# Patient Record
Sex: FEMALE | Race: WHITE | NOT HISPANIC OR LATINO | Employment: UNEMPLOYED | URBAN - METROPOLITAN AREA
[De-identification: names, ages, dates, MRNs, and addresses within clinical notes are randomized per-mention and may not be internally consistent; named-entity substitution may affect disease eponyms.]

---

## 2017-03-04 PROCEDURE — 99283 EMERGENCY DEPT VISIT LOW MDM: CPT

## 2017-03-05 ENCOUNTER — HOSPITAL ENCOUNTER (EMERGENCY)
Facility: HOSPITAL | Age: 47
Discharge: HOME | End: 2017-03-05
Attending: EMERGENCY MEDICINE
Payer: MEDICAID

## 2017-03-05 VITALS
HEIGHT: 64 IN | WEIGHT: 128 LBS | TEMPERATURE: 98 F | SYSTOLIC BLOOD PRESSURE: 138 MMHG | BODY MASS INDEX: 21.85 KG/M2 | RESPIRATION RATE: 18 BRPM | OXYGEN SATURATION: 96 % | HEART RATE: 75 BPM | DIASTOLIC BLOOD PRESSURE: 70 MMHG

## 2017-03-05 DIAGNOSIS — K08.89 PAIN, DENTAL: Primary | ICD-10-CM

## 2017-03-05 RX ORDER — PROMETHAZINE HYDROCHLORIDE 25 MG/1
12.5 TABLET ORAL EVERY 6 HOURS PRN
Qty: 12 TABLET | Refills: 0 | Status: SHIPPED | OUTPATIENT
Start: 2017-03-05 | End: 2017-04-13 | Stop reason: SDUPTHER

## 2017-03-05 RX ORDER — HYDROCODONE BITARTRATE AND ACETAMINOPHEN 7.5; 325 MG/1; MG/1
1 TABLET ORAL EVERY 4 HOURS PRN
Qty: 12 TABLET | Refills: 0 | Status: ON HOLD | OUTPATIENT
Start: 2017-03-05 | End: 2017-04-08

## 2017-03-05 NOTE — ED PROVIDER NOTES
SCRIBE #1 NOTE: I, Preeti Reddy, am scribing for, and in the presence of, Melita Saavedra MD. I have scribed the entire note.      History      Chief Complaint   Patient presents with    Dental Pain     reports having 2 teeth pulled on Wednesday reports calling her dentist and he informed her to go to the ER for a possible dry socket. still smoking        Review of patient's allergies indicates:   Allergen Reactions    Tramadol Rash        HPI   HPI    3/5/2017, 12:52 AM   History obtained from the patient      History of Present Illness: Kaylene Emery is a 46 y.o. female patient who presents to the Emergency Department for dental pain which onset gradually since she got 2 teeth pulled on 3/1/17. Symptoms are constant and moderate in severity. Pain is located to R lower tooth. No mitigating or exacerbating factors reported. Associated sxs include nausea. Patient denies any fever, chills, emesis, diarrhea, SOB, and all other sxs at this time. Prior Tx includes Amoxicillin after her teeth were removed. No further complaints or concerns at this time.         Arrival mode: Personal vehicle    PCP: Jonas Jimenez MD       Past Medical History:  Past Medical History:   Diagnosis Date    Tobacco use        Past Surgical History:  Past Surgical History:   Procedure Laterality Date    BLADDER REPAIR      KNEE ARTHROPLASTY      PARTIAL HYSTERECTOMY      TUBAL LIGATION           Family History:  Family History   Problem Relation Age of Onset    Hypertension Mother     Diabetes Mother        Social History:  Social History     Social History Main Topics    Smoking status: Current Every Day Smoker     Packs/day: 1.00     Types: Cigarettes    Smokeless tobacco: Never Used    Alcohol use Yes    Drug use: No    Sexual activity: Yes       ROS   Review of Systems   Constitutional: Negative for chills and fever.   HENT: Positive for dental problem. Negative for sore throat.    Respiratory: Negative for shortness of  breath.    Cardiovascular: Negative for chest pain.   Gastrointestinal: Negative for diarrhea, nausea and vomiting.   Genitourinary: Negative for dysuria.   Musculoskeletal: Negative for back pain.   Skin: Negative for rash.   Neurological: Negative for weakness.   Hematological: Does not bruise/bleed easily.   All other systems reviewed and are negative.      Physical Exam    Initial Vitals   BP Pulse Resp Temp SpO2   03/05/17 0007 03/05/17 0007 03/05/17 0007 03/05/17 0007 03/05/17 0007   138/70 75 18 98.1 °F (36.7 °C) 96 %      Physical Exam  Nursing Notes and Vital Signs Reviewed.  Constitutional: Patient is in no acute distress. Awake and alert. Well-developed and well-nourished.  Head: Atraumatic. Normocephalic.  Eyes: PERRL. EOM intact. Conjunctivae are not pale. No scleral icterus.  ENT: Mucous membranes are moist. Oropharynx is clear and symmetric.    Mouth/Throat: No evident facial swelling. Patient handles secretions normally. negative for dental caries. negative for gingival edema. R lower K-9 tooth tenderness and mild swelling No palpable fluctuance. No evidence of periodontal or periapical abscess. No trismus.   Neck: Supple. Full ROM. No lymphadenopathy.  Cardiovascular: Regular rate. Regular rhythm. No murmurs, rubs, or gallops. Distal pulses are 2+ and symmetric.  Pulmonary/Chest: No respiratory distress. Clear to auscultation bilaterally. No wheezing, rales, or rhonchi.  Abdominal: Soft and non-distended.  There is no tenderness.  No rebound, guarding, or rigidity. Good bowel sounds.  Genitourinary: No CVA tenderness  Musculoskeletal: Moves all extremities. No obvious deformities. No edema. No calf tenderness.  Skin: Warm and dry.  Neurological:  Alert, awake, and appropriate.  Normal speech.  No acute focal neurological deficits are appreciated.  Psychiatric: Normal affect. Good eye contact. Appropriate in content.    ED Course    Procedures  ED Vital Signs:  Vitals:    03/05/17 0007   BP: 138/70  "  Pulse: 75   Resp: 18   Temp: 98.1 °F (36.7 °C)   TempSrc: Oral   SpO2: 96%   Weight: 58.1 kg (128 lb)   Height: 5' 4" (1.626 m)            The Emergency Provider reviewed the vital signs and test results, which are outlined above.    ED Discussion       12:57 AM: Pt states  Discussed with pt all pertinent ED information and results. Discussed pt dx  and plan of tx. Gave pt all f/u and return to the ED instructions. All questions and concerns were addressed at this time. Pt expresses understanding of information and instructions, and is comfortable with plan to discharge. Pt is stable for discharge.          ED Medication(s):  Medications - No data to display    Discharge Medication List as of 3/5/2017 12:53 AM      START taking these medications    Details   hydrocodone-acetaminophen 7.5-325mg (NORCO) 7.5-325 mg per tablet Take 1 tablet by mouth every 4 (four) hours as needed for Pain., Starting 3/5/2017, Until Discontinued, Print      promethazine (PHENERGAN) 25 MG tablet Take 0.5 tablets (12.5 mg total) by mouth every 6 (six) hours as needed for Nausea., Starting 3/5/2017, Until Discontinued, Print             Follow-up Information     Follow up with your dentist In 2 days.        Follow up with [facility].    Specialty:  Emergency Medicine    Why:  As needed    Contact information:    [address] [facility] [address] [phone]            Medical Decision Making              Scribe Attestation:   Scribe #1: I performed the above scribed service and the documentation accurately describes the services I performed. I attest to the accuracy of the note.    Attending:   Physician Attestation Statement for Scribe #1: I, Melita Saavedra MD, personally performed the services described in this documentation, as scribed by Preeti Reddy, in my presence, and it is both accurate and complete.          Clinical Impression       ICD-10-CM ICD-9-CM   1. Pain, dental K08.89 525.9 "       Disposition:   Disposition: Discharged  Condition: Stable         Melita Saavedra MD  03/05/17 0609

## 2017-03-05 NOTE — ED AVS SNAPSHOT
[facility] [address] [place] [address]               Kaylene Emery   3/5/2017 12:45 AM   ED    Description:  Female : 1970   Department:  [facility]           Your Care was Coordinated By:     Provider Role From To    Melita Saavedra MD Attending Provider 17 0045 --      Reason for Visit     Dental Pain           Diagnoses this Visit        Comments    Pain, dental    -  Primary       ED Disposition     ED Disposition Condition Comment    Discharge             To Do List           Follow-up Information     Follow up with your dentist In 2 days.        Follow up with [facility].    Specialty:  Emergency Medicine    Why:  As needed    Contact information:    [address] [place] [address] [phone]       These Medications        Disp Refills Start End    hydrocodone-acetaminophen 7.5-325mg (NORCO) 7.5-325 mg per tablet 12 tablet 0 3/5/2017     Take 1 tablet by mouth every 4 (four) hours as needed for Pain. - Oral    promethazine (PHENERGAN) 25 MG tablet 12 tablet 0 3/5/2017     Take 0.5 tablets (12.5 mg total) by mouth every 6 (six) hours as needed for Nausea. - Oral      [facility]     [facility] -  Assistance  Registered nurses in the [facility] provide clinical advisement, health education, appointment booking, and other advisory services.  Call for this free service at [phone].             Medications           Message regarding Medications     Verify the changes and/or additions to your medication regime listed below are the same as discussed with your clinician today.  If any of these changes or additions are incorrect, please notify your healthcare provider.        START taking these NEW medications        Refills    hydrocodone-acetaminophen 7.5-325mg (NORCO) 7.5-325 mg per tablet 0    Sig: Take 1 tablet by mouth  "every 4 (four) hours as needed for Pain.    Class: Print    Route: Oral    promethazine (PHENERGAN) 25 MG tablet 0    Sig: Take 0.5 tablets (12.5 mg total) by mouth every 6 (six) hours as needed for Nausea.    Class: Print    Route: Oral           Verify that the below list of medications is an accurate representation of the medications you are currently taking.  If none reported, the list may be blank. If incorrect, please contact your healthcare provider. Carry this list with you in case of emergency.           Current Medications     hydrocodone-acetaminophen 7.5-325mg (NORCO) 7.5-325 mg per tablet Take 1 tablet by mouth every 4 (four) hours as needed for Pain.    naproxen (NAPROSYN) 375 MG tablet Take 1 tablet (375 mg total) by mouth 2 (two) times daily with meals.    promethazine (PHENERGAN) 25 MG tablet Take 0.5 tablets (12.5 mg total) by mouth every 6 (six) hours as needed for Nausea.    tramadol (ULTRAM) 50 mg tablet Take 1 tablet (50 mg total) by mouth every 6 (six) hours as needed for Pain.           Clinical Reference Information           Your Vitals Were     BP Pulse Temp Resp Height Weight    138/70 (BP Location: Right arm, Patient Position: Sitting) 75 98.1 °F (36.7 °C) (Oral) 18 5' 4" (1.626 m) 58.1 kg (128 lb)    SpO2 BMI             96% 21.97 kg/m2         Allergies as of 3/5/2017        Reactions    Tramadol Rash      Immunizations Administered on Date of Encounter - 3/5/2017     None      ED Micro, Lab, POCT     None      ED Imaging Orders     None      Discharge References/Attachments     DENTAL PAIN (ENGLISH)      [facility] Sign-Up     Activating your [facility] account is as easy as 1-2-3!     1) Visit [email or web address], select Sign Up Now, enter this activation code and your date of birth, then select Next.  [email or web address]  Expires: 4/19/2017 12:53 AM      2) Create a username and password to use when you visit [facility] in the future and select a security question in case you lose your " password and select Next.    3) Enter your e-mail address and click Sign Up!    Additional Information  If you have questions, please e-mail [email or web address] or call [phone] to talk to our [facility] staff. Remember, [facility] is NOT to be used for urgent needs. For medical emergencies, dial 911.         Smoking Cessation     If you would like to quit smoking:   You may be eligible for free services if you are a [place] resident and started smoking cigarettes before September 1, 1988.  Call the [employer]oking [employer]essation [employer] ([employer]) toll free at (932) 685-4831 or (085) [phone].   Call 0-368-QUIT-NOW if you do not meet the above criteria.             [facility] complies with applicable Federal civil rights laws and does not discriminate on the basis of race, color, national origin, age, disability, or sex.        Language Assistance Services     ATTENTION: Language assistance services are available, free of charge. Please call [phone].      ATENCIÓN: Si habla español, tiene a smith disposición servicios gratuitos de asistencia lingüística. Llame al [phone].     [facility] Ý: N?u b?n nói Ti?ng Vi?t, có các d?ch v? h? tr? ngôn ng? mi?n phí dành cho b?n. G?i s? [phone].

## 2017-04-07 ENCOUNTER — SURGERY (OUTPATIENT)
Age: 47
End: 2017-04-07

## 2017-04-07 ENCOUNTER — ANESTHESIA (OUTPATIENT)
Dept: SURGERY | Facility: HOSPITAL | Age: 47
End: 2017-04-07
Payer: MEDICAID

## 2017-04-07 ENCOUNTER — HOSPITAL ENCOUNTER (OUTPATIENT)
Facility: HOSPITAL | Age: 47
Discharge: HOME | End: 2017-04-08
Attending: EMERGENCY MEDICINE | Admitting: SURGERY
Payer: MEDICAID

## 2017-04-07 ENCOUNTER — ANESTHESIA EVENT (OUTPATIENT)
Dept: SURGERY | Facility: HOSPITAL | Age: 47
End: 2017-04-07
Payer: MEDICAID

## 2017-04-07 DIAGNOSIS — K81.9 CHOLECYSTITIS: ICD-10-CM

## 2017-04-07 DIAGNOSIS — K80.00 CALCULUS OF GALLBLADDER WITH ACUTE CHOLECYSTITIS WITHOUT OBSTRUCTION: Primary | ICD-10-CM

## 2017-04-07 PROBLEM — Z72.0 TOBACCO USE: Status: ACTIVE | Noted: 2017-04-07

## 2017-04-07 LAB
ALBUMIN SERPL BCP-MCNC: 4 G/DL
ALP SERPL-CCNC: 84 U/L
ALT SERPL W/O P-5'-P-CCNC: 10 U/L
AMYLASE SERPL-CCNC: 38 U/L
ANION GAP SERPL CALC-SCNC: 11 MMOL/L
AST SERPL-CCNC: 13 U/L
B-HCG UR QL: NEGATIVE
BASOPHILS # BLD AUTO: 0.02 K/UL
BASOPHILS NFR BLD: 0.1 %
BILIRUB SERPL-MCNC: 0.5 MG/DL
BILIRUB UR QL STRIP: NEGATIVE
BNP SERPL-MCNC: 148 PG/ML
BUN SERPL-MCNC: 7 MG/DL
CALCIUM SERPL-MCNC: 9.1 MG/DL
CHLORIDE SERPL-SCNC: 96 MMOL/L
CK SERPL-CCNC: 81 U/L
CLARITY UR: ABNORMAL
CO2 SERPL-SCNC: 25 MMOL/L
COLOR UR: YELLOW
CREAT SERPL-MCNC: 0.8 MG/DL
DIFFERENTIAL METHOD: ABNORMAL
EOSINOPHIL # BLD AUTO: 0.1 K/UL
EOSINOPHIL NFR BLD: 0.6 %
ERYTHROCYTE [DISTWIDTH] IN BLOOD BY AUTOMATED COUNT: 14.2 %
EST. GFR  (AFRICAN AMERICAN): >60 ML/MIN/1.73 M^2
EST. GFR  (NON AFRICAN AMERICAN): >60 ML/MIN/1.73 M^2
GLUCOSE SERPL-MCNC: 140 MG/DL
GLUCOSE UR QL STRIP: NEGATIVE
HCT VFR BLD AUTO: 45.8 %
HGB BLD-MCNC: 16.4 G/DL
HGB UR QL STRIP: NEGATIVE
KETONES UR QL STRIP: NEGATIVE
LEUKOCYTE ESTERASE UR QL STRIP: NEGATIVE
LIPASE SERPL-CCNC: 30 U/L
LYMPHOCYTES # BLD AUTO: 1.7 K/UL
LYMPHOCYTES NFR BLD: 11.2 %
MCH RBC QN AUTO: 32.8 PG
MCHC RBC AUTO-ENTMCNC: 35.8 %
MCV RBC AUTO: 92 FL
MONOCYTES # BLD AUTO: 0.7 K/UL
MONOCYTES NFR BLD: 4.4 %
NEUTROPHILS # BLD AUTO: 12.5 K/UL
NEUTROPHILS NFR BLD: 83.7 %
NITRITE UR QL STRIP: NEGATIVE
PH UR STRIP: 8 [PH] (ref 5–8)
PLATELET # BLD AUTO: 253 K/UL
PMV BLD AUTO: 9.7 FL
POTASSIUM SERPL-SCNC: 4.2 MMOL/L
PROT SERPL-MCNC: 8.4 G/DL
PROT UR QL STRIP: NEGATIVE
RBC # BLD AUTO: 5 M/UL
SODIUM SERPL-SCNC: 132 MMOL/L
SP GR UR STRIP: 1.01 (ref 1–1.03)
TROPONIN I SERPL DL<=0.01 NG/ML-MCNC: <0.006 NG/ML
URN SPEC COLLECT METH UR: ABNORMAL
UROBILINOGEN UR STRIP-ACNC: NEGATIVE EU/DL
WBC # BLD AUTO: 14.9 K/UL

## 2017-04-07 PROCEDURE — 47562 LAPAROSCOPIC CHOLECYSTECTOMY: CPT | Mod: ,,, | Performed by: SURGERY

## 2017-04-07 PROCEDURE — 81025 URINE PREGNANCY TEST: CPT

## 2017-04-07 PROCEDURE — G0378 HOSPITAL OBSERVATION PER HR: HCPCS

## 2017-04-07 PROCEDURE — 81003 URINALYSIS AUTO W/O SCOPE: CPT

## 2017-04-07 PROCEDURE — 80053 COMPREHEN METABOLIC PANEL: CPT

## 2017-04-07 PROCEDURE — 37000009 HC ANESTHESIA EA ADD 15 MINS: Performed by: SURGERY

## 2017-04-07 PROCEDURE — 63600175 PHARM REV CODE 636 W HCPCS: Performed by: SURGERY

## 2017-04-07 PROCEDURE — 96366 THER/PROPH/DIAG IV INF ADDON: CPT

## 2017-04-07 PROCEDURE — 83880 ASSAY OF NATRIURETIC PEPTIDE: CPT

## 2017-04-07 PROCEDURE — 27201423 OPTIME MED/SURG SUP & DEVICES STERILE SUPPLY: Performed by: SURGERY

## 2017-04-07 PROCEDURE — 63600175 PHARM REV CODE 636 W HCPCS: Performed by: NURSE ANESTHETIST, CERTIFIED REGISTERED

## 2017-04-07 PROCEDURE — 71000033 HC RECOVERY, INTIAL HOUR: Performed by: SURGERY

## 2017-04-07 PROCEDURE — 83690 ASSAY OF LIPASE: CPT

## 2017-04-07 PROCEDURE — 93005 ELECTROCARDIOGRAM TRACING: CPT

## 2017-04-07 PROCEDURE — 36000709 HC OR TIME LEV III EA ADD 15 MIN: Performed by: SURGERY

## 2017-04-07 PROCEDURE — 25000003 PHARM REV CODE 250: Performed by: EMERGENCY MEDICINE

## 2017-04-07 PROCEDURE — 82150 ASSAY OF AMYLASE: CPT

## 2017-04-07 PROCEDURE — 96367 TX/PROPH/DG ADDL SEQ IV INF: CPT

## 2017-04-07 PROCEDURE — 37000008 HC ANESTHESIA 1ST 15 MINUTES: Performed by: SURGERY

## 2017-04-07 PROCEDURE — 25000003 PHARM REV CODE 250: Performed by: NURSE ANESTHETIST, CERTIFIED REGISTERED

## 2017-04-07 PROCEDURE — 88304 TISSUE EXAM BY PATHOLOGIST: CPT | Performed by: PATHOLOGY

## 2017-04-07 PROCEDURE — 63600175 PHARM REV CODE 636 W HCPCS: Performed by: EMERGENCY MEDICINE

## 2017-04-07 PROCEDURE — 85025 COMPLETE CBC W/AUTO DIFF WBC: CPT

## 2017-04-07 PROCEDURE — 99220 PR INITIAL OBSERVATION CARE,LEVL III: CPT | Mod: 57,,, | Performed by: SURGERY

## 2017-04-07 PROCEDURE — 93010 ELECTROCARDIOGRAM REPORT: CPT | Mod: ,,, | Performed by: INTERNAL MEDICINE

## 2017-04-07 PROCEDURE — 36000708 HC OR TIME LEV III 1ST 15 MIN: Performed by: SURGERY

## 2017-04-07 PROCEDURE — 99285 EMERGENCY DEPT VISIT HI MDM: CPT | Mod: 25

## 2017-04-07 PROCEDURE — 25000003 PHARM REV CODE 250: Performed by: SURGERY

## 2017-04-07 PROCEDURE — 88304 TISSUE EXAM BY PATHOLOGIST: CPT | Mod: 26,,, | Performed by: PATHOLOGY

## 2017-04-07 PROCEDURE — 96365 THER/PROPH/DIAG IV INF INIT: CPT

## 2017-04-07 PROCEDURE — 84484 ASSAY OF TROPONIN QUANT: CPT

## 2017-04-07 PROCEDURE — 82550 ASSAY OF CK (CPK): CPT

## 2017-04-07 RX ORDER — SODIUM CHLORIDE 9 MG/ML
3 INJECTION, SOLUTION INTRAMUSCULAR; INTRAVENOUS; SUBCUTANEOUS
Status: DISCONTINUED | OUTPATIENT
Start: 2017-04-07 | End: 2017-04-07

## 2017-04-07 RX ORDER — SODIUM CHLORIDE, SODIUM LACTATE, POTASSIUM CHLORIDE, CALCIUM CHLORIDE 600; 310; 30; 20 MG/100ML; MG/100ML; MG/100ML; MG/100ML
INJECTION, SOLUTION INTRAVENOUS CONTINUOUS PRN
Status: DISCONTINUED | OUTPATIENT
Start: 2017-04-07 | End: 2017-04-07

## 2017-04-07 RX ORDER — SODIUM CHLORIDE, SODIUM LACTATE, POTASSIUM CHLORIDE, CALCIUM CHLORIDE 600; 310; 30; 20 MG/100ML; MG/100ML; MG/100ML; MG/100ML
INJECTION, SOLUTION INTRAVENOUS CONTINUOUS
Status: DISCONTINUED | OUTPATIENT
Start: 2017-04-07 | End: 2017-04-07

## 2017-04-07 RX ORDER — LORAZEPAM 1 MG/1
1 TABLET ORAL EVERY 6 HOURS PRN
Status: ON HOLD | COMMUNITY
End: 2017-07-14 | Stop reason: HOSPADM

## 2017-04-07 RX ORDER — QUETIAPINE FUMARATE 200 MG/1
TABLET, FILM COATED ORAL
Status: ON HOLD | COMMUNITY
End: 2017-07-14 | Stop reason: HOSPADM

## 2017-04-07 RX ORDER — MORPHINE SULFATE 4 MG/ML
4 INJECTION, SOLUTION INTRAMUSCULAR; INTRAVENOUS
Status: COMPLETED | OUTPATIENT
Start: 2017-04-07 | End: 2017-04-07

## 2017-04-07 RX ORDER — MORPHINE SULFATE 2 MG/ML
2 INJECTION, SOLUTION INTRAMUSCULAR; INTRAVENOUS
Status: DISCONTINUED | OUTPATIENT
Start: 2017-04-07 | End: 2017-04-08 | Stop reason: HOSPADM

## 2017-04-07 RX ORDER — RAMELTEON 8 MG/1
8 TABLET ORAL NIGHTLY PRN
Status: DISCONTINUED | OUTPATIENT
Start: 2017-04-07 | End: 2017-04-07

## 2017-04-07 RX ORDER — MIDAZOLAM HYDROCHLORIDE 1 MG/ML
INJECTION, SOLUTION INTRAMUSCULAR; INTRAVENOUS
Status: DISCONTINUED | OUTPATIENT
Start: 2017-04-07 | End: 2017-04-07

## 2017-04-07 RX ORDER — ONDANSETRON 2 MG/ML
INJECTION INTRAMUSCULAR; INTRAVENOUS
Status: DISCONTINUED | OUTPATIENT
Start: 2017-04-07 | End: 2017-04-07

## 2017-04-07 RX ORDER — FLUOXETINE HYDROCHLORIDE 40 MG/1
40 CAPSULE ORAL DAILY
COMMUNITY
End: 2018-06-13 | Stop reason: CLARIF

## 2017-04-07 RX ORDER — SODIUM CHLORIDE, SODIUM LACTATE, POTASSIUM CHLORIDE, CALCIUM CHLORIDE 600; 310; 30; 20 MG/100ML; MG/100ML; MG/100ML; MG/100ML
INJECTION, SOLUTION INTRAVENOUS CONTINUOUS
Status: DISCONTINUED | OUTPATIENT
Start: 2017-04-07 | End: 2017-04-08 | Stop reason: HOSPADM

## 2017-04-07 RX ORDER — ROCURONIUM BROMIDE 10 MG/ML
INJECTION, SOLUTION INTRAVENOUS
Status: DISCONTINUED | OUTPATIENT
Start: 2017-04-07 | End: 2017-04-07

## 2017-04-07 RX ORDER — ONDANSETRON 8 MG/1
8 TABLET, ORALLY DISINTEGRATING ORAL EVERY 8 HOURS PRN
Status: DISCONTINUED | OUTPATIENT
Start: 2017-04-07 | End: 2017-04-07

## 2017-04-07 RX ORDER — SUCCINYLCHOLINE CHLORIDE 20 MG/ML
INJECTION INTRAMUSCULAR; INTRAVENOUS
Status: DISCONTINUED | OUTPATIENT
Start: 2017-04-07 | End: 2017-04-07

## 2017-04-07 RX ORDER — BUPIVACAINE HYDROCHLORIDE 2.5 MG/ML
INJECTION, SOLUTION INFILTRATION; PERINEURAL
Status: DISCONTINUED | OUTPATIENT
Start: 2017-04-07 | End: 2017-04-07 | Stop reason: HOSPADM

## 2017-04-07 RX ORDER — FENTANYL CITRATE 50 UG/ML
INJECTION, SOLUTION INTRAMUSCULAR; INTRAVENOUS
Status: DISCONTINUED | OUTPATIENT
Start: 2017-04-07 | End: 2017-04-07

## 2017-04-07 RX ORDER — HYDROCODONE BITARTRATE AND ACETAMINOPHEN 7.5; 325 MG/1; MG/1
1 TABLET ORAL EVERY 4 HOURS PRN
Status: DISCONTINUED | OUTPATIENT
Start: 2017-04-07 | End: 2017-04-08 | Stop reason: HOSPADM

## 2017-04-07 RX ORDER — GLYCOPYRROLATE 0.2 MG/ML
INJECTION INTRAMUSCULAR; INTRAVENOUS
Status: DISCONTINUED | OUTPATIENT
Start: 2017-04-07 | End: 2017-04-07

## 2017-04-07 RX ORDER — IBUPROFEN 200 MG
1 TABLET ORAL DAILY
Status: DISCONTINUED | OUTPATIENT
Start: 2017-04-07 | End: 2017-04-07

## 2017-04-07 RX ORDER — SODIUM CHLORIDE 0.9 % (FLUSH) 0.9 %
3 SYRINGE (ML) INJECTION EVERY 8 HOURS
Status: DISCONTINUED | OUTPATIENT
Start: 2017-04-07 | End: 2017-04-07

## 2017-04-07 RX ORDER — MORPHINE SULFATE 10 MG/ML
2 INJECTION INTRAMUSCULAR; INTRAVENOUS; SUBCUTANEOUS EVERY 5 MIN PRN
Status: DISCONTINUED | OUTPATIENT
Start: 2017-04-07 | End: 2017-04-08 | Stop reason: HOSPADM

## 2017-04-07 RX ORDER — NEOSTIGMINE METHYLSULFATE 1 MG/ML
INJECTION, SOLUTION INTRAVENOUS
Status: DISCONTINUED | OUTPATIENT
Start: 2017-04-07 | End: 2017-04-07

## 2017-04-07 RX ORDER — MORPHINE SULFATE 4 MG/ML
4 INJECTION, SOLUTION INTRAMUSCULAR; INTRAVENOUS EVERY 4 HOURS PRN
Status: DISCONTINUED | OUTPATIENT
Start: 2017-04-07 | End: 2017-04-07

## 2017-04-07 RX ORDER — PROPOFOL 10 MG/ML
VIAL (ML) INTRAVENOUS
Status: DISCONTINUED | OUTPATIENT
Start: 2017-04-07 | End: 2017-04-07

## 2017-04-07 RX ORDER — MEPERIDINE HYDROCHLORIDE 50 MG/ML
12.5 INJECTION INTRAMUSCULAR; INTRAVENOUS; SUBCUTANEOUS ONCE AS NEEDED
Status: ACTIVE | OUTPATIENT
Start: 2017-04-07 | End: 2017-04-07

## 2017-04-07 RX ORDER — ONDANSETRON HYDROCHLORIDE 8 MG/1
8 TABLET, FILM COATED ORAL
COMMUNITY
End: 2017-04-25 | Stop reason: SDUPTHER

## 2017-04-07 RX ORDER — OXYCODONE HYDROCHLORIDE 5 MG/1
5 TABLET ORAL
Status: DISCONTINUED | OUTPATIENT
Start: 2017-04-07 | End: 2017-04-08 | Stop reason: HOSPADM

## 2017-04-07 RX ORDER — LIDOCAINE HYDROCHLORIDE 10 MG/ML
INJECTION INFILTRATION; PERINEURAL
Status: DISCONTINUED | OUTPATIENT
Start: 2017-04-07 | End: 2017-04-07

## 2017-04-07 RX ORDER — FAMOTIDINE 40 MG/1
40 TABLET, FILM COATED ORAL DAILY
Status: ON HOLD | COMMUNITY
End: 2017-05-05 | Stop reason: HOSPADM

## 2017-04-07 RX ORDER — KETOROLAC TROMETHAMINE 30 MG/ML
INJECTION, SOLUTION INTRAMUSCULAR; INTRAVENOUS
Status: DISCONTINUED | OUTPATIENT
Start: 2017-04-07 | End: 2017-04-07

## 2017-04-07 RX ORDER — MORPHINE SULFATE 2 MG/ML
2 INJECTION, SOLUTION INTRAMUSCULAR; INTRAVENOUS
Status: DISCONTINUED | OUTPATIENT
Start: 2017-04-07 | End: 2017-04-07

## 2017-04-07 RX ORDER — DIPHENHYDRAMINE HYDROCHLORIDE 50 MG/ML
25 INJECTION INTRAMUSCULAR; INTRAVENOUS EVERY 4 HOURS PRN
Status: DISCONTINUED | OUTPATIENT
Start: 2017-04-07 | End: 2017-04-07

## 2017-04-07 RX ORDER — MOXIFLOXACIN HYDROCHLORIDE 400 MG/250ML
400 INJECTION, SOLUTION INTRAVENOUS
Status: DISCONTINUED | OUTPATIENT
Start: 2017-04-07 | End: 2017-04-07

## 2017-04-07 RX ORDER — ESOMEPRAZOLE MAGNESIUM 40 MG/1
40 CAPSULE, DELAYED RELEASE ORAL
COMMUNITY
End: 2017-06-02 | Stop reason: CLARIF

## 2017-04-07 RX ADMIN — SUCCINYLCHOLINE CHLORIDE 120 MG: 20 INJECTION, SOLUTION INTRAMUSCULAR; INTRAVENOUS at 07:04

## 2017-04-07 RX ADMIN — MORPHINE SULFATE 4 MG: 4 INJECTION, SOLUTION INTRAMUSCULAR; INTRAVENOUS at 01:04

## 2017-04-07 RX ADMIN — PROMETHAZINE HYDROCHLORIDE 12.5 MG: 25 INJECTION, SOLUTION INTRAMUSCULAR; INTRAVENOUS at 09:04

## 2017-04-07 RX ADMIN — MORPHINE SULFATE 2 MG: 2 INJECTION, SOLUTION INTRAMUSCULAR; INTRAVENOUS at 05:04

## 2017-04-07 RX ADMIN — BUPIVACAINE HYDROCHLORIDE 30 ML: 2.5 INJECTION, SOLUTION INFILTRATION; PERINEURAL at 08:04

## 2017-04-07 RX ADMIN — PROMETHAZINE HYDROCHLORIDE 12.5 MG: 25 INJECTION, SOLUTION INTRAMUSCULAR; INTRAVENOUS at 02:04

## 2017-04-07 RX ADMIN — HYDROCODONE BITARTRATE AND ACETAMINOPHEN 1 TABLET: 7.5; 325 TABLET ORAL at 10:04

## 2017-04-07 RX ADMIN — SODIUM CHLORIDE 1000 ML: 0.9 INJECTION, SOLUTION INTRAVENOUS at 09:04

## 2017-04-07 RX ADMIN — MOXIFLOXACIN HYDROCHLORIDE 400 MG: 400 INJECTION, SOLUTION INTRAVENOUS at 03:04

## 2017-04-07 RX ADMIN — PROMETHAZINE HYDROCHLORIDE 6.25 MG: 25 INJECTION, SOLUTION INTRAMUSCULAR; INTRAVENOUS at 05:04

## 2017-04-07 RX ADMIN — ONDANSETRON 4 MG: 2 INJECTION, SOLUTION INTRAMUSCULAR; INTRAVENOUS at 08:04

## 2017-04-07 RX ADMIN — PROMETHAZINE HYDROCHLORIDE 12.5 MG: 25 INJECTION, SOLUTION INTRAMUSCULAR; INTRAVENOUS at 11:04

## 2017-04-07 RX ADMIN — SODIUM CHLORIDE, SODIUM LACTATE, POTASSIUM CHLORIDE, AND CALCIUM CHLORIDE: 600; 310; 30; 20 INJECTION, SOLUTION INTRAVENOUS at 07:04

## 2017-04-07 RX ADMIN — NICOTINE 1 PATCH: 14 PATCH, EXTENDED RELEASE TRANSDERMAL at 04:04

## 2017-04-07 RX ADMIN — MORPHINE SULFATE 4 MG: 4 INJECTION, SOLUTION INTRAMUSCULAR; INTRAVENOUS at 09:04

## 2017-04-07 RX ADMIN — PROPOFOL 200 MG: 10 INJECTION, EMULSION INTRAVENOUS at 07:04

## 2017-04-07 RX ADMIN — NEOSTIGMINE METHYLSULFATE 4 MG: 1 INJECTION INTRAVENOUS at 08:04

## 2017-04-07 RX ADMIN — ONDANSETRON 8 MG: 8 TABLET, ORALLY DISINTEGRATING ORAL at 04:04

## 2017-04-07 RX ADMIN — ROCURONIUM BROMIDE 5 MG: 10 INJECTION, SOLUTION INTRAVENOUS at 07:04

## 2017-04-07 RX ADMIN — FENTANYL CITRATE 100 MCG: 50 INJECTION, SOLUTION INTRAMUSCULAR; INTRAVENOUS at 07:04

## 2017-04-07 RX ADMIN — SODIUM CHLORIDE, SODIUM LACTATE, POTASSIUM CHLORIDE, AND CALCIUM CHLORIDE: .6; .31; .03; .02 INJECTION, SOLUTION INTRAVENOUS at 04:04

## 2017-04-07 RX ADMIN — KETOROLAC TROMETHAMINE 30 MG: 30 INJECTION, SOLUTION INTRAMUSCULAR; INTRAVENOUS at 08:04

## 2017-04-07 RX ADMIN — ROCURONIUM BROMIDE 25 MG: 10 INJECTION, SOLUTION INTRAVENOUS at 07:04

## 2017-04-07 RX ADMIN — MORPHINE SULFATE 4 MG: 4 INJECTION, SOLUTION INTRAMUSCULAR; INTRAVENOUS at 02:04

## 2017-04-07 RX ADMIN — GLYCOPYRROLATE 0.6 MG: 0.2 INJECTION, SOLUTION INTRAMUSCULAR; INTRAVENOUS at 08:04

## 2017-04-07 RX ADMIN — MIDAZOLAM HYDROCHLORIDE 2 MG: 1 INJECTION, SOLUTION INTRAMUSCULAR; INTRAVENOUS at 07:04

## 2017-04-07 RX ADMIN — LIDOCAINE HYDROCHLORIDE 80 MG: 10 INJECTION, SOLUTION INFILTRATION; PERINEURAL at 07:04

## 2017-04-07 RX ADMIN — SODIUM CHLORIDE, SODIUM LACTATE, POTASSIUM CHLORIDE, AND CALCIUM CHLORIDE: .6; .31; .03; .02 INJECTION, SOLUTION INTRAVENOUS at 09:04

## 2017-04-07 NOTE — ASSESSMENT & PLAN NOTE
Patient will be provided with information about tobacco cesssation at the time of discharge.    Nicotine patch while hospitalized

## 2017-04-07 NOTE — SUBJECTIVE & OBJECTIVE
No current facility-administered medications on file prior to encounter.      Current Outpatient Prescriptions on File Prior to Encounter   Medication Sig    hydrocodone-acetaminophen 7.5-325mg (NORCO) 7.5-325 mg per tablet Take 1 tablet by mouth every 4 (four) hours as needed for Pain.    naproxen (NAPROSYN) 375 MG tablet Take 1 tablet (375 mg total) by mouth 2 (two) times daily with meals.    promethazine (PHENERGAN) 25 MG tablet Take 0.5 tablets (12.5 mg total) by mouth every 6 (six) hours as needed for Nausea.    tramadol (ULTRAM) 50 mg tablet Take 1 tablet (50 mg total) by mouth every 6 (six) hours as needed for Pain.       Review of patient's allergies indicates:   Allergen Reactions    Tramadol Rash       Past Medical History:   Diagnosis Date    Anxiety     Depression     GERD (gastroesophageal reflux disease)     Tobacco use      Past Surgical History:   Procedure Laterality Date    BLADDER REPAIR      KNEE ARTHROPLASTY      PARTIAL HYSTERECTOMY      TUBAL LIGATION       Family History     Problem Relation (Age of Onset)    Diabetes Mother    Hypertension Mother        Social History Main Topics    Smoking status: Current Every Day Smoker     Packs/day: 1.00     Types: Cigarettes    Smokeless tobacco: Never Used    Alcohol use Yes    Drug use: No    Sexual activity: Yes     Review of Systems   Constitutional: Negative for appetite change, chills, fatigue, fever and unexpected weight change.   HENT: Negative for hearing loss and rhinorrhea.    Eyes: Negative for visual disturbance.   Respiratory: Negative for apnea, cough, shortness of breath and wheezing.    Cardiovascular: Negative for chest pain and palpitations.   Gastrointestinal: Positive for abdominal pain, nausea and vomiting. Negative for abdominal distention, blood in stool, constipation and diarrhea.   Genitourinary: Negative for dysuria, frequency and urgency.   Musculoskeletal: Negative for arthralgias and neck pain.   Skin:  Negative for rash.   Neurological: Negative for seizures, weakness, numbness and headaches.   Hematological: Negative for adenopathy. Does not bruise/bleed easily.   Psychiatric/Behavioral: Negative for hallucinations. The patient is not nervous/anxious.      Objective:     Vital Signs (Most Recent):  Temp: 98.1 °F (36.7 °C) (04/07/17 1514)  Pulse: 73 (04/07/17 1434)  Resp: 13 (04/07/17 1331)  BP: (!) 156/90 (04/07/17 1434)  SpO2: 96 % (04/07/17 1434) Vital Signs (24h Range):  Temp:  [97.2 °F (36.2 °C)-98.1 °F (36.7 °C)] 98.1 °F (36.7 °C)  Pulse:  [68-98] 73  Resp:  [13-18] 13  SpO2:  [96 %-100 %] 96 %  BP: (156-178)/() 156/90     Weight: 61.7 kg (136 lb)  Body mass index is 23.34 kg/(m^2).    Physical Exam   Constitutional: She is oriented to person, place, and time. She appears well-developed and well-nourished.   HENT:   Head: Normocephalic.   Eyes: EOM are normal. Pupils are equal, round, and reactive to light. No scleral icterus.   Neck: No JVD present. No tracheal deviation present.   Cardiovascular: Normal rate, regular rhythm and normal heart sounds.    Pulmonary/Chest: Breath sounds normal. She has no wheezes.   Abdominal: Soft. Bowel sounds are normal. She exhibits no distension and no mass. There is no hepatosplenomegaly. There is no tenderness (right upper quadrant, positive Membreno sign). There is no rigidity, no rebound and no guarding.   Well healed abdominal incisions    Musculoskeletal: Normal range of motion. She exhibits no edema.   Lymphadenopathy:     She has no cervical adenopathy.   Neurological: She is oriented to person, place, and time.   Skin: Skin is warm and dry. No rash noted. No erythema.   Psychiatric: She has a normal mood and affect.       Significant Labs:  CBC:   Recent Labs  Lab 04/07/17  0925   WBC 14.90*   RBC 5.00   HGB 16.4*   HCT 45.8      MCV 92   MCH 32.8*   MCHC 35.8     CMP:   Recent Labs  Lab 04/07/17  0925   *   CALCIUM 9.1   ALBUMIN 4.0   PROT 8.4    *   K 4.2   CO2 25   CL 96   BUN 7   CREATININE 0.8   ALKPHOS 84   ALT 10   AST 13   BILITOT 0.5       Significant Diagnostics:  U/S: I have reviewed all pertinent results/findings within the past 24 hours and my personal findings are:  Consistent with acute cholecystitis with a 9 mm common bile duct

## 2017-04-07 NOTE — ANESTHESIA PREPROCEDURE EVALUATION
04/07/2017  Kaylene Emery is a 46 y.o., female.    [facility] Anesthesia Evaluation    I have reviewed the Patient Summary Reports.    I have reviewed the Nursing Notes.   I have reviewed the Medications.     Review of Systems  Anesthesia Hx:  No problems with previous Anesthesia  History of prior surgery of interest to airway management or planning: Denies Family Hx of Anesthesia complications.   Denies Personal Hx of Anesthesia complications.   Social:  Smoker    Cardiovascular:  Cardiovascular Normal     Pulmonary:  Pulmonary Normal    Hepatic/GI:   GERD        Physical Exam  General:  Well nourished    Airway/Jaw/Neck:  Airway Findings: Mouth Opening: Normal Tongue: Normal  General Airway Assessment: Adult  Mallampati: II  TM Distance: Normal, at least 6 cm          Chest/Lungs:  Chest/Lungs Findings: Normal Respiratory Rate     Heart/Vascular:  Heart Findings: Rate: Normal             Anesthesia Plan  Type of Anesthesia, risks & benefits discussed:  Anesthesia Type:  general  Patient's Preference:   Intra-op Monitoring Plan:   Intra-op Monitoring Plan Comments:   Post Op Pain Control Plan:   Post Op Pain Control Plan Comments:   Induction:   IV  Beta Blocker:  Patient is not currently on a Beta-Blocker (No further documentation required).       Informed Consent: Patient understands risks and agrees with Anesthesia plan.  Questions answered. Anesthesia consent signed with patient.  ASA Score: 2     Day of Surgery Review of History & Physical: I have interviewed and examined the patient. I have reviewed the patient's H&P dated:            Ready For Surgery From Anesthesia Perspective.

## 2017-04-07 NOTE — ED NOTES
Pt resting in ER stretcher, aaox4, rr e/u, NAD noted. Pt remains on cardiac monitor with vss noted. Bed low and locked, call light in reach, side rails up x2. Fiance at bedside.  Pt reports abdominal pain relieved by morphine.  Pt verbalized understanding of status and POC; denies further needs. Will continue to monitor.

## 2017-04-07 NOTE — ED NOTES
Pt resting in ER stretcher, aaox4, rr e/u, NAD noted. Pt remains on cardiac monitor with vss noted. Bed low and locked, call light in reach, side rails up x2. Fiance at bedside.  Pt verbalized understanding of status and POC; denies further needs. Will continue to monitor.

## 2017-04-07 NOTE — ED PROVIDER NOTES
SCRIBE #1 NOTE: I, Feliberto Modi, am scribing for, and in the presence of, Alfred Scott MD. I have scribed the entire note.      History      Chief Complaint   Patient presents with    Abdominal Pain     epigastric pain since yesterday, N/V since denies diarrhea       Review of patient's allergies indicates:   Allergen Reactions    Tramadol Rash        HPI   HPI    4/7/2017, 9:09 AM   History obtained from the patient      History of Present Illness: Kaylene Emery is a 46 y.o. female patient who presents to the Emergency Department for epigastric abd pain which onset gradually yesterday worsening this AM. Symptoms are intermittent and moderate in severity. Sx are exacerbated by nothing and relieved by nothing. Associated sxs include N/V. Pt states she was seen at urgent care last PM and given a shot of zofran and a GI cocktail. Pt reported temporary relief until the sxs worsened this AM. Pt reports Hx of acid reflux. Patient denies any fever, chills, CP, SOB, constipation, dysuria, difficulty urinating, radiating pain, chest tightness and all other sxs at this time. No further complaints or concerns at this time.     Arrival mode: Personal vehicle     PCP: Jonas Jimenez MD       Past Medical History:  Past Medical History:   Diagnosis Date    Anxiety     Depression     GERD (gastroesophageal reflux disease)     Tobacco use        Past Surgical History:  Past Surgical History:   Procedure Laterality Date    BLADDER REPAIR      KNEE ARTHROPLASTY      PARTIAL HYSTERECTOMY      TUBAL LIGATION           Family History:  Family History   Problem Relation Age of Onset    Hypertension Mother     Diabetes Mother        Social History:  Social History     Social History Main Topics    Smoking status: Current Every Day Smoker     Packs/day: 1.00     Types: Cigarettes    Smokeless tobacco: Never Used    Alcohol use Yes    Drug use: No    Sexual activity: Yes       ROS   Review of Systems    Constitutional: Negative for chills and fever.   HENT: Negative for congestion and sore throat.    Respiratory: Negative for cough, chest tightness and shortness of breath.    Cardiovascular: Negative for chest pain, palpitations and leg swelling.   Gastrointestinal: Positive for abdominal pain (epigastric), nausea and vomiting. Negative for constipation and diarrhea.   Genitourinary: Negative for difficulty urinating and dysuria.   Musculoskeletal: Negative for back pain and neck pain.   Skin: Negative for rash.   Neurological: Negative for dizziness, weakness, light-headedness, numbness and headaches.   Hematological: Does not bruise/bleed easily.   Psychiatric/Behavioral: Negative for agitation and confusion.   All other systems reviewed and are negative.      Physical Exam    Initial Vitals   BP Pulse Resp Temp SpO2   04/07/17 0857 04/07/17 0857 04/07/17 0857 04/07/17 0857 04/07/17 0857   178/102 98 18 97.2 °F (36.2 °C) 96 %      Physical Exam  Nursing Notes and Vital Signs Reviewed.  Constitutional: Patient is in mild distress. Awake and alert. Well-developed and well-nourished.  Head: Atraumatic. Normocephalic.  Eyes: PERRL. EOM intact. Conjunctivae are not pale. No scleral icterus.  ENT: Mucous membranes are moist. Oropharynx is clear and symmetric.    Neck: Supple. Full ROM. No lymphadenopathy.  Cardiovascular: Regular rate. Regular rhythm. No murmurs, rubs, or gallops. Distal pulses are 2+ and symmetric.  Pulmonary/Chest: No respiratory distress. Clear to auscultation bilaterally. No wheezing, rales, or rhonchi.  Abdominal: Soft and non-distended.  There is epigastric tenderness.  No rebound, guarding, or rigidity. Good bowel sounds.  Musculoskeletal: Moves all extremities. No obvious deformities. No edema. No calf tenderness.  Skin: Warm and dry.  Neurological:  Alert, awake, and appropriate.  Normal speech.  No acute focal neurological deficits are appreciated.  Psychiatric: Normal affect. Good eye  "contact. Appropriate in content.    ED Course    Procedures  ED Vital Signs:  Vitals:    04/07/17 0857 04/07/17 0950 04/07/17 1132 04/07/17 1235   BP: (!) 178/102 (!) 164/91 (!) 157/88 (!) 166/89   Pulse: 98 80 76 69   Resp: 18 16  17   Temp: 97.2 °F (36.2 °C)      TempSrc: Tympanic      SpO2: 96% 100% 96% 100%   Weight: 61.7 kg (136 lb)      Height: 5' 4" (1.626 m)       04/07/17 1331 04/07/17 1434 04/07/17 1514 04/07/17 1520   BP: (!) 161/94 (!) 156/90  (!) 161/83   Pulse: 68 73  67   Resp: 13      Temp:   98.1 °F (36.7 °C)    TempSrc:       SpO2: 100% 96%  99%   Weight:       Height:           Abnormal Lab Results:  Labs Reviewed   CBC W/ AUTO DIFFERENTIAL - Abnormal; Notable for the following:        Result Value    WBC 14.90 (*)     Hemoglobin 16.4 (*)     MCH 32.8 (*)     Gran # 12.5 (*)     Gran% 83.7 (*)     Lymph% 11.2 (*)     All other components within normal limits   COMPREHENSIVE METABOLIC PANEL - Abnormal; Notable for the following:     Sodium 132 (*)     Glucose 140 (*)     All other components within normal limits   URINALYSIS - Abnormal; Notable for the following:     Appearance, UA Hazy (*)     All other components within normal limits   B-TYPE NATRIURETIC PEPTIDE - Abnormal; Notable for the following:      (*)     All other components within normal limits   PREGNANCY TEST, URINE RAPID   LIPASE   AMYLASE   CK   TROPONIN I        All Lab Results:  Results for orders placed or performed during the hospital encounter of 04/07/17   CBC auto differential   Result Value Ref Range    WBC 14.90 (H) 3.90 - 12.70 K/uL    RBC 5.00 4.00 - 5.40 M/uL    Hemoglobin 16.4 (H) 12.0 - 16.0 g/dL    Hematocrit 45.8 37.0 - 48.5 %    MCV 92 82 - 98 fL    MCH 32.8 (H) 27.0 - 31.0 pg    MCHC 35.8 32.0 - 36.0 %    RDW 14.2 11.5 - 14.5 %    Platelets 253 150 - 350 K/uL    MPV 9.7 9.2 - 12.9 fL    Gran # 12.5 (H) 1.8 - 7.7 K/uL    Lymph # 1.7 1.0 - 4.8 K/uL    Mono # 0.7 0.3 - 1.0 K/uL    Eos # 0.1 0.0 - 0.5 K/uL    " Baso # 0.02 0.00 - 0.20 K/uL    Gran% 83.7 (H) 38.0 - 73.0 %    Lymph% 11.2 (L) 18.0 - 48.0 %    Mono% 4.4 4.0 - 15.0 %    Eosinophil% 0.6 0.0 - 8.0 %    Basophil% 0.1 0.0 - 1.9 %    Differential Method Automated    Comprehensive metabolic panel   Result Value Ref Range    Sodium 132 (L) 136 - 145 mmol/L    Potassium 4.2 3.5 - 5.1 mmol/L    Chloride 96 95 - 110 mmol/L    CO2 25 23 - 29 mmol/L    Glucose 140 (H) 70 - 110 mg/dL    BUN, Bld 7 6 - 20 mg/dL    Creatinine 0.8 0.5 - 1.4 mg/dL    Calcium 9.1 8.7 - 10.5 mg/dL    Total Protein 8.4 6.0 - 8.4 g/dL    Albumin 4.0 3.5 - 5.2 g/dL    Total Bilirubin 0.5 0.1 - 1.0 mg/dL    Alkaline Phosphatase 84 55 - 135 U/L    AST 13 10 - 40 U/L    ALT 10 10 - 44 U/L    Anion Gap 11 8 - 16 mmol/L    eGFR if African American >60 >60 mL/min/1.73 m^2    eGFR if non African American >60 >60 mL/min/1.73 m^2   Urinalysis   Result Value Ref Range    Specimen UA Urine, Clean Catch     Color, UA Yellow Yellow, Straw, Lisa    Appearance, UA Hazy (A) Clear    pH, UA 8.0 5.0 - 8.0    Specific Gravity, UA 1.015 1.005 - 1.030    Protein, UA Negative Negative    Glucose, UA Negative Negative    Ketones, UA Negative Negative    Bilirubin (UA) Negative Negative    Occult Blood UA Negative Negative    Nitrite, UA Negative Negative    Urobilinogen, UA Negative <2.0 EU/dL    Leukocytes, UA Negative Negative   Pregnancy, urine rapid   Result Value Ref Range    Preg Test, Ur Negative    Lipase   Result Value Ref Range    Lipase 30 4 - 60 U/L   Amylase   Result Value Ref Range    Amylase 38 20 - 110 U/L   Brain natriuretic peptide   Result Value Ref Range     (H) 0 - 99 pg/mL   CK   Result Value Ref Range    CPK 81 20 - 180 U/L   Troponin I   Result Value Ref Range    Troponin I <0.006 0.000 - 0.026 ng/mL   '    Imaging Results:  Imaging Results         US Abdomen Limited (Final result) Result time:  04/07/17 14:22:48    Final result by JENNA Bell Sr., MD (04/07/17 14:22:48)    Impression:            1.  The findings are consistent with the patient's history characteristic of cholecystitis.  There multiple stones in the dependent portion of the gallbladder.  There is gallbladder wall thickening, a sonographic Membreno's sign, and common bile duct dilatation.  2.  The liver is enlarged. It measures 19.2 cm in length.       Electronically signed by: JENNA BELL MD  Date:     04/07/17  Time:    14:22     Narrative:    Limited ultrasound examination of the abdomen    Clinical History: Right upper quadrant abdominal pain; suspected cholecystitis    Technique: Multiple static ultrasound images are submitted for interpretation.    Finding: The liver is enlarged. It measures 19.2 cm in length. There is no intrahepatic biliary ductal dilatation. The common bile duct is dilated.  It has a diameter of 9 mm.  There are multiple stones in the dependent portion of the gallbladder.  One of the larger ones measures 19 mm in size.  The wall of the gallbladder is thickened.  It measures 6 mm in thickness.  There is no pericholecystic fluid.  There is a positive sonographic Membreno sign.  The visualized portion of the pancreas is normal in appearance. The right kidney is normal in appearance. It measures 10.9 cm in length.            CT Renal Stone Study ABD Pelvis WO (Edited Result - FINAL) Result time:  04/07/17 12:51:01    Addendum 1 of 1 by JENNA Bell Sr., MD (04/07/17 12:51:01)    Addendum: The above findings and impressions were discussed with Dr. Scott via the telephone at 12:50 PM on 4/7/2017.      Electronically signed by: JENNA BELL MD  Date:     04/07/17  Time:    12:51           Final result by JENNA Bell Sr., MD (04/07/17 12:46:45)    Impression:      1.  There are multiple stones in the gallbladder.  One of the larger ones measures 12 mm in size.  There is suspected thickening of the wall of the gallbladder.  This may represent cholecystitis.  If additional imaging evaluation is clinically  indicated, I recommend consideration of an ultrasound examination of the gallbladder.  2.  The liver is enlarged.  It measures 20.0 cm in craniocaudal dimension.   3.  Surgical changes    All CT scans at this facility use dose modulation, iterative reconstruction, and/or weight base dosing when appropriate to reduce radiation dose when appropriate to reduce radiation dose to as low as reasonably achievable.      Electronically signed by: JENNA CLEARY MD  Date:     04/07/17  Time:    12:46     Narrative:    CT of abdomen and pelvis without IV Contrast    History: Epigastric pain    Technique: Standard abdomen and pelvis CT protocol without oral or IV contrast was performed.    Finding: The size of the heart is within normal limits.  There are minimal dependent atelectatic changes in both lungs.  There is no pneumothorax or pleural effusion.  There is partial visualization of the implants in both breasts.    The liver is enlarged.  It measures 20.0 cm in craniocaudal dimension.  There are multiple stones in the gallbladder.  One of the larger ones measures 12 mm in size.  There is suspected thickening of the wall of the gallbladder.  There is mild generalized atrophy of the pancreas.  There is a 1 mm calcification in anterior aspect of the spleen.  The adrenals and kidneys are normal in appearance.  The ureters and the urinary bladder are normal in appearance. The appendix is not visualized.  The rest of the gastrointestinal system is normal in appearance. The uterus is absent.  The ovaries are normal in appearance. There is no free fluid within the abdomen or pelvis. There is no pneumoperitoneum.  There is a mild amount of atherosclerosis.               The EKG was ordered, reviewed, and independently interpreted by the ED provider.  Interpretation time: 0914  Rate: 83 BPM  Rhythm: normal sinus rhythm  Interpretation: Biatrial enlargement. Low voltage QRS. Septal infarct No STEMI.         The Emergency Provider  reviewed the vital signs and test results, which are outlined above.    ED Discussion     1:18 PM: Re-evaluated pt. Pt is resting comfortably and is in no acute distress.  D/w pt all pertinent results. D/w pt any concerns expressed at this time. Answered all questions. Pt expresses understanding at this time.    2:27 PM: Re-evaluated pt. I have discussed test results, shared treatment plan, and the need for admission with patient and family at bedside. Pt and family express understanding at this time and agree with all information. All questions answered. Pt and family have no further questions or concerns at this time. Pt is ready for admit.    2:34 PM: Discussed case with Dr. Church (General Surgery). Dr. Church agrees with current care and management of pt and accepts admission. General Surgery will come see pt in the ED.  Admitting Service: General Surgery  Admitting Physician: Dr. Church  Admit to: OR      ED Medication(s):  Medications   moxifloxacin 400 mg/250 mL IVPB 400 mg (400 mg Intravenous New Bag 4/7/17 1509)   sodium chloride 0.9% injection 3 mL (not administered)   sodium chloride 0.9% bolus 1,000 mL (0 mLs Intravenous Stopped 4/7/17 1308)   promethazine (PHENERGAN) 12.5 mg in dextrose 5 % 50 mL IVPB (0 mg Intravenous Stopped 4/7/17 1000)   morphine injection 4 mg (4 mg Intravenous Given 4/7/17 0941)   promethazine (PHENERGAN) 12.5 mg in dextrose 5 % 50 mL IVPB (0 mg Intravenous Stopped 4/7/17 1249)   morphine injection 4 mg (4 mg Intravenous Given 4/7/17 1309)   morphine injection 4 mg (4 mg Intravenous Given 4/7/17 1452)   promethazine (PHENERGAN) 12.5 mg in dextrose 5 % 50 mL IVPB (12.5 mg Intravenous New Bag 4/7/17 1449)       New Prescriptions    No medications on file             Medical Decision Making    Medical Decision Making:   Clinical Tests:   Lab Tests: Ordered and Reviewed  Radiological Study: Ordered and Reviewed  Medical Tests: Reviewed and Ordered           Scribe  Attestation:   Scribe #1: I performed the above scribed service and the documentation accurately describes the services I performed. I attest to the accuracy of the note.    Attending:   Physician Attestation Statement for Scribe #1: I, Alfred Scott MD, personally performed the services described in this documentation, as scribed by Feliberto Modi, in my presence, and it is both accurate and complete.          Clinical Impression       ICD-10-CM ICD-9-CM   1. Calculus of gallbladder with acute cholecystitis without obstruction K80.00 574.00   2. Cholecystitis K81.9 575.10       Disposition:   Disposition: Admitted (OR)  Condition: Fair         Alfred Scott MD  04/07/17 1544

## 2017-04-07 NOTE — ASSESSMENT & PLAN NOTE
Admission to observation  IV antibiotics  Laparoscopic cholecystectomy    The risks, benefits and complications of laparoscopic cholecystectomy were discussed.  These included infection, bleeding, abscess and bile leak, and a retained common bile duct stone The need for open conversion was dicussed.  The rare possibility of a common bile duct injury and the need for additional procedures and/or surgery was reviewed.  All question were answered.  The patient has agreed to proceed.  Consent was obtained.

## 2017-04-07 NOTE — H&P
[facility] -   General Surgery  History & Physical    Patient Name: Kaylene Emery  MRN: [id number]  Admission Date: 4/7/2017  Attending Physician: Td Church MD  Primary Care Provider: Jonas Jimenez MD    Patient information was obtained from patient and ER records.     Subjective:     Chief Complaint/Reason for Admission: Abdominal pain, nausea and vomiting/acute cholecystitis    History of Present Illness: Patient reports that she developed abdominal pain associated with nausea and vomiting yesterday.  The pain was located in the epigastrium and right upper quadrant.  It was a constant in nature with episodes of increased intensity.  There were no other symptoms other than the nausea and vomiting.    He was seen in after hours clinic and given a GI cocktail.  The pain continued presented the emergency room.  She was found to have findings consistent with acute cholecystitis.    The surgery service was called      No current facility-administered medications on file prior to encounter.      Current Outpatient Prescriptions on File Prior to Encounter   Medication Sig    hydrocodone-acetaminophen 7.5-325mg (NORCO) 7.5-325 mg per tablet Take 1 tablet by mouth every 4 (four) hours as needed for Pain.    naproxen (NAPROSYN) 375 MG tablet Take 1 tablet (375 mg total) by mouth 2 (two) times daily with meals.    promethazine (PHENERGAN) 25 MG tablet Take 0.5 tablets (12.5 mg total) by mouth every 6 (six) hours as needed for Nausea.    tramadol (ULTRAM) 50 mg tablet Take 1 tablet (50 mg total) by mouth every 6 (six) hours as needed for Pain.       Review of patient's allergies indicates:   Allergen Reactions    Tramadol Rash       Past Medical History:   Diagnosis Date    Anxiety     Depression     GERD (gastroesophageal reflux disease)     Tobacco use      Past Surgical History:   Procedure Laterality Date    BLADDER REPAIR      KNEE ARTHROPLASTY      PARTIAL HYSTERECTOMY      TUBAL  LIGATION       Family History     Problem Relation (Age of Onset)    Diabetes Mother    Hypertension Mother        Social History Main Topics    Smoking status: Current Every Day Smoker     Packs/day: 1.00     Types: Cigarettes    Smokeless tobacco: Never Used    Alcohol use Yes    Drug use: No    Sexual activity: Yes     Review of Systems   Constitutional: Negative for appetite change, chills, fatigue, fever and unexpected weight change.   HENT: Negative for hearing loss and rhinorrhea.    Eyes: Negative for visual disturbance.   Respiratory: Negative for apnea, cough, shortness of breath and wheezing.    Cardiovascular: Negative for chest pain and palpitations.   Gastrointestinal: Positive for abdominal pain, nausea and vomiting. Negative for abdominal distention, blood in stool, constipation and diarrhea.   Genitourinary: Negative for dysuria, frequency and urgency.   Musculoskeletal: Negative for arthralgias and neck pain.   Skin: Negative for rash.   Neurological: Negative for seizures, weakness, numbness and headaches.   Hematological: Negative for adenopathy. Does not bruise/bleed easily.   Psychiatric/Behavioral: Negative for hallucinations. The patient is not nervous/anxious.      Objective:     Vital Signs (Most Recent):  Temp: 98.1 °F (36.7 °C) (04/07/17 1514)  Pulse: 73 (04/07/17 1434)  Resp: 13 (04/07/17 1331)  BP: (!) 156/90 (04/07/17 1434)  SpO2: 96 % (04/07/17 1434) Vital Signs (24h Range):  Temp:  [97.2 °F (36.2 °C)-98.1 °F (36.7 °C)] 98.1 °F (36.7 °C)  Pulse:  [68-98] 73  Resp:  [13-18] 13  SpO2:  [96 %-100 %] 96 %  BP: (156-178)/() 156/90     Weight: 61.7 kg (136 lb)  Body mass index is 23.34 kg/(m^2).    Physical Exam   Constitutional: She is oriented to person, place, and time. She appears well-developed and well-nourished.   HENT:   Head: Normocephalic.   Eyes: EOM are normal. Pupils are equal, round, and reactive to light. No scleral icterus.   Neck: No JVD present. No tracheal  deviation present.   Cardiovascular: Normal rate, regular rhythm and normal heart sounds.    Pulmonary/Chest: Breath sounds normal. She has no wheezes.   Abdominal: Soft. Bowel sounds are normal. She exhibits no distension and no mass. There is no hepatosplenomegaly. There is no tenderness (right upper quadrant, positive Membreno sign). There is no rigidity, no rebound and no guarding.   Well healed abdominal incisions    Musculoskeletal: Normal range of motion. She exhibits no edema.   Lymphadenopathy:     She has no cervical adenopathy.   Neurological: She is oriented to person, place, and time.   Skin: Skin is warm and dry. No rash noted. No erythema.   Psychiatric: She has a normal mood and affect.       Significant Labs:  CBC:   Recent Labs  Lab 04/07/17  0925   WBC 14.90*   RBC 5.00   HGB 16.4*   HCT 45.8      MCV 92   MCH 32.8*   MCHC 35.8     CMP:   Recent Labs  Lab 04/07/17  0925   *   CALCIUM 9.1   ALBUMIN 4.0   PROT 8.4   *   K 4.2   CO2 25   CL 96   BUN 7   CREATININE 0.8   ALKPHOS 84   ALT 10   AST 13   BILITOT 0.5       Significant Diagnostics:  U/S: I have reviewed all pertinent results/findings within the past 24 hours and my personal findings are:  Consistent with acute cholecystitis with a 9 mm common bile duct    Assessment/Plan:     Calculus of gallbladder with acute cholecystitis  Admission to observation  IV antibiotics  Laparoscopic cholecystectomy    The risks, benefits and complications of laparoscopic cholecystectomy were discussed.  These included infection, bleeding, abscess and bile leak, and a retained common bile duct stone The need for open conversion was dicussed.  The rare possibility of a common bile duct injury and the need for additional procedures and/or surgery was reviewed.  All question were answered.  The patient has agreed to proceed.  Consent was obtained.        Tobacco use  Patient will be provided with information about tobacco cesssation at the time  of discharge.    Nicotine patch while hospitalized    VTE Risk Mitigation     None          Td Church MD  General Surgery  [facility]

## 2017-04-08 VITALS
OXYGEN SATURATION: 96 % | SYSTOLIC BLOOD PRESSURE: 115 MMHG | HEART RATE: 74 BPM | WEIGHT: 136 LBS | TEMPERATURE: 99 F | BODY MASS INDEX: 23.22 KG/M2 | RESPIRATION RATE: 18 BRPM | HEIGHT: 64 IN | DIASTOLIC BLOOD PRESSURE: 71 MMHG

## 2017-04-08 PROBLEM — K81.9 CHOLECYSTITIS: Status: RESOLVED | Noted: 2017-04-07 | Resolved: 2017-04-08

## 2017-04-08 LAB
ALBUMIN SERPL BCP-MCNC: 3.1 G/DL
ALP SERPL-CCNC: 66 U/L
ALT SERPL W/O P-5'-P-CCNC: 50 U/L
ANION GAP SERPL CALC-SCNC: 8 MMOL/L
AST SERPL-CCNC: 46 U/L
BASOPHILS # BLD AUTO: 0.03 K/UL
BASOPHILS NFR BLD: 0.2 %
BILIRUB SERPL-MCNC: 0.5 MG/DL
BUN SERPL-MCNC: 6 MG/DL
CALCIUM SERPL-MCNC: 8.4 MG/DL
CHLORIDE SERPL-SCNC: 103 MMOL/L
CO2 SERPL-SCNC: 24 MMOL/L
CREAT SERPL-MCNC: 0.7 MG/DL
DIFFERENTIAL METHOD: ABNORMAL
EOSINOPHIL # BLD AUTO: 0.2 K/UL
EOSINOPHIL NFR BLD: 1.8 %
ERYTHROCYTE [DISTWIDTH] IN BLOOD BY AUTOMATED COUNT: 14.4 %
EST. GFR  (AFRICAN AMERICAN): >60 ML/MIN/1.73 M^2
EST. GFR  (NON AFRICAN AMERICAN): >60 ML/MIN/1.73 M^2
GLUCOSE SERPL-MCNC: 118 MG/DL
HCT VFR BLD AUTO: 40.7 %
HGB BLD-MCNC: 13.9 G/DL
LYMPHOCYTES # BLD AUTO: 2.8 K/UL
LYMPHOCYTES NFR BLD: 22.1 %
MCH RBC QN AUTO: 31.5 PG
MCHC RBC AUTO-ENTMCNC: 34.2 %
MCV RBC AUTO: 92 FL
MONOCYTES # BLD AUTO: 1 K/UL
MONOCYTES NFR BLD: 8.1 %
NEUTROPHILS # BLD AUTO: 8.4 K/UL
NEUTROPHILS NFR BLD: 67.8 %
PLATELET # BLD AUTO: 259 K/UL
PMV BLD AUTO: 9.7 FL
POTASSIUM SERPL-SCNC: 4.5 MMOL/L
PROT SERPL-MCNC: 6.5 G/DL
RBC # BLD AUTO: 4.41 M/UL
SODIUM SERPL-SCNC: 135 MMOL/L
WBC # BLD AUTO: 12.45 K/UL

## 2017-04-08 PROCEDURE — 94761 N-INVAS EAR/PLS OXIMETRY MLT: CPT

## 2017-04-08 PROCEDURE — 25000003 PHARM REV CODE 250: Performed by: SURGERY

## 2017-04-08 PROCEDURE — 36415 COLL VENOUS BLD VENIPUNCTURE: CPT

## 2017-04-08 PROCEDURE — 80053 COMPREHEN METABOLIC PANEL: CPT

## 2017-04-08 PROCEDURE — 85025 COMPLETE CBC W/AUTO DIFF WBC: CPT

## 2017-04-08 RX ORDER — BISACODYL 5 MG
10 TABLET, DELAYED RELEASE (ENTERIC COATED) ORAL ONCE
Status: COMPLETED | OUTPATIENT
Start: 2017-04-08 | End: 2017-04-08

## 2017-04-08 RX ORDER — HYDROCODONE BITARTRATE AND ACETAMINOPHEN 7.5; 325 MG/1; MG/1
1 TABLET ORAL EVERY 4 HOURS PRN
Qty: 30 TABLET | Refills: 0 | Status: SHIPPED | OUTPATIENT
Start: 2017-04-08 | End: 2017-04-13 | Stop reason: SDUPTHER

## 2017-04-08 RX ADMIN — BISACODYL 10 MG: 5 TABLET, COATED ORAL at 09:04

## 2017-04-08 RX ADMIN — HYDROCODONE BITARTRATE AND ACETAMINOPHEN 1 TABLET: 7.5; 325 TABLET ORAL at 06:04

## 2017-04-08 RX ADMIN — HYDROCODONE BITARTRATE AND ACETAMINOPHEN 1 TABLET: 7.5; 325 TABLET ORAL at 10:04

## 2017-04-08 RX ADMIN — SODIUM CHLORIDE, SODIUM LACTATE, POTASSIUM CHLORIDE, AND CALCIUM CHLORIDE: .6; .31; .03; .02 INJECTION, SOLUTION INTRAVENOUS at 05:04

## 2017-04-08 RX ADMIN — HYDROCODONE BITARTRATE AND ACETAMINOPHEN 1 TABLET: 7.5; 325 TABLET ORAL at 02:04

## 2017-04-08 NOTE — DISCHARGE SUMMARY
[facility] -   General Surgery  Discharge Summary      Patient Name: Kaylene Emery  MRN: [id number]  Admission Date: 4/7/2017  Hospital Length of Stay: 0 days  Discharge Date and Time:  04/08/2017 1:28 PM  Attending Physician: No att. providers found   Discharging Provider: Td Church MD  Primary Care Provider: Jonas Jimenez MD    HPI:   Patient reports that she developed abdominal pain associated with nausea and vomiting yesterday.  The pain was located in the epigastrium and right upper quadrant.  It was a constant in nature with episodes of increased intensity.  There were no other symptoms other than the nausea and vomiting.    He was seen in after hours clinic and given a GI cocktail.  The pain continued presented the emergency room.  She was found to have findings consistent with acute cholecystitis.    The surgery service was called      Procedure(s) (LRB):  CHOLECYSTECTOMY-LAPAROSCOPIC (N/A)      Indwelling Lines/Drains at time of discharge:   Lines/Drains/Airways          No matching active lines, drains, or airways        Hospital Course: Patient underwent a laparoscopic cholecystectomy for acute cholecystitis on 4 7 2017    Consults:     Significant Diagnostic Studies: Labs:   CMP   Recent Labs  Lab 04/07/17  0925 04/08/17  0516   * 135*   K 4.2 4.5   CL 96 103   CO2 25 24   * 118*   BUN 7 6   CREATININE 0.8 0.7   CALCIUM 9.1 8.4*   PROT 8.4 6.5   ALBUMIN 4.0 3.1*   BILITOT 0.5 0.5   ALKPHOS 84 66   AST 13 46*   ALT 10 50*   ANIONGAP 11 8   ESTGFRAFRICA >60 >60   EGFRNONAA >60 >60    and CBC   Recent Labs  Lab 04/07/17  0925 04/08/17  0516   WBC 14.90* 12.45   HGB 16.4* 13.9   HCT 45.8 40.7    259     Radiology: Ultrasound: Acute Cholecystitis    Pending Diagnostic Studies:     None        Final Active Diagnoses:    Diagnosis Date Noted POA    PRINCIPAL PROBLEM:  Calculus of gallbladder with acute cholecystitis [K80.00] 04/07/2017 Yes    Tobacco use [Z72.0]  04/07/2017 Yes      Problems Resolved During this Admission:    Diagnosis Date Noted Date Resolved POA    Cholecystitis [K81.9] 04/07/2017 04/07/2017 Yes    Cholecystitis [K81.9] 04/07/2017 04/08/2017 Yes      Discharged Condition: stable    Disposition: Home or Self Care    Follow Up:  Follow-up Information     Follow up with Td Church MD In 3 weeks.    Specialty:  General Surgery    Why:  post op appt, or Ms. Calli Gutierrez on a thursday    Contact information:    [address]  [place] [address] [phone]          Follow up with Td Church MD In 3 weeks.    Specialty:  General Surgery    Why:  post op appt    Contact information:    [address]  [place] [address] [phone]          Patient Instructions:     Diet general     Lifting restrictions   Order Comments: No lifting more than 30 pounds for 2 weeks     Call MD for:  temperature >100.4     Call MD for:  persistent nausea and vomiting or diarrhea     Call MD for:  severe uncontrolled pain     Call MD for:  difficulty breathing or increased cough     Remove dressing in 48 hours   Order Comments: May shower once dressings are removed  Remove steri strips as they begin to fall off       Medications:  Reconciled Home Medications:   Discharge Medication List as of 4/8/2017 12:02 PM      CONTINUE these medications which have CHANGED    Details   hydrocodone-acetaminophen 7.5-325mg (NORCO) 7.5-325 mg per tablet Take 1 tablet by mouth every 4 (four) hours as needed for Pain., Starting 4/8/2017, Until Discontinued, Print         CONTINUE these medications which have NOT CHANGED    Details   esomeprazole (NEXIUM) 40 MG capsule Take 40 mg by mouth before breakfast., Until Discontinued, Historical Med      famotidine (PEPCID) 40 MG tablet Take 40 mg by mouth once daily., Until Discontinued, Historical Med      fluoxetine (PROZAC) 40 MG capsule Take 40 mg by mouth once daily., Until Discontinued, Historical Med      lorazepam  (ATIVAN) 1 MG tablet Take 1 mg by mouth every 6 (six) hours as needed for Anxiety., Until Discontinued, Historical Med      naproxen (NAPROSYN) 375 MG tablet Take 1 tablet (375 mg total) by mouth 2 (two) times daily with meals., Starting 2/29/2016, Until Discontinued, Print      ondansetron (ZOFRAN) 8 MG tablet Take 8 mg by mouth every 8 (eight) hours., Until Discontinued, Historical Med      promethazine (PHENERGAN) 25 MG tablet Take 0.5 tablets (12.5 mg total) by mouth every 6 (six) hours as needed for Nausea., Starting 3/5/2017, Until Discontinued, Print      quetiapine (SEROQUEL) 200 MG Tab Take by mouth., Until Discontinued, Historical Med         STOP taking these medications       tramadol (ULTRAM) 50 mg tablet Comments:   Reason for Stopping:             Time spent on the discharge of patient: 10 minutes    Td Church MD  General Surgery  [facility] -

## 2017-04-08 NOTE — PROGRESS NOTES
[facility] -   General Surgery  Progress Note    Subjective:     History of Present Illness:  Patient reports that she developed abdominal pain associated with nausea and vomiting yesterday.  The pain was located in the epigastrium and right upper quadrant.  It was a constant in nature with episodes of increased intensity.  There were no other symptoms other than the nausea and vomiting.    He was seen in after hours clinic and given a GI cocktail.  The pain continued presented the emergency room.  She was found to have findings consistent with acute cholecystitis.    The surgery service was called      Post-Op Info:  Procedure(s) (LRB):  CHOLECYSTECTOMY-LAPAROSCOPIC (N/A)   1 Day Post-Op     Interval History: Patient has incisional pain.  His bloating.  She tolerated her clear liquid     Medications:  Continuous Infusions:   lactated ringers 100 mL/hr at 04/08/17 0559     Scheduled Meds:   PRN Meds:hydrocodone-acetaminophen 7.5-325mg, morphine, morphine, oxycodone, promethazine (PHENERGAN) IVPB     Review of patient's allergies indicates:   Allergen Reactions    Tramadol Rash     Objective:     Vital Signs (Most Recent):  Temp: 98.7 °F (37.1 °C) (04/08/17 1125)  Pulse: 74 (04/08/17 1125)  Resp: 18 (04/08/17 1125)  BP: 115/71 (04/08/17 1125)  SpO2: 96 % (04/08/17 1125) Vital Signs (24h Range):  Temp:  [97.9 °F (36.6 °C)-99.5 °F (37.5 °C)] 98.7 °F (37.1 °C)  Pulse:  [65-94] 74  Resp:  [13-21] 18  SpO2:  [93 %-100 %] 96 %  BP: (115-166)/(71-97) 115/71     Weight: 61.7 kg (136 lb)  Body mass index is 23.34 kg/(m^2).    Intake/Output - Last 3 Shifts       04/06 0700 - 04/07 0659 04/07 0700 - 04/08 0659 04/08 0700 - 04/09 0659    P.O.  600     I.V. (mL/kg)  1441.7 (23.4)     Total Intake(mL/kg)  2041.7 (33.1)     Urine (mL/kg/hr)  800 550 (2)    Blood  50     Total Output   850 550    Net   +1191.7 -550           Urine Occurrence  1 x           Physical Exam   Constitutional: She is oriented to person,  place, and time. She appears well-developed and well-nourished. No distress.   HENT:   Head: Normocephalic and atraumatic.   Eyes: No scleral icterus.   Neck: Normal range of motion. Neck supple.   Cardiovascular: Normal rate, regular rhythm, normal heart sounds and intact distal pulses.    Pulmonary/Chest: Effort normal and breath sounds normal.   Abdominal: Soft. Bowel sounds are normal. She exhibits distension (mild). There is tenderness (incisional).   4 incisions from her cholecystectomy are dressed and the dressings are dry   Neurological: She is alert and oriented to person, place, and time.   Skin: Skin is warm and dry.       Significant Labs:  CBC:   Recent Labs  Lab 04/08/17  0516   WBC 12.45   RBC 4.41   HGB 13.9   HCT 40.7      MCV 92   MCH 31.5*   MCHC 34.2     CMP:   Recent Labs  Lab 04/08/17  0516   *   CALCIUM 8.4*   ALBUMIN 3.1*   PROT 6.5   *   K 4.5   CO2 24      BUN 6   CREATININE 0.7   ALKPHOS 66   ALT 50*   AST 46*   BILITOT 0.5       Significant Diagnostics:  none    Assessment/Plan:     * Calculus of gallbladder with acute cholecystitis  Patient underwent a laparoscopic cholecystectomy.  Her white blood cell count returned to normal.  Her liver function tests show no significant elevation.    Patient will have her diet advancement tolerated she can discharge to home.        Tobacco use  Patient will be provided with information about tobacco cesssation at the time of discharge.    Nicotine patch while hospitalized      Td Church MD  General Surgery  [facility] -

## 2017-04-08 NOTE — PLAN OF CARE
"Problem: Patient Care Overview  Goal: Plan of Care Review  Outcome: Ongoing (interventions implemented as appropriate)  Pt alert and oriented. Lap chol during shift, 4 abdominal lap sites with gauze and surgical tape CDI. Pt states she "feels much better than before surgery."  Pain controlled with PRN [place]. LR @  100 ml/hr. Tolerating CL diet, denies any nausea.  Ambulated to RR with stand by assist, remained free of falls during shift, call light in reach, room free of clutter, side rails up X2, will continue to monitor.           "

## 2017-04-08 NOTE — ANESTHESIA RELEASE NOTE
"Anesthesia Release from PACU Note    Patient: Kaylene Emery    Procedure(s) Performed: Procedure(s) (LRB):  CHOLECYSTECTOMY-LAPAROSCOPIC (N/A)    Anesthesia type: epidural    Post pain: Adequate analgesia    Post assessment: no apparent anesthetic complications, tolerated procedure well and no evidence of recall    Last Vitals:   Visit Vitals    /79    Pulse 94    Temp 36.7 °C (98.1 °F) (Temporal)    Resp 18    Ht 5' 4" (1.626 m)    Wt 61.7 kg (136 lb)    SpO2 97%    Breastfeeding No    BMI 23.34 kg/m2       Post vital signs: stable    Level of consciousness: awake, alert  and oriented    Nausea/Vomiting: no nausea/no vomiting    Complications: none    Airway Patency: patent    Respiratory: unassisted    Cardiovascular: stable and blood pressure at baseline    Hydration: euvolemic  "

## 2017-04-08 NOTE — PROGRESS NOTES
Patient discharge home self care. All discharge instruction, education, and RXs giving to patient and family, verbal understanding received. PIV removed with cath tip intact. Tele monitor removed and returned to monitor room. Patient discharge via w/c per myself.

## 2017-04-08 NOTE — ASSESSMENT & PLAN NOTE
Patient underwent a laparoscopic cholecystectomy.  Her white blood cell count returned to normal.  Her liver function tests show no significant elevation.    Patient will have her diet advancement tolerated she can discharge to home.

## 2017-04-08 NOTE — DISCHARGE INSTRUCTIONS
Please call for any fever, increase in pain, nausea or vomiting or redness or drainage from incision(s).    No lifting more than 30 pounds for 2 weeks    May shower once dressings are removed  Remove steri strips as they begin to fall off    If you become constipated from the pain medication you can use over the counter laxatives,  Miralax or Glycolax, or Magnesium Citrate for severe constipation.

## 2017-04-08 NOTE — SUBJECTIVE & OBJECTIVE
Interval History: Patient has incisional pain.  His bloating.  She tolerated her clear liquid     Medications:  Continuous Infusions:   lactated ringers 100 mL/hr at 04/08/17 0559     Scheduled Meds:   PRN Meds:hydrocodone-acetaminophen 7.5-325mg, morphine, morphine, oxycodone, promethazine (PHENERGAN) IVPB     Review of patient's allergies indicates:   Allergen Reactions    Tramadol Rash     Objective:     Vital Signs (Most Recent):  Temp: 98.7 °F (37.1 °C) (04/08/17 1125)  Pulse: 74 (04/08/17 1125)  Resp: 18 (04/08/17 1125)  BP: 115/71 (04/08/17 1125)  SpO2: 96 % (04/08/17 1125) Vital Signs (24h Range):  Temp:  [97.9 °F (36.6 °C)-99.5 °F (37.5 °C)] 98.7 °F (37.1 °C)  Pulse:  [65-94] 74  Resp:  [13-21] 18  SpO2:  [93 %-100 %] 96 %  BP: (115-166)/(71-97) 115/71     Weight: 61.7 kg (136 lb)  Body mass index is 23.34 kg/(m^2).    Intake/Output - Last 3 Shifts       04/06 0700 - 04/07 0659 04/07 0700 - 04/08 0659 04/08 0700 - 04/09 0659    P.O.  600     I.V. (mL/kg)  1441.7 (23.4)     Total Intake(mL/kg)  2041.7 (33.1)     Urine (mL/kg/hr)  800 550 (2)    Blood  50     Total Output   850 550    Net   +1191.7 -550           Urine Occurrence  1 x           Physical Exam   Constitutional: She is oriented to person, place, and time. She appears well-developed and well-nourished. No distress.   HENT:   Head: Normocephalic and atraumatic.   Eyes: No scleral icterus.   Neck: Normal range of motion. Neck supple.   Cardiovascular: Normal rate, regular rhythm, normal heart sounds and intact distal pulses.    Pulmonary/Chest: Effort normal and breath sounds normal.   Abdominal: Soft. Bowel sounds are normal. She exhibits distension (mild). There is tenderness (incisional).   4 incisions from her cholecystectomy are dressed and the dressings are dry   Neurological: She is alert and oriented to person, place, and time.   Skin: Skin is warm and dry.       Significant Labs:  CBC:   Recent Labs  Lab 04/08/17  0516   WBC 12.45   RBC  4.41   HGB 13.9   HCT 40.7      MCV 92   MCH 31.5*   MCHC 34.2     CMP:   Recent Labs  Lab 04/08/17  0516   *   CALCIUM 8.4*   ALBUMIN 3.1*   PROT 6.5   *   K 4.5   CO2 24      BUN 6   CREATININE 0.7   ALKPHOS 66   ALT 50*   AST 46*   BILITOT 0.5       Significant Diagnostics:  none

## 2017-04-08 NOTE — PROGRESS NOTES
1920- Pt transferred from observation to surgery. Pt alert and oriented, appears anxious. Ambulated independently from bed to stretcher. Vital signs stable. C/o nausea with dry heaving- POC reviewed, pt verbalized understanding.     2127- Pt transferred back to obs from PACU. Pt alert and oriented, follows commands. 4 lap sites noted, dressing CDI. Vital signs stable. Family at bedside. Will continue to monitor.

## 2017-04-08 NOTE — ASSESSMENT & PLAN NOTE
Patient underwent a laparoscopic cholecystectomy her white count returned to normal.  Her liver function test did not show any yajaira evidence of obstruction.  She was discharged home after diet was tolerated

## 2017-04-08 NOTE — OP NOTE
[facility]  Surgery Department  Operative Note    SUMMARY     Date of Procedure: 4/7/2017     Procedure: Procedure(s) (LRB):  CHOLECYSTECTOMY-LAPAROSCOPIC (N/A)     Surgeon(s) and Role:     * Td Church MD - Primary    Assisting Surgeon: None    Pre-Operative Diagnosis: Calculus of gallbladder with acute cholecystitis without obstruction [K80.00]    Post-Operative Diagnosis: Post-Op Diagnosis Codes:     * Calculus of gallbladder with acute cholecystitis without obstruction [K80.00]    Anesthesia: General    Technical Procedures Used: Laparoscopic cholecystectomy    Description of the Findings of the Procedure:     Findings: A distended, inflamed, edematous gallbladder      The patient was placed on the operating table in the supine position. SCDs were placed for DVT prophylaxis.  She was receiving antibiotics for acute cholecystitis. General anesthesia was induced.  The abdomen was prepped and draped in a sterile fashion.    Time out completed    An incision was made at the umbilicus. The fascia was elevated and the Veress needle was inserted. Proper position was confirmed by aspiration and saline drop test. The abdomen was insufflated with carbon dioxide to a pressure of 15 mmHg. The patient tolerated insufflation well. A 5 mm trocar was then inserted.     The laparoscope was inserted and the abdomen inspected. No injuries from the initial trocar placement were noted. A 11 mmm trocars were then inserted in the epigastrium and two 5 mmtrocars  in the right mid clavicular line and along the right lateral costal margin. The abdomen was inspected and no gross abnormalities were found.  The table was placed in the reverse Trendelenburg position with the right side up.     The gallbladder was held superiorly from the lateral position.  The gallbladder was aspirated and 60 cc of thick bile was removed.     The dome of the gallbladder was grasped with an atraumatic grasper passed through the lateral  port and retracted over the dome of the liver.  Adhesions of the gallbladder to the omentum were taken down using electrocautery..  The infundibulum was also grasped with an atraumatic grasper through the midclavicular port and retracted toward the right lower quadrant. This maneuver exposed Calot's triangle. The peritoneum overlying the gallbladder infundibulum was then incised and the cystic duct and cystic artery were identified and circumferentially dissected.  The peritoneum was noted to be edematous but with careful dissection Vargas triangle was defined.  The neck of the gallbladder was the dissected off the liver bed.  The critical view was obtained. The cystic duct and cystic artery were then doubly clipped and divided close to the gallbladder.     The gallbladder was then dissected from its peritoneal attachments by electrocautery.  A posterior artery was controlled with a liga clip. Hemostasis was checked.  Surgicel was placed after the liver bed was inspected and there was no evidence of bleeding or bile leak    The lateral trocar sites were inspected after the trocar was removed and there was no bleeding.    The gallbladder was then placed in an Endo Catch bag.  The gallbladder was partially extracted through the epigastric port site.  To facilitate gallbladder extraction the port site was extended to approximately 2.5 cm.  The fascia was divided and the gallbladder was removed.  All bladder was passed from the operative field    The remaining umbilical trocar was then removed.    The epigastric incision was closed with 0 Vicryl suture in a figure-of-eight fashion ×3.  Marcaine was infiltrated. ously irrigated with saline, and hemostasis was obtained. There was no evidence of bleeding of the gallbladder fossa or cystic artery or leakage of bile from the cystic duct stump. Surgicel was placed     The skin for all ports was closed with 4-0 vricryl subcuticular suture.    The patient tolerated the procedure  well and was taken to the postanesthesia care unit in stable condition.      Counts were corrected       Significant Surgical Tasks Conducted by the Assistant(s), if Applicable: None    Complications: No    Estimated Blood Loss (EBL): 50 mL  IV fluids approximately 1500 ML's  Marcaine 0.25% 30           Implants: * No implants in log *    Specimens:   Specimen (12h ago through future)    Start     Ordered    04/07/17 2048  Specimen to Pathology - Surgery  Once     Comments:  Gallbladder with stones    Dx: acute cholecystitis    04/07/17 2047 04/07/17 2007  Specimen to Pathology - Surgery  Once     Comments:  Gallbladder    Dx: acute cholecystitis    04/07/17 2007 04/07/17 2006  Specimen to Pathology - Surgery  Once     Comments:  Gallbladder    Dx: acute cholecystitis    04/07/17 2006                  Condition: Stable    Disposition: PACU - hemodynamically stable.    Attestation: I performed the procedure.

## 2017-04-08 NOTE — TRANSFER OF CARE
"Anesthesia Transfer of Care Note    Patient: Kaylene Emery    Procedure(s) Performed: Procedure(s) (LRB):  CHOLECYSTECTOMY-LAPAROSCOPIC (N/A)    Patient location: PACU    Anesthesia Type: general    Transport from OR: Transported from OR on room air with adequate spontaneous ventilation    Post pain: adequate analgesia    Post assessment: no apparent anesthetic complications and tolerated procedure well    Post vital signs: stable    Level of consciousness: awake, alert and oriented    Nausea/Vomiting: no nausea/vomiting    Complications: none          Last vitals:   Visit Vitals    /79    Pulse 94    Temp 36.7 °C (98.1 °F) (Temporal)    Resp 18    Ht 5' 4" (1.626 m)    Wt 61.7 kg (136 lb)    SpO2 97%    Breastfeeding No    BMI 23.34 kg/m2     "

## 2017-04-10 NOTE — ANESTHESIA POSTPROCEDURE EVALUATION
"Anesthesia Post Evaluation    Patient: Kaylene Emery    Procedure(s) Performed: Procedure(s) (LRB):  CHOLECYSTECTOMY-LAPAROSCOPIC (N/A)    Final Anesthesia Type: general  Patient location during evaluation: PACU  Patient participation: Yes- Able to Participate  Level of consciousness: awake and alert  Post-procedure vital signs: reviewed and stable  Pain management: adequate  Airway patency: patent  PONV status at discharge: No PONV  Anesthetic complications: no      Cardiovascular status: blood pressure returned to baseline  Respiratory status: unassisted  Hydration status: euvolemic  Follow-up not needed.        Visit Vitals    /71 (BP Location: Left arm, Patient Position: Lying, BP Method: Automatic)    Pulse 74    Temp 37.1 °C (98.7 °F) (Oral)    Resp 18    Ht 5' 4" (1.626 m)    Wt 61.7 kg (136 lb)    SpO2 96%    Breastfeeding No    BMI 23.34 kg/m2       Pain/Lexi Score: No Data Recorded      "

## 2017-04-13 ENCOUNTER — DOCUMENTATION ONLY (OUTPATIENT)
Dept: HEMATOLOGY/ONCOLOGY | Facility: CLINIC | Age: 47
End: 2017-04-13

## 2017-04-13 RX ORDER — HYDROCODONE BITARTRATE AND ACETAMINOPHEN 7.5; 325 MG/1; MG/1
1 TABLET ORAL EVERY 6 HOURS PRN
Qty: 15 TABLET | Refills: 0 | Status: SHIPPED | OUTPATIENT
Start: 2017-04-13 | End: 2017-04-21

## 2017-04-13 RX ORDER — PROMETHAZINE HYDROCHLORIDE 25 MG/1
12.5 TABLET ORAL EVERY 6 HOURS PRN
Qty: 8 TABLET | Refills: 0 | Status: SHIPPED | OUTPATIENT
Start: 2017-04-13 | End: 2017-04-21

## 2017-04-13 NOTE — PROGRESS NOTES
Patient called to report that she was having pain at the top incision site after gallbladder surgery that was performed on 4/7/17. Bruised- no drainage, low grade temp of highest- 99.6, intermittent diarrhea. Nausea with no emesis that started yesterday after eating a poptart and a frappe from [employer]. No abdominal pain or cramping. Pain mainly with position changes and bending. Nausea now before and after eating- tried her zofran that she had at home and does not work. Requesting pain med refill since otc aleve and ibuprofen not working and tylenol not working. Imodium working for diarrhea.    Explained to pt the discomfort at the incision site is common and that this should improve with gradual increase in activity. She should watch for any drainage or swelling at the site.    Nausea most likely related to her eating the pop tart and frappe since they are high in fat. Asked pt to stick to a bland brat diet regimen.     Take imodium as needed for loose bowels.     Notified Dr. Adis Nichole the surgeon on call of pt complaints and regarding her request for pain and nausea medication. Dr. Nichole stated that he would send in a refill for those meds to the [facility] per the pt request.    Explained to the pt that if her symptoms exacerbate- nausea with vomiting, abdominal pain, any drainage from incisions, fever over 100.5, worsening pain or diarrhea, inability to eat or drink, she should go to the ED this weekend since the clinic will be closed on Friday for the holiday. Encouraged her to drink and to eat small amounts frequently and she agreed and verbalized understanding of the information given.

## 2017-04-17 ENCOUNTER — TELEPHONE (OUTPATIENT)
Dept: SURGERY | Facility: CLINIC | Age: 47
End: 2017-04-17

## 2017-04-17 NOTE — TELEPHONE ENCOUNTER
----- Message from Henrietta Tinsley sent at 4/13/2017  2:13 PM CDT -----  Pt requesting a call from nurse to f/u after surgery.            Please call pt back at [phone]

## 2017-04-21 ENCOUNTER — HOSPITAL ENCOUNTER (EMERGENCY)
Facility: HOSPITAL | Age: 47
Discharge: HOME | End: 2017-04-22
Attending: EMERGENCY MEDICINE
Payer: MEDICAID

## 2017-04-21 DIAGNOSIS — G89.18 POST-OP PAIN: Primary | ICD-10-CM

## 2017-04-21 DIAGNOSIS — R10.13 EPIGASTRIC PAIN: ICD-10-CM

## 2017-04-21 DIAGNOSIS — Z72.0 TOBACCO ABUSE DISORDER: ICD-10-CM

## 2017-04-21 DIAGNOSIS — Z90.49 S/P LAPAROSCOPIC CHOLECYSTECTOMY: ICD-10-CM

## 2017-04-21 LAB
ALBUMIN SERPL BCP-MCNC: 3.9 G/DL
ALP SERPL-CCNC: 67 U/L
ALT SERPL W/O P-5'-P-CCNC: 14 U/L
ANION GAP SERPL CALC-SCNC: 7 MMOL/L
APTT BLDCRRT: 26.1 SEC
AST SERPL-CCNC: 11 U/L
BASOPHILS # BLD AUTO: 0.1 K/UL
BASOPHILS NFR BLD: 1.3 %
BILIRUB SERPL-MCNC: 0.2 MG/DL
BUN SERPL-MCNC: 15 MG/DL
CALCIUM SERPL-MCNC: 9 MG/DL
CHLORIDE SERPL-SCNC: 102 MMOL/L
CO2 SERPL-SCNC: 28 MMOL/L
CREAT SERPL-MCNC: 0.9 MG/DL
DIFFERENTIAL METHOD: ABNORMAL
EOSINOPHIL # BLD AUTO: 0.4 K/UL
EOSINOPHIL NFR BLD: 5.7 %
ERYTHROCYTE [DISTWIDTH] IN BLOOD BY AUTOMATED COUNT: 14.1 %
EST. GFR  (AFRICAN AMERICAN): >60 ML/MIN/1.73 M^2
EST. GFR  (NON AFRICAN AMERICAN): >60 ML/MIN/1.73 M^2
GLUCOSE SERPL-MCNC: 98 MG/DL
HCT VFR BLD AUTO: 43.1 %
HGB BLD-MCNC: 14.8 G/DL
INR PPP: 1
LIPASE SERPL-CCNC: 25 U/L
LYMPHOCYTES # BLD AUTO: 3.2 K/UL
LYMPHOCYTES NFR BLD: 42.2 %
MCH RBC QN AUTO: 31.6 PG
MCHC RBC AUTO-ENTMCNC: 34.3 %
MCV RBC AUTO: 92 FL
MONOCYTES # BLD AUTO: 0.5 K/UL
MONOCYTES NFR BLD: 7.2 %
NEUTROPHILS # BLD AUTO: 3.3 K/UL
NEUTROPHILS NFR BLD: 43.6 %
PLATELET # BLD AUTO: 283 K/UL
PMV BLD AUTO: 9.8 FL
POTASSIUM SERPL-SCNC: 4.3 MMOL/L
PROT SERPL-MCNC: 7.3 G/DL
PROTHROMBIN TIME: 10.7 SEC
RBC # BLD AUTO: 4.69 M/UL
SODIUM SERPL-SCNC: 137 MMOL/L
WBC # BLD AUTO: 7.48 K/UL

## 2017-04-21 PROCEDURE — 85610 PROTHROMBIN TIME: CPT

## 2017-04-21 PROCEDURE — 96374 THER/PROPH/DIAG INJ IV PUSH: CPT

## 2017-04-21 PROCEDURE — 25000003 PHARM REV CODE 250: Performed by: EMERGENCY MEDICINE

## 2017-04-21 PROCEDURE — 96375 TX/PRO/DX INJ NEW DRUG ADDON: CPT

## 2017-04-21 PROCEDURE — 99284 EMERGENCY DEPT VISIT MOD MDM: CPT | Mod: 25

## 2017-04-21 PROCEDURE — 83690 ASSAY OF LIPASE: CPT

## 2017-04-21 PROCEDURE — 96376 TX/PRO/DX INJ SAME DRUG ADON: CPT

## 2017-04-21 PROCEDURE — 85730 THROMBOPLASTIN TIME PARTIAL: CPT

## 2017-04-21 PROCEDURE — 80053 COMPREHEN METABOLIC PANEL: CPT

## 2017-04-21 PROCEDURE — 63600175 PHARM REV CODE 636 W HCPCS: Performed by: EMERGENCY MEDICINE

## 2017-04-21 PROCEDURE — 85025 COMPLETE CBC W/AUTO DIFF WBC: CPT

## 2017-04-21 RX ORDER — ONDANSETRON 2 MG/ML
4 INJECTION INTRAMUSCULAR; INTRAVENOUS
Status: COMPLETED | OUTPATIENT
Start: 2017-04-21 | End: 2017-04-21

## 2017-04-21 RX ORDER — HYDROCODONE BITARTRATE AND ACETAMINOPHEN 7.5; 325 MG/1; MG/1
1 TABLET ORAL EVERY 6 HOURS PRN
Qty: 15 TABLET | Refills: 0 | Status: SHIPPED | OUTPATIENT
Start: 2017-04-21 | End: 2017-04-25 | Stop reason: SDUPTHER

## 2017-04-21 RX ORDER — MORPHINE SULFATE 4 MG/ML
4 INJECTION, SOLUTION INTRAMUSCULAR; INTRAVENOUS
Status: COMPLETED | OUTPATIENT
Start: 2017-04-21 | End: 2017-04-21

## 2017-04-21 RX ORDER — PROMETHAZINE HYDROCHLORIDE 25 MG/1
12.5 TABLET ORAL EVERY 6 HOURS PRN
Qty: 8 TABLET | Refills: 0 | Status: SHIPPED | OUTPATIENT
Start: 2017-04-21 | End: 2017-05-01 | Stop reason: SDUPTHER

## 2017-04-21 RX ADMIN — ONDANSETRON 4 MG: 2 INJECTION INTRAMUSCULAR; INTRAVENOUS at 10:04

## 2017-04-21 RX ADMIN — MORPHINE SULFATE 4 MG: 4 INJECTION, SOLUTION INTRAMUSCULAR; INTRAVENOUS at 11:04

## 2017-04-21 RX ADMIN — LIDOCAINE HYDROCHLORIDE 50 ML: 20 SOLUTION ORAL; TOPICAL at 10:04

## 2017-04-21 RX ADMIN — ONDANSETRON 4 MG: 2 INJECTION INTRAMUSCULAR; INTRAVENOUS at 11:04

## 2017-04-21 NOTE — ED AVS SNAPSHOT
[facility] -   [address] [facility]  [place] [address]               Kaylene Emery   2017  9:37 PM   ED    Description:  Female : 1970   Department:  [facility]           Your Care was Coordinated By:     Provider Role From To    Melba Joseph MD Attending Provider 17 1510 --      Reason for Visit     Post-op Problem           Diagnoses this Visit        Comments    Post-op pain    -  Primary     Epigastric pain         S/P laparoscopic cholecystectomy           ED Disposition     None           To Do List           Follow-up Information     Follow up with Td Church MD On 2017.    Specialty:  General Surgery    Why:  Return to the Emergency Room, If symptoms worsen    Contact information:    [address]  [place] [id number] [phone]         These Medications        Disp Refills Start End    hydrocodone-acetaminophen 7.5-325mg (NORCO) 7.5-325 mg per tablet 15 tablet 0 2017     Take 1 tablet by mouth every 6 (six) hours as needed for Pain. - Oral    Pharmacy: [facility] [address] [place] [address] [facility] [address] [place] [address] [address] [place] [address] Ph #: [phone]       promethazine (PHENERGAN) 25 MG tablet 8 tablet 0 2017     Take 0.5 tablets (12.5 mg total) by mouth every 6 (six) hours as needed for Nausea. - Oral    Pharmacy: [facility] [address] [place] [address] [facility] [address] AT [place] [address] Ph #: [phone]         [facility]     [facility] Care [facility] -  Assistance  Unless otherwise directed by your provider, please contact [facility], our nurse care line that is available for  assistance.     Registered nurses in the [facility] provide: appointment scheduling, clinical advisement, health education, and other advisory services.  Call: [phone] (toll free)               Medications           Message  regarding Medications     Verify the changes and/or additions to your medication regime listed below are the same as discussed with your clinician today.  If any of these changes or additions are incorrect, please notify your healthcare provider.        These medications were administered today        Dose Freq    ondansetron injection 4 mg 4 mg ED 1 Time    Sig: Inject 4 mg into the vein ED 1 Time.    Class: Normal    Route: Intravenous    GI cocktail (mylanta 30 mL, lidocaine 2 % viscous 10 mL, dicyclomine 10 mL) 50 mL  ED 1 Time    Sig: Take by mouth ED 1 Time.    Class: Normal    Route: Oral    morphine injection 4 mg 4 mg ED 1 Time    Sig: Inject 1 mL (4 mg total) into the vein ED 1 Time.    Class: Normal    Route: Intravenous    ondansetron injection 4 mg 4 mg ED 1 Time    Sig: Inject 4 mg into the vein ED 1 Time.    Class: Normal    Route: Intravenous           Verify that the below list of medications is an accurate representation of the medications you are currently taking.  If none reported, the list may be blank. If incorrect, please contact your healthcare provider. Carry this list with you in case of emergency.           Current Medications     esomeprazole (NEXIUM) 40 MG capsule Take 40 mg by mouth before breakfast.    famotidine (PEPCID) 40 MG tablet Take 40 mg by mouth once daily.    fluoxetine (PROZAC) 40 MG capsule Take 40 mg by mouth once daily.    lorazepam (ATIVAN) 1 MG tablet Take 1 mg by mouth every 6 (six) hours as needed for Anxiety.    ondansetron (ZOFRAN) 8 MG tablet Take 8 mg by mouth every 8 (eight) hours.    quetiapine (SEROQUEL) 200 MG Tab Take by mouth.    hydrocodone-acetaminophen 7.5-325mg (NORCO) 7.5-325 mg per tablet Take 1 tablet by mouth every 6 (six) hours as needed for Pain.    naproxen (NAPROSYN) 375 MG tablet Take 1 tablet (375 mg total) by mouth 2 (two) times daily with meals.    promethazine (PHENERGAN) 25 MG tablet Take 0.5 tablets (12.5 mg total) by mouth every 6 (six)  "hours as needed for Nausea.           Clinical Reference Information           Your Vitals Were     BP Pulse Temp Resp Height Weight    105/68 (BP Location: Left arm, Patient Position: Sitting, BP Method: Automatic) 78 98.6 °F (37 °C) (Oral) 16 5' 4" (1.626 m) 61.7 kg (136 lb)    SpO2 BMI             99% 23.34 kg/m2         Allergies as of 4/21/2017        Reactions    Tramadol Rash      Immunizations Administered on Date of Encounter - 4/21/2017     None      ED Micro, Lab, POCT     Start Ordered       Status Ordering Provider    04/21/17 2150 04/21/17 2149  CBC auto differential  STAT      Final result     04/21/17 2150 04/21/17 2149  Comprehensive metabolic panel  STAT      Final result     04/21/17 2150 04/21/17 2149  Lipase  STAT      Final result     04/21/17 2150 04/21/17 2149  APTT  STAT      Final result     04/21/17 2150 04/21/17 2149  Protime-INR  STAT      Final result       ED Imaging Orders     Start Ordered       Status Ordering Provider    04/21/17 2238 04/21/17 2237  X-Ray Abdomen Flat And Erect  1 time imaging      Final result       Discharge References/Attachments     PAIN (POST-OP), MANAGING AT HOME: MEDICINES (ENGLISH)    POST OP WOUND CHECK, PAIN (ENGLISH)      [facility] Sign-Up     Activating your [facility] account is as easy as 1-2-3!     1) Visit [email or web address], select Sign Up Now, enter this activation code and your date of birth, then select Next.  [email or web address]  Expires: 6/5/2017 11:38 PM      2) Create a username and password to use when you visit [facility] in the future and select a security question in case you lose your password and select Next.    3) Enter your e-mail address and click Sign Up!    Additional Information  If you have questions, please e-mail [email or web address] or call [phone] to talk to our [facility] staff. Remember, [facility] is NOT to be used for urgent needs. For medical emergencies, dial 911.         Smoking Cessation     If you would like to quit " smoking:   You may be eligible for free services if you are a [place] resident and started smoking cigarettes before September 1, 1988.  Call the Smoking Cessation [employer] (S[employer]) toll free at (787) 873-3806 or (418) 405-2399.   Call 1-800-QUIT-NOW if you do not meet the above criteria.   Contact us via email: [email or web address]   View our website for more information: [email or web address]         [facility]  complies with applicable Federal civil rights laws and does not discriminate on the basis of race, color, national origin, age, disability, or sex.        Language Assistance Services     ATTENTION: Language assistance services are available, free of charge. Please call [phone].      ATENCIÓN: Si habla david, tiene a smith disposición servicios gratuitos de asistencia lingüística. Llame al [phone].     CHÚ Ý: N?u b?n nói Ti?ng Vi?t, có các d?ch v? h? tr? ngôn ng? mi?n phí dành cho b?n. G?i s? [phone].

## 2017-04-22 VITALS
HEIGHT: 64 IN | OXYGEN SATURATION: 97 % | TEMPERATURE: 99 F | RESPIRATION RATE: 16 BRPM | WEIGHT: 136 LBS | DIASTOLIC BLOOD PRESSURE: 77 MMHG | BODY MASS INDEX: 23.22 KG/M2 | SYSTOLIC BLOOD PRESSURE: 121 MMHG | HEART RATE: 66 BPM

## 2017-04-22 NOTE — ED PROVIDER NOTES
SCRIBE #1 NOTE: I, Herrera Mcghee, am scribing for, and in the presence of, Mleba Joseph MD. I have scribed the entire note.      History      Chief Complaint   Patient presents with    Post-op Problem     reports having gallbladder taken out on 7th. reports pain by incision and pressure.       Review of patient's allergies indicates:   Allergen Reactions    Tramadol Rash        HPI   HPI    4/21/2017, 10:14 PM   History obtained from the patient      History of Present Illness: Kaylene Emery is a 46 y.o. female patient who presents to the Emergency Department for post-op problem which onset gradually today. Pt states she had a cholecystectomy on April 7th, 2017. The patient reports a burning sensation and pain to her trocar sites to her abdomen. Symptoms are constant and moderate in severity. No mitigating or exacerbating factors reported. Associated sxs include nausea and diarrhea. Patient denies any fever, chills, constipation, emesis, dysuria, difficulty urinating, CP, SOB, hematuria and all other sxs at this time. No prior Tx reported. No further complaints or concerns at this time.     Arrival mode: Personal vehicle    PCP: Jonas Jimenez MD       Past Medical History:  Past Medical History:   Diagnosis Date    Anxiety     Depression     GERD (gastroesophageal reflux disease)     Tobacco use        Past Surgical History:  Past Surgical History:   Procedure Laterality Date    BLADDER REPAIR      CHOLECYSTECTOMY  04/07/2017    KNEE ARTHROPLASTY      PARTIAL HYSTERECTOMY      TUBAL LIGATION           Family History:  Family History   Problem Relation Age of Onset    Hypertension Mother     Diabetes Mother        Social History:  Social History     Social History Main Topics    Smoking status: Current Every Day Smoker     Packs/day: 1.00     Types: Cigarettes    Smokeless tobacco: Never Used    Alcohol use No    Drug use: No    Sexual activity: Yes       ROS   Review of Systems    Constitutional: Negative for chills and fever.   HENT: Negative for congestion and sore throat.    Respiratory: Negative for chest tightness and shortness of breath.    Cardiovascular: Negative for chest pain.   Gastrointestinal: Positive for diarrhea and nausea. Negative for abdominal pain, blood in stool, constipation and vomiting.   Genitourinary: Negative for dysuria and hematuria.   Musculoskeletal: Negative for back pain and neck pain.   Skin: Negative for rash.        (+)pain and burning to trocar sites   Neurological: Negative for dizziness, numbness and headaches.   Psychiatric/Behavioral: Negative for agitation and confusion.   All other systems reviewed and are negative.      Physical Exam    Initial Vitals   BP Pulse Resp Temp SpO2   04/21/17 2128 04/21/17 2128 04/21/17 2128 04/21/17 2129 04/21/17 2128   130/79 96 19 97.5 °F (36.4 °C) 96 %      Physical Exam  Nursing Notes and Vital Signs Reviewed.  Constitutional: Patient is in no acute distress. Awake and alert. Well-developed and well-nourished.  Head: Atraumatic. Normocephalic.  Eyes: PERRL. EOM intact. Conjunctivae are not pale. No scleral icterus.  ENT: Mucous membranes are moist. Oropharynx is clear and symmetric.    Neck: Supple. Full ROM. No lymphadenopathy.  Cardiovascular: Regular rate. Regular rhythm. No murmurs, rubs, or gallops. Distal pulses are 2+ and symmetric.  Pulmonary/Chest: No respiratory distress. Clear to auscultation bilaterally. No wheezing, rales, or rhonchi.   Abdominal: Soft and non-distended.  Epigastric tenderness noted at trochar site, no evidence of infection. No rebound, guarding, or rigidity. Good bowel sounds.   Musculoskeletal: Moves all extremities. No obvious deformities. No edema. No calf tenderness.  Skin: Warm and dry. Multiple well healed trocar sites to abdominal wall, no erythema, induration, fluctuance, or drainage appreciated. P  Neurological:  Alert, awake, and appropriate.  Normal speech.  No acute focal  "neurological deficits are appreciated.  Psychiatric: Normal affect. Good eye contact. Appropriate in content.    ED Course    Procedures  ED Vital Signs:  Vitals:    04/21/17 2128 04/21/17 2129 04/21/17 2300   BP: 130/79  105/68   Pulse: 96  78   Resp: 19  16   Temp:  97.5 °F (36.4 °C) 98.6 °F (37 °C)   TempSrc: Oral  Oral   SpO2: 96%  99%   Weight: 61.7 kg (136 lb)     Height: 5' 4" (1.626 m)         Abnormal Lab Results:  Labs Reviewed   CBC W/ AUTO DIFFERENTIAL - Abnormal; Notable for the following:        Result Value    MCH 31.6 (*)     All other components within normal limits   COMPREHENSIVE METABOLIC PANEL - Abnormal; Notable for the following:     Anion Gap 7 (*)     All other components within normal limits   LIPASE   APTT   PROTIME-INR        All Lab Results:  Results for orders placed or performed during the hospital encounter of 04/21/17   CBC auto differential   Result Value Ref Range    WBC 7.48 3.90 - 12.70 K/uL    RBC 4.69 4.00 - 5.40 M/uL    Hemoglobin 14.8 12.0 - 16.0 g/dL    Hematocrit 43.1 37.0 - 48.5 %    MCV 92 82 - 98 fL    MCH 31.6 (H) 27.0 - 31.0 pg    MCHC 34.3 32.0 - 36.0 %    RDW 14.1 11.5 - 14.5 %    Platelets 283 150 - 350 K/uL    MPV 9.8 9.2 - 12.9 fL    Gran # 3.3 1.8 - 7.7 K/uL    Lymph # 3.2 1.0 - 4.8 K/uL    Mono # 0.5 0.3 - 1.0 K/uL    Eos # 0.4 0.0 - 0.5 K/uL    Baso # 0.10 0.00 - 0.20 K/uL    Gran% 43.6 38.0 - 73.0 %    Lymph% 42.2 18.0 - 48.0 %    Mono% 7.2 4.0 - 15.0 %    Eosinophil% 5.7 0.0 - 8.0 %    Basophil% 1.3 0.0 - 1.9 %    Differential Method Automated    Comprehensive metabolic panel   Result Value Ref Range    Sodium 137 136 - 145 mmol/L    Potassium 4.3 3.5 - 5.1 mmol/L    Chloride 102 95 - 110 mmol/L    CO2 28 23 - 29 mmol/L    Glucose 98 70 - 110 mg/dL    BUN, Bld 15 6 - 20 mg/dL    Creatinine 0.9 0.5 - 1.4 mg/dL    Calcium 9.0 8.7 - 10.5 mg/dL    Total Protein 7.3 6.0 - 8.4 g/dL    Albumin 3.9 3.5 - 5.2 g/dL    Total Bilirubin 0.2 0.1 - 1.0 mg/dL    Alkaline " "Phosphatase 67 55 - 135 U/L    AST 11 10 - 40 U/L    ALT 14 10 - 44 U/L    Anion Gap 7 (L) 8 - 16 mmol/L    eGFR if African American >60 >60 mL/min/1.73 m^2    eGFR if non African American >60 >60 mL/min/1.73 m^2   Lipase   Result Value Ref Range    Lipase 25 4 - 60 U/L   APTT   Result Value Ref Range    aPTT 26.1 21.0 - 32.0 sec   Protime-INR   Result Value Ref Range    Prothrombin Time 10.7 9.0 - 12.5 sec    INR 1.0 0.8 - 1.2         Imaging Results:  Imaging Results         X-Ray Abdomen Flat And Erect (Final result) Result time:  04/21/17 22:52:03    Final result by Nuno Jordan MD (04/21/17 22:52:03)    Impression:         Unremarkable exam.      Electronically signed by: NUNO JORDAN MD  Date:     04/21/17  Time:    22:52     Narrative:    Examination: Abdomen, 2 views.    History:    Epigastric pain    Findings:    Flat and upright views of the abdomen reveal no evidence of free air or pneumatosis. Bowel gas pattern is within normal limits. No suspicious calcifications. Surgical clips are noted in the right upper quadrant.                      The Emergency Provider reviewed the vital signs and test results, which are outlined above.    ED Discussion     11:03 PM: Re-evaluated pt. Pt is resting comfortably and is in no acute distress.  Pt states "burning" sensation went away but the pain is still there. Patient asked if she could have anything for the nausea and pain.  D/w pt all pertinent results. D/w pt any concerns expressed at this time. Answered all questions. Pt expresses understanding at this time.    11:10 PM: Dr. Joseph discussed the pt's case with Dr. Bruno (GI) who recommends if pt is stable and labs look good, no further work up is recommended. Dr. Nichole recommends f/u with Dr. Church in clinic on Monday.     11:27 PM: Reassessed pt at this time.  Pt states her condition has improved at this time. Discussed with pt all pertinent ED information and results. Discussed pt dx and plan of " tx. Gave pt all f/u and return to the ED instructions. All questions and concerns were addressed at this time. Pt expresses understanding of information and instructions, and is comfortable with plan to discharge. Pt is stable for discharge.    I discussed wound care precautions with patient and/or family/caretaker; specifically that all wounds have risk of infection despite efforts to cleanse and debride the wound; and there is a risk of an occult foreign body (and thus increased risk of infection) despite a negative examination.  I discussed with patient need to return for any signs of infection, specifically redness, increased pain, fever, drainage of pus, or any concern, immediately.      ED Medication(s):  Medications   ondansetron injection 4 mg (4 mg Intravenous Given 4/21/17 2206)   GI cocktail (mylanta 30 mL, lidocaine 2 % viscous 10 mL, dicyclomine 10 mL) 50 mL (50 mLs Oral Given 4/21/17 2206)   morphine injection 4 mg (4 mg Intravenous Given 4/21/17 2318)   ondansetron injection 4 mg (4 mg Intravenous Given 4/21/17 2318)       New Prescriptions    No medications on file       Follow-up Information     Follow up with Td Church MD On 4/24/2017.    Specialty:  General Surgery    Why:  Return to the Emergency Room, If symptoms worsen    Contact information:    [address]  [place] [id number] [phone]              Medical Decision Making    Medical Decision Making:   Clinical Tests:   Lab Tests: Ordered and Reviewed  Radiological Study: Ordered and Reviewed     Additional MDM:   Smoking Cessation: The patient is a smoker. The patient was counseled on smoking cessation for: 3 minutes. The patient was counseled on tobacco related  health complications.        Scribe Attestation:   Scribe #1: I performed the above scribed service and the documentation accurately describes the services I performed. I attest to the accuracy of the note.    Attending:   Physician Attestation Statement for  Scribe #1: I, Melba Joseph MD, personally performed the services described in this documentation, as scribed by Herrera Mcghee, in my presence, and it is both accurate and complete.          Clinical Impression       ICD-10-CM ICD-9-CM   1. Post-op pain G89.18 338.18   2. Epigastric pain R10.13 789.06   3. S/P laparoscopic cholecystectomy Z90.49 V45.89   4. Tobacco abuse disorder Z72.0 305.1       Disposition:   Disposition: Discharged  Condition: Stable         Melba Joseph MD  04/21/17 9041       Melba Joseph MD  04/21/17 1283

## 2017-04-22 NOTE — ED NOTES
Pt states she is still nauseated an in pain.  The zofran does not help.  Phenergan works better.  No n/v since in ER bed. Pain still an 8

## 2017-04-25 ENCOUNTER — TELEPHONE (OUTPATIENT)
Dept: SURGERY | Facility: CLINIC | Age: 47
End: 2017-04-25

## 2017-04-25 ENCOUNTER — TELEPHONE (OUTPATIENT)
Dept: SURGERY | Facility: HOSPITAL | Age: 47
End: 2017-04-25

## 2017-04-25 RX ORDER — HYDROCODONE BITARTRATE AND ACETAMINOPHEN 7.5; 325 MG/1; MG/1
1 TABLET ORAL EVERY 6 HOURS PRN
Qty: 30 TABLET | Refills: 0 | Status: SHIPPED | OUTPATIENT
Start: 2017-04-25 | End: 2017-05-01

## 2017-04-25 RX ORDER — ONDANSETRON HYDROCHLORIDE 8 MG/1
8 TABLET, FILM COATED ORAL EVERY 8 HOURS
Qty: 20 TABLET | Refills: 1 | Status: SHIPPED | OUTPATIENT
Start: 2017-04-25 | End: 2017-05-01

## 2017-04-25 NOTE — TELEPHONE ENCOUNTER
----- Message from Hayde Greer sent at 4/25/2017  3:18 PM CDT -----  Please call [facility] about changing med to something else. The zophram is not cover through insurance. Please call at [phone].

## 2017-04-25 NOTE — TELEPHONE ENCOUNTER
-script for zofran sent-        --- Message from Valdez Bonilla MA sent at 4/25/2017 11:59 AM CDT -----  Patient is scheduled for Monday, but she is also requesting nausea and pain meds sent to her pharmacy. Please advise thanks  ----- Message -----     From: Lashawn Bobby     Sent: 4/25/2017   9:14 AM       To: Lynnette Appiah Staff    Pt is new and needs to know if she can be seen with her insurance/ please call [phone]/ma    Script f

## 2017-05-01 ENCOUNTER — OFFICE VISIT (OUTPATIENT)
Dept: SURGERY | Facility: CLINIC | Age: 47
End: 2017-05-01
Payer: MEDICAID

## 2017-05-01 VITALS
SYSTOLIC BLOOD PRESSURE: 97 MMHG | HEART RATE: 86 BPM | TEMPERATURE: 99 F | DIASTOLIC BLOOD PRESSURE: 70 MMHG | BODY MASS INDEX: 24.29 KG/M2 | WEIGHT: 141.56 LBS

## 2017-05-01 DIAGNOSIS — Z90.49 STATUS POST LAPAROSCOPIC CHOLECYSTECTOMY: Primary | ICD-10-CM

## 2017-05-01 PROBLEM — K80.00 CALCULUS OF GALLBLADDER WITH ACUTE CHOLECYSTITIS: Status: RESOLVED | Noted: 2017-04-07 | Resolved: 2017-05-01

## 2017-05-01 PROCEDURE — 99999 PR PBB SHADOW E&M-EST. PATIENT-LVL III: CPT | Mod: PBBFAC,,, | Performed by: SURGERY

## 2017-05-01 PROCEDURE — 99024 POSTOP FOLLOW-UP VISIT: CPT | Mod: ,,, | Performed by: SURGERY

## 2017-05-01 PROCEDURE — 99213 OFFICE O/P EST LOW 20 MIN: CPT | Mod: PBBFAC | Performed by: SURGERY

## 2017-05-01 RX ORDER — PROMETHAZINE HYDROCHLORIDE 25 MG/1
25 TABLET ORAL EVERY 6 HOURS PRN
Qty: 20 TABLET | Refills: 1 | Status: SHIPPED | OUTPATIENT
Start: 2017-05-01 | End: 2017-05-10 | Stop reason: SDUPTHER

## 2017-05-01 RX ORDER — HYDROCODONE BITARTRATE AND ACETAMINOPHEN 5; 325 MG/1; MG/1
TABLET ORAL
Qty: 30 TABLET | Refills: 0 | Status: SHIPPED | OUTPATIENT
Start: 2017-05-01 | End: 2017-06-02 | Stop reason: CLARIF

## 2017-05-01 NOTE — PROGRESS NOTES
Subjective:       Patient ID: Kaylene Emery is a 46 y.o. female.    Post laparoscopic cholecystectomy for acute cholecystitis    Chief Complaint: Post-op Evaluation    HPI Comments: Patient underwent a laparoscopic cholecystectomy for acute cholecystitis.  She still has some incisional pain.  She feels a bit bloated.  She is still taking narcotics.  She is recently started on MiraLAX.  She is passing gas and having bowel movements.  She reports no nausea or vomiting    Review of Systems   Gastrointestinal: Positive for abdominal distention and abdominal pain.       Objective:      Physical Exam   Constitutional: She appears well-developed and well-nourished. No distress.   Eyes: No scleral icterus.   Cardiovascular: Normal rate, regular rhythm and normal heart sounds.    Pulmonary/Chest: Effort normal and breath sounds normal.   Abdominal: Soft. Bowel sounds are normal. Distention:  mild. Tenderness: mild. There is no guarding.   The incisions from a laparoscopic cholecystectomy are healing well   Vitals reviewed.      Pathology was consistent with acute cholecystitis  Assessment:      mild abdominal bloating, incisional pain and some nausea after laparoscopic cholecystectomy   Plan:     continue with MiraLAX on a daily basis.  Refill of her narcotics but is a lower dose, Phenergan for nausea.  Follow-up in about 4 weeks.  She was asked to call us if the abdominal bloating worsened or she develop any nausea or vomiting

## 2017-05-01 NOTE — MR AVS SNAPSHOT
[facility]  [address] [facility] [address]  Phone: [phone]  Fax: [phone]                  Kaylene Emery   2017 10:40 AM   Office Visit    Description:  Female : 1970   Provider:  Td Church MD   Department:  [facility]           Reason for Visit     Post-op Evaluation           Diagnoses this Visit        Comments    Status post laparoscopic cholecystectomy    -  Primary            To Do List           Future Appointments        Provider Department Dept Phone    2017 10:40 AM Td Church MD [facility] [phone]    2017 11:00 AM Td Church MD [facility] [phone]      Goals (5 Years of Data)     None       These Medications        Disp Refills Start End    hydrocodone-acetaminophen 5-325mg (NORCO) 5-325 mg per tablet 30 tablet 0 2017     1 every 6 hours as needed for pain    Pharmacy: [facility] [address] [place] [id number] [address] [place] [address] [address] [place] [address] Ph #: [phone]       promethazine (PHENERGAN) 25 MG tablet 20 tablet 1 2017     Take 1 tablet (25 mg total) by mouth every 6 (six) hours as needed for Nausea. - Oral    Pharmacy: [facility] [address] [place] [address] [facility] [address] [place] [address] Ph #: [phone]         [facility]     [facility] Care [facility] -  Assistance  Unless otherwise directed by your provider, please contact [facility], our nurse care line that is available for  assistance.     Registered nurses in the [facility] provide: appointment scheduling, clinical advisement, health education, and other advisory services.  Call: [phone] (toll free)               Medications           Message regarding Medications     Verify the changes and/or additions to your medication regime listed below are the same as discussed with your clinician today.  If  any of these changes or additions are incorrect, please notify your healthcare provider.        START taking these NEW medications        Refills    hydrocodone-acetaminophen 5-325mg (NORCO) 5-325 mg per tablet 0    Si every 6 hours as needed for pain    Class: Normal      CHANGE how you are taking these medications     Start Taking Instead of    promethazine (PHENERGAN) 25 MG tablet promethazine (PHENERGAN) 25 MG tablet    Dosage:  Take 1 tablet (25 mg total) by mouth every 6 (six) hours as needed for Nausea. Dosage:  Take 0.5 tablets (12.5 mg total) by mouth every 6 (six) hours as needed for Nausea.    Reason for Change:  Reorder       STOP taking these medications     ondansetron (ZOFRAN) 8 MG tablet Take 1 tablet (8 mg total) by mouth every 8 (eight) hours.    naproxen (NAPROSYN) 375 MG tablet Take 1 tablet (375 mg total) by mouth 2 (two) times daily with meals.    hydrocodone-acetaminophen 7.5-325mg (NORCO) 7.5-325 mg per tablet Take 1 tablet by mouth every 6 (six) hours as needed for Pain.           Verify that the below list of medications is an accurate representation of the medications you are currently taking.  If none reported, the list may be blank. If incorrect, please contact your healthcare provider. Carry this list with you in case of emergency.           Current Medications     famotidine (PEPCID) 40 MG tablet Take 40 mg by mouth once daily.    fluoxetine (PROZAC) 40 MG capsule Take 40 mg by mouth once daily.    lorazepam (ATIVAN) 1 MG tablet Take 1 mg by mouth every 6 (six) hours as needed for Anxiety.    quetiapine (SEROQUEL) 200 MG Tab Take by mouth.    esomeprazole (NEXIUM) 40 MG capsule Take 40 mg by mouth before breakfast.    hydrocodone-acetaminophen 5-325mg (NORCO) 5-325 mg per tablet 1 every 6 hours as needed for pain    promethazine (PHENERGAN) 25 MG tablet Take 1 tablet (25 mg total) by mouth every 6 (six) hours as needed for Nausea.           Clinical Reference Information            Your Vitals Were     BP Pulse Temp Weight BMI    97/70 86 98.7 °F (37.1 °C) (Oral) 64.2 kg (141 lb 8.6 oz) 24.29 kg/m2      Blood Pressure          Most Recent Value    BP  97/70      Allergies as of 5/1/2017     Tramadol      Immunizations Administered on Date of Encounter - 5/1/2017     None      [facility] Sign-Up     Activating your [facility] account is as easy as 1-2-3!     1) Visit [email or web address], select Sign Up Now, enter this activation code and your date of birth, then select Next.  [email or web address]  Expires: 6/5/2017 11:38 PM      2) Create a username and password to use when you visit [facility] in the future and select a security question in case you lose your password and select Next.    3) Enter your e-mail address and click Sign Up!    Additional Information  If you have questions, please e-mail [email or web address] or call [phone] to talk to our [facility] staff. Remember, [facility] is NOT to be used for urgent needs. For medical emergencies, dial 911.         Smoking Cessation     If you would like to quit smoking:   You may be eligible for free services if you are a [place] resident and started smoking cigarettes before September 1, 1988.  Call the Smoking Cessation [employer] ([facility]) toll free at (159) [phone] or (734) 691-1294.   Call 1-800-QUIT-NOW if you do not meet the above criteria.   Contact us via email: [email or web address]   View our website for more information: [email or web address]        Language Assistance Services     ATTENTION: Language assistance services are available, free of charge. Please call [phone].      ATENCIÓN: Si habla español, tiene a smith disposición servicios gratuitos de asistencia lingüística. Llame al [phone].     CHÚ Ý: N?u b?n nói Ti?ng Vi?t, có các d?ch v? h? tr? ngôn ng? mi?n phí dành cho b?n. G?i s? [phone].         [facility] complies with applicable Federal civil rights laws and does not discriminate on the  basis of race, color, national origin, age, disability, or sex.

## 2017-05-01 NOTE — PATIENT INSTRUCTIONS
I would like you to go to the lower dose of pain medicine.  If you have significant pain you can take 2 of the pills.    Please take the MiraLAX to see if this helps with the bloating.    The narcotics can really slowed on your bowels and are likely the cause of this.    If the abdominal bloating gets worse and he started having nausea and vomiting please let us know.      There are no limits on your activity      I would recommend you discuss strategies to stop smoking with your primary care doctor, Dr. Jimenez    How to Quit Smoking  Smoking is one of the hardest habits to break. About half of all people who have ever smoked have been able to quit. Most people who still smoke want to quit. Here are some of the best ways to stop smoking.    Keep trying  It takes most smokers about eight tries before they can quit entirely. Its important not to give up.  Go cold turkey  Most former smokers quit cold turkey (all at once). Trying to cut back gradually doesn't seem to work as well, perhaps because it continues the smoking habit. Also, it is possible to inhale more while smoking fewer cigarettes. This results in the same amount of nicotine in your body!  Get support  Support programs can be a big help, especially for heavy smokers. These groups offer lectures, ways to change behavior, and peer support. Here are some ways to find a support program:  · Free national quitline: 800-QUIT-NOW (282-029-2526).  · Hospital quit-smoking programs.  · American Lung Association: (903.710.1897).  · American Cancer Society (458-799-5718).  Support at home is important too. Nonsmokers can offer praise and encouragement. If the smoker in your life finds it hard to quit, encourage them to keep trying!  Over-the-counter medicines  Nicotine replacement therapy may make quitting easier. Certain aids, such as the nicotine patch, gum, and lozenges, are available without a prescription. It is best to use these under a doctors care, though.  "The skin patch provides a steady supply of nicotine. Nicotine gum and lozenges give temporary bursts of low levels of nicotine. Both methods reduce the craving for cigarettes. Warning: If you have nausea, vomiting, dizziness, weakness, or a fast heartbeat, stop using these products and see your doctor.  Prescription medicines  After reviewing your smoking patterns and prior attempts to quit, your doctor may offer a prescription medicine such as bupropion, varenicline, a nicotine inhaler, or nasal spray. Each has advantages and side effects. Your doctor can review these with you.  Health benefits of quitting  The benefits of quitting start right away and keep improving the longer you go without smoking. These benefits occur at any age.  So whether you are 17 or 70, quitting is a good decision. Some of the benefits include:  · 20 minutes: Blood pressure and pulse return to normal.  · 8 hours: Oxygen levels return to normal.  · 2 days: Ability to smell and taste begin to improve as damaged nerves regrow.  · 2 to 3 weeks: Circulation and lung function improve.  · 1 to 9 months: Coughing, congestion, and shortness of breath decrease; tiredness decreases.  · 1 year: Risk of heart attack decreases by half.  · 5 years: Risk of lung cancer decreases by half; risk of stroke becomes the same as a nonsmokers.  For more on how to quit smoking, try these online resources:   · Smokefree.gov  · "Clearing the Air" booklet from the National Cancer [place]: smokefree.gov/sites/default/files/pdf/clearing-the-air-accessible.pdf  Date Last Reviewed: 4/28/2015 © 2000-2016 [employer]. [address] [place] [address], [place] [address]. All rights reserved. This information is not intended as a substitute for professional medical care. Always follow your healthcare professional's instructions.        Why Do You Smoke?  The more you know about why you smoke, the easier it will be to quit. You may reach for a cigarette during a " stressful commute. Or you may want to smoke when you first wake up in the morning. Learn what your smoking triggers are, and how to handle them.    Common triggers  · Frustration  · Fatigue  · Anger  · Stress  · Hunger  · Boredom or loneliness  · Drinking or socializing  · Watching others smoke  Track your smoking habits  To learn about your smoking habits, track them for a week. Attach a small notebook or piece of paper to your cigarette pack. With each cigarette you smoke, write down the time, where you are, who youre with, and how you feel.  How to cope with your triggers  · Change the habits that lead you to smoke. For instance, if you often smoke at a morning break, go for a walk instead.  · Distract yourself from smoking. Keep your hands busy by playing with a paper clip or doodling. Keep your mouth busy by chewing on gum or a carrot stick.  · Limit contact with people who are smoking. When you eat out, sit in the nonsmoking section.     For more information  · smokefree.gov/bvzi-qq-dj-expert  · National Cancer [place] Smoking Quitline: 877-44U-QUIT (910-136-2317)      Date Last Reviewed: 11/12/2014  © 1588-6418 [employer]. [address] [place] [address], [place] [address]. All rights reserved. This information is not intended as a substitute for professional medical care. Always follow your healthcare professional's instructions.

## 2017-05-02 ENCOUNTER — HOSPITAL ENCOUNTER (EMERGENCY)
Facility: HOSPITAL | Age: 47
Discharge: HOME | End: 2017-05-02
Payer: MEDICAID

## 2017-05-02 VITALS
DIASTOLIC BLOOD PRESSURE: 58 MMHG | BODY MASS INDEX: 23.9 KG/M2 | HEIGHT: 64 IN | HEART RATE: 80 BPM | TEMPERATURE: 98 F | OXYGEN SATURATION: 95 % | SYSTOLIC BLOOD PRESSURE: 101 MMHG | WEIGHT: 140 LBS | RESPIRATION RATE: 16 BRPM

## 2017-05-02 DIAGNOSIS — R33.9 URINARY RETENTION: Primary | ICD-10-CM

## 2017-05-02 LAB
ALBUMIN SERPL BCP-MCNC: 3.5 G/DL
ALP SERPL-CCNC: 60 U/L
ALT SERPL W/O P-5'-P-CCNC: 7 U/L
ANION GAP SERPL CALC-SCNC: 8 MMOL/L
AST SERPL-CCNC: 13 U/L
BASOPHILS # BLD AUTO: 0.04 K/UL
BASOPHILS NFR BLD: 0.5 %
BILIRUB SERPL-MCNC: 0.2 MG/DL
BILIRUB UR QL STRIP: NEGATIVE
BUN SERPL-MCNC: 7 MG/DL
CALCIUM SERPL-MCNC: 9 MG/DL
CHLORIDE SERPL-SCNC: 103 MMOL/L
CLARITY UR: CLEAR
CO2 SERPL-SCNC: 27 MMOL/L
COLOR UR: YELLOW
CREAT SERPL-MCNC: 0.8 MG/DL
DIFFERENTIAL METHOD: ABNORMAL
EOSINOPHIL # BLD AUTO: 0.5 K/UL
EOSINOPHIL NFR BLD: 6.3 %
ERYTHROCYTE [DISTWIDTH] IN BLOOD BY AUTOMATED COUNT: 14.1 %
EST. GFR  (AFRICAN AMERICAN): >60 ML/MIN/1.73 M^2
EST. GFR  (NON AFRICAN AMERICAN): >60 ML/MIN/1.73 M^2
GLUCOSE SERPL-MCNC: 81 MG/DL
GLUCOSE UR QL STRIP: NEGATIVE
HCT VFR BLD AUTO: 40.6 %
HGB BLD-MCNC: 13.9 G/DL
HGB UR QL STRIP: NEGATIVE
KETONES UR QL STRIP: NEGATIVE
LEUKOCYTE ESTERASE UR QL STRIP: NEGATIVE
LYMPHOCYTES # BLD AUTO: 3.4 K/UL
LYMPHOCYTES NFR BLD: 40.8 %
MCH RBC QN AUTO: 32 PG
MCHC RBC AUTO-ENTMCNC: 34.2 %
MCV RBC AUTO: 94 FL
MONOCYTES # BLD AUTO: 0.8 K/UL
MONOCYTES NFR BLD: 9.9 %
NEUTROPHILS # BLD AUTO: 3.5 K/UL
NEUTROPHILS NFR BLD: 42.5 %
NITRITE UR QL STRIP: NEGATIVE
PH UR STRIP: 6 [PH] (ref 5–8)
PLATELET # BLD AUTO: 195 K/UL
PMV BLD AUTO: 10 FL
POTASSIUM SERPL-SCNC: 4.3 MMOL/L
PROT SERPL-MCNC: 6.9 G/DL
PROT UR QL STRIP: NEGATIVE
RBC # BLD AUTO: 4.34 M/UL
SODIUM SERPL-SCNC: 138 MMOL/L
SP GR UR STRIP: <=1.005 (ref 1–1.03)
URN SPEC COLLECT METH UR: ABNORMAL
UROBILINOGEN UR STRIP-ACNC: NEGATIVE EU/DL
WBC # BLD AUTO: 8.35 K/UL

## 2017-05-02 PROCEDURE — 81003 URINALYSIS AUTO W/O SCOPE: CPT

## 2017-05-02 PROCEDURE — 96374 THER/PROPH/DIAG INJ IV PUSH: CPT

## 2017-05-02 PROCEDURE — 80053 COMPREHEN METABOLIC PANEL: CPT

## 2017-05-02 PROCEDURE — 63600175 PHARM REV CODE 636 W HCPCS: Performed by: PHYSICIAN ASSISTANT

## 2017-05-02 PROCEDURE — 51702 INSERT TEMP BLADDER CATH: CPT

## 2017-05-02 PROCEDURE — 99284 EMERGENCY DEPT VISIT MOD MDM: CPT | Mod: 25

## 2017-05-02 PROCEDURE — 85025 COMPLETE CBC W/AUTO DIFF WBC: CPT

## 2017-05-02 PROCEDURE — 25000003 PHARM REV CODE 250: Performed by: PHYSICIAN ASSISTANT

## 2017-05-02 RX ORDER — PHENAZOPYRIDINE HYDROCHLORIDE 200 MG/1
200 TABLET, FILM COATED ORAL
Qty: 15 TABLET | Refills: 0 | Status: SHIPPED | OUTPATIENT
Start: 2017-05-02 | End: 2017-05-12

## 2017-05-02 RX ORDER — HYDROMORPHONE HYDROCHLORIDE 2 MG/ML
1 INJECTION, SOLUTION INTRAMUSCULAR; INTRAVENOUS; SUBCUTANEOUS
Status: COMPLETED | OUTPATIENT
Start: 2017-05-02 | End: 2017-05-02

## 2017-05-02 RX ORDER — DICLOFENAC SODIUM 75 MG/1
75 TABLET, DELAYED RELEASE ORAL 2 TIMES DAILY
Qty: 20 TABLET | Refills: 0 | Status: SHIPPED | OUTPATIENT
Start: 2017-05-02 | End: 2017-06-02 | Stop reason: CLARIF

## 2017-05-02 RX ORDER — ONDANSETRON 4 MG/1
4 TABLET, ORALLY DISINTEGRATING ORAL
Status: COMPLETED | OUTPATIENT
Start: 2017-05-02 | End: 2017-05-02

## 2017-05-02 RX ORDER — NITROFURANTOIN 25; 75 MG/1; MG/1
100 CAPSULE ORAL 2 TIMES DAILY
Qty: 10 CAPSULE | Refills: 0 | Status: SHIPPED | OUTPATIENT
Start: 2017-05-02 | End: 2017-06-02 | Stop reason: CLARIF

## 2017-05-02 RX ORDER — ACETAMINOPHEN 500 MG
1000 TABLET ORAL 3 TIMES DAILY PRN
Qty: 30 TABLET | Refills: 0
Start: 2017-05-02 | End: 2017-06-02 | Stop reason: CLARIF

## 2017-05-02 RX ORDER — MORPHINE SULFATE 4 MG/ML
4 INJECTION, SOLUTION INTRAMUSCULAR; INTRAVENOUS
Status: DISCONTINUED | OUTPATIENT
Start: 2017-05-02 | End: 2017-05-02

## 2017-05-02 RX ORDER — IBUPROFEN 200 MG
400 TABLET ORAL EVERY 6 HOURS PRN
Qty: 30 TABLET | Refills: 0
Start: 2017-05-02 | End: 2017-06-02 | Stop reason: CLARIF

## 2017-05-02 RX ADMIN — ONDANSETRON 4 MG: 4 TABLET, ORALLY DISINTEGRATING ORAL at 02:05

## 2017-05-02 RX ADMIN — HYDROMORPHONE HYDROCHLORIDE 1 MG: 2 INJECTION, SOLUTION INTRAMUSCULAR; INTRAVENOUS; SUBCUTANEOUS at 02:05

## 2017-05-02 NOTE — DISCHARGE INSTRUCTIONS
Urinary Retention (Female)    Urinary retention is the medical term for difficulty or inability to pass urine, even though your bladder is full.  Causes  For girls and women, the most common cause of urinary retention is a bladder infection. Certain medicines and changes in the body, such as uterine prolapse, can also cause this problem.  Symptoms  Some people have no symptoms. For others, common symptoms include:  · Bladder or lower-abdominal pain or fullness  · Abdominal swelling  · Nausea or vomiting  · Back pain  · Frequent urination  · Feeling that the bladder is still full after urinating  · Incontinence (not being able to control the release of urine)  Treatment  This condition is treated by inserting a tube (catheter) into the bladder to drain the urine. This provides immediate relief. The catheter may need to stay in place for a few days. The catheter has a balloon on the tip, which is inflated after insertion. This prevents the catheter from falling out.  Home care  · If you were given antibiotics to treat a bladder infection, take them until they are used up, or your healthcare provider tells you to stop. It is important to finish the antibiotics even though you feel better. This is to make sure your infection has cleared.  · If a catheter was left in place, it is important to keep bacteria from getting into the collection bag. Do not disconnect the catheter from the collection bag.  · Use a leg band to secure the drainage tube, so it does not pull on the catheter. Drain the collection bag when it becomes full using the drain spout at the bottom of the bag.  · Do not pull on or try to remove your catheter. This will injure your urethra. The catheter must be removed by a healthcare provider.  Follow-up care  Follow up with your healthcare provider, or as advised.  If a catheter was left in place, it can usually be removed within 3 to 7 days. Some conditions require that the catheter stays in longer.  Your healthcare provider will tell you when to return to have the catheter removed.  When to seek medical advice  Call your healthcare provider right away if any of these occur:  · Fever of 100.4ºF (38ºC) or higher, or as directed by your healthcare provider  · Bladder or lower-abdominal pain or fullness  · Abdominal swelling, nausea, vomiting, or back pain  · Blood or urine leakage around the catheter  · Bloody urine coming from the catheter (if a new symptom)  · Weakness, dizziness, or fainting  · Confusion or change in usual level of alertness  · If a catheter was left in place, return if:  ¨ Catheter falls out  ¨ Catheter stops draining for 6 hours  Date Last Reviewed: 7/26/2015  © 7793-5158 [employer]. [address] [place] [address], [place] [address]. All rights reserved. This information is not intended as a substitute for professional medical care. Always follow your healthcare professional's instructions.          Palmer Catheter Care    A Palmer catheter is a rubber tube that is placed through the urethra (opening where urine comes out) and into the bladder. This helps drain urine from the bladder. There is a small balloon on the end of the tube that is inflated after insertion. This keeps the catheter from sliding out of the bladder.  A Palmer catheter is used to treat urinary retention (unable to pass urine). It is also used when there is incontinence (loss of bladder control).  Home care  · Finish taking any prescribed antibiotic even if you are feeling better before then.  · It is important to keep bacteria from getting into the collection bag. Do not disconnect the catheter from the collection bag.  · Use a leg band to secure the drainage tube, so it does not pull on the catheter. Drain the collection bag when it becomes full using the drain spout at the bottom of the bag.  · Do not try to pull or remove your catheter. This will injure your urethra. It must be removed by your healthcare provider  or nurse.  Follow-up care  Follow up with your healthcare provider as advised for repeat urine testing and catheter removal or replacement.  When to seek medical advice  Call your healthcare provider right away if any of these occur:  · Fever of 100.4ºF (38ºC) or higher, or as directed by your healthcare provider  · Bladder pain or fullness  · Abdominal swelling, nausea or vomiting, or back pain  · Blood or urine leakage around the catheter  · Bloody urine coming from the catheter (if a new symptom)  · Catheter falls out  · Catheter stops draining for 6 hours  · Weakness, dizziness, or fainting  Date Last Reviewed: 10/1/2016  © 4505-8174 [employer]. [address] [place] [address], [place] [address]. All rights reserved. This information is not intended as a substitute for professional medical care. Always follow your healthcare professional's instructions.

## 2017-05-02 NOTE — ED PROVIDER NOTES
Encounter Date: 5/2/2017       History     Chief Complaint   Patient presents with    Urinary Retention     pt states she had gallbladder surgery 3 weeks ago, last night she borrowed her sons catheter to self cath her self, because she can't urinate     Review of patient's allergies indicates:   Allergen Reactions    Tramadol Rash     HPI Comments: Pt states that she has been unable to urinate since yesterday.  She used her son's catheter system to self cath three hours PTA, at which time she withdrew 1000cc's.    Patient is a 46 y.o. female presenting with the following complaint: dysuria. The history is provided by the patient.   Dysuria    This is a new problem. The current episode started yesterday. Associated symptoms include hesitancy and urgency. Pertinent negatives include no chills, no sweats, no nausea, no vomiting, no discharge, no frequency, no hematuria, no possible pregnancy and no flank pain. Associated symptoms comments: Urinary retention  .     Past Medical History:   Diagnosis Date    Anxiety     Depression     GERD (gastroesophageal reflux disease)     Tobacco use      Past Surgical History:   Procedure Laterality Date    BLADDER REPAIR      CHOLECYSTECTOMY  04/07/2017    KNEE ARTHROPLASTY      PARTIAL HYSTERECTOMY      TUBAL LIGATION       Family History   Problem Relation Age of Onset    Hypertension Mother     Diabetes Mother      Social History   Substance Use Topics    Smoking status: Current Every Day Smoker     Packs/day: 1.00     Types: Cigarettes    Smokeless tobacco: Never Used    Alcohol use No     Review of Systems   Constitutional: Negative for chills and fever.   HENT: Negative for sore throat.    Eyes: Negative for photophobia and redness.   Respiratory: Negative for shortness of breath.    Cardiovascular: Negative for chest pain.   Gastrointestinal: Negative for nausea and vomiting.   Endocrine: Negative for polydipsia and polyphagia.   Genitourinary: Positive for  decreased urine volume, difficulty urinating, dysuria, hesitancy and urgency. Negative for flank pain, frequency and hematuria.   Musculoskeletal: Negative for back pain.   Skin: Negative for rash.   Neurological: Negative for weakness.   Hematological: Does not bruise/bleed easily.   Psychiatric/Behavioral: Negative for self-injury.   All other systems reviewed and are negative.      Physical Exam   Initial Vitals   BP Pulse Resp Temp SpO2   05/02/17 1313 05/02/17 1313 05/02/17 1313 05/02/17 1313 05/02/17 1313   137/87 99 18 98.3 °F (36.8 °C) 95 %     Physical Exam    Nursing note and vitals reviewed.  Constitutional: Vital signs are normal. She appears well-developed and well-nourished. No distress.   HENT:   Head: Normocephalic and atraumatic.   Right Ear: External ear normal.   Left Ear: External ear normal.   Nose: Nose normal.   Mouth/Throat: Oropharynx is clear and moist.   Eyes: Conjunctivae, EOM and lids are normal. Pupils are equal, round, and reactive to light.   Neck: Normal range of motion and full passive range of motion without pain. Neck supple.   Cardiovascular: Normal rate, regular rhythm, S1 normal, S2 normal, normal heart sounds, intact distal pulses and normal pulses.   Pulmonary/Chest: Breath sounds normal. No respiratory distress. She has no wheezes. She has no rales.   Abdominal: Soft. Normal appearance and bowel sounds are normal. She exhibits distension (suprapubic). There is tenderness (suprapubic).   Musculoskeletal: Normal range of motion.   Lymphadenopathy:     She has no cervical adenopathy.   Neurological: She is alert and oriented to person, place, and time. She has normal strength. No cranial nerve deficit or sensory deficit. Coordination and gait normal.   Skin: Skin is warm, dry and intact.   Psychiatric: She has a normal mood and affect. Her speech is normal and behavior is normal. Judgment and thought content normal. Cognition and memory are normal.         ED Course    Procedures  Labs Reviewed   CBC W/ AUTO DIFFERENTIAL - Abnormal; Notable for the following:        Result Value    MCH 32.0 (*)     All other components within normal limits   COMPREHENSIVE METABOLIC PANEL - Abnormal; Notable for the following:     ALT 7 (*)     All other components within normal limits   URINALYSIS - Abnormal; Notable for the following:     Specific Gravity, UA <=1.005 (*)     All other components within normal limits                               ED Course     Clinical Impression:   The encounter diagnosis was Urinary retention.    Disposition:   Disposition: Discharged  Condition: Stable       ALAINA Carrero  05/02/17 1549       ALAINA Carrero  05/08/17 0807

## 2017-05-02 NOTE — ED AVS SNAPSHOT
[facility]  [address] [facility]  [place] [address]               Kaylene Emery   2017  1:14 PM   ED    Description:  Female : 1970   Department:  [facility]           Your Care was Coordinated By:     Provider Role From To    ALAINA Carrero Physician Assistant [phone] --      Reason for Visit     Urinary Retention           Diagnoses this Visit        Comments    Urinary retention    -  Primary       ED Disposition     ED Disposition Condition Comment    Discharge             To Do List           Follow-up Information     Follow up with Laureano Parra IV, MD. Go in 1 day.    Specialty:  Urology    Contact information:    [address]  [place] [id number] [phone]         These Medications        Disp Refills Start End    nitrofurantoin, macrocrystal-monohydrate, (MACROBID) 100 MG capsule 10 capsule 0 2017     Take 1 capsule (100 mg total) by mouth 2 (two) times daily. - Oral    Pharmacy: [facility] [address] [address] [place] [address] AT [place] [address] Ph #: [phone]       phenazopyridine (PYRIDIUM) 200 MG tablet 15 tablet 0 2017    Take 1 tablet (200 mg total) by mouth 3 (three) times daily with meals. - Oral    Pharmacy: [facility] [address] [address] [facility] [address] AT [place] [address] & [place] [address] Ph #: [phone]       ibuprofen (ADVIL,MOTRIN) 200 MG tablet 30 tablet 0 2017     Take 2 tablets (400 mg total) by mouth every 6 (six) hours as needed for Pain. - Oral    Pharmacy: [facility] [address] [address] [facility] [address] AT [place] [address] & [place] [address] Ph #: [phone]       acetaminophen (TYLENOL) 500 MG tablet 30 tablet 0 2017     Take 2 tablets (1,000 mg total) by mouth 3 (three) times daily as needed for Pain. - Oral    Pharmacy: [facility] [address] [address] [place] [address] AT [facility] [place]  &  [place] [address] Ph #: [phone]       diclofenac (VOLTAREN) 75 MG EC tablet 20 tablet 0 5/2/2017     Take 1 tablet (75 mg total) by mouth 2 (two) times daily. - Oral    Pharmacy: [facility] [address] [facility] [address] [facility] AT [facility] [place] & [place] [address] Ph #: [phone]         [facility]     [facility] Nurse Care Line - 24/7 Assistance  Unless otherwise directed by your provider, please contact [facility], our nurse care line that is available for 24/7 assistance.     Registered nurses in the [facility] provide: appointment scheduling, clinical advisement, health education, and other advisory services.  Call: [phone] (toll free)               Medications           Message regarding Medications     Verify the changes and/or additions to your medication regime listed below are the same as discussed with your clinician today.  If any of these changes or additions are incorrect, please notify your healthcare provider.        START taking these NEW medications        Refills    nitrofurantoin, macrocrystal-monohydrate, (MACROBID) 100 MG capsule 0    Sig: Take 1 capsule (100 mg total) by mouth 2 (two) times daily.    Class: Print    Route: Oral    phenazopyridine (PYRIDIUM) 200 MG tablet 0    Sig: Take 1 tablet (200 mg total) by mouth 3 (three) times daily with meals.    Class: Print    Route: Oral    ibuprofen (ADVIL,MOTRIN) 200 MG tablet 0    Sig: Take 2 tablets (400 mg total) by mouth every 6 (six) hours as needed for Pain.    Class: No Print    Route: Oral    acetaminophen (TYLENOL) 500 MG tablet 0    Sig: Take 2 tablets (1,000 mg total) by mouth 3 (three) times daily as needed for Pain.    Class: No Print    Route: Oral    diclofenac (VOLTAREN) 75 MG EC tablet 0    Sig: Take 1 tablet (75 mg total) by mouth 2 (two) times daily.    Class: Print    Route: Oral      These medications were administered today        Dose Freq    hydromorphone (PF) injection 1 mg 1 mg ED 1  Time    Sig: Inject 0.5 mLs (1 mg total) into the vein ED 1 Time.    Class: Normal    Route: Intravenous    Cosign for Ordering: Required by Melba Joseph MD    ondansetron disintegrating tablet 4 mg 4 mg ED 1 Time    Sig: Take 1 tablet (4 mg total) by mouth ED 1 Time.    Class: Normal    Route: Oral    Cosign for Ordering: Required by Melba Joseph MD    morphine injection 4 mg 4 mg ED 1 Time    Sig: Inject 1 mL (4 mg total) into the vein ED 1 Time.    Class: Normal    Route: Intravenous    Cosign for Ordering: Required by Melba Joseph MD           Verify that the below list of medications is an accurate representation of the medications you are currently taking.  If none reported, the list may be blank. If incorrect, please contact your healthcare provider. Carry this list with you in case of emergency.           Current Medications     acetaminophen (TYLENOL) 500 MG tablet Take 2 tablets (1,000 mg total) by mouth 3 (three) times daily as needed for Pain.    diclofenac (VOLTAREN) 75 MG EC tablet Take 1 tablet (75 mg total) by mouth 2 (two) times daily.    esomeprazole (NEXIUM) 40 MG capsule Take 40 mg by mouth before breakfast.    famotidine (PEPCID) 40 MG tablet Take 40 mg by mouth once daily.    fluoxetine (PROZAC) 40 MG capsule Take 40 mg by mouth once daily.    hydrocodone-acetaminophen 5-325mg (NORCO) 5-325 mg per tablet 1 every 6 hours as needed for pain    ibuprofen (ADVIL,MOTRIN) 200 MG tablet Take 2 tablets (400 mg total) by mouth every 6 (six) hours as needed for Pain.    lorazepam (ATIVAN) 1 MG tablet Take 1 mg by mouth every 6 (six) hours as needed for Anxiety.    morphine injection 4 mg Inject 1 mL (4 mg total) into the vein ED 1 Time.    nitrofurantoin, macrocrystal-monohydrate, (MACROBID) 100 MG capsule Take 1 capsule (100 mg total) by mouth 2 (two) times daily.    phenazopyridine (PYRIDIUM) 200 MG tablet Take 1 tablet (200 mg total) by mouth 3 (three) times daily with  "meals.    promethazine (PHENERGAN) 25 MG tablet Take 1 tablet (25 mg total) by mouth every 6 (six) hours as needed for Nausea.    quetiapine (SEROQUEL) 200 MG Tab Take by mouth.           Clinical Reference Information           Your Vitals Were     BP Pulse Temp Resp Height Weight    137/87 (BP Location: Right arm, Patient Position: Sitting) 99 98.3 °F (36.8 °C) (Oral) 18 5' 4" (1.626 m) 63.5 kg (140 lb)    SpO2 BMI             95% 24.03 kg/m2         Allergies as of 5/2/2017        Reactions    Tramadol Rash      Immunizations Administered on Date of Encounter - 5/2/2017     None      ED Micro, Lab, POCT     Start Ordered       Status Ordering Provider    05/02/17 1412 05/02/17 1411  Urinalysis Catheterized  STAT      Final result     05/02/17 1337 05/02/17 1337  CBC auto differential  STAT      Final result     05/02/17 1337 05/02/17 1337  Comprehensive metabolic panel  STAT      Final result       ED Imaging Orders     None        Discharge Instructions           Urinary Retention (Female)    Urinary retention is the medical term for difficulty or inability to pass urine, even though your bladder is full.  Causes  For girls and women, the most common cause of urinary retention is a bladder infection. Certain medicines and changes in the body, such as uterine prolapse, can also cause this problem.  Symptoms  Some people have no symptoms. For others, common symptoms include:  · Bladder or lower-abdominal pain or fullness  · Abdominal swelling  · Nausea or vomiting  · Back pain  · Frequent urination  · Feeling that the bladder is still full after urinating  · Incontinence (not being able to control the release of urine)  Treatment  This condition is treated by inserting a tube (catheter) into the bladder to drain the urine. This provides immediate relief. The catheter may need to stay in place for a few days. The catheter has a balloon on the tip, which is inflated after insertion. This prevents the catheter from " falling out.  Home care  · If you were given antibiotics to treat a bladder infection, take them until they are used up, or your healthcare provider tells you to stop. It is important to finish the antibiotics even though you feel better. This is to make sure your infection has cleared.  · If a catheter was left in place, it is important to keep bacteria from getting into the collection bag. Do not disconnect the catheter from the collection bag.  · Use a leg band to secure the drainage tube, so it does not pull on the catheter. Drain the collection bag when it becomes full using the drain spout at the bottom of the bag.  · Do not pull on or try to remove your catheter. This will injure your urethra. The catheter must be removed by a healthcare provider.  Follow-up care  Follow up with your healthcare provider, or as advised.  If a catheter was left in place, it can usually be removed within 3 to 7 days. Some conditions require that the catheter stays in longer. Your healthcare provider will tell you when to return to have the catheter removed.  When to seek medical advice  Call your healthcare provider right away if any of these occur:  · Fever of 100.4ºF (38ºC) or higher, or as directed by your healthcare provider  · Bladder or lower-abdominal pain or fullness  · Abdominal swelling, nausea, vomiting, or back pain  · Blood or urine leakage around the catheter  · Bloody urine coming from the catheter (if a new symptom)  · Weakness, dizziness, or fainting  · Confusion or change in usual level of alertness  · If a catheter was left in place, return if:  ¨ Catheter falls out  ¨ Catheter stops draining for 6 hours  Date Last Reviewed: 7/26/2015 © 2000-2016 [employer]. [address] [place] [address], [place] [address]. All rights reserved. This information is not intended as a substitute for professional medical care. Always follow your healthcare professional's instructions.          Palmer Catheter Care    A  Palmer catheter is a rubber tube that is placed through the urethra (opening where urine comes out) and into the bladder. This helps drain urine from the bladder. There is a small balloon on the end of the tube that is inflated after insertion. This keeps the catheter from sliding out of the bladder.  A Palmer catheter is used to treat urinary retention (unable to pass urine). It is also used when there is incontinence (loss of bladder control).  Home care  · Finish taking any prescribed antibiotic even if you are feeling better before then.  · It is important to keep bacteria from getting into the collection bag. Do not disconnect the catheter from the collection bag.  · Use a leg band to secure the drainage tube, so it does not pull on the catheter. Drain the collection bag when it becomes full using the drain spout at the bottom of the bag.  · Do not try to pull or remove your catheter. This will injure your urethra. It must be removed by your healthcare provider or nurse.  Follow-up care  Follow up with your healthcare provider as advised for repeat urine testing and catheter removal or replacement.  When to seek medical advice  Call your healthcare provider right away if any of these occur:  · Fever of 100.4ºF (38ºC) or higher, or as directed by your healthcare provider  · Bladder pain or fullness  · Abdominal swelling, nausea or vomiting, or back pain  · Blood or urine leakage around the catheter  · Bloody urine coming from the catheter (if a new symptom)  · Catheter falls out  · Catheter stops draining for 6 hours  · Weakness, dizziness, or fainting  Date Last Reviewed: 10/1/2016  © 9990-8275 [employer]. [address] [place] [address], [place] [address]. All rights reserved. This information is not intended as a substitute for professional medical care. Always follow your healthcare professional's instructions.          Your Scheduled Appointments     Jun 05, 2017 11:00 AM CDT   Post OP with Td  MD Ricky Church - General Surgery ([facility])    [address] [facility] [address] [phone]              [facility] Sign-Up     Activating your [facility] account is as easy as 1-2-3!     1) Visit [email or web address], select Sign Up Now, enter this activation code and your date of birth, then select Next.  [email or web address]  Expires: 6/5/2017 11:38 PM      2) Create a username and password to use when you visit [facility] in the future and select a security question in case you lose your password and select Next.    3) Enter your e-mail address and click Sign Up!    Additional Information  If you have questions, please e-mail [email or web address] or call [phone] to talk to our [facility] staff. Remember, [facility] is NOT to be used for urgent needs. For medical emergencies, dial 911.         Smoking Cessation     If you would like to quit smoking:   You may be eligible for free services if you are a [place] resident and started smoking cigarettes before September 1, 1988.  Call the Smoking Cessation [employer] (SCT) toll free at (965) 661-0328 or (849) 233-2168.   Call 1-800-QUIT-NOW if you do not meet the above criteria.   Contact us via email: [email or web address]   View our website for more information: [email or web address]         [facility] complies with applicable Federal civil rights laws and does not discriminate on the basis of race, color, national origin, age, disability, or sex.        Language Assistance Services     ATTENTION: Language assistance services are available, free of charge. Please call [phone].      ATENCIÓN: Si habla david, tiene a smith disposición servicios gratuitos de asistencia lingüística. Llame al [phone].     CHÚ Ý: N?u b?n nói Ti?ng Vi?t, có các d?ch v? h? tr? ngôn ng? mi?n phí dành cho b?n. G?i s? [phone].

## 2017-05-02 NOTE — ED NOTES
Reported BP to PA,instructed not to give Morphine due to BP.OK to D/c with current V/S. Leg bag provided with instructions.

## 2017-05-03 ENCOUNTER — TELEPHONE (OUTPATIENT)
Dept: UROLOGY | Facility: CLINIC | Age: 47
End: 2017-05-03

## 2017-05-03 ENCOUNTER — TELEPHONE (OUTPATIENT)
Dept: SURGERY | Facility: CLINIC | Age: 47
End: 2017-05-03

## 2017-05-03 ENCOUNTER — HOSPITAL ENCOUNTER (OUTPATIENT)
Facility: HOSPITAL | Age: 47
Discharge: HOME | End: 2017-05-05
Attending: EMERGENCY MEDICINE | Admitting: SURGERY
Payer: MEDICAID

## 2017-05-03 DIAGNOSIS — K59.03 CONSTIPATION DUE TO PAIN MEDICATION: ICD-10-CM

## 2017-05-03 DIAGNOSIS — R10.10 PAIN OF UPPER ABDOMEN: Primary | ICD-10-CM

## 2017-05-03 DIAGNOSIS — G89.18 POSTOPERATIVE PAIN: ICD-10-CM

## 2017-05-03 DIAGNOSIS — R33.9 URINARY RETENTION: ICD-10-CM

## 2017-05-03 DIAGNOSIS — R33.0 DRUG INDUCED RETENTION OF URINE: ICD-10-CM

## 2017-05-03 LAB
ALBUMIN SERPL BCP-MCNC: 3.5 G/DL
ALP SERPL-CCNC: 60 U/L
ALT SERPL W/O P-5'-P-CCNC: 10 U/L
ANION GAP SERPL CALC-SCNC: 8 MMOL/L
AST SERPL-CCNC: 11 U/L
BACTERIA #/AREA URNS HPF: NORMAL /HPF
BASOPHILS # BLD AUTO: 0.04 K/UL
BASOPHILS NFR BLD: 0.4 %
BILIRUB SERPL-MCNC: 0.2 MG/DL
BILIRUB UR QL STRIP: NEGATIVE
BNP SERPL-MCNC: 41 PG/ML
BUN SERPL-MCNC: 11 MG/DL
CALCIUM SERPL-MCNC: 9.2 MG/DL
CHLORIDE SERPL-SCNC: 101 MMOL/L
CLARITY UR: CLEAR
CO2 SERPL-SCNC: 29 MMOL/L
COLOR UR: YELLOW
CREAT SERPL-MCNC: 0.9 MG/DL
DIFFERENTIAL METHOD: ABNORMAL
EOSINOPHIL # BLD AUTO: 0.6 K/UL
EOSINOPHIL NFR BLD: 6.5 %
ERYTHROCYTE [DISTWIDTH] IN BLOOD BY AUTOMATED COUNT: 14.4 %
EST. GFR  (AFRICAN AMERICAN): >60 ML/MIN/1.73 M^2
EST. GFR  (NON AFRICAN AMERICAN): >60 ML/MIN/1.73 M^2
GLUCOSE SERPL-MCNC: 87 MG/DL
GLUCOSE UR QL STRIP: ABNORMAL
HCT VFR BLD AUTO: 39.6 %
HGB BLD-MCNC: 13.2 G/DL
HGB UR QL STRIP: ABNORMAL
INR PPP: 0.9
KETONES UR QL STRIP: NEGATIVE
LEUKOCYTE ESTERASE UR QL STRIP: NEGATIVE
LIPASE SERPL-CCNC: 19 U/L
LYMPHOCYTES # BLD AUTO: 3.3 K/UL
LYMPHOCYTES NFR BLD: 34.7 %
MCH RBC QN AUTO: 31.5 PG
MCHC RBC AUTO-ENTMCNC: 33.3 %
MCV RBC AUTO: 95 FL
MICROSCOPIC COMMENT: NORMAL
MONOCYTES # BLD AUTO: 0.9 K/UL
MONOCYTES NFR BLD: 9 %
NEUTROPHILS # BLD AUTO: 4.7 K/UL
NEUTROPHILS NFR BLD: 49.4 %
NITRITE UR QL STRIP: POSITIVE
PH UR STRIP: 6 [PH] (ref 5–8)
PLATELET # BLD AUTO: 202 K/UL
PMV BLD AUTO: 10 FL
POTASSIUM SERPL-SCNC: 4.7 MMOL/L
PROT SERPL-MCNC: 6.8 G/DL
PROT UR QL STRIP: NEGATIVE
PROTHROMBIN TIME: 9.8 SEC
RBC # BLD AUTO: 4.19 M/UL
RBC #/AREA URNS HPF: 2 /HPF (ref 0–4)
SODIUM SERPL-SCNC: 138 MMOL/L
SP GR UR STRIP: <=1.005 (ref 1–1.03)
URN SPEC COLLECT METH UR: ABNORMAL
UROBILINOGEN UR STRIP-ACNC: 1 EU/DL
WBC # BLD AUTO: 9.49 K/UL

## 2017-05-03 PROCEDURE — 96374 THER/PROPH/DIAG INJ IV PUSH: CPT

## 2017-05-03 PROCEDURE — 25000003 PHARM REV CODE 250: Performed by: SURGERY

## 2017-05-03 PROCEDURE — 83880 ASSAY OF NATRIURETIC PEPTIDE: CPT

## 2017-05-03 PROCEDURE — 99285 EMERGENCY DEPT VISIT HI MDM: CPT | Mod: 25

## 2017-05-03 PROCEDURE — G0378 HOSPITAL OBSERVATION PER HR: HCPCS

## 2017-05-03 PROCEDURE — 96375 TX/PRO/DX INJ NEW DRUG ADDON: CPT

## 2017-05-03 PROCEDURE — 63600175 PHARM REV CODE 636 W HCPCS: Performed by: NURSE PRACTITIONER

## 2017-05-03 PROCEDURE — 63600175 PHARM REV CODE 636 W HCPCS: Performed by: SURGERY

## 2017-05-03 PROCEDURE — 81000 URINALYSIS NONAUTO W/SCOPE: CPT

## 2017-05-03 PROCEDURE — 85025 COMPLETE CBC W/AUTO DIFF WBC: CPT

## 2017-05-03 PROCEDURE — 80053 COMPREHEN METABOLIC PANEL: CPT

## 2017-05-03 PROCEDURE — 85610 PROTHROMBIN TIME: CPT

## 2017-05-03 PROCEDURE — 83690 ASSAY OF LIPASE: CPT

## 2017-05-03 PROCEDURE — 96361 HYDRATE IV INFUSION ADD-ON: CPT

## 2017-05-03 PROCEDURE — 25500020 PHARM REV CODE 255: Performed by: EMERGENCY MEDICINE

## 2017-05-03 RX ORDER — PANTOPRAZOLE SODIUM 40 MG/1
40 TABLET, DELAYED RELEASE ORAL DAILY
Status: DISCONTINUED | OUTPATIENT
Start: 2017-05-04 | End: 2017-05-05 | Stop reason: HOSPADM

## 2017-05-03 RX ORDER — LORAZEPAM 1 MG/1
1 TABLET ORAL EVERY 6 HOURS PRN
Status: DISCONTINUED | OUTPATIENT
Start: 2017-05-03 | End: 2017-05-05 | Stop reason: HOSPADM

## 2017-05-03 RX ORDER — RAMELTEON 8 MG/1
8 TABLET ORAL NIGHTLY PRN
Status: DISCONTINUED | OUTPATIENT
Start: 2017-05-03 | End: 2017-05-05 | Stop reason: HOSPADM

## 2017-05-03 RX ORDER — IBUPROFEN 200 MG
1 TABLET ORAL DAILY
Status: DISCONTINUED | OUTPATIENT
Start: 2017-05-03 | End: 2017-05-05 | Stop reason: HOSPADM

## 2017-05-03 RX ORDER — MORPHINE SULFATE 4 MG/ML
4 INJECTION, SOLUTION INTRAMUSCULAR; INTRAVENOUS
Status: COMPLETED | OUTPATIENT
Start: 2017-05-03 | End: 2017-05-03

## 2017-05-03 RX ORDER — NITROFURANTOIN 25; 75 MG/1; MG/1
100 CAPSULE ORAL 2 TIMES DAILY
Status: DISCONTINUED | OUTPATIENT
Start: 2017-05-03 | End: 2017-05-05 | Stop reason: HOSPADM

## 2017-05-03 RX ORDER — HYDROMORPHONE HYDROCHLORIDE 2 MG/ML
1 INJECTION, SOLUTION INTRAMUSCULAR; INTRAVENOUS; SUBCUTANEOUS EVERY 6 HOURS PRN
Status: DISCONTINUED | OUTPATIENT
Start: 2017-05-03 | End: 2017-05-03 | Stop reason: SDUPTHER

## 2017-05-03 RX ORDER — SODIUM CHLORIDE, SODIUM LACTATE, POTASSIUM CHLORIDE, CALCIUM CHLORIDE 600; 310; 30; 20 MG/100ML; MG/100ML; MG/100ML; MG/100ML
INJECTION, SOLUTION INTRAVENOUS CONTINUOUS
Status: DISCONTINUED | OUTPATIENT
Start: 2017-05-03 | End: 2017-05-05

## 2017-05-03 RX ORDER — SYRING-NEEDL,DISP,INSUL,0.3 ML 29 G X1/2"
296 SYRINGE, EMPTY DISPOSABLE MISCELLANEOUS ONCE
Status: COMPLETED | OUTPATIENT
Start: 2017-05-03 | End: 2017-05-03

## 2017-05-03 RX ORDER — ONDANSETRON 2 MG/ML
4 INJECTION INTRAMUSCULAR; INTRAVENOUS
Status: COMPLETED | OUTPATIENT
Start: 2017-05-03 | End: 2017-05-03

## 2017-05-03 RX ORDER — DIPHENHYDRAMINE HYDROCHLORIDE 50 MG/ML
25 INJECTION INTRAMUSCULAR; INTRAVENOUS EVERY 4 HOURS PRN
Status: DISCONTINUED | OUTPATIENT
Start: 2017-05-03 | End: 2017-05-04

## 2017-05-03 RX ORDER — FLUOXETINE HYDROCHLORIDE 20 MG/1
40 CAPSULE ORAL DAILY
Status: DISCONTINUED | OUTPATIENT
Start: 2017-05-04 | End: 2017-05-05 | Stop reason: HOSPADM

## 2017-05-03 RX ORDER — HYDROMORPHONE HYDROCHLORIDE 2 MG/ML
1 INJECTION, SOLUTION INTRAMUSCULAR; INTRAVENOUS; SUBCUTANEOUS EVERY 6 HOURS PRN
Status: DISCONTINUED | OUTPATIENT
Start: 2017-05-03 | End: 2017-05-05 | Stop reason: HOSPADM

## 2017-05-03 RX ORDER — HYDROCODONE BITARTRATE AND ACETAMINOPHEN 7.5; 325 MG/1; MG/1
1 TABLET ORAL EVERY 6 HOURS PRN
Status: DISCONTINUED | OUTPATIENT
Start: 2017-05-03 | End: 2017-05-05 | Stop reason: HOSPADM

## 2017-05-03 RX ORDER — ACETAMINOPHEN 325 MG/1
650 TABLET ORAL EVERY 8 HOURS PRN
Status: DISCONTINUED | OUTPATIENT
Start: 2017-05-03 | End: 2017-05-04

## 2017-05-03 RX ORDER — DOCUSATE SODIUM 100 MG/1
100 CAPSULE, LIQUID FILLED ORAL 2 TIMES DAILY
Status: DISCONTINUED | OUTPATIENT
Start: 2017-05-03 | End: 2017-05-05 | Stop reason: HOSPADM

## 2017-05-03 RX ORDER — ONDANSETRON 8 MG/1
8 TABLET, ORALLY DISINTEGRATING ORAL EVERY 8 HOURS PRN
Status: DISCONTINUED | OUTPATIENT
Start: 2017-05-03 | End: 2017-05-05 | Stop reason: HOSPADM

## 2017-05-03 RX ORDER — QUETIAPINE FUMARATE 100 MG/1
200 TABLET, FILM COATED ORAL NIGHTLY
Status: DISCONTINUED | OUTPATIENT
Start: 2017-05-03 | End: 2017-05-05 | Stop reason: HOSPADM

## 2017-05-03 RX ORDER — SODIUM CHLORIDE 9 MG/ML
3 INJECTION, SOLUTION INTRAMUSCULAR; INTRAVENOUS; SUBCUTANEOUS EVERY 8 HOURS
Status: DISCONTINUED | OUTPATIENT
Start: 2017-05-03 | End: 2017-05-05 | Stop reason: HOSPADM

## 2017-05-03 RX ADMIN — NITROFURANTOIN MONOHYDRATE/MACROCRYSTALLINE 100 MG: 25; 75 CAPSULE ORAL at 09:05

## 2017-05-03 RX ADMIN — SODIUM CHLORIDE, PRESERVATIVE FREE 3 ML: 5 INJECTION INTRAVENOUS at 02:05

## 2017-05-03 RX ADMIN — MORPHINE SULFATE 4 MG: 4 INJECTION, SOLUTION INTRAMUSCULAR; INTRAVENOUS at 11:05

## 2017-05-03 RX ADMIN — DOCUSATE SODIUM 100 MG: 100 CAPSULE, LIQUID FILLED ORAL at 09:05

## 2017-05-03 RX ADMIN — LORAZEPAM 1 MG: 1 TABLET ORAL at 09:05

## 2017-05-03 RX ADMIN — ONDANSETRON 4 MG: 2 INJECTION INTRAMUSCULAR; INTRAVENOUS at 11:05

## 2017-05-03 RX ADMIN — HYDROCODONE BITARTRATE AND ACETAMINOPHEN 1 TABLET: 7.5; 325 TABLET ORAL at 06:05

## 2017-05-03 RX ADMIN — ONDANSETRON 8 MG: 8 TABLET, ORALLY DISINTEGRATING ORAL at 02:05

## 2017-05-03 RX ADMIN — IOHEXOL 75 ML: 350 INJECTION, SOLUTION INTRAVENOUS at 11:05

## 2017-05-03 RX ADMIN — HYDROMORPHONE HYDROCHLORIDE 1 MG: 2 INJECTION, SOLUTION INTRAMUSCULAR; INTRAVENOUS; SUBCUTANEOUS at 02:05

## 2017-05-03 RX ADMIN — QUETIAPINE FUMARATE 200 MG: 100 TABLET, FILM COATED ORAL at 09:05

## 2017-05-03 RX ADMIN — SODIUM CHLORIDE, SODIUM LACTATE, POTASSIUM CHLORIDE, AND CALCIUM CHLORIDE: .6; .31; .03; .02 INJECTION, SOLUTION INTRAVENOUS at 02:05

## 2017-05-03 RX ADMIN — MAGNESIUM CITRATE 296 ML: 1.75 LIQUID ORAL at 02:05

## 2017-05-03 RX ADMIN — NICOTINE 1 PATCH: 21 PATCH, EXTENDED RELEASE TRANSDERMAL at 02:05

## 2017-05-03 RX ADMIN — HYDROMORPHONE HYDROCHLORIDE 1 MG: 2 INJECTION, SOLUTION INTRAMUSCULAR; INTRAVENOUS; SUBCUTANEOUS at 09:05

## 2017-05-03 NOTE — SUBJECTIVE & OBJECTIVE
"Current Facility-Administered Medications on File Prior to Encounter   Medication    [DISCONTINUED] morphine injection 4 mg     Current Outpatient Prescriptions on File Prior to Encounter   Medication Sig    acetaminophen (TYLENOL) 500 MG tablet Take 2 tablets (1,000 mg total) by mouth 3 (three) times daily as needed for Pain.    diclofenac (VOLTAREN) 75 MG EC tablet Take 1 tablet (75 mg total) by mouth 2 (two) times daily.    esomeprazole (NEXIUM) 40 MG capsule Take 40 mg by mouth before breakfast.    famotidine (PEPCID) 40 MG tablet Take 40 mg by mouth once daily.    fluoxetine (PROZAC) 40 MG capsule Take 40 mg by mouth once daily.    hydrocodone-acetaminophen 5-325mg (NORCO) 5-325 mg per tablet 1 every 6 hours as needed for pain    ibuprofen (ADVIL,MOTRIN) 200 MG tablet Take 2 tablets (400 mg total) by mouth every 6 (six) hours as needed for Pain.    lorazepam (ATIVAN) 1 MG tablet Take 1 mg by mouth every 6 (six) hours as needed for Anxiety.    nitrofurantoin, macrocrystal-monohydrate, (MACROBID) 100 MG capsule Take 1 capsule (100 mg total) by mouth 2 (two) times daily.    phenazopyridine (PYRIDIUM) 200 MG tablet Take 1 tablet (200 mg total) by mouth 3 (three) times daily with meals.    promethazine (PHENERGAN) 25 MG tablet Take 1 tablet (25 mg total) by mouth every 6 (six) hours as needed for Nausea.    quetiapine (SEROQUEL) 200 MG Tab Take by mouth.       Review of patient's allergies indicates:   Allergen Reactions    Corticosteroids (glucocorticoids)      "sets off my anxiety real bad"    Tramadol Rash       Past Medical History:   Diagnosis Date    Anxiety     Depression     GERD (gastroesophageal reflux disease)     Tobacco use      Past Surgical History:   Procedure Laterality Date    BLADDER REPAIR      CHOLECYSTECTOMY  04/07/2017    KNEE ARTHROPLASTY      PARTIAL HYSTERECTOMY      TUBAL LIGATION       Family History     Problem Relation (Age of Onset)    Diabetes Mother    " Hypertension Mother        Social History Main Topics    Smoking status: Current Every Day Smoker     Packs/day: 1.00     Types: Cigarettes    Smokeless tobacco: Never Used    Alcohol use No    Drug use: No    Sexual activity: Yes     Review of Systems   Constitutional: Negative for appetite change, chills, fatigue, fever and unexpected weight change.   HENT: Negative for hearing loss and rhinorrhea.    Eyes: Negative for visual disturbance.   Respiratory: Negative for apnea, cough, shortness of breath and wheezing.    Cardiovascular: Negative for chest pain and palpitations.   Gastrointestinal: Positive for abdominal distention and abdominal pain. Negative for blood in stool, constipation, diarrhea, nausea and vomiting.   Genitourinary: Positive for difficulty urinating. Negative for dysuria, frequency and urgency.        Urinary retention, Palmer catheter   Musculoskeletal: Negative for arthralgias and neck pain.   Skin: Negative for rash.   Neurological: Negative for seizures, weakness, numbness and headaches.   Hematological: Negative for adenopathy. Does not bruise/bleed easily.   Psychiatric/Behavioral: Negative for hallucinations. The patient is not nervous/anxious.      Objective:     Vital Signs (Most Recent):  Temp: 97.7 °F (36.5 °C) (05/03/17 0958)  Pulse: 72 (05/03/17 1449)  Resp: 20 (05/03/17 1449)  BP: 101/70 (05/03/17 1449)  SpO2: 98 % (05/03/17 1449) Vital Signs (24h Range):  Temp:  [97.7 °F (36.5 °C)] 97.7 °F (36.5 °C)  Pulse:  [] 72  Resp:  [16-20] 20  SpO2:  [97 %-98 %] 98 %  BP: (101-107)/(58-74) 101/70     Weight: 63.5 kg (140 lb)  Body mass index is 24.03 kg/(m^2).    Physical Exam   Constitutional: She is oriented to person, place, and time. She appears well-developed. No distress.   HENT:   Head: Normocephalic and atraumatic.   Eyes: Pupils are equal, round, and reactive to light. No scleral icterus.   Neck: Normal range of motion. No JVD present. No tracheal deviation present. No  thyromegaly present.   Cardiovascular: Normal rate, regular rhythm, normal heart sounds and intact distal pulses.    Pulmonary/Chest: Effort normal.   Abdominal: Soft. She exhibits distension. She exhibits no mass. Tenderness: mild generalized slightly greater on the right. There is no rebound and no guarding. No hernia.   Incisions from her laparoscopic cholecystectomy are healing well   Musculoskeletal: She exhibits no edema or tenderness.   Neurological: She is alert and oriented to person, place, and time.   Skin: Skin is warm.   Psychiatric: She has a normal mood and affect. Her behavior is normal. Judgment and thought content normal.   Vitals reviewed.      Significant Labs:  CBC:   Recent Labs  Lab 05/03/17  1050   WBC 9.49   RBC 4.19   HGB 13.2   HCT 39.6      MCV 95   MCH 31.5*   MCHC 33.3     BMP:   Recent Labs  Lab 05/03/17  1050   GLU 87      K 4.7      CO2 29   BUN 11   CREATININE 0.9   CALCIUM 9.2     CMP:   Recent Labs  Lab 05/03/17  1050   GLU 87   CALCIUM 9.2   ALBUMIN 3.5   PROT 6.8      K 4.7   CO2 29      BUN 11   CREATININE 0.9   ALKPHOS 60   ALT 10   AST 11   BILITOT 0.2       Significant Diagnostics:  CT: I have reviewed all pertinent results/findings within the past 24 hours and my personal findings are:  Os consistent with constipation.  There were discussed with the radiologist and there is no yajaira evidence of pneumatosis of the cecum or ascending colon

## 2017-05-03 NOTE — TELEPHONE ENCOUNTER
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 Referred her to [facility] or [facility].

## 2017-05-03 NOTE — ED NOTES
"Pt has gotten dressed and went outside with her HL still in her arm "to smoke" informed pt that she could not be allowed to go outside from tx area with HL still in her arm.  "

## 2017-05-03 NOTE — IP AVS SNAPSHOT
[facility]  [address] [place] [address] [address] [place] [address]           Patient Discharge Instructions   Our goal is to set you up for success. This packet includes information on your condition, medications, and your home care.  It will help you care for yourself to prevent having to return to the hospital.     Please ask your nurse if you have any questions.      There are many details to remember when preparing to leave the hospital. Here is what you will need to do:    1. Take your medicine. If you are prescribed medications, review your Medication List on the following pages. You may have new medications to  at the pharmacy and others that you'll need to stop taking. Review the instructions for how and when to take your medications. Talk with your doctor or nurses if you are unsure of what to do.     2. Go to your follow-up appointments. Specific follow-up information is listed in the following pages. Your may be contacted by a nurse or clinical provider about future appointments. Be sure we have all of the phone numbers to reach you. Please contact your provider's office if you are unable to make an appointment.     3. Watch for warning signs. Your doctor or nurse will give you detailed warning signs to watch for and when to call for assistance. These instructions may also include educational information about your condition. If you experience any of warning signs to your health, call your doctor.           [facility]  Unless otherwise directed by your provider, please   contact [facility], our nurse care line   that is available for 24/7 assistance.     [phone] (toll-free)     Registered nurses in the [facility]   provide: appointment scheduling, clinical advisement, health education, and other advisory services.                  ** Verify the list of medication(s) below is accurate and up to date. Carry this with you in case of emergency. If your  medications have changed, please notify your healthcare provider.             Medication List      START taking these medications        Additional Info                      lubiprostone 24 MCG Cap   Commonly known as:  AMITIZA   Quantity:  30 capsule   Refills:  1   Dose:  24 mcg    Last time this was given:  24 mcg on 5/5/2017  8:25 AM   Instructions:  Take 1 capsule (24 mcg total) by mouth 2 (two) times daily with meals.     Begin Date    AM    Noon    PM    Bedtime         CONTINUE taking these medications        Additional Info                      acetaminophen 500 MG tablet   Commonly known as:  TYLENOL   Quantity:  30 tablet   Refills:  0   Dose:  1000 mg    Instructions:  Take 2 tablets (1,000 mg total) by mouth 3 (three) times daily as needed for Pain.     Begin Date    AM    Noon    PM    Bedtime       diclofenac 75 MG EC tablet   Commonly known as:  VOLTAREN   Quantity:  20 tablet   Refills:  0   Dose:  75 mg    Instructions:  Take 1 tablet (75 mg total) by mouth 2 (two) times daily.     Begin Date    AM    Noon    PM    Bedtime       esomeprazole 40 MG capsule   Commonly known as:  NEXIUM   Refills:  0   Dose:  40 mg    Instructions:  Take 40 mg by mouth before breakfast.     Begin Date    AM    Noon    PM    Bedtime       fluoxetine 40 MG capsule   Commonly known as:  PROZAC   Refills:  0   Dose:  40 mg    Last time this was given:  40 mg on 5/5/2017  8:24 AM   Instructions:  Take 40 mg by mouth once daily.     Begin Date    AM    Noon    PM    Bedtime       hydrocodone-acetaminophen 5-325mg 5-325 mg per tablet   Commonly known as:  NORCO   Quantity:  30 tablet   Refills:  0    Instructions:  1 every 6 hours as needed for pain     Begin Date    AM    Noon    PM    Bedtime       ibuprofen 200 MG tablet   Commonly known as:  ADVIL,MOTRIN   Quantity:  30 tablet   Refills:  0   Dose:  400 mg    Instructions:  Take 2 tablets (400 mg total) by mouth every 6 (six) hours as needed for Pain.     Begin Date     AM    Noon    PM    Bedtime       lorazepam 1 MG tablet   Commonly known as:  ATIVAN   Refills:  0   Dose:  1 mg    Last time this was given:  1 mg on 5/5/2017  5:07 AM   Instructions:  Take 1 mg by mouth every 6 (six) hours as needed for Anxiety.     Begin Date    AM    Noon    PM    Bedtime       nitrofurantoin (macrocrystal-monohydrate) 100 MG capsule   Commonly known as:  MACROBID   Quantity:  10 capsule   Refills:  0   Dose:  100 mg    Last time this was given:  100 mg on 5/5/2017  8:24 AM   Instructions:  Take 1 capsule (100 mg total) by mouth 2 (two) times daily.     Begin Date    AM    Noon    PM    Bedtime         STOP taking these medications     famotidine 40 MG tablet   Commonly known as:  PEPCID         ASK your doctor about these medications        Additional Info                      phenazopyridine 200 MG tablet   Commonly known as:  PYRIDIUM   Quantity:  15 tablet   Refills:  0   Dose:  200 mg    Instructions:  Take 1 tablet (200 mg total) by mouth 3 (three) times daily with meals.     Begin Date    AM    Noon    PM    Bedtime       promethazine 25 MG tablet   Commonly known as:  PHENERGAN   Quantity:  20 tablet   Refills:  1   Dose:  25 mg    Instructions:  Take 1 tablet (25 mg total) by mouth every 6 (six) hours as needed for Nausea.     Begin Date    AM    Noon    PM    Bedtime       quetiapine 200 MG Tab   Commonly known as:  SEROQUEL   Refills:  0    Last time this was given:  200 mg on 5/4/2017  9:33 PM   Instructions:  Take by mouth.     Begin Date    AM    Noon    PM    Bedtime            Where to Get Your Medications      These medications were sent to [facility] [address] [facility] [address] [facility] [address] AT [facility] [place] [address] [place] [address]  [address] [facility] [address], [facility] [address]    Hours:  24-hours Phone:  [phone]     lubiprostone 24 MCG Cap                  Please bring to all follow up appointments:    1. A copy of your discharge instructions.  2. All medicines you are  currently taking in their original bottles.  3. Identification and insurance card.    Please arrive 15 minutes ahead of scheduled appointment time.    Please call 24 hours in advance if you must reschedule your appointment and/or time.        Your Scheduled Appointments     May 08, 2017  4:00 PM CDT   Established Patient Visit with MD Ricky Allen - General Surgery ([facility])    [address] [place] [id number]   [phone]            Jun 05, 2017 11:00 AM CDT   Post OP with MD Ricky Allen - General Surgery ([facility])    [address] [place] [address]   [phone]              Follow-up Information     Follow up with Laureano Parra IV, MD In 1 week.    Specialty:  Urology    Why:  for carroll removal    Contact information:    [address]  [facility] [address]  [phone]          Discharge Instructions     Future Orders    Activity as tolerated     Call MD for:  persistent nausea and vomiting or diarrhea     Call MD for:  severe uncontrolled pain     Call MD for:  temperature >100.4     Diet general     Questions:    Total calories:      Fat restriction, if any:      Protein restriction, if any:      Na restriction, if any:      Fluid restriction:      Additional restrictions:      No dressing needed         Discharge Instructions       It is very important that you avoid narcotic pain medication as that is the cause of the constipation and urinary retention.  I would only take a pain pill if needed and try to take them as far apart as possible.    Once she at home today drink one bottle of magnesium citrate and follow with with 216 ounce glasses of water.    If you develop abdominal pain nausea or vomiting please let us know.    you will need to take 1 antibiotic pill while you have the catheter in place to prevent a urinary tract infection        Discharge References/Attachments     CONSTIPATION (ADULT) (ENGLISH)     "LUBIPROSTONE ORAL CAPSULE (ENGLISH)    YE CATHETER, CARE (ENGLISH)        Primary Diagnosis     Your primary diagnosis was:  Constipation Due To Pain Medication      Admission Information     Date & Time Provider Department CSN    5/3/2017 10:12 AM Td Church MD [facility] [id number]      Care Providers     Provider Role Specialty Primary office phone    Td Church MD Attending Provider General Surgery [phone]    Laureano Parra IV, MD Consulting Physician  Urology [phone]      Your Vitals Were     BP Pulse Temp Resp Height Weight    121/75 (BP Location: Right arm, Patient Position: Lying, BP Method: Automatic) 77 98 °F (36.7 °C) (Oral) 18 5' 4" (1.626 m) 63.5 kg (140 lb 1.6 oz)    SpO2 BMI             95% 24.05 kg/m2         Recent Lab Values     No lab values to display.      Pending Labs     Order Current Status    Urine culture In process      Allergies as of 5/5/2017        Reactions    Corticosteroids (Glucocorticoids)     "sets off my anxiety real bad"    Tramadol Rash      Advance Directives     An advance directive is a document which, in the event you are no longer able to make decisions for yourself, tells your healthcare team what kind of treatment you do or do not want to receive, or who you would like to make those decisions for you.  If you do not currently have an advance directive, [facility] encourages you to create one.  For more information call:  [phone]), [phone] ([phone]),  or log on to [email or web address].        Smoking Cessation     If you would like to quit smoking:   You may be eligible for free services if you are a [place] resident and started smoking cigarettes before September 1, 1988.  Call the Smoking Cessation [employer] (SCT) toll free at (044) 294-6573 or (148) 454-7645.   Call 0-800-QUIT-NOW if you do not meet the above criteria.   Contact us via email: [email or web address]   View our website for more " information: [email or web address]        Language Assistance Services     ATTENTION: Language assistance services are available, free of charge. Please call [phone].      ATENCIÓN: Si lizette hernandez, tiene a smith disposición servicios gratuitos de asistencia lingüística. Llame al [phone].     CHÚ Ý: N?u b?n nói Ti?ng Vi?t, có các d?ch v? h? tr? ngôn ng? mi?n phí dành cho b?n. G?i s? [phone].        [facility] Sign-Up     Activating your [facility] account is as easy as 1-2-3!     1) Visit [email or web address], select Sign Up Now, enter this activation code and your date of birth, then select Next.  [email or web address]  Expires: 6/5/2017 11:38 PM      2) Create a username and password to use when you visit [facility] in the future and select a security question in case you lose your password and select Next.    3) Enter your e-mail address and click Sign Up!    Additional Information  If you have questions, please e-mail [email or web address] or call [phone] to talk to our [facility] staff. Remember, [facility] is NOT to be used for urgent needs. For medical emergencies, dial 911.          [facility] complies with applicable Federal civil rights laws and does not discriminate on the basis of race, color, national origin, age, disability, or sex.

## 2017-05-03 NOTE — TELEPHONE ENCOUNTER
----- Message from Hayde Greer sent at 5/3/2017  8:24 AM CDT -----  Pt states she was calling about her bladder problem. She was not able to use the restroom and had to be cathererized. Please call pt a.s.a.p at [phone].

## 2017-05-03 NOTE — ED NOTES
Pt has requested more pain medication .Bridger JAMISON is at  and explained to pt he would like to await results to CT scan. Pt wants to know if he will be sending her home with more hydrocodone that she was out and still having pain 3 weeks after her cholecystectomy. Pt was told she would have to f/u with Dr Banuelos in r/t to that. Pt became agitated.

## 2017-05-03 NOTE — TELEPHONE ENCOUNTER
Spoke to patient she stated that she is at the ER for bladder spasm, she was advised that a follow up appointment was scheduled for Monday to follow up after ER visit, she also requested a refill on pain medication, she was also advised that a message will be sent to Dr. Church Re: this she verbalized understanding.

## 2017-05-03 NOTE — H&P
[facility]   General Surgery  History & Physical    Patient Name: Kaylene Emery  MRN: [id number]  Admission Date: 5/3/2017  Attending Physician: Td Church MD   Primary Care Provider: Jonas Jimenez MD    Patient information was obtained from patient, past medical records and ER records.     Subjective:     Chief Complaint/Reason for Admission: Abdominal pain, urinary retention, narcotic-induced constipation and urinary retention    History of Present Illness: Patient underwent a laparoscopic cholecystectomy for acute cholecystitis on 4/7/2016.    She has had issues with some incisional pain taking narcotics.  She presented to the office was found to have some mild distention but otherwise no complaints.    She has been in the emergency room twice within the last 72 hours.  The first time was her urinary retention.  A Palmer catheter was placed.  Urology follow-up was recommended with [facility] (see emergency room note)    She read turns today due to abdominal pain.  She says she states she has had a bowel movement.  She describes this as a diffuse crampy pain gets worse on the right as compared to the left.  It is not associated with any nausea or vomiting.  She denies any fever or chills      Current Facility-Administered Medications on File Prior to Encounter   Medication    [DISCONTINUED] morphine injection 4 mg     Current Outpatient Prescriptions on File Prior to Encounter   Medication Sig    acetaminophen (TYLENOL) 500 MG tablet Take 2 tablets (1,000 mg total) by mouth 3 (three) times daily as needed for Pain.    diclofenac (VOLTAREN) 75 MG EC tablet Take 1 tablet (75 mg total) by mouth 2 (two) times daily.    esomeprazole (NEXIUM) 40 MG capsule Take 40 mg by mouth before breakfast.    famotidine (PEPCID) 40 MG tablet Take 40 mg by mouth once daily.    fluoxetine (PROZAC) 40 MG capsule Take 40 mg by mouth once daily.    hydrocodone-acetaminophen 5-325mg (NORCO) 5-325  "mg per tablet 1 every 6 hours as needed for pain    ibuprofen (ADVIL,MOTRIN) 200 MG tablet Take 2 tablets (400 mg total) by mouth every 6 (six) hours as needed for Pain.    lorazepam (ATIVAN) 1 MG tablet Take 1 mg by mouth every 6 (six) hours as needed for Anxiety.    nitrofurantoin, macrocrystal-monohydrate, (MACROBID) 100 MG capsule Take 1 capsule (100 mg total) by mouth 2 (two) times daily.    phenazopyridine (PYRIDIUM) 200 MG tablet Take 1 tablet (200 mg total) by mouth 3 (three) times daily with meals.    promethazine (PHENERGAN) 25 MG tablet Take 1 tablet (25 mg total) by mouth every 6 (six) hours as needed for Nausea.    quetiapine (SEROQUEL) 200 MG Tab Take by mouth.       Review of patient's allergies indicates:   Allergen Reactions    Corticosteroids (glucocorticoids)      "sets off my anxiety real bad"    Tramadol Rash       Past Medical History:   Diagnosis Date    Anxiety     Depression     GERD (gastroesophageal reflux disease)     Tobacco use      Past Surgical History:   Procedure Laterality Date    BLADDER REPAIR      CHOLECYSTECTOMY  04/07/2017    KNEE ARTHROPLASTY      PARTIAL HYSTERECTOMY      TUBAL LIGATION       Family History     Problem Relation (Age of Onset)    Diabetes Mother    Hypertension Mother        Social History Main Topics    Smoking status: Current Every Day Smoker     Packs/day: 1.00     Types: Cigarettes    Smokeless tobacco: Never Used    Alcohol use No    Drug use: No    Sexual activity: Yes     Review of Systems   Constitutional: Negative for appetite change, chills, fatigue, fever and unexpected weight change.   HENT: Negative for hearing loss and rhinorrhea.    Eyes: Negative for visual disturbance.   Respiratory: Negative for apnea, cough, shortness of breath and wheezing.    Cardiovascular: Negative for chest pain and palpitations.   Gastrointestinal: Positive for abdominal distention and abdominal pain. Negative for blood in stool, constipation, " diarrhea, nausea and vomiting.   Genitourinary: Positive for difficulty urinating. Negative for dysuria, frequency and urgency.        Urinary retention, Palmer catheter   Musculoskeletal: Negative for arthralgias and neck pain.   Skin: Negative for rash.   Neurological: Negative for seizures, weakness, numbness and headaches.   Hematological: Negative for adenopathy. Does not bruise/bleed easily.   Psychiatric/Behavioral: Negative for hallucinations. The patient is not nervous/anxious.      Objective:     Vital Signs (Most Recent):  Temp: 97.7 °F (36.5 °C) (05/03/17 0958)  Pulse: 72 (05/03/17 1449)  Resp: 20 (05/03/17 1449)  BP: 101/70 (05/03/17 1449)  SpO2: 98 % (05/03/17 1449) Vital Signs (24h Range):  Temp:  [97.7 °F (36.5 °C)] 97.7 °F (36.5 °C)  Pulse:  [] 72  Resp:  [16-20] 20  SpO2:  [97 %-98 %] 98 %  BP: (101-107)/(58-74) 101/70     Weight: 63.5 kg (140 lb)  Body mass index is 24.03 kg/(m^2).    Physical Exam   Constitutional: She is oriented to person, place, and time. She appears well-developed. No distress.   HENT:   Head: Normocephalic and atraumatic.   Eyes: Pupils are equal, round, and reactive to light. No scleral icterus.   Neck: Normal range of motion. No JVD present. No tracheal deviation present. No thyromegaly present.   Cardiovascular: Normal rate, regular rhythm, normal heart sounds and intact distal pulses.    Pulmonary/Chest: Effort normal.   Abdominal: Soft. She exhibits distension. She exhibits no mass. Tenderness: mild generalized slightly greater on the right. There is no rebound and no guarding. No hernia.   Incisions from her laparoscopic cholecystectomy are healing well   Musculoskeletal: She exhibits no edema or tenderness.   Neurological: She is alert and oriented to person, place, and time.   Skin: Skin is warm.   Psychiatric: She has a normal mood and affect. Her behavior is normal. Judgment and thought content normal.   Vitals reviewed.      Significant Labs:  CBC:   Recent  Labs  Lab 05/03/17  1050   WBC 9.49   RBC 4.19   HGB 13.2   HCT 39.6      MCV 95   MCH 31.5*   MCHC 33.3     BMP:   Recent Labs  Lab 05/03/17  1050   GLU 87      K 4.7      CO2 29   BUN 11   CREATININE 0.9   CALCIUM 9.2     CMP:   Recent Labs  Lab 05/03/17  1050   GLU 87   CALCIUM 9.2   ALBUMIN 3.5   PROT 6.8      K 4.7   CO2 29      BUN 11   CREATININE 0.9   ALKPHOS 60   ALT 10   AST 11   BILITOT 0.2       Significant Diagnostics:  CT: I have reviewed all pertinent results/findings within the past 24 hours and my personal findings are:  Os consistent with constipation.  There were discussed with the radiologist and there is no yajaira evidence of pneumatosis of the cecum or ascending colon    Assessment/Plan:     Constipation due to pain medication  Patient will be admitted.  She'll be started on a clear liquid diet.  We will give her magnesium citrate followed by water to help with the constipation repeat abdominal film in the morning    Drug induced retention of urine  Removed the Palmer catheter in the morning and see if the patient is able to urinate.  If she is unable to urinate the Palmer catheter be reinserted    Tobacco use  Nicotine patch    Postoperative pain  Narcotics as needed, try to minimize the need as this is contributing to the constipation    VTE Risk Mitigation         Ordered     Medium Risk of VTE  Once      05/03/17 1328     Place sequential compression device  Until discontinued      05/03/17 1328          Td Church MD  General Surgery  [facility]

## 2017-05-03 NOTE — TELEPHONE ENCOUNTER
----- Message from Lisa Ruiz sent at 5/3/2017 11:22 AM CDT -----  Contact: PT   Pt states that she is having issues with her bladder and need to talk to the nurse.   ..[phone] (home)

## 2017-05-04 LAB
BASOPHILS # BLD AUTO: 0.02 K/UL
BASOPHILS NFR BLD: 0.3 %
DIFFERENTIAL METHOD: ABNORMAL
EOSINOPHIL # BLD AUTO: 0.7 K/UL
EOSINOPHIL NFR BLD: 11.1 %
ERYTHROCYTE [DISTWIDTH] IN BLOOD BY AUTOMATED COUNT: 14.7 %
HCT VFR BLD AUTO: 38.3 %
HGB BLD-MCNC: 12.6 G/DL
LYMPHOCYTES # BLD AUTO: 3 K/UL
LYMPHOCYTES NFR BLD: 50.1 %
MCH RBC QN AUTO: 31.2 PG
MCHC RBC AUTO-ENTMCNC: 32.9 %
MCV RBC AUTO: 95 FL
MONOCYTES # BLD AUTO: 0.6 K/UL
MONOCYTES NFR BLD: 10.2 %
NEUTROPHILS # BLD AUTO: 1.7 K/UL
NEUTROPHILS NFR BLD: 28.3 %
PLATELET # BLD AUTO: 198 K/UL
PMV BLD AUTO: 10 FL
RBC # BLD AUTO: 4.04 M/UL
WBC # BLD AUTO: 5.97 K/UL

## 2017-05-04 PROCEDURE — 63600175 PHARM REV CODE 636 W HCPCS: Performed by: SURGERY

## 2017-05-04 PROCEDURE — G0378 HOSPITAL OBSERVATION PER HR: HCPCS

## 2017-05-04 PROCEDURE — 99204 OFFICE O/P NEW MOD 45 MIN: CPT | Mod: ,,, | Performed by: UROLOGY

## 2017-05-04 PROCEDURE — 25000003 PHARM REV CODE 250: Performed by: SURGERY

## 2017-05-04 PROCEDURE — 25500020 PHARM REV CODE 255: Performed by: SURGERY

## 2017-05-04 PROCEDURE — 85025 COMPLETE CBC W/AUTO DIFF WBC: CPT

## 2017-05-04 PROCEDURE — 96375 TX/PRO/DX INJ NEW DRUG ADDON: CPT

## 2017-05-04 PROCEDURE — 96376 TX/PRO/DX INJ SAME DRUG ADON: CPT

## 2017-05-04 PROCEDURE — 36415 COLL VENOUS BLD VENIPUNCTURE: CPT

## 2017-05-04 PROCEDURE — 87086 URINE CULTURE/COLONY COUNT: CPT

## 2017-05-04 RX ORDER — TAMSULOSIN HYDROCHLORIDE 0.4 MG/1
0.4 CAPSULE ORAL DAILY
Status: DISCONTINUED | OUTPATIENT
Start: 2017-05-04 | End: 2017-05-05 | Stop reason: HOSPADM

## 2017-05-04 RX ORDER — LUBIPROSTONE 24 UG/1
24 CAPSULE ORAL 2 TIMES DAILY WITH MEALS
Status: DISCONTINUED | OUTPATIENT
Start: 2017-05-04 | End: 2017-05-05 | Stop reason: HOSPADM

## 2017-05-04 RX ORDER — ACETAMINOPHEN 10 MG/ML
1000 INJECTION, SOLUTION INTRAVENOUS EVERY 8 HOURS
Status: DISCONTINUED | OUTPATIENT
Start: 2017-05-04 | End: 2017-05-05 | Stop reason: HOSPADM

## 2017-05-04 RX ADMIN — SODIUM CHLORIDE, SODIUM LACTATE, POTASSIUM CHLORIDE, AND CALCIUM CHLORIDE: .6; .31; .03; .02 INJECTION, SOLUTION INTRAVENOUS at 05:05

## 2017-05-04 RX ADMIN — HYDROCODONE BITARTRATE AND ACETAMINOPHEN 1 TABLET: 7.5; 325 TABLET ORAL at 07:05

## 2017-05-04 RX ADMIN — LORAZEPAM 1 MG: 1 TABLET ORAL at 08:05

## 2017-05-04 RX ADMIN — HYDROMORPHONE HYDROCHLORIDE 1 MG: 2 INJECTION, SOLUTION INTRAMUSCULAR; INTRAVENOUS; SUBCUTANEOUS at 04:05

## 2017-05-04 RX ADMIN — HYDROCODONE BITARTRATE AND ACETAMINOPHEN 1 TABLET: 7.5; 325 TABLET ORAL at 01:05

## 2017-05-04 RX ADMIN — FLUOXETINE 40 MG: 20 CAPSULE ORAL at 08:05

## 2017-05-04 RX ADMIN — NITROFURANTOIN MONOHYDRATE/MACROCRYSTALLINE 100 MG: 25; 75 CAPSULE ORAL at 09:05

## 2017-05-04 RX ADMIN — DOCUSATE SODIUM 100 MG: 100 CAPSULE, LIQUID FILLED ORAL at 08:05

## 2017-05-04 RX ADMIN — TAMSULOSIN HYDROCHLORIDE 0.4 MG: 0.4 CAPSULE ORAL at 10:05

## 2017-05-04 RX ADMIN — HYDROMORPHONE HYDROCHLORIDE 1 MG: 2 INJECTION, SOLUTION INTRAMUSCULAR; INTRAVENOUS; SUBCUTANEOUS at 10:05

## 2017-05-04 RX ADMIN — DIATRIZOATE MEGLUMINE AND DIATRIZOATE SODIUM 480 ML: 660; 100 LIQUID ORAL; RECTAL at 10:05

## 2017-05-04 RX ADMIN — LORAZEPAM 1 MG: 1 TABLET ORAL at 09:05

## 2017-05-04 RX ADMIN — LUBIPROSTONE 24 MCG: 24 CAPSULE, GELATIN COATED ORAL at 01:05

## 2017-05-04 RX ADMIN — NICOTINE 1 PATCH: 21 PATCH, EXTENDED RELEASE TRANSDERMAL at 08:05

## 2017-05-04 RX ADMIN — QUETIAPINE FUMARATE 200 MG: 100 TABLET, FILM COATED ORAL at 09:05

## 2017-05-04 RX ADMIN — LORAZEPAM 1 MG: 1 TABLET ORAL at 02:05

## 2017-05-04 RX ADMIN — DOCUSATE SODIUM 100 MG: 100 CAPSULE, LIQUID FILLED ORAL at 09:05

## 2017-05-04 RX ADMIN — PANTOPRAZOLE SODIUM 40 MG: 40 TABLET, DELAYED RELEASE ORAL at 08:05

## 2017-05-04 RX ADMIN — NITROFURANTOIN MONOHYDRATE/MACROCRYSTALLINE 100 MG: 25; 75 CAPSULE ORAL at 08:05

## 2017-05-04 RX ADMIN — ACETAMINOPHEN 1000 MG: 10 INJECTION, SOLUTION INTRAVENOUS at 01:05

## 2017-05-04 RX ADMIN — HYDROCODONE BITARTRATE AND ACETAMINOPHEN 1 TABLET: 7.5; 325 TABLET ORAL at 09:05

## 2017-05-04 RX ADMIN — HYDROCODONE BITARTRATE AND ACETAMINOPHEN 1 TABLET: 7.5; 325 TABLET ORAL at 02:05

## 2017-05-04 NOTE — NURSING
Patient arrived from er and ambulated from wheelchair to bed with no assistance or distress noted.  Patient and  educated on hourly rounding, fall precautions and plan of care.  VSS, IV fluids infusing, no complaints of nausea at this time.  See flow sheets for further assessment.

## 2017-05-04 NOTE — PLAN OF CARE
Problem: Patient Care Overview  Goal: Plan of Care Review  Outcome: Ongoing (interventions implemented as appropriate)  Patient remains free from falls, fall precaution in place. Independent.   VS stable. C/o constant pain. PRN pain meds given. Palmer intact. No BM this shift. BM with gastrografin.   IV infusing. Patient educated on the reducing of pain medication, patient showed no interested in education.   Patient relation and charge Nurse Jose G spoke to patient and fiance regarding pain medication. Dr. Church is aware.   No other C/O at this time.Call bell and belongings within reach, reminded to call for assistance.

## 2017-05-04 NOTE — PROGRESS NOTES
Explained problem to patient and boyfriend  Narcotic induced constipation and urinary retention  Explained that it would be best to avoid all narcotics but there is the need to manage pain.  The goal is to control pain with as little narcotic as possible  Explained that the the patient, staff and and all people should be treated with respect and not treatened

## 2017-05-04 NOTE — PLAN OF CARE
Met with patient's anna, patient out of room. No discharge needs identified at this time.      05/04/17 0951   Discharge Assessment   Assessment Type Discharge Planning Assessment   Confirmed/corrected address and phone number on facesheet? Yes   Assessment information obtained from? Medical Record;Other  (anna)   Prior to hospitilization cognitive status: Alert/Oriented   Prior to hospitalization functional status: Independent   Current Functional Status: Independent   Arrived From home or self-care   Lives With significant other   Able to Return to Prior Arrangements yes   Is patient able to care for self after discharge? Yes   How many people do you have in your home that can help with your care after discharge? 1   Who are your caregiver(s) and their phone number(s)? anna Izquierdo, [phone]   Patient's perception of discharge disposition home or selfcare   Readmission Within The Last 30 Days no previous admission in last 30 days   Patient currently being followed by outpatient case management? No   Patient currently receives home health services? No   Does the patient currently use HME? No   Equipment Currently Used at Home none   Do you have any problems affording any of your prescribed medications? No   Is the patient taking medications as prescribed? yes   Do you have any financial concerns preventing you from receiving the healthcare you need? No   Does the patient have transportation to healthcare appointments? Yes   Transportation Available family or friend will provide   On Dialysis? No   Discharge Plan A Home   Patient/Family In Agreement With Plan yes

## 2017-05-04 NOTE — CONSULTS
Chief Complaint: Urinary retention    HPI:   5/4/17: 47 yo woman s/p lap sam one month ago is admitted secondary to troublesome constipation and associated urinary retention.  Was seen in ER a couple times for these problems, and after failing another voiding trial a catheter was placed and this consult requested.  Pt is 4 wks postop and says that she just can't make it without narcotic pain meds the pain is still so troublesome.  No exac/rel factors for pain from constipation in the lower abdomen and epigastric pain from lap sam.  Pain is often severe.  No hematuria.  No known urolithiasis.  No urinary bother prior to surgery.  Takes benadryl products regularly.    Allergies:  Corticosteroids (glucocorticoids) and Tramadol    Medications: see MAR    Review of Systems:  General: No fever, chills, fatigability, or weight loss.  Skin: No rashes, itching, or changes in color or texture of skin.  Chest: Denies COOMBS, cyanosis, wheezing, cough, and sputum production.  Abdomen: Appetite fine. No weight loss. Denies diarrhea, abdominal pain, hematemesis, or blood in stool.  Musculoskeletal: No joint stiffness or swelling. Some back pain.  : As above.  All other review of systems negative.    PMH:   has a past medical history of Anxiety; Depression; GERD (gastroesophageal reflux disease); and Tobacco use.    PSH:   has a past surgical history that includes Partial hysterectomy; Bladder repair; Knee Arthroplasty; Tubal ligation; and Cholecystectomy (04/07/2017).    FamHx: family history includes Diabetes in her mother; Hypertension in her mother.    SocHx:  reports that she has been smoking Cigarettes.  She has been smoking about 1.00 pack per day. She has never used smokeless tobacco. She reports that she does not drink alcohol or use illicit drugs.     Physical Exam:  Vitals:   Vitals:    05/04/17 0730   BP: 101/64   Pulse: 72   Resp: 18   Temp: 98.6 °F (37 °C)     General: A&Ox3. No apparent distress. No  deformities.  Neck: No masses. Normal thyroid.  Lungs: normal inspiration. No use of accessory muscles.  Heart: normal pulse. No arrhythmias.  Abdomen: Soft. Mild dist, mild TTP  Lymphatic: Neck and groin nodes negative.  Skin: The skin is warm and dry. No jaundice.  Ext: No c/c/e.  : carroll clear in good position    Labs/Studies: nit + urine latel    Impression/Plan:   1. Send cath UCx and treat on presumption of UTI, likely present on admission due to prior instrumentation.   2. Keep carroll x1 week or instruct in CIC; RTC 1 week to reassess.  3. No narcotic pain meds and no benadryl products in the interim, to maximum extent possible.  Pt is likely suffering from her constipation and associated retention due primarily to this factor so all measures to reduce these are needed. Pt states she is unwilling to do that, presenting a difficult conundrum.  Carroll is the only answer to the resultant retention unless pt elects CIC.   4. Will teach CIC in clinic after d/c if pt is willing.

## 2017-05-04 NOTE — PLAN OF CARE
Problem: Patient Care Overview  Goal: Plan of Care Review  Outcome: Ongoing (interventions implemented as appropriate)  Patient complaints of stomach pain through the night, relieved by prn medication, IV fluids infusing, maintaining a clear liquid diet, reviewed plan of care with pateint and spouse.  Will continue to monitor.

## 2017-05-04 NOTE — SUBJECTIVE & OBJECTIVE
"Interval History: Abdominal pain  No bowel movement  Has not voided 6 hours after Palmer catheter was removed    Medications:  Continuous Infusions:   lactated ringers 75 mL/hr at 05/03/17 1426     Scheduled Meds:   docusate sodium  100 mg Oral BID    fluoxetine  40 mg Oral Daily    nicotine  1 patch Transdermal Daily    nitrofurantoin (macrocrystal-monohydrate)  100 mg Oral BID    pantoprazole  40 mg Oral Daily    quetiapine  200 mg Oral QHS    sodium chloride 0.9%  3 mL Intravenous Q8H    tamsulosin  0.4 mg Oral Daily     PRN Meds:acetaminophen, diphenhydrAMINE, hydrocodone-acetaminophen 7.5-325mg, HYDROmorphone, lorazepam, ondansetron, ramelteon     Review of patient's allergies indicates:   Allergen Reactions    Corticosteroids (glucocorticoids)      "sets off my anxiety real bad"    Tramadol Rash     Objective:     Vital Signs (Most Recent):  Temp: 98.6 °F (37 °C) (05/04/17 0730)  Pulse: 72 (05/04/17 0730)  Resp: 18 (05/04/17 0730)  BP: 101/64 (05/04/17 0730)  SpO2: 96 % (05/04/17 0730) Vital Signs (24h Range):  Temp:  [97.8 °F (36.6 °C)-98.6 °F (37 °C)] 98.6 °F (37 °C)  Pulse:  [70-79] 72  Resp:  [18-20] 18  SpO2:  [95 %-98 %] 96 %  BP: ()/(54-74) 101/64     Weight: 63.5 kg (140 lb 1.6 oz)  Body mass index is 24.05 kg/(m^2).    Intake/Output - Last 3 Shifts       05/02 0700 - 05/03 0659 05/03 0700 - 05/04 0659 05/04 0700 - 05/05 0659    P.O.  360 840    I.V. (mL/kg)  1073.8 (16.9)     Total Intake(mL/kg)  1433.8 (22.6) 840 (13.2)    Urine (mL/kg/hr)  900     Total Output   900      Net   +533.8 +840           Stool Occurrence   0 x          Physical Exam   Constitutional: She appears well-developed and well-nourished.   Uncomfortable   HENT:   Head: Normocephalic.   Neck: Neck supple.   Cardiovascular: Normal rate and normal heart sounds.    Pulmonary/Chest: Effort normal.   Abdominal: Soft. She exhibits distension. There is no rebound and no guarding (mild generalized).   Skin: Skin is warm " and dry.   Vitals reviewed.      Significant Labs:  CBC:   Recent Labs  Lab 05/04/17  0432   WBC 5.97   RBC 4.04   HGB 12.6   HCT 38.3      MCV 95   MCH 31.2*   MCHC 32.9     BMP:   Recent Labs  Lab 05/03/17  1050   GLU 87      K 4.7      CO2 29   BUN 11   CREATININE 0.9   CALCIUM 9.2       Significant Diagnostics:  Abdominal films were reviewed and showed moderate constipation

## 2017-05-04 NOTE — PROGRESS NOTES
"[facility] -   General Surgery  Progress Note    Subjective:     History of Present Illness:  Patient underwent a laparoscopic cholecystectomy for acute cholecystitis on 4/7/2016.    She has had issues with some incisional pain taking narcotics.  She presented to the office was found to have some mild distention but otherwise no complaints.    She has been in the emergency room twice within the last 72 hours.  The first time was her urinary retention.  A Palmer catheter was placed.  Urology follow-up was recommended with [facility] (see emergency room note)    She read turns today due to abdominal pain.  She says she states she has had a bowel movement.  She describes this as a diffuse crampy pain gets worse on the right as compared to the left.  It is not associated with any nausea or vomiting.  She denies any fever or chills      Post-Op Info:  * No surgery found *         Interval History: Abdominal pain  No bowel movement  Has not voided 6 hours after Palmer catheter was removed    Medications:  Continuous Infusions:   lactated ringers 75 mL/hr at 05/03/17 1426     Scheduled Meds:   docusate sodium  100 mg Oral BID    fluoxetine  40 mg Oral Daily    nicotine  1 patch Transdermal Daily    nitrofurantoin (macrocrystal-monohydrate)  100 mg Oral BID    pantoprazole  40 mg Oral Daily    quetiapine  200 mg Oral QHS    sodium chloride 0.9%  3 mL Intravenous Q8H    tamsulosin  0.4 mg Oral Daily     PRN Meds:acetaminophen, diphenhydrAMINE, hydrocodone-acetaminophen 7.5-325mg, HYDROmorphone, lorazepam, ondansetron, ramelteon     Review of patient's allergies indicates:   Allergen Reactions    Corticosteroids (glucocorticoids)      "sets off my anxiety real bad"    Tramadol Rash     Objective:     Vital Signs (Most Recent):  Temp: 98.6 °F (37 °C) (05/04/17 0730)  Pulse: 72 (05/04/17 0730)  Resp: 18 (05/04/17 0730)  BP: 101/64 (05/04/17 0730)  SpO2: 96 % (05/04/17 0730) Vital Signs (24h " Range):  Temp:  [97.8 °F (36.6 °C)-98.6 °F (37 °C)] 98.6 °F (37 °C)  Pulse:  [70-79] 72  Resp:  [18-20] 18  SpO2:  [95 %-98 %] 96 %  BP: ()/(54-74) 101/64     Weight: 63.5 kg (140 lb 1.6 oz)  Body mass index is 24.05 kg/(m^2).    Intake/Output - Last 3 Shifts       05/02 0700 - 05/03 0659 05/03 0700 - 05/04 0659 05/04 0700 - 05/05 0659    P.O.  360 840    I.V. (mL/kg)  1073.8 (16.9)     Total Intake(mL/kg)  1433.8 (22.6) 840 (13.2)    Urine (mL/kg/hr)  900     Total Output   900      Net   +533.8 +840           Stool Occurrence   0 x          Physical Exam   Constitutional: She appears well-developed and well-nourished.   Uncomfortable   HENT:   Head: Normocephalic.   Neck: Neck supple.   Cardiovascular: Normal rate and normal heart sounds.    Pulmonary/Chest: Effort normal.   Abdominal: Soft. She exhibits distension. There is no rebound and no guarding (mild generalized).   Skin: Skin is warm and dry.   Vitals reviewed.      Significant Labs:  CBC:   Recent Labs  Lab 05/04/17  0432   WBC 5.97   RBC 4.04   HGB 12.6   HCT 38.3      MCV 95   MCH 31.2*   MCHC 32.9     BMP:   Recent Labs  Lab 05/03/17  1050   GLU 87      K 4.7      CO2 29   BUN 11   CREATININE 0.9   CALCIUM 9.2       Significant Diagnostics:  Abdominal films were reviewed and showed moderate constipation    Assessment/Plan:     Constipation due to pain medication  No results with magnesium citrate.    Therapeutic Gastrografin enema    Drug induced retention of urine  In 10 you urinary retention after Palmer catheter removal.    Replace Palmer  Flomax  Urology consult    Tobacco use  Nicotine patch,  Patient will be given information on smoking cessation and instructed to discuss this further with her primary care doctor    Postoperative pain  Narcotics as needed, try to minimize the need as this is contributing to the constipation      Td Church MD  General Surgery  [facility] -

## 2017-05-05 ENCOUNTER — TELEPHONE (OUTPATIENT)
Dept: SURGERY | Facility: CLINIC | Age: 47
End: 2017-05-05

## 2017-05-05 VITALS
HEART RATE: 77 BPM | BODY MASS INDEX: 23.92 KG/M2 | SYSTOLIC BLOOD PRESSURE: 121 MMHG | OXYGEN SATURATION: 95 % | WEIGHT: 140.13 LBS | HEIGHT: 64 IN | DIASTOLIC BLOOD PRESSURE: 75 MMHG | RESPIRATION RATE: 18 BRPM | TEMPERATURE: 98 F

## 2017-05-05 LAB
ANION GAP SERPL CALC-SCNC: 9 MMOL/L
BASOPHILS # BLD AUTO: 0.02 K/UL
BASOPHILS NFR BLD: 0.4 %
BUN SERPL-MCNC: 5 MG/DL
CALCIUM SERPL-MCNC: 8.9 MG/DL
CHLORIDE SERPL-SCNC: 102 MMOL/L
CO2 SERPL-SCNC: 29 MMOL/L
CREAT SERPL-MCNC: 0.8 MG/DL
DIFFERENTIAL METHOD: ABNORMAL
EOSINOPHIL # BLD AUTO: 0.4 K/UL
EOSINOPHIL NFR BLD: 8.6 %
ERYTHROCYTE [DISTWIDTH] IN BLOOD BY AUTOMATED COUNT: 14.6 %
EST. GFR  (AFRICAN AMERICAN): >60 ML/MIN/1.73 M^2
EST. GFR  (NON AFRICAN AMERICAN): >60 ML/MIN/1.73 M^2
GLUCOSE SERPL-MCNC: 76 MG/DL
HCT VFR BLD AUTO: 42.8 %
HGB BLD-MCNC: 13.9 G/DL
LYMPHOCYTES # BLD AUTO: 2.4 K/UL
LYMPHOCYTES NFR BLD: 46.7 %
MCH RBC QN AUTO: 31.2 PG
MCHC RBC AUTO-ENTMCNC: 32.5 %
MCV RBC AUTO: 96 FL
MONOCYTES # BLD AUTO: 0.5 K/UL
MONOCYTES NFR BLD: 9.7 %
NEUTROPHILS # BLD AUTO: 1.8 K/UL
NEUTROPHILS NFR BLD: 34.6 %
PLATELET # BLD AUTO: 214 K/UL
PMV BLD AUTO: 10 FL
POTASSIUM SERPL-SCNC: 4.3 MMOL/L
RBC # BLD AUTO: 4.46 M/UL
SODIUM SERPL-SCNC: 140 MMOL/L
WBC # BLD AUTO: 5.14 K/UL

## 2017-05-05 PROCEDURE — 96361 HYDRATE IV INFUSION ADD-ON: CPT

## 2017-05-05 PROCEDURE — 85025 COMPLETE CBC W/AUTO DIFF WBC: CPT

## 2017-05-05 PROCEDURE — 96375 TX/PRO/DX INJ NEW DRUG ADDON: CPT

## 2017-05-05 PROCEDURE — 96374 THER/PROPH/DIAG INJ IV PUSH: CPT

## 2017-05-05 PROCEDURE — 36415 COLL VENOUS BLD VENIPUNCTURE: CPT

## 2017-05-05 PROCEDURE — 25000003 PHARM REV CODE 250: Performed by: SURGERY

## 2017-05-05 PROCEDURE — 96376 TX/PRO/DX INJ SAME DRUG ADON: CPT

## 2017-05-05 PROCEDURE — 63600175 PHARM REV CODE 636 W HCPCS: Performed by: SURGERY

## 2017-05-05 PROCEDURE — G0378 HOSPITAL OBSERVATION PER HR: HCPCS

## 2017-05-05 PROCEDURE — 80048 BASIC METABOLIC PNL TOTAL CA: CPT

## 2017-05-05 RX ORDER — LUBIPROSTONE 24 UG/1
24 CAPSULE ORAL 2 TIMES DAILY WITH MEALS
Qty: 30 CAPSULE | Refills: 1 | Status: SHIPPED | OUTPATIENT
Start: 2017-05-05 | End: 2017-06-02 | Stop reason: CLARIF

## 2017-05-05 RX ADMIN — PANTOPRAZOLE SODIUM 40 MG: 40 TABLET, DELAYED RELEASE ORAL at 08:05

## 2017-05-05 RX ADMIN — HYDROMORPHONE HYDROCHLORIDE 1 MG: 2 INJECTION, SOLUTION INTRAMUSCULAR; INTRAVENOUS; SUBCUTANEOUS at 01:05

## 2017-05-05 RX ADMIN — FLUOXETINE 40 MG: 20 CAPSULE ORAL at 08:05

## 2017-05-05 RX ADMIN — HYDROMORPHONE HYDROCHLORIDE 1 MG: 2 INJECTION, SOLUTION INTRAMUSCULAR; INTRAVENOUS; SUBCUTANEOUS at 08:05

## 2017-05-05 RX ADMIN — TAMSULOSIN HYDROCHLORIDE 0.4 MG: 0.4 CAPSULE ORAL at 08:05

## 2017-05-05 RX ADMIN — LORAZEPAM 1 MG: 1 TABLET ORAL at 05:05

## 2017-05-05 RX ADMIN — NITROFURANTOIN MONOHYDRATE/MACROCRYSTALLINE 100 MG: 25; 75 CAPSULE ORAL at 08:05

## 2017-05-05 RX ADMIN — HYDROCODONE BITARTRATE AND ACETAMINOPHEN 1 TABLET: 7.5; 325 TABLET ORAL at 05:05

## 2017-05-05 RX ADMIN — DOCUSATE SODIUM 100 MG: 100 CAPSULE, LIQUID FILLED ORAL at 08:05

## 2017-05-05 RX ADMIN — NICOTINE 1 PATCH: 21 PATCH, EXTENDED RELEASE TRANSDERMAL at 08:05

## 2017-05-05 RX ADMIN — LUBIPROSTONE 24 MCG: 24 CAPSULE, GELATIN COATED ORAL at 08:05

## 2017-05-05 NOTE — PROGRESS NOTES
Went over discharge instructions with patient.   Stressed importance of making and keeping all follow ups.   Patient verbalized understanding and has no questions in regards to discharge.  IV removed, catheter intact.  Pt discharge with carroll, to be removed by Urology in 1 wk.  Patient walked down to waiting car with staff, no distress noted.

## 2017-05-05 NOTE — TELEPHONE ENCOUNTER
Patient was given had a script for 30 tablets on Monday  Admitted for narcotic induced constipation and urinary retention on Wednesday to Friday    She was informed at discharge that no additional narcotics wound be given        ----- Message from Valdez Bonilla MA sent at 5/5/2017  1:26 PM CDT -----  Contact: [phone]      ----- Message -----     From: Satya Atwood     Sent: 5/5/2017   1:17 PM       To: Lynnette Appiah Staff    Patient would like a refill on Rx medication hydrocodone  5 mg      Pt uses  .  [facility] [address] [place] [address] [address] [facility] [address] [facility] [place] [address] [place] [address]  [address]  [facility] [address]  Phone: [phone] Fax: [phone]

## 2017-05-05 NOTE — PLAN OF CARE
CM followed up with pt for d/c planning.  Pt with significant other who was gathering her belongings, secondary to the pt being ready to go home.  Bedside nurse also in pt room and notified CM of need for pt to follow up with Laureano Parra MD in urology for carroll cath removal.  CM contacted office of Dr. Parra to schedule appointment.  Pt scheduled for next available appointment 5/15 at 2 pm at [facility].  Pt discharged with no further CM needs.    CM attempted to call pt and inform of scheduled appointment, but pt did not answer the phone.       05/05/17 1114   Final Note   Assessment Type Final Discharge Note   Discharge Disposition Home   Discharge planning education complete? Yes   What phone number can be called within the next 1-3 days to see how you are doing after discharge? [phone]   Hospital Follow Up  Appt(s) scheduled? Yes   Discharge plans and expectations educations in teach back method with documentation complete? Yes   Offered [facility] -- Bedside Delivery? n/a   Discharge/Hospital Encounter Summary to ([facility]) PCP n/a   Referral to Outpatient Case Management complete? n/a   Referral to / orders for Home Health Complete? n/a   30 day supply of medicines given at discharge, if documented non-compliance / non-adherence? n/a   Any social issues identified prior to discharge? No   Did you assess the readiness or willingness of the family or caregiver to support self management of care? n/a

## 2017-05-05 NOTE — DISCHARGE SUMMARY
[facility] -   General Surgery  Discharge Summary      Patient Name: Kaylene Emery  MRN: [id number]  Admission Date: 5/3/2017  Hospital Length of Stay: 0 days  Discharge Date and Time:  05/05/2017 11:37 AM  Attending Physician: No att. providers found   Discharging Provider: Tracy Velasco MD  Primary Care Provider: Jonas Jimenez MD    HPI:   Patient underwent a laparoscopic cholecystectomy for acute cholecystitis on 4/7/2016.    She has had issues with some incisional pain taking narcotics.  She presented to the office was found to have some mild distention but otherwise no complaints.    She has been in the emergency room twice within the last 72 hours.  The first time was her urinary retention.  A Palmer catheter was placed.  Urology follow-up was recommended with [facility] (see emergency room note)    She read turns today due to abdominal pain.  She says she states she has had a bowel movement.  She describes this as a diffuse crampy pain gets worse on the right as compared to the left.  It is not associated with any nausea or vomiting.  She denies any fever or chills      * No surgery found *      Indwelling Lines/Drains at time of discharge:   Lines/Drains/Airways          No matching active lines, drains, or airways        Hospital Course: Hospital day 2     Patient was given magnesium citrate last night.  She does not report any bowel movements.  She continues to complain about diffuse abdominal pain.    Her Palmer catheter was removed early in the morning and she has not been able to void      Consults:   Consults         Status Ordering Provider     Inpatient consult to Urology  Once     Provider:  Laureano Parra IV, MD    Completed TRACY VELASCO          Significant Diagnostic Studies: Labs:   BMP:   Recent Labs  Lab 05/05/17  0434   GLU 76      K 4.3      CO2 29   BUN 5*   CREATININE 0.8   CALCIUM 8.9    and CMP   Recent Labs  Lab 05/05/17  0434       K 4.3      CO2 29   GLU 76   BUN 5*   CREATININE 0.8   CALCIUM 8.9   ANIONGAP 9   ESTGFRAFRICA >60   EGFRNONAA >60     Radiology: X-Ray: Gastrografin enema showing constipation but no other lesions  CT scan: CT ABDOMEN PELVIS WITH CONTRAST:   Results for orders placed or performed during the hospital encounter of 05/03/17   CT Abdomen Pelvis With Contrast    Narrative    CT of abdomen and pelvis with IV Contrast    History:     Abdominal pain    Technique: Standard abdomen and pelvis CT protocol with IV contrast was performed. 75 mL of Omnipaque 350 contrast material was used for this examination. There was no oral contrast administered.    Finding: Comparison is made to a prior examination performed on 4/7/2017. The size of the heart is within normal limits. There are mild dependent atelectatic changes in both lungs. There is no pneumothorax or pleural effusion. There is partial visualization of bilateral breast implants.    The liver is enlarged. It measures 19.2 cm in craniocaudal dimension. There has been interval removal of the gallbladder. There are surgical clips in the gallbladder fossa. There is a small amount of fluid within the gallbladder fossa. The pancreas, spleen, adrenals, and kidneys are normal in appearance. The ureters are normal in appearance. A Palmer catheter is in place. There is not much fluid within the urinary bladder. The appendix is normal in appearance. There is possible pneumatosis intestinalis in the cecum and proximal portion of the ascending colon. The uterus is absent. The ovaries are normal in appearance. There is no free fluid within the abdomen or pelvis. There is no pneumoperitoneum. There is a mild amount of atherosclerosis. There are 6 lumbar-type vertebral bodies.    Impression    1. There is possible pneumatosis intestinalis in the cecum and proximal portion of the ascending colon.  2. There has been interval removal of the gallbladder. There are surgical clips in the  gallbladder fossa. There is a small amount of fluid within the gallbladder fossa.   3. The liver is enlarged. It measures 19.2 cm in craniocaudal dimension.   4. There are 6 lumbar-type vertebral bodies. This is characteristic of an incidental finding.  5. The above findings and impressions were discussed with Dr. Patrick via the telephone at 12:10 PM on 5/3/2017.      All CT scans at this facility use dose modulation, iterative reconstruction, and/or weight base dosing when appropriate to reduce radiation dose when appropriate to reduce radiation dose to as low as reasonably achievable.       Electronically signed by: JENNA CLEARY MD  Date:     05/03/17  Time:    12:20     and CT ABDOMEN PELVIS WITHOUT CONTRAST: No results found for this visit on 05/03/17.    Pending Diagnostic Studies:     None        Final Active Diagnoses:    Diagnosis Date Noted POA    PRINCIPAL PROBLEM:  Constipation due to pain medication [K59.03] 05/03/2017 Yes    Drug induced retention of urine [R33.0] 05/03/2017 Yes    Tobacco use [Z72.0] 04/07/2017 Yes    Postoperative pain [G89.18] 05/03/2017 Yes      Problems Resolved During this Admission:    Diagnosis Date Noted Date Resolved POA      Discharged Condition: stable    Disposition: Home or Self Care    Follow Up:  Follow-up Information     Follow up with Laureano Parra IV, MD. Go on 5/15/2017.    Specialty:  Urology    Why:  for carroll removal    Contact information:    [address]  [place] [address] [phone]          Patient Instructions:     Diet general     Activity as tolerated     Call MD for:  severe uncontrolled pain     Call MD for:  persistent nausea and vomiting or diarrhea     Call MD for:  temperature >100.4     No dressing needed       Medications:  Reconciled Home Medications:   Discharge Medication List as of 5/5/2017 10:53 AM      START taking these medications    Details   lubiprostone (AMITIZA) 24 MCG Cap Take 1 capsule (24 mcg total) by mouth 2 (two)  times daily with meals., Starting 5/5/2017, Until Discontinued, Normal         CONTINUE these medications which have NOT CHANGED    Details   acetaminophen (TYLENOL) 500 MG tablet Take 2 tablets (1,000 mg total) by mouth 3 (three) times daily as needed for Pain., Starting 5/2/2017, Until Discontinued, No Print      diclofenac (VOLTAREN) 75 MG EC tablet Take 1 tablet (75 mg total) by mouth 2 (two) times daily., Starting 5/2/2017, Until Discontinued, Print      esomeprazole (NEXIUM) 40 MG capsule Take 40 mg by mouth before breakfast., Until Discontinued, Historical Med      fluoxetine (PROZAC) 40 MG capsule Take 40 mg by mouth once daily., Until Discontinued, Historical Med      hydrocodone-acetaminophen 5-325mg (NORCO) 5-325 mg per tablet 1 every 6 hours as needed for pain, Normal      ibuprofen (ADVIL,MOTRIN) 200 MG tablet Take 2 tablets (400 mg total) by mouth every 6 (six) hours as needed for Pain., Starting 5/2/2017, Until Discontinued, No Print      lorazepam (ATIVAN) 1 MG tablet Take 1 mg by mouth every 6 (six) hours as needed for Anxiety., Until Discontinued, Historical Med      nitrofurantoin, macrocrystal-monohydrate, (MACROBID) 100 MG capsule Take 1 capsule (100 mg total) by mouth 2 (two) times daily., Starting 5/2/2017, Until Discontinued, Print      phenazopyridine (PYRIDIUM) 200 MG tablet Take 1 tablet (200 mg total) by mouth 3 (three) times daily with meals., Starting 5/2/2017, Until Fri 5/12/17, Print      promethazine (PHENERGAN) 25 MG tablet Take 1 tablet (25 mg total) by mouth every 6 (six) hours as needed for Nausea., Starting 5/1/2017, Until Discontinued, Normal      quetiapine (SEROQUEL) 200 MG Tab Take by mouth., Until Discontinued, Historical Med         STOP taking these medications       famotidine (PEPCID) 40 MG tablet Comments:   Reason for Stopping:             Time spent on the discharge of patient: 15 minutes    Td Church MD  General Surgery  [facility] -

## 2017-05-05 NOTE — PLAN OF CARE
Problem: Patient Care Overview  Goal: Plan of Care Review  Outcome: Ongoing (interventions implemented as appropriate)  patient had a restful night, iv fluids infusing, complaints of pain relieved by prn medication, educated patient on pain mgt, discussed other way to help take mind off pain, abdomen distended and tender to touch, no bowel movment this shift.   will continue to monitor.

## 2017-05-05 NOTE — DISCHARGE INSTRUCTIONS
It is very important that you avoid narcotic pain medication as that is the cause of the constipation and urinary retention.  I would only take a pain pill if needed and try to take them as far apart as possible.    Once she at home today drink one bottle of magnesium citrate and follow with with 216 ounce glasses of water.    If you develop abdominal pain nausea or vomiting please let us know.    you will need to take 1 antibiotic pill while you have the catheter in place to prevent a urinary tract infection

## 2017-05-06 LAB — BACTERIA UR CULT: NO GROWTH

## 2017-05-10 ENCOUNTER — TELEPHONE (OUTPATIENT)
Dept: SURGERY | Facility: CLINIC | Age: 47
End: 2017-05-10

## 2017-05-10 RX ORDER — PROMETHAZINE HYDROCHLORIDE 25 MG/1
25 TABLET ORAL EVERY 6 HOURS PRN
Qty: 20 TABLET | Refills: 1 | Status: ON HOLD | OUTPATIENT
Start: 2017-05-10 | End: 2017-07-14 | Stop reason: HOSPADM

## 2017-05-10 NOTE — TELEPHONE ENCOUNTER
----- Message from Estephania Carmen sent at 5/10/2017  4:21 PM CDT -----  Call pt at [phone]//pt has bad abdominal pain and very nauseated and extreme diarrhea//thks ht

## 2017-05-10 NOTE — TELEPHONE ENCOUNTER
Call to report loose stool, nausea and incisional pain  Will give phenergan  No additional narcotic after narcotic induced constipation and urinary retention

## 2017-05-20 ENCOUNTER — HOSPITAL ENCOUNTER (EMERGENCY)
Facility: HOSPITAL | Age: 47
Discharge: HOME | End: 2017-05-20
Attending: EMERGENCY MEDICINE
Payer: MEDICAID

## 2017-05-20 ENCOUNTER — NURSE TRIAGE (OUTPATIENT)
Dept: ADMINISTRATIVE | Facility: CLINIC | Age: 47
End: 2017-05-20

## 2017-05-20 VITALS
TEMPERATURE: 98 F | SYSTOLIC BLOOD PRESSURE: 101 MMHG | HEART RATE: 76 BPM | OXYGEN SATURATION: 97 % | DIASTOLIC BLOOD PRESSURE: 68 MMHG | BODY MASS INDEX: 22.2 KG/M2 | RESPIRATION RATE: 20 BRPM | WEIGHT: 130 LBS | HEIGHT: 64 IN

## 2017-05-20 DIAGNOSIS — S39.91XA INJURY OF ABDOMINAL WALL, INITIAL ENCOUNTER: Primary | ICD-10-CM

## 2017-05-20 DIAGNOSIS — R52 PAIN: ICD-10-CM

## 2017-05-20 PROCEDURE — 63600175 PHARM REV CODE 636 W HCPCS: Performed by: EMERGENCY MEDICINE

## 2017-05-20 PROCEDURE — 25000003 PHARM REV CODE 250: Performed by: EMERGENCY MEDICINE

## 2017-05-20 PROCEDURE — 96372 THER/PROPH/DIAG INJ SC/IM: CPT

## 2017-05-20 PROCEDURE — 99283 EMERGENCY DEPT VISIT LOW MDM: CPT | Mod: 25

## 2017-05-20 RX ORDER — ONDANSETRON 4 MG/1
4 TABLET, ORALLY DISINTEGRATING ORAL EVERY 6 HOURS PRN
Qty: 15 TABLET | Refills: 0 | Status: ON HOLD | OUTPATIENT
Start: 2017-05-20 | End: 2017-07-14 | Stop reason: HOSPADM

## 2017-05-20 RX ORDER — HYDROCODONE BITARTRATE AND ACETAMINOPHEN 10; 325 MG/1; MG/1
1 TABLET ORAL
Status: COMPLETED | OUTPATIENT
Start: 2017-05-20 | End: 2017-05-20

## 2017-05-20 RX ORDER — ONDANSETRON 2 MG/ML
8 INJECTION INTRAMUSCULAR; INTRAVENOUS
Status: COMPLETED | OUTPATIENT
Start: 2017-05-20 | End: 2017-05-20

## 2017-05-20 RX ORDER — PROMETHAZINE HYDROCHLORIDE 25 MG/1
25 TABLET ORAL
Status: COMPLETED | OUTPATIENT
Start: 2017-05-20 | End: 2017-05-20

## 2017-05-20 RX ADMIN — ONDANSETRON 8 MG: 2 INJECTION INTRAMUSCULAR; INTRAVENOUS at 09:05

## 2017-05-20 RX ADMIN — PROMETHAZINE HYDROCHLORIDE 25 MG: 25 TABLET ORAL at 09:05

## 2017-05-20 RX ADMIN — HYDROCODONE BITARTRATE AND ACETAMINOPHEN 1 TABLET: 10; 325 TABLET ORAL at 09:05

## 2017-05-20 NOTE — ED AVS SNAPSHOT
[facility] -   [address] [facility] [address]               Kaylene Emery   2017  8:49 PM   ED    Description:  Female : 1970   Department:  [facility]           Your Care was Coordinated By:     Provider Role From To    Star Motta Jr., MD Attending Provider 17 --      Reason for Visit     Abdominal Pain           Diagnoses this Visit        Comments    Injury of abdominal wall, initial encounter    -  Primary     Pain           ED Disposition     ED Disposition Condition Comment    Discharge             To Do List           Follow-up Information     Follow up with Jonas Jimenez MD. Call in 2 days.    Specialty:  Family Medicine    Contact information:    [address] [facility] [address]  [facility] [address]  [phone]         These Medications        Disp Refills Start End    ondansetron (ZOFRAN-ODT) 4 MG TbDL 15 tablet 0 2017     Take 1 tablet (4 mg total) by mouth every 6 (six) hours as needed. - Oral    Pharmacy: [facility] [id number] [place] [address] [facility] [address] AT [facility] [place] [address] [place] [address] Ph #: [phone]         [facility]     [facility] -  Assistance  Unless otherwise directed by your provider, please contact [facility], our nurse care line that is available for  assistance.     Registered nurses in the [facility] provide: appointment scheduling, clinical advisement, health education, and other advisory services.  Call: [phone] (toll free)               Medications           Message regarding Medications     Verify the changes and/or additions to your medication regime listed below are the same as discussed with your clinician today.  If any of these changes or additions are incorrect, please notify your healthcare provider.        START taking these NEW medications        Refills    ondansetron (ZOFRAN-ODT) 4 MG TbDL 0     Sig: Take 1 tablet (4 mg total) by mouth every 6 (six) hours as needed.    Class: Print    Route: Oral      These medications were administered today        Dose Freq    hydrocodone-acetaminophen 10-325mg per tablet 1 tablet 1 tablet ED 1 Time    Sig: Take 1 tablet by mouth ED 1 Time.    Class: Normal    Route: Oral    promethazine tablet 25 mg 25 mg ED 1 Time    Sig: Take 1 tablet (25 mg total) by mouth ED 1 Time.    Class: Normal    Route: Oral    ondansetron injection 8 mg 8 mg ED 1 Time    Sig: Inject 8 mg into the muscle ED 1 Time.    Class: Normal    Route: Intramuscular           Verify that the below list of medications is an accurate representation of the medications you are currently taking.  If none reported, the list may be blank. If incorrect, please contact your healthcare provider. Carry this list with you in case of emergency.           Current Medications     esomeprazole (NEXIUM) 40 MG capsule Take 40 mg by mouth before breakfast.    fluoxetine (PROZAC) 40 MG capsule Take 40 mg by mouth once daily.    lorazepam (ATIVAN) 1 MG tablet Take 1 mg by mouth every 6 (six) hours as needed for Anxiety.    promethazine (PHENERGAN) 25 MG tablet Take 1 tablet (25 mg total) by mouth every 6 (six) hours as needed for Nausea.    quetiapine (SEROQUEL) 200 MG Tab Take by mouth.    acetaminophen (TYLENOL) 500 MG tablet Take 2 tablets (1,000 mg total) by mouth 3 (three) times daily as needed for Pain.    diclofenac (VOLTAREN) 75 MG EC tablet Take 1 tablet (75 mg total) by mouth 2 (two) times daily.    hydrocodone-acetaminophen 5-325mg (NORCO) 5-325 mg per tablet 1 every 6 hours as needed for pain    ibuprofen (ADVIL,MOTRIN) 200 MG tablet Take 2 tablets (400 mg total) by mouth every 6 (six) hours as needed for Pain.    lubiprostone (AMITIZA) 24 MCG Cap Take 1 capsule (24 mcg total) by mouth 2 (two) times daily with meals.    nitrofurantoin, macrocrystal-monohydrate, (MACROBID) 100 MG capsule Take 1 capsule (100 mg  "total) by mouth 2 (two) times daily.    ondansetron (ZOFRAN-ODT) 4 MG TbDL Take 1 tablet (4 mg total) by mouth every 6 (six) hours as needed.           Clinical Reference Information           Your Vitals Were     BP Pulse Temp Resp Height Weight    137/77 (BP Location: Left arm, Patient Position: Sitting) 86 97.5 °F (36.4 °C) (Oral) 18 5' 4" (1.626 m) 59 kg (130 lb)    SpO2 BMI             96% 22.31 kg/m2         Allergies as of 5/20/2017        Reactions    Corticosteroids (Glucocorticoids)     "sets off my anxiety real bad"    Tramadol Rash      Immunizations Administered on Date of Encounter - 5/20/2017     None      ED Micro, Lab, POCT     None      ED Imaging Orders     Start Ordered       Status Ordering Provider    05/20/17 2136 05/20/17 2136  X-Ray Abdomen Flat And Erect  1 time imaging      Final result     05/20/17 2102 05/20/17 2101  X-Ray Abdomen Flat And Erect  1 time imaging      Acknowledged         Discharge Instructions         Pain Management After Surgery  Once youre home after surgery, you may have some pain, since even minor surgery causes swelling and breakdown of tissue. When it comes to effective pain management, the tips you may have learned in the hospital also work at home. To get the best pain relief possible, remember these points:  Special note: Be sure to follow any specific post-operative instructions from your surgeon or nurse.     Use your medicine only as directed  · If your pain is not relieved or if it gets worse, call your health care provider.  · If pain lessens, try taking your medicine less often or in smaller doses.  Remember that medicines need time to work  · Most pain relievers taken by mouth need at least 20 to 30 minutes to take effect. They may not reach their maximum effect for close to an hour.   · Take pain medicine at regular times as directed. Dont wait until the pain gets bad to take it.  Time your medicine  · Try to time your medicine so that you take it " before beginning an activity, such as dressing or sitting at the table for dinner.  · Taking your medicine at night may help you get a good nights rest.  Eat lots of fruit and vegetables  · Constipation is a common side effect with some pain medicines. Eating fruit, vegetables, and other foods high in fiber can help.  · Drink lots of fluids.  · Talk to your health care provider about taking a preventive bowel regimen.  Avoid drinking alcohol while taking pain medicine  · Mixing alcohol and pain medicine can cause dizziness and slow your respiratory system. It can even be fatal.  Avoid driving or operating machinery while taking pain medicines that can cause drowsiness.  Date Last Reviewed: 6/8/2015  © 9477-4100 [employer]. [address] [place] [address], [place] [address]. All rights reserved. This information is not intended as a substitute for professional medical care. Always follow your healthcare professional's instructions.          Your Scheduled Appointments     Jun 05, 2017 11:00 AM CDT   Post OP with MD Ricky Allen - [facility]    [id number] [place] [address] [phone]              [facility] Sign-Up     Activating your [facility] account is as easy as 1-2-3!     1) Visit [email or web address], select Sign Up Now, enter this activation code and your date of birth, then select Next.  [email or web address]  Expires: 6/5/2017 11:38 PM      2) Create a username and password to use when you visit [facility] in the future and select a security question in case you lose your password and select Next.    3) Enter your e-mail address and click Sign Up!    Additional Information  If you have questions, please e-mail [email or web address] or call [phone] to talk to our [facility] staff. Remember, [facility] is NOT to be used for urgent needs. For medical emergencies, dial 911.         Smoking Cessation     If you would like to quit smoking:   You may be  eligible for free services if you are a [place] resident and started smoking cigarettes before September 1, 1988.  Call the Smoking Cessation [employer] (SCT) toll free at (675) 614-8171 or (709) 692-7392.   Call 1-800-QUIT-NOW if you do not meet the above criteria.   Contact us via email: [email or web address]   View our website for more information: [email or web address]         [facility]  complies with applicable Federal civil rights laws and does not discriminate on the basis of race, color, national origin, age, disability, or sex.        Language Assistance Services     ATTENTION: Language assistance services are available, free of charge. Please call [phone].      ATENCIÓN: Si habla david, tiene a smith disposición servicios gratuitos de asistencia lingüística. Llame al [phone].     CHÚ Ý: N?u b?n nói Ti?ng Vi?t, có các d?ch v? h? tr? ngôn ng? mi?n phí dành cho b?n. G?i s? [phone].

## 2017-05-21 NOTE — ED PROVIDER NOTES
"SCRIBE #1 NOTE: I, Hernan Degroot, am scribing for, and in the presence of, Star Motta Jr., MD. I have scribed the entire note.      History      Chief Complaint   Patient presents with    Abdominal Pain     had gallbladder removed x 1 month ago. accidentally bumped incision against corner of dresser at home and c/o pain and nausea       Review of patient's allergies indicates:   Allergen Reactions    Corticosteroids (glucocorticoids)      "sets off my anxiety real bad"    Tramadol Rash        HPI   HPI    5/20/2017, 9:00 PM   History obtained from the patient      History of Present Illness: Kaylene Emery is a 46 y.o. female patient who presents to the Emergency Department for post-op problem which onset today. Pt reports receiving a Cholecystectomy 1-2 months ago. Pt hurt the surgical scar after bumping into a dresser today. The pain is constant and moderate in severity. No modifying factors reported. No associated symptoms. Patient denies any headache, fever, chills, CP, SOB, abdominal pain, N/V/D or any other sx at this time. No further complaints or concerns at this time.     Arrival mode: Personal vehicle    PCP: Jonas Jimenez MD       Past Medical History:  Past Medical History:   Diagnosis Date    Anxiety     Depression     GERD (gastroesophageal reflux disease)     Tobacco use        Past Surgical History:  Past Surgical History:   Procedure Laterality Date    BLADDER REPAIR      CHOLECYSTECTOMY  04/07/2017    KNEE ARTHROPLASTY      PARTIAL HYSTERECTOMY      TUBAL LIGATION           Family History:  Family History   Problem Relation Age of Onset    Hypertension Mother     Diabetes Mother        Social History:  Social History     Social History Main Topics    Smoking status: Current Every Day Smoker     Packs/day: 1.00     Types: Cigarettes    Smokeless tobacco: Never Used    Alcohol use Not on file    Drug use: No    Sexual activity: Yes       ROS   Review of Systems "   Constitutional: Negative for chills and fever.   HENT: Negative for sore throat.    Respiratory: Negative for shortness of breath.    Cardiovascular: Negative for chest pain.   Gastrointestinal: Positive for abdominal pain (post-operative site ). Negative for diarrhea, nausea and vomiting.   Genitourinary: Negative for dysuria.   Musculoskeletal: Negative for back pain.   Skin: Negative for rash.   Neurological: Negative for weakness.   Hematological: Does not bruise/bleed easily.     Physical Exam    Initial Vitals   BP Pulse Resp Temp SpO2   05/20/17 2047 05/20/17 2047 05/20/17 2047 05/20/17 2047 05/20/17 2047   137/77 86 18 97.5 °F (36.4 °C) 96 %      Physical Exam  Nursing Notes and Vital Signs Reviewed.  Constitutional: Patient is in no acute distress. Well-developed and well-nourished.  Head: Atraumatic. Normocephalic.  Eyes: PERRL. EOM intact. Conjunctivae are not pale. No scleral icterus.  ENT: Mucous membranes are moist. Oropharynx is clear and symmetric.    Neck: Supple. Full ROM. No lymphadenopathy.  Cardiovascular: Regular rate. Regular rhythm. No murmurs, rubs, or gallops. Distal pulses are 2+ and symmetric.  Pulmonary/Chest: No respiratory distress. Clear to auscultation bilaterally. No wheezing, rales, or rhonchi.  Abdominal: There is a surgical scar to the epigastric abdomen; well-healed with no evidence of ecchymosis. Soft and non-distended.  There is no tenderness.  No rebound, guarding, or rigidity. Good bowel sounds.  Genitourinary: No CVA tenderness  Musculoskeletal: Moves all extremities. No obvious deformities. No edema. No calf tenderness.  Skin: Warm and dry.  Neurological:  Alert, awake, and appropriate.  Normal speech.  No acute focal neurological deficits are appreciated.  Psychiatric: Normal affect. Good eye contact. Appropriate in content.    ED Course    Procedures  ED Vital Signs:  Vitals:    05/20/17 2047 05/20/17 2242   BP: 137/77 101/68   Pulse: 86 76   Resp: 18 20   Temp: 97.5  "°F (36.4 °C) 98.3 °F (36.8 °C)   TempSrc: Oral    SpO2: 96% 97%   Weight: 59 kg (130 lb)    Height: 5' 4" (1.626 m)      Imaging Results:  Imaging Results         X-Ray Abdomen Flat And Erect (Final result) Result time:  05/20/17 22:25:22    Final result by Leroy Suarez Jr., MD (05/20/17 22:25:22)    Impression:             No acute abdominal findings.      Electronically signed by: LEROY SUAREZ  Date:     05/20/17  Time:    22:25     Narrative:    As Abdomen, 2 views.    Indication:     Pain.    Findings:    Nonspecific single mildly gaseous distended loop of small bowel in the central abdomen.  No evidence of obstruction.  Prior cholecystectomy.  Hepatomegaly.  No abnormal calcifications.  Lung bases are clear.  Osseous structures intact.                 The Emergency Provider reviewed the vital signs and test results, which are outlined above.    ED Discussion     10:27 PM: The pt is re-evaluated. Pt is drinking a Coke at this time. Discussed with pt all pertinent ED information and results. Discussed pt dx and plan of tx. Gave pt all f/u and return to the ED instructions. Pt is instructed to f/u with her PCP. All questions and concerns were addressed at this time. Pt expresses understanding of information and instructions, and is comfortable with plan to discharge. Pt is stable for discharge.    ED Medication(s):  Medications   hydrocodone-acetaminophen 10-325mg per tablet 1 tablet (1 tablet Oral Given 5/20/17 2111)   promethazine tablet 25 mg (25 mg Oral Given 5/20/17 2111)   ondansetron injection 8 mg (8 mg Intramuscular Given 5/20/17 2158)     Discharge Medication List as of 5/20/2017 10:30 PM      START taking these medications    Details   ondansetron (ZOFRAN-ODT) 4 MG TbDL Take 1 tablet (4 mg total) by mouth every 6 (six) hours as needed., Starting 5/20/2017, Until Discontinued, Print           Follow-up Information     Follow up with Jonas Jimenez MD. Call in 2 days.    Specialty:  Family " Medicine    Contact information:    [address] [facility] [address]  [facility] [address]  [phone]          Medical Decision Making    Medical Decision Making:   Clinical Tests:   Radiological Study: Ordered and Reviewed           Scribe Attestation:   Scribe #1: I performed the above scribed service and the documentation accurately describes the services I performed. I attest to the accuracy of the note.    Attending:   Physician Attestation Statement for Scribe #1: I, Star Motta Jr., MD, personally performed the services described in this documentation, as scribed by Hernan Degroot, in my presence, and it is both accurate and complete.          Clinical Impression       ICD-10-CM ICD-9-CM   1. Injury of abdominal wall, initial encounter S39.91XA 959.12   2. Pain R52 780.96       Disposition:   Disposition: Discharged  Condition: Stable       Star Motta Jr., MD  05/20/17 2329

## 2017-05-21 NOTE — DISCHARGE INSTRUCTIONS
Pain Management After Surgery  Once youre home after surgery, you may have some pain, since even minor surgery causes swelling and breakdown of tissue. When it comes to effective pain management, the tips you may have learned in the hospital also work at home. To get the best pain relief possible, remember these points:  Special note: Be sure to follow any specific post-operative instructions from your surgeon or nurse.     Use your medicine only as directed  · If your pain is not relieved or if it gets worse, call your health care provider.  · If pain lessens, try taking your medicine less often or in smaller doses.  Remember that medicines need time to work  · Most pain relievers taken by mouth need at least 20 to 30 minutes to take effect. They may not reach their maximum effect for close to an hour.   · Take pain medicine at regular times as directed. Dont wait until the pain gets bad to take it.  Time your medicine  · Try to time your medicine so that you take it before beginning an activity, such as dressing or sitting at the table for dinner.  · Taking your medicine at night may help you get a good nights rest.  Eat lots of fruit and vegetables  · Constipation is a common side effect with some pain medicines. Eating fruit, vegetables, and other foods high in fiber can help.  · Drink lots of fluids.  · Talk to your health care provider about taking a preventive bowel regimen.  Avoid drinking alcohol while taking pain medicine  · Mixing alcohol and pain medicine can cause dizziness and slow your respiratory system. It can even be fatal.  Avoid driving or operating machinery while taking pain medicines that can cause drowsiness.  Date Last Reviewed: 6/8/2015  © 9139-3972 [employer]. [address] [place] [address], [place] [address]. All rights reserved. This information is not intended as a substitute for professional medical care. Always follow your healthcare professional's instructions.

## 2017-05-21 NOTE — TELEPHONE ENCOUNTER
Reason for Disposition   Caller requesting a NON-URGENT new prescription or refill and triager unable to refill per unit policy    Protocols used: ST MEDICATION QUESTION CALL-A-    Patient called to request pain medicine for incisional pain. Read to her Dr. Church last note about narcotic pain medication. Patient advised to go to ED for evaluation, she verbalized understanding.

## 2017-06-02 ENCOUNTER — HOSPITAL ENCOUNTER (EMERGENCY)
Facility: HOSPITAL | Age: 47
Discharge: HOME | End: 2017-06-02
Payer: MEDICAID

## 2017-06-02 VITALS
WEIGHT: 136 LBS | DIASTOLIC BLOOD PRESSURE: 69 MMHG | BODY MASS INDEX: 23.22 KG/M2 | HEIGHT: 64 IN | HEART RATE: 86 BPM | SYSTOLIC BLOOD PRESSURE: 110 MMHG | TEMPERATURE: 98 F | OXYGEN SATURATION: 98 % | RESPIRATION RATE: 18 BRPM

## 2017-06-02 DIAGNOSIS — S30.0XXA CONTUSION OF SACRUM, INITIAL ENCOUNTER: Primary | ICD-10-CM
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
## 2017-06-02 RX ORDER — CYCLOBENZAPRINE HCL 10 MG
10 TABLET ORAL NIGHTLY PRN
Qty: 10 TABLET | Refills: 0 | Status: SHIPPED | OUTPATIENT
Start: 2017-06-02 | End: 2017-06-12

## 2017-06-02 RX ORDER — HYDROCODONE BITARTRATE AND ACETAMINOPHEN 10; 325 MG/1; MG/1
1 TABLET ORAL
Status: COMPLETED | OUTPATIENT
Start: 2017-06-02 | End: 2017-06-02

## 2017-06-02 RX ORDER — MELOXICAM 7.5 MG/1
7.5 TABLET ORAL DAILY
Qty: 10 TABLET | Refills: 0 | Status: ON HOLD | OUTPATIENT
Start: 2017-06-02 | End: 2017-07-10 | Stop reason: HOSPADM

## 2017-06-02 RX ADMIN — HYDROCODONE BITARTRATE AND ACETAMINOPHEN 1 TABLET: 10; 325 TABLET ORAL at 03:06

## 2017-06-02 NOTE — ED PROVIDER NOTES
"SCRIBE #1 NOTE: I, Evaristo Garzon, am scribing for, and in the presence of, ALAINA Rudolph. I have scribed the entire note.      History      Chief Complaint   Patient presents with    Fall     from standing position. Reports tailbone pain. Denies hitting head/LOC. Ambulatory to triage       Review of patient's allergies indicates:   Allergen Reactions    Corticosteroids (glucocorticoids)      "sets off my anxiety real bad"    Tramadol Rash        HPI   HPI    6/2/2017, 3:18 PM   History obtained from the patient      History of Present Illness: Kaylene Emery is a 46 y.o. female patient who presents to the Emergency Department for an evaluation of tailbone pain which onset suddenly today. Pt reportedly fell from a standing position and landed on her buttocks resulting in pain to her tailbone. Symptoms are constant and moderate in severity. Exacerbated by walking and relieved by nothing. Patient denies any head injury/trauma, LOC, HA, numbness, weakness, dizziness, back pain, neck pain, knee pain, hip pain, abd pain, CP, SOB, and all other sxs at this time. No further complaints or concerns at this time.     Arrival mode: Personal vehicle    PCP: Jonas Jimenez MD       Past Medical History:  Past Medical History:   Diagnosis Date    Anxiety     Depression     GERD (gastroesophageal reflux disease)     Tobacco use        Past Surgical History:  Past Surgical History:   Procedure Laterality Date    BLADDER REPAIR      CHOLECYSTECTOMY  04/07/2017    KNEE ARTHROPLASTY      PARTIAL HYSTERECTOMY      TUBAL LIGATION           Family History:  Family History   Problem Relation Age of Onset    Hypertension Mother     Diabetes Mother        Social History:  Social History     Social History Main Topics    Smoking status: Current Every Day Smoker     Packs/day: 1.00     Types: Cigarettes    Smokeless tobacco: Never Used    Alcohol use Yes    Drug use: No    Sexual activity: Yes       ROS   Review of " Systems   Constitutional: Negative for chills, diaphoresis and fever.   HENT: Negative for sore throat.    Respiratory: Negative for shortness of breath.    Cardiovascular: Negative for chest pain.   Gastrointestinal: Negative for abdominal pain, diarrhea, nausea and vomiting.   Genitourinary: Negative for dysuria and hematuria.   Musculoskeletal: Negative for back pain, gait problem, myalgias, neck pain and neck stiffness.        (+) tailbone pain   Skin: Negative for rash and wound.   Neurological: Negative for dizziness, seizures, weakness, light-headedness, numbness and headaches.   Hematological: Does not bruise/bleed easily.     Physical Exam      Initial Vitals [06/02/17 1402]   BP Pulse Resp Temp SpO2   (!) 188/74 109 20 98.9 °F (37.2 °C) 95 %      Physical Exam  Nursing Notes and Vital Signs Reviewed.  Constitutional: Patient is in no acute distress. Well-developed and well-nourished.  Head: Atraumatic. Normocephalic.  Eyes: PERRL. EOM intact. Conjunctivae are not pale. No scleral icterus.  ENT: Mucous membranes are moist.  Neck: Supple. Full ROM. No lymphadenopathy. No cervical midline bony tenderness, deformities, or step-offs.   Cardiovascular: Regular rate. Regular rhythm.  Pulmonary/Chest: No respiratory distress.   Abdominal: Soft and non-distended.  There is no tenderness.   Musculoskeletal: Moves all extremities. No obvious deformities. No edema. No calf tenderness. Negative bilateral straight leg raises. Normal deep tendon reflexes.   Back: Tenderness to palpation of bilateral lumbar paraspinal musculature.Tenderness to palpation over bilateral PSIS. Pain with lumbar flexion and extension. Tenderness to palpation over sacrum. No deformities or step-offs of the T-spine or L-spine. Skin appears normal without abrasions or bruising. No erythema, induration, or fluctuance.   Skin: Warm and dry.  Neurological:  Alert, awake, and appropriate.  Normal speech. No light touch sensory deficit. No acute focal  "neurological deficits are appreciated.  Bilateral patellar and achilles DTR 2+.  Psychiatric: Normal affect. Good eye contact. Appropriate in content.    ED Course    Procedures  ED Vital Signs:  Vitals:    06/02/17 1402 06/02/17 1623   BP: (!) 188/74 110/69   Pulse: 109 86   Resp: 20 18   Temp: 98.9 °F (37.2 °C) 98.2 °F (36.8 °C)   TempSrc: Oral    SpO2: 95% 98%   Weight: 61.7 kg (136 lb)    Height: 5' 4" (1.626 m)          Imaging Results:  Imaging Results          X-Ray Sacrum And Coccyx (Final result)  Result time 06/02/17 15:54:51    Final result by JENNA Bell Sr., MD (06/02/17 15:54:51)                 Impression:      1. The sacrum and coccyx are normal in appearance.   2. There is a prominent amount of fecal material within the visualized portion of the colon.      Electronically signed by: JENNA BELL MD  Date:     06/02/17  Time:    15:54              Narrative:    2 view x-ray of the sacrum and coccyx    Clinical History:     Back pain    Findings: The sacrum and coccyx are normal in appearance. There is no fracture. There is no spondylolisthesis. There is a prominent amount of fecal material within the visualized portion of the colon.                             X-Ray Pelvis Routine AP (Final result)  Result time 06/02/17 15:53:19   Procedure changed from X-Ray Pelvis Complete min 3 views     Final result by JENNA Bell Sr., MD (06/02/17 15:53:19)                 Impression:         There is a prominent amount of fecal material in the colon.        Electronically signed by: JENNA BELL MD  Date:     06/02/17  Time:    15:53              Narrative:    Frontal view of the pelvis    Clinical History:     Unspecified fall, initial encounter    Findings:     There is no fracture. There is no dislocation. There is a prominent amount of fecal material in the colon.                                      The Emergency Provider reviewed the vital signs and test results, which are outlined above.    ED " Discussion      4:19 PM: Reassessed pt at this time. Pt is awake, alert, and in NAD. Pt states her condition has improved at this time. Discussed with pt all pertinent ED information and results. Discussed pt dx of contusion of pelvic region and plan of tx. Gave pt all f/u and return to the ED instructions. All questions and concerns were addressed at this time. Pt expresses understanding of information and instructions, and is comfortable with plan to discharge. Pt is stable for discharge.    Trauma precautions were discussed with patient and/or family/caretaker; I do not specifically detect any abdominal, thoracic, CNS, orthopedic, or other emergent or life threatening condition and that patient is safe to be discharged.  It was also discussed that despite an unrevealing examination and negative radiographic examination for serious or life threatening injury, these conditions may still exist.  As such, patient should return to ED immediately should they experience, severe or worsening pain, shortness of breath, abdominal pain, headache, vomiting, or any other concern.  It was also discussed that not infrequently, injuries may not be diagnosed during the initial ED visit (such as fractures) and that if the patient discovers a new area of concern, a new area of injury that was not evaluated in the ED, they should return for evaluation as they may have an injury that requires treatment.      ED Medication(s):  Medications   hydrocodone-acetaminophen 10-325mg per tablet 1 tablet (1 tablet Oral Given 6/2/17 1553)       Discharge Medication List as of 6/2/2017  4:20 PM      START taking these medications    Details   cyclobenzaprine (FLEXERIL) 10 MG tablet Take 1 tablet (10 mg total) by mouth nightly as needed., Starting Fri 6/2/2017, Until Mon 6/12/2017, Print      meloxicam (MOBIC) 7.5 MG tablet Take 1 tablet (7.5 mg total) by mouth once daily., Starting Fri 6/2/2017, Print             Follow-up Information     Jonas  MD Tony in 3 days.    Specialty:  Family Medicine  Contact information:  [address] [facility] [address]  [facility] [address]  [phone]                     Medical Decision Making    Medical Decision Making:   Clinical Tests:   Radiological Study: Ordered and Reviewed           Scribe Attestation:   Scribe #1: I performed the above scribed service and the documentation accurately describes the services I performed. I attest to the accuracy of the note.    Attending:   Physician Attestation Statement for Scribe #1: I, ALAINA Rudolph, personally performed the services described in this documentation, as scribed by Evaristo Garzon, in my presence, and it is both accurate and complete.          Clinical Impression       ICD-10-CM ICD-9-CM   1. Contusion of sacrum, initial encounter S30.0XXA 922.32   2. Fall W19.XXXA E888.9       Disposition:   Disposition: Discharged  Condition: Stable         Krystal Velasquez PA-C  06/02/17 0510

## 2017-06-05 ENCOUNTER — TELEPHONE (OUTPATIENT)
Dept: SURGERY | Facility: CLINIC | Age: 47
End: 2017-06-05

## 2017-06-05 NOTE — TELEPHONE ENCOUNTER
Spoke to patient she is aware that Dr. Church denied the pain medication refill, she voiced understanding.

## 2017-06-05 NOTE — TELEPHONE ENCOUNTER
----- Message from Satya Atwood sent at 6/5/2017  3:33 PM CDT -----  Contact: [phone]  Pt called in stating she fell and would like something called in for pain...Please call back at [phone] (home)       .  [facility] [address] [place] [address] [address] [facility] [address] [facility] [address] & [place] [address]  [facility] [address]  Phone: [phone] Fax: [phone]

## 2017-06-28 ENCOUNTER — HOSPITAL ENCOUNTER (INPATIENT)
Facility: HOSPITAL | Age: 47
LOS: 12 days | Discharge: ANOTHER HOSPITAL | DRG: 917 | End: 2017-07-10
Attending: SPECIALIST | Admitting: INTERNAL MEDICINE
Payer: MEDICAID

## 2017-06-28 DIAGNOSIS — T50.901A OVERDOSE: ICD-10-CM

## 2017-06-28 DIAGNOSIS — J96.01 ACUTE HYPOXEMIC RESPIRATORY FAILURE: ICD-10-CM

## 2017-06-28 DIAGNOSIS — Z45.2 ENCOUNTER FOR CENTRAL LINE PLACEMENT: ICD-10-CM

## 2017-06-28 DIAGNOSIS — A41.9 SEPSIS: ICD-10-CM

## 2017-06-28 DIAGNOSIS — J69.0 ASPIRATION PNEUMONIA OF BOTH LUNGS, UNSPECIFIED ASPIRATION PNEUMONIA TYPE, UNSPECIFIED PART OF LUNG: ICD-10-CM

## 2017-06-28 DIAGNOSIS — F33.2 SEVERE EPISODE OF RECURRENT MAJOR DEPRESSIVE DISORDER, WITHOUT PSYCHOTIC FEATURES: Chronic | ICD-10-CM

## 2017-06-28 DIAGNOSIS — Z01.818 ENCOUNTER FOR INTUBATION: ICD-10-CM

## 2017-06-28 DIAGNOSIS — T50.901A POLYSUBSTANCE OVERDOSE: ICD-10-CM

## 2017-06-28 DIAGNOSIS — E83.42 HYPOMAGNESEMIA: ICD-10-CM

## 2017-06-28 DIAGNOSIS — D69.6 THROMBOCYTOPENIA: ICD-10-CM

## 2017-06-28 DIAGNOSIS — N17.9 AKI (ACUTE KIDNEY INJURY): ICD-10-CM

## 2017-06-28 DIAGNOSIS — R06.00 DYSPNEA: ICD-10-CM

## 2017-06-28 DIAGNOSIS — E88.09 HYPOALBUMINEMIA: ICD-10-CM

## 2017-06-28 DIAGNOSIS — E87.8 ELECTROLYTE IMBALANCE: ICD-10-CM

## 2017-06-28 DIAGNOSIS — E87.1 HYPONATREMIA: ICD-10-CM

## 2017-06-28 DIAGNOSIS — T50.901A POLYSUBSTANCE OVERDOSE, ACCIDENTAL OR UNINTENTIONAL, INITIAL ENCOUNTER: ICD-10-CM

## 2017-06-28 DIAGNOSIS — M62.82 NON-TRAUMATIC RHABDOMYOLYSIS: Primary | ICD-10-CM

## 2017-06-28 DIAGNOSIS — R74.8 ELEVATED LIVER ENZYMES: ICD-10-CM

## 2017-06-28 DIAGNOSIS — T50.904A POLYSUBSTANCE OVERDOSE, UNDETERMINED INTENT, INITIAL ENCOUNTER: ICD-10-CM

## 2017-06-28 DIAGNOSIS — R74.01 TRANSAMINITIS: ICD-10-CM

## 2017-06-28 DIAGNOSIS — T50.901A OVERDOSE, ACCIDENTAL OR UNINTENTIONAL, INITIAL ENCOUNTER: ICD-10-CM

## 2017-06-28 DIAGNOSIS — F33.1 MODERATE EPISODE OF RECURRENT MAJOR DEPRESSIVE DISORDER: Chronic | ICD-10-CM

## 2017-06-28 PROBLEM — G93.41 ACUTE METABOLIC ENCEPHALOPATHY: Status: ACTIVE | Noted: 2017-06-28

## 2017-06-28 LAB
ALBUMIN SERPL BCP-MCNC: 3.5 G/DL
ALLENS TEST: ABNORMAL
ALP SERPL-CCNC: 92 U/L
ALT SERPL W/O P-5'-P-CCNC: 178 U/L
AMPHET+METHAMPHET UR QL: NEGATIVE
ANION GAP SERPL CALC-SCNC: 16 MMOL/L
APAP SERPL-MCNC: <3 UG/ML
APTT BLDCRRT: 28.8 SEC
AST SERPL-CCNC: 647 U/L
B-HCG UR QL: NEGATIVE
BACTERIA #/AREA URNS HPF: ABNORMAL /HPF
BARBITURATES UR QL SCN>200 NG/ML: NEGATIVE
BASOPHILS # BLD AUTO: 0.02 K/UL
BASOPHILS NFR BLD: 0.1 %
BENZODIAZ UR QL SCN>200 NG/ML: NORMAL
BILIRUB SERPL-MCNC: 0.5 MG/DL
BILIRUB UR QL STRIP: ABNORMAL
BUN SERPL-MCNC: 13 MG/DL
BZE UR QL SCN: NEGATIVE
CALCIUM SERPL-MCNC: 8.5 MG/DL
CANNABINOIDS UR QL SCN: NEGATIVE
CHLORIDE SERPL-SCNC: 97 MMOL/L
CK SERPL-CCNC: ABNORMAL U/L
CLARITY UR: ABNORMAL
CO2 SERPL-SCNC: 17 MMOL/L
COLOR UR: ABNORMAL
CREAT SERPL-MCNC: 1 MG/DL
CREAT UR-MCNC: 94.4 MG/DL
DELSYS: ABNORMAL
DIFFERENTIAL METHOD: ABNORMAL
EOSINOPHIL # BLD AUTO: 0 K/UL
EOSINOPHIL NFR BLD: 0.1 %
ERYTHROCYTE [DISTWIDTH] IN BLOOD BY AUTOMATED COUNT: 13.9 %
EST. GFR  (AFRICAN AMERICAN): >60 ML/MIN/1.73 M^2
EST. GFR  (NON AFRICAN AMERICAN): >60 ML/MIN/1.73 M^2
ETHANOL SERPL-MCNC: <10 MG/DL
FIO2: 21
GLUCOSE SERPL-MCNC: 127 MG/DL
GLUCOSE UR QL STRIP: NEGATIVE
HCO3 UR-SCNC: 17.7 MMOL/L (ref 24–28)
HCT VFR BLD AUTO: 47.9 %
HGB BLD-MCNC: 17.3 G/DL
HGB UR QL STRIP: ABNORMAL
HYALINE CASTS #/AREA URNS LPF: 2 /LPF
KETONES UR QL STRIP: ABNORMAL
LACTATE SERPL-SCNC: 0.6 MMOL/L
LACTATE SERPL-SCNC: 2 MMOL/L
LEUKOCYTE ESTERASE UR QL STRIP: ABNORMAL
LYMPHOCYTES # BLD AUTO: 1.8 K/UL
LYMPHOCYTES NFR BLD: 12.3 %
MCH RBC QN AUTO: 32.3 PG
MCHC RBC AUTO-ENTMCNC: 36.1 %
MCV RBC AUTO: 90 FL
METHADONE UR QL SCN>300 NG/ML: NEGATIVE
MICROSCOPIC COMMENT: ABNORMAL
MODE: ABNORMAL
MONOCYTES # BLD AUTO: 1.3 K/UL
MONOCYTES NFR BLD: 8.5 %
NEUTROPHILS # BLD AUTO: 11.8 K/UL
NEUTROPHILS NFR BLD: 79 %
NITRITE UR QL STRIP: POSITIVE
OPIATES UR QL SCN: NORMAL
PCO2 BLDA: 28.1 MMHG (ref 35–45)
PCP UR QL SCN>25 NG/ML: NEGATIVE
PH SMN: 7.41 [PH] (ref 7.35–7.45)
PH UR STRIP: 7 [PH] (ref 5–8)
PLATELET # BLD AUTO: 173 K/UL
PMV BLD AUTO: 10.7 FL
PO2 BLDA: 78 MMHG (ref 80–100)
POC BE: -7 MMOL/L
POC SATURATED O2: 96 % (ref 95–100)
POTASSIUM SERPL-SCNC: 4.5 MMOL/L
PROT SERPL-MCNC: 7.8 G/DL
PROT UR QL STRIP: ABNORMAL
RBC # BLD AUTO: 5.35 M/UL
RBC #/AREA URNS HPF: 5 /HPF (ref 0–4)
SALICYLATES SERPL-MCNC: <5 MG/DL
SAMPLE: ABNORMAL
SITE: ABNORMAL
SODIUM SERPL-SCNC: 130 MMOL/L
SP GR UR STRIP: 1.02 (ref 1–1.03)
T4 FREE SERPL-MCNC: 0.85 NG/DL
TOXICOLOGY INFORMATION: NORMAL
TSH SERPL DL<=0.005 MIU/L-ACNC: 0.26 UIU/ML
URN SPEC COLLECT METH UR: ABNORMAL
UROBILINOGEN UR STRIP-ACNC: 1 EU/DL
WBC # BLD AUTO: 14.93 K/UL
WBC #/AREA URNS HPF: 2 /HPF (ref 0–5)

## 2017-06-28 PROCEDURE — 84443 ASSAY THYROID STIM HORMONE: CPT

## 2017-06-28 PROCEDURE — 80053 COMPREHEN METABOLIC PANEL: CPT

## 2017-06-28 PROCEDURE — 25000003 PHARM REV CODE 250: Performed by: EMERGENCY MEDICINE

## 2017-06-28 PROCEDURE — 80320 DRUG SCREEN QUANTALCOHOLS: CPT

## 2017-06-28 PROCEDURE — 5A1935Z RESPIRATORY VENTILATION, LESS THAN 24 CONSECUTIVE HOURS: ICD-10-PCS | Performed by: EMERGENCY MEDICINE

## 2017-06-28 PROCEDURE — 96376 TX/PRO/DX INJ SAME DRUG ADON: CPT

## 2017-06-28 PROCEDURE — 0BH17EZ INSERTION OF ENDOTRACHEAL AIRWAY INTO TRACHEA, VIA NATURAL OR ARTIFICIAL OPENING: ICD-10-PCS | Performed by: EMERGENCY MEDICINE

## 2017-06-28 PROCEDURE — 85025 COMPLETE CBC W/AUTO DIFF WBC: CPT

## 2017-06-28 PROCEDURE — 96372 THER/PROPH/DIAG INJ SC/IM: CPT

## 2017-06-28 PROCEDURE — 82550 ASSAY OF CK (CPK): CPT

## 2017-06-28 PROCEDURE — 25000003 PHARM REV CODE 250: Performed by: SPECIALIST

## 2017-06-28 PROCEDURE — 83605 ASSAY OF LACTIC ACID: CPT | Mod: 91

## 2017-06-28 PROCEDURE — 87040 BLOOD CULTURE FOR BACTERIA: CPT | Mod: 59

## 2017-06-28 PROCEDURE — 85730 THROMBOPLASTIN TIME PARTIAL: CPT

## 2017-06-28 PROCEDURE — 90945 DIALYSIS ONE EVALUATION: CPT

## 2017-06-28 PROCEDURE — 81025 URINE PREGNANCY TEST: CPT

## 2017-06-28 PROCEDURE — 25000003 PHARM REV CODE 250: Performed by: INTERNAL MEDICINE

## 2017-06-28 PROCEDURE — 99233 SBSQ HOSP IP/OBS HIGH 50: CPT | Mod: 57,25,, | Performed by: INTERNAL MEDICINE

## 2017-06-28 PROCEDURE — 96366 THER/PROPH/DIAG IV INF ADDON: CPT

## 2017-06-28 PROCEDURE — 36600 WITHDRAWAL OF ARTERIAL BLOOD: CPT

## 2017-06-28 PROCEDURE — 80329 ANALGESICS NON-OPIOID 1 OR 2: CPT

## 2017-06-28 PROCEDURE — 02HV33Z INSERTION OF INFUSION DEVICE INTO SUPERIOR VENA CAVA, PERCUTANEOUS APPROACH: ICD-10-PCS | Performed by: INTERNAL MEDICINE

## 2017-06-28 PROCEDURE — 96365 THER/PROPH/DIAG IV INF INIT: CPT

## 2017-06-28 PROCEDURE — 80307 DRUG TEST PRSMV CHEM ANLYZR: CPT

## 2017-06-28 PROCEDURE — 96375 TX/PRO/DX INJ NEW DRUG ADDON: CPT

## 2017-06-28 PROCEDURE — 94002 VENT MGMT INPAT INIT DAY: CPT

## 2017-06-28 PROCEDURE — 96367 TX/PROPH/DG ADDL SEQ IV INF: CPT

## 2017-06-28 PROCEDURE — 82803 BLOOD GASES ANY COMBINATION: CPT

## 2017-06-28 PROCEDURE — 63600175 PHARM REV CODE 636 W HCPCS: Performed by: EMERGENCY MEDICINE

## 2017-06-28 PROCEDURE — 63600175 PHARM REV CODE 636 W HCPCS: Performed by: INTERNAL MEDICINE

## 2017-06-28 PROCEDURE — 99291 CRITICAL CARE FIRST HOUR: CPT | Mod: 57,25,, | Performed by: INTERNAL MEDICINE

## 2017-06-28 PROCEDURE — 84439 ASSAY OF FREE THYROXINE: CPT

## 2017-06-28 PROCEDURE — 99900035 HC TECH TIME PER 15 MIN (STAT)

## 2017-06-28 PROCEDURE — 31500 INSERT EMERGENCY AIRWAY: CPT

## 2017-06-28 PROCEDURE — 36556 INSERT NON-TUNNEL CV CATH: CPT | Mod: ,,, | Performed by: INTERNAL MEDICINE

## 2017-06-28 PROCEDURE — 93005 ELECTROCARDIOGRAM TRACING: CPT

## 2017-06-28 PROCEDURE — 63600175 PHARM REV CODE 636 W HCPCS

## 2017-06-28 PROCEDURE — 63600175 PHARM REV CODE 636 W HCPCS: Performed by: SPECIALIST

## 2017-06-28 PROCEDURE — 81000 URINALYSIS NONAUTO W/SCOPE: CPT

## 2017-06-28 PROCEDURE — 51702 INSERT TEMP BLADDER CATH: CPT

## 2017-06-28 PROCEDURE — 93010 ELECTROCARDIOGRAM REPORT: CPT | Mod: ,,, | Performed by: INTERNAL MEDICINE

## 2017-06-28 PROCEDURE — 83605 ASSAY OF LACTIC ACID: CPT

## 2017-06-28 PROCEDURE — 20000000 HC ICU ROOM

## 2017-06-28 PROCEDURE — 99291 CRITICAL CARE FIRST HOUR: CPT | Mod: 25

## 2017-06-28 RX ORDER — PANTOPRAZOLE SODIUM 40 MG/10ML
40 INJECTION, POWDER, LYOPHILIZED, FOR SOLUTION INTRAVENOUS DAILY
Status: DISCONTINUED | OUTPATIENT
Start: 2017-06-29 | End: 2017-06-29

## 2017-06-28 RX ORDER — SODIUM CHLORIDE 9 MG/ML
INJECTION, SOLUTION INTRAVENOUS CONTINUOUS
Status: DISCONTINUED | OUTPATIENT
Start: 2017-06-28 | End: 2017-06-28

## 2017-06-28 RX ORDER — SUCCINYLCHOLINE CHLORIDE 100 MG/5ML
1.5 SYRINGE (ML) INTRAVENOUS
Status: DISCONTINUED | OUTPATIENT
Start: 2017-06-28 | End: 2017-06-28

## 2017-06-28 RX ORDER — HEPARIN SODIUM 1000 [USP'U]/ML
4000 INJECTION, SOLUTION INTRAVENOUS; SUBCUTANEOUS ONCE
Status: COMPLETED | OUTPATIENT
Start: 2017-06-28 | End: 2017-06-28

## 2017-06-28 RX ORDER — DIPHENHYDRAMINE HYDROCHLORIDE 50 MG/ML
25 INJECTION INTRAMUSCULAR; INTRAVENOUS
Status: COMPLETED | OUTPATIENT
Start: 2017-06-28 | End: 2017-06-28

## 2017-06-28 RX ORDER — LORAZEPAM 2 MG/ML
2 INJECTION INTRAMUSCULAR
Status: COMPLETED | OUTPATIENT
Start: 2017-06-28 | End: 2017-06-28

## 2017-06-28 RX ORDER — MIDAZOLAM HYDROCHLORIDE 1 MG/ML
INJECTION INTRAMUSCULAR; INTRAVENOUS
Status: DISCONTINUED
Start: 2017-06-28 | End: 2017-06-28 | Stop reason: WASHOUT

## 2017-06-28 RX ORDER — ETOMIDATE 2 MG/ML
0.3 INJECTION INTRAVENOUS
Status: COMPLETED | OUTPATIENT
Start: 2017-06-28 | End: 2017-06-28

## 2017-06-28 RX ORDER — HEPARIN SODIUM 10000 [USP'U]/100ML
1000 INJECTION, SOLUTION INTRAVENOUS CONTINUOUS
Status: DISCONTINUED | OUTPATIENT
Start: 2017-06-28 | End: 2017-06-29

## 2017-06-28 RX ORDER — MIDAZOLAM HYDROCHLORIDE 1 MG/ML
4 INJECTION INTRAMUSCULAR; INTRAVENOUS ONCE
Status: DISCONTINUED | OUTPATIENT
Start: 2017-06-28 | End: 2017-06-28

## 2017-06-28 RX ORDER — PROPOFOL 10 MG/ML
INJECTION, EMULSION INTRAVENOUS
Status: COMPLETED
Start: 2017-06-28 | End: 2017-06-28

## 2017-06-28 RX ORDER — BENZTROPINE MESYLATE 1 MG/ML
1 INJECTION, SOLUTION INTRAMUSCULAR; INTRAVENOUS
Status: COMPLETED | OUTPATIENT
Start: 2017-06-28 | End: 2017-06-28

## 2017-06-28 RX ORDER — HEPARIN SODIUM 1000 [USP'U]/ML
1000 INJECTION, SOLUTION INTRAVENOUS; SUBCUTANEOUS CONTINUOUS
Status: DISCONTINUED | OUTPATIENT
Start: 2017-06-28 | End: 2017-06-28

## 2017-06-28 RX ORDER — FAMOTIDINE 10 MG/ML
20 INJECTION INTRAVENOUS 2 TIMES DAILY
Status: CANCELLED | OUTPATIENT
Start: 2017-06-28

## 2017-06-28 RX ORDER — PROPOFOL 10 MG/ML
5 VIAL (ML) INTRAVENOUS CONTINUOUS
Status: DISCONTINUED | OUTPATIENT
Start: 2017-06-28 | End: 2017-06-29

## 2017-06-28 RX ORDER — SODIUM CHLORIDE 9 MG/ML
1000 INJECTION, SOLUTION INTRAVENOUS
Status: COMPLETED | OUTPATIENT
Start: 2017-06-28 | End: 2017-06-28

## 2017-06-28 RX ORDER — CHLORHEXIDINE GLUCONATE ORAL RINSE 1.2 MG/ML
15 SOLUTION DENTAL 2 TIMES DAILY
Status: DISCONTINUED | OUTPATIENT
Start: 2017-06-28 | End: 2017-06-29

## 2017-06-28 RX ORDER — IPRATROPIUM BROMIDE AND ALBUTEROL SULFATE 2.5; .5 MG/3ML; MG/3ML
3 SOLUTION RESPIRATORY (INHALATION) EVERY 6 HOURS
Status: DISCONTINUED | OUTPATIENT
Start: 2017-06-28 | End: 2017-06-29

## 2017-06-28 RX ADMIN — HEPARIN SODIUM AND DEXTROSE 1000 UNITS/HR: 10000; 5 INJECTION INTRAVENOUS at 10:06

## 2017-06-28 RX ADMIN — CEFTRIAXONE 1 G: 1 INJECTION, SOLUTION INTRAVENOUS at 04:06

## 2017-06-28 RX ADMIN — PROPOFOL 5 MCG/KG/MIN: 10 INJECTION, EMULSION INTRAVENOUS at 05:06

## 2017-06-28 RX ADMIN — ETOMIDATE 19.8 MG: 20 INJECTION, SOLUTION INTRAVENOUS at 05:06

## 2017-06-28 RX ADMIN — HEPARIN SODIUM 4000 UNITS: 1000 INJECTION, SOLUTION INTRAVENOUS; SUBCUTANEOUS at 10:06

## 2017-06-28 RX ADMIN — DIPHENHYDRAMINE HYDROCHLORIDE 25 MG: 50 INJECTION, SOLUTION INTRAMUSCULAR; INTRAVENOUS at 11:06

## 2017-06-28 RX ADMIN — DIPHENHYDRAMINE HYDROCHLORIDE 25 MG: 50 INJECTION, SOLUTION INTRAMUSCULAR; INTRAVENOUS at 01:06

## 2017-06-28 RX ADMIN — PROPOFOL 10 MCG/KG/MIN: 10 INJECTION, EMULSION INTRAVENOUS at 05:06

## 2017-06-28 RX ADMIN — SODIUM CHLORIDE 1000 ML: 0.9 INJECTION, SOLUTION INTRAVENOUS at 07:06

## 2017-06-28 RX ADMIN — PROPOFOL 30 MCG/KG/MIN: 10 INJECTION, EMULSION INTRAVENOUS at 09:06

## 2017-06-28 RX ADMIN — SODIUM CHLORIDE 1000 ML: 0.9 INJECTION, SOLUTION INTRAVENOUS at 05:06

## 2017-06-28 RX ADMIN — SODIUM CHLORIDE 1000 ML: 0.9 INJECTION, SOLUTION INTRAVENOUS at 03:06

## 2017-06-28 RX ADMIN — VANCOMYCIN HYDROCHLORIDE 1250 MG: 100 INJECTION, POWDER, LYOPHILIZED, FOR SOLUTION INTRAVENOUS at 09:06

## 2017-06-28 RX ADMIN — SODIUM BICARBONATE: 84 INJECTION, SOLUTION INTRAVENOUS at 09:06

## 2017-06-28 RX ADMIN — LORAZEPAM 2 MG: 2 INJECTION INTRAMUSCULAR; INTRAVENOUS at 04:06

## 2017-06-28 RX ADMIN — PIPERACILLIN AND TAZOBACTAM 4.5 G: 4; .5 INJECTION, POWDER, FOR SOLUTION INTRAVENOUS at 09:06

## 2017-06-28 RX ADMIN — CHLORHEXIDINE GLUCONATE 15 ML: 1.2 RINSE ORAL at 09:06

## 2017-06-28 RX ADMIN — BENZTROPINE MESYLATE 1 MG: 1 INJECTION INTRAMUSCULAR; INTRAVENOUS at 11:06

## 2017-06-28 NOTE — ED NOTES
Spoke with Dr. Cool (psych) states she will assess patient at another time and pt should be admitted to ICU

## 2017-06-28 NOTE — CONSULTS
Tele-Consultation to Emergency Department from Psychiatry    Unable to assess and do tele consult with pt due to critical nature of pts presentation.     Suspecting Neuroleptic Malignant Syndrome due to possible Seroquel, geodon ingestion and following symptoms noted by ED and Neuro teams  · Autonomic instability  · Hyperthermia  · Elevated CPK   · Diaphoresis  · AMS    Unlikely Serotonin syndrome as pt not having hyperreflexia or clonus per Neurologists physical examination.    Dispo- Once medically cleared; Seek Involuntary Inpt psychiatric admission for stabilization of acute psychiatric symptoms.  Continue PEC and Hold all psych medications until then. May use Benzos for any severe none redirectable agitation.     Please re-consult for any further recs.    ARLENE PAREDES MD   [facility]  [facility]  6/28/2017 5:19 PM

## 2017-06-28 NOTE — ED TRIAGE NOTES
"Pt found by family with AMS and recently filled RX bottles empty.  Family reports "lots of legal problems"  "

## 2017-06-28 NOTE — ED NOTES
Spoke with Vika at poison control.  Recommendations given to Dr. Espinoza.  Will continue to monitor QRS.  Will continue to monitor for S&S of serotonin syndrome.

## 2017-06-28 NOTE — CONSULTS
[facility] EMERGENCY ROOM    NEUROLOGY CONSULT NOTE    Kaylene Emery   46 y.o. female  Consult Requested By: Erika Alexandre MD  Reason for Consult:   Overdose  DATE 6/28/2017      History of Present Illness:  Most of the history obtained from the patient's bf who is at the bedside.    In brief, this is a 46 you RHWF with history of depression, anxiety, substance abuse, chronic pain and is on multiple CNS modulating drugs.   Per patient's boy friend, Adrien. Patient has been feeling nauseated, abdominal pain since yesterday. On Sunday she went to [facility] and had a Lumbar CT done which indicated DDD. Adrien noticed this morning she was not doing well and unable to move her extremities which was around 7:30 AM.   She fell while she made an attempt to ambulate. She comes to the ED with decreased muscle tone thought out and agitated, akathisia,   Diaphoresis.  No bladder or bowel incontinent, she has rhabdomyolysis.   She had a recent prescription refill for  Methocarbamol, Mobic, lorazepam, flexeril, phenergan, Seroquel, additionally she is on Celexa. Her boy friend is on Geodon, Klonopin, Wellbutrin and Effexor.  Most of her recent filled prescription bottles were found empty at the bedside when her bf found her.  She has had similar episodes in the past.   Yesterday she was summoned to appear in the court for her DUI that occurred 4 months ago. She was extremely anxious and pacing all day per her bf.       Patient follows commands. Able to carry on a conversation. Denies chest pain, no diplopia, no blurry vision, no dysphagia, no dysarthria, no aphasia, no bladder or bowel incontinent.         Past Medical History:   Diagnosis Date    Anxiety     Depression     GERD (gastroesophageal reflux disease)     Tobacco use      Past Surgical History:   Procedure Laterality Date    BLADDER REPAIR      CHOLECYSTECTOMY  04/07/2017    KNEE ARTHROPLASTY      PARTIAL HYSTERECTOMY      TUBAL LIGATION    "    Family History   Problem Relation Age of Onset    Hypertension Mother     Diabetes Mother      Social History   Substance Use Topics    Smoking status: Current Every Day Smoker     Packs/day: 1.00     Types: Cigarettes    Smokeless tobacco: Never Used    Alcohol use Yes      Comment: occasionally       Review of patient's allergies indicates:   Allergen Reactions    Corticosteroids (glucocorticoids)      "sets off my anxiety real bad"    Tramadol Rash        Scheduled Meds:   sodium chloride 0.9%  1,000 mL Intravenous ED 1 Time    cefTRIAXone (ROCEPHIN) IVPB  1 g Intravenous ED 1 Time    lorazepam  2 mg Intravenous ED 1 Time    sodium chloride 0.9%  1,000 mL Intravenous ED 1 Time    sodium chloride 0.9%  1,000 mL Intravenous ED 1 Time    sodium chloride 0.9%  1,000 mL Intravenous ED 1 Time     Continuous Infusions:   PRN Meds:    Review of Systems:  Patient  OBJECTIVE:     Vital Signs (Most Recent)  Temp: (!) 100.6 °F (38.1 °C) (06/28/17 1518)  Pulse: (!) 124 (06/28/17 1135)  Resp: (!) 24 (06/28/17 1135)  BP: (!) 121/93 (06/28/17 1551)  SpO2: 97 % (06/28/17 1551)     Vital Signs Range (Last 24H):  Temp:  [100.6 °F (38.1 °C)]   Pulse:  [124]   Resp:  [24]   BP: ()/(70-95)   SpO2:  [97 %-98 %]     Physical Exam:  General:  HEENT:   Acephalic, Atraumatic,  EOMI, PERRLA, no nystagmus, no ptosis, no lid lag, no neglect, no gaze palsy      Neck: supple, no JVD, no Carotid bruit, no torticollis,   Lungs: CTA,  No rhonchi, no rales  Heart: Regular Rate and rhythm, no murmurs, rubs and or gallops  Abdomen, Soft, non tender, non distended, no abdominal  bruit, bowel sounds present  Extremities: No edema,   Musculoskeletal:  Decreased range of motion  Skin: No rash, no ecchymoses, multiple areas of lacerations from the fall        NEURO    Mental Status:   Hypersomnolence, can be aroused  : following commands  Memory, Recent and Remote: Non reliable but participates in the exam.   Mood:Anxious, " agitated  Affect: flat  Behavior: No disinhibition noticed  Attention and Concentration: Compromised  Insight and thought processes, Higher Executive functions,Visual spatial: all compromised and or non reliable  Hallucinations, Delusions and suicidal ideation: Noticed having visual hallucinations      SPEECH:   No aphasia, no Dysarthria,     CRANIAL NERVES:      II: visual acuity  Intact   II: visual fields Full to confrontation   II: pupils Equal, round, reactive to light   III,VII: ptosis None   III,IV,VI: extraocular muscles  Full ROM   V: mastication Normal   V: facial light touch sensation  Normal   V,VII: corneal reflex  Present   VII: facial muscle function - upper  Normal   VII: facial muscle function - lower Normal   VIII: hearing intact   IX: soft palate elevation  Normal   IX,X: gag reflex Present   XI: trapezius strength  deferred   XI: sternocleidomastoid strength deferred   XI: neck flexion strength  Intact   XII: tongue strength  deferred         MOTOR:Upper Extremities : Able to move both the UE antigravity  Significant tremulousness, no pronation no drift.  No muscle fasciculations        Lower Extremities: Flaccid paralysis, Right >left.   Reflexes: 0  Withdraws to pain            MUSCLE MASS:  REFLEXES: Deep tendon reflexes tested:  Upper extremities:               biceps (C5, C6):1               brachioradialis (C5, C6):1               triceps (C6, C7),0     Lower extremities:                knee or patellar (L2, 3, 4):0               ankle (S1, S2):0      Plantar responses:  mute  Clonus: negative   Muscle Fasciculations: negative    SENSORY  PIN PRICK and TEMP:  Intact  Soft TOUCH: intact  VIBRATION: Intact  GRAPHESTHESIA:  Deferred          CEREBELLAR: deferred        ROMBERG and  GAIT: Deferred    EXTRAPYRAMIDALS:Tremulousness              Laboratory:  Lab Results   Component Value Date    WBC 14.93 (H) 06/28/2017    HGB 17.3 (H) 06/28/2017    HCT 47.9 06/28/2017     06/28/2017    ALT  178 (H) 06/28/2017     (H) 06/28/2017     (L) 06/28/2017    K 4.5 06/28/2017    CL 97 06/28/2017    CREATININE 1.0 06/28/2017    BUN 13 06/28/2017    CO2 17 (L) 06/28/2017    TSH 0.255 (L) 06/28/2017    INR 0.9 05/03/2017        Recent Labs  Lab 06/28/17  1514   COLORU Brown*   SPECGRAV 1.025   PHUR 7.0   PROTEINUA 3+*   BACTERIA Few*     Results for orders placed or performed during the hospital encounter of 06/28/17   CBC auto differential   Result Value Ref Range    WBC 14.93 (H) 3.90 - 12.70 K/uL    RBC 5.35 4.00 - 5.40 M/uL    Hemoglobin 17.3 (H) 12.0 - 16.0 g/dL    Hematocrit 47.9 37.0 - 48.5 %    MCV 90 82 - 98 fL    MCH 32.3 (H) 27.0 - 31.0 pg    MCHC 36.1 (H) 32.0 - 36.0 %    RDW 13.9 11.5 - 14.5 %    Platelets 173 150 - 350 K/uL    MPV 10.7 9.2 - 12.9 fL    Gran # 11.8 (H) 1.8 - 7.7 K/uL    Lymph # 1.8 1.0 - 4.8 K/uL    Mono # 1.3 (H) 0.3 - 1.0 K/uL    Eos # 0.0 0.0 - 0.5 K/uL    Baso # 0.02 0.00 - 0.20 K/uL    Gran% 79.0 (H) 38.0 - 73.0 %    Lymph% 12.3 (L) 18.0 - 48.0 %    Mono% 8.5 4.0 - 15.0 %    Eosinophil% 0.1 0.0 - 8.0 %    Basophil% 0.1 0.0 - 1.9 %    Differential Method Automated    Comprehensive metabolic panel   Result Value Ref Range    Sodium 130 (L) 136 - 145 mmol/L    Potassium 4.5 3.5 - 5.1 mmol/L    Chloride 97 95 - 110 mmol/L    CO2 17 (L) 23 - 29 mmol/L    Glucose 127 (H) 70 - 110 mg/dL    BUN, Bld 13 6 - 20 mg/dL    Creatinine 1.0 0.5 - 1.4 mg/dL    Calcium 8.5 (L) 8.7 - 10.5 mg/dL    Total Protein 7.8 6.0 - 8.4 g/dL    Albumin 3.5 3.5 - 5.2 g/dL    Total Bilirubin 0.5 0.1 - 1.0 mg/dL    Alkaline Phosphatase 92 55 - 135 U/L     (H) 10 - 40 U/L     (H) 10 - 44 U/L    Anion Gap 16 8 - 16 mmol/L    eGFR if African American >60 >60 mL/min/1.73 m^2    eGFR if non African American >60 >60 mL/min/1.73 m^2   TSH   Result Value Ref Range    TSH 0.255 (L) 0.400 - 4.000 uIU/mL   Urinalysis - clean catch   Result Value Ref Range    Specimen UA Urine, Catheterized      Color, UA Brown (A) Yellow, Straw, Lisa    Appearance, UA Cloudy (A) Clear    pH, UA 7.0 5.0 - 8.0    Specific Gravity, UA 1.025 1.005 - 1.030    Protein, UA 3+ (A) Negative    Glucose, UA Negative Negative    Ketones, UA 1+ (A) Negative    Bilirubin (UA) 1+ (A) Negative    Occult Blood UA 3+ (A) Negative    Nitrite, UA Positive (A) Negative    Urobilinogen, UA 1.0 <2.0 EU/dL    Leukocytes, UA Trace (A) Negative   Drug screen panel, emergency   Result Value Ref Range    Benzodiazepines Presumptive Positive     Methadone metabolites Negative     Cocaine (Metab.) Negative     Opiate Scrn, Ur Presumptive Positive     Barbiturate Screen, Ur Negative     Amphetamine Screen, Ur Negative     THC Negative     Phencyclidine Negative     Creatinine, Random Ur 94.4 15.0 - 325.0 mg/dL    Toxicology Information SEE COMMENT    Ethanol   Result Value Ref Range    Alcohol, Medical, Serum <10 <10 mg/dL   Acetaminophen level   Result Value Ref Range    Acetaminophen (Tylenol), Serum <3.0 (L) 10.0 - 20.0 ug/mL   Salicylate level   Result Value Ref Range    Salicylate Lvl <5.0 (L) 15.0 - 30.0 mg/dL   T4, free   Result Value Ref Range    Free T4 0.85 0.71 - 1.51 ng/dL   Pregnancy, urine rapid   Result Value Ref Range    Preg Test, Ur Negative    CK   Result Value Ref Range    CPK >91787 (H) 20 - 180 U/L   Urinalysis Microscopic   Result Value Ref Range    RBC, UA 5 (H) 0 - 4 /hpf    WBC, UA 2 0 - 5 /hpf    Bacteria, UA Few (A) None-Occ /hpf    Hyaline Casts, UA 2 (A) 0-1/lpf /lpf    Microscopic Comment SEE COMMENT    ISTAT PROCEDURE   Result Value Ref Range    POC PH 7.407 7.35 - 7.45    POC PCO2 28.1 (LL) 35 - 45 mmHg    POC PO2 78 (L) 80 - 100 mmHg    POC HCO3 17.7 (L) 24 - 28 mmol/L    POC BE -7 -2 to 2 mmol/L    POC SATURATED O2 96 95 - 100 %    Sample ARTERIAL     Site RR     Allens Test Pass     DelSys Room Air     Mode SPONT     FiO2 21          Diagnostic Results  Imaging Results          CT Head Without Contrast (In process)                  06/28/2017    Assessment and Recommendations:  Patient with recurrent over dose on CNS modulating drugs. Not sure to which she overdosed on?  She has no diarrhea, no no rigidity, no vomiting, no clonus, no occular clonus, hyporeflexia, no catatonia,   She has myoglobinuira with elevated CPK. , she has urine positive for opiates and benzos  Muscle relaxant can produce anticholinergic and interaction with other drugs can produce antimuscarinic.     History is unreliable. She does not have features of Serotonin syndrome, doesn't mean she may not be heading towards one.  Some of the medications can have non depolarization effect. The question is which one did she overdose because all the bottles were empty, and the patient as well and his boy friends history is non reliable. She is a high risk for seizures and respiratory arrest.    1) Metabolic Encephalopathy  2) Toxic encephalopathy  3) Drug Drug interaction  4) Drug overdose  5) Drug withdrawal  6) Serotonin syndrome/Serotonin toxicity        She is a high risk for respiratory compromise and or seizures. If remains agitated, please intubate and sedated, or else CPK can get worse. Also treat if she spikes a fever.      Recommendations:  1) hydration  2) Do not discontinue benzos abruptly, high risk for withdrawals.  3) Medical Management  4) Neuro Checks q1  5) Non contrast head CT Stat    6) Drug screening.   7) Toxicology consult  8) patient will benefit from Intubation.         Thank you for the consult          Edwin Ramires MD., Ph.D., MS

## 2017-06-28 NOTE — ED PROVIDER NOTES
"SCRIBE #1 NOTE: I, Tyra Campuzano, am scribing for, and in the presence of, Erika Alexandre MD. I have scribed the HPI, ROS, and PEx.     SCRIBE #2 NOTE: I, Evaristo Garzon, am scribing for, and in the presence of,  Amirah Giles DO. I have scribed the remaining portions of the note not scribed by Scribe #1.     History      Chief Complaint   Patient presents with    Drug Overdose     pt had 30 pills of  methocarbamol 500 mg and 30 pills of mloxicam 7.5 mg filled on 6/25, 30 tabs of 1 mg lorazepam on 6/26, 30 pills of 10 mg flexeril on 5/12, 30 pills of phenergan 25 mg filled on 5/10, 600 mg of ibuprofen 30 pills fill on 5/31.  pt found altered PTA and all bottles emtpy       Review of patient's allergies indicates:   Allergen Reactions    Corticosteroids (glucocorticoids)      "sets off my anxiety real bad"    Tramadol Rash        HPI   HPI    6/28/2017, 11:28 AM   History obtained from the patient      History of Present Illness: Kaylene Emery is a 46 y.o. female patient who presents to the Emergency Department for a drug overdose which onset suddenly PTA. Pt's  found her unresponsive with an empty bottle of Ativan. The Ativan Rx was written for 30 pills and was filled 2 days ago. Pt was also found with other empty bottles of medication on her person including Seroquil. Pt was awake and alert upon arrival. Symptoms are moderate in severity. No mitigating or exacerbating factors reported. No other associated sxs reported. Pt denies any SI, HI, increased stress, auditory/visual hallucinations, sleep changes, IV drug use, ETOH abuse, and all other sxs at this time. No further complaints or concerns at this time.     Arrival mode: EMS      PCP: Jonas Jimenez MD        Past Medical History:  Past Medical History:   Diagnosis Date    Anxiety     Depression     GERD (gastroesophageal reflux disease)     Tobacco use        Past Surgical History:  Past Surgical History:   Procedure Laterality Date "    BLADDER REPAIR      CENTRAL LINE  6/28/2017         CHOLECYSTECTOMY  04/07/2017    KNEE ARTHROPLASTY      PARTIAL HYSTERECTOMY      TUBAL LIGATION           Family History:  Family History   Problem Relation Age of Onset    Hypertension Mother     Diabetes Mother        Social History:  Social History     Social History Main Topics    Smoking status: Current Every Day Smoker     Packs/day: 1.00     Types: Cigarettes    Smokeless tobacco: Never Used    Alcohol use Yes      Comment: occasionally    Drug use: No    Sexual activity: Yes       ROS   Review of Systems   Constitutional: Negative for fever.        (+) AMS   HENT: Negative for sore throat.    Respiratory: Negative for shortness of breath.    Cardiovascular: Negative for chest pain.   Gastrointestinal: Negative for nausea.   Genitourinary: Negative for dysuria.   Musculoskeletal: Negative for back pain.   Skin: Negative for rash.   Neurological: Negative for weakness.   Hematological: Does not bruise/bleed easily.   Psychiatric/Behavioral: Negative for hallucinations, sleep disturbance and suicidal ideas.        (-) HI   All other systems reviewed and are negative.    Physical Exam      Initial Vitals   BP Pulse Resp Temp SpO2   -- -- -- -- --      MAP       --          Physical Exam  Nursing Notes and Vital Signs Reviewed.  Constitutional: Patient is in no acute distress. Well-developed and well-nourished.  Head: Atraumatic. Normocephalic.  Eyes: PERRL. EOM intact. Conjunctivae are not pale. No scleral icterus.  ENT: Mucous membranes are moist. Oropharynx is clear and symmetric.    Neck: Supple. Full ROM. No lymphadenopathy.  Cardiovascular: Tachycardic. Regular rhythm. No murmurs, rubs, or gallops. Distal pulses are 2+ and symmetric.  Pulmonary/Chest: No respiratory distress. Clear to auscultation bilaterally. No wheezing, rales, or rhonchi.  Abdominal: Soft and non-distended.  There is no tenderness.  No rebound, guarding, or rigidity.    Musculoskeletal: Moves all extremities. No obvious deformities. No edema. No calf tenderness.  Skin: Warm and dry.   Neurological:  Alert, awake, and appropriate.  Normal speech.  No acute focal neurological deficits are appreciated.  Psychiatric:               Behavior: normal              Mood and Affect: agitation              Thought Process: within normal limits              Suicidal Ideations: Pt denies suicidal ideations              Suicidal Plan: Pt took an entire bottle of Ativan (30 pills) even though she denies SI              Homicidal Ideations: No              Hallucinations: none    ED Course    Intubation  Date/Time: 6/28/2017 5:07 PM  Performed by: MARY ANNE MCCLELLAN  Authorized by: MARY ANNE MCCLELLAN   Indications: Pt is in rhabdomyolysis needs a CT to be done for admission. Pt is still unable to stop moving to have procedure done after 4 of ativan. Consulted ICU NP who advised intubation.  Intubation method: oral  Patient status: sedated  Preoxygenation: bag valve mask  Sedatives: etomidate (20)  Paralytic: none  Laryngoscope size: Mac 4  Tube size: 7.5 mm  Tube type: uncuffed  Number of attempts: 1  Cricoid pressure: yes  Cords visualized: yes  Post-procedure assessment: chest rise,  ETCO2 monitor,  CO2 detector and esophageal detector  Breath sounds: clear and equal  Cuff inflated: yes  ETT to lip: 22 cm  Tube secured with: adhesive tape  Chest x-ray interpreted by me and radiologist.  Chest x-ray findings: endotracheal tube in appropriate position  Patient tolerance: Patient tolerated the procedure well with no immediate complications  Complications: No  Comments:         Critical Care  Date/Time: 6/28/2017 4:00 PM  Performed by: MARY ANEN MCCLELLAN  Authorized by: MARY ANNE MCCLELLAN   Direct patient critical care time: 15 minutes  Additional history critical care time: 10 minutes  Ordering / reviewing critical care time: 10 minutes  Documentation critical care time: 10 minutes  Consulting  other physicians critical care time: 10 minutes  Consult with family critical care time: 5 minutes  Total critical care time (exclusive of procedural time) : 60 minutes  Critical care time was exclusive of separately billable procedures and treating other patients.  Critical care was necessary to treat or prevent imminent or life-threatening deterioration of the following conditions: metabolic crisis, renal failure and toxidrome.  Critical care was time spent personally by me on the following activities: blood draw for specimens, development of treatment plan with patient or surrogate, discussions with consultants, gastric intubation, interpretation of cardiac output measurements, evaluation of patient's response to treatment, examination of patient, obtaining history from patient or surrogate, ordering and performing treatments and interventions, ordering and review of laboratory studies, ordering and review of radiographic studies, pulse oximetry, re-evaluation of patient's condition and ventilator management.  Subsequent provider of critical care: I assumed direction of critical care for this patient from another provider of my specialty.        ED Vital Signs:  Vitals:    06/28/17 2158 06/28/17 2205 06/28/17 2215 06/28/17 2230   BP: 111/78 128/74 (!) 129/94 (!) 135/91   Pulse: 76 81 77 81   Resp: 17 20 17 18   Temp:  97 °F (36.1 °C)     TempSrc:  Core Bladder     SpO2: 100% 99% 100% 100%   Weight:        06/28/17 2245 06/28/17 2300 06/28/17 2305 06/29/17 0000   BP: 123/86 133/86 133/86    Pulse: 80 79 79 79   Resp: 16 16 16 19   Temp:   96.6 °F (35.9 °C)    TempSrc:   Core Bladder    SpO2: 100% 100% 100% 100%   Weight:        06/29/17 0005 06/29/17 0035 06/29/17 0045 06/29/17 0100   BP: 138/89  126/80 118/78   Pulse: 79 79 77 74   Resp: 14 18 17 16   Temp: (!) 95.6 °F (35.3 °C)      TempSrc: Core Bladder      SpO2: 100% 100% 100% 100%   Weight:        06/29/17 0105 06/29/17 0115 06/29/17 0130   BP: 118/78 128/85  121/81   Pulse: 75 75 76   Resp: 16 16 16   Temp: (!) 95.8 °F (35.4 °C)     TempSrc: Core Bladder     SpO2: 100% 100% 100%   Weight:          Abnormal Lab Results:  Labs Reviewed   CBC W/ AUTO DIFFERENTIAL - Abnormal; Notable for the following:        Result Value    WBC 14.93 (*)     Hemoglobin 17.3 (*)     MCH 32.3 (*)     MCHC 36.1 (*)     Gran # 11.8 (*)     Mono # 1.3 (*)     Gran% 79.0 (*)     Lymph% 12.3 (*)     All other components within normal limits   COMPREHENSIVE METABOLIC PANEL - Abnormal; Notable for the following:     Sodium 130 (*)     CO2 17 (*)     Glucose 127 (*)     Calcium 8.5 (*)      (*)      (*)     All other components within normal limits   TSH - Abnormal; Notable for the following:     TSH 0.255 (*)     All other components within normal limits   URINALYSIS - Abnormal; Notable for the following:     Color, UA Brown (*)     Appearance, UA Cloudy (*)     Protein, UA 3+ (*)     Ketones, UA 1+ (*)     Bilirubin (UA) 1+ (*)     Occult Blood UA 3+ (*)     Nitrite, UA Positive (*)     Leukocytes, UA Trace (*)     All other components within normal limits   ACETAMINOPHEN LEVEL - Abnormal; Notable for the following:     Acetaminophen (Tylenol), Serum <3.0 (*)     All other components within normal limits   SALICYLATE LEVEL - Abnormal; Notable for the following:     Salicylate Lvl <5.0 (*)     All other components within normal limits   CK - Abnormal; Notable for the following:     CPK >29333 (*)     All other components within normal limits   URINALYSIS MICROSCOPIC - Abnormal; Notable for the following:     RBC, UA 5 (*)     Bacteria, UA Few (*)     Hyaline Casts, UA 2 (*)     All other components within normal limits   ISTAT PROCEDURE - Abnormal; Notable for the following:     POC PCO2 28.1 (*)     POC PO2 78 (*)     POC HCO3 17.7 (*)     All other components within normal limits   CULTURE, RESPIRATORY   DRUG SCREEN PANEL, URINE EMERGENCY   ALCOHOL,MEDICAL (ETHANOL)   T4, FREE    PREGNANCY TEST, URINE RAPID   CK   LACTIC ACID, PLASMA   CK   PROTIME-INR   COMPREHENSIVE METABOLIC PANEL        All Lab Results:  Results for orders placed or performed during the hospital encounter of 06/28/17   CBC auto differential   Result Value Ref Range    WBC 14.93 (H) 3.90 - 12.70 K/uL    RBC 5.35 4.00 - 5.40 M/uL    Hemoglobin 17.3 (H) 12.0 - 16.0 g/dL    Hematocrit 47.9 37.0 - 48.5 %    MCV 90 82 - 98 fL    MCH 32.3 (H) 27.0 - 31.0 pg    MCHC 36.1 (H) 32.0 - 36.0 %    RDW 13.9 11.5 - 14.5 %    Platelets 173 150 - 350 K/uL    MPV 10.7 9.2 - 12.9 fL    Gran # 11.8 (H) 1.8 - 7.7 K/uL    Lymph # 1.8 1.0 - 4.8 K/uL    Mono # 1.3 (H) 0.3 - 1.0 K/uL    Eos # 0.0 0.0 - 0.5 K/uL    Baso # 0.02 0.00 - 0.20 K/uL    Gran% 79.0 (H) 38.0 - 73.0 %    Lymph% 12.3 (L) 18.0 - 48.0 %    Mono% 8.5 4.0 - 15.0 %    Eosinophil% 0.1 0.0 - 8.0 %    Basophil% 0.1 0.0 - 1.9 %    Differential Method Automated    Comprehensive metabolic panel   Result Value Ref Range    Sodium 130 (L) 136 - 145 mmol/L    Potassium 4.5 3.5 - 5.1 mmol/L    Chloride 97 95 - 110 mmol/L    CO2 17 (L) 23 - 29 mmol/L    Glucose 127 (H) 70 - 110 mg/dL    BUN, Bld 13 6 - 20 mg/dL    Creatinine 1.0 0.5 - 1.4 mg/dL    Calcium 8.5 (L) 8.7 - 10.5 mg/dL    Total Protein 7.8 6.0 - 8.4 g/dL    Albumin 3.5 3.5 - 5.2 g/dL    Total Bilirubin 0.5 0.1 - 1.0 mg/dL    Alkaline Phosphatase 92 55 - 135 U/L     (H) 10 - 40 U/L     (H) 10 - 44 U/L    Anion Gap 16 8 - 16 mmol/L    eGFR if African American >60 >60 mL/min/1.73 m^2    eGFR if non African American >60 >60 mL/min/1.73 m^2   TSH   Result Value Ref Range    TSH 0.255 (L) 0.400 - 4.000 uIU/mL   Urinalysis - clean catch   Result Value Ref Range    Specimen UA Urine, Catheterized     Color, UA Brown (A) Yellow, Straw, Lisa    Appearance, UA Cloudy (A) Clear    pH, UA 7.0 5.0 - 8.0    Specific Gravity, UA 1.025 1.005 - 1.030    Protein, UA 3+ (A) Negative    Glucose, UA Negative Negative    Ketones, UA 1+  (A) Negative    Bilirubin (UA) 1+ (A) Negative    Occult Blood UA 3+ (A) Negative    Nitrite, UA Positive (A) Negative    Urobilinogen, UA 1.0 <2.0 EU/dL    Leukocytes, UA Trace (A) Negative   Drug screen panel, emergency   Result Value Ref Range    Benzodiazepines Presumptive Positive     Methadone metabolites Negative     Cocaine (Metab.) Negative     Opiate Scrn, Ur Presumptive Positive     Barbiturate Screen, Ur Negative     Amphetamine Screen, Ur Negative     THC Negative     Phencyclidine Negative     Creatinine, Random Ur 94.4 15.0 - 325.0 mg/dL    Toxicology Information SEE COMMENT    Ethanol   Result Value Ref Range    Alcohol, Medical, Serum <10 <10 mg/dL   Acetaminophen level   Result Value Ref Range    Acetaminophen (Tylenol), Serum <3.0 (L) 10.0 - 20.0 ug/mL   Salicylate level   Result Value Ref Range    Salicylate Lvl <5.0 (L) 15.0 - 30.0 mg/dL   T4, free   Result Value Ref Range    Free T4 0.85 0.71 - 1.51 ng/dL   Pregnancy, urine rapid   Result Value Ref Range    Preg Test, Ur Negative    CK   Result Value Ref Range    CPK >89441 (H) 20 - 180 U/L   Lactic acid, plasma   Result Value Ref Range    Lactate (Lactic Acid) 2.0 0.5 - 2.2 mmol/L   Urinalysis Microscopic   Result Value Ref Range    RBC, UA 5 (H) 0 - 4 /hpf    WBC, UA 2 0 - 5 /hpf    Bacteria, UA Few (A) None-Occ /hpf    Hyaline Casts, UA 2 (A) 0-1/lpf /lpf    Microscopic Comment SEE COMMENT    Lactic acid, plasma   Result Value Ref Range    Lactate (Lactic Acid) 0.6 0.5 - 2.2 mmol/L   APTT   Result Value Ref Range    aPTT 28.8 21.0 - 32.0 sec   CK   Result Value Ref Range    CPK >43006 (H) 20 - 180 U/L   Lactic acid, plasma   Result Value Ref Range    Lactate (Lactic Acid) 0.7 0.5 - 2.2 mmol/L   ISTAT PROCEDURE   Result Value Ref Range    POC PH 7.407 7.35 - 7.45    POC PCO2 28.1 (LL) 35 - 45 mmHg    POC PO2 78 (L) 80 - 100 mmHg    POC HCO3 17.7 (L) 24 - 28 mmol/L    POC BE -7 -2 to 2 mmol/L    POC SATURATED O2 96 95 - 100 %    Sample  ARTERIAL     Site RR     Allens Test Pass     DelSys Room Air     Mode SPONT     FiO2 21          Imaging Results:  Imaging Results          X-Ray Chest AP Portable (Final result)  Result time 06/28/17 20:17:13    Final result by Minnie Sampson III, MD (06/28/17 20:17:13)                 Impression:     See above        Electronically signed by: MINNIE SAMPSON MD  Date:     06/28/17  Time:    20:17              Narrative:    XR CHEST AP PORTABLE    Clinical history: . Z45.2, Encounter for adjustment of vascular access device.    FINDINGS: The right jugular central venous line terminates over the mid right atrium and could be retracted 5 cm.  The endotracheal tube is satisfactory in position.  Patchy bilateral lower lobe interstitial infiltrates are again noted..  No pneumothorax or other new pulmonary disease identified.                             CT Head Without Contrast (Final result)  Result time 06/28/17 18:14:35    Final result by Minnie Sampson III, MD (06/28/17 18:14:35)                 Impression:       No acute intracranial disease identified.       Electronically signed by: MINNIE SAMPSON MD  Date:     06/28/17  Time:    18:14              Narrative:    Head CT without contrast    Clinical history: Altered Mental Status    TECHNIQUE: Routine noncontrast axial head CT. All CT scans at this facility use dose modulation, iterative reconstruction, and/or weight based dosing when appropriate to reduce radiation dose to as low as reasonably achievable.    Comparison: none    FINDINGS: There is no evidence of intracranial hemorrhage, mass-effect, hydrocephalus or other acute disease. There is no CT evidence of acute ischemia or cerebral edema.  There is mild opacification of the right mastoid air cells.  The visualized paranasal sinuses and left mastoid air cells are clear. No calvarial fracture is identified.                             CT Lumbar Spine Without Contrast (Final result)  Result time 06/28/17  18:20:35    Final result by Minnie Sampson III, MD (06/28/17 18:20:35)                 Impression:       1.  Asymmetric thickening of the right piriformis muscle with surrounding stranding possibly related to muscle strain or inflammatory in etiology.  Findings could cause right sciatica.    2.  No acute osseous abnormality identified.  Mild L3-4 through L5-S1 disc bulging and facet arthropathy causing foraminal stenosis.        Electronically signed by: MINNIE SAMPSON MD  Date:     06/28/17  Time:    18:20              Narrative:    CT LUMBAR SPINE WITHOUT CONTRAST    Clinical history: Weakness (780.79).    Technique:  Axial noncontrast CT scan of the lumbar spine with sagittal and coronal reconstructions. All CT scans at this facility use dose modulation, iterative reconstruction, and/or weight based dosing when appropriate to reduce radiation dose to as low as reasonably achievable.    Comparison: None    Findings:   No fracture or other acute injury is seen in the lumbar spine. Lumbar spine alignment is anatomic. No suspicious lytic or blastic bone marrow lesions identified.  There is asymmetric thickening of the right piriformis muscle with surrounding stranding.  No other acute paravertebral soft tissue abnormality identified. The intervertebral disc heights appear relatively well preserved.    T12-L1: Unremarkable    L1-2: Unremarkable    L2-3: Unremarkable    L3-4: Small annular disc bulge and mild facet arthropathy causing mild left foraminal stenosis.    L4-5: Small annular disc bulge and facet arthropathy causing mild right and moderate left foraminal stenosis.    L5-S1:  Tiny broad-based posterior disc bulge at moderate hypertrophic facet arthropathy causing mild bilateral foraminal stenosis.                             X-Ray Chest AP Portable (Final result)  Result time 06/28/17 18:12:06    Final result by Minnie Sampson III, MD (06/28/17 18:12:06)                 Impression:     Well positioned  "endotracheal tube.  Possible patchy lower lobe interstitial infiltrates.      Electronically signed by: MINNIE SAMPSON MD  Date:     06/28/17  Time:    18:12              Narrative:    XR CHEST AP PORTABLE    Clinical history: Endotracheal tube placement. Z46.82 Encounter for fitting and adjustment of non-vascular catheter.    Findings: The endotracheal tube is well positioned terminating approximately 2.5 cm above the kaylie. There are possible patchy bilateral lower lobe interstitial infiltrates. No pneumothorax or other acute pulmonary disease identified.                             The EKG was ordered, reviewed, and independently interpreted by the ED provider.  Interpretation time: 11:35  Rate: 121 BPM  Rhythm: sinus tachycardia  Interpretation: Nonspecific T wave abnormality. Right atrial enlargement. No STEMI.         The Emergency Provider reviewed the vital signs and test results, which are outlined above.    ED Discussion     11:28 AM: The PEC hold has been issued by Dr. Alexandre at this time for a suicide attempt.    3:21 PM: Dr. Alexandre discussed the pt's case the Hospitalist who recommends ordering an L-spine CT.    4:08 PM: Dr. Alexandre transfers care of pt to Dr. Giles, pending CT results.    4:39 PM: I re-evaluated pt after ativan was administered, as advised by poison control to keep pt still, pt was able to move her left lower extremity and there were no marks or bruises noted to pt's back. During physically exam pt was pulling at her monitor.     4:42 PM: Palmer was placed with dark "coca-cola" colored urine noted. Pt was able to move her left lower extremity.     4:55 PM: Pt is being given 2 more of ativan making an overall total of 4 given since coming to the ED.     5:04 PM: Spoke with ICU NP (Vivi) concerning pt in bed 11 due to pt still fidgeting after 4 of ativan. Intubation of pt was advised by NP. Spoke with pt's son who agrees with plan of care.     5:25 PM: Following intubation lower extremities " were evaluated and no firm compartments were appreciated, but abrasions to bilateral lower extremities were noted.    5:26 PM: Dr. Giles discussed the pt's case with Dr. Sarah (Nephrology) who states he will come evaluate pt in ED.  Likely will start hemofiltration.    5:34 PM: Urine is 2400 in and almost 1000 ml out.    6:31 PM: Discussed plan to place a dialysis catheter with Dr. Sarah (Nephrology in ED.       6:34 PM: Discussed plan to place a dialysis catheter with Dr. Sarah in ED. Dr. Sarah states he will be placing the catheter himself and a cental line will no longer be necessary.     6:37 PM: Dr. Giles discussed the pt's case with YAQUELIN Mascorro (Hospital Medicine) who states she will come evaluate pt in ED.    6:57 PM: Discussed case with YAQUELIN Mascorro ([facility]). Krystin agrees with current care and management of pt and accepts admission.   Admitting Service: Hospital medicine   Admitting Physician: Dr. Gupta  Admit to: ICU    6:58 PM: Re-evaluated pt. I have discussed test results, shared treatment plan, and the need for admission with patient and family at bedside. Pt and family express understanding at this time and agree with all information. All questions answered. Pt and family have no further questions or concerns at this time. Pt is ready for admit.    20:00 Discussed with Dr. Sarah fluids recommendations.  He recommends:  0.45 NS plus 50 meq of NaHCO3 at 200 ml/hour.    ED Medication(s):  Medications   propofol (DIPRIVAN) 10 mg/mL infusion (50 mcg/kg/min × 65.8 kg Intravenous Verify Only 6/29/17 0200)   chlorhexidine 0.12 % solution 15 mL (15 mLs Mouth/Throat Given 6/28/17 2110)   piperacillin-tazobactam 4.5 g in dextrose 5 % 100 mL IVPB (ready to mix system) (4.5 g Intravenous New Bag 6/28/17 2117)   pantoprazole injection 40 mg (not administered)   albuterol-ipratropium 2.5mg-0.5mg/3mL nebulizer solution 3 mL (not administered)   sodium chloride 0.45% 1,000 mL with sodium bicarbonate 1 mEq/mL  (8.4 %) 100 mEq infusion ( Intravenous New Bag 6/29/17 0225)   heparin 25,000 units in dextrose 5% 250 mL (100 units/mL) infusion (heparin infusion) (1,000 Units/hr Intravenous Verify Only 6/29/17 0100)   diphenhydrAMINE injection 25 mg (25 mg Intravenous Given 6/28/17 1142)   benztropine mesylate injection 1 mg (1 mg Intramuscular Given 6/28/17 1153)   diphenhydrAMINE injection 25 mg (25 mg Intravenous Given 6/28/17 1300)   sodium chloride 0.9% bolus 1,000 mL (0 mLs Intravenous Stopped 6/28/17 1620)   sodium chloride 0.9% bolus 1,000 mL (0 mLs Intravenous Stopped 6/28/17 1600)   0.9%  NaCl infusion (0 mLs Intravenous Stopped 6/28/17 1733)   sodium chloride 0.9% bolus 1,000 mL (0 mLs Intravenous Stopped 6/28/17 1745)   cefTRIAXone (ROCEPHIN) 1 g in dextrose 5 % 50 mL IVPB (0 g Intravenous Stopped 6/28/17 1717)   lorazepam injection 2 mg (2 mg Intravenous Given 6/28/17 1620)   lorazepam injection 2 mg (2 mg Intravenous Given 6/28/17 1647)   etomidate injection 19.8 mg (19.8 mg Intravenous Given 6/28/17 1719)   0.9%  NaCl infusion (1,000 mLs Intravenous New Bag 6/28/17 1902)   0.9%  NaCl infusion (0 mLs Intravenous Stopped 6/28/17 1951)   vancomycin (VANCOCIN) 1,250 mg in dextrose 5 % 250 mL IVPB (1,250 mg Intravenous New Bag 6/28/17 2117)   heparin (porcine) injection 4,000 Units (4,000 Units Intravenous Given 6/28/17 2208)       Current Discharge Medication List                Medical Decision Making    Medical Decision Making:   Clinical Tests:   Lab Tests: Ordered and Reviewed  Radiological Study: Ordered and Reviewed  Medical Tests: Ordered and Reviewed           Scribe Attestation:   Scribe #1: I performed the above scribed service and the documentation accurately describes the services I performed. I attest to the accuracy of the note.    Attending:   Physician Attestation Statement for Scribe #1: I, Erika Alexandre MD, personally performed the services described in this documentation, as scribed by Tyra  Tatianna, in my presence, and it is both accurate and complete.       Scribe Attestation:   Scribe #2: I performed the above scribed service and the documentation accurately describes the services I performed. I attest to the accuracy of the note.    Attending Attestation:           Physician Attestation for Scribe:    Physician Attestation Statement for Scribe #2: I, Amirah Giles DO, reviewed documentation, as scribed by Evaristo Garzon in my presence, and it is both accurate and complete. I also acknowledge and confirm the content of the note done by Scribe #1.          Clinical Impression       ICD-10-CM ICD-9-CM   1. Non-traumatic rhabdomyolysis M62.82 728.88   2. Overdose T50.901A 977.9     E980.5   3. Encounter for intubation Z01.818 V72.83   4. Elevated liver enzymes R74.8 790.5   5. Hyponatremia E87.1 276.1   6. Encounter for central line placement Z45.2 V58.81   7. Sepsis A41.9 038.9     995.91       Disposition:   Disposition: Admitted  Condition: Fair         Amirah Giles,   06/29/17 0246       Amirah Giles,   06/29/17 0247

## 2017-06-28 NOTE — ED NOTES
Marleny at bedside.  Pt attempting to remove gown and stating she is going home.  Attempting to reorient pt

## 2017-06-29 PROBLEM — Z72.0 TOBACCO USE: Chronic | Status: ACTIVE | Noted: 2017-04-07

## 2017-06-29 PROBLEM — E87.1 HYPONATREMIA: Status: RESOLVED | Noted: 2017-06-28 | Resolved: 2017-06-29

## 2017-06-29 PROBLEM — G93.41 ACUTE METABOLIC ENCEPHALOPATHY: Status: RESOLVED | Noted: 2017-06-28 | Resolved: 2017-06-29

## 2017-06-29 PROBLEM — E83.42 HYPOMAGNESEMIA: Status: ACTIVE | Noted: 2017-06-29

## 2017-06-29 PROBLEM — A41.9 SEPSIS: Status: RESOLVED | Noted: 2017-06-28 | Resolved: 2017-06-29

## 2017-06-29 PROBLEM — D69.6 THROMBOCYTOPENIA: Status: ACTIVE | Noted: 2017-06-29

## 2017-06-29 PROBLEM — F33.9 RECURRENT MAJOR DEPRESSIVE DISORDER: Chronic | Status: ACTIVE | Noted: 2017-06-29

## 2017-06-29 LAB
ALBUMIN SERPL BCP-MCNC: 1.6 G/DL
ALBUMIN SERPL BCP-MCNC: 1.7 G/DL
ALBUMIN SERPL BCP-MCNC: 1.8 G/DL
ALLENS TEST: ABNORMAL
ALP SERPL-CCNC: 162 U/L
ALP SERPL-CCNC: 164 U/L
ALP SERPL-CCNC: 168 U/L
ALT SERPL W/O P-5'-P-CCNC: 188 U/L
ALT SERPL W/O P-5'-P-CCNC: 192 U/L
ALT SERPL W/O P-5'-P-CCNC: 226 U/L
ANION GAP SERPL CALC-SCNC: 4 MMOL/L
ANION GAP SERPL CALC-SCNC: 6 MMOL/L
ANION GAP SERPL CALC-SCNC: 7 MMOL/L
APTT BLDCRRT: 44.2 SEC
AST SERPL-CCNC: 444 U/L
AST SERPL-CCNC: 451 U/L
AST SERPL-CCNC: 626 U/L
BASOPHILS # BLD AUTO: 0.02 K/UL
BASOPHILS # BLD AUTO: 0.02 K/UL
BASOPHILS NFR BLD: 0.2 %
BASOPHILS NFR BLD: 0.2 %
BILIRUB SERPL-MCNC: 0.2 MG/DL
BILIRUB SERPL-MCNC: 0.3 MG/DL
BILIRUB SERPL-MCNC: 0.4 MG/DL
BUN SERPL-MCNC: 12 MG/DL
BUN SERPL-MCNC: 13 MG/DL
BUN SERPL-MCNC: 15 MG/DL
CALCIUM SERPL-MCNC: 5.8 MG/DL
CALCIUM SERPL-MCNC: 5.9 MG/DL
CALCIUM SERPL-MCNC: 6.1 MG/DL
CHLORIDE SERPL-SCNC: 103 MMOL/L
CHLORIDE SERPL-SCNC: 106 MMOL/L
CHLORIDE SERPL-SCNC: 112 MMOL/L
CK SERPL-CCNC: ABNORMAL U/L
CO2 SERPL-SCNC: 19 MMOL/L
CO2 SERPL-SCNC: 24 MMOL/L
CO2 SERPL-SCNC: 25 MMOL/L
CREAT SERPL-MCNC: 1.1 MG/DL
CREAT SERPL-MCNC: 1.4 MG/DL
CREAT SERPL-MCNC: 1.4 MG/DL
DELSYS: ABNORMAL
DIFFERENTIAL METHOD: ABNORMAL
DIFFERENTIAL METHOD: ABNORMAL
EOSINOPHIL # BLD AUTO: 0 K/UL
EOSINOPHIL # BLD AUTO: 0 K/UL
EOSINOPHIL NFR BLD: 0.3 %
EOSINOPHIL NFR BLD: 0.3 %
ERYTHROCYTE [DISTWIDTH] IN BLOOD BY AUTOMATED COUNT: 14 %
ERYTHROCYTE [DISTWIDTH] IN BLOOD BY AUTOMATED COUNT: 14.1 %
ERYTHROCYTE [SEDIMENTATION RATE] IN BLOOD BY WESTERGREN METHOD: 16 MM/H
EST. GFR  (AFRICAN AMERICAN): 53 ML/MIN/1.73 M^2
EST. GFR  (AFRICAN AMERICAN): 53 ML/MIN/1.73 M^2
EST. GFR  (AFRICAN AMERICAN): >60 ML/MIN/1.73 M^2
EST. GFR  (NON AFRICAN AMERICAN): 46 ML/MIN/1.73 M^2
EST. GFR  (NON AFRICAN AMERICAN): 46 ML/MIN/1.73 M^2
EST. GFR  (NON AFRICAN AMERICAN): >60 ML/MIN/1.73 M^2
FIO2: 40
GLUCOSE SERPL-MCNC: 123 MG/DL
GLUCOSE SERPL-MCNC: 129 MG/DL
GLUCOSE SERPL-MCNC: 131 MG/DL
HCO3 UR-SCNC: 18.6 MMOL/L (ref 24–28)
HCT VFR BLD AUTO: 35.6 %
HCT VFR BLD AUTO: 40.5 %
HGB BLD-MCNC: 12.4 G/DL
HGB BLD-MCNC: 14 G/DL
LACTATE SERPL-SCNC: 0.7 MMOL/L
LACTATE SERPL-SCNC: 0.7 MMOL/L
LYMPHOCYTES # BLD AUTO: 1.1 K/UL
LYMPHOCYTES # BLD AUTO: 1.2 K/UL
LYMPHOCYTES NFR BLD: 9.4 %
LYMPHOCYTES NFR BLD: 9.4 %
MAGNESIUM SERPL-MCNC: 1.5 MG/DL
MAGNESIUM SERPL-MCNC: 1.9 MG/DL
MAGNESIUM SERPL-MCNC: 2.1 MG/DL
MCH RBC QN AUTO: 31 PG
MCH RBC QN AUTO: 31.3 PG
MCHC RBC AUTO-ENTMCNC: 34.6 %
MCHC RBC AUTO-ENTMCNC: 34.8 %
MCV RBC AUTO: 90 FL
MCV RBC AUTO: 90 FL
MODE: ABNORMAL
MONOCYTES # BLD AUTO: 1 K/UL
MONOCYTES # BLD AUTO: 1.1 K/UL
MONOCYTES NFR BLD: 7.2 %
MONOCYTES NFR BLD: 9.7 %
NEUTROPHILS # BLD AUTO: 10.9 K/UL
NEUTROPHILS # BLD AUTO: 9.4 K/UL
NEUTROPHILS NFR BLD: 80.4 %
NEUTROPHILS NFR BLD: 82.9 %
PCO2 BLDA: 35.1 MMHG (ref 35–45)
PEEP: 5
PH SMN: 7.33 [PH] (ref 7.35–7.45)
PHOSPHATE SERPL-MCNC: 3 MG/DL
PHOSPHATE SERPL-MCNC: 3.9 MG/DL
PHOSPHATE SERPL-MCNC: 4.5 MG/DL
PLATELET # BLD AUTO: 54 K/UL
PLATELET # BLD AUTO: 55 K/UL
PMV BLD AUTO: 10.5 FL
PMV BLD AUTO: 11 FL
PO2 BLDA: 137 MMHG (ref 80–100)
POC BE: -7 MMOL/L
POC SATURATED O2: 99 % (ref 95–100)
POTASSIUM SERPL-SCNC: 3.9 MMOL/L
POTASSIUM SERPL-SCNC: 4 MMOL/L
POTASSIUM SERPL-SCNC: 4.1 MMOL/L
PROT SERPL-MCNC: 4.1 G/DL
PROT SERPL-MCNC: 4.2 G/DL
PROT SERPL-MCNC: 4.3 G/DL
RBC # BLD AUTO: 3.96 M/UL
RBC # BLD AUTO: 4.51 M/UL
SAMPLE: ABNORMAL
SITE: ABNORMAL
SODIUM SERPL-SCNC: 132 MMOL/L
SODIUM SERPL-SCNC: 137 MMOL/L
SODIUM SERPL-SCNC: 137 MMOL/L
VT: 500
WBC # BLD AUTO: 11.72 K/UL
WBC # BLD AUTO: 13.12 K/UL

## 2017-06-29 PROCEDURE — 63600175 PHARM REV CODE 636 W HCPCS: Performed by: EMERGENCY MEDICINE

## 2017-06-29 PROCEDURE — 80053 COMPREHEN METABOLIC PANEL: CPT | Mod: 91

## 2017-06-29 PROCEDURE — 99900035 HC TECH TIME PER 15 MIN (STAT)

## 2017-06-29 PROCEDURE — 90945 DIALYSIS ONE EVALUATION: CPT

## 2017-06-29 PROCEDURE — 80100008 HC CRRT DAILY MAINTENANCE

## 2017-06-29 PROCEDURE — C9113 INJ PANTOPRAZOLE SODIUM, VIA: HCPCS | Performed by: INTERNAL MEDICINE

## 2017-06-29 PROCEDURE — 25000242 PHARM REV CODE 250 ALT 637 W/ HCPCS: Performed by: INTERNAL MEDICINE

## 2017-06-29 PROCEDURE — 63600175 PHARM REV CODE 636 W HCPCS: Performed by: NURSE PRACTITIONER

## 2017-06-29 PROCEDURE — 25000003 PHARM REV CODE 250: Performed by: INTERNAL MEDICINE

## 2017-06-29 PROCEDURE — 80053 COMPREHEN METABOLIC PANEL: CPT

## 2017-06-29 PROCEDURE — 85730 THROMBOPLASTIN TIME PARTIAL: CPT

## 2017-06-29 PROCEDURE — 94640 AIRWAY INHALATION TREATMENT: CPT

## 2017-06-29 PROCEDURE — 82550 ASSAY OF CK (CPK): CPT | Mod: 91

## 2017-06-29 PROCEDURE — 63600175 PHARM REV CODE 636 W HCPCS: Performed by: INTERNAL MEDICINE

## 2017-06-29 PROCEDURE — 36415 COLL VENOUS BLD VENIPUNCTURE: CPT

## 2017-06-29 PROCEDURE — 94003 VENT MGMT INPAT SUBQ DAY: CPT

## 2017-06-29 PROCEDURE — 99292 CRITICAL CARE ADDL 30 MIN: CPT | Mod: ,,, | Performed by: NURSE PRACTITIONER

## 2017-06-29 PROCEDURE — 25000003 PHARM REV CODE 250: Performed by: EMERGENCY MEDICINE

## 2017-06-29 PROCEDURE — 36600 WITHDRAWAL OF ARTERIAL BLOOD: CPT

## 2017-06-29 PROCEDURE — 82550 ASSAY OF CK (CPK): CPT

## 2017-06-29 PROCEDURE — 27100171 HC OXYGEN HIGH FLOW UP TO 24 HOURS

## 2017-06-29 PROCEDURE — 83605 ASSAY OF LACTIC ACID: CPT

## 2017-06-29 PROCEDURE — 99291 CRITICAL CARE FIRST HOUR: CPT | Mod: ,,, | Performed by: NURSE PRACTITIONER

## 2017-06-29 PROCEDURE — 20000000 HC ICU ROOM

## 2017-06-29 PROCEDURE — 84100 ASSAY OF PHOSPHORUS: CPT | Mod: 91

## 2017-06-29 PROCEDURE — 82803 BLOOD GASES ANY COMBINATION: CPT

## 2017-06-29 PROCEDURE — 83735 ASSAY OF MAGNESIUM: CPT | Mod: 91

## 2017-06-29 PROCEDURE — 82306 VITAMIN D 25 HYDROXY: CPT

## 2017-06-29 PROCEDURE — 25000003 PHARM REV CODE 250: Performed by: NURSE PRACTITIONER

## 2017-06-29 PROCEDURE — 85025 COMPLETE CBC W/AUTO DIFF WBC: CPT

## 2017-06-29 PROCEDURE — 83605 ASSAY OF LACTIC ACID: CPT | Mod: 91

## 2017-06-29 PROCEDURE — 27000221 HC OXYGEN, UP TO 24 HOURS

## 2017-06-29 PROCEDURE — 90945 DIALYSIS ONE EVALUATION: CPT | Mod: ,,, | Performed by: INTERNAL MEDICINE

## 2017-06-29 PROCEDURE — 31500 INSERT EMERGENCY AIRWAY: CPT

## 2017-06-29 PROCEDURE — 83735 ASSAY OF MAGNESIUM: CPT

## 2017-06-29 PROCEDURE — 84100 ASSAY OF PHOSPHORUS: CPT

## 2017-06-29 RX ORDER — HEPARIN 100 UNIT/ML
500 SYRINGE INTRAVENOUS
Status: DISCONTINUED | OUTPATIENT
Start: 2017-06-29 | End: 2017-06-29

## 2017-06-29 RX ORDER — DIPHENHYDRAMINE HCL 50 MG
50 CAPSULE ORAL NIGHTLY PRN
Status: DISCONTINUED | OUTPATIENT
Start: 2017-06-29 | End: 2017-07-10 | Stop reason: HOSPADM

## 2017-06-29 RX ORDER — PANTOPRAZOLE SODIUM 40 MG/1
40 TABLET, DELAYED RELEASE ORAL DAILY
Status: DISCONTINUED | OUTPATIENT
Start: 2017-06-29 | End: 2017-07-10 | Stop reason: HOSPADM

## 2017-06-29 RX ORDER — DOCUSATE SODIUM 100 MG/1
100 CAPSULE, LIQUID FILLED ORAL DAILY
Status: DISCONTINUED | OUTPATIENT
Start: 2017-06-29 | End: 2017-07-10 | Stop reason: HOSPADM

## 2017-06-29 RX ORDER — HEPARIN SODIUM 1000 [USP'U]/ML
2000 INJECTION, SOLUTION INTRAVENOUS; SUBCUTANEOUS
Status: DISCONTINUED | OUTPATIENT
Start: 2017-06-29 | End: 2017-07-10 | Stop reason: HOSPADM

## 2017-06-29 RX ORDER — MAGNESIUM SULFATE HEPTAHYDRATE 40 MG/ML
2 INJECTION, SOLUTION INTRAVENOUS ONCE
Status: COMPLETED | OUTPATIENT
Start: 2017-06-29 | End: 2017-06-29

## 2017-06-29 RX ORDER — FLUOXETINE HYDROCHLORIDE 20 MG/1
40 CAPSULE ORAL DAILY
Status: DISCONTINUED | OUTPATIENT
Start: 2017-06-29 | End: 2017-07-10 | Stop reason: HOSPADM

## 2017-06-29 RX ORDER — GABAPENTIN 400 MG/1
400 CAPSULE ORAL 3 TIMES DAILY
Status: ON HOLD | COMMUNITY
End: 2017-07-14 | Stop reason: HOSPADM

## 2017-06-29 RX ORDER — OXYCODONE AND ACETAMINOPHEN 7.5; 325 MG/1; MG/1
1 TABLET ORAL EVERY 4 HOURS PRN
Status: ON HOLD | COMMUNITY
End: 2017-07-14 | Stop reason: HOSPADM

## 2017-06-29 RX ORDER — IPRATROPIUM BROMIDE AND ALBUTEROL SULFATE 2.5; .5 MG/3ML; MG/3ML
3 SOLUTION RESPIRATORY (INHALATION) EVERY 4 HOURS PRN
Status: DISCONTINUED | OUTPATIENT
Start: 2017-06-29 | End: 2017-07-10 | Stop reason: HOSPADM

## 2017-06-29 RX ORDER — BISACODYL 10 MG
10 SUPPOSITORY, RECTAL RECTAL DAILY PRN
Status: DISCONTINUED | OUTPATIENT
Start: 2017-06-29 | End: 2017-07-10 | Stop reason: HOSPADM

## 2017-06-29 RX ORDER — OXYCODONE HCL 10 MG/1
10 TABLET, FILM COATED, EXTENDED RELEASE ORAL 2 TIMES DAILY PRN
Status: DISCONTINUED | OUTPATIENT
Start: 2017-06-29 | End: 2017-06-30

## 2017-06-29 RX ORDER — ONDANSETRON 2 MG/ML
4 INJECTION INTRAMUSCULAR; INTRAVENOUS EVERY 8 HOURS PRN
Status: DISCONTINUED | OUTPATIENT
Start: 2017-06-29 | End: 2017-07-10 | Stop reason: HOSPADM

## 2017-06-29 RX ORDER — QUETIAPINE FUMARATE 200 MG/1
200 TABLET, FILM COATED ORAL DAILY
Status: DISCONTINUED | OUTPATIENT
Start: 2017-06-29 | End: 2017-06-30

## 2017-06-29 RX ORDER — ACETAMINOPHEN 325 MG/1
650 TABLET ORAL EVERY 6 HOURS PRN
Status: DISCONTINUED | OUTPATIENT
Start: 2017-06-29 | End: 2017-06-30

## 2017-06-29 RX ORDER — LORAZEPAM 0.5 MG/1
0.5 TABLET ORAL 2 TIMES DAILY
Status: DISCONTINUED | OUTPATIENT
Start: 2017-06-29 | End: 2017-07-10 | Stop reason: HOSPADM

## 2017-06-29 RX ORDER — IBUPROFEN 200 MG
1 TABLET ORAL DAILY
Status: DISCONTINUED | OUTPATIENT
Start: 2017-06-29 | End: 2017-07-10 | Stop reason: HOSPADM

## 2017-06-29 RX ADMIN — QUETIAPINE FUMARATE 200 MG: 200 TABLET, FILM COATED ORAL at 03:06

## 2017-06-29 RX ADMIN — DIPHENHYDRAMINE HYDROCHLORIDE 50 MG: 25 CAPSULE ORAL at 10:06

## 2017-06-29 RX ADMIN — FLUOXETINE 40 MG: 20 CAPSULE ORAL at 01:06

## 2017-06-29 RX ADMIN — PIPERACILLIN AND TAZOBACTAM 4.5 G: 4; .5 INJECTION, POWDER, FOR SOLUTION INTRAVENOUS at 05:06

## 2017-06-29 RX ADMIN — NICOTINE 1 PATCH: 14 PATCH, EXTENDED RELEASE TRANSDERMAL at 03:06

## 2017-06-29 RX ADMIN — PROPOFOL 50 MCG/KG/MIN: 10 INJECTION, EMULSION INTRAVENOUS at 05:06

## 2017-06-29 RX ADMIN — PIPERACILLIN AND TAZOBACTAM 4.5 G: 4; .5 INJECTION, POWDER, FOR SOLUTION INTRAVENOUS at 01:06

## 2017-06-29 RX ADMIN — LORAZEPAM 0.5 MG: 0.5 TABLET ORAL at 09:06

## 2017-06-29 RX ADMIN — OXYCODONE HYDROCHLORIDE 10 MG: 10 TABLET, FILM COATED, EXTENDED RELEASE ORAL at 09:06

## 2017-06-29 RX ADMIN — SODIUM BICARBONATE: 84 INJECTION, SOLUTION INTRAVENOUS at 02:06

## 2017-06-29 RX ADMIN — MAGNESIUM SULFATE IN WATER 2 G: 40 INJECTION, SOLUTION INTRAVENOUS at 10:06

## 2017-06-29 RX ADMIN — SODIUM BICARBONATE: 84 INJECTION, SOLUTION INTRAVENOUS at 10:06

## 2017-06-29 RX ADMIN — SODIUM BICARBONATE: 84 INJECTION, SOLUTION INTRAVENOUS at 05:06

## 2017-06-29 RX ADMIN — ACETAMINOPHEN 650 MG: 325 TABLET ORAL at 07:06

## 2017-06-29 RX ADMIN — LORAZEPAM 0.5 MG: 0.5 TABLET ORAL at 01:06

## 2017-06-29 RX ADMIN — DOCUSATE SODIUM 100 MG: 100 CAPSULE, LIQUID FILLED ORAL at 10:06

## 2017-06-29 RX ADMIN — SODIUM BICARBONATE: 84 INJECTION, SOLUTION INTRAVENOUS at 08:06

## 2017-06-29 RX ADMIN — IPRATROPIUM BROMIDE AND ALBUTEROL SULFATE 3 ML: .5; 3 SOLUTION RESPIRATORY (INHALATION) at 07:06

## 2017-06-29 RX ADMIN — PIPERACILLIN AND TAZOBACTAM 4.5 G: 4; .5 INJECTION, POWDER, FOR SOLUTION INTRAVENOUS at 09:06

## 2017-06-29 RX ADMIN — PANTOPRAZOLE SODIUM 40 MG: 40 INJECTION, POWDER, FOR SOLUTION INTRAVENOUS at 08:06

## 2017-06-29 RX ADMIN — PROPOFOL 50 MCG/KG/MIN: 10 INJECTION, EMULSION INTRAVENOUS at 01:06

## 2017-06-29 RX ADMIN — IPRATROPIUM BROMIDE AND ALBUTEROL SULFATE 3 ML: .5; 3 SOLUTION RESPIRATORY (INHALATION) at 03:06

## 2017-06-29 RX ADMIN — SODIUM BICARBONATE: 84 INJECTION, SOLUTION INTRAVENOUS at 12:06

## 2017-06-29 NOTE — PROGRESS NOTES
Consult  Critical Care    Admit Date: 6/28/2017   LOS: 1 day     Follow-up For: Resp Failure     SUBJECTIVE:   HPI:  Ms Emery is a 45 yo WF with a PMH of tobacco abuse, GERD, Depression and LENORE.  She has chronic pain also.  According to records she has hx of substance abuse and OD.  Per chart review, Two days ago patient was summoned to appear in the court for her DUI that occurred 4 months ago. She was extremely anxious and pacing all day yesterday.  Yesterday, Pt's  found her unresponsive with an empty bottle of Ativan. The Ativan Rx was written for 30 pills and was filled 2 days ago. Pt was also found with other empty bottles of medication on her person including Seroquil.  ED review of meds with EMS revealed (pt had 30 pills of  methocarbamol 500 mg and 30 pills of mloxicam 7.5 mg filled on 6/25, 30 tabs of 1 mg lorazepam on 6/26, 30 pills of 10 mg flexeril on 5/12, 30 pills of phenergan 25 mg filled on 5/10, 600 mg of ibuprofen 30 pills fill on 5/31.  pt found altered PTA and all bottles emtpy). Upon arrival to ED patient was awake and alert and denied any SI, HI, increased stress, auditory/visual hallucinations, sleep changes, IV drug use, or ETOH abuse.  CT head unremarkable, urine brown, CPK > 40,000 ,WBC 15, elevated LFTs, UDS + opiates and BZDs.  Intubated in ED.  Renal consulted started on Bicarb infusion and PEC placed then admitted to ICU.  Neuro consulted.  CRRT started.      ROS  Review of Systems   Constitutional: Positive for malaise/fatigue. Negative for chills and fever.   HENT: Negative for congestion.    Eyes: Negative for blurred vision.   Respiratory: Negative for cough, sputum production and shortness of breath.    Cardiovascular: Negative for chest pain and leg swelling.   Gastrointestinal: Negative for abdominal pain, nausea and vomiting.   Genitourinary: Negative for dysuria.   Musculoskeletal: Positive for myalgias (buttock and back pain chronic).   Skin: Negative for rash.  "  Neurological: Positive for sensory change (LEs numb) and weakness. Negative for dizziness and headaches.   Endo/Heme/Allergies: Does not bruise/bleed easily.   Psychiatric/Behavioral: Positive for depression. Negative for suicidal ideas. The patient is not nervous/anxious.        Continuous Infusions:   heparin (porcine) in 5 % dex 1,000 Units/hr (06/29/17 0600)    custom IV infusion builder 200 mL/hr at 06/29/17 0800     Review of patient's allergies indicates:   Allergen Reactions    Corticosteroids (glucocorticoids)      "sets off my anxiety real bad"    Tramadol Rash       OBJECTIVE:     Vital Signs (Most Recent)  Temp: 97.5 °F (36.4 °C) (06/29/17 0702)  Pulse: 83 (06/29/17 0805)  Resp: (!) 23 (06/29/17 0805)  BP: 135/80 (06/29/17 0800)  SpO2: 99 % (06/29/17 0805)    Vital Signs Range (Last 24H):  Temp:  [95.6 °F (35.3 °C)-100.6 °F (38.1 °C)]   Pulse:  []   Resp:  [14-32]   BP: ()/(35-99)   SpO2:  [93 %-100 %]     I & O (Last 24H):  Intake/Output Summary (Last 24 hours) at 06/29/17 0849  Last data filed at 06/29/17 0800   Gross per 24 hour   Intake          3334.17 ml   Output              311 ml   Net          3023.17 ml       Ventilator Data (Last 24H):     Vent Mode: Spont  Oxygen Concentration (%):  [30-50] 30  Resp Rate Total:  [16 br/min-20 br/min] 18 br/min  Vt Set:  [450 mL-500 mL] 500 mL  PEEP/CPAP:  [5 cmH20] 5 cmH20  Pressure Support:  [0 cmH20-8 cmH20] 8 cmH20  Mean Airway Pressure:  [9.9 pmU71-34 cmH20] 10 cmH20    Physical Exam:    Physical Exam   Constitutional: She is oriented to person, place, and time and well-developed, well-nourished, and in no distress. Vital signs are normal. She appears not lethargic, to not be writhing in pain, not malnourished and not jaundiced. She appears unhealthy.  Non-toxic appearance. She has a sickly appearance. No distress.   HENT:   Head: Normocephalic and atraumatic.   Mouth/Throat: Oropharynx is clear and moist. No posterior oropharyngeal " edema or posterior oropharyngeal erythema.   Eyes: EOM are normal. Pupils are equal, round, and reactive to light.   Neck: Normal range of motion. Carotid bruit is not present.       Cardiovascular: Normal rate and regular rhythm.    Pulses:       Radial pulses are 2+ on the right side, and 2+ on the left side.        Dorsalis pedis pulses are 2+ on the right side, and 2+ on the left side.   Pulmonary/Chest: Breath sounds normal. No accessory muscle usage. No tachypnea (just extubated). No respiratory distress.   Abdominal: Soft. Normal appearance. She exhibits no distension. Bowel sounds are hypoactive. There is no tenderness.   Genitourinary:   Genitourinary Comments: Palmer    Musculoskeletal: Normal range of motion.   No edema   Lymphadenopathy:     She has no cervical adenopathy.   Neurological: She is alert and oriented to person, place, and time. She appears not lethargic.   Skin: Skin is warm and dry. She is not diaphoretic. No cyanosis.   Psychiatric: Memory normal. Her mood appears anxious. Her affect is labile. She expresses impulsivity.       Laboratory (Last 24H):  CBC:    Recent Labs  Lab 06/29/17  0500   WBC 11.72   HGB 14.0   HCT 40.5   PLT 55*     CMP:    Recent Labs  Lab 06/29/17  0500   CALCIUM 5.8*   ALBUMIN 1.8*   PROT 4.3*      K 3.9   CO2 19*   *   BUN 15   CREATININE 1.1   ALKPHOS 162*   *   *   BILITOT 0.2       Coagulation:   Recent Labs  Lab 06/29/17  0500   APTT 44.2*     ABGs:   Recent Labs  Lab 06/29/17  0525   PH 7.333*   PCO2 35.1   HCO3 18.6*   POCSATURATED 99   BE -7     Microbiology Results (last 7 days)     Procedure Component Value Units Date/Time    Blood culture [[id number]] Collected:  06/28/17 1600    Order Status:  Completed Specimen:  Blood from Peripheral, Hand, Left Updated:  06/29/17 0715     Blood Culture, Routine No Growth to date    Blood culture [[id number]] Collected:  06/28/17 1616    Order Status:  Completed Specimen:  Blood from Peripheral,  Hand, Right Updated:  06/29/17 0715     Blood Culture, Routine No Growth to date    Culture, Respiratory with Gram Stain [[id number]]     Order Status:  No result Specimen:  Respiratory from Endotracheal Aspirate           Chest X-Ray:  Film and report reviewed:  Bibasilar atelectasis and hazy left basilar infiltrate.      ASSESSMENT/PLAN:     Problem   Polysubstance Overdose   Acute Hypoxemic Respiratory Failure   Non-Traumatic Rhabdomyolysis   Recurrent Major Depressive Disorder   Thrombocytopenia   Aspiration Pneumonia   Transaminitis   Hypomagnesemia   Tobacco Use   Sepsis (Resolved)   Acute Metabolic Encephalopathy (Resolved)   Hyponatremia (Resolved)       PLAN:    1. Neuro: ICU neuro monitoring.  Tolerated SAT.  PECd.  Patient claims post extubation that she didn't mean to take that many pills and denies suicide attempt or ideations.     2. Pulmonary: Yehuda SBT fully alert and cooperative.  Extubate. Encourage C&DB and OOB asap.  Cont IVAB     3. Cardiac: ICU Cardiac monitoring    4. Renal: Palmer in place, monitor I/Os.  Renal following on CRRT for Rhabdo and polysubstance OD.     5. Infectious Disease: Follow fever curve.  Possible aspiration PNA.  Cont Zosyn and stop Vanc.  Blood cultures X 2 NGTD.    6. Hematology/Oncology: monitor for bleeding.  Plt count dropped stop Heparin.  CBC in AM    7. Endocrine:  Monitor glucose    8. Fluids/Electrolytes/Nutrition/GI: IVFs w/ Bicarb per Renal and cont CRRT another 24 hours.  Start Renal diet.      9. Musculoskeletal:  ROM    10. Pain Management: PRN Oxycontin    11. Discharge and Palliative Care: Plan home with family vs Inpt Psych.    Preventive Measures and Monitoring:   Stress Ulcer: PPI  Nutrition: Renal diet  Glucose control:  stable  Bowel prophylaxis: PRN Dulcolax  DVT prophylaxis: Heparin infusion/SCDs  Hx CAD on B-Blocker: no hx CAD  Head of Bed/Reposition: Elevate HOB and turn Q1-2 hours    Early Mobility: OOB asap  SAT/SBT: passed this AM  RASS goal:  -2  Vent Day: #2  OG Day: #2  Central Line right IJ VasCath Day: #2  Palmer Day: #2  IVAB Day: #2  Code Status: Full    Counseling/Consultation: I have discussed the care of this patient in detail with Multidisciplinary team during rounds, bedside nursing staff and Dr. Treadwell and Dr. Parikh    Critical Care Time greater than: 76 minutes    Critical care was time spent personally by me on the following activities: development of treatment plan with patient or surrogate and bedside caregivers, discussions with consultants, evaluation of patient's response to treatment, examination of patient, ordering and performing treatments and interventions, ordering and review of laboratory studies, ordering and review of radiographic studies, pulse oximetry, re-evaluation of patient's condition.  This critical care time did not overlap with that of any other provider or involve time for any procedures.    DEVEN Singer [facility]  [facility]

## 2017-06-29 NOTE — PROGRESS NOTES
Critical calcium of 5.8 reported by lab.  Value communicated to EICU, including corrected calcium level of 7.56.  No new orders.

## 2017-06-29 NOTE — PROVIDER PROGRESS NOTES - EMERGENCY DEPT.
7:54 PM: Discussed further plan of treatment with Dr. Sarah in ED following successful placement of catheter. Dr. Sarah confirms catheter in good position and recommends pt be placed on half normal saline with 2 amps of bicarb every x2 hours.

## 2017-06-29 NOTE — SUBJECTIVE & OBJECTIVE
"Past Medical History:   Diagnosis Date    Anxiety     Depression     GERD (gastroesophageal reflux disease)     Tobacco use        Past Surgical History:   Procedure Laterality Date    BLADDER REPAIR      CHOLECYSTECTOMY  04/07/2017    KNEE ARTHROPLASTY      PARTIAL HYSTERECTOMY      TUBAL LIGATION         Review of patient's allergies indicates:   Allergen Reactions    Corticosteroids (glucocorticoids)      "sets off my anxiety real bad"    Tramadol Rash       No current facility-administered medications on file prior to encounter.      Current Outpatient Prescriptions on File Prior to Encounter   Medication Sig    fluoxetine (PROZAC) 40 MG capsule Take 40 mg by mouth once daily.    lorazepam (ATIVAN) 1 MG tablet Take 1 mg by mouth every 6 (six) hours as needed for Anxiety.    meloxicam (MOBIC) 7.5 MG tablet Take 1 tablet (7.5 mg total) by mouth once daily.    ondansetron (ZOFRAN-ODT) 4 MG TbDL Take 1 tablet (4 mg total) by mouth every 6 (six) hours as needed.    promethazine (PHENERGAN) 25 MG tablet Take 1 tablet (25 mg total) by mouth every 6 (six) hours as needed for Nausea.    quetiapine (SEROQUEL) 200 MG Tab Take by mouth.     Family History     Problem Relation (Age of Onset)    Diabetes Mother    Hypertension Mother        Social History Main Topics    Smoking status: Current Every Day Smoker     Packs/day: 1.00     Types: Cigarettes    Smokeless tobacco: Never Used    Alcohol use Yes      Comment: occasionally    Drug use: No    Sexual activity: Yes     Review of Systems   Unable to perform ROS: Intubated     Objective:     Vital Signs (Most Recent):  Temp: (!) 100.6 °F (38.1 °C) (06/28/17 1518)  Pulse: 81 (06/28/17 1951)  Resp: 16 (06/28/17 1951)  BP: 119/79 (06/28/17 1951)  SpO2: 100 % (06/28/17 1951) Vital Signs (24h Range):  Temp:  [100.6 °F (38.1 °C)] 100.6 °F (38.1 °C)  Pulse:  [] 81  Resp:  [15-32] 16  SpO2:  [93 %-100 %] 100 %  BP: ()/(35-95) 119/79     Weight: 65.8 " kg (145 lb)  Body mass index is 24.89 kg/m².    Physical Exam   Constitutional: No distress. She is intubated.   Intubated, sedated on propafol, breathing over the vent, intermittently agitated.   HENT:   Head: Normocephalic.   Eyes: Conjunctivae are normal. No scleral icterus.   Cardiovascular: Regular rhythm, normal heart sounds and intact distal pulses.  Tachycardia present.    No murmur heard.  Pulmonary/Chest: She is intubated. She has rhonchi.   Abdominal: Soft. She exhibits no distension.   Musculoskeletal: She exhibits no edema or deformity.   Lymphadenopathy:     She has no cervical adenopathy.   Neurological:   Intubated and sedated on propofol, pupils equal and slightly reactive, moves spontaneously, agitated on the vent, responds to painful stimuli   Skin: Skin is warm. She is not diaphoretic. No erythema.   Psychiatric: She is agitated.   Nursing note and vitals reviewed.       Significant Labs:   A1C: No results for input(s): HGBA1C in the last 4320 hours.  ABGs:   Recent Labs  Lab 06/28/17  1531   PH 7.407   PCO2 28.1*   HCO3 17.7*   POCSATURATED 96   BE -7     Bilirubin:   Recent Labs  Lab 06/28/17  1140   BILITOT 0.5     Blood Culture: No results for input(s): LABBLOO in the last 48 hours.  BMP:   Recent Labs  Lab 06/28/17  1140   *   *   K 4.5   CL 97   CO2 17*   BUN 13   CREATININE 1.0   CALCIUM 8.5*     CBC:   Recent Labs  Lab 06/28/17  1140   WBC 14.93*   HGB 17.3*   HCT 47.9        CMP:   Recent Labs  Lab 06/28/17  1140   *   K 4.5   CL 97   CO2 17*   *   BUN 13   CREATININE 1.0   CALCIUM 8.5*   PROT 7.8   ALBUMIN 3.5   BILITOT 0.5   ALKPHOS 92   *   *   ANIONGAP 16   EGFRNONAA >60     Cardiac Markers: No results for input(s): CKMB, MYOGLOBIN, BNP, TROPISTAT in the last 48 hours.  Coagulation: No results for input(s): INR, APTT in the last 48 hours.    Invalid input(s): PT  Lactic Acid:   Recent Labs  Lab 06/28/17  1616   LACTATE 2.0     Lipase: No  results for input(s): LIPASE in the last 48 hours.  Lipid Panel: No results for input(s): CHOL, HDL, LDLCALC, TRIG, CHOLHDL in the last 48 hours.  Magnesium: No results for input(s): MG in the last 48 hours.  POCT Glucose: No results for input(s): POCTGLUCOSE in the last 48 hours.  Prealbumin: No results for input(s): PREALBUMIN in the last 48 hours.  Respiratory Culture: No results for input(s): GSRESP, RESPIRATORYC in the last 48 hours.  Troponin: No results for input(s): TROPONINI in the last 48 hours.  TSH:   Recent Labs  Lab 06/28/17  1140   TSH 0.255*     Urine Culture: No results for input(s): LABURIN in the last 48 hours.  Urine Studies:   Recent Labs  Lab 06/28/17  1514   COLORU Brown*   APPEARANCEUA Cloudy*   PHUR 7.0   SPECGRAV 1.025   PROTEINUA 3+*   GLUCUA Negative   KETONESU 1+*   BILIRUBINUA 1+*   OCCULTUA 3+*   NITRITE Positive*   UROBILINOGEN 1.0   LEUKOCYTESUR Trace*   RBCUA 5*   WBCUA 2   BACTERIA Few*   HYALINECASTS 2*     All pertinent labs within the past 24 hours have been reviewed.    Significant Imaging: I have reviewed and interpreted all pertinent imaging results/findings within the past 24 hours.     Imaging Results          X-Ray Chest AP Portable (In process)                CT Head Without Contrast (Final result)  Result time 06/28/17 18:14:35    Final result by Minnie Sampson III, MD (06/28/17 18:14:35)                 Impression:       No acute intracranial disease identified.       Electronically signed by: MINNIE SAMPSON MD  Date:     06/28/17  Time:    18:14              Narrative:    Head CT without contrast    Clinical history: Altered Mental Status    TECHNIQUE: Routine noncontrast axial head CT. All CT scans at this facility use dose modulation, iterative reconstruction, and/or weight based dosing when appropriate to reduce radiation dose to as low as reasonably achievable.    Comparison: none    FINDINGS: There is no evidence of intracranial hemorrhage, mass-effect,  hydrocephalus or other acute disease. There is no CT evidence of acute ischemia or cerebral edema.  There is mild opacification of the right mastoid air cells.  The visualized paranasal sinuses and left mastoid air cells are clear. No calvarial fracture is identified.                             CT Lumbar Spine Without Contrast (Final result)  Result time 06/28/17 18:20:35    Final result by Minnie Sampson III, MD (06/28/17 18:20:35)                 Impression:       1.  Asymmetric thickening of the right piriformis muscle with surrounding stranding possibly related to muscle strain or inflammatory in etiology.  Findings could cause right sciatica.    2.  No acute osseous abnormality identified.  Mild L3-4 through L5-S1 disc bulging and facet arthropathy causing foraminal stenosis.        Electronically signed by: MINNIE SAMPSON MD  Date:     06/28/17  Time:    18:20              Narrative:    CT LUMBAR SPINE WITHOUT CONTRAST    Clinical history: Weakness (780.79).    Technique:  Axial noncontrast CT scan of the lumbar spine with sagittal and coronal reconstructions. All CT scans at this facility use dose modulation, iterative reconstruction, and/or weight based dosing when appropriate to reduce radiation dose to as low as reasonably achievable.    Comparison: None    Findings:   No fracture or other acute injury is seen in the lumbar spine. Lumbar spine alignment is anatomic. No suspicious lytic or blastic bone marrow lesions identified.  There is asymmetric thickening of the right piriformis muscle with surrounding stranding.  No other acute paravertebral soft tissue abnormality identified. The intervertebral disc heights appear relatively well preserved.    T12-L1: Unremarkable    L1-2: Unremarkable    L2-3: Unremarkable    L3-4: Small annular disc bulge and mild facet arthropathy causing mild left foraminal stenosis.    L4-5: Small annular disc bulge and facet arthropathy causing mild right and moderate left  foraminal stenosis.    L5-S1:  Tiny broad-based posterior disc bulge at moderate hypertrophic facet arthropathy causing mild bilateral foraminal stenosis.                             X-Ray Chest AP Portable (Final result)  Result time 06/28/17 18:12:06    Final result by Minnie Sampson III, MD (06/28/17 18:12:06)                 Impression:     Well positioned endotracheal tube.  Possible patchy lower lobe interstitial infiltrates.      Electronically signed by: MINNIE SAMPSON MD  Date:     06/28/17  Time:    18:12              Narrative:    XR CHEST AP PORTABLE    Clinical history: Endotracheal tube placement. Z46.82 Encounter for fitting and adjustment of non-vascular catheter.    Findings: The endotracheal tube is well positioned terminating approximately 2.5 cm above the kaylie. There are possible patchy bilateral lower lobe interstitial infiltrates. No pneumothorax or other acute pulmonary disease identified.

## 2017-06-29 NOTE — PROGRESS NOTES
" NEURO CRITICAL CARE PROGRESS  NOTE    Kaylene Emery   46 y.o. female  DATE 6/29/2017    Patient with substance abuse, recurrent over dose. She has overdosed on medications, not sure to which one, given she is on multiple medications.   She was intubated and sedated, but today she is extubated, awake, alert and follows commands. She denies headaches, no nausea, no vomiting, no dizziness,no chest pain, no sob, no abdominal pain,     Past Medical History:   Diagnosis Date    Anxiety     Depression     GERD (gastroesophageal reflux disease)     Tobacco use      Past Surgical History:   Procedure Laterality Date    BLADDER REPAIR      CENTRAL LINE  6/28/2017         CHOLECYSTECTOMY  04/07/2017    KNEE ARTHROPLASTY      PARTIAL HYSTERECTOMY      TUBAL LIGATION       Family History   Problem Relation Age of Onset    Hypertension Mother     Diabetes Mother      Social History   Substance Use Topics    Smoking status: Current Every Day Smoker     Packs/day: 1.00     Types: Cigarettes    Smokeless tobacco: Never Used    Alcohol use Yes      Comment: occasionally       Review of patient's allergies indicates:   Allergen Reactions    Corticosteroids (glucocorticoids)      "sets off my anxiety real bad"    Tramadol Rash        Scheduled Meds:   docusate sodium  100 mg Oral Daily    fluoxetine  40 mg Oral Daily    lorazepam  0.5 mg Oral BID    nicotine  1 patch Transdermal Daily    pantoprazole  40 mg Oral Daily    piperacillin-tazobactam 4.5 g in dextrose 5 % 100 mL IVPB (ready to mix system)  4.5 g Intravenous Q8H    quetiapine  200 mg Oral Daily     Continuous Infusions:   custom IV infusion builder 200 mL/hr at 06/29/17 1500     PRN Meds:albuterol-ipratropium 2.5mg-0.5mg/3mL, bisacodyl, heparin (porcine), ondansetron, oxycodone    Review of Systems:  All the 14 ROS were reviewed and the pertinent ones are mentioned in the HPI           OBJECTIVE:     Vital Signs (Most Recent)  Temp: 99.4 °F " (37.4 °C) (06/29/17 1502)  Pulse: 92 (06/29/17 1502)  Resp: (!) 22 (06/29/17 1502)  BP: (!) 142/76 (06/29/17 1502)  SpO2: 99 % (06/29/17 1502)     Vital Signs Range (Last 24H):  Temp:  [95.6 °F (35.3 °C)-99.4 °F (37.4 °C)]   Pulse:  []   Resp:  [13-32]   BP: ()/(35-99)   SpO2:  [93 %-100 %]     Physical Exam:  General:  Awake, alert and follows commands  HEENT:   Acephalic, Atraumatic,  EOMI, PERRLA, no nystagmus, no ptosis, no lid lag, no neglect, no gaze palsy  Neck: supple, no JVD, no Carotid bruit, no torticollis,   Lungs: CTA,  No rhonchi, no rales  Heart: Regular Rate and rhythm, no murmurs, rubs and or gallops  Abdomen, Soft, non tender, non distended, no abdominal  bruit, bowel sounds present  Extremities: No edema,   Skin: No rash, no ecchymoses,         NEURO    Mental Status:   Awake, alert   X 4  Memory, Recent and Remote  Mood:  Anxious  Affect: Appropriate  Behavior: Appropriate  Attention and Concentration: intact  Hallucinations, Delusions and suicidal ideation:  No hallucinations,   Language:      SPEECH:   No aphasia, no Dysarthria,  Tremulousness     CRANIAL NERVES:      II: visual acuity  Intact   II: visual fields Full to confrontation   II: pupils Equal, round, reactive to light   III,VII: ptosis None   III,IV,VI: extraocular muscles  Full ROM   V: mastication Normal   V: facial light touch sensation  Normal   V,VII: corneal reflex  Present   VII: facial muscle function - upper  Normal   VII: facial muscle function - lower Normal   VIII: hearing Not tested   IX: soft palate elevation  Normal   IX,X: gag reflex Present   XI: trapezius strength  Intact   XI: sternocleidomastoid strength Intact   XI: neck flexion strength  Intact   XII: tongue strength  Normal         MOTOR:Upper Extremities  Moves antigravity, 5-/5 proximal and distal    Lower Extremities:  Able to move 2/5 in the left and 1+/5 in the right    TONE:   UE; intact, no rigidity,no cog wheeling  LE: decreased  MUSCLE  MASS:  REFLEXES: Deep tendon reflexes tested:  Upper extremities:               biceps (C5, C6):2               brachioradialis (C5, C6):2               triceps (C6, C7),0     Lower extremities:                knee or patellar (L2, 3, 4):0               ankle (S1, S2):0      Plantar responses:  Flexors  Clonus:  Negative   Muscle Fasciculations: none    SENSORY  PIN PRICK and TEMP: Intact  Soft TOUCH: intact  VIBRATION: intact    ROMBERG and  GAIT:  Deferred     EXTRAPYRAMIDALS:  Tremulousness             Laboratory:  Lab Results   Component Value Date    WBC 13.12 (H) 06/29/2017    HGB 12.4 06/29/2017    HCT 35.6 (L) 06/29/2017    PLT 54 (L) 06/29/2017     (H) 06/29/2017     (H) 06/29/2017     06/29/2017    K 4.1 06/29/2017     06/29/2017    CREATININE 1.4 06/29/2017    BUN 13 06/29/2017    CO2 24 06/29/2017    TSH 0.255 (L) 06/28/2017    INR 0.9 05/03/2017      Results for orders placed or performed during the hospital encounter of 06/28/17   Blood culture   Result Value Ref Range    Blood Culture, Routine No Growth to date    Blood culture   Result Value Ref Range    Blood Culture, Routine No Growth to date    CBC auto differential   Result Value Ref Range    WBC 14.93 (H) 3.90 - 12.70 K/uL    RBC 5.35 4.00 - 5.40 M/uL    Hemoglobin 17.3 (H) 12.0 - 16.0 g/dL    Hematocrit 47.9 37.0 - 48.5 %    MCV 90 82 - 98 fL    MCH 32.3 (H) 27.0 - 31.0 pg    MCHC 36.1 (H) 32.0 - 36.0 %    RDW 13.9 11.5 - 14.5 %    Platelets 173 150 - 350 K/uL    MPV 10.7 9.2 - 12.9 fL    Gran # 11.8 (H) 1.8 - 7.7 K/uL    Lymph # 1.8 1.0 - 4.8 K/uL    Mono # 1.3 (H) 0.3 - 1.0 K/uL    Eos # 0.0 0.0 - 0.5 K/uL    Baso # 0.02 0.00 - 0.20 K/uL    Gran% 79.0 (H) 38.0 - 73.0 %    Lymph% 12.3 (L) 18.0 - 48.0 %    Mono% 8.5 4.0 - 15.0 %    Eosinophil% 0.1 0.0 - 8.0 %    Basophil% 0.1 0.0 - 1.9 %    Differential Method Automated    Comprehensive metabolic panel   Result Value Ref Range    Sodium 130 (L) 136 - 145 mmol/L     Potassium 4.5 3.5 - 5.1 mmol/L    Chloride 97 95 - 110 mmol/L    CO2 17 (L) 23 - 29 mmol/L    Glucose 127 (H) 70 - 110 mg/dL    BUN, Bld 13 6 - 20 mg/dL    Creatinine 1.0 0.5 - 1.4 mg/dL    Calcium 8.5 (L) 8.7 - 10.5 mg/dL    Total Protein 7.8 6.0 - 8.4 g/dL    Albumin 3.5 3.5 - 5.2 g/dL    Total Bilirubin 0.5 0.1 - 1.0 mg/dL    Alkaline Phosphatase 92 55 - 135 U/L     (H) 10 - 40 U/L     (H) 10 - 44 U/L    Anion Gap 16 8 - 16 mmol/L    eGFR if African American >60 >60 mL/min/1.73 m^2    eGFR if non African American >60 >60 mL/min/1.73 m^2   TSH   Result Value Ref Range    TSH 0.255 (L) 0.400 - 4.000 uIU/mL   Urinalysis - clean catch   Result Value Ref Range    Specimen UA Urine, Catheterized     Color, UA Brown (A) Yellow, Straw, Lisa    Appearance, UA Cloudy (A) Clear    pH, UA 7.0 5.0 - 8.0    Specific Gravity, UA 1.025 1.005 - 1.030    Protein, UA 3+ (A) Negative    Glucose, UA Negative Negative    Ketones, UA 1+ (A) Negative    Bilirubin (UA) 1+ (A) Negative    Occult Blood UA 3+ (A) Negative    Nitrite, UA Positive (A) Negative    Urobilinogen, UA 1.0 <2.0 EU/dL    Leukocytes, UA Trace (A) Negative   Drug screen panel, emergency   Result Value Ref Range    Benzodiazepines Presumptive Positive     Methadone metabolites Negative     Cocaine (Metab.) Negative     Opiate Scrn, Ur Presumptive Positive     Barbiturate Screen, Ur Negative     Amphetamine Screen, Ur Negative     THC Negative     Phencyclidine Negative     Creatinine, Random Ur 94.4 15.0 - 325.0 mg/dL    Toxicology Information SEE COMMENT    Ethanol   Result Value Ref Range    Alcohol, Medical, Serum <10 <10 mg/dL   Acetaminophen level   Result Value Ref Range    Acetaminophen (Tylenol), Serum <3.0 (L) 10.0 - 20.0 ug/mL   Salicylate level   Result Value Ref Range    Salicylate Lvl <5.0 (L) 15.0 - 30.0 mg/dL   T4, free   Result Value Ref Range    Free T4 0.85 0.71 - 1.51 ng/dL   Pregnancy, urine rapid   Result Value Ref Range    Preg  Test, Ur Negative    CK   Result Value Ref Range    CPK >11159 (H) 20 - 180 U/L   Lactic acid, plasma   Result Value Ref Range    Lactate (Lactic Acid) 2.0 0.5 - 2.2 mmol/L   Urinalysis Microscopic   Result Value Ref Range    RBC, UA 5 (H) 0 - 4 /hpf    WBC, UA 2 0 - 5 /hpf    Bacteria, UA Few (A) None-Occ /hpf    Hyaline Casts, UA 2 (A) 0-1/lpf /lpf    Microscopic Comment SEE COMMENT    Lactic acid, plasma   Result Value Ref Range    Lactate (Lactic Acid) 0.6 0.5 - 2.2 mmol/L   APTT   Result Value Ref Range    aPTT 28.8 21.0 - 32.0 sec   CK   Result Value Ref Range    CPK >84581 (H) 20 - 180 U/L   Lactic acid, plasma   Result Value Ref Range    Lactate (Lactic Acid) 0.7 0.5 - 2.2 mmol/L   CBC auto differential   Result Value Ref Range    WBC 11.72 3.90 - 12.70 K/uL    RBC 4.51 4.00 - 5.40 M/uL    Hemoglobin 14.0 12.0 - 16.0 g/dL    Hematocrit 40.5 37.0 - 48.5 %    MCV 90 82 - 98 fL    MCH 31.0 27.0 - 31.0 pg    MCHC 34.6 32.0 - 36.0 %    RDW 14.1 11.5 - 14.5 %    Platelets 55 (L) 150 - 350 K/uL    MPV 11.0 9.2 - 12.9 fL    Gran # 9.4 (H) 1.8 - 7.7 K/uL    Lymph # 1.1 1.0 - 4.8 K/uL    Mono # 1.1 (H) 0.3 - 1.0 K/uL    Eos # 0.0 0.0 - 0.5 K/uL    Baso # 0.02 0.00 - 0.20 K/uL    Gran% 80.4 (H) 38.0 - 73.0 %    Lymph% 9.4 (L) 18.0 - 48.0 %    Mono% 9.7 4.0 - 15.0 %    Eosinophil% 0.3 0.0 - 8.0 %    Basophil% 0.2 0.0 - 1.9 %    Differential Method Automated    Comprehensive metabolic panel   Result Value Ref Range    Sodium 137 136 - 145 mmol/L    Potassium 3.9 3.5 - 5.1 mmol/L    Chloride 112 (H) 95 - 110 mmol/L    CO2 19 (L) 23 - 29 mmol/L    Glucose 129 (H) 70 - 110 mg/dL    BUN, Bld 15 6 - 20 mg/dL    Creatinine 1.1 0.5 - 1.4 mg/dL    Calcium 5.8 (LL) 8.7 - 10.5 mg/dL    Total Protein 4.3 (L) 6.0 - 8.4 g/dL    Albumin 1.8 (L) 3.5 - 5.2 g/dL    Total Bilirubin 0.2 0.1 - 1.0 mg/dL    Alkaline Phosphatase 162 (H) 55 - 135 U/L     (H) 10 - 40 U/L     (H) 10 - 44 U/L    Anion Gap 6 (L) 8 - 16 mmol/L    eGFR  if African American >60 >60 mL/min/1.73 m^2    eGFR if non African American >60 >60 mL/min/1.73 m^2   Magnesium   Result Value Ref Range    Magnesium 1.5 (L) 1.6 - 2.6 mg/dL   Phosphorus   Result Value Ref Range    Phosphorus 4.5 2.7 - 4.5 mg/dL   Lactic acid, plasma   Result Value Ref Range    Lactate (Lactic Acid) 0.7 0.5 - 2.2 mmol/L   APTT   Result Value Ref Range    aPTT 44.2 (H) 21.0 - 32.0 sec   CK   Result Value Ref Range    CPK >89595 (H) 20 - 180 U/L   CK   Result Value Ref Range    CPK >54571 (H) 20 - 180 U/L   Magnesium   Result Value Ref Range    Magnesium 2.1 1.6 - 2.6 mg/dL   Comprehensive metabolic panel   Result Value Ref Range    Sodium 137 136 - 145 mmol/L    Potassium 4.1 3.5 - 5.1 mmol/L    Chloride 106 95 - 110 mmol/L    CO2 24 23 - 29 mmol/L    Glucose 123 (H) 70 - 110 mg/dL    BUN, Bld 13 6 - 20 mg/dL    Creatinine 1.4 0.5 - 1.4 mg/dL    Calcium 5.9 (LL) 8.7 - 10.5 mg/dL    Total Protein 4.1 (L) 6.0 - 8.4 g/dL    Albumin 1.6 (L) 3.5 - 5.2 g/dL    Total Bilirubin 0.3 0.1 - 1.0 mg/dL    Alkaline Phosphatase 168 (H) 55 - 135 U/L     (H) 10 - 40 U/L     (H) 10 - 44 U/L    Anion Gap 7 (L) 8 - 16 mmol/L    eGFR if African American 53 (A) >60 mL/min/1.73 m^2    eGFR if non African American 46 (A) >60 mL/min/1.73 m^2   Phosphorus   Result Value Ref Range    Phosphorus 3.9 2.7 - 4.5 mg/dL   CBC auto differential   Result Value Ref Range    WBC 13.12 (H) 3.90 - 12.70 K/uL    RBC 3.96 (L) 4.00 - 5.40 M/uL    Hemoglobin 12.4 12.0 - 16.0 g/dL    Hematocrit 35.6 (L) 37.0 - 48.5 %    MCV 90 82 - 98 fL    MCH 31.3 (H) 27.0 - 31.0 pg    MCHC 34.8 32.0 - 36.0 %    RDW 14.0 11.5 - 14.5 %    Platelets 54 (L) 150 - 350 K/uL    MPV 10.5 9.2 - 12.9 fL    Gran # 10.9 (H) 1.8 - 7.7 K/uL    Lymph # 1.2 1.0 - 4.8 K/uL    Mono # 1.0 0.3 - 1.0 K/uL    Eos # 0.0 0.0 - 0.5 K/uL    Baso # 0.02 0.00 - 0.20 K/uL    Gran% 82.9 (H) 38.0 - 73.0 %    Lymph% 9.4 (L) 18.0 - 48.0 %    Mono% 7.2 4.0 - 15.0 %     Eosinophil% 0.3 0.0 - 8.0 %    Basophil% 0.2 0.0 - 1.9 %    Differential Method Automated    ISTAT PROCEDURE   Result Value Ref Range    POC PH 7.407 7.35 - 7.45    POC PCO2 28.1 (LL) 35 - 45 mmHg    POC PO2 78 (L) 80 - 100 mmHg    POC HCO3 17.7 (L) 24 - 28 mmol/L    POC BE -7 -2 to 2 mmol/L    POC SATURATED O2 96 95 - 100 %    Sample ARTERIAL     Site RR     Allens Test Pass     DelSys Room Air     Mode SPONT     FiO2 21    ISTAT PROCEDURE   Result Value Ref Range    POC PH 7.333 (L) 7.35 - 7.45    POC PCO2 35.1 35 - 45 mmHg    POC PO2 137 (H) 80 - 100 mmHg    POC HCO3 18.6 (L) 24 - 28 mmol/L    POC BE -7 -2 to 2 mmol/L    POC SATURATED O2 99 95 - 100 %    Rate 16     Sample ARTERIAL     Site RR     Allens Test Pass     DelSys Adult Vent     Mode AC/PRVC     Vt 500     PEEP 5     FiO2 40          Diagnostic Results:CT scan films   ( All images reviewed Independently)   Imaging Results          X-Ray Chest AP Portable (Final result)  Result time 06/29/17 07:36:47    Final result by Minnie Horn MD (06/29/17 07:36:47)                 Impression:      Line and tube placement as described above.    Bibasilar atelectasis and hazy left basilar infiltrate.    Please see above.      Electronically signed by: MINNIE HORN MD  Date:     06/29/17  Time:    07:36              Narrative:    EXAM: Portable CXR one view    CLINICAL HISTORY: Encounter for fitting and adjustment nonvascular catheter..    COMPARISON STUDIES: 06/28/2017    FINDINGS:  Right neck catheter tip terminates in the right atrium.  Surgical clips right upper quadrant.  Nasogastric tube is terminating in the stomach.  Endotracheal tube tip terminates approximately 2.5 cm above the kaylie.    Normal heart size.  No pneumothorax is identified.  Focal atelectasis within the right and left lower lungs.  hazy infiltrate in the left pulmonary base.  Mild interval improvement.  Ribs appear intact .                             X-Ray Chest AP Portable (Final  result)  Result time 06/28/17 20:17:13    Final result by Minnie Sampson III, MD (06/28/17 20:17:13)                 Impression:     See above        Electronically signed by: MINNIE SAMPSON MD  Date:     06/28/17  Time:    20:17              Narrative:    XR CHEST AP PORTABLE    Clinical history: . Z45.2, Encounter for adjustment of vascular access device.    FINDINGS: The right jugular central venous line terminates over the mid right atrium and could be retracted 5 cm.  The endotracheal tube is satisfactory in position.  Patchy bilateral lower lobe interstitial infiltrates are again noted..  No pneumothorax or other new pulmonary disease identified.                             CT Head Without Contrast (Final result)  Result time 06/28/17 18:14:35    Final result by Minnie Sampson III, MD (06/28/17 18:14:35)                 Impression:       No acute intracranial disease identified.       Electronically signed by: MINNIE SAMPSON MD  Date:     06/28/17  Time:    18:14              Narrative:    Head CT without contrast    Clinical history: Altered Mental Status    TECHNIQUE: Routine noncontrast axial head CT. All CT scans at this facility use dose modulation, iterative reconstruction, and/or weight based dosing when appropriate to reduce radiation dose to as low as reasonably achievable.    Comparison: none    FINDINGS: There is no evidence of intracranial hemorrhage, mass-effect, hydrocephalus or other acute disease. There is no CT evidence of acute ischemia or cerebral edema.  There is mild opacification of the right mastoid air cells.  The visualized paranasal sinuses and left mastoid air cells are clear. No calvarial fracture is identified.                             CT Lumbar Spine Without Contrast (Final result)  Result time 06/28/17 18:20:35    Final result by Minnie Sampson III, MD (06/28/17 18:20:35)                 Impression:       1.  Asymmetric thickening of the right piriformis muscle with surrounding  stranding possibly related to muscle strain or inflammatory in etiology.  Findings could cause right sciatica.    2.  No acute osseous abnormality identified.  Mild L3-4 through L5-S1 disc bulging and facet arthropathy causing foraminal stenosis.        Electronically signed by: MINNIE ISSA MD  Date:     06/28/17  Time:    18:20              Narrative:    CT LUMBAR SPINE WITHOUT CONTRAST    Clinical history: Weakness (780.79).    Technique:  Axial noncontrast CT scan of the lumbar spine with sagittal and coronal reconstructions. All CT scans at this facility use dose modulation, iterative reconstruction, and/or weight based dosing when appropriate to reduce radiation dose to as low as reasonably achievable.    Comparison: None    Findings:   No fracture or other acute injury is seen in the lumbar spine. Lumbar spine alignment is anatomic. No suspicious lytic or blastic bone marrow lesions identified.  There is asymmetric thickening of the right piriformis muscle with surrounding stranding.  No other acute paravertebral soft tissue abnormality identified. The intervertebral disc heights appear relatively well preserved.    T12-L1: Unremarkable    L1-2: Unremarkable    L2-3: Unremarkable    L3-4: Small annular disc bulge and mild facet arthropathy causing mild left foraminal stenosis.    L4-5: Small annular disc bulge and facet arthropathy causing mild right and moderate left foraminal stenosis.    L5-S1:  Tiny broad-based posterior disc bulge at moderate hypertrophic facet arthropathy causing mild bilateral foraminal stenosis.                             X-Ray Chest AP Portable (Final result)  Result time 06/28/17 18:12:06    Final result by Minnie Issa III, MD (06/28/17 18:12:06)                 Impression:     Well positioned endotracheal tube.  Possible patchy lower lobe interstitial infiltrates.      Electronically signed by: MINNIE ISSA MD  Date:     06/28/17  Time:    18:12              Narrative:    XR  CHEST AP PORTABLE    Clinical history: Endotracheal tube placement. Z46.82 Encounter for fitting and adjustment of non-vascular catheter.    Findings: The endotracheal tube is well positioned terminating approximately 2.5 cm above the kaylie. There are possible patchy bilateral lower lobe interstitial infiltrates. No pneumothorax or other acute pulmonary disease identified.                              06/29/2017    Assessment and Recommendations:  Patient with overdose of multiple medications, not sure to which one. She has had multiple medication bottles those were empty.   She has no clonus, no rigidity, no arrhythmia, no hyper-reflexia, no opsoclonus, no myoclonus, she has no pyrexia.   Less likely to be serotonin syndrome and or NMS.   She has     1) Toxic Encephalopathy  2) metabolic Encephalopathy  3)  Vitamin B12 and D  Deficiency needs to be ruled out  4) As always psychogenic /conversion disorder need to be ruled out by the diagnosis of exclusion.       Recommendations:  1) Check Vitamin B12, Vitamin D, CPK,   2) if symptoms does not improve, she may need LP                              Edwin West., MD., Ph.D., MS

## 2017-06-29 NOTE — CONSULTS
Kaylene Emery [id number] is a 46 y.o. female who has been consulted for vancomycin dosing.  Consult ordered by Dr Gupta    Dx: Sepsis, secondary to pneumonia  Vancomycin trough goal = 15-20 mcg/mL  CRRT completed due to medication overdose    Antimicrobial Tx: Zosyn 4.5 G every 8 hrs, Vancomycin 1,250 mg loading dose, then 750 mg every 12 hrs.    Tmax: 100.6 F, I/O = +1,314 mL since admit on 6/28  Cultures: Collected 6/28/17 NGTD    The patient has the following labs:     Date Creatinine (mg/dl)    BUN WBC Count   6/29/2017 Estimated Creatinine Clearance: 70.4 mL/min (based on Cr of 1). Lab Results   Component Value Date    BUN 13 06/28/2017     Lab Results   Component Value Date    WBC 14.93 (H) 06/28/2017      which calculates to an Estimated Creatinine Clearance: 70.4 mL/min (based on Cr of 1)..       Current weight is 73.2 kg (161 lb 6 oz)    The patient will be started on a loading dose of vancomycin 1,250 mg, then 750 mg every 12 hours.     Patient will be followed by pharmacy for changes in renal function, toxicity, and efficacy.  The vancomycin trough has been ordered for 6/30/17 at 0830.      Thank you for allowing us to participate in this patient's care.     Martir Alonso

## 2017-06-29 NOTE — SUBJECTIVE & OBJECTIVE
"Past Medical History:   Diagnosis Date    Anxiety     Depression     GERD (gastroesophageal reflux disease)     Tobacco use        Past Surgical History:   Procedure Laterality Date    BLADDER REPAIR      CENTRAL LINE  6/28/2017         CHOLECYSTECTOMY  04/07/2017    KNEE ARTHROPLASTY      PARTIAL HYSTERECTOMY      TUBAL LIGATION         Review of patient's allergies indicates:   Allergen Reactions    Corticosteroids (glucocorticoids)      "sets off my anxiety real bad"    Tramadol Rash       No current facility-administered medications on file prior to encounter.      Current Outpatient Prescriptions on File Prior to Encounter   Medication Sig    fluoxetine (PROZAC) 40 MG capsule Take 40 mg by mouth once daily.    lorazepam (ATIVAN) 1 MG tablet Take 1 mg by mouth every 6 (six) hours as needed for Anxiety.    meloxicam (MOBIC) 7.5 MG tablet Take 1 tablet (7.5 mg total) by mouth once daily.    ondansetron (ZOFRAN-ODT) 4 MG TbDL Take 1 tablet (4 mg total) by mouth every 6 (six) hours as needed.    promethazine (PHENERGAN) 25 MG tablet Take 1 tablet (25 mg total) by mouth every 6 (six) hours as needed for Nausea.    quetiapine (SEROQUEL) 200 MG Tab Take by mouth.     Family History     Problem Relation (Age of Onset)    Diabetes Mother    Hypertension Mother        Social History Main Topics    Smoking status: Current Every Day Smoker     Packs/day: 1.00     Types: Cigarettes    Smokeless tobacco: Never Used    Alcohol use Yes      Comment: occasionally    Drug use: No    Sexual activity: Yes     Review of Systems   Unable to perform ROS: Intubated   Constitutional: Negative for activity change, appetite change, chills, diaphoresis, fatigue, fever and unexpected weight change.   HENT: Negative for congestion, dental problem, drooling, postnasal drip, rhinorrhea and voice change.    Eyes: Negative for discharge.   Respiratory: Negative for apnea, cough, choking, chest tightness, shortness of " breath, wheezing and stridor.    Cardiovascular: Negative for chest pain, palpitations and leg swelling.   Gastrointestinal: Negative for abdominal distention, blood in stool, constipation, diarrhea, nausea, rectal pain and vomiting.   Endocrine: Negative for cold intolerance, heat intolerance, polydipsia and polyuria.   Genitourinary: Negative for decreased urine volume, difficulty urinating, dysuria, enuresis, flank pain, frequency, hematuria and urgency.   Musculoskeletal: Negative for arthralgias, back pain, gait problem and joint swelling.        Muscle pains in hips and legs    Skin: Negative for rash.   Allergic/Immunologic: Negative for food allergies and immunocompromised state.   Neurological: Negative for dizziness, tremors, syncope, numbness and headaches.   Hematological: Does not bruise/bleed easily.   Psychiatric/Behavioral: Negative for agitation, behavioral problems and self-injury. The patient is not nervous/anxious and is not hyperactive.    All other systems reviewed and are negative.    Objective:     Vital Signs (Most Recent):  Temp: 97.6 °F (36.4 °C) (06/29/17 1102)  Pulse: 86 (06/29/17 1102)  Resp: (!) 22 (06/29/17 1102)  BP: 138/84 (06/29/17 1102)  SpO2: 100 % (06/29/17 1102) Vital Signs (24h Range):  Temp:  [95.6 °F (35.3 °C)-100.6 °F (38.1 °C)] 97.6 °F (36.4 °C)  Pulse:  [] 86  Resp:  [14-32] 22  SpO2:  [93 %-100 %] 100 %  BP: ()/(35-99) 138/84     Weight: 75.4 kg (166 lb 3.6 oz)  Body mass index is 28.53 kg/m².    Physical Exam   Constitutional: No distress. She is intubated.   Intubated, sedated on propafol, breathing over the vent, intermittently agitated.   HENT:   Head: Normocephalic.   Eyes: Conjunctivae are normal. No scleral icterus.   Cardiovascular: Regular rhythm, normal heart sounds and intact distal pulses.  Tachycardia present.    No murmur heard.  Pulmonary/Chest: She is intubated. She has rhonchi.   Abdominal: Soft. She exhibits no distension.   Musculoskeletal:  She exhibits no edema or deformity.   Lymphadenopathy:     She has no cervical adenopathy.   Neurological:   Intubated and able to communicate    Skin: Skin is warm. She is not diaphoretic. No erythema.   Psychiatric: She is not agitated.   Nursing note and vitals reviewed.       Significant Labs:   A1C: No results for input(s): HGBA1C in the last 4320 hours.  ABGs:     Recent Labs  Lab 06/29/17  0525   PH 7.333*   PCO2 35.1   HCO3 18.6*   POCSATURATED 99   BE -7     Bilirubin:     Recent Labs  Lab 06/28/17  1140 06/29/17  0500   BILITOT 0.5 0.2     Blood Culture:     Recent Labs  Lab 06/28/17  1600 06/28/17  1616   LABBLOO No Growth to date No Growth to date     BMP:     Recent Labs  Lab 06/29/17  0500   *      K 3.9   *   CO2 19*   BUN 15   CREATININE 1.1   CALCIUM 5.8*   MG 1.5*     CBC:     Recent Labs  Lab 06/28/17  1140 06/29/17  0500   WBC 14.93* 11.72   HGB 17.3* 14.0   HCT 47.9 40.5    55*     CMP:     Recent Labs  Lab 06/28/17  1140 06/29/17  0500   * 137   K 4.5 3.9   CL 97 112*   CO2 17* 19*   * 129*   BUN 13 15   CREATININE 1.0 1.1   CALCIUM 8.5* 5.8*   PROT 7.8 4.3*   ALBUMIN 3.5 1.8*   BILITOT 0.5 0.2   ALKPHOS 92 162*   * 626*   * 226*   ANIONGAP 16 6*   EGFRNONAA >60 >60     Cardiac Markers: No results for input(s): CKMB, MYOGLOBIN, BNP, TROPISTAT in the last 48 hours.  Coagulation:     Recent Labs  Lab 06/29/17  0500   APTT 44.2*     Lactic Acid:     Recent Labs  Lab 06/28/17  2101 06/29/17  0052 06/29/17  0500   LACTATE 0.6 0.7 0.7     Lipase: No results for input(s): LIPASE in the last 48 hours.  Lipid Panel: No results for input(s): CHOL, HDL, LDLCALC, TRIG, CHOLHDL in the last 48 hours.  Magnesium:     Recent Labs  Lab 06/29/17  0500   MG 1.5*     POCT Glucose: No results for input(s): POCTGLUCOSE in the last 48 hours.  Prealbumin: No results for input(s): PREALBUMIN in the last 48 hours.  Respiratory Culture: No results for input(s): GSRESP,  RESPIRATORYC in the last 48 hours.  Troponin: No results for input(s): TROPONINI in the last 48 hours.  TSH:     Recent Labs  Lab 06/28/17  1140   TSH 0.255*     Urine Culture: No results for input(s): LABURIN in the last 48 hours.  Urine Studies:     Recent Labs  Lab 06/28/17  1514   COLORU Brown*   APPEARANCEUA Cloudy*   PHUR 7.0   SPECGRAV 1.025   PROTEINUA 3+*   GLUCUA Negative   KETONESU 1+*   BILIRUBINUA 1+*   OCCULTUA 3+*   NITRITE Positive*   UROBILINOGEN 1.0   LEUKOCYTESUR Trace*   RBCUA 5*   WBCUA 2   BACTERIA Few*   HYALINECASTS 2*     All pertinent labs within the past 24 hours have been reviewed.    Significant Imaging: I have reviewed and interpreted all pertinent imaging results/findings within the past 24 hours.     Imaging Results          X-Ray Chest AP Portable (Final result)  Result time 06/29/17 07:36:47    Final result by Minnie Horn MD (06/29/17 07:36:47)                 Impression:      Line and tube placement as described above.    Bibasilar atelectasis and hazy left basilar infiltrate.    Please see above.      Electronically signed by: MINNIE HORN MD  Date:     06/29/17  Time:    07:36              Narrative:    EXAM: Portable CXR one view    CLINICAL HISTORY: Encounter for fitting and adjustment nonvascular catheter..    COMPARISON STUDIES: 06/28/2017    FINDINGS:  Right neck catheter tip terminates in the right atrium.  Surgical clips right upper quadrant.  Nasogastric tube is terminating in the stomach.  Endotracheal tube tip terminates approximately 2.5 cm above the kaylie.    Normal heart size.  No pneumothorax is identified.  Focal atelectasis within the right and left lower lungs.  hazy infiltrate in the left pulmonary base.  Mild interval improvement.  Ribs appear intact .                             X-Ray Chest AP Portable (Final result)  Result time 06/28/17 20:17:13    Final result by Minnie Issa III, MD (06/28/17 20:17:13)                 Impression:     See above         Electronically signed by: MINNIE ISSA MD  Date:     06/28/17  Time:    20:17              Narrative:    XR CHEST AP PORTABLE    Clinical history: . Z45.2, Encounter for adjustment of vascular access device.    FINDINGS: The right jugular central venous line terminates over the mid right atrium and could be retracted 5 cm.  The endotracheal tube is satisfactory in position.  Patchy bilateral lower lobe interstitial infiltrates are again noted..  No pneumothorax or other new pulmonary disease identified.                             CT Head Without Contrast (Final result)  Result time 06/28/17 18:14:35    Final result by Minnie Issa III, MD (06/28/17 18:14:35)                 Impression:       No acute intracranial disease identified.       Electronically signed by: MINNIE ISSA MD  Date:     06/28/17  Time:    18:14              Narrative:    Head CT without contrast    Clinical history: Altered Mental Status    TECHNIQUE: Routine noncontrast axial head CT. All CT scans at this facility use dose modulation, iterative reconstruction, and/or weight based dosing when appropriate to reduce radiation dose to as low as reasonably achievable.    Comparison: none    FINDINGS: There is no evidence of intracranial hemorrhage, mass-effect, hydrocephalus or other acute disease. There is no CT evidence of acute ischemia or cerebral edema.  There is mild opacification of the right mastoid air cells.  The visualized paranasal sinuses and left mastoid air cells are clear. No calvarial fracture is identified.                             CT Lumbar Spine Without Contrast (Final result)  Result time 06/28/17 18:20:35    Final result by Minnie Issa III, MD (06/28/17 18:20:35)                 Impression:       1.  Asymmetric thickening of the right piriformis muscle with surrounding stranding possibly related to muscle strain or inflammatory in etiology.  Findings could cause right sciatica.    2.  No acute osseous  abnormality identified.  Mild L3-4 through L5-S1 disc bulging and facet arthropathy causing foraminal stenosis.        Electronically signed by: MINNIE ISSA MD  Date:     06/28/17  Time:    18:20              Narrative:    CT LUMBAR SPINE WITHOUT CONTRAST    Clinical history: Weakness (780.79).    Technique:  Axial noncontrast CT scan of the lumbar spine with sagittal and coronal reconstructions. All CT scans at this facility use dose modulation, iterative reconstruction, and/or weight based dosing when appropriate to reduce radiation dose to as low as reasonably achievable.    Comparison: None    Findings:   No fracture or other acute injury is seen in the lumbar spine. Lumbar spine alignment is anatomic. No suspicious lytic or blastic bone marrow lesions identified.  There is asymmetric thickening of the right piriformis muscle with surrounding stranding.  No other acute paravertebral soft tissue abnormality identified. The intervertebral disc heights appear relatively well preserved.    T12-L1: Unremarkable    L1-2: Unremarkable    L2-3: Unremarkable    L3-4: Small annular disc bulge and mild facet arthropathy causing mild left foraminal stenosis.    L4-5: Small annular disc bulge and facet arthropathy causing mild right and moderate left foraminal stenosis.    L5-S1:  Tiny broad-based posterior disc bulge at moderate hypertrophic facet arthropathy causing mild bilateral foraminal stenosis.                             X-Ray Chest AP Portable (Final result)  Result time 06/28/17 18:12:06    Final result by Minnie Issa III, MD (06/28/17 18:12:06)                 Impression:     Well positioned endotracheal tube.  Possible patchy lower lobe interstitial infiltrates.      Electronically signed by: MINNIE ISSA MD  Date:     06/28/17  Time:    18:12              Narrative:    XR CHEST AP PORTABLE    Clinical history: Endotracheal tube placement. Z46.82 Encounter for fitting and adjustment of non-vascular  catheter.    Findings: The endotracheal tube is well positioned terminating approximately 2.5 cm above the kaylie. There are possible patchy bilateral lower lobe interstitial infiltrates. No pneumothorax or other acute pulmonary disease identified.

## 2017-06-29 NOTE — HPI
Ms. Emery is a 47 y/o  female with h/o depression, substance OD in the past, was found with AMS earlier today and was brought to the ED. Patient is currently intubated and history obtained per chart review. Family not at bedside.    Apparently patient likely had overdosed on multiple medications including Methocarbamol, Mobic, lorazepam, flexeril, phenergan, Seroquel, Celexa. Her boy friend is on Geodon, Klonopin, Wellbutrin and Effexor.  Most of her recent filled prescription bottles were found empty at the bedside when her bf found her.  Per chart review, yesterday patient was summoned to appear in the court for her DUI that occurred 4 months ago. She was extremely anxious and pacing all day today.     Labs show lactic acid 2.0, CPK > 40,000, CXR show bilateral patchy infiltrates, ANNETTE < 10, tylenol level < 5,  CO2 17, Na 130, , , WBC 15,000, Hgb 17.3.    Patient is currently intubated on the ventilator, breathing over the vent. She received NS 6 liters so far. Patient was evaluated by both neurology and nephrology in the ED. Nephrology plans to initiate patient on CRRT for poison/toxin control.

## 2017-06-29 NOTE — PROGRESS NOTES
Daily maintenance complete with necessary modifications.  Dialysate levels at 20 L at this time with DFR at 1.9 L/hr.  Report to nurse attending.

## 2017-06-29 NOTE — PLAN OF CARE
Problem: Patient Care Overview  Goal: Plan of Care Review  Outcome: Ongoing (interventions implemented as appropriate)  Admitted from ED after drug overdose.  Under PEC, Q15 minute monitoring initiated upon arrival to unit. On ventilator to manage airway. OG inserted, immediate return dark brown liquid.  Propofol maxed out at 50mcg/kg/min to maintain a RASS of -1.  Non-violent restraints initiated due to attempting to self extubate when awake.  CRRT initiated for filtration only.  Heparin gtt initiated due to CRRT.  Bicarbonate drip running at 200ml/hr.  Hypothermic during shift, requiring Fabi hugger to achieve normothermia.  UOP 20-50ml/hr of dark brown urine.  Safety maintained, turned q2h throughout shift.

## 2017-06-29 NOTE — PROCEDURES
CENTRAL VENOUS LINE INSERTION:    Antisepsis: sterile gloves, mask, gown, and drape.  Skin prepped with betadine.  Catheter: other trialysis vasc cath + extra central line via right internal jugular.  Insertion directed by ultrasound.  Heme positive aspiration all ports.  Secured with sutures at 2 cm at skin.  Dressing: dry sterile gauze and bio occlusive dressing.  Complications: small hematoma with no active bleeding.

## 2017-06-29 NOTE — ASSESSMENT & PLAN NOTE
- ABG shows PH 7.4, PCO2 28, PO2 78 on Fio2 21%  - Intubated for airway protection  - Pulmonary consult for vent management

## 2017-06-29 NOTE — CONSULTS
"[facility] -   Nephrology  Consult Note    Patient Name: Kaylene Emery  MRN: [id number]  Admission Date: 6/28/2017  Hospital Length of Stay: 0 days  Attending Provider: Demetri Gupta MD   Primary Care Physician: Jonas Jimenez MD  Principal Problem:Sepsis    Consults  Subjective:     HPI: Patient is a 46-year-old with multiple psychiatry issues.  Comes in with multiple medication overdose with unclear intent suicidal versus overdose versus toxic injections.  Patient has dark-colored urine along with severe rhabdomyolysis.  Patient is now intubated.  Dr. Giles consulted me for initiation for CRRT for multiple drug poisoning and rhabdo myelolysis.  Patient has had transient episode of hypotension with anxiolytics.  Still has some urine output and is nonoliguric.  Most of the history has been obtained from Dr. Giles, patient's fiancé and from the medical records.    Past Medical History:   Diagnosis Date    Anxiety     Depression     GERD (gastroesophageal reflux disease)     Tobacco use        Past Surgical History:   Procedure Laterality Date    BLADDER REPAIR      CENTRAL LINE  6/28/2017         CHOLECYSTECTOMY  04/07/2017    KNEE ARTHROPLASTY      PARTIAL HYSTERECTOMY      TUBAL LIGATION         Review of patient's allergies indicates:   Allergen Reactions    Corticosteroids (glucocorticoids)      "sets off my anxiety real bad"    Tramadol Rash       No current facility-administered medications on file prior to encounter.      Current Outpatient Prescriptions on File Prior to Encounter   Medication Sig    fluoxetine (PROZAC) 40 MG capsule Take 40 mg by mouth once daily.    lorazepam (ATIVAN) 1 MG tablet Take 1 mg by mouth every 6 (six) hours as needed for Anxiety.    meloxicam (MOBIC) 7.5 MG tablet Take 1 tablet (7.5 mg total) by mouth once daily.    ondansetron (ZOFRAN-ODT) 4 MG TbDL Take 1 tablet (4 mg total) by mouth every 6 (six) hours as needed.    promethazine " (PHENERGAN) 25 MG tablet Take 1 tablet (25 mg total) by mouth every 6 (six) hours as needed for Nausea.    quetiapine (SEROQUEL) 200 MG Tab Take by mouth.     Family History     Problem Relation (Age of Onset)    Diabetes Mother    Hypertension Mother        Social History Main Topics    Smoking status: Current Every Day Smoker     Packs/day: 1.00     Types: Cigarettes    Smokeless tobacco: Never Used    Alcohol use Yes      Comment: occasionally    Drug use: No    Sexual activity: Yes     Review of Systems   Unable to perform ROS: Intubated     Objective:     Vital Signs (Most Recent):  Temp: 96.3 °F (35.7 °C) (06/28/17 1900)  Pulse: 76 (06/28/17 2158)  Resp: 17 (06/28/17 2158)  BP: 111/78 (06/28/17 2158)  SpO2: 100 % (06/28/17 2158) Vital Signs (24h Range):  Temp:  [96.3 °F (35.7 °C)-100.6 °F (38.1 °C)] 96.3 °F (35.7 °C)  Pulse:  [] 76  Resp:  [15-32] 17  SpO2:  [93 %-100 %] 100 %  BP: ()/(35-95) 111/78     Weight: 65.8 kg (145 lb)  Body mass index is 24.89 kg/m².    Physical Exam   Constitutional: No distress. She is intubated.   Intubated, sedated on propafol, breathing over the vent, intermittently agitated.   HENT:   Head: Normocephalic.   Eyes: Conjunctivae are normal. No scleral icterus.   Cardiovascular: Regular rhythm, normal heart sounds and intact distal pulses.  Tachycardia present.    No murmur heard.  Pulmonary/Chest: She is intubated. She has rhonchi.   Abdominal: Soft. She exhibits no distension.   Musculoskeletal: She exhibits no edema or deformity.   Lymphadenopathy:     She has no cervical adenopathy.   Neurological:   Intubated and sedated on propofol, pupils equal and slightly reactive, moves spontaneously, agitated on the vent, responds to painful stimuli   Skin: Skin is warm. She is not diaphoretic. No erythema.   Psychiatric: She is agitated.   Nursing note and vitals reviewed.       Significant Labs:   A1C: No results for input(s): HGBA1C in the last 4320 hours.  ABGs:      Recent Labs  Lab 06/28/17  1531   PH 7.407   PCO2 28.1*   HCO3 17.7*   POCSATURATED 96   BE -7     Bilirubin:     Recent Labs  Lab 06/28/17  1140   BILITOT 0.5     Blood Culture: No results for input(s): LABBLOO in the last 48 hours.  BMP:     Recent Labs  Lab 06/28/17  1140   *   *   K 4.5   CL 97   CO2 17*   BUN 13   CREATININE 1.0   CALCIUM 8.5*     CBC:     Recent Labs  Lab 06/28/17  1140   WBC 14.93*   HGB 17.3*   HCT 47.9        CMP:     Recent Labs  Lab 06/28/17  1140   *   K 4.5   CL 97   CO2 17*   *   BUN 13   CREATININE 1.0   CALCIUM 8.5*   PROT 7.8   ALBUMIN 3.5   BILITOT 0.5   ALKPHOS 92   *   *   ANIONGAP 16   EGFRNONAA >60     Cardiac Markers: No results for input(s): CKMB, MYOGLOBIN, BNP, TROPISTAT in the last 48 hours.  Coagulation:     Recent Labs  Lab 06/28/17  2142   APTT 28.8     Lactic Acid:     Recent Labs  Lab 06/28/17  1616 06/28/17  2101   LACTATE 2.0 0.6     Lipase: No results for input(s): LIPASE in the last 48 hours.  Lipid Panel: No results for input(s): CHOL, HDL, LDLCALC, TRIG, CHOLHDL in the last 48 hours.  Magnesium: No results for input(s): MG in the last 48 hours.  POCT Glucose: No results for input(s): POCTGLUCOSE in the last 48 hours.  Prealbumin: No results for input(s): PREALBUMIN in the last 48 hours.  Respiratory Culture: No results for input(s): GSRESP, RESPIRATORYC in the last 48 hours.  Troponin: No results for input(s): TROPONINI in the last 48 hours.  TSH:     Recent Labs  Lab 06/28/17  1140   TSH 0.255*     Urine Culture: No results for input(s): LABURIN in the last 48 hours.  Urine Studies:     Recent Labs  Lab 06/28/17  1514   COLORU Brown*   APPEARANCEUA Cloudy*   PHUR 7.0   SPECGRAV 1.025   PROTEINUA 3+*   GLUCUA Negative   KETONESU 1+*   BILIRUBINUA 1+*   OCCULTUA 3+*   NITRITE Positive*   UROBILINOGEN 1.0   LEUKOCYTESUR Trace*   RBCUA 5*   WBCUA 2   BACTERIA Few*   HYALINECASTS 2*     All pertinent labs within the  past 24 hours have been reviewed.    Significant Imaging: I have reviewed and interpreted all pertinent imaging results/findings within the past 24 hours.     Imaging Results          X-Ray Chest AP Portable (Final result)  Result time 06/28/17 20:17:13    Final result by Minnie Sampson III, MD (06/28/17 20:17:13)                 Impression:     See above        Electronically signed by: MINNIE SAMPSON MD  Date:     06/28/17  Time:    20:17              Narrative:    XR CHEST AP PORTABLE    Clinical history: . Z45.2, Encounter for adjustment of vascular access device.    FINDINGS: The right jugular central venous line terminates over the mid right atrium and could be retracted 5 cm.  The endotracheal tube is satisfactory in position.  Patchy bilateral lower lobe interstitial infiltrates are again noted..  No pneumothorax or other new pulmonary disease identified.                             CT Head Without Contrast (Final result)  Result time 06/28/17 18:14:35    Final result by Minnie Sampson III, MD (06/28/17 18:14:35)                 Impression:       No acute intracranial disease identified.       Electronically signed by: MINNIE SAMPSON MD  Date:     06/28/17  Time:    18:14              Narrative:    Head CT without contrast    Clinical history: Altered Mental Status    TECHNIQUE: Routine noncontrast axial head CT. All CT scans at this facility use dose modulation, iterative reconstruction, and/or weight based dosing when appropriate to reduce radiation dose to as low as reasonably achievable.    Comparison: none    FINDINGS: There is no evidence of intracranial hemorrhage, mass-effect, hydrocephalus or other acute disease. There is no CT evidence of acute ischemia or cerebral edema.  There is mild opacification of the right mastoid air cells.  The visualized paranasal sinuses and left mastoid air cells are clear. No calvarial fracture is identified.                             CT Lumbar Spine Without Contrast  (Final result)  Result time 06/28/17 18:20:35    Final result by Minnie Sampson III, MD (06/28/17 18:20:35)                 Impression:       1.  Asymmetric thickening of the right piriformis muscle with surrounding stranding possibly related to muscle strain or inflammatory in etiology.  Findings could cause right sciatica.    2.  No acute osseous abnormality identified.  Mild L3-4 through L5-S1 disc bulging and facet arthropathy causing foraminal stenosis.        Electronically signed by: MINNIE SAMPSON MD  Date:     06/28/17  Time:    18:20              Narrative:    CT LUMBAR SPINE WITHOUT CONTRAST    Clinical history: Weakness (780.79).    Technique:  Axial noncontrast CT scan of the lumbar spine with sagittal and coronal reconstructions. All CT scans at this facility use dose modulation, iterative reconstruction, and/or weight based dosing when appropriate to reduce radiation dose to as low as reasonably achievable.    Comparison: None    Findings:   No fracture or other acute injury is seen in the lumbar spine. Lumbar spine alignment is anatomic. No suspicious lytic or blastic bone marrow lesions identified.  There is asymmetric thickening of the right piriformis muscle with surrounding stranding.  No other acute paravertebral soft tissue abnormality identified. The intervertebral disc heights appear relatively well preserved.    T12-L1: Unremarkable    L1-2: Unremarkable    L2-3: Unremarkable    L3-4: Small annular disc bulge and mild facet arthropathy causing mild left foraminal stenosis.    L4-5: Small annular disc bulge and facet arthropathy causing mild right and moderate left foraminal stenosis.    L5-S1:  Tiny broad-based posterior disc bulge at moderate hypertrophic facet arthropathy causing mild bilateral foraminal stenosis.                             X-Ray Chest AP Portable (Final result)  Result time 06/28/17 18:12:06    Final result by Minnie Sampson III, MD (06/28/17 18:12:06)                  Impression:     Well positioned endotracheal tube.  Possible patchy lower lobe interstitial infiltrates.      Electronically signed by: MINNIE SAMPSON MD  Date:     06/28/17  Time:    18:12              Narrative:    XR CHEST AP PORTABLE    Clinical history: Endotracheal tube placement. Z46.82 Encounter for fitting and adjustment of non-vascular catheter.    Findings: The endotracheal tube is well positioned terminating approximately 2.5 cm above the kaylie. There are possible patchy bilateral lower lobe interstitial infiltrates. No pneumothorax or other acute pulmonary disease identified.                                Assessment/Plan:     Non-traumatic rhabdomyolysis    Initiate CRRT, IV fluids with bicarbonate.  No ultrafiltration.  6 L of fluids given in the ER.  Maintain adequate urine output.  Decision to start CRRT is based on multiple medications poisoning as well as severe rhabdomyolysis.      Critical care time spent today is about 90 minutes total in multiple face to face visits.  Greater than 50% of the time was spent in counseling with the patient's fiancee ( all consents obtained for central line and and CRRT with fiancee as patient is intubated ) and discussing the therapeutic options with all specialties.    Critical care time was exclusive of separately billable consult , other  procedures and treating other patients.   Critical care was time spent personally by me on the following activities: development of treatment plan with patient or surrogate, discussions with consultants, evaluation of patient's response to treatment, examination of patient, obtaining history from patient or surrogate, ordering and performing treatments and interventions, ordering and review of laboratory studies, ordering and review of radiographic studies, pulse oximetry, re-evaluation of patient's condition, review of old charts and interpretation of cardiac output measurements.                       Thank you for your  consult.     Suzy Sarah MD  Nephrology  [facility]

## 2017-06-29 NOTE — ASSESSMENT & PLAN NOTE
- Start Vanc and Zosyn (received rocephin, zithromax in the ED)  - Follow up on blood, sputum cultures  - Duonebs q6

## 2017-06-29 NOTE — SUBJECTIVE & OBJECTIVE
"Past Medical History:   Diagnosis Date    Anxiety     Depression     GERD (gastroesophageal reflux disease)     Tobacco use        Past Surgical History:   Procedure Laterality Date    BLADDER REPAIR      CENTRAL LINE  6/28/2017         CHOLECYSTECTOMY  04/07/2017    KNEE ARTHROPLASTY      PARTIAL HYSTERECTOMY      TUBAL LIGATION         Review of patient's allergies indicates:   Allergen Reactions    Corticosteroids (glucocorticoids)      "sets off my anxiety real bad"    Tramadol Rash       No current facility-administered medications on file prior to encounter.      Current Outpatient Prescriptions on File Prior to Encounter   Medication Sig    fluoxetine (PROZAC) 40 MG capsule Take 40 mg by mouth once daily.    lorazepam (ATIVAN) 1 MG tablet Take 1 mg by mouth every 6 (six) hours as needed for Anxiety.    meloxicam (MOBIC) 7.5 MG tablet Take 1 tablet (7.5 mg total) by mouth once daily.    ondansetron (ZOFRAN-ODT) 4 MG TbDL Take 1 tablet (4 mg total) by mouth every 6 (six) hours as needed.    promethazine (PHENERGAN) 25 MG tablet Take 1 tablet (25 mg total) by mouth every 6 (six) hours as needed for Nausea.    quetiapine (SEROQUEL) 200 MG Tab Take by mouth.     Family History     Problem Relation (Age of Onset)    Diabetes Mother    Hypertension Mother        Social History Main Topics    Smoking status: Current Every Day Smoker     Packs/day: 1.00     Types: Cigarettes    Smokeless tobacco: Never Used    Alcohol use Yes      Comment: occasionally    Drug use: No    Sexual activity: Yes     Review of Systems   Unable to perform ROS: Intubated     Objective:     Vital Signs (Most Recent):  Temp: 96.3 °F (35.7 °C) (06/28/17 1900)  Pulse: 76 (06/28/17 2158)  Resp: 17 (06/28/17 2158)  BP: 111/78 (06/28/17 2158)  SpO2: 100 % (06/28/17 2158) Vital Signs (24h Range):  Temp:  [96.3 °F (35.7 °C)-100.6 °F (38.1 °C)] 96.3 °F (35.7 °C)  Pulse:  [] 76  Resp:  [15-32] 17  SpO2:  [93 %-100 %] 100 " %  BP: ()/(35-95) 111/78     Weight: 65.8 kg (145 lb)  Body mass index is 24.89 kg/m².    Physical Exam   Constitutional: No distress. She is intubated.   Intubated, sedated on propafol, breathing over the vent, intermittently agitated.   HENT:   Head: Normocephalic.   Eyes: Conjunctivae are normal. No scleral icterus.   Cardiovascular: Regular rhythm, normal heart sounds and intact distal pulses.  Tachycardia present.    No murmur heard.  Pulmonary/Chest: She is intubated. She has rhonchi.   Abdominal: Soft. She exhibits no distension.   Musculoskeletal: She exhibits no edema or deformity.   Lymphadenopathy:     She has no cervical adenopathy.   Neurological:   Intubated and sedated on propofol, pupils equal and slightly reactive, moves spontaneously, agitated on the vent, responds to painful stimuli   Skin: Skin is warm. She is not diaphoretic. No erythema.   Psychiatric: She is agitated.   Nursing note and vitals reviewed.       Significant Labs:   A1C: No results for input(s): HGBA1C in the last 4320 hours.  ABGs:     Recent Labs  Lab 06/28/17  1531   PH 7.407   PCO2 28.1*   HCO3 17.7*   POCSATURATED 96   BE -7     Bilirubin:     Recent Labs  Lab 06/28/17  1140   BILITOT 0.5     Blood Culture: No results for input(s): LABBLOO in the last 48 hours.  BMP:     Recent Labs  Lab 06/28/17  1140   *   *   K 4.5   CL 97   CO2 17*   BUN 13   CREATININE 1.0   CALCIUM 8.5*     CBC:     Recent Labs  Lab 06/28/17  1140   WBC 14.93*   HGB 17.3*   HCT 47.9        CMP:     Recent Labs  Lab 06/28/17  1140   *   K 4.5   CL 97   CO2 17*   *   BUN 13   CREATININE 1.0   CALCIUM 8.5*   PROT 7.8   ALBUMIN 3.5   BILITOT 0.5   ALKPHOS 92   *   *   ANIONGAP 16   EGFRNONAA >60     Cardiac Markers: No results for input(s): CKMB, MYOGLOBIN, BNP, TROPISTAT in the last 48 hours.  Coagulation:     Recent Labs  Lab 06/28/17  2142   APTT 28.8     Lactic Acid:     Recent Labs  Lab  06/28/17  1616 06/28/17  2101   LACTATE 2.0 0.6     Lipase: No results for input(s): LIPASE in the last 48 hours.  Lipid Panel: No results for input(s): CHOL, HDL, LDLCALC, TRIG, CHOLHDL in the last 48 hours.  Magnesium: No results for input(s): MG in the last 48 hours.  POCT Glucose: No results for input(s): POCTGLUCOSE in the last 48 hours.  Prealbumin: No results for input(s): PREALBUMIN in the last 48 hours.  Respiratory Culture: No results for input(s): GSRESP, RESPIRATORYC in the last 48 hours.  Troponin: No results for input(s): TROPONINI in the last 48 hours.  TSH:     Recent Labs  Lab 06/28/17  1140   TSH 0.255*     Urine Culture: No results for input(s): LABURIN in the last 48 hours.  Urine Studies:     Recent Labs  Lab 06/28/17  1514   COLORU Brown*   APPEARANCEUA Cloudy*   PHUR 7.0   SPECGRAV 1.025   PROTEINUA 3+*   GLUCUA Negative   KETONESU 1+*   BILIRUBINUA 1+*   OCCULTUA 3+*   NITRITE Positive*   UROBILINOGEN 1.0   LEUKOCYTESUR Trace*   RBCUA 5*   WBCUA 2   BACTERIA Few*   HYALINECASTS 2*     All pertinent labs within the past 24 hours have been reviewed.    Significant Imaging: I have reviewed and interpreted all pertinent imaging results/findings within the past 24 hours.     Imaging Results          X-Ray Chest AP Portable (Final result)  Result time 06/28/17 20:17:13    Final result by Minnie Sampson III, MD (06/28/17 20:17:13)                 Impression:     See above        Electronically signed by: MINNIE SAMPSON MD  Date:     06/28/17  Time:    20:17              Narrative:    XR CHEST AP PORTABLE    Clinical history: . Z45.2, Encounter for adjustment of vascular access device.    FINDINGS: The right jugular central venous line terminates over the mid right atrium and could be retracted 5 cm.  The endotracheal tube is satisfactory in position.  Patchy bilateral lower lobe interstitial infiltrates are again noted..  No pneumothorax or other new pulmonary disease identified.                              CT Head Without Contrast (Final result)  Result time 06/28/17 18:14:35    Final result by Minnie Sampson III, MD (06/28/17 18:14:35)                 Impression:       No acute intracranial disease identified.       Electronically signed by: MINNIE SAMPSON MD  Date:     06/28/17  Time:    18:14              Narrative:    Head CT without contrast    Clinical history: Altered Mental Status    TECHNIQUE: Routine noncontrast axial head CT. All CT scans at this facility use dose modulation, iterative reconstruction, and/or weight based dosing when appropriate to reduce radiation dose to as low as reasonably achievable.    Comparison: none    FINDINGS: There is no evidence of intracranial hemorrhage, mass-effect, hydrocephalus or other acute disease. There is no CT evidence of acute ischemia or cerebral edema.  There is mild opacification of the right mastoid air cells.  The visualized paranasal sinuses and left mastoid air cells are clear. No calvarial fracture is identified.                             CT Lumbar Spine Without Contrast (Final result)  Result time 06/28/17 18:20:35    Final result by Minnie Sampson III, MD (06/28/17 18:20:35)                 Impression:       1.  Asymmetric thickening of the right piriformis muscle with surrounding stranding possibly related to muscle strain or inflammatory in etiology.  Findings could cause right sciatica.    2.  No acute osseous abnormality identified.  Mild L3-4 through L5-S1 disc bulging and facet arthropathy causing foraminal stenosis.        Electronically signed by: MINNIE SAMPSON MD  Date:     06/28/17  Time:    18:20              Narrative:    CT LUMBAR SPINE WITHOUT CONTRAST    Clinical history: Weakness (780.79).    Technique:  Axial noncontrast CT scan of the lumbar spine with sagittal and coronal reconstructions. All CT scans at this facility use dose modulation, iterative reconstruction, and/or weight based dosing when appropriate to reduce radiation  dose to as low as reasonably achievable.    Comparison: None    Findings:   No fracture or other acute injury is seen in the lumbar spine. Lumbar spine alignment is anatomic. No suspicious lytic or blastic bone marrow lesions identified.  There is asymmetric thickening of the right piriformis muscle with surrounding stranding.  No other acute paravertebral soft tissue abnormality identified. The intervertebral disc heights appear relatively well preserved.    T12-L1: Unremarkable    L1-2: Unremarkable    L2-3: Unremarkable    L3-4: Small annular disc bulge and mild facet arthropathy causing mild left foraminal stenosis.    L4-5: Small annular disc bulge and facet arthropathy causing mild right and moderate left foraminal stenosis.    L5-S1:  Tiny broad-based posterior disc bulge at moderate hypertrophic facet arthropathy causing mild bilateral foraminal stenosis.                             X-Ray Chest AP Portable (Final result)  Result time 06/28/17 18:12:06    Final result by Minnie Sampson III, MD (06/28/17 18:12:06)                 Impression:     Well positioned endotracheal tube.  Possible patchy lower lobe interstitial infiltrates.      Electronically signed by: MNINIE SAMPSON MD  Date:     06/28/17  Time:    18:12              Narrative:    XR CHEST AP PORTABLE    Clinical history: Endotracheal tube placement. Z46.82 Encounter for fitting and adjustment of non-vascular catheter.    Findings: The endotracheal tube is well positioned terminating approximately 2.5 cm above the kaylie. There are possible patchy bilateral lower lobe interstitial infiltrates. No pneumothorax or other acute pulmonary disease identified.

## 2017-06-29 NOTE — HPI
Patient is a 46-year-old with multiple psychiatry issues.  Comes in with multiple medication overdose with unclear intent suicidal versus overdose versus toxic injections.  Patient has dark-colored urine along with severe rhabdomyolysis.  Patient is now intubated.  Dr. Giles consulted me for initiation for CRRT for multiple drug poisoning and rhabdo myelolysis.  Patient has had transient episode of hypotension with anxiolytics.  Still has some urine output and is nonoliguric.  Most of the history has been obtained from Dr. Giles, patient's fiancé and from the medical records.

## 2017-06-29 NOTE — PROGRESS NOTES
"[facility]  Nephrology  Progress Note    Patient Name: Kaylene Emery  MRN: [id number]  Admission Date: 6/28/2017  Hospital Length of Stay: 1 days  Attending Provider: Stephanie Parikh MD   Primary Care Physician: Jonas Jimenez MD  Principal Problem:Polysubstance overdose    Subjective:     HPI: Patient is a 46-year-old with multiple psychiatry issues.  Comes in with multiple medication overdose with unclear intent suicidal versus overdose versus toxic injections.  Patient has dark-colored urine along with severe rhabdomyolysis.  Patient is now intubated.  Dr. Giles consulted me for initiation for CRRT for multiple drug poisoning and rhabdo myelolysis.  Patient has had transient episode of hypotension with anxiolytics.  Still has some urine output and is nonoliguric.  Most of the history has been obtained from Dr. Giles, patient's fiancé and from the medical records.    Past Medical History:   Diagnosis Date    Anxiety     Depression     GERD (gastroesophageal reflux disease)     Tobacco use        Past Surgical History:   Procedure Laterality Date    BLADDER REPAIR      CENTRAL LINE  6/28/2017         CHOLECYSTECTOMY  04/07/2017    KNEE ARTHROPLASTY      PARTIAL HYSTERECTOMY      TUBAL LIGATION         Review of patient's allergies indicates:   Allergen Reactions    Corticosteroids (glucocorticoids)      "sets off my anxiety real bad"    Tramadol Rash       No current facility-administered medications on file prior to encounter.      Current Outpatient Prescriptions on File Prior to Encounter   Medication Sig    fluoxetine (PROZAC) 40 MG capsule Take 40 mg by mouth once daily.    lorazepam (ATIVAN) 1 MG tablet Take 1 mg by mouth every 6 (six) hours as needed for Anxiety.    meloxicam (MOBIC) 7.5 MG tablet Take 1 tablet (7.5 mg total) by mouth once daily.    ondansetron (ZOFRAN-ODT) 4 MG TbDL Take 1 tablet (4 mg total) by mouth every 6 (six) hours as needed.    promethazine " (PHENERGAN) 25 MG tablet Take 1 tablet (25 mg total) by mouth every 6 (six) hours as needed for Nausea.    quetiapine (SEROQUEL) 200 MG Tab Take by mouth.     Family History     Problem Relation (Age of Onset)    Diabetes Mother    Hypertension Mother        Social History Main Topics    Smoking status: Current Every Day Smoker     Packs/day: 1.00     Types: Cigarettes    Smokeless tobacco: Never Used    Alcohol use Yes      Comment: occasionally    Drug use: No    Sexual activity: Yes     Review of Systems   Unable to perform ROS: Intubated   Constitutional: Negative for activity change, appetite change, chills, diaphoresis, fatigue, fever and unexpected weight change.   HENT: Negative for congestion, dental problem, drooling, postnasal drip, rhinorrhea and voice change.    Eyes: Negative for discharge.   Respiratory: Negative for apnea, cough, choking, chest tightness, shortness of breath, wheezing and stridor.    Cardiovascular: Negative for chest pain, palpitations and leg swelling.   Gastrointestinal: Negative for abdominal distention, blood in stool, constipation, diarrhea, nausea, rectal pain and vomiting.   Endocrine: Negative for cold intolerance, heat intolerance, polydipsia and polyuria.   Genitourinary: Negative for decreased urine volume, difficulty urinating, dysuria, enuresis, flank pain, frequency, hematuria and urgency.   Musculoskeletal: Negative for arthralgias, back pain, gait problem and joint swelling.        Muscle pains in hips and legs    Skin: Negative for rash.   Allergic/Immunologic: Negative for food allergies and immunocompromised state.   Neurological: Negative for dizziness, tremors, syncope, numbness and headaches.   Hematological: Does not bruise/bleed easily.   Psychiatric/Behavioral: Negative for agitation, behavioral problems and self-injury. The patient is not nervous/anxious and is not hyperactive.    All other systems reviewed and are negative.    Objective:     Vital  Signs (Most Recent):  Temp: 97.6 °F (36.4 °C) (06/29/17 1102)  Pulse: 86 (06/29/17 1102)  Resp: (!) 22 (06/29/17 1102)  BP: 138/84 (06/29/17 1102)  SpO2: 100 % (06/29/17 1102) Vital Signs (24h Range):  Temp:  [95.6 °F (35.3 °C)-100.6 °F (38.1 °C)] 97.6 °F (36.4 °C)  Pulse:  [] 86  Resp:  [14-32] 22  SpO2:  [93 %-100 %] 100 %  BP: ()/(35-99) 138/84     Weight: 75.4 kg (166 lb 3.6 oz)  Body mass index is 28.53 kg/m².    Physical Exam   Constitutional: No distress. She is intubated.   Intubated, sedated on propafol, breathing over the vent, intermittently agitated.   HENT:   Head: Normocephalic.   Eyes: Conjunctivae are normal. No scleral icterus.   Cardiovascular: Regular rhythm, normal heart sounds and intact distal pulses.  Tachycardia present.    No murmur heard.  Pulmonary/Chest: She is intubated. She has rhonchi.   Abdominal: Soft. She exhibits no distension.   Musculoskeletal: She exhibits no edema or deformity.   Lymphadenopathy:     She has no cervical adenopathy.   Neurological:   Intubated and able to communicate    Skin: Skin is warm. She is not diaphoretic. No erythema.   Psychiatric: She is not agitated.   Nursing note and vitals reviewed.       Significant Labs:   A1C: No results for input(s): HGBA1C in the last 4320 hours.  ABGs:     Recent Labs  Lab 06/29/17  0525   PH 7.333*   PCO2 35.1   HCO3 18.6*   POCSATURATED 99   BE -7     Bilirubin:     Recent Labs  Lab 06/28/17  1140 06/29/17  0500   BILITOT 0.5 0.2     Blood Culture:     Recent Labs  Lab 06/28/17  1600 06/28/17  1616   LABBLOO No Growth to date No Growth to date     BMP:     Recent Labs  Lab 06/29/17  0500   *      K 3.9   *   CO2 19*   BUN 15   CREATININE 1.1   CALCIUM 5.8*   MG 1.5*     CBC:     Recent Labs  Lab 06/28/17  1140 06/29/17  0500   WBC 14.93* 11.72   HGB 17.3* 14.0   HCT 47.9 40.5    55*     CMP:     Recent Labs  Lab 06/28/17  1140 06/29/17  0500   * 137   K 4.5 3.9   CL 97 112*   CO2  17* 19*   * 129*   BUN 13 15   CREATININE 1.0 1.1   CALCIUM 8.5* 5.8*   PROT 7.8 4.3*   ALBUMIN 3.5 1.8*   BILITOT 0.5 0.2   ALKPHOS 92 162*   * 626*   * 226*   ANIONGAP 16 6*   EGFRNONAA >60 >60     Cardiac Markers: No results for input(s): CKMB, MYOGLOBIN, BNP, TROPISTAT in the last 48 hours.  Coagulation:     Recent Labs  Lab 06/29/17  0500   APTT 44.2*     Lactic Acid:     Recent Labs  Lab 06/28/17  2101 06/29/17  0052 06/29/17  0500   LACTATE 0.6 0.7 0.7     Lipase: No results for input(s): LIPASE in the last 48 hours.  Lipid Panel: No results for input(s): CHOL, HDL, LDLCALC, TRIG, CHOLHDL in the last 48 hours.  Magnesium:     Recent Labs  Lab 06/29/17  0500   MG 1.5*     POCT Glucose: No results for input(s): POCTGLUCOSE in the last 48 hours.  Prealbumin: No results for input(s): PREALBUMIN in the last 48 hours.  Respiratory Culture: No results for input(s): GSRESP, RESPIRATORYC in the last 48 hours.  Troponin: No results for input(s): TROPONINI in the last 48 hours.  TSH:     Recent Labs  Lab 06/28/17  1140   TSH 0.255*     Urine Culture: No results for input(s): LABURIN in the last 48 hours.  Urine Studies:     Recent Labs  Lab 06/28/17  1514   COLORU Brown*   APPEARANCEUA Cloudy*   PHUR 7.0   SPECGRAV 1.025   PROTEINUA 3+*   GLUCUA Negative   KETONESU 1+*   BILIRUBINUA 1+*   OCCULTUA 3+*   NITRITE Positive*   UROBILINOGEN 1.0   LEUKOCYTESUR Trace*   RBCUA 5*   WBCUA 2   BACTERIA Few*   HYALINECASTS 2*     All pertinent labs within the past 24 hours have been reviewed.    Significant Imaging: I have reviewed and interpreted all pertinent imaging results/findings within the past 24 hours.     Imaging Results          X-Ray Chest AP Portable (Final result)  Result time 06/29/17 07:36:47    Final result by Laureano Horn MD (06/29/17 07:36:47)                 Impression:      Line and tube placement as described above.    Bibasilar atelectasis and hazy left basilar  infiltrate.    Please see above.      Electronically signed by: MINNIE HICKS MD  Date:     06/29/17  Time:    07:36              Narrative:    EXAM: Portable CXR one view    CLINICAL HISTORY: Encounter for fitting and adjustment nonvascular catheter..    COMPARISON STUDIES: 06/28/2017    FINDINGS:  Right neck catheter tip terminates in the right atrium.  Surgical clips right upper quadrant.  Nasogastric tube is terminating in the stomach.  Endotracheal tube tip terminates approximately 2.5 cm above the kaylie.    Normal heart size.  No pneumothorax is identified.  Focal atelectasis within the right and left lower lungs.  hazy infiltrate in the left pulmonary base.  Mild interval improvement.  Ribs appear intact .                             X-Ray Chest AP Portable (Final result)  Result time 06/28/17 20:17:13    Final result by Minnie Sampson III, MD (06/28/17 20:17:13)                 Impression:     See above        Electronically signed by: MINNIE SAMPSON MD  Date:     06/28/17  Time:    20:17              Narrative:    XR CHEST AP PORTABLE    Clinical history: . Z45.2, Encounter for adjustment of vascular access device.    FINDINGS: The right jugular central venous line terminates over the mid right atrium and could be retracted 5 cm.  The endotracheal tube is satisfactory in position.  Patchy bilateral lower lobe interstitial infiltrates are again noted..  No pneumothorax or other new pulmonary disease identified.                             CT Head Without Contrast (Final result)  Result time 06/28/17 18:14:35    Final result by Minnie Sampson III, MD (06/28/17 18:14:35)                 Impression:       No acute intracranial disease identified.       Electronically signed by: MINNIE SAMPSON MD  Date:     06/28/17  Time:    18:14              Narrative:    Head CT without contrast    Clinical history: Altered Mental Status    TECHNIQUE: Routine noncontrast axial head CT. All CT scans at this facility use dose  modulation, iterative reconstruction, and/or weight based dosing when appropriate to reduce radiation dose to as low as reasonably achievable.    Comparison: none    FINDINGS: There is no evidence of intracranial hemorrhage, mass-effect, hydrocephalus or other acute disease. There is no CT evidence of acute ischemia or cerebral edema.  There is mild opacification of the right mastoid air cells.  The visualized paranasal sinuses and left mastoid air cells are clear. No calvarial fracture is identified.                             CT Lumbar Spine Without Contrast (Final result)  Result time 06/28/17 18:20:35    Final result by Minnie Sampson III, MD (06/28/17 18:20:35)                 Impression:       1.  Asymmetric thickening of the right piriformis muscle with surrounding stranding possibly related to muscle strain or inflammatory in etiology.  Findings could cause right sciatica.    2.  No acute osseous abnormality identified.  Mild L3-4 through L5-S1 disc bulging and facet arthropathy causing foraminal stenosis.        Electronically signed by: MINNIE SAMPSON MD  Date:     06/28/17  Time:    18:20              Narrative:    CT LUMBAR SPINE WITHOUT CONTRAST    Clinical history: Weakness (780.79).    Technique:  Axial noncontrast CT scan of the lumbar spine with sagittal and coronal reconstructions. All CT scans at this facility use dose modulation, iterative reconstruction, and/or weight based dosing when appropriate to reduce radiation dose to as low as reasonably achievable.    Comparison: None    Findings:   No fracture or other acute injury is seen in the lumbar spine. Lumbar spine alignment is anatomic. No suspicious lytic or blastic bone marrow lesions identified.  There is asymmetric thickening of the right piriformis muscle with surrounding stranding.  No other acute paravertebral soft tissue abnormality identified. The intervertebral disc heights appear relatively well preserved.    T12-L1:  Unremarkable    L1-2: Unremarkable    L2-3: Unremarkable    L3-4: Small annular disc bulge and mild facet arthropathy causing mild left foraminal stenosis.    L4-5: Small annular disc bulge and facet arthropathy causing mild right and moderate left foraminal stenosis.    L5-S1:  Tiny broad-based posterior disc bulge at moderate hypertrophic facet arthropathy causing mild bilateral foraminal stenosis.                             X-Ray Chest AP Portable (Final result)  Result time 06/28/17 18:12:06    Final result by Minnie Sampson III, MD (06/28/17 18:12:06)                 Impression:     Well positioned endotracheal tube.  Possible patchy lower lobe interstitial infiltrates.      Electronically signed by: MINNIE SAMPSON MD  Date:     06/28/17  Time:    18:12              Narrative:    XR CHEST AP PORTABLE    Clinical history: Endotracheal tube placement. Z46.82 Encounter for fitting and adjustment of non-vascular catheter.    Findings: The endotracheal tube is well positioned terminating approximately 2.5 cm above the kaylie. There are possible patchy bilateral lower lobe interstitial infiltrates. No pneumothorax or other acute pulmonary disease identified.                                Assessment/Plan:     Non-traumatic rhabdomyolysis    Initiate CRRT, IV fluids with bicarbonate.  No ultrafiltration.  Patient seen and examined and managed for CRRT today.  All plans discussed with dialysis nurse as well as ICU team and [facility]        * Polysubstance overdose    CRRT today             Thank you for your consult.     Suzy Sarah MD  Nephrology  [facility]

## 2017-06-29 NOTE — ASSESSMENT & PLAN NOTE
- WBC 15,000 with tachycardia, and likely aspiration event  - Follow up on blood and sputum cultures  - Received zithromax and rocephin in the ED  - Will change to Vanc and Zosyn  - Repeat lactic acid x3  - Received six liters of NS so far in the ED

## 2017-06-29 NOTE — PLAN OF CARE
D/C assessment completed. Met with the patient and anna. Per anna, the patient was found on the ground. 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 He said she had fallen at [facility] 1 month ago and her pain became worse. He did bring all of her meds here. He said he knew something was strange because she just got her meds refilled on 6/26/17.  He noticed some of her meds bottles were I/2 gone. He brought her to ED. CM asked the patient was she ever treated for any type of Behavioral problems. Patient stated she has depression. CM asked why did she take so many pills? Patient shrudded her shoulder and became tearful. CM explained once she is medically stable , she is  PEC. This means once you are medically stable  / cleared by the hospitalist, she will be transitioned to a facility for psychiatric care. Patient verbalized understanding.     06/29/17 1100   Discharge Assessment   Assessment Type Discharge Planning Assessment   Confirmed/corrected address and phone number on facesheet? Yes   Assessment information obtained from? Patient;Caregiver;Medical Record   Expected Length of Stay (days) (TBD)   Communicated expected length of stay with patient/caregiver yes   Type of Healthcare Directive Received Advance Directive;Other (Comment);Living will  (Written info given for Advance Directive, Living Will, and pamphlet on d/c planning begins on admission)   If Healthcare Directive is received, is it scanned into Epic? no (comment)   Prior to hospitilization cognitive status: Unable to Assess   Prior to hospitalization functional status: Independent   Current cognitive status: Alert/Oriented   Current Functional Status: Independent   Arrived From home or self-care   Lives With significant other   Able to Return to Prior Arrangements (Patient PEC)   Is patient able to care for self after discharge? (PEC)   How many people do you have in your home that can help with your care after discharge? 1    Who are your caregiver(s) and their phone number(s)? Rafa Mullins - [phone]   Patient's perception of discharge disposition home or selfcare   Readmission Within The Last 30 Days no previous admission in last 30 days   Patient currently being followed by outpatient case management? No   Patient currently receives home health services? No   Does the patient currently use HME? No   Patient currently receives private duty nursing? No   Patient currently receives any other outside agency services? No   Equipment Currently Used at Home none   Do you have any problems affording any of your prescribed medications? No   Is the patient taking medications as prescribed? (Patient overdose on 22 Seroquel and  1/2 bottle Ativan)   Do you have any financial concerns preventing you from receiving the healthcare you need? No   Does the patient have transportation to healthcare appointments? Yes   Transportation Available car;family or friend will provide   On Dialysis? No   Does the patient receive services at the [facility]? No   Are there any open cases? No   Discharge Plan A Psychiatric hospital   Discharge Plan B Home with family   Patient/Family In Agreement With Plan yes

## 2017-06-29 NOTE — H&P
[facility] Medicine  History & Physical    Patient Name: Kaylene Emery  MRN: [id number]  Admission Date: 6/28/2017  Attending Physician: Demetri Gupta MD  Primary Care Provider: Jonas Jimenez MD         Patient information was obtained from spouse/SO and ER records.     Subjective:     Principal Problem:Sepsis    Chief Complaint:   Chief Complaint   Patient presents with    Drug Overdose     pt had 30 pills of  methocarbamol 500 mg and 30 pills of mloxicam 7.5 mg filled on 6/25, 30 tabs of 1 mg lorazepam on 6/26, 30 pills of 10 mg flexeril on 5/12, 30 pills of phenergan 25 mg filled on 5/10, 600 mg of ibuprofen 30 pills fill on 5/31.  pt found altered PTA and all bottles emtpy        HPI: Ms. Emery is a 45 y/o  female with h/o depression, substance OD in the past, was found with AMS earlier today and was brought to the ED. Patient is currently intubated and history obtained per chart review. Family not at bedside.    Apparently patient likely had overdosed on multiple medications including Methocarbamol, Mobic, lorazepam, flexeril, phenergan, Seroquel, Celexa. Her boy friend is on Geodon, Klonopin, Wellbutrin and Effexor.  Most of her recent filled prescription bottles were found empty at the bedside when her bf found her.   Per chart review, yesterday patient was summoned to appear in the court for her DUI that occurred 4 months ago. She was extremely anxious and pacing all day today.     Labs show lactic acid 2.0, CPK > 40,000, CXR show bilateral patchy infiltrates, ANNETTE < 10, tylenol level < 5,  CO2 17, Na 130, , , WBC 15,000, Hgb 17.3.    Patient is currently intubated on the ventilator, breathing over the vent. She received NS 6 liters so far. Patient was evaluated by both neurology and nephrology in the ED. Nephrology plans to initiate patient on CRRT for poison/toxin control.    Past Medical History:   Diagnosis Date    Anxiety     Depression      "GERD (gastroesophageal reflux disease)     Tobacco use        Past Surgical History:   Procedure Laterality Date    BLADDER REPAIR      CHOLECYSTECTOMY  04/07/2017    KNEE ARTHROPLASTY      PARTIAL HYSTERECTOMY      TUBAL LIGATION         Review of patient's allergies indicates:   Allergen Reactions    Corticosteroids (glucocorticoids)      "sets off my anxiety real bad"    Tramadol Rash       No current facility-administered medications on file prior to encounter.      Current Outpatient Prescriptions on File Prior to Encounter   Medication Sig    fluoxetine (PROZAC) 40 MG capsule Take 40 mg by mouth once daily.    lorazepam (ATIVAN) 1 MG tablet Take 1 mg by mouth every 6 (six) hours as needed for Anxiety.    meloxicam (MOBIC) 7.5 MG tablet Take 1 tablet (7.5 mg total) by mouth once daily.    ondansetron (ZOFRAN-ODT) 4 MG TbDL Take 1 tablet (4 mg total) by mouth every 6 (six) hours as needed.    promethazine (PHENERGAN) 25 MG tablet Take 1 tablet (25 mg total) by mouth every 6 (six) hours as needed for Nausea.    quetiapine (SEROQUEL) 200 MG Tab Take by mouth.     Family History     Problem Relation (Age of Onset)    Diabetes Mother    Hypertension Mother        Social History Main Topics    Smoking status: Current Every Day Smoker     Packs/day: 1.00     Types: Cigarettes    Smokeless tobacco: Never Used    Alcohol use Yes      Comment: occasionally    Drug use: No    Sexual activity: Yes     Review of Systems   Unable to perform ROS: Intubated     Objective:     Vital Signs (Most Recent):  Temp: (!) 100.6 °F (38.1 °C) (06/28/17 1518)  Pulse: 81 (06/28/17 1951)  Resp: 16 (06/28/17 1951)  BP: 119/79 (06/28/17 1951)  SpO2: 100 % (06/28/17 1951) Vital Signs (24h Range):  Temp:  [100.6 °F (38.1 °C)] 100.6 °F (38.1 °C)  Pulse:  [] 81  Resp:  [15-32] 16  SpO2:  [93 %-100 %] 100 %  BP: ()/(35-95) 119/79     Weight: 65.8 kg (145 lb)  Body mass index is 24.89 kg/m².    Physical Exam "   Constitutional: No distress. She is intubated.   Intubated, sedated on propafol, breathing over the vent, intermittently agitated.   HENT:   Head: Normocephalic.   Eyes: Conjunctivae are normal. No scleral icterus.   Cardiovascular: Regular rhythm, normal heart sounds and intact distal pulses.  Tachycardia present.    No murmur heard.  Pulmonary/Chest: She is intubated. She has rhonchi.   Abdominal: Soft. She exhibits no distension.   Musculoskeletal: She exhibits no edema or deformity.   Lymphadenopathy:     She has no cervical adenopathy.   Neurological:   Intubated and sedated on propofol, pupils equal and slightly reactive, moves spontaneously, agitated on the vent, responds to painful stimuli   Skin: Skin is warm. She is not diaphoretic. No erythema.   Psychiatric: She is agitated.   Nursing note and vitals reviewed.       Significant Labs:   A1C: No results for input(s): HGBA1C in the last 4320 hours.  ABGs:   Recent Labs  Lab 06/28/17  1531   PH 7.407   PCO2 28.1*   HCO3 17.7*   POCSATURATED 96   BE -7     Bilirubin:   Recent Labs  Lab 06/28/17  1140   BILITOT 0.5     Blood Culture: No results for input(s): LABBLOO in the last 48 hours.  BMP:   Recent Labs  Lab 06/28/17  1140   *   *   K 4.5   CL 97   CO2 17*   BUN 13   CREATININE 1.0   CALCIUM 8.5*     CBC:   Recent Labs  Lab 06/28/17  1140   WBC 14.93*   HGB 17.3*   HCT 47.9        CMP:   Recent Labs  Lab 06/28/17  1140   *   K 4.5   CL 97   CO2 17*   *   BUN 13   CREATININE 1.0   CALCIUM 8.5*   PROT 7.8   ALBUMIN 3.5   BILITOT 0.5   ALKPHOS 92   *   *   ANIONGAP 16   EGFRNONAA >60     Cardiac Markers: No results for input(s): CKMB, MYOGLOBIN, BNP, TROPISTAT in the last 48 hours.  Coagulation: No results for input(s): INR, APTT in the last 48 hours.    Invalid input(s): PT  Lactic Acid:   Recent Labs  Lab 06/28/17  1616   LACTATE 2.0     Lipase: No results for input(s): LIPASE in the last 48 hours.  Lipid  Panel: No results for input(s): CHOL, HDL, LDLCALC, TRIG, CHOLHDL in the last 48 hours.  Magnesium: No results for input(s): MG in the last 48 hours.  POCT Glucose: No results for input(s): POCTGLUCOSE in the last 48 hours.  Prealbumin: No results for input(s): PREALBUMIN in the last 48 hours.  Respiratory Culture: No results for input(s): GSRESP, RESPIRATORYC in the last 48 hours.  Troponin: No results for input(s): TROPONINI in the last 48 hours.  TSH:   Recent Labs  Lab 06/28/17  1140   TSH 0.255*     Urine Culture: No results for input(s): LABURIN in the last 48 hours.  Urine Studies:   Recent Labs  Lab 06/28/17  1514   COLORU Brown*   APPEARANCEUA Cloudy*   PHUR 7.0   SPECGRAV 1.025   PROTEINUA 3+*   GLUCUA Negative   KETONESU 1+*   BILIRUBINUA 1+*   OCCULTUA 3+*   NITRITE Positive*   UROBILINOGEN 1.0   LEUKOCYTESUR Trace*   RBCUA 5*   WBCUA 2   BACTERIA Few*   HYALINECASTS 2*     All pertinent labs within the past 24 hours have been reviewed.    Significant Imaging: I have reviewed and interpreted all pertinent imaging results/findings within the past 24 hours.     Imaging Results          X-Ray Chest AP Portable (In process)                CT Head Without Contrast (Final result)  Result time 06/28/17 18:14:35    Final result by Minnie Sampson III, MD (06/28/17 18:14:35)                 Impression:       No acute intracranial disease identified.       Electronically signed by: MINNIE SAMPSON MD  Date:     06/28/17  Time:    18:14              Narrative:    Head CT without contrast    Clinical history: Altered Mental Status    TECHNIQUE: Routine noncontrast axial head CT. All CT scans at this facility use dose modulation, iterative reconstruction, and/or weight based dosing when appropriate to reduce radiation dose to as low as reasonably achievable.    Comparison: none    FINDINGS: There is no evidence of intracranial hemorrhage, mass-effect, hydrocephalus or other acute disease. There is no CT evidence of  acute ischemia or cerebral edema.  There is mild opacification of the right mastoid air cells.  The visualized paranasal sinuses and left mastoid air cells are clear. No calvarial fracture is identified.                             CT Lumbar Spine Without Contrast (Final result)  Result time 06/28/17 18:20:35    Final result by Minnie Sampson III, MD (06/28/17 18:20:35)                 Impression:       1.  Asymmetric thickening of the right piriformis muscle with surrounding stranding possibly related to muscle strain or inflammatory in etiology.  Findings could cause right sciatica.    2.  No acute osseous abnormality identified.  Mild L3-4 through L5-S1 disc bulging and facet arthropathy causing foraminal stenosis.        Electronically signed by: MINNIE SAMPSON MD  Date:     06/28/17  Time:    18:20              Narrative:    CT LUMBAR SPINE WITHOUT CONTRAST    Clinical history: Weakness (780.79).    Technique:  Axial noncontrast CT scan of the lumbar spine with sagittal and coronal reconstructions. All CT scans at this facility use dose modulation, iterative reconstruction, and/or weight based dosing when appropriate to reduce radiation dose to as low as reasonably achievable.    Comparison: None    Findings:   No fracture or other acute injury is seen in the lumbar spine. Lumbar spine alignment is anatomic. No suspicious lytic or blastic bone marrow lesions identified.  There is asymmetric thickening of the right piriformis muscle with surrounding stranding.  No other acute paravertebral soft tissue abnormality identified. The intervertebral disc heights appear relatively well preserved.    T12-L1: Unremarkable    L1-2: Unremarkable    L2-3: Unremarkable    L3-4: Small annular disc bulge and mild facet arthropathy causing mild left foraminal stenosis.    L4-5: Small annular disc bulge and facet arthropathy causing mild right and moderate left foraminal stenosis.    L5-S1:  Tiny broad-based posterior disc bulge  at moderate hypertrophic facet arthropathy causing mild bilateral foraminal stenosis.                             X-Ray Chest AP Portable (Final result)  Result time 06/28/17 18:12:06    Final result by Minnie Sampson III, MD (06/28/17 18:12:06)                 Impression:     Well positioned endotracheal tube.  Possible patchy lower lobe interstitial infiltrates.      Electronically signed by: MINNIE SAMPSON MD  Date:     06/28/17  Time:    18:12              Narrative:    XR CHEST AP PORTABLE    Clinical history: Endotracheal tube placement. Z46.82 Encounter for fitting and adjustment of non-vascular catheter.    Findings: The endotracheal tube is well positioned terminating approximately 2.5 cm above the kaylie. There are possible patchy bilateral lower lobe interstitial infiltrates. No pneumothorax or other acute pulmonary disease identified.                                Assessment/Plan:     * Sepsis    - WBC 15,000 with tachycardia, and likely aspiration event  - Follow up on blood and sputum cultures  - Received zithromax and rocephin in the ED  - Will change to Vanc and Zosyn  - Repeat lactic acid x3  - Received six liters of NS so far in the ED          Polysubstance overdose    - Unclear intention.  - PEC'd  - Psychiatry has evaluated patient  - CRRT to be started tonight for polysubstance/toxin removal          Acute metabolic encephalopathy    - Due to polysubstance abuse  - Sedated on propafol          Acute hypoxemic respiratory failure    - ABG shows PH 7.4, PCO2 28, PO2 78 on Fio2 21%  - Intubated for airway protection  - Pulmonary consult for vent management          Aspiration pneumonia    - Start Vanc and Zosyn (received rocephin, zithromax in the ED)  - Follow up on blood, sputum cultures  - Duonebs q6          Non-traumatic rhabdomyolysis    - CPK < 40,000  - Repeat daily CPK  - Received six liters of NS so far in the ED  - Continue NS at 125 cc/hr  - Discussed with  (nephrology), plan to  initiate CRRT tonight          Transaminitis    - Secondary to polysubstance overdose  - Monitor closely              VTE Risk Mitigation     SCDs        Discussed with , nephrology. Admit tot ICU on ventilator, to be initiated on CRRT tonight.    Critical care time: 40 minutes.  Critical care time was exclusive of separately billable procedures and treating other patients.     Demetri Gupta MD  Department of Hospital Medicine   [facility]

## 2017-06-29 NOTE — ASSESSMENT & PLAN NOTE
Initiate CRRT, IV fluids with bicarbonate.  No ultrafiltration.  Patient seen and examined and managed for CRRT today.  All plans discussed with dialysis nurse as well as ICU team and [facility]-[facility]

## 2017-06-29 NOTE — PLAN OF CARE
Problem: Patient Care Overview  Goal: Plan of Care Review  Outcome: Ongoing (interventions implemented as appropriate)  At first assessment patient was intubated and sedated.  Patient passed SAT and then shortly after patient was extubated.  After extubation patient has been AAOx4.  Pain has been controlled with PRN oxycodone. Patient  Has been complaining of numbness and tingling to BLE.  Vitals have been stable. CRRT running continuously.  Patient has had adequate urine output. Sodium bicarb drip maintained.  IV abx administered as ordered.  [place] is being encouraged.  Sitter at bedside.  No falls on shift.

## 2017-06-29 NOTE — ASSESSMENT & PLAN NOTE
- Unclear intention.  - PEC'd  - Psychiatry has evaluated patient  - CRRT to be started tonight for polysubstance/toxin removal

## 2017-06-29 NOTE — ASSESSMENT & PLAN NOTE
- CPK < 40,000  - Repeat daily CPK  - Received six liters of NS so far in the ED  - Continue NS at 125 cc/hr  - Discussed with  (nephrology), plan to initiate CRRT tonight

## 2017-06-29 NOTE — PROGRESS NOTES
"   06/29/17 1015   Wound Care Screen Assessment   Mobility Bedbound   Continent of Bowel Yes   Continent of Bladder No  (carroll)   Contractures No   Previous History of pressure ulcers No   Sensory Perception 2-->very limited   Moisture 4-->rarely moist   Activity 1-->bedfast   Mobility 2-->very limited   Nutrition 2-->probably inadequate   Friction and Shear 1-->problem   Avtar Score 12   Feeding Tube No   S/P Flap or Graft Surgery No   Skin Condition intact   Wound(s) Present Yes   Wound acquired during current hospitalization No   Evaluation by Wound Care Team required? Yes     Wound Care Screen completed due to documented Avtar Scale score <=14.  Skin assessment completed at this time. Bilateral heels intact with no redness or breakdown noted.  Patient turned to right side.  Sacrum/coccyx/bilateral buttock intact with no redness or breakdown noted.  Patient left of right side with foam wedge for support.  Left medial ankle and Right medial midfoot each with 1.5x1.5 cm circular patch of reddened blanchable skin. Border edges marked prior to this assessment and remain unchanged.  No overt bite marks or other wounds noted.  Primary nurse JEROD Castro RN present at bedside and assisted with positioning of patient. Discussed importance of documenting the indicated / performed skin interventions in EPIC flow sheet (ie: turning q 2 hours with actual position documented, heels floated, skin moisturizer utilized, all incontinence care, mattress surface, incontinence pad utilized, moisture barriers utilized, preventive dressings, etc.). Nurse verbalizes understanding.  Wound care recommends the following for Pressure Injury Prevention:  Skin Care Precautions / Pressure Injury Prevention:  1. Follow "Guidelines for Prevention of Pressure Ulcers in at Risk Patients"  2. Document wound assessment in [facility] using guidelines in Ghassan's "Assessment : Wound" procedure  3. Limit the amount of linen/underpad between patient and " mattress surface to ONE fitted sheet and ONE covidien underpad - NO draw sheet/briefs.  4. Obtain Easi Cleans Foam Wipes for providing dc care - avoid the use of wash cloths to areas affected by IAD.  5. Apply Clear Barrier Ointment to perineal / perirectal areas in a thin even layer to clean dry skin BID and after each episode of pericare  6. Apply sween 24 moisturizer cream to all dry skin after daily bath and prn  7. Obtain foam wedge from materials management to assist with maintaining proper position changes at least q 2hours and document actual position in EPIC q 2hours  8. Please elevate heels off mattress and document in EPIC Flow Sheets every 2 hours.  9. Do NOT elevate HOB greater than 30 degrees unless contraindicated.

## 2017-06-29 NOTE — ASSESSMENT & PLAN NOTE
Initiate CRRT, IV fluids with bicarbonate.  No ultrafiltration.  6 L of fluids given in the ER.  Maintain adequate urine output.  Decision to start CRRT is based on multiple medications poisoning as well as severe rhabdomyolysis.      Critical care time spent today is about 90 minutes total in multiple face to face visits.  Greater than 50% of the time was spent in counseling with the patient's fiancee ( all consents obtained for central line and and CRRT with fiancee as patient is intubated ) and discussing the therapeutic options with all specialties.    Critical care time was exclusive of separately billable consult , other  procedures and treating other patients.   Critical care was time spent personally by me on the following activities: development of treatment plan with patient or surrogate, discussions with consultants, evaluation of patient's response to treatment, examination of patient, obtaining history from patient or surrogate, ordering and performing treatments and interventions, ordering and review of laboratory studies, ordering and review of radiographic studies, pulse oximetry, re-evaluation of patient's condition, review of old charts and interpretation of cardiac output measurements.

## 2017-06-30 PROBLEM — J96.01 ACUTE HYPOXEMIC RESPIRATORY FAILURE: Status: RESOLVED | Noted: 2017-06-28 | Resolved: 2017-06-30

## 2017-06-30 PROBLEM — N17.9 AKI (ACUTE KIDNEY INJURY): Status: ACTIVE | Noted: 2017-06-30

## 2017-06-30 PROBLEM — R60.1 ANASARCA: Status: ACTIVE | Noted: 2017-06-30

## 2017-06-30 PROBLEM — E88.09 HYPOALBUMINEMIA: Status: ACTIVE | Noted: 2017-06-30

## 2017-06-30 PROBLEM — E87.8 ELECTROLYTE IMBALANCE: Status: RESOLVED | Noted: 2017-06-29 | Resolved: 2017-06-30

## 2017-06-30 PROBLEM — E87.8 ELECTROLYTE IMBALANCE: Status: ACTIVE | Noted: 2017-06-29

## 2017-06-30 LAB
25(OH)D3+25(OH)D2 SERPL-MCNC: 7 NG/ML
ALBUMIN SERPL BCP-MCNC: 1.5 G/DL
ALBUMIN SERPL BCP-MCNC: 1.6 G/DL
ALP SERPL-CCNC: 151 U/L
ALP SERPL-CCNC: 159 U/L
ALT SERPL W/O P-5'-P-CCNC: 160 U/L
ALT SERPL W/O P-5'-P-CCNC: 170 U/L
ANION GAP SERPL CALC-SCNC: 5 MMOL/L
ANION GAP SERPL CALC-SCNC: 6 MMOL/L
ANION GAP SERPL CALC-SCNC: 6 MMOL/L
AST SERPL-CCNC: 336 U/L
AST SERPL-CCNC: 386 U/L
BASOPHILS # BLD AUTO: 0.02 K/UL
BASOPHILS NFR BLD: 0.2 %
BILIRUB SERPL-MCNC: 0.4 MG/DL
BILIRUB SERPL-MCNC: 0.4 MG/DL
BUN SERPL-MCNC: 11 MG/DL
BUN SERPL-MCNC: 11 MG/DL
BUN SERPL-MCNC: 7 MG/DL
CALCIUM SERPL-MCNC: 6.1 MG/DL
CALCIUM SERPL-MCNC: 6.2 MG/DL
CALCIUM SERPL-MCNC: 6.9 MG/DL
CHLORIDE SERPL-SCNC: 100 MMOL/L
CHLORIDE SERPL-SCNC: 101 MMOL/L
CHLORIDE SERPL-SCNC: 99 MMOL/L
CK SERPL-CCNC: ABNORMAL U/L
CO2 SERPL-SCNC: 28 MMOL/L
CO2 SERPL-SCNC: 30 MMOL/L
CO2 SERPL-SCNC: 30 MMOL/L
CREAT SERPL-MCNC: 1.3 MG/DL
CREAT SERPL-MCNC: 1.4 MG/DL
CREAT SERPL-MCNC: 1.6 MG/DL
DIFFERENTIAL METHOD: ABNORMAL
EOSINOPHIL # BLD AUTO: 0.1 K/UL
EOSINOPHIL NFR BLD: 0.7 %
ERYTHROCYTE [DISTWIDTH] IN BLOOD BY AUTOMATED COUNT: 14 %
EST. GFR  (AFRICAN AMERICAN): 45 ML/MIN/1.73 M^2
EST. GFR  (AFRICAN AMERICAN): 53 ML/MIN/1.73 M^2
EST. GFR  (AFRICAN AMERICAN): 58 ML/MIN/1.73 M^2
EST. GFR  (NON AFRICAN AMERICAN): 39 ML/MIN/1.73 M^2
EST. GFR  (NON AFRICAN AMERICAN): 46 ML/MIN/1.73 M^2
EST. GFR  (NON AFRICAN AMERICAN): 50 ML/MIN/1.73 M^2
GLUCOSE SERPL-MCNC: 116 MG/DL
GLUCOSE SERPL-MCNC: 121 MG/DL
GLUCOSE SERPL-MCNC: 125 MG/DL
HCT VFR BLD AUTO: 30.3 %
HGB BLD-MCNC: 10.5 G/DL
INR PPP: 1.1
LYMPHOCYTES # BLD AUTO: 1.5 K/UL
LYMPHOCYTES NFR BLD: 11.7 %
MAGNESIUM SERPL-MCNC: 1.7 MG/DL
MAGNESIUM SERPL-MCNC: 1.8 MG/DL
MAGNESIUM SERPL-MCNC: 2 MG/DL
MCH RBC QN AUTO: 30.8 PG
MCHC RBC AUTO-ENTMCNC: 34.7 %
MCV RBC AUTO: 89 FL
MONOCYTES # BLD AUTO: 0.9 K/UL
MONOCYTES NFR BLD: 7 %
NEUTROPHILS # BLD AUTO: 10.2 K/UL
NEUTROPHILS NFR BLD: 80.4 %
PHOSPHATE SERPL-MCNC: 2.7 MG/DL
PHOSPHATE SERPL-MCNC: 2.9 MG/DL
PLATELET # BLD AUTO: 54 K/UL
PMV BLD AUTO: 11.1 FL
POTASSIUM SERPL-SCNC: 3.5 MMOL/L
POTASSIUM SERPL-SCNC: 3.5 MMOL/L
POTASSIUM SERPL-SCNC: 3.7 MMOL/L
PROT SERPL-MCNC: 3.9 G/DL
PROT SERPL-MCNC: 4 G/DL
PROTHROMBIN TIME: 10.9 SEC
RBC # BLD AUTO: 3.41 M/UL
SODIUM SERPL-SCNC: 134 MMOL/L
SODIUM SERPL-SCNC: 135 MMOL/L
SODIUM SERPL-SCNC: 136 MMOL/L
VIT B12 SERPL-MCNC: 328 PG/ML
WBC # BLD AUTO: 12.65 K/UL

## 2017-06-30 PROCEDURE — 83735 ASSAY OF MAGNESIUM: CPT | Mod: 91

## 2017-06-30 PROCEDURE — 25000003 PHARM REV CODE 250: Performed by: EMERGENCY MEDICINE

## 2017-06-30 PROCEDURE — 63600175 PHARM REV CODE 636 W HCPCS: Performed by: EMERGENCY MEDICINE

## 2017-06-30 PROCEDURE — 82550 ASSAY OF CK (CPK): CPT

## 2017-06-30 PROCEDURE — 90935 HEMODIALYSIS ONE EVALUATION: CPT

## 2017-06-30 PROCEDURE — 85025 COMPLETE CBC W/AUTO DIFF WBC: CPT

## 2017-06-30 PROCEDURE — 84100 ASSAY OF PHOSPHORUS: CPT

## 2017-06-30 PROCEDURE — G8987 SELF CARE CURRENT STATUS: HCPCS | Mod: CM

## 2017-06-30 PROCEDURE — 97530 THERAPEUTIC ACTIVITIES: CPT

## 2017-06-30 PROCEDURE — G8979 MOBILITY GOAL STATUS: HCPCS | Mod: CL

## 2017-06-30 PROCEDURE — 90937 HEMODIALYSIS REPEATED EVAL: CPT | Mod: ,,, | Performed by: INTERNAL MEDICINE

## 2017-06-30 PROCEDURE — 85610 PROTHROMBIN TIME: CPT

## 2017-06-30 PROCEDURE — 99291 CRITICAL CARE FIRST HOUR: CPT | Mod: ,,, | Performed by: NURSE PRACTITIONER

## 2017-06-30 PROCEDURE — 97163 PT EVAL HIGH COMPLEX 45 MIN: CPT

## 2017-06-30 PROCEDURE — 84100 ASSAY OF PHOSPHORUS: CPT | Mod: 91

## 2017-06-30 PROCEDURE — 25000003 PHARM REV CODE 250: Performed by: NURSE PRACTITIONER

## 2017-06-30 PROCEDURE — 86706 HEP B SURFACE ANTIBODY: CPT

## 2017-06-30 PROCEDURE — G8978 MOBILITY CURRENT STATUS: HCPCS | Mod: CM

## 2017-06-30 PROCEDURE — 36415 COLL VENOUS BLD VENIPUNCTURE: CPT

## 2017-06-30 PROCEDURE — 80053 COMPREHEN METABOLIC PANEL: CPT | Mod: 91

## 2017-06-30 PROCEDURE — 80048 BASIC METABOLIC PNL TOTAL CA: CPT

## 2017-06-30 PROCEDURE — 25000003 PHARM REV CODE 250: Performed by: PSYCHIATRY & NEUROLOGY

## 2017-06-30 PROCEDURE — 63600175 PHARM REV CODE 636 W HCPCS: Performed by: NURSE PRACTITIONER

## 2017-06-30 PROCEDURE — 80074 ACUTE HEPATITIS PANEL: CPT

## 2017-06-30 PROCEDURE — 20000000 HC ICU ROOM

## 2017-06-30 PROCEDURE — 63600175 PHARM REV CODE 636 W HCPCS: Performed by: INTERNAL MEDICINE

## 2017-06-30 PROCEDURE — 97535 SELF CARE MNGMENT TRAINING: CPT

## 2017-06-30 PROCEDURE — 99291 CRITICAL CARE FIRST HOUR: CPT | Mod: 25,,, | Performed by: INTERNAL MEDICINE

## 2017-06-30 PROCEDURE — 97166 OT EVAL MOD COMPLEX 45 MIN: CPT

## 2017-06-30 PROCEDURE — 82607 VITAMIN B-12: CPT

## 2017-06-30 PROCEDURE — 25000003 PHARM REV CODE 250: Performed by: INTERNAL MEDICINE

## 2017-06-30 PROCEDURE — 82550 ASSAY OF CK (CPK): CPT | Mod: 91

## 2017-06-30 PROCEDURE — 80053 COMPREHEN METABOLIC PANEL: CPT

## 2017-06-30 PROCEDURE — G8988 SELF CARE GOAL STATUS: HCPCS | Mod: CK

## 2017-06-30 PROCEDURE — 27000221 HC OXYGEN, UP TO 24 HOURS

## 2017-06-30 RX ORDER — ACETAMINOPHEN 325 MG/1
650 TABLET ORAL EVERY 8 HOURS PRN
Status: DISCONTINUED | OUTPATIENT
Start: 2017-06-30 | End: 2017-07-10 | Stop reason: HOSPADM

## 2017-06-30 RX ORDER — QUETIAPINE FUMARATE 25 MG/1
50 TABLET, FILM COATED ORAL DAILY
Status: DISCONTINUED | OUTPATIENT
Start: 2017-07-01 | End: 2017-07-03

## 2017-06-30 RX ORDER — ERGOCALCIFEROL 1.25 MG/1
50000 CAPSULE ORAL
Status: DISCONTINUED | OUTPATIENT
Start: 2017-06-30 | End: 2017-07-10 | Stop reason: HOSPADM

## 2017-06-30 RX ORDER — CHOLECALCIFEROL (VITAMIN D3) 25 MCG
1000 TABLET ORAL DAILY
Status: DISCONTINUED | OUTPATIENT
Start: 2017-06-30 | End: 2017-06-30

## 2017-06-30 RX ORDER — OXYCODONE HCL 10 MG/1
20 TABLET, FILM COATED, EXTENDED RELEASE ORAL 2 TIMES DAILY PRN
Status: DISCONTINUED | OUTPATIENT
Start: 2017-06-30 | End: 2017-07-10 | Stop reason: HOSPADM

## 2017-06-30 RX ORDER — SODIUM CHLORIDE 9 MG/ML
INJECTION, SOLUTION INTRAVENOUS CONTINUOUS
Status: DISCONTINUED | OUTPATIENT
Start: 2017-06-30 | End: 2017-07-01

## 2017-06-30 RX ADMIN — SODIUM CHLORIDE: 0.9 INJECTION, SOLUTION INTRAVENOUS at 07:06

## 2017-06-30 RX ADMIN — OXYCODONE HYDROCHLORIDE 20 MG: 10 TABLET, FILM COATED, EXTENDED RELEASE ORAL at 09:06

## 2017-06-30 RX ADMIN — LORAZEPAM 0.5 MG: 0.5 TABLET ORAL at 09:06

## 2017-06-30 RX ADMIN — LORAZEPAM 0.5 MG: 0.5 TABLET ORAL at 08:06

## 2017-06-30 RX ADMIN — HEPARIN SODIUM 2000 UNITS: 1000 INJECTION, SOLUTION INTRAVENOUS; SUBCUTANEOUS at 02:06

## 2017-06-30 RX ADMIN — PIPERACILLIN AND TAZOBACTAM 4.5 G: 4; .5 INJECTION, POWDER, FOR SOLUTION INTRAVENOUS at 04:06

## 2017-06-30 RX ADMIN — QUETIAPINE FUMARATE 200 MG: 200 TABLET, FILM COATED ORAL at 08:06

## 2017-06-30 RX ADMIN — SODIUM BICARBONATE: 84 INJECTION, SOLUTION INTRAVENOUS at 08:06

## 2017-06-30 RX ADMIN — FLUOXETINE 40 MG: 20 CAPSULE ORAL at 08:06

## 2017-06-30 RX ADMIN — ONDANSETRON 4 MG: 2 INJECTION INTRAMUSCULAR; INTRAVENOUS at 04:06

## 2017-06-30 RX ADMIN — VITAMIN D, TAB 1000IU (100/BT) 1000 UNITS: 25 TAB at 10:06

## 2017-06-30 RX ADMIN — SODIUM BICARBONATE: 84 INJECTION, SOLUTION INTRAVENOUS at 03:06

## 2017-06-30 RX ADMIN — CEFTRIAXONE 1 G: 1 INJECTION, SOLUTION INTRAVENOUS at 10:06

## 2017-06-30 RX ADMIN — ACETAMINOPHEN 650 MG: 325 TABLET ORAL at 05:06

## 2017-06-30 RX ADMIN — SODIUM CHLORIDE: 0.9 INJECTION, SOLUTION INTRAVENOUS at 09:06

## 2017-06-30 RX ADMIN — DOCUSATE SODIUM 100 MG: 100 CAPSULE, LIQUID FILLED ORAL at 08:06

## 2017-06-30 RX ADMIN — PANTOPRAZOLE SODIUM 40 MG: 40 TABLET, DELAYED RELEASE ORAL at 08:06

## 2017-06-30 RX ADMIN — ERGOCALCIFEROL 50000 UNITS: 1.25 CAPSULE ORAL at 11:06

## 2017-06-30 RX ADMIN — NICOTINE 1 PATCH: 14 PATCH, EXTENDED RELEASE TRANSDERMAL at 08:06

## 2017-06-30 NOTE — PLAN OF CARE
Problem: Patient Care Overview  Goal: Plan of Care Review  Outcome: Ongoing (interventions implemented as appropriate)  Alert, able to make needs known.  Continually asking for pain medications and behavior escalating when needs aren't met immediately.  Received Oxycontin 10mg at 2100 but stated it did not help her, requesting more medication, muscle relaxants, etc.  To stop continual requests, EICU MD had to teleconference in to her room and explain why he would not order more pain medications.  Very attention seeking this shift, offering multiple somatic complaints. C/O leg and back pain.  Noted edema and tightness to BLE and at end of shift BUE.  Slept at short intervals throughout shift.  Bicarbonate drip infusing at 200ml/hr.  Tolerated CRRT well, external kidney clotted off at approx. 0200.  Blood returned to patient and lines flushed per protocol.  UOP ranging 30-50ml/hr, color improving at shift went on.  Oral fluid intake good. Critical calcium levels noted on serial labs, EICU notified, with no new orders received. Low grade temperature per criticore, ranging 99.2-100.  Currently under CEC and has 1:1 visual monitoring with sitter.

## 2017-06-30 NOTE — PROGRESS NOTES
Renal f/u and addendum note:  Pt was revisited and reevaluated during HD.  Stable, no new c/o's  Tolerating HD well, no c/o's made or reported.    /80, HR 90    A/P ALEXUS.  Requiring HD  Tolerating HD well  Continue HD.    Mona Gregory MD

## 2017-06-30 NOTE — PROGRESS NOTES
High alarm sounding, noted clotting to external kidney.  Blood returned to patient and CRRT stopped. Vas cath ports flushed with heparin per protocol.

## 2017-06-30 NOTE — ASSESSMENT & PLAN NOTE
- Unclear intention.  - PEC'd  - Psychiatry has evaluated patient  - CRRT to be started tonight for polysubstance/toxin removal    Improving on CRRT-- CPK still high but LFTs improving

## 2017-06-30 NOTE — PROGRESS NOTES
"Progress Note  Critical Care    Admit Date: 6/28/2017   LOS: 2 days     Follow-up For: Resp Failure     SUBJECTIVE:   Change over last 24 hours: Yehuda Extubation yesterday.  Increased movement and sensation of LEs.  CRRT clotted off at 0200 hr this AM.  C/O chronic pain not controlled.    ROS:  Review of Systems   Constitutional: Negative for chills, fever and malaise/fatigue.   HENT: Negative for congestion.    Eyes: Negative for blurred vision.   Respiratory: Negative for cough, sputum production and shortness of breath.    Cardiovascular: Negative for chest pain and leg swelling.   Gastrointestinal: Negative for nausea and vomiting.   Genitourinary: Negative for dysuria.   Musculoskeletal: Positive for back pain (chronic) and myalgias (chronic).   Skin: Negative for rash.   Neurological: Positive for weakness. Negative for dizziness, tingling, tremors, focal weakness, seizures, loss of consciousness and headaches.   Endo/Heme/Allergies: Does not bruise/bleed easily.   Psychiatric/Behavioral: Positive for depression (chronic). Negative for hallucinations. The patient is nervous/anxious.        Continuous Infusions:   custom IV infusion builder 200 mL/hr at 06/30/17 0600     Review of patient's allergies indicates:   Allergen Reactions    Corticosteroids (glucocorticoids)      "sets off my anxiety real bad"    Tramadol Rash       OBJECTIVE:     Vital Signs (Most Recent)  Temp: 99.4 °F (37.4 °C) (06/30/17 0600)  Pulse: 106 (06/30/17 0600)  Resp: (!) 23 (06/30/17 0600)  BP: (!) 133/90 (06/30/17 0600)  SpO2: 96 % (06/30/17 0600)    Vital Signs Range (Last 24H):  Temp:  [97.6 °F (36.4 °C)-99.7 °F (37.6 °C)]   Pulse:  []   Resp:  [13-35]   BP: (114-152)/()   SpO2:  [94 %-100 %]     I & O (Last 24H):  Intake/Output Summary (Last 24 hours) at 06/30/17 0715  Last data filed at 06/30/17 0600   Gross per 24 hour   Intake         23109.55 ml   Output             2047 ml   Net          9182.55 ml       Ventilator " Data (Last 24H):     Vent Mode: Spont  Oxygen Concentration (%):  [30-40] 30  Resp Rate Total:  [16 br/min-20 br/min] 18 br/min  Vt Set:  [500 mL] 500 mL  PEEP/CPAP:  [5 cmH20] 5 cmH20  Pressure Support:  [0 cmH20-8 cmH20] 8 cmH20  Mean Airway Pressure:  [10 cmH20] 10 cmH20    Physical Exam:  Physical Exam   Constitutional: She is oriented to person, place, and time and well-developed, well-nourished, and in no distress. Vital signs are normal. She appears not lethargic, to not be writhing in pain, not malnourished and not jaundiced. She appears unhealthy.  Non-toxic appearance. No distress.   HENT:   Head: Normocephalic and atraumatic.   Mouth/Throat: Oropharynx is clear and moist. No posterior oropharyngeal edema or posterior oropharyngeal erythema.   Eyes: EOM are normal. Pupils are equal, round, and reactive to light.   Neck: Normal range of motion. Carotid bruit is not present.       Cardiovascular: Regular rhythm.  Tachycardia present.    Pulses:       Radial pulses are 2+ on the right side, and 2+ on the left side.        Dorsalis pedis pulses are 2+ on the right side, and 2+ on the left side.   Pulmonary/Chest: No accessory muscle usage. Tachypneic: just extubated. No respiratory distress. She has decreased breath sounds in the right lower field and the left lower field.   Abdominal: Normal appearance. She exhibits no distension. Bowel sounds are hypoactive. There is no tenderness.   Genitourinary:   Genitourinary Comments: Palmer    Musculoskeletal: Normal range of motion.   Diffuse tissue hardening Rhabdo of all extrem and Abd increased from yesterday with some edema component.     Lymphadenopathy:     She has no cervical adenopathy.   Neurological: She is alert and oriented to person, place, and time. She appears not lethargic.   Skin: Skin is warm, dry and intact. She is not diaphoretic. No cyanosis.   Psychiatric: Memory, affect and judgment normal. Her mood appears anxious (mild).       Laboratory (Last  24H):  CBC:    Recent Labs  Lab 06/30/17  0426   WBC 12.65   HGB 10.5*   HCT 30.3*   PLT 54*     CMP:    Recent Labs  Lab 06/30/17  0426   CALCIUM 6.1*   ALBUMIN 1.5*   PROT 3.9*      K 3.5   CO2 30*      BUN 11   CREATININE 1.6*   ALKPHOS 159*   *   *   BILITOT 0.4       Coagulation:   Recent Labs  Lab 06/29/17  0500 06/30/17 0426   INR  --  1.1   APTT 44.2*  --      Microbiology Results (last 7 days)     Procedure Component Value Units Date/Time    Blood culture [[id number]] Collected:  06/28/17 1600    Order Status:  Completed Specimen:  Blood from Peripheral, Hand, Left Updated:  06/30/17 0612     Blood Culture, Routine No Growth to date     Blood Culture, Routine No Growth to date    Blood culture [[id number]] Collected:  06/28/17 1616    Order Status:  Completed Specimen:  Blood from Peripheral, Hand, Right Updated:  06/30/17 0612     Blood Culture, Routine No Growth to date     Blood Culture, Routine No Growth to date    Culture, Respiratory with Gram Stain [[id number]]     Order Status:  No result Specimen:  Respiratory from Endotracheal Aspirate           Chest X-Ray:         No recent film last 24 hours     ASSESSMENT/PLAN:     Problem   Non-Traumatic Rhabdomyolysis   Polysubstance Overdose   Kishore (Acute Kidney Injury)   Recurrent Major Depressive Disorder   Thrombocytopenia   Hypoalbuminemia   Electrolyte Imbalance   Aspiration Pneumonia   Elevated Liver Enzymes   Acute Hypoxemic Respiratory Failure (Resolved)       PLAN:    1. Neuro:  neuro monitoring.  PECd but denies SA/SI.  Cont Ativan, Seroquel and Prozac     2. Pulmonary: Encourage C&DB and OOB.  Cont IVAB.  Nicotine patch and encourage tobacco cessation.       3. Cardiac: Cardiac monitoring     4. Renal: Palmer in place, monitor I/Os.  Renal following, CRRT for Rhabdo and polysubstance OD clotted early this AM.      5. Infectious Disease: Follow fever curve.  Possible aspiration PNA.  Cont Zosyn.  Blood cultures X 2  NGTD.     6. Hematology/Oncology: monitor for bleeding.  Plt count dropped holding off Heparin.  CBC daily     7. Endocrine:  Monitor glucose stable     8. Fluids/Electrolytes/Nutrition/GI: IVFs w/ Bicarb per Renal +/- CRRT.  Encourage Renal diet.       9. Musculoskeletal:  ROM, OOB and consult PT/OT     10. Pain Management: PRN Oxycontin increase dose     11. Discharge and Palliative Care: Plan home with family vs Inpt Psych.     Preventive Measures and Monitoring:   Stress Ulcer: PPI  Nutrition: Renal diet  Glucose control:  stable  Bowel prophylaxis: PRN Dulcolax  DVT prophylaxis: SCDs  Hx CAD on B-Blocker: no hx CAD  Head of Bed/Reposition: Elevate HOB and turn Q1-2 hours    Early Mobility: OOB today  Central Line right IJ VasCath Day: #3  Palmer Day: #3  IVAB Day: #3  Code Status: Full    Counseling/Consultation: I have discussed the care of this patient in detail with multidisciplinary team during rounds, bedside nursing staff and Dr. Treadwell and Dr. Parikh and Dr. Gregory.    Critical Care Time greater than: 48 minutes    Critical care was time spent personally by me on the following activities: development of treatment plan with patient or surrogate and bedside caregivers, discussions with consultants, evaluation of patient's response to treatment, examination of patient, ordering and performing treatments and interventions, ordering and review of laboratory studies, ordering and review of radiographic studies, pulse oximetry, re-evaluation of patient's condition.  This critical care time did not overlap with that of any other provider or involve time of any procedures.    DEVEN Singer [facility]-BC  [facility] / [facility]

## 2017-06-30 NOTE — PT/OT/SLP EVAL
Physical Therapy  Evaluation    Kaylene Emery   MRN: [id number]   Admitting Diagnosis: Non-traumatic rhabdomyolysis    PT Received On: 06/30/17  PT Start Time: 0906     PT Stop Time: 0930    PT Total Time (min): 24 min       Billable Minutes:  Evaluation 14 and Therapeutic Activity 10    Diagnosis: Non-traumatic rhabdomyolysis  P.T. DX: IMPAIRED MOBILITY     Past Medical History:   Diagnosis Date    Anxiety     Depression     GERD (gastroesophageal reflux disease)     Tobacco use       Past Surgical History:   Procedure Laterality Date    BLADDER REPAIR      CENTRAL LINE  6/28/2017         CHOLECYSTECTOMY  04/07/2017    KNEE ARTHROPLASTY      PARTIAL HYSTERECTOMY      TUBAL LIGATION         Referring physician: BRITTA  Date referred to PT: 6/30/2017      General Precautions: Standard, fall  Orthopedic Precautions:     Braces:              Patient History:  Lives With: significant other  Home Layout: Able to live on 1st floor  Transportation Available: family or friend will provide  Living Environment Comment: PT WAS IND AND LIVING AT HOME WITH BOYFRIEND. PT HAS RAMP TO ENTER HOME  Equipment Currently Used at Home: none  DME owned (not currently used): none    Previous Level of Function:  Ambulation Skills: independent  Transfer Skills: independent  ADL Skills: independent  Work/Leisure Activity: independent    Subjective:  Communicated with NURSE GINO AND [facility] CHART REVIEW  prior to session.  PT AGREED TO EVAL AND TX   Chief Complaint:  PAIN AND FATIGUE   Patient goals: INC MOBILITY    Pain/Comfort  Pain Rating 1: 10/10  Location - Side 1: Bilateral  Location 1: leg      Objective:   Patient found with: peripheral IV, telemetry, carroll catheter, oxygen, pulse ox (continuous)     Cognitive Exam:  Oriented to: Person, Place, Time and Situation    Follows Commands/attention: Follows multistep  commands  Communication: clear/fluent  Safety awareness/insight to disability: impaired    Physical  Exam:  Postural examination/scapula alignment: Rounded shoulder and Head forward    Edema: Severe B LE    Lower Extremity Range of Motion:  Right Lower Extremity: LIMITED NO PALPABLE MUSCLE CONTRACTION OF ANKLE DF  Left Lower Extremity: LIMITED    Lower Extremity Strength:  Right Lower Extremity: SEVERE LIMITED  Left Lower Extremity: LIMITED      Functional Mobility:  Bed Mobility:  Rolling/Turning to Left: Maximum assistance  Scooting/Bridging: Maximum Assistance  Supine to Sit: Maximum Assistance    Transfers:  Sit <> Stand Assistance: Activity did not occur  Bed <> Chair Technique: Squat Pivot  Bed <> Chair Assistance: Total Assistance  Bed <> Chair Assistive Device: No Assistive Device    Gait:   Gait Distance: UNABLE      Balance:   Static Sit: POOR+: Needs MINIMAL assist to maintain  Dynamic Sit: POOR: N/A  Static Stand: 0: Needs MAXIMAL assist to maintain   Dynamic stand: 0: N/A    Therapeutic Activities and Exercises:  PT T/F TO CHAIR WITH SQUAT PIVOT T/F WITH TOTAL A. PT ATTEMPTED SIT>STAND FROM CHAIR WITH MAX A AND RW USE. PT UNABLE TO STAND. PT LEFT SEATED IN CHAIR AND ED ON ROM TE TO COMPLETE DAILY AND REC PLOF.    AM-PAC 6 CLICK MOBILITY  How much help from another person does this patient currently need?   1 = Unable, Total/Dependent Assistance  2 = A lot, Maximum/Moderate Assistance  3 = A little, Minimum/Contact Guard/Supervision  4 = None, Modified [place]/Independent    Turning over in bed (including adjusting bedclothes, sheets and blankets)?: 2  Sitting down on and standing up from a chair with arms (e.g., wheelchair, bedside commode, etc.): 1  Moving from lying on back to sitting on the side of the bed?: 2  Moving to and from a bed to a chair (including a wheelchair)?: 2  Need to walk in hospital room?: 1  Climbing 3-5 steps with a railing?: 1  Total Score: 9     AM-PAC Raw Score CMS G-Code Modifier Level of Impairment Assistance   6 % Total / Unable   7 - 9 CM 80 - 100% Maximal  Assist   10 - 14 CL 60 - 80% Moderate Assist   15 - 19 CK 40 - 60% Moderate Assist   20 - 22 CJ 20 - 40% Minimal Assist   23 CI 1-20% SBA / CGA   24 CH 0% Independent/ Mod I     Patient left up in chair with call button in reach and NURSE notified.    Assessment:   Kaylene Emery is a 46 y.o. female with a medical diagnosis of Non-traumatic rhabdomyolysis and presents with GEN WEAKNESS AND DEBILITY. PT WILL BENEFIT FROM P.T. TO ADDRESS IMPAIRMENTS LISTED.    Rehab identified problem list/impairments: Rehab identified problem list/impairments: weakness, impaired endurance, impaired sensation, gait instability, impaired functional mobilty, impaired self care skills, impaired balance, decreased lower extremity function, decreased upper extremity function, decreased coordination, decreased safety awareness, pain, abnormal tone, impaired fine motor, impaired coordination, decreased ROM, edema    Rehab potential is good.    Activity tolerance: Poor    Discharge recommendations: Discharge Facility/Level Of Care Needs: nursing facility, skilled     Barriers to discharge: Barriers to Discharge: None    Equipment recommendations: Equipment Needed After Discharge: wheelchair, walker, rolling     GOALS:    Physical Therapy Goals        Problem: Physical Therapy Goal    Goal Priority Disciplines Outcome Goal Variances Interventions   Physical Therapy Goal     PT/OT, PT      Description:  PT WILL BE SEEN FOR P.T. FOR A MIN OF 5 OUT OF 7 DAYS A WEEK  LT17  1. PT WILL COMPLETE BED MOBILITY WITH MOD A.  2. PT WILL STAND WITH MAX A AND RW  3. PT WILL T/F TO CHAIR WITH MAX A.  4. PT WILL COMPLETE B LE TE X 20 REPS FOR STRENGTHENING.                     PLAN:    Patient to be seen   to address the above listed problems via gait training, therapeutic activities, therapeutic exercises  Plan of Care expires: 17  Plan of Care reviewed with: patient    Functional Assessment Tool Used: [facility] PAC  Score: CM  Functional  Limitation: Mobility: Walking and moving around  Mobility: Walking and Moving Around Current Status (): CM  Mobility: Walking and Moving Around Goal Status (): CL     Estrella Keller, PT  06/30/2017

## 2017-06-30 NOTE — ASSESSMENT & PLAN NOTE
3. Psych: apparent drug overdose in what is reported as intention to self-harm  H/o of depression  Briefly discussed with fiance  Will defer to primary team.

## 2017-06-30 NOTE — ASSESSMENT & PLAN NOTE
2. ID: [id number]  On zosyn for suspected aspiration pneumonia  Adjust dose for the renal function

## 2017-06-30 NOTE — SUBJECTIVE & OBJECTIVE
"Interval History: Pt was seen and examined in ICU. H/o was reviewed. Pt is a 47 y/o female with acute kidney injury due to rhabdomyolysis. Pt has required dialysis support. Presentation was reviewed with pt and her fiance. Pt apparently had taken a number medications in excess, including ativan, and had passed out. Pt was found with decreased responsiveness by her fiance in the house driveway. ICU events and notes reviewed. Noted pt was previously intubated, now extubated, was on CRRT until last pm. Pt sitting in chair, feels SOB, no other c/o's, no CP, no palpitation. Meds and labs were reviewed.     Review of patient's allergies indicates:   Allergen Reactions    Corticosteroids (glucocorticoids)      "sets off my anxiety real bad"    Tramadol Rash     Current Facility-Administered Medications   Medication Frequency    0.9%  NaCl infusion Continuous    acetaminophen tablet 650 mg Q8H PRN    albuterol-ipratropium 2.5mg-0.5mg/3mL nebulizer solution 3 mL Q4H PRN    bisacodyl suppository 10 mg Daily PRN    cefTRIAXone (ROCEPHIN) 1 g in dextrose 5 % 50 mL IVPB Q24H    diphenhydrAMINE capsule 50 mg Nightly PRN    docusate sodium capsule 100 mg Daily    fluoxetine capsule 40 mg Daily    heparin (porcine) injection 2,000 Units PRN    lorazepam tablet 0.5 mg BID    nicotine 14 mg/24 hr 1 patch Daily    ondansetron injection 4 mg Q8H PRN    oxycodone 12 hr tablet 20 mg BID PRN    pantoprazole EC tablet 40 mg Daily    quetiapine tablet 200 mg Daily    vitamin D 1000 units tablet 1,000 Units Daily       Objective:     Vital Signs (Most Recent):  Temp: 98 °F (36.7 °C) (06/30/17 0702)  Pulse: 99 (06/30/17 1000)  Resp: (!) 30 (06/30/17 1000)  BP: 122/65 (06/30/17 1000)  SpO2: (!) 94 % (06/30/17 1000)  O2 Device (Oxygen Therapy): nasal cannula (06/30/17 1000) Vital Signs (24h Range):  Temp:  [97.6 °F (36.4 °C)-99.7 °F (37.6 °C)] 98 °F (36.7 °C)  Pulse:  [] 99  Resp:  [13-35] 30  SpO2:  [94 %-100 %] 94 " %  BP: (114-152)/() 122/65     Weight: 84.6 kg (186 lb 8.2 oz) (06/30/17 0500)  Body mass index is 32.01 kg/m².  Body surface area is 1.95 meters squared.    I/O last 3 completed shifts:  In: 45214.9 [P.O.:4050; I.V.:9528.9; IV Piggyback:750]  Out: 2347 [Urine:2347]    Physical Exam   Constitutional: No distress. She is intubated.   Intubated, sedated on propafol, breathing over the vent, intermittently agitated.   HENT:   Head: Normocephalic.   Eyes: Conjunctivae are normal. No scleral icterus.   Cardiovascular: Regular rhythm, normal heart sounds and intact distal pulses.  Tachycardia present.    No murmur heard.  Pulmonary/Chest: She is intubated. She has rhonchi.   Abdominal: Soft. She exhibits no distension.   Musculoskeletal: She exhibits no edema or deformity.   Lymphadenopathy:     She has no cervical adenopathy.   Neurological:   Intubated and able to communicate    Skin: Skin is warm. She is not diaphoretic. No erythema.   Psychiatric: She is not agitated.   Nursing note and vitals reviewed.      Significant Labs:  BMP  Lab Results   Component Value Date     06/30/2017    K 3.5 06/30/2017     06/30/2017    CO2 30 (H) 06/30/2017    BUN 11 06/30/2017    CREATININE 1.6 (H) 06/30/2017    CALCIUM 6.1 (LL) 06/30/2017    ANIONGAP 6 (L) 06/30/2017    ESTGFRAFRICA 45 (A) 06/30/2017    EGFRNONAA 39 (A) 06/30/2017     Lab Results   Component Value Date    WBC 12.65 06/30/2017    HGB 10.5 (L) 06/30/2017    HCT 30.3 (L) 06/30/2017    MCV 89 06/30/2017    PLT 54 (L) 06/30/2017

## 2017-06-30 NOTE — ASSESSMENT & PLAN NOTE
Heparin drip off as CRRT does not require heparin gtt  Platelets still low but stable-- probably sec to Acute Liver Injury

## 2017-06-30 NOTE — PROGRESS NOTES
Requesting pain medication.  Explanation of why she can not have any further pain medication given.  Verbalizes understanding but then will request pain meds within 30 minutes.  Continues to add multiple complaints of different areas of pain. EICU called per patient request, orders for Benadryl only.  Benadryl given.

## 2017-06-30 NOTE — ASSESSMENT & PLAN NOTE
1. Renal: ALEXUS. Due to non-traumatic rhabdomyolysis  Requiring dialysis support.  Tolerating dialysis well, on CRRT until yesterday  Pt is SOB and O2 sat is relatively low. CXR was reviewed  Will provide conventional HD today  Discussed with pt, her fiance and ICU team.  Electrolytes reviewed

## 2017-06-30 NOTE — PROGRESS NOTES
[facility] Hospital Medicine  Progress Note    Patient Name: Kaylene Emery  MRN: [id number]  Patient Class: IP- Inpatient   Admission Date: 6/28/2017  Length of Stay: 1 days  Attending Physician: Stephanie Parikh MD  Primary Care Provider: Jonas Jimenez MD        Subjective:     Principal Problem:Polysubstance overdose    HPI:  Ms. Emery is a 45 y/o  female with h/o depression, substance OD in the past, was found with AMS earlier today and was brought to the ED. Patient is currently intubated and history obtained per chart review. Family not at bedside.    Apparently patient likely had overdosed on multiple medications including Methocarbamol, Mobic, lorazepam, flexeril, phenergan, Seroquel, Celexa. Her boy friend is on Geodon, Klonopin, Wellbutrin and Effexor.  Most of her recent filled prescription bottles were found empty at the bedside when her bf found her.   Per chart review, yesterday patient was summoned to appear in the court for her DUI that occurred 4 months ago. She was extremely anxious and pacing all day today.     Labs show lactic acid 2.0, CPK > 40,000, CXR show bilateral patchy infiltrates, ANNETTE < 10, tylenol level < 5,  CO2 17, Na 130, , , WBC 15,000, Hgb 17.3.    Patient is currently intubated on the ventilator, breathing over the vent. She received NS 6 liters so far. Patient was evaluated by both neurology and nephrology in the ED. Nephrology plans to initiate patient on CRRT for poison/toxin control.    Hospital Course:  Pt extubated early this am, doing well, still feels anxious but better, denies any SI/ intentions or attempts. She is PECed. debies any prior hx of psych admissions. CRRT started this am and tolerating well. Repeat labs show improving LFTs but CPK still > 40,000/. Denies any injury or pain.     Interval History: feels and looks better, extubated, talking, no swallow problem    Review of Systems   Constitutional: Negative for activity  change, appetite change, chills, diaphoresis, fatigue, fever and unexpected weight change.   HENT: Negative for congestion, dental problem, drooling, postnasal drip, rhinorrhea and voice change.    Eyes: Negative for discharge.   Respiratory: Negative for apnea, cough, choking, chest tightness, shortness of breath, wheezing and stridor.    Cardiovascular: Negative for chest pain, palpitations and leg swelling.   Gastrointestinal: Negative for abdominal distention, blood in stool, constipation, diarrhea, nausea, rectal pain and vomiting.   Endocrine: Negative for cold intolerance, heat intolerance, polydipsia and polyuria.   Genitourinary: Negative for decreased urine volume, difficulty urinating, dysuria, enuresis, flank pain, frequency, hematuria and urgency.   Musculoskeletal: Negative for arthralgias, back pain, gait problem and joint swelling.        Muscle pains in hips and legs    Skin: Negative for rash.   Allergic/Immunologic: Negative for food allergies and immunocompromised state.   Neurological: Negative for dizziness, tremors, syncope, numbness and headaches.   Hematological: Does not bruise/bleed easily.   Psychiatric/Behavioral: Negative for agitation, behavioral problems and self-injury. The patient is not nervous/anxious and is not hyperactive.    All other systems reviewed and are negative.    Objective:     Vital Signs (Most Recent):  Temp: 99.4 °F (37.4 °C) (06/29/17 1502)  Pulse: 96 (06/29/17 1800)  Resp: (!) 30 (06/29/17 1800)  BP: (!) 144/127 (06/29/17 1800)  SpO2: 100 % (06/29/17 1800) Vital Signs (24h Range):  Temp:  [95.6 °F (35.3 °C)-99.4 °F (37.4 °C)] 99.4 °F (37.4 °C)  Pulse:  [74-96] 96  Resp:  [13-30] 30  SpO2:  [95 %-100 %] 100 %  BP: (111-154)/() 144/127     Weight: 75.4 kg (166 lb 3.6 oz)  Body mass index is 28.53 kg/m².    Intake/Output Summary (Last 24 hours) at 06/29/17 1913  Last data filed at 06/29/17 1800   Gross per 24 hour   Intake         60967.92 ml   Output              1463 ml   Net          8695.92 ml      Physical Exam   Constitutional: She is oriented to person, place, and time. No distress. She is intubated.   Extubated, appears anxious   HENT:   Head: Normocephalic.   Mouth/Throat: Oropharynx is clear and moist.   Eyes: Conjunctivae and EOM are normal. Pupils are equal, round, and reactive to light. No scleral icterus.   Neck: Normal range of motion. Neck supple. No JVD present.   Cardiovascular: Normal rate, regular rhythm, normal heart sounds and intact distal pulses.  Exam reveals no friction rub.    No murmur heard.  Pulmonary/Chest: Effort normal and breath sounds normal. She is intubated. No respiratory distress. She has no wheezes. She has no rales.   Abdominal: Soft. She exhibits no distension.   Genitourinary:   Genitourinary Comments: deferred   Musculoskeletal: Normal range of motion. She exhibits no edema or deformity.   Lymphadenopathy:     She has no cervical adenopathy.   Neurological: She is alert and oriented to person, place, and time. She has normal reflexes.   Intubated and able to communicate    Skin: Skin is warm and dry. Capillary refill takes less than 2 seconds. She is not diaphoretic. No erythema.   Psychiatric: She is not agitated.   Nursing note and vitals reviewed.      Significant Labs:    ABGs:   Recent Labs  Lab 06/29/17  0525   PH 7.333*   PCO2 35.1   HCO3 18.6*   POCSATURATED 99   BE -7     Bilirubin:   Recent Labs  Lab 06/28/17  1140 06/29/17  0500 06/29/17  1304   BILITOT 0.5 0.2 0.3     BMP:   Recent Labs  Lab 06/29/17  1304   *      K 4.1      CO2 24   BUN 13   CREATININE 1.4   CALCIUM 5.9*   MG 2.1     CBC:   Recent Labs  Lab 06/28/17  1140 06/29/17  0500 06/29/17  1304   WBC 14.93* 11.72 13.12*   HGB 17.3* 14.0 12.4   HCT 47.9 40.5 35.6*    55* 54*     CMP:   Recent Labs  Lab 06/28/17  1140 06/29/17  0500 06/29/17  1304   * 137 137   K 4.5 3.9 4.1   CL 97 112* 106   CO2 17* 19* 24   * 129* 123*    BUN 13 15 13   CREATININE 1.0 1.1 1.4   CALCIUM 8.5* 5.8* 5.9*   PROT 7.8 4.3* 4.1*   ALBUMIN 3.5 1.8* 1.6*   BILITOT 0.5 0.2 0.3   ALKPHOS 92 162* 168*   * 626* 451*   * 226* 192*   ANIONGAP 16 6* 7*   EGFRNONAA >60 >60 46*     All pertinent labs within the past 24 hours have been reviewed.  Imaging Results          X-Ray Chest AP Portable (Final result)  Result time 06/29/17 07:36:47    Final result by Minnie Horn MD (06/29/17 07:36:47)                 Impression:      Line and tube placement as described above.    Bibasilar atelectasis and hazy left basilar infiltrate.    Please see above.      Electronically signed by: MINNIE HORN MD  Date:     06/29/17  Time:    07:36              Narrative:    EXAM: Portable CXR one view    CLINICAL HISTORY: Encounter for fitting and adjustment nonvascular catheter..    COMPARISON STUDIES: 06/28/2017    FINDINGS:  Right neck catheter tip terminates in the right atrium.  Surgical clips right upper quadrant.  Nasogastric tube is terminating in the stomach.  Endotracheal tube tip terminates approximately 2.5 cm above the kaylie.    Normal heart size.  No pneumothorax is identified.  Focal atelectasis within the right and left lower lungs.  hazy infiltrate in the left pulmonary base.  Mild interval improvement.  Ribs appear intact .                             X-Ray Chest AP Portable (Final result)  Result time 06/28/17 20:17:13    Final result by Minnie Sampson III, MD (06/28/17 20:17:13)                 Impression:     See above        Electronically signed by: MINNIE SAMPSON MD  Date:     06/28/17  Time:    20:17              Narrative:    XR CHEST AP PORTABLE    Clinical history: . Z45.2, Encounter for adjustment of vascular access device.    FINDINGS: The right jugular central venous line terminates over the mid right atrium and could be retracted 5 cm.  The endotracheal tube is satisfactory in position.  Patchy bilateral lower lobe interstitial  infiltrates are again noted..  No pneumothorax or other new pulmonary disease identified.                             CT Head Without Contrast (Final result)  Result time 06/28/17 18:14:35    Final result by Minnie Sampson III, MD (06/28/17 18:14:35)                 Impression:       No acute intracranial disease identified.       Electronically signed by: MINNIE SAMPSON MD  Date:     06/28/17  Time:    18:14              Narrative:    Head CT without contrast    Clinical history: Altered Mental Status    TECHNIQUE: Routine noncontrast axial head CT. All CT scans at this facility use dose modulation, iterative reconstruction, and/or weight based dosing when appropriate to reduce radiation dose to as low as reasonably achievable.    Comparison: none    FINDINGS: There is no evidence of intracranial hemorrhage, mass-effect, hydrocephalus or other acute disease. There is no CT evidence of acute ischemia or cerebral edema.  There is mild opacification of the right mastoid air cells.  The visualized paranasal sinuses and left mastoid air cells are clear. No calvarial fracture is identified.                             CT Lumbar Spine Without Contrast (Final result)  Result time 06/28/17 18:20:35    Final result by Minnie Sampson III, MD (06/28/17 18:20:35)                 Impression:       1.  Asymmetric thickening of the right piriformis muscle with surrounding stranding possibly related to muscle strain or inflammatory in etiology.  Findings could cause right sciatica.    2.  No acute osseous abnormality identified.  Mild L3-4 through L5-S1 disc bulging and facet arthropathy causing foraminal stenosis.        Electronically signed by: MINNIE SAMPSON MD  Date:     06/28/17  Time:    18:20              Narrative:    CT LUMBAR SPINE WITHOUT CONTRAST    Clinical history: Weakness (780.79).    Technique:  Axial noncontrast CT scan of the lumbar spine with sagittal and coronal reconstructions. All CT scans at this facility use  dose modulation, iterative reconstruction, and/or weight based dosing when appropriate to reduce radiation dose to as low as reasonably achievable.    Comparison: None    Findings:   No fracture or other acute injury is seen in the lumbar spine. Lumbar spine alignment is anatomic. No suspicious lytic or blastic bone marrow lesions identified.  There is asymmetric thickening of the right piriformis muscle with surrounding stranding.  No other acute paravertebral soft tissue abnormality identified. The intervertebral disc heights appear relatively well preserved.    T12-L1: Unremarkable    L1-2: Unremarkable    L2-3: Unremarkable    L3-4: Small annular disc bulge and mild facet arthropathy causing mild left foraminal stenosis.    L4-5: Small annular disc bulge and facet arthropathy causing mild right and moderate left foraminal stenosis.    L5-S1:  Tiny broad-based posterior disc bulge at moderate hypertrophic facet arthropathy causing mild bilateral foraminal stenosis.                             X-Ray Chest AP Portable (Final result)  Result time 06/28/17 18:12:06    Final result by Minnie Sampson III, MD (06/28/17 18:12:06)                 Impression:     Well positioned endotracheal tube.  Possible patchy lower lobe interstitial infiltrates.      Electronically signed by: MINNIE SAMPSON MD  Date:     06/28/17  Time:    18:12              Narrative:    XR CHEST AP PORTABLE    Clinical history: Endotracheal tube placement. Z46.82 Encounter for fitting and adjustment of non-vascular catheter.    Findings: The endotracheal tube is well positioned terminating approximately 2.5 cm above the kaylie. There are possible patchy bilateral lower lobe interstitial infiltrates. No pneumothorax or other acute pulmonary disease identified.                          Significant Imaging: I have reviewed all pertinent imaging results/findings within the past 24 hours.    Assessment/Plan:      * Polysubstance overdose    - Unclear  intention.  - PEC'd  - Psychiatry has evaluated patient  - CRRT to be started tonight for polysubstance/toxin removal    Improving on CRRT-- CPK still high but LFTs improving          Non-traumatic rhabdomyolysis    - CPK < 40,000  - Repeat daily CPK  - Received six liters of NS so far in the ED  - Continue NS at 125 cc/hr  - Discussed with  (nephrology), plan to initiate CRRT tonight    As above-- improving        Acute hypoxemic respiratory failure    - ABG shows PH 7.4, PCO2 28, PO2 78 on Fio2 21%  - Intubated for airway protection  - Pulmonary consult for vent management    Extubated this am-- stable        Transaminitis    - Secondary to polysubstance overdose  - Monitor closely  - Gradually improving with CRRT            Hypomagnesemia    repleted          Aspiration pneumonia    - Start Vanc and Zosyn (received rocephin, zithromax in the ED)  - Follow up on blood, sputum cultures  - Duonebs q6          Thrombocytopenia    Heparin drip off as CRRT does not require heparin gtt          Recurrent major depressive disorder    Needs to be addressed further            VTE Risk Mitigation         Ordered     Medium Risk of VTE  Once      06/28/17 2059     Place JUAN M hose  Until discontinued      06/28/17 2059     Place sequential compression device  Until discontinued      06/28/17 2059          Stephanie Parikh MD  Department of Hospital Medicine   [facility]

## 2017-06-30 NOTE — ASSESSMENT & PLAN NOTE
- ABG shows PH 7.4, PCO2 28, PO2 78 on Fio2 21%  - Intubated for airway protection  - Pulmonary consult for vent management    Extubated this am-- stable

## 2017-06-30 NOTE — PLAN OF CARE
Patient received hd today. Net removal 3 liters. No access problems. Dr. Gregory visited during hd. Pt. Tolerated well.

## 2017-06-30 NOTE — PT/OT/SLP EVAL
Occupational Therapy  Evaluation    Kaylene Emery   MRN: [id number]   Admitting Diagnosis: Non-traumatic rhabdomyolysis    OT Date of Treatment: 06/30/17   OT Start Time: 0845  OT Stop Time: 0925  OT Total Time (min): 40 min    Billable Minutes:  Evaluation 10 minutes  Self Care/Home Management 10 minutes  Therapeutic Activity 20 minutes    Diagnosis: Non-traumatic rhabdomyolysis   Debility and generalized weakness      Past Medical History:   Diagnosis Date    Anxiety     Depression     GERD (gastroesophageal reflux disease)     Tobacco use       Past Surgical History:   Procedure Laterality Date    BLADDER REPAIR      CENTRAL LINE  6/28/2017         CHOLECYSTECTOMY  04/07/2017    KNEE ARTHROPLASTY      PARTIAL HYSTERECTOMY      TUBAL LIGATION         Referring physician: dr. Treadwell/ samantha  Date referred to OT: 6-29-17    General Precautions: Standard, fall  Orthopedic Precautions: N/A  Braces:            Patient History:  Living Environment  Lives With: significant other  Home Layout: Able to live on 1st floor  Stair Railings at Home: none  Living Environment Comment: pt lives at home with boyfriend. pt reports (i) plof  Equipment Currently Used at Home: none    Prior level of function:   Bed Mobility/Transfers: independent  Grooming: independent  Bathing: independent  Upper Body Dressing: independent  Lower Body Dressing: independent  Toileting: independent  Driving License: Yes  Occupation: Unemployed     Dominant hand: right    Subjective:  Communicated with nurse ceasar and [facility] chart review prior to session.    Chief Complaint: debility and generalized weakness  Patient/Family stated goals:          Objective:  Patient found with: telemetry, blood pressure cuff, peripheral IV, carroll catheter    Cognitive Exam:  Oriented to: Person, Place, Time and Situation  Follows Commands/attention: Follows two-step commands  Communication: clear/fluent  Memory:  No Deficits noted  Safety awareness/insight  to disability: impaired  Coping skills/emotional control: Appropriate to situation    Visual/perceptual:  Intact    Physical Exam:  Postural examination/scapula alignment: Rounded shoulder and Head forward  Skin integrity: Visible skin intact and Thin  Edema: Severe b le and mild b ue    Sensation:   Intact    Upper Extremity Range of Motion:  Right Upper Extremity: WFL  Left Upper Extremity: WFL    Upper Extremity Strength:  Right Upper Extremity: mmt: 3/5 grossly  Left Upper Extremity: mmt: 3/5 grossly   Strength: mmt: 3/5 grossly    Fine motor coordination:   Intact    Gross motor coordination: impaired    Functional Mobility:  Bed Mobility:  Rolling/Turning to Left: Maximum assistance  Rolling/Turning Right: Maximum assistance  Scooting/Bridging: Maximum Assistance  Supine to Sit: Maximum Assistance    Transfers:  Sit <> Stand Assistance: Total Assistance  Sit <> Stand Assistive Device: No Assistive Device  Bed <> Chair Technique: Squat Pivot  Bed <> Chair Transfer Assistance: Maximum Assistance    Functional Ambulation: na    Activities of Daily Living:     Feeding adaptive equipment: na  UE Dressing Level of Assistance: Total assistance  UE adaptive equipment: na  LE Dressing Level of Assistance: Total assistance  LE adaptive equipment: na        Toileting Where Assessed: Bed level  Toileting Level of Assistance: Total assistance        Bathing adaptive equipment: na    Balance:   Static Sit: FAIR-: Maintains without assist but inconsistent   Dynamic Sit: POOR: N/A  Static Stand: poor-  Dynamic stand: na    Therapeutic Activities and Exercises:  na    AM-PAC 6 CLICK ADL  How much help from another person does this patient currently need?  1 = Unable, Total/Dependent Assistance  2 = A lot, Maximum/Moderate Assistance  3 = A little, Minimum/Contact Guard/Supervision  4 = None, Modified Massey/Independent    Putting on and taking off regular lower body clothing? : 1  Bathing (including washing, rinsing,  "drying)?: 1  Toileting, which includes using toilet, bedpan, or urinal? : 1  Putting on and taking off regular upper body clothing?: 1  Taking care of personal grooming such as brushing teeth?: 2  Eating meals?: 2  Total Score: 8    AM-PAC Raw Score CMS "G-Code Modifier Level of Impairment Assistance   6 % Total / Unable   7 - 9 CM 80 - 100% Maximal Assist   10-14 CL 60 - 80% Moderate Assist   15 - 19 CK 40 - 60% Moderate Assist   20 - 22 CJ 20 - 40% Minimal Assist   23 CI 1-20% SBA / CGA   24 CH 0% Independent/ Mod I       Patient left up in chair with all lines intact, call button in reach and nurse ceaasr notified boyfriend present      Assessment:  Kaylene Emery is a 46 y.o. female with a medical diagnosis of Non-traumatic rhabdomyolysis and presents with debility and generalized weakness and will benefit from skilled O.T.    Rehab identified problem list/impairments: Rehab identified problem list/impairments: weakness, impaired functional mobilty, impaired balance, impaired endurance, impaired self care skills, gait instability, decreased coordination, decreased upper extremity function, decreased safety awareness, pain, decreased ROM    Rehab potential is good.    Activity tolerance: Good    Discharge recommendations: Discharge Facility/Level Of Care Needs: nursing facility, skilled     Barriers to discharge:      Equipment recommendations: wheelchair, hospital bed, walker, rolling, bath bench     GOALS:    Occupational Therapy Goals        Problem: Occupational Therapy Goal    Goal Priority Disciplines Outcome Interventions   Occupational Therapy Goal     OT, PT/OT     Description:  ot goals to be met by 7-7-17  1. Mod a with ue dressing  2. Mod a with le dressing  3. Pt will tolerate 1 set x 10 reps b ue rom exercise  4. Pt will req mod a with bsc<>bed t/f's                    PLAN:  Patient to be seen 3 x/week to address the above listed problems via self-care/home management, therapeutic " activities, therapeutic exercises  Plan of Care expires:    Plan of Care reviewed with: patient         Raeann Hdzzier, OT  06/30/2017

## 2017-06-30 NOTE — ASSESSMENT & PLAN NOTE
- Secondary to polysubstance overdose  - Monitor closely  - Gradually improving with CRRT  - Continue present care,, LFTs improving

## 2017-06-30 NOTE — PROGRESS NOTES
"Progress Note  Neurological ICU    Admit Date: 6/28/2017   LOS: 2 days     SUBJECTIVE:     Patient is awake, alert and following commands. There is more history to her now. Apparently she has had 2 DUI and she was also in long term for drug possession.  [facility] and had prescription for Lortab. She took  15 Lortab in 3 days, in addition to Seroquel, Ativan, Zanaflex, Flexeril, Gabapentin     Continuous Infusions:   sodium chloride 0.9% 100 mL/hr at 06/30/17 1000     Scheduled Meds:   cefTRIAXone (ROCEPHIN) IVPB  1 g Intravenous Q24H    docusate sodium  100 mg Oral Daily    ergocalciferol  50,000 Units Oral Q7 Days    fluoxetine  40 mg Oral Daily    lorazepam  0.5 mg Oral BID    nicotine  1 patch Transdermal Daily    pantoprazole  40 mg Oral Daily    [START ON 7/1/2017] quetiapine  50 mg Oral Daily     PRN Meds:acetaminophen, albuterol-ipratropium 2.5mg-0.5mg/3mL, bisacodyl, diphenhydrAMINE, heparin (porcine), ondansetron, oxycodone    Review of patient's allergies indicates:   Allergen Reactions    Corticosteroids (glucocorticoids)      "sets off my anxiety real bad"    Tramadol Rash       OBJECTIVE:     Vital Signs (Most Recent)  Temp: 98 °F (36.7 °C) (06/30/17 0702)  Pulse: 99 (06/30/17 1000)  Resp: (!) 30 (06/30/17 1000)  BP: 122/65 (06/30/17 1000)  SpO2: (!) 94 % (06/30/17 1000)    Vital Signs Range (Last 24H):  Temp:  [98 °F (36.7 °C)-99.7 °F (37.6 °C)]   Pulse:  []   Resp:  [13-35]   BP: (114-152)/()   SpO2:  [94 %-100 %]     I & O (Last 24H):  Intake/Output Summary (Last 24 hours) at 06/30/17 1118  Last data filed at 06/30/17 1027   Gross per 24 hour   Intake             9285 ml   Output             1988 ml   Net             7297 ml     Physical Exam:  General: She is awake, alert and following.  HEENT:   Acephalic, Atraumatic,  EOMI, PERRLA, no nystagmus, no ptosis, no lid lag, no neglect, no gaze palsy  Fundoscopy:    Neck: supple, no JVD, no Carotid bruit, " no torticollis,   Lungs: CTA,  No rhonchi, no rales  Heart:: Regular Rate and rhythm, no murmurs, rubs and or gallops  Abdomen, Soft, non tender, non distended, no abdominal bruit, bowel sounds present  Extremities: No edema,   Skin: No rash, no ecchymoses,         NEURO    Mental Status   Awake, alert   X 4  Memory, Recent and Remote: has improved  Mood: Anxious  Affect: Flat  Behavior: Intact, no disinhibition, no hostility  Attention and Concentration: intact  Insight and thought processes:  Intact  Higher Executive functions:  Compromised      SPEECH:   No dysphasia, no Dysarthria,     CRANIAL NERVES:      II: visual acuity  intact   II: visual fields Full to confrontation   II: pupils Equal, round, reactive to light   III,VII: ptosis None   III,IV,VI: extraocular muscles  Full ROM   V: mastication Normal   V: facial light touch sensation  Normal   V,VII: corneal reflex  Present   VII: facial muscle function - upper  Normal   VII: facial muscle function - lower Normal   VIII: hearing Not tested   IX: soft palate elevation  Normal   IX,X: gag reflex Present   XI: trapezius strength  5/5   XI: sternocleidomastoid strength 5/5   XI: neck flexion strength  5/5   XII: tongue strength  Normal         MOTOR:Upper Extremities 5/5 in the proximal and distal  No pronation  No drift  No rigidity         Lower Extremities Right 2+/5                                     Left 3/5    Decreased tone. But has improved significantly    REFLEXES:  UE: 1+  B/l in all the modalities                          LE: 0/5 b/l in all the modalities          SENSORY: intact,   Soft TOUCH and VIBRATION:  intact      ROMBERG:  And GAIT:   Non ambulatory    EXTRAPYRAMIDALS: Tremulousness      Lines/Drains:       Trialysis (Dialysis) Catheter 06/28/17 1800 right internal jugular (Active)   IV Device Securement sutures 6/30/2017  7:02 AM   Patency/Care flushed w/o difficulty 6/30/2017  7:02 AM   Site Assessment Clean;Intact;Dry;No redness;No  "swelling 6/30/2017  7:02 AM   Status Deaccessed 6/30/2017  7:02 AM   Dressing Status Biopatch in place;Clean;Dry;Intact 6/30/2017  7:02 AM   Dressing Change Due 07/05/17 6/30/2017  7:02 AM   Daily Line Review Performed 6/30/2017  7:02 AM   Number of days: 1            Peripheral IV - Single Lumen 06/28/17 1700 Right Antecubital (Active)   Site Assessment Clean;Dry;Intact;No redness;No swelling 6/30/2017  7:02 AM   Line Status Infusing 6/30/2017  7:02 AM   Dressing Status Clean;Dry;Intact 6/30/2017  7:02 AM   Site Change Due 06/02/17 6/28/2017  9:05 PM   Reason Not Rotated Not due 6/29/2017 11:05 PM   Number of days: 1            Urethral Catheter 06/28/17 1640 Straight-tip 16 Fr. (Active)   Site Assessment Clean;Intact 6/30/2017  7:02 AM   Collection Container Urimeter 6/30/2017  7:02 AM   Securement Method secured to top of thigh w/ adhesive device 6/30/2017  7:02 AM   Catheter Care Performed yes 6/30/2017  7:02 AM   Reason for Continuing Urinary Catheterization Critically ill in ICU requiring intensive monitoring 6/30/2017  7:02 AM   CAUTI Prevention Bundle StatLock in place w 1" slack;Drainage bag off the floor;Intact seal between catheter & drainage tubing;Green sheeting clip in use;No dependent loops or kinks;Drainage bag not overfilled (<2/3 full);Drainage bag below bladder 6/30/2017  7:02 AM   Output (mL) 50 mL 6/30/2017  9:00 AM   Number of days: 1       Laboratory (Last 24H):  CBC:    Recent Labs  Lab 06/30/17  0426   WBC 12.65   HGB 10.5*   HCT 30.3*   PLT 54*     CMP:    Recent Labs  Lab 06/30/17  0426   CALCIUM 6.1*   ALBUMIN 1.5*   PROT 3.9*      K 3.5   CO2 30*      BUN 11   CREATININE 1.6*   ALKPHOS 159*   *   *   BILITOT 0.4     Results for orders placed or performed during the hospital encounter of 06/28/17   Blood culture   Result Value Ref Range    Blood Culture, Routine No Growth to date     Blood Culture, Routine No Growth to date    Blood culture   Result Value Ref " Range    Blood Culture, Routine No Growth to date     Blood Culture, Routine No Growth to date    CBC auto differential   Result Value Ref Range    WBC 14.93 (H) 3.90 - 12.70 K/uL    RBC 5.35 4.00 - 5.40 M/uL    Hemoglobin 17.3 (H) 12.0 - 16.0 g/dL    Hematocrit 47.9 37.0 - 48.5 %    MCV 90 82 - 98 fL    MCH 32.3 (H) 27.0 - 31.0 pg    MCHC 36.1 (H) 32.0 - 36.0 %    RDW 13.9 11.5 - 14.5 %    Platelets 173 150 - 350 K/uL    MPV 10.7 9.2 - 12.9 fL    Gran # 11.8 (H) 1.8 - 7.7 K/uL    Lymph # 1.8 1.0 - 4.8 K/uL    Mono # 1.3 (H) 0.3 - 1.0 K/uL    Eos # 0.0 0.0 - 0.5 K/uL    Baso # 0.02 0.00 - 0.20 K/uL    Gran% 79.0 (H) 38.0 - 73.0 %    Lymph% 12.3 (L) 18.0 - 48.0 %    Mono% 8.5 4.0 - 15.0 %    Eosinophil% 0.1 0.0 - 8.0 %    Basophil% 0.1 0.0 - 1.9 %    Differential Method Automated    Comprehensive metabolic panel   Result Value Ref Range    Sodium 130 (L) 136 - 145 mmol/L    Potassium 4.5 3.5 - 5.1 mmol/L    Chloride 97 95 - 110 mmol/L    CO2 17 (L) 23 - 29 mmol/L    Glucose 127 (H) 70 - 110 mg/dL    BUN, Bld 13 6 - 20 mg/dL    Creatinine 1.0 0.5 - 1.4 mg/dL    Calcium 8.5 (L) 8.7 - 10.5 mg/dL    Total Protein 7.8 6.0 - 8.4 g/dL    Albumin 3.5 3.5 - 5.2 g/dL    Total Bilirubin 0.5 0.1 - 1.0 mg/dL    Alkaline Phosphatase 92 55 - 135 U/L     (H) 10 - 40 U/L     (H) 10 - 44 U/L    Anion Gap 16 8 - 16 mmol/L    eGFR if African American >60 >60 mL/min/1.73 m^2    eGFR if non African American >60 >60 mL/min/1.73 m^2   TSH   Result Value Ref Range    TSH 0.255 (L) 0.400 - 4.000 uIU/mL   Urinalysis - clean catch   Result Value Ref Range    Specimen UA Urine, Catheterized     Color, UA Brown (A) Yellow, Straw, Lisa    Appearance, UA Cloudy (A) Clear    pH, UA 7.0 5.0 - 8.0    Specific Gravity, UA 1.025 1.005 - 1.030    Protein, UA 3+ (A) Negative    Glucose, UA Negative Negative    Ketones, UA 1+ (A) Negative    Bilirubin (UA) 1+ (A) Negative    Occult Blood UA 3+ (A) Negative    Nitrite, UA Positive (A)  Negative    Urobilinogen, UA 1.0 <2.0 EU/dL    Leukocytes, UA Trace (A) Negative   Drug screen panel, emergency   Result Value Ref Range    Benzodiazepines Presumptive Positive     Methadone metabolites Negative     Cocaine (Metab.) Negative     Opiate Scrn, Ur Presumptive Positive     Barbiturate Screen, Ur Negative     Amphetamine Screen, Ur Negative     THC Negative     Phencyclidine Negative     Creatinine, Random Ur 94.4 15.0 - 325.0 mg/dL    Toxicology Information SEE COMMENT    Ethanol   Result Value Ref Range    Alcohol, Medical, Serum <10 <10 mg/dL   Acetaminophen level   Result Value Ref Range    Acetaminophen (Tylenol), Serum <3.0 (L) 10.0 - 20.0 ug/mL   Salicylate level   Result Value Ref Range    Salicylate Lvl <5.0 (L) 15.0 - 30.0 mg/dL   T4, free   Result Value Ref Range    Free T4 0.85 0.71 - 1.51 ng/dL   Pregnancy, urine rapid   Result Value Ref Range    Preg Test, Ur Negative    CK   Result Value Ref Range    CPK >50966 (H) 20 - 180 U/L   Lactic acid, plasma   Result Value Ref Range    Lactate (Lactic Acid) 2.0 0.5 - 2.2 mmol/L   Urinalysis Microscopic   Result Value Ref Range    RBC, UA 5 (H) 0 - 4 /hpf    WBC, UA 2 0 - 5 /hpf    Bacteria, UA Few (A) None-Occ /hpf    Hyaline Casts, UA 2 (A) 0-1/lpf /lpf    Microscopic Comment SEE COMMENT    Lactic acid, plasma   Result Value Ref Range    Lactate (Lactic Acid) 0.6 0.5 - 2.2 mmol/L   APTT   Result Value Ref Range    aPTT 28.8 21.0 - 32.0 sec   CK   Result Value Ref Range    CPK >70355 (H) 20 - 180 U/L   Lactic acid, plasma   Result Value Ref Range    Lactate (Lactic Acid) 0.7 0.5 - 2.2 mmol/L   CBC auto differential   Result Value Ref Range    WBC 11.72 3.90 - 12.70 K/uL    RBC 4.51 4.00 - 5.40 M/uL    Hemoglobin 14.0 12.0 - 16.0 g/dL    Hematocrit 40.5 37.0 - 48.5 %    MCV 90 82 - 98 fL    MCH 31.0 27.0 - 31.0 pg    MCHC 34.6 32.0 - 36.0 %    RDW 14.1 11.5 - 14.5 %    Platelets 55 (L) 150 - 350 K/uL    MPV 11.0 9.2 - 12.9 fL    Gran # 9.4 (H) 1.8 -  7.7 K/uL    Lymph # 1.1 1.0 - 4.8 K/uL    Mono # 1.1 (H) 0.3 - 1.0 K/uL    Eos # 0.0 0.0 - 0.5 K/uL    Baso # 0.02 0.00 - 0.20 K/uL    Gran% 80.4 (H) 38.0 - 73.0 %    Lymph% 9.4 (L) 18.0 - 48.0 %    Mono% 9.7 4.0 - 15.0 %    Eosinophil% 0.3 0.0 - 8.0 %    Basophil% 0.2 0.0 - 1.9 %    Differential Method Automated    Comprehensive metabolic panel   Result Value Ref Range    Sodium 137 136 - 145 mmol/L    Potassium 3.9 3.5 - 5.1 mmol/L    Chloride 112 (H) 95 - 110 mmol/L    CO2 19 (L) 23 - 29 mmol/L    Glucose 129 (H) 70 - 110 mg/dL    BUN, Bld 15 6 - 20 mg/dL    Creatinine 1.1 0.5 - 1.4 mg/dL    Calcium 5.8 (LL) 8.7 - 10.5 mg/dL    Total Protein 4.3 (L) 6.0 - 8.4 g/dL    Albumin 1.8 (L) 3.5 - 5.2 g/dL    Total Bilirubin 0.2 0.1 - 1.0 mg/dL    Alkaline Phosphatase 162 (H) 55 - 135 U/L     (H) 10 - 40 U/L     (H) 10 - 44 U/L    Anion Gap 6 (L) 8 - 16 mmol/L    eGFR if African American >60 >60 mL/min/1.73 m^2    eGFR if non African American >60 >60 mL/min/1.73 m^2   Magnesium   Result Value Ref Range    Magnesium 1.5 (L) 1.6 - 2.6 mg/dL   Phosphorus   Result Value Ref Range    Phosphorus 4.5 2.7 - 4.5 mg/dL   Lactic acid, plasma   Result Value Ref Range    Lactate (Lactic Acid) 0.7 0.5 - 2.2 mmol/L   APTT   Result Value Ref Range    aPTT 44.2 (H) 21.0 - 32.0 sec   CK   Result Value Ref Range    CPK >16703 (H) 20 - 180 U/L   CK   Result Value Ref Range    CPK >63170 (H) 20 - 180 U/L   Magnesium   Result Value Ref Range    Magnesium 2.1 1.6 - 2.6 mg/dL   Comprehensive metabolic panel   Result Value Ref Range    Sodium 137 136 - 145 mmol/L    Potassium 4.1 3.5 - 5.1 mmol/L    Chloride 106 95 - 110 mmol/L    CO2 24 23 - 29 mmol/L    Glucose 123 (H) 70 - 110 mg/dL    BUN, Bld 13 6 - 20 mg/dL    Creatinine 1.4 0.5 - 1.4 mg/dL    Calcium 5.9 (LL) 8.7 - 10.5 mg/dL    Total Protein 4.1 (L) 6.0 - 8.4 g/dL    Albumin 1.6 (L) 3.5 - 5.2 g/dL    Total Bilirubin 0.3 0.1 - 1.0 mg/dL    Alkaline Phosphatase 168 (H) 55 -  135 U/L     (H) 10 - 40 U/L     (H) 10 - 44 U/L    Anion Gap 7 (L) 8 - 16 mmol/L    eGFR if African American 53 (A) >60 mL/min/1.73 m^2    eGFR if non African American 46 (A) >60 mL/min/1.73 m^2   Phosphorus   Result Value Ref Range    Phosphorus 3.9 2.7 - 4.5 mg/dL   CBC auto differential   Result Value Ref Range    WBC 13.12 (H) 3.90 - 12.70 K/uL    RBC 3.96 (L) 4.00 - 5.40 M/uL    Hemoglobin 12.4 12.0 - 16.0 g/dL    Hematocrit 35.6 (L) 37.0 - 48.5 %    MCV 90 82 - 98 fL    MCH 31.3 (H) 27.0 - 31.0 pg    MCHC 34.8 32.0 - 36.0 %    RDW 14.0 11.5 - 14.5 %    Platelets 54 (L) 150 - 350 K/uL    MPV 10.5 9.2 - 12.9 fL    Gran # 10.9 (H) 1.8 - 7.7 K/uL    Lymph # 1.2 1.0 - 4.8 K/uL    Mono # 1.0 0.3 - 1.0 K/uL    Eos # 0.0 0.0 - 0.5 K/uL    Baso # 0.02 0.00 - 0.20 K/uL    Gran% 82.9 (H) 38.0 - 73.0 %    Lymph% 9.4 (L) 18.0 - 48.0 %    Mono% 7.2 4.0 - 15.0 %    Eosinophil% 0.3 0.0 - 8.0 %    Basophil% 0.2 0.0 - 1.9 %    Differential Method Automated    Vitamin D   Result Value Ref Range    Vit D, 25-Hydroxy 7 (L) 30 - 96 ng/mL   CK   Result Value Ref Range    CPK >19678 (H) 20 - 180 U/L   Magnesium   Result Value Ref Range    Magnesium 1.9 1.6 - 2.6 mg/dL   Comprehensive metabolic panel   Result Value Ref Range    Sodium 132 (L) 136 - 145 mmol/L    Potassium 4.0 3.5 - 5.1 mmol/L    Chloride 103 95 - 110 mmol/L    CO2 25 23 - 29 mmol/L    Glucose 131 (H) 70 - 110 mg/dL    BUN, Bld 12 6 - 20 mg/dL    Creatinine 1.4 0.5 - 1.4 mg/dL    Calcium 6.1 (LL) 8.7 - 10.5 mg/dL    Total Protein 4.2 (L) 6.0 - 8.4 g/dL    Albumin 1.7 (L) 3.5 - 5.2 g/dL    Total Bilirubin 0.4 0.1 - 1.0 mg/dL    Alkaline Phosphatase 164 (H) 55 - 135 U/L     (H) 10 - 40 U/L     (H) 10 - 44 U/L    Anion Gap 4 (L) 8 - 16 mmol/L    eGFR if African American 53 (A) >60 mL/min/1.73 m^2    eGFR if non African American 46 (A) >60 mL/min/1.73 m^2   Phosphorus   Result Value Ref Range    Phosphorus 3.0 2.7 - 4.5 mg/dL   Magnesium    Result Value Ref Range    Magnesium 2.0 1.6 - 2.6 mg/dL   Comprehensive metabolic panel   Result Value Ref Range    Sodium 135 (L) 136 - 145 mmol/L    Potassium 3.7 3.5 - 5.1 mmol/L    Chloride 101 95 - 110 mmol/L    CO2 28 23 - 29 mmol/L    Glucose 125 (H) 70 - 110 mg/dL    BUN, Bld 11 6 - 20 mg/dL    Creatinine 1.4 0.5 - 1.4 mg/dL    Calcium 6.2 (LL) 8.7 - 10.5 mg/dL    Total Protein 4.0 (L) 6.0 - 8.4 g/dL    Albumin 1.6 (L) 3.5 - 5.2 g/dL    Total Bilirubin 0.4 0.1 - 1.0 mg/dL    Alkaline Phosphatase 151 (H) 55 - 135 U/L     (H) 10 - 40 U/L     (H) 10 - 44 U/L    Anion Gap 6 (L) 8 - 16 mmol/L    eGFR if African American 53 (A) >60 mL/min/1.73 m^2    eGFR if non African American 46 (A) >60 mL/min/1.73 m^2   Phosphorus   Result Value Ref Range    Phosphorus 2.7 2.7 - 4.5 mg/dL   CBC auto differential   Result Value Ref Range    WBC 12.65 3.90 - 12.70 K/uL    RBC 3.41 (L) 4.00 - 5.40 M/uL    Hemoglobin 10.5 (L) 12.0 - 16.0 g/dL    Hematocrit 30.3 (L) 37.0 - 48.5 %    MCV 89 82 - 98 fL    MCH 30.8 27.0 - 31.0 pg    MCHC 34.7 32.0 - 36.0 %    RDW 14.0 11.5 - 14.5 %    Platelets 54 (L) 150 - 350 K/uL    MPV 11.1 9.2 - 12.9 fL    Gran # 10.2 (H) 1.8 - 7.7 K/uL    Lymph # 1.5 1.0 - 4.8 K/uL    Mono # 0.9 0.3 - 1.0 K/uL    Eos # 0.1 0.0 - 0.5 K/uL    Baso # 0.02 0.00 - 0.20 K/uL    Gran% 80.4 (H) 38.0 - 73.0 %    Lymph% 11.7 (L) 18.0 - 48.0 %    Mono% 7.0 4.0 - 15.0 %    Eosinophil% 0.7 0.0 - 8.0 %    Basophil% 0.2 0.0 - 1.9 %    Differential Method Automated    Magnesium   Result Value Ref Range    Magnesium 1.8 1.6 - 2.6 mg/dL   Comprehensive metabolic panel   Result Value Ref Range    Sodium 136 136 - 145 mmol/L    Potassium 3.5 3.5 - 5.1 mmol/L    Chloride 100 95 - 110 mmol/L    CO2 30 (H) 23 - 29 mmol/L    Glucose 121 (H) 70 - 110 mg/dL    BUN, Bld 11 6 - 20 mg/dL    Creatinine 1.6 (H) 0.5 - 1.4 mg/dL    Calcium 6.1 (LL) 8.7 - 10.5 mg/dL    Total Protein 3.9 (L) 6.0 - 8.4 g/dL    Albumin 1.5 (L) 3.5 -  5.2 g/dL    Total Bilirubin 0.4 0.1 - 1.0 mg/dL    Alkaline Phosphatase 159 (H) 55 - 135 U/L     (H) 10 - 40 U/L     (H) 10 - 44 U/L    Anion Gap 6 (L) 8 - 16 mmol/L    eGFR if African American 45 (A) >60 mL/min/1.73 m^2    eGFR if non African American 39 (A) >60 mL/min/1.73 m^2   Phosphorus   Result Value Ref Range    Phosphorus 2.9 2.7 - 4.5 mg/dL   Protime-INR   Result Value Ref Range    Prothrombin Time 10.9 9.0 - 12.5 sec    INR 1.1 0.8 - 1.2   CK   Result Value Ref Range    CPK 69952 (H) 20 - 180 U/L   ISTAT PROCEDURE   Result Value Ref Range    POC PH 7.407 7.35 - 7.45    POC PCO2 28.1 (LL) 35 - 45 mmHg    POC PO2 78 (L) 80 - 100 mmHg    POC HCO3 17.7 (L) 24 - 28 mmol/L    POC BE -7 -2 to 2 mmol/L    POC SATURATED O2 96 95 - 100 %    Sample ARTERIAL     Site RR     Allens Test Pass     DelSys Room Air     Mode SPONT     FiO2 21    ISTAT PROCEDURE   Result Value Ref Range    POC PH 7.333 (L) 7.35 - 7.45    POC PCO2 35.1 35 - 45 mmHg    POC PO2 137 (H) 80 - 100 mmHg    POC HCO3 18.6 (L) 24 - 28 mmol/L    POC BE -7 -2 to 2 mmol/L    POC SATURATED O2 99 95 - 100 %    Rate 16     Sample ARTERIAL     Site RR     Allens Test Pass     DelSys Adult Vent     Mode AC/PRVC     Vt 500     PEEP 5     FiO2 40        ASSESSMENT/PLAN:   Patient with polysubstance abuse, comes with overdose of multiple medications, including opiates.  CPK has been elevated, it is trending down. He has vitamin D def.  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

## 2017-06-30 NOTE — ASSESSMENT & PLAN NOTE
- Start Vanc and Zosyn (received rocephin, zithromax in the ED)  - Follow up on blood, sputum cultures  - Duonebs q6    No s/s of aspiration, will stop Zosyn

## 2017-06-30 NOTE — HOSPITAL COURSE
Pt extubated early this am, doing well, still feels anxious but better, denies any SI/ intentions or attempts. She is PECed. debies any prior hx of psych admissions. CRRT started this am and tolerating well. Repeat labs show improving LFTs but CPK still > 40,000/. Denies any injury or pain.     Looks much better, sitting up in chair, has severe LE edema from the Bicarb fluid. CRRT clotted off at 0200 this am, c/o ch back and muscle pain. No numbness of tingling. Again denies any suicidal ideation or attempt. Getting IV Zosyn too for a presumed Aspiration.     7/7/10: patient feeling better she still with leg edema but feels it is better. Her urine output has increased despite her renal function stable. Her CPK is trending down. She currently denies any homicidal ideation.    7/8/17:Her Cr increased and Nephrology decided to restart her IV fluids overnight and today she is complaining of worsening anasarca which was getting better. She will get albumin and IVF will be held.    7/10/17: He catheter will be pulled as her urine output still good. She has less edema today. I have discussed with her going [facility] and they will continue to monitor her renal function. She became upset today as her mother who was on hospice for COPD brought in to the hospital and now in ICU intubated. She is crying currently but I have discussed the importance of her getting the help she needs. Today her BUN/Cr is 55/4.4.

## 2017-06-30 NOTE — PROGRESS NOTES
Nephrology evaluated the pt and switched her to HD instead of CRRT due to SOB, pt tolerating it well so far.

## 2017-06-30 NOTE — PROGRESS NOTES
[facility] Hospital Medicine  Progress Note    Patient Name: Kaylene Emery  MRN: [id number]  Patient Class: IP- Inpatient   Admission Date: 6/28/2017  Length of Stay: 2 days  Attending Physician: Stephanie Parikh MD  Primary Care Provider: Jonas Jimenez MD        Subjective:     Principal Problem:Non-traumatic rhabdomyolysis    HPI:  Ms. Emery is a 47 y/o  female with h/o depression, substance OD in the past, was found with AMS earlier today and was brought to the ED. Patient is currently intubated and history obtained per chart review. Family not at bedside.    Apparently patient likely had overdosed on multiple medications including Methocarbamol, Mobic, lorazepam, flexeril, phenergan, Seroquel, Celexa. Her boy friend is on Geodon, Klonopin, Wellbutrin and Effexor.  Most of her recent filled prescription bottles were found empty at the bedside when her bf found her.   Per chart review, yesterday patient was summoned to appear in the court for her DUI that occurred 4 months ago. She was extremely anxious and pacing all day today.     Labs show lactic acid 2.0, CPK > 40,000, CXR show bilateral patchy infiltrates, ANNETTE < 10, tylenol level < 5,  CO2 17, Na 130, , , WBC 15,000, Hgb 17.3.    Patient is currently intubated on the ventilator, breathing over the vent. She received NS 6 liters so far. Patient was evaluated by both neurology and nephrology in the ED. Nephrology plans to initiate patient on CRRT for poison/toxin control.    Hospital Course:  Pt extubated early this am, doing well, still feels anxious but better, denies any SI/ intentions or attempts. She is PECed. debies any prior hx of psych admissions. CRRT started this am and tolerating well. Repeat labs show improving LFTs but CPK still > 40,000/. Denies any injury or pain.     Looks much better, sitting up in chair, has severe LE edema from the Bicarb fluid. CRRT clotted off at 0200 this am, c/o ch back and  muscle pain. No numbness of tingling. Again denies any suicidal ideation or attempt. Getting IV Zosyn too for a presumed Aspiration.     Interval History: sitting up in chair, looks much better, has ch LE pain. Both LE shows severe edema likely from the IVF and low Albumin. CRRT clotted off at 2 am.    Review of Systems   Constitutional: Negative for activity change, appetite change, chills, diaphoresis, fatigue, fever and unexpected weight change.   HENT: Negative for congestion, dental problem, drooling, postnasal drip, rhinorrhea and voice change.    Eyes: Negative for discharge.   Respiratory: Negative for apnea, cough, choking, chest tightness, shortness of breath, wheezing and stridor.    Cardiovascular: Negative for chest pain, palpitations and leg swelling.   Gastrointestinal: Negative for abdominal distention, blood in stool, constipation, diarrhea, nausea, rectal pain and vomiting.   Endocrine: Negative for cold intolerance, heat intolerance, polydipsia and polyuria.   Genitourinary: Negative for decreased urine volume, difficulty urinating, dysuria, enuresis, flank pain, frequency, hematuria and urgency.   Musculoskeletal: Negative for arthralgias, back pain, gait problem and joint swelling.        Muscle pains in hips and legs    Skin: Negative for rash.   Allergic/Immunologic: Negative for food allergies and immunocompromised state.   Neurological: Negative for dizziness, tremors, syncope, numbness and headaches.   Hematological: Does not bruise/bleed easily.   Psychiatric/Behavioral: Negative for agitation, behavioral problems and self-injury. The patient is not nervous/anxious and is not hyperactive.    All other systems reviewed and are negative.    Objective:     Vital Signs (Most Recent):  Temp: 98 °F (36.7 °C) (06/30/17 0702)  Pulse: 103 (06/30/17 0702)  Resp: (!) 29 (06/30/17 0702)  BP: (!) 145/89 (06/30/17 0702)  SpO2: 97 % (06/30/17 0702) Vital Signs (24h Range):  Temp:  [97.6 °F (36.4 °C)-99.7  °F (37.6 °C)] 98 °F (36.7 °C)  Pulse:  [] 103  Resp:  [13-35] 29  SpO2:  [94 %-100 %] 97 %  BP: (114-152)/() 145/89     Weight: 84.6 kg (186 lb 8.2 oz)  Body mass index is 32.01 kg/m².    Intake/Output Summary (Last 24 hours) at 06/30/17 1017  Last data filed at 06/30/17 0920   Gross per 24 hour   Intake          9626.67 ml   Output             2057 ml   Net          7569.67 ml      Physical Exam   Constitutional: She is oriented to person, place, and time. She appears well-developed and well-nourished. No distress. She is not intubated.   appears anxious but NAD   HENT:   Head: Normocephalic.   Mouth/Throat: Oropharynx is clear and moist.   Eyes: Conjunctivae and EOM are normal. Pupils are equal, round, and reactive to light. No scleral icterus.   Neck: Normal range of motion. Neck supple. No JVD present.   Cardiovascular: Normal rate, regular rhythm, normal heart sounds and intact distal pulses.  Exam reveals no friction rub.    No murmur heard.  Pulmonary/Chest: Effort normal and breath sounds normal. She is not intubated. No respiratory distress. She has no wheezes. She has no rales.   Abdominal: Soft. She exhibits no distension. There is no tenderness.   Genitourinary:   Genitourinary Comments: deferred   Musculoskeletal: Normal range of motion. She exhibits edema. She exhibits no deformity.   3-4 pitting edema B   Lymphadenopathy:     She has no cervical adenopathy.   Neurological: She is alert and oriented to person, place, and time. She has normal reflexes.   Skin: Skin is warm and dry. Capillary refill takes less than 2 seconds. She is not diaphoretic. No erythema. No pallor.   Psychiatric: She has a normal mood and affect. Her behavior is normal. Judgment and thought content normal. She is not agitated.   Nursing note and vitals reviewed.      Significant Labs:   CBC:   Recent Labs  Lab 06/29/17  0500 06/29/17  1304 06/30/17  0426   WBC 11.72 13.12* 12.65   HGB 14.0 12.4 10.5*   HCT 40.5 35.6*  30.3*   PLT 55* 54* 54*     CMP:   Recent Labs  Lab 06/29/17  1800 06/30/17  0001 06/30/17  0426   * 135* 136   K 4.0 3.7 3.5    101 100   CO2 25 28 30*   * 125* 121*   BUN 12 11 11   CREATININE 1.4 1.4 1.6*   CALCIUM 6.1* 6.2* 6.1*   PROT 4.2* 4.0* 3.9*   ALBUMIN 1.7* 1.6* 1.5*   BILITOT 0.4 0.4 0.4   ALKPHOS 164* 151* 159*   * 386* 336*   * 170* 160*   ANIONGAP 4* 6* 6*   EGFRNONAA 46* 46* 39*     All pertinent labs within the past 24 hours have been reviewed.    Significant Imaging: I have reviewed all pertinent imaging results/findings within the past 24 hours.    Assessment/Plan:      Polysubstance overdose    - Unclear intention.  - PEC'd  - Psychiatry has evaluated patient  - CRRT to be started tonight for polysubstance/toxin removal    Improving on CRRT-- CPK still high but LFTs improving    CRRT clotted off, CPK slightly down but still very high  Will change Bicarb Fluids to NS at a lower rate          * Non-traumatic rhabdomyolysis    - CPK < 40,000  - Repeat daily CPK  - Received six liters of NS so far in the ED  - Continue NS at 125 cc/hr  - Discussed with  (nephrology), plan to initiate CRRT tonight    As above-- improving but still very high, still needs IVF        Transaminitis    - Secondary to polysubstance overdose  - Monitor closely  - Gradually improving with CRRT  - Continue present care,, LFTs improving            Hypoalbuminemia    Probably acute on Ch due to Liver Injury  Will watch          Aspiration pneumonia    - Start Vanc and Zosyn (received rocephin, zithromax in the ED)  - Follow up on blood, sputum cultures  - Duonebs q6    No s/s of aspiration, will stop Zosyn          Thrombocytopenia    Heparin drip off as CRRT does not require heparin gtt  Platelets still low but stable-- probably sec to Acute Liver Injury          Recurrent major depressive disorder    Needs to be addressed further          Elevated liver enzymes    Sec to Polysubstance  Drug OD-- improving          ALEXUS (acute kidney injury)    Possibly Pre renal?  Will watch closely          Anasarca    Third spacing due to ALI and ALEXUS-- will change IVF to NS at a lower rate  Avoid Lasix at this point to protect the kidneys            VTE Risk Mitigation         Ordered     Medium Risk of VTE  Once      06/28/17 2059     Place sequential compression device  Until discontinued      06/28/17 2059          Stephanie Parikh MD  Department of Hospital Medicine   [facility]

## 2017-06-30 NOTE — ASSESSMENT & PLAN NOTE
- Unclear intention.  - PEC'd  - Psychiatry has evaluated patient  - CRRT to be started tonight for polysubstance/toxin removal    Improving on CRRT-- CPK still high but LFTs improving    CRRT clotted off, CPK slightly down but still very high  Will change Bicarb Fluids to NS at a lower rate

## 2017-06-30 NOTE — PLAN OF CARE
Problem: Patient Care Overview  Goal: Plan of Care Review  PT REQUIRES MAX A X2>TOTAL A FOR BED MOBILITY AND T/F TO CHAIR   Outcome: Ongoing (interventions implemented as appropriate)  Patient AAOx4.  All vitals have been stable except for O2 SATS and respirations.  O2 level drops when patient is drinking fluids between 87-90%. Patient has been complaining of SOB with respirations jumping into the 30s. she sounds very course on assessment. Speech Eval has been ordered .  PT/OT working with patient.  Patient had to be transferred to the chair by nursing staff.  She still complains of numbness and tingling to BLE. Patients received HD and 3L were removed. IV fluids maintained.  IV abx administered as ordered. Sitter at bedside.   No falls on shift.  Dodge encouraged

## 2017-06-30 NOTE — ASSESSMENT & PLAN NOTE
- CPK < 40,000  - Repeat daily CPK  - Received six liters of NS so far in the ED  - Continue NS at 125 cc/hr  - Discussed with  (nephrology), plan to initiate CRRT tonight    As above-- improving but still very high, still needs IVF

## 2017-06-30 NOTE — ASSESSMENT & PLAN NOTE
Third spacing due to ALI and ALEXUS-- will change IVF to NS at a lower rate  Avoid Lasix at this point to protect the kidneys

## 2017-06-30 NOTE — SUBJECTIVE & OBJECTIVE
Interval History: feels and looks better, extubated, talking, no swallow problem    Review of Systems   Constitutional: Negative for activity change, appetite change, chills, diaphoresis, fatigue, fever and unexpected weight change.   HENT: Negative for congestion, dental problem, drooling, postnasal drip, rhinorrhea and voice change.    Eyes: Negative for discharge.   Respiratory: Negative for apnea, cough, choking, chest tightness, shortness of breath, wheezing and stridor.    Cardiovascular: Negative for chest pain, palpitations and leg swelling.   Gastrointestinal: Negative for abdominal distention, blood in stool, constipation, diarrhea, nausea, rectal pain and vomiting.   Endocrine: Negative for cold intolerance, heat intolerance, polydipsia and polyuria.   Genitourinary: Negative for decreased urine volume, difficulty urinating, dysuria, enuresis, flank pain, frequency, hematuria and urgency.   Musculoskeletal: Negative for arthralgias, back pain, gait problem and joint swelling.        Muscle pains in hips and legs    Skin: Negative for rash.   Allergic/Immunologic: Negative for food allergies and immunocompromised state.   Neurological: Negative for dizziness, tremors, syncope, numbness and headaches.   Hematological: Does not bruise/bleed easily.   Psychiatric/Behavioral: Negative for agitation, behavioral problems and self-injury. The patient is not nervous/anxious and is not hyperactive.    All other systems reviewed and are negative.    Objective:     Vital Signs (Most Recent):  Temp: 99.4 °F (37.4 °C) (06/29/17 1502)  Pulse: 96 (06/29/17 1800)  Resp: (!) 30 (06/29/17 1800)  BP: (!) 144/127 (06/29/17 1800)  SpO2: 100 % (06/29/17 1800) Vital Signs (24h Range):  Temp:  [95.6 °F (35.3 °C)-99.4 °F (37.4 °C)] 99.4 °F (37.4 °C)  Pulse:  [74-96] 96  Resp:  [13-30] 30  SpO2:  [95 %-100 %] 100 %  BP: (111-154)/() 144/127     Weight: 75.4 kg (166 lb 3.6 oz)  Body mass index is 28.53 kg/m².    Intake/Output  Summary (Last 24 hours) at 06/29/17 1913  Last data filed at 06/29/17 1800   Gross per 24 hour   Intake         70762.92 ml   Output             1463 ml   Net          8695.92 ml      Physical Exam   Constitutional: She is oriented to person, place, and time. No distress. She is intubated.   Extubated, appears anxious   HENT:   Head: Normocephalic.   Mouth/Throat: Oropharynx is clear and moist.   Eyes: Conjunctivae and EOM are normal. Pupils are equal, round, and reactive to light. No scleral icterus.   Neck: Normal range of motion. Neck supple. No JVD present.   Cardiovascular: Normal rate, regular rhythm, normal heart sounds and intact distal pulses.  Exam reveals no friction rub.    No murmur heard.  Pulmonary/Chest: Effort normal and breath sounds normal. She is intubated. No respiratory distress. She has no wheezes. She has no rales.   Abdominal: Soft. She exhibits no distension.   Genitourinary:   Genitourinary Comments: deferred   Musculoskeletal: Normal range of motion. She exhibits no edema or deformity.   Lymphadenopathy:     She has no cervical adenopathy.   Neurological: She is alert and oriented to person, place, and time. She has normal reflexes.   Intubated and able to communicate    Skin: Skin is warm and dry. Capillary refill takes less than 2 seconds. She is not diaphoretic. No erythema.   Psychiatric: She is not agitated.   Nursing note and vitals reviewed.      Significant Labs:    ABGs:   Recent Labs  Lab 06/29/17  0525   PH 7.333*   PCO2 35.1   HCO3 18.6*   POCSATURATED 99   BE -7     Bilirubin:   Recent Labs  Lab 06/28/17  1140 06/29/17  0500 06/29/17  1304   BILITOT 0.5 0.2 0.3     BMP:   Recent Labs  Lab 06/29/17  1304   *      K 4.1      CO2 24   BUN 13   CREATININE 1.4   CALCIUM 5.9*   MG 2.1     CBC:   Recent Labs  Lab 06/28/17  1140 06/29/17  0500 06/29/17  1304   WBC 14.93* 11.72 13.12*   HGB 17.3* 14.0 12.4   HCT 47.9 40.5 35.6*    55* 54*     CMP:   Recent  Labs  Lab 06/28/17  1140 06/29/17  0500 06/29/17  1304   * 137 137   K 4.5 3.9 4.1   CL 97 112* 106   CO2 17* 19* 24   * 129* 123*   BUN 13 15 13   CREATININE 1.0 1.1 1.4   CALCIUM 8.5* 5.8* 5.9*   PROT 7.8 4.3* 4.1*   ALBUMIN 3.5 1.8* 1.6*   BILITOT 0.5 0.2 0.3   ALKPHOS 92 162* 168*   * 626* 451*   * 226* 192*   ANIONGAP 16 6* 7*   EGFRNONAA >60 >60 46*     All pertinent labs within the past 24 hours have been reviewed.  Imaging Results          X-Ray Chest AP Portable (Final result)  Result time 06/29/17 07:36:47    Final result by Minnie Horn MD (06/29/17 07:36:47)                 Impression:      Line and tube placement as described above.    Bibasilar atelectasis and hazy left basilar infiltrate.    Please see above.      Electronically signed by: MINNIE HORN MD  Date:     06/29/17  Time:    07:36              Narrative:    EXAM: Portable CXR one view    CLINICAL HISTORY: Encounter for fitting and adjustment nonvascular catheter..    COMPARISON STUDIES: 06/28/2017    FINDINGS:  Right neck catheter tip terminates in the right atrium.  Surgical clips right upper quadrant.  Nasogastric tube is terminating in the stomach.  Endotracheal tube tip terminates approximately 2.5 cm above the kaylie.    Normal heart size.  No pneumothorax is identified.  Focal atelectasis within the right and left lower lungs.  hazy infiltrate in the left pulmonary base.  Mild interval improvement.  Ribs appear intact .                             X-Ray Chest AP Portable (Final result)  Result time 06/28/17 20:17:13    Final result by Minnie Sampson III, MD (06/28/17 20:17:13)                 Impression:     See above        Electronically signed by: MINNIE SAMPSON MD  Date:     06/28/17  Time:    20:17              Narrative:    XR CHEST AP PORTABLE    Clinical history: . Z45.2, Encounter for adjustment of vascular access device.    FINDINGS: The right jugular central venous line terminates over the  mid right atrium and could be retracted 5 cm.  The endotracheal tube is satisfactory in position.  Patchy bilateral lower lobe interstitial infiltrates are again noted..  No pneumothorax or other new pulmonary disease identified.                             CT Head Without Contrast (Final result)  Result time 06/28/17 18:14:35    Final result by Minnie Sampson III, MD (06/28/17 18:14:35)                 Impression:       No acute intracranial disease identified.       Electronically signed by: MINNIE SAMPSON MD  Date:     06/28/17  Time:    18:14              Narrative:    Head CT without contrast    Clinical history: Altered Mental Status    TECHNIQUE: Routine noncontrast axial head CT. All CT scans at this facility use dose modulation, iterative reconstruction, and/or weight based dosing when appropriate to reduce radiation dose to as low as reasonably achievable.    Comparison: none    FINDINGS: There is no evidence of intracranial hemorrhage, mass-effect, hydrocephalus or other acute disease. There is no CT evidence of acute ischemia or cerebral edema.  There is mild opacification of the right mastoid air cells.  The visualized paranasal sinuses and left mastoid air cells are clear. No calvarial fracture is identified.                             CT Lumbar Spine Without Contrast (Final result)  Result time 06/28/17 18:20:35    Final result by Minnie Sampson III, MD (06/28/17 18:20:35)                 Impression:       1.  Asymmetric thickening of the right piriformis muscle with surrounding stranding possibly related to muscle strain or inflammatory in etiology.  Findings could cause right sciatica.    2.  No acute osseous abnormality identified.  Mild L3-4 through L5-S1 disc bulging and facet arthropathy causing foraminal stenosis.        Electronically signed by: MINNIE SAMPSON MD  Date:     06/28/17  Time:    18:20              Narrative:    CT LUMBAR SPINE WITHOUT CONTRAST    Clinical history: Weakness  (780.79).    Technique:  Axial noncontrast CT scan of the lumbar spine with sagittal and coronal reconstructions. All CT scans at this facility use dose modulation, iterative reconstruction, and/or weight based dosing when appropriate to reduce radiation dose to as low as reasonably achievable.    Comparison: None    Findings:   No fracture or other acute injury is seen in the lumbar spine. Lumbar spine alignment is anatomic. No suspicious lytic or blastic bone marrow lesions identified.  There is asymmetric thickening of the right piriformis muscle with surrounding stranding.  No other acute paravertebral soft tissue abnormality identified. The intervertebral disc heights appear relatively well preserved.    T12-L1: Unremarkable    L1-2: Unremarkable    L2-3: Unremarkable    L3-4: Small annular disc bulge and mild facet arthropathy causing mild left foraminal stenosis.    L4-5: Small annular disc bulge and facet arthropathy causing mild right and moderate left foraminal stenosis.    L5-S1:  Tiny broad-based posterior disc bulge at moderate hypertrophic facet arthropathy causing mild bilateral foraminal stenosis.                             X-Ray Chest AP Portable (Final result)  Result time 06/28/17 18:12:06    Final result by Minnie Sampson III, MD (06/28/17 18:12:06)                 Impression:     Well positioned endotracheal tube.  Possible patchy lower lobe interstitial infiltrates.      Electronically signed by: MINNIE SAMPSON MD  Date:     06/28/17  Time:    18:12              Narrative:    XR CHEST AP PORTABLE    Clinical history: Endotracheal tube placement. Z46.82 Encounter for fitting and adjustment of non-vascular catheter.    Findings: The endotracheal tube is well positioned terminating approximately 2.5 cm above the kaylie. There are possible patchy bilateral lower lobe interstitial infiltrates. No pneumothorax or other acute pulmonary disease identified.                          Significant Imaging:  I have reviewed all pertinent imaging results/findings within the past 24 hours.

## 2017-06-30 NOTE — PROGRESS NOTES
"[facility] -   Nephrology  Progress Note    Patient Name: Kaylene Emery  MRN: [id number]  Admission Date: 6/28/2017  Hospital Length of Stay: 2 days  Attending Provider: Stephanie Parikh MD   Primary Care Physician: Jonas Jimenez MD  Principal Problem:Non-traumatic rhabdomyolysis    Subjective:     HPI: Patient is a 46-year-old with multiple psychiatry issues.  Comes in with multiple medication overdose with unclear intent suicidal versus overdose versus toxic injections.  Patient has dark-colored urine along with severe rhabdomyolysis.  Patient is now intubated.  Dr. Giles consulted me for initiation for CRRT for multiple drug poisoning and rhabdo myelolysis.  Patient has had transient episode of hypotension with anxiolytics.  Still has some urine output and is nonoliguric.  Most of the history has been obtained from Dr. Giles, patient's fiancé and from the medical records.    Interval History: Pt was seen and examined in ICU. H/o was reviewed. Pt is a 45 y/o female with acute kidney injury due to rhabdomyolysis. Pt has required dialysis support. Presentation was reviewed with pt and her fiance. Pt apparently had taken a number medications in excess, including ativan, and had passed out. Pt was found with decreased responsiveness by her fiance in the house driveway. ICU events and notes reviewed. Noted pt was previously intubated, now extubated, was on CRRT until last pm. Pt sitting in chair, feels SOB, no other c/o's, no CP, no palpitation. Meds and labs were reviewed.     Review of patient's allergies indicates:   Allergen Reactions    Corticosteroids (glucocorticoids)      "sets off my anxiety real bad"    Tramadol Rash     Current Facility-Administered Medications   Medication Frequency    0.9%  NaCl infusion Continuous    acetaminophen tablet 650 mg Q8H PRN    albuterol-ipratropium 2.5mg-0.5mg/3mL nebulizer solution 3 mL Q4H PRN    bisacodyl suppository 10 mg Daily PRN    " cefTRIAXone (ROCEPHIN) 1 g in dextrose 5 % 50 mL IVPB Q24H    diphenhydrAMINE capsule 50 mg Nightly PRN    docusate sodium capsule 100 mg Daily    fluoxetine capsule 40 mg Daily    heparin (porcine) injection 2,000 Units PRN    lorazepam tablet 0.5 mg BID    nicotine 14 mg/24 hr 1 patch Daily    ondansetron injection 4 mg Q8H PRN    oxycodone 12 hr tablet 20 mg BID PRN    pantoprazole EC tablet 40 mg Daily    quetiapine tablet 200 mg Daily    vitamin D 1000 units tablet 1,000 Units Daily       Objective:     Vital Signs (Most Recent):  Temp: 98 °F (36.7 °C) (06/30/17 0702)  Pulse: 99 (06/30/17 1000)  Resp: (!) 30 (06/30/17 1000)  BP: 122/65 (06/30/17 1000)  SpO2: (!) 94 % (06/30/17 1000)  O2 Device (Oxygen Therapy): nasal cannula (06/30/17 1000) Vital Signs (24h Range):  Temp:  [97.6 °F (36.4 °C)-99.7 °F (37.6 °C)] 98 °F (36.7 °C)  Pulse:  [] 99  Resp:  [13-35] 30  SpO2:  [94 %-100 %] 94 %  BP: (114-152)/() 122/65     Weight: 84.6 kg (186 lb 8.2 oz) (06/30/17 0500)  Body mass index is 32.01 kg/m².  Body surface area is 1.95 meters squared.    I/O last 3 completed shifts:  In: 91014.9 [P.O.:4050; I.V.:9528.9; IV Piggyback:750]  Out: 2347 [Urine:2347]    Physical Exam   Constitutional: No distress. She is extubated now  Not agitated.   HENT:   Head: Normocephalic.   Eyes: Conjunctivae are normal. No scleral icterus.   Cardiovascular: RRR, normal heart sounds and intact distal pulses.    No murmur heard.  Pulmonary/Chest: + scattered rales  Abdominal: Soft. She exhibits no distension.   Musculoskeletal: She exhibits no edema or deformity.   Lymphadenopathy:     She has no cervical adenopathy.   Neurological: deferred    Skin: Skin is warm. She is not diaphoretic. No erythema.   Psychiatric: She is not agitated.   Nursing note and vitals reviewed.      Significant Labs:  BMP  Lab Results   Component Value Date     06/30/2017    K 3.5 06/30/2017     06/30/2017    CO2 30 (H) 06/30/2017     BUN 11 06/30/2017    CREATININE 1.6 (H) 06/30/2017    CALCIUM 6.1 (LL) 06/30/2017    ANIONGAP 6 (L) 06/30/2017    ESTGFRAFRICA 45 (A) 06/30/2017    EGFRNONAA 39 (A) 06/30/2017     Lab Results   Component Value Date    WBC 12.65 06/30/2017    HGB 10.5 (L) 06/30/2017    HCT 30.3 (L) 06/30/2017    MCV 89 06/30/2017    PLT 54 (L) 06/30/2017         Assessment/Plan:     * Non-traumatic rhabdomyolysis    1. Renal: ALEXUS. Due to non-traumatic rhabdomyolysis  Requiring dialysis support.  Tolerating dialysis well, on CRRT until yesterday  Pt is SOB and O2 sat is relatively low. CXR was reviewed  Will provide conventional HD today  Discussed with pt, her fiance and ICU team.  Electrolytes reviewed  K normal  Metabolic acidosis  IVF's reviewed            Aspiration pneumonia    2. ID: abx reviewed  On zosyn for suspected aspiration pneumonia  Adjust dose for the renal function  Afebrile  WBC slightly elevated        Polysubstance overdose    3. Psych: apparent drug overdose in what is reported as intention to self-harm  H/o of depression  Briefly discussed with fiance  Will defer to primary team.          Plans and recommendations:  As discussed above  HD today  Pt was seen twice in ICU and will be revisited during HD to be reevaluated  Pt received multiple visits and evaluations  Continue NS IVF's at 100 ml/hr  Repeat blood work  Replace all electrolyte deficiencies  Total critical care time spent 60 minutes including time needed to review the records, the patient evaluation, documentation, face-to-face discussion with the patient,   more than 50% of the time was spent on coordination of care and counseling.        Mona Gregory MD  Nephrology  [place]

## 2017-06-30 NOTE — ASSESSMENT & PLAN NOTE
- CPK < 40,000  - Repeat daily CPK  - Received six liters of NS so far in the ED  - Continue NS at 125 cc/hr  - Discussed with  (nephrology), plan to initiate CRRT tonight    As above-- improving

## 2017-06-30 NOTE — SUBJECTIVE & OBJECTIVE
Interval History: sitting up in chair, looks much better, has ch LE pain. Both LE shows severe edema likely from the IVF and low Albumin. CRRT clotted off at 2 am.    Review of Systems   Constitutional: Negative for activity change, appetite change, chills, diaphoresis, fatigue, fever and unexpected weight change.   HENT: Negative for congestion, dental problem, drooling, postnasal drip, rhinorrhea and voice change.    Eyes: Negative for discharge.   Respiratory: Negative for apnea, cough, choking, chest tightness, shortness of breath, wheezing and stridor.    Cardiovascular: Negative for chest pain, palpitations and leg swelling.   Gastrointestinal: Negative for abdominal distention, blood in stool, constipation, diarrhea, nausea, rectal pain and vomiting.   Endocrine: Negative for cold intolerance, heat intolerance, polydipsia and polyuria.   Genitourinary: Negative for decreased urine volume, difficulty urinating, dysuria, enuresis, flank pain, frequency, hematuria and urgency.   Musculoskeletal: Negative for arthralgias, back pain, gait problem and joint swelling.        Muscle pains in hips and legs    Skin: Negative for rash.   Allergic/Immunologic: Negative for food allergies and immunocompromised state.   Neurological: Negative for dizziness, tremors, syncope, numbness and headaches.   Hematological: Does not bruise/bleed easily.   Psychiatric/Behavioral: Negative for agitation, behavioral problems and self-injury. The patient is not nervous/anxious and is not hyperactive.    All other systems reviewed and are negative.    Objective:     Vital Signs (Most Recent):  Temp: 98 °F (36.7 °C) (06/30/17 0702)  Pulse: 103 (06/30/17 0702)  Resp: (!) 29 (06/30/17 0702)  BP: (!) 145/89 (06/30/17 0702)  SpO2: 97 % (06/30/17 0702) Vital Signs (24h Range):  Temp:  [97.6 °F (36.4 °C)-99.7 °F (37.6 °C)] 98 °F (36.7 °C)  Pulse:  [] 103  Resp:  [13-35] 29  SpO2:  [94 %-100 %] 97 %  BP: (114-152)/() 145/89      Weight: 84.6 kg (186 lb 8.2 oz)  Body mass index is 32.01 kg/m².    Intake/Output Summary (Last 24 hours) at 06/30/17 1017  Last data filed at 06/30/17 0920   Gross per 24 hour   Intake          9626.67 ml   Output             2057 ml   Net          7569.67 ml      Physical Exam   Constitutional: She is oriented to person, place, and time. She appears well-developed and well-nourished. No distress. She is not intubated.   appears anxious but NAD   HENT:   Head: Normocephalic.   Mouth/Throat: Oropharynx is clear and moist.   Eyes: Conjunctivae and EOM are normal. Pupils are equal, round, and reactive to light. No scleral icterus.   Neck: Normal range of motion. Neck supple. No JVD present.   Cardiovascular: Normal rate, regular rhythm, normal heart sounds and intact distal pulses.  Exam reveals no friction rub.    No murmur heard.  Pulmonary/Chest: Effort normal and breath sounds normal. She is not intubated. No respiratory distress. She has no wheezes. She has no rales.   Abdominal: Soft. She exhibits no distension. There is no tenderness.   Genitourinary:   Genitourinary Comments: deferred   Musculoskeletal: Normal range of motion. She exhibits edema. She exhibits no deformity.   3-4 pitting edema B   Lymphadenopathy:     She has no cervical adenopathy.   Neurological: She is alert and oriented to person, place, and time. She has normal reflexes.   Skin: Skin is warm and dry. Capillary refill takes less than 2 seconds. She is not diaphoretic. No erythema. No pallor.   Psychiatric: She has a normal mood and affect. Her behavior is normal. Judgment and thought content normal. She is not agitated.   Nursing note and vitals reviewed.      Significant Labs:   CBC:   Recent Labs  Lab 06/29/17  0500 06/29/17  1304 06/30/17  0426   WBC 11.72 13.12* 12.65   HGB 14.0 12.4 10.5*   HCT 40.5 35.6* 30.3*   PLT 55* 54* 54*     CMP:   Recent Labs  Lab 06/29/17  1800 06/30/17  0001 06/30/17  0426   * 135* 136   K 4.0 3.7  3.5    101 100   CO2 25 28 30*   * 125* 121*   BUN 12 11 11   CREATININE 1.4 1.4 1.6*   CALCIUM 6.1* 6.2* 6.1*   PROT 4.2* 4.0* 3.9*   ALBUMIN 1.7* 1.6* 1.5*   BILITOT 0.4 0.4 0.4   ALKPHOS 164* 151* 159*   * 386* 336*   * 170* 160*   ANIONGAP 4* 6* 6*   EGFRNONAA 46* 46* 39*     All pertinent labs within the past 24 hours have been reviewed.    Significant Imaging: I have reviewed all pertinent imaging results/findings within the past 24 hours.

## 2017-06-30 NOTE — PROGRESS NOTES
on unit for lab draws.  Patient called out to phlebotomist asking if she had any pain medications to give her.  Patient educated on appropriate behaviors with hospital personnel.

## 2017-06-30 NOTE — PROGRESS NOTES
Patient crying, and calling out due to c/o such 'intense' pain.  C/O 'severe' leg and back pain.  Educated on leg discomfort from rhabdomyolysis, verbalizes understanding. Repositioned for comfort with little relief.  Requesting to have a teleconference with EICU.  EICU notified of request including increase in complaints of pain and asking for pain management.  Dr. Baeza spoke with patient with teleconference in room.  Educated on reason for no further pain medications and psychiatry recommendations.  Verbalizes understanding.

## 2017-07-01 LAB
ALBUMIN SERPL BCP-MCNC: 1.5 G/DL
ALBUMIN SERPL BCP-MCNC: 1.5 G/DL
ALP SERPL-CCNC: 126 U/L
ALP SERPL-CCNC: 136 U/L
ALT SERPL W/O P-5'-P-CCNC: 141 U/L
ALT SERPL W/O P-5'-P-CCNC: 151 U/L
ANION GAP SERPL CALC-SCNC: 5 MMOL/L
ANION GAP SERPL CALC-SCNC: 6 MMOL/L
AST SERPL-CCNC: 281 U/L
AST SERPL-CCNC: 297 U/L
BASOPHILS # BLD AUTO: 0.03 K/UL
BASOPHILS # BLD AUTO: 0.03 K/UL
BASOPHILS NFR BLD: 0.2 %
BASOPHILS NFR BLD: 0.2 %
BILIRUB DIRECT SERPL-MCNC: 0.2 MG/DL
BILIRUB DIRECT SERPL-MCNC: 0.2 MG/DL
BILIRUB SERPL-MCNC: 0.3 MG/DL
BILIRUB SERPL-MCNC: 0.3 MG/DL
BUN SERPL-MCNC: 12 MG/DL
BUN SERPL-MCNC: 14 MG/DL
CALCIUM SERPL-MCNC: 6.8 MG/DL
CALCIUM SERPL-MCNC: 6.8 MG/DL
CHLORIDE SERPL-SCNC: 101 MMOL/L
CHLORIDE SERPL-SCNC: 101 MMOL/L
CK MB SERPL-MCNC: 7.6 NG/ML
CK MB SERPL-RTO: 0 %
CK SERPL-CCNC: ABNORMAL U/L
CO2 SERPL-SCNC: 28 MMOL/L
CO2 SERPL-SCNC: 29 MMOL/L
CREAT SERPL-MCNC: 2.2 MG/DL
CREAT SERPL-MCNC: 2.3 MG/DL
DIFFERENTIAL METHOD: ABNORMAL
DIFFERENTIAL METHOD: ABNORMAL
EOSINOPHIL # BLD AUTO: 0.2 K/UL
EOSINOPHIL # BLD AUTO: 0.3 K/UL
EOSINOPHIL NFR BLD: 1.3 %
EOSINOPHIL NFR BLD: 2 %
ERYTHROCYTE [DISTWIDTH] IN BLOOD BY AUTOMATED COUNT: 13.9 %
ERYTHROCYTE [DISTWIDTH] IN BLOOD BY AUTOMATED COUNT: 14 %
EST. GFR  (AFRICAN AMERICAN): 29 ML/MIN/1.73 M^2
EST. GFR  (AFRICAN AMERICAN): 31 ML/MIN/1.73 M^2
EST. GFR  (NON AFRICAN AMERICAN): 25 ML/MIN/1.73 M^2
EST. GFR  (NON AFRICAN AMERICAN): 26 ML/MIN/1.73 M^2
GLUCOSE SERPL-MCNC: 115 MG/DL
GLUCOSE SERPL-MCNC: 97 MG/DL
HCT VFR BLD AUTO: 27.2 %
HCT VFR BLD AUTO: 27.4 %
HGB BLD-MCNC: 9.5 G/DL
HGB BLD-MCNC: 9.5 G/DL
LYMPHOCYTES # BLD AUTO: 1.6 K/UL
LYMPHOCYTES # BLD AUTO: 1.9 K/UL
LYMPHOCYTES NFR BLD: 11.6 %
LYMPHOCYTES NFR BLD: 14.4 %
MAGNESIUM SERPL-MCNC: 1.7 MG/DL
MCH RBC QN AUTO: 30.9 PG
MCH RBC QN AUTO: 31.3 PG
MCHC RBC AUTO-ENTMCNC: 34.7 %
MCHC RBC AUTO-ENTMCNC: 34.9 %
MCV RBC AUTO: 89 FL
MCV RBC AUTO: 90 FL
MONOCYTES # BLD AUTO: 0.9 K/UL
MONOCYTES # BLD AUTO: 0.9 K/UL
MONOCYTES NFR BLD: 6.4 %
MONOCYTES NFR BLD: 7.1 %
NEUTROPHILS # BLD AUTO: 11 K/UL
NEUTROPHILS # BLD AUTO: 9.8 K/UL
NEUTROPHILS NFR BLD: 76.6 %
NEUTROPHILS NFR BLD: 80.5 %
PLATELET # BLD AUTO: 60 K/UL
PLATELET # BLD AUTO: 61 K/UL
PMV BLD AUTO: 10.9 FL
PMV BLD AUTO: 10.9 FL
POTASSIUM SERPL-SCNC: 3.6 MMOL/L
POTASSIUM SERPL-SCNC: 3.6 MMOL/L
PROT SERPL-MCNC: 4.1 G/DL
PROT SERPL-MCNC: 4.1 G/DL
RBC # BLD AUTO: 3.04 M/UL
RBC # BLD AUTO: 3.07 M/UL
SODIUM SERPL-SCNC: 135 MMOL/L
SODIUM SERPL-SCNC: 135 MMOL/L
WBC # BLD AUTO: 12.89 K/UL
WBC # BLD AUTO: 13.6 K/UL

## 2017-07-01 PROCEDURE — 85025 COMPLETE CBC W/AUTO DIFF WBC: CPT | Mod: 91

## 2017-07-01 PROCEDURE — 90937 HEMODIALYSIS REPEATED EVAL: CPT | Mod: ,,, | Performed by: INTERNAL MEDICINE

## 2017-07-01 PROCEDURE — 80076 HEPATIC FUNCTION PANEL: CPT

## 2017-07-01 PROCEDURE — 92610 EVALUATE SWALLOWING FUNCTION: CPT

## 2017-07-01 PROCEDURE — 27000221 HC OXYGEN, UP TO 24 HOURS

## 2017-07-01 PROCEDURE — 25000003 PHARM REV CODE 250: Performed by: PSYCHIATRY & NEUROLOGY

## 2017-07-01 PROCEDURE — 94640 AIRWAY INHALATION TREATMENT: CPT

## 2017-07-01 PROCEDURE — 63600175 PHARM REV CODE 636 W HCPCS: Performed by: EMERGENCY MEDICINE

## 2017-07-01 PROCEDURE — 99233 SBSQ HOSP IP/OBS HIGH 50: CPT | Mod: ,,, | Performed by: INTERNAL MEDICINE

## 2017-07-01 PROCEDURE — 99291 CRITICAL CARE FIRST HOUR: CPT | Mod: ,,, | Performed by: INTERNAL MEDICINE

## 2017-07-01 PROCEDURE — 20000000 HC ICU ROOM

## 2017-07-01 PROCEDURE — 90935 HEMODIALYSIS ONE EVALUATION: CPT

## 2017-07-01 PROCEDURE — 80048 BASIC METABOLIC PNL TOTAL CA: CPT

## 2017-07-01 PROCEDURE — 97530 THERAPEUTIC ACTIVITIES: CPT

## 2017-07-01 PROCEDURE — 25000003 PHARM REV CODE 250: Performed by: SPECIALIST

## 2017-07-01 PROCEDURE — 80076 HEPATIC FUNCTION PANEL: CPT | Mod: 91

## 2017-07-01 PROCEDURE — 25000242 PHARM REV CODE 250 ALT 637 W/ HCPCS: Performed by: NURSE PRACTITIONER

## 2017-07-01 PROCEDURE — 87340 HEPATITIS B SURFACE AG IA: CPT

## 2017-07-01 PROCEDURE — 25000003 PHARM REV CODE 250: Performed by: NURSE PRACTITIONER

## 2017-07-01 PROCEDURE — 82550 ASSAY OF CK (CPK): CPT | Mod: 91

## 2017-07-01 PROCEDURE — 82553 CREATINE MB FRACTION: CPT

## 2017-07-01 PROCEDURE — 25000003 PHARM REV CODE 250: Performed by: EMERGENCY MEDICINE

## 2017-07-01 PROCEDURE — 80048 BASIC METABOLIC PNL TOTAL CA: CPT | Mod: 91

## 2017-07-01 PROCEDURE — 94799 UNLISTED PULMONARY SVC/PX: CPT

## 2017-07-01 PROCEDURE — 83735 ASSAY OF MAGNESIUM: CPT

## 2017-07-01 PROCEDURE — 97110 THERAPEUTIC EXERCISES: CPT

## 2017-07-01 PROCEDURE — G8997 SWALLOW GOAL STATUS: HCPCS | Mod: CI

## 2017-07-01 PROCEDURE — G8996 SWALLOW CURRENT STATUS: HCPCS | Mod: CJ

## 2017-07-01 PROCEDURE — 99900035 HC TECH TIME PER 15 MIN (STAT)

## 2017-07-01 RX ORDER — FLUTICASONE PROPIONATE 50 MCG
2 SPRAY, SUSPENSION (ML) NASAL DAILY
Status: COMPLETED | OUTPATIENT
Start: 2017-07-01 | End: 2017-07-02

## 2017-07-01 RX ADMIN — IPRATROPIUM BROMIDE AND ALBUTEROL SULFATE 3 ML: .5; 3 SOLUTION RESPIRATORY (INHALATION) at 04:07

## 2017-07-01 RX ADMIN — CEFTRIAXONE 1 G: 1 INJECTION, SOLUTION INTRAVENOUS at 11:07

## 2017-07-01 RX ADMIN — FLUOXETINE 40 MG: 20 CAPSULE ORAL at 08:07

## 2017-07-01 RX ADMIN — LORAZEPAM 0.5 MG: 0.5 TABLET ORAL at 08:07

## 2017-07-01 RX ADMIN — OXYCODONE HYDROCHLORIDE 20 MG: 10 TABLET, FILM COATED, EXTENDED RELEASE ORAL at 08:07

## 2017-07-01 RX ADMIN — QUETIAPINE FUMARATE 50 MG: 25 TABLET, FILM COATED ORAL at 08:07

## 2017-07-01 RX ADMIN — OXYCODONE HYDROCHLORIDE 20 MG: 10 TABLET, FILM COATED, EXTENDED RELEASE ORAL at 09:07

## 2017-07-01 RX ADMIN — SODIUM CHLORIDE: 0.9 INJECTION, SOLUTION INTRAVENOUS at 05:07

## 2017-07-01 RX ADMIN — PANTOPRAZOLE SODIUM 40 MG: 40 TABLET, DELAYED RELEASE ORAL at 08:07

## 2017-07-01 RX ADMIN — IPRATROPIUM BROMIDE AND ALBUTEROL SULFATE 3 ML: .5; 3 SOLUTION RESPIRATORY (INHALATION) at 08:07

## 2017-07-01 RX ADMIN — NICOTINE 1 PATCH: 14 PATCH, EXTENDED RELEASE TRANSDERMAL at 08:07

## 2017-07-01 RX ADMIN — FLUTICASONE PROPIONATE 2 SPRAY: 50 SPRAY, METERED NASAL at 05:07

## 2017-07-01 RX ADMIN — SODIUM CHLORIDE: 0.9 INJECTION, SOLUTION INTRAVENOUS at 03:07

## 2017-07-01 NOTE — PLAN OF CARE
Problem: Occupational Therapy Goal  Goal: Occupational Therapy Goal  ot goals to be met by 7-7-17  1. Mod a with ue dressing  2. Mod a with le dressing  3. Pt will tolerate 1 set x 10 reps b ue rom exercise  4. Pt will req mod a with bsc<>bed t/f's   Outcome: Ongoing (interventions implemented as appropriate)  PT STOOD MAX A X2 WITH RW X1 ATTEMPT. SAT EOB WITH O2 SATS DECREASING TO 74 AT ONE TIME, RANGING TO 92. MAX CUES FOR TECHNIQUE WITH ALL TRANSFERS AND FUNCTIONAL MOBILITY. INCREASED ANXIETY DURING TREATMENT. PT LEFT SUPINE IN BED WITH LEGS ELEVATED. SPOUSE PRESENT. PT'S SIGNIFICANT OTHER EDUCATED ON UE/LE THEREX'S TO PERFORM WHILE NOT IN THERAPY. VERBALIZED UNDERSTANDING.

## 2017-07-01 NOTE — PROGRESS NOTES
"CRITICAL CARE   NEUROLOGY PROGRESS NOTE    Kaylene Emery   46 y.o. female  DATE 7/1/2017    Patient is awake, alert and following commands. She  Denies headaches, no nausea, no vomiting. No dizziness, no chest pain, no sob. She is getting agitated when I was explaining her about her recurrent overdose. I also explained to her about the side effects of all the medications she is on. Was counseling her about the medications she is on. She has been doctor shopping and has been also purchasing these drugs from friends. Explained her risks about this drugs.  She agrees she has been to detention for possession of non prescription drugs.   She lives with her boy friend who is also on multiple medications, and has been just released from detox program. I was mentioning her that it is easy to relapse and have access to his medications as well.     Past Medical History:   Diagnosis Date    Anxiety     Depression     GERD (gastroesophageal reflux disease)     Tobacco use      Past Surgical History:   Procedure Laterality Date    BLADDER REPAIR      CENTRAL LINE  6/28/2017         CHOLECYSTECTOMY  04/07/2017    KNEE ARTHROPLASTY      PARTIAL HYSTERECTOMY      TUBAL LIGATION       Family History   Problem Relation Age of Onset    Hypertension Mother     Diabetes Mother      Social History   Substance Use Topics    Smoking status: Current Every Day Smoker     Packs/day: 1.00     Types: Cigarettes    Smokeless tobacco: Never Used    Alcohol use Yes      Comment: occasionally       Review of patient's allergies indicates:   Allergen Reactions    Corticosteroids (glucocorticoids)      "sets off my anxiety real bad"    Tramadol Rash        Scheduled Meds:   cefTRIAXone (ROCEPHIN) IVPB  1 g Intravenous Q24H    docusate sodium  100 mg Oral Daily    ergocalciferol  50,000 Units Oral Q7 Days    fluoxetine  40 mg Oral Daily    lorazepam  0.5 mg Oral BID    nicotine  1 patch Transdermal Daily    pantoprazole  " 40 mg Oral Daily    quetiapine  50 mg Oral Daily     Continuous Infusions:   sodium chloride 0.9% 100 mL/hr at 07/01/17 1101     PRN Meds:acetaminophen, albuterol-ipratropium 2.5mg-0.5mg/3mL, bisacodyl, diphenhydrAMINE, heparin (porcine), ondansetron, oxycodone    Review of Systems:  All the 14 ROS were reviewed and the pertinent ones are mentioned in the HPI           OBJECTIVE:     Vital Signs (Most Recent)  Temp: 99.2 °F (37.3 °C) (07/01/17 0702)  Pulse: 101 (07/01/17 0900)  Resp: (!) 34 (07/01/17 0900)  BP: (!) 146/80 (07/01/17 0900)  SpO2: (!) 94 % (07/01/17 0900)     Vital Signs Range (Last 24H):  Temp:  [99.2 °F (37.3 °C)-101.1 °F (38.4 °C)]   Pulse:  []   Resp:  [18-38]   BP: (127-150)/(59-98)   SpO2:  [89 %-97 %]     Physical Exam:  General:  Awake, alert and follows commands.   HEENT:   Acephalic, Atraumatic,  EOMI, PERRLA, no nystagmus, no ptosis, no lid lag, no neglect, no gaze palsy  Neck: supple, no JVD, no Carotid bruit, no torticollis,   Lungs: CTA,  No rhonchi, no rales  Heart: Regular Rate and rhythm, no murmurs, rubs and or gallops  Abdomen, Soft, non tender, non distended, no abdominal  bruit, bowel sounds present  Extremities: No edema,   Skin: No rash, no ecchymoses,         NEURO    Mental Status:   Awake, alert   X 4  Memory, Recent and Remote  Mood:  Irritable and anxious  Affect: Flat  Behavior:  Appropriate  Attention and Concentration: Intact  Hallucinations, Delusions and suicidal ideation: Denies        SPEECH:   No aphasia, no Dysarthria,     CRANIAL NERVES:      II: visual acuity  Intact   II: visual fields Full to confrontation   II: pupils Equal, round, reactive to light   III,VII: ptosis None   III,IV,VI: extraocular muscles  Full ROM   V: mastication Normal   V: facial light touch sensation  Normal   V,VII: corneal reflex  Present   VII: facial muscle function - upper  Normal   VII: facial muscle function - lower Normal   VIII: hearing Not tested   IX: soft palate  elevation  Normal   IX,X: gag reflex Present   XI: trapezius strength  Intact   XI: sternocleidomastoid strength Intact   XI: neck flexion strength  Intact   XII: tongue strength  Normal         MOTOR:Upper Extremities 5/5 in the proximal and distal    NO pronation   No drift  Tone: Intact, no rigidity, no spasticity        Lower Extremities: Has improved significantly     TONE:  Decreased    MUSCLE MASS:  REFLEXES: Deep tendon reflexes tested:  Upper extremities:               biceps (C5, C6):2               brachioradialis (C5, C6):2               triceps (C6, C7),1     Lower extremities:                knee or patellar (L2, 3, 4):0               ankle (S1, S2):0      Plantar responses:  flexors  Clonus:  Negative   Muscle Fasciculations: negative     SENSORY  PIN PRICK and TEMP:  intact  Soft TOUCH:intact  VIBRATION: intact          CEREBELLAR  No dysmetria  No dysdiadochokinesia  No rebound Phenomenon  No Nystagmus  No scanning speech      ROMBERG and  GAIT: Deferred    EXTRAPYRAMIDALS: None          Laboratory:  Lab Results   Component Value Date    WBC 12.89 (H) 07/01/2017    HGB 9.5 (L) 07/01/2017    HCT 27.2 (L) 07/01/2017    PLT 60 (L) 07/01/2017     (H) 07/01/2017     (H) 07/01/2017     (L) 07/01/2017    K 3.6 07/01/2017     07/01/2017    CREATININE 2.3 (H) 07/01/2017    BUN 14 07/01/2017    CO2 28 07/01/2017    TSH 0.255 (L) 06/28/2017    INR 1.1 06/30/2017     Results for orders placed or performed during the hospital encounter of 06/28/17   Blood culture   Result Value Ref Range    Blood Culture, Routine No Growth to date     Blood Culture, Routine No Growth to date     Blood Culture, Routine No Growth to date    Blood culture   Result Value Ref Range    Blood Culture, Routine No Growth to date     Blood Culture, Routine No Growth to date     Blood Culture, Routine No Growth to date    CBC auto differential   Result Value Ref Range    WBC 14.93 (H) 3.90 - 12.70 K/uL    RBC 5.35  4.00 - 5.40 M/uL    Hemoglobin 17.3 (H) 12.0 - 16.0 g/dL    Hematocrit 47.9 37.0 - 48.5 %    MCV 90 82 - 98 fL    MCH 32.3 (H) 27.0 - 31.0 pg    MCHC 36.1 (H) 32.0 - 36.0 %    RDW 13.9 11.5 - 14.5 %    Platelets 173 150 - 350 K/uL    MPV 10.7 9.2 - 12.9 fL    Gran # 11.8 (H) 1.8 - 7.7 K/uL    Lymph # 1.8 1.0 - 4.8 K/uL    Mono # 1.3 (H) 0.3 - 1.0 K/uL    Eos # 0.0 0.0 - 0.5 K/uL    Baso # 0.02 0.00 - 0.20 K/uL    Gran% 79.0 (H) 38.0 - 73.0 %    Lymph% 12.3 (L) 18.0 - 48.0 %    Mono% 8.5 4.0 - 15.0 %    Eosinophil% 0.1 0.0 - 8.0 %    Basophil% 0.1 0.0 - 1.9 %    Differential Method Automated    Comprehensive metabolic panel   Result Value Ref Range    Sodium 130 (L) 136 - 145 mmol/L    Potassium 4.5 3.5 - 5.1 mmol/L    Chloride 97 95 - 110 mmol/L    CO2 17 (L) 23 - 29 mmol/L    Glucose 127 (H) 70 - 110 mg/dL    BUN, Bld 13 6 - 20 mg/dL    Creatinine 1.0 0.5 - 1.4 mg/dL    Calcium 8.5 (L) 8.7 - 10.5 mg/dL    Total Protein 7.8 6.0 - 8.4 g/dL    Albumin 3.5 3.5 - 5.2 g/dL    Total Bilirubin 0.5 0.1 - 1.0 mg/dL    Alkaline Phosphatase 92 55 - 135 U/L     (H) 10 - 40 U/L     (H) 10 - 44 U/L    Anion Gap 16 8 - 16 mmol/L    eGFR if African American >60 >60 mL/min/1.73 m^2    eGFR if non African American >60 >60 mL/min/1.73 m^2   TSH   Result Value Ref Range    TSH 0.255 (L) 0.400 - 4.000 uIU/mL   Urinalysis - clean catch   Result Value Ref Range    Specimen UA Urine, Catheterized     Color, UA Brown (A) Yellow, Straw, Lisa    Appearance, UA Cloudy (A) Clear    pH, UA 7.0 5.0 - 8.0    Specific Gravity, UA 1.025 1.005 - 1.030    Protein, UA 3+ (A) Negative    Glucose, UA Negative Negative    Ketones, UA 1+ (A) Negative    Bilirubin (UA) 1+ (A) Negative    Occult Blood UA 3+ (A) Negative    Nitrite, UA Positive (A) Negative    Urobilinogen, UA 1.0 <2.0 EU/dL    Leukocytes, UA Trace (A) Negative   Drug screen panel, emergency   Result Value Ref Range    Benzodiazepines Presumptive Positive     Methadone  metabolites Negative     Cocaine (Metab.) Negative     Opiate Scrn, Ur Presumptive Positive     Barbiturate Screen, Ur Negative     Amphetamine Screen, Ur Negative     THC Negative     Phencyclidine Negative     Creatinine, Random Ur 94.4 15.0 - 325.0 mg/dL    Toxicology Information SEE COMMENT    Ethanol   Result Value Ref Range    Alcohol, Medical, Serum <10 <10 mg/dL   Acetaminophen level   Result Value Ref Range    Acetaminophen (Tylenol), Serum <3.0 (L) 10.0 - 20.0 ug/mL   Salicylate level   Result Value Ref Range    Salicylate Lvl <5.0 (L) 15.0 - 30.0 mg/dL   T4, free   Result Value Ref Range    Free T4 0.85 0.71 - 1.51 ng/dL   Pregnancy, urine rapid   Result Value Ref Range    Preg Test, Ur Negative    CK   Result Value Ref Range    CPK >57174 (H) 20 - 180 U/L   Lactic acid, plasma   Result Value Ref Range    Lactate (Lactic Acid) 2.0 0.5 - 2.2 mmol/L   Urinalysis Microscopic   Result Value Ref Range    RBC, UA 5 (H) 0 - 4 /hpf    WBC, UA 2 0 - 5 /hpf    Bacteria, UA Few (A) None-Occ /hpf    Hyaline Casts, UA 2 (A) 0-1/lpf /lpf    Microscopic Comment SEE COMMENT    Lactic acid, plasma   Result Value Ref Range    Lactate (Lactic Acid) 0.6 0.5 - 2.2 mmol/L   APTT   Result Value Ref Range    aPTT 28.8 21.0 - 32.0 sec   CK   Result Value Ref Range    CPK >82488 (H) 20 - 180 U/L   Lactic acid, plasma   Result Value Ref Range    Lactate (Lactic Acid) 0.7 0.5 - 2.2 mmol/L   CBC auto differential   Result Value Ref Range    WBC 11.72 3.90 - 12.70 K/uL    RBC 4.51 4.00 - 5.40 M/uL    Hemoglobin 14.0 12.0 - 16.0 g/dL    Hematocrit 40.5 37.0 - 48.5 %    MCV 90 82 - 98 fL    MCH 31.0 27.0 - 31.0 pg    MCHC 34.6 32.0 - 36.0 %    RDW 14.1 11.5 - 14.5 %    Platelets 55 (L) 150 - 350 K/uL    MPV 11.0 9.2 - 12.9 fL    Gran # 9.4 (H) 1.8 - 7.7 K/uL    Lymph # 1.1 1.0 - 4.8 K/uL    Mono # 1.1 (H) 0.3 - 1.0 K/uL    Eos # 0.0 0.0 - 0.5 K/uL    Baso # 0.02 0.00 - 0.20 K/uL    Gran% 80.4 (H) 38.0 - 73.0 %    Lymph% 9.4 (L) 18.0 -  48.0 %    Mono% 9.7 4.0 - 15.0 %    Eosinophil% 0.3 0.0 - 8.0 %    Basophil% 0.2 0.0 - 1.9 %    Differential Method Automated    Comprehensive metabolic panel   Result Value Ref Range    Sodium 137 136 - 145 mmol/L    Potassium 3.9 3.5 - 5.1 mmol/L    Chloride 112 (H) 95 - 110 mmol/L    CO2 19 (L) 23 - 29 mmol/L    Glucose 129 (H) 70 - 110 mg/dL    BUN, Bld 15 6 - 20 mg/dL    Creatinine 1.1 0.5 - 1.4 mg/dL    Calcium 5.8 (LL) 8.7 - 10.5 mg/dL    Total Protein 4.3 (L) 6.0 - 8.4 g/dL    Albumin 1.8 (L) 3.5 - 5.2 g/dL    Total Bilirubin 0.2 0.1 - 1.0 mg/dL    Alkaline Phosphatase 162 (H) 55 - 135 U/L     (H) 10 - 40 U/L     (H) 10 - 44 U/L    Anion Gap 6 (L) 8 - 16 mmol/L    eGFR if African American >60 >60 mL/min/1.73 m^2    eGFR if non African American >60 >60 mL/min/1.73 m^2   Magnesium   Result Value Ref Range    Magnesium 1.5 (L) 1.6 - 2.6 mg/dL   Phosphorus   Result Value Ref Range    Phosphorus 4.5 2.7 - 4.5 mg/dL   Lactic acid, plasma   Result Value Ref Range    Lactate (Lactic Acid) 0.7 0.5 - 2.2 mmol/L   APTT   Result Value Ref Range    aPTT 44.2 (H) 21.0 - 32.0 sec   CK   Result Value Ref Range    CPK >57303 (H) 20 - 180 U/L   CK   Result Value Ref Range    CPK >36799 (H) 20 - 180 U/L   Magnesium   Result Value Ref Range    Magnesium 2.1 1.6 - 2.6 mg/dL   Comprehensive metabolic panel   Result Value Ref Range    Sodium 137 136 - 145 mmol/L    Potassium 4.1 3.5 - 5.1 mmol/L    Chloride 106 95 - 110 mmol/L    CO2 24 23 - 29 mmol/L    Glucose 123 (H) 70 - 110 mg/dL    BUN, Bld 13 6 - 20 mg/dL    Creatinine 1.4 0.5 - 1.4 mg/dL    Calcium 5.9 (LL) 8.7 - 10.5 mg/dL    Total Protein 4.1 (L) 6.0 - 8.4 g/dL    Albumin 1.6 (L) 3.5 - 5.2 g/dL    Total Bilirubin 0.3 0.1 - 1.0 mg/dL    Alkaline Phosphatase 168 (H) 55 - 135 U/L     (H) 10 - 40 U/L     (H) 10 - 44 U/L    Anion Gap 7 (L) 8 - 16 mmol/L    eGFR if African American 53 (A) >60 mL/min/1.73 m^2    eGFR if non  46 (A)  >60 mL/min/1.73 m^2   Phosphorus   Result Value Ref Range    Phosphorus 3.9 2.7 - 4.5 mg/dL   CBC auto differential   Result Value Ref Range    WBC 13.12 (H) 3.90 - 12.70 K/uL    RBC 3.96 (L) 4.00 - 5.40 M/uL    Hemoglobin 12.4 12.0 - 16.0 g/dL    Hematocrit 35.6 (L) 37.0 - 48.5 %    MCV 90 82 - 98 fL    MCH 31.3 (H) 27.0 - 31.0 pg    MCHC 34.8 32.0 - 36.0 %    RDW 14.0 11.5 - 14.5 %    Platelets 54 (L) 150 - 350 K/uL    MPV 10.5 9.2 - 12.9 fL    Gran # 10.9 (H) 1.8 - 7.7 K/uL    Lymph # 1.2 1.0 - 4.8 K/uL    Mono # 1.0 0.3 - 1.0 K/uL    Eos # 0.0 0.0 - 0.5 K/uL    Baso # 0.02 0.00 - 0.20 K/uL    Gran% 82.9 (H) 38.0 - 73.0 %    Lymph% 9.4 (L) 18.0 - 48.0 %    Mono% 7.2 4.0 - 15.0 %    Eosinophil% 0.3 0.0 - 8.0 %    Basophil% 0.2 0.0 - 1.9 %    Differential Method Automated    Vitamin B12   Result Value Ref Range    Vitamin B-12 328 210 - 950 pg/mL   Vitamin D   Result Value Ref Range    Vit D, 25-Hydroxy 7 (L) 30 - 96 ng/mL   CK   Result Value Ref Range    CPK >26145 (H) 20 - 180 U/L   Magnesium   Result Value Ref Range    Magnesium 1.9 1.6 - 2.6 mg/dL   Comprehensive metabolic panel   Result Value Ref Range    Sodium 132 (L) 136 - 145 mmol/L    Potassium 4.0 3.5 - 5.1 mmol/L    Chloride 103 95 - 110 mmol/L    CO2 25 23 - 29 mmol/L    Glucose 131 (H) 70 - 110 mg/dL    BUN, Bld 12 6 - 20 mg/dL    Creatinine 1.4 0.5 - 1.4 mg/dL    Calcium 6.1 (LL) 8.7 - 10.5 mg/dL    Total Protein 4.2 (L) 6.0 - 8.4 g/dL    Albumin 1.7 (L) 3.5 - 5.2 g/dL    Total Bilirubin 0.4 0.1 - 1.0 mg/dL    Alkaline Phosphatase 164 (H) 55 - 135 U/L     (H) 10 - 40 U/L     (H) 10 - 44 U/L    Anion Gap 4 (L) 8 - 16 mmol/L    eGFR if African American 53 (A) >60 mL/min/1.73 m^2    eGFR if non African American 46 (A) >60 mL/min/1.73 m^2   Phosphorus   Result Value Ref Range    Phosphorus 3.0 2.7 - 4.5 mg/dL   Magnesium   Result Value Ref Range    Magnesium 2.0 1.6 - 2.6 mg/dL   Comprehensive metabolic panel   Result Value Ref Range     Sodium 135 (L) 136 - 145 mmol/L    Potassium 3.7 3.5 - 5.1 mmol/L    Chloride 101 95 - 110 mmol/L    CO2 28 23 - 29 mmol/L    Glucose 125 (H) 70 - 110 mg/dL    BUN, Bld 11 6 - 20 mg/dL    Creatinine 1.4 0.5 - 1.4 mg/dL    Calcium 6.2 (LL) 8.7 - 10.5 mg/dL    Total Protein 4.0 (L) 6.0 - 8.4 g/dL    Albumin 1.6 (L) 3.5 - 5.2 g/dL    Total Bilirubin 0.4 0.1 - 1.0 mg/dL    Alkaline Phosphatase 151 (H) 55 - 135 U/L     (H) 10 - 40 U/L     (H) 10 - 44 U/L    Anion Gap 6 (L) 8 - 16 mmol/L    eGFR if African American 53 (A) >60 mL/min/1.73 m^2    eGFR if non African American 46 (A) >60 mL/min/1.73 m^2   Phosphorus   Result Value Ref Range    Phosphorus 2.7 2.7 - 4.5 mg/dL   CBC auto differential   Result Value Ref Range    WBC 12.65 3.90 - 12.70 K/uL    RBC 3.41 (L) 4.00 - 5.40 M/uL    Hemoglobin 10.5 (L) 12.0 - 16.0 g/dL    Hematocrit 30.3 (L) 37.0 - 48.5 %    MCV 89 82 - 98 fL    MCH 30.8 27.0 - 31.0 pg    MCHC 34.7 32.0 - 36.0 %    RDW 14.0 11.5 - 14.5 %    Platelets 54 (L) 150 - 350 K/uL    MPV 11.1 9.2 - 12.9 fL    Gran # 10.2 (H) 1.8 - 7.7 K/uL    Lymph # 1.5 1.0 - 4.8 K/uL    Mono # 0.9 0.3 - 1.0 K/uL    Eos # 0.1 0.0 - 0.5 K/uL    Baso # 0.02 0.00 - 0.20 K/uL    Gran% 80.4 (H) 38.0 - 73.0 %    Lymph% 11.7 (L) 18.0 - 48.0 %    Mono% 7.0 4.0 - 15.0 %    Eosinophil% 0.7 0.0 - 8.0 %    Basophil% 0.2 0.0 - 1.9 %    Differential Method Automated    Magnesium   Result Value Ref Range    Magnesium 1.8 1.6 - 2.6 mg/dL   Comprehensive metabolic panel   Result Value Ref Range    Sodium 136 136 - 145 mmol/L    Potassium 3.5 3.5 - 5.1 mmol/L    Chloride 100 95 - 110 mmol/L    CO2 30 (H) 23 - 29 mmol/L    Glucose 121 (H) 70 - 110 mg/dL    BUN, Bld 11 6 - 20 mg/dL    Creatinine 1.6 (H) 0.5 - 1.4 mg/dL    Calcium 6.1 (LL) 8.7 - 10.5 mg/dL    Total Protein 3.9 (L) 6.0 - 8.4 g/dL    Albumin 1.5 (L) 3.5 - 5.2 g/dL    Total Bilirubin 0.4 0.1 - 1.0 mg/dL    Alkaline Phosphatase 159 (H) 55 - 135 U/L     (H) 10 - 40  U/L     (H) 10 - 44 U/L    Anion Gap 6 (L) 8 - 16 mmol/L    eGFR if African American 45 (A) >60 mL/min/1.73 m^2    eGFR if non African American 39 (A) >60 mL/min/1.73 m^2   Phosphorus   Result Value Ref Range    Phosphorus 2.9 2.7 - 4.5 mg/dL   Protime-INR   Result Value Ref Range    Prothrombin Time 10.9 9.0 - 12.5 sec    INR 1.1 0.8 - 1.2   CK   Result Value Ref Range    CPK 02379 (H) 20 - 180 U/L   CK   Result Value Ref Range    CPK 79145 (H) 20 - 180 U/L   CK   Result Value Ref Range    CPK 49381 (H) 20 - 180 U/L   Basic metabolic panel   Result Value Ref Range    Sodium 134 (L) 136 - 145 mmol/L    Potassium 3.5 3.5 - 5.1 mmol/L    Chloride 99 95 - 110 mmol/L    CO2 30 (H) 23 - 29 mmol/L    Glucose 116 (H) 70 - 110 mg/dL    BUN, Bld 7 6 - 20 mg/dL    Creatinine 1.3 0.5 - 1.4 mg/dL    Calcium 6.9 (LL) 8.7 - 10.5 mg/dL    Anion Gap 5 (L) 8 - 16 mmol/L    eGFR if African American 58 (A) >60 mL/min/1.73 m^2    eGFR if non African American 50 (A) >60 mL/min/1.73 m^2   Magnesium   Result Value Ref Range    Magnesium 1.7 1.6 - 2.6 mg/dL   CK   Result Value Ref Range    CPK 80753 (H) 20 - 180 U/L   CBC auto differential   Result Value Ref Range    WBC 13.60 (H) 3.90 - 12.70 K/uL    RBC 3.07 (L) 4.00 - 5.40 M/uL    Hemoglobin 9.5 (L) 12.0 - 16.0 g/dL    Hematocrit 27.4 (L) 37.0 - 48.5 %    MCV 89 82 - 98 fL    MCH 30.9 27.0 - 31.0 pg    MCHC 34.7 32.0 - 36.0 %    RDW 13.9 11.5 - 14.5 %    Platelets 61 (L) 150 - 350 K/uL    MPV 10.9 9.2 - 12.9 fL    Gran # 11.0 (H) 1.8 - 7.7 K/uL    Lymph # 1.6 1.0 - 4.8 K/uL    Mono # 0.9 0.3 - 1.0 K/uL    Eos # 0.2 0.0 - 0.5 K/uL    Baso # 0.03 0.00 - 0.20 K/uL    Gran% 80.5 (H) 38.0 - 73.0 %    Lymph% 11.6 (L) 18.0 - 48.0 %    Mono% 6.4 4.0 - 15.0 %    Eosinophil% 1.3 0.0 - 8.0 %    Basophil% 0.2 0.0 - 1.9 %    Differential Method Automated    CK   Result Value Ref Range    CPK 52815 (H) 20 - 180 U/L   Basic metabolic panel   Result Value Ref Range    Sodium 135 (L) 136 - 145  mmol/L    Potassium 3.6 3.5 - 5.1 mmol/L    Chloride 101 95 - 110 mmol/L    CO2 29 23 - 29 mmol/L    Glucose 115 (H) 70 - 110 mg/dL    BUN, Bld 12 6 - 20 mg/dL    Creatinine 2.2 (H) 0.5 - 1.4 mg/dL    Calcium 6.8 (LL) 8.7 - 10.5 mg/dL    Anion Gap 5 (L) 8 - 16 mmol/L    eGFR if African American 31 (A) >60 mL/min/1.73 m^2    eGFR if non African American 26 (A) >60 mL/min/1.73 m^2   Magnesium   Result Value Ref Range    Magnesium 1.7 1.6 - 2.6 mg/dL   Hepatic function panel   Result Value Ref Range    Total Protein 4.1 (L) 6.0 - 8.4 g/dL    Albumin 1.5 (L) 3.5 - 5.2 g/dL    Total Bilirubin 0.3 0.1 - 1.0 mg/dL    Bilirubin, Direct 0.2 0.1 - 0.3 mg/dL     (H) 10 - 40 U/L     (H) 10 - 44 U/L    Alkaline Phosphatase 136 (H) 55 - 135 U/L   Hepatic function panel   Result Value Ref Range    Total Protein 4.1 (L) 6.0 - 8.4 g/dL    Albumin 1.5 (L) 3.5 - 5.2 g/dL    Total Bilirubin 0.3 0.1 - 1.0 mg/dL    Bilirubin, Direct 0.2 0.1 - 0.3 mg/dL     (H) 10 - 40 U/L     (H) 10 - 44 U/L    Alkaline Phosphatase 126 55 - 135 U/L   CBC auto differential   Result Value Ref Range    WBC 12.89 (H) 3.90 - 12.70 K/uL    RBC 3.04 (L) 4.00 - 5.40 M/uL    Hemoglobin 9.5 (L) 12.0 - 16.0 g/dL    Hematocrit 27.2 (L) 37.0 - 48.5 %    MCV 90 82 - 98 fL    MCH 31.3 (H) 27.0 - 31.0 pg    MCHC 34.9 32.0 - 36.0 %    RDW 14.0 11.5 - 14.5 %    Platelets 60 (L) 150 - 350 K/uL    MPV 10.9 9.2 - 12.9 fL    Gran # 9.8 (H) 1.8 - 7.7 K/uL    Lymph # 1.9 1.0 - 4.8 K/uL    Mono # 0.9 0.3 - 1.0 K/uL    Eos # 0.3 0.0 - 0.5 K/uL    Baso # 0.03 0.00 - 0.20 K/uL    Gran% 76.6 (H) 38.0 - 73.0 %    Lymph% 14.4 (L) 18.0 - 48.0 %    Mono% 7.1 4.0 - 15.0 %    Eosinophil% 2.0 0.0 - 8.0 %    Basophil% 0.2 0.0 - 1.9 %    Differential Method Automated    Basic metabolic panel   Result Value Ref Range    Sodium 135 (L) 136 - 145 mmol/L    Potassium 3.6 3.5 - 5.1 mmol/L    Chloride 101 95 - 110 mmol/L    CO2 28 23 - 29 mmol/L    Glucose 97 70 - 110  mg/dL    BUN, Bld 14 6 - 20 mg/dL    Creatinine 2.3 (H) 0.5 - 1.4 mg/dL    Calcium 6.8 (LL) 8.7 - 10.5 mg/dL    Anion Gap 6 (L) 8 - 16 mmol/L    eGFR if African American 29 (A) >60 mL/min/1.73 m^2    eGFR if non African American 25 (A) >60 mL/min/1.73 m^2   CK-MB   Result Value Ref Range    CPK 22710 (H) 20 - 180 U/L    CPK MB 7.6 (H) 0.1 - 6.5 ng/mL    MB% 0.0 0.0 - 5.0 %   ISTAT PROCEDURE   Result Value Ref Range    POC PH 7.407 7.35 - 7.45    POC PCO2 28.1 (LL) 35 - 45 mmHg    POC PO2 78 (L) 80 - 100 mmHg    POC HCO3 17.7 (L) 24 - 28 mmol/L    POC BE -7 -2 to 2 mmol/L    POC SATURATED O2 96 95 - 100 %    Sample ARTERIAL     Site RR     Allens Test Pass     DelSys Room Air     Mode SPONT     FiO2 21    ISTAT PROCEDURE   Result Value Ref Range    POC PH 7.333 (L) 7.35 - 7.45    POC PCO2 35.1 35 - 45 mmHg    POC PO2 137 (H) 80 - 100 mmHg    POC HCO3 18.6 (L) 24 - 28 mmol/L    POC BE -7 -2 to 2 mmol/L    POC SATURATED O2 99 95 - 100 %    Rate 16     Sample ARTERIAL     Site RR     Allens Test Pass     DelSys Adult Vent     Mode AC/PRVC     Vt 500     PEEP 5     FiO2 40          Diagnostic Results:CT scan films   ( All images reviewed Independently)   Imaging Results          X-Ray Chest AP Portable (Final result)  Result time 06/29/17 07:36:47    Final result by Minnie Horn MD (06/29/17 07:36:47)                 Impression:      Line and tube placement as described above.    Bibasilar atelectasis and hazy left basilar infiltrate.    Please see above.      Electronically signed by: MINNIE HORN MD  Date:     06/29/17  Time:    07:36              Narrative:    EXAM: Portable CXR one view    CLINICAL HISTORY: Encounter for fitting and adjustment nonvascular catheter..    COMPARISON STUDIES: 06/28/2017    FINDINGS:  Right neck catheter tip terminates in the right atrium.  Surgical clips right upper quadrant.  Nasogastric tube is terminating in the stomach.  Endotracheal tube tip terminates approximately 2.5 cm  above the kaylie.    Normal heart size.  No pneumothorax is identified.  Focal atelectasis within the right and left lower lungs.  hazy infiltrate in the left pulmonary base.  Mild interval improvement.  Ribs appear intact .                             X-Ray Chest AP Portable (Final result)  Result time 06/28/17 20:17:13    Final result by Minnie Sampson III, MD (06/28/17 20:17:13)                 Impression:     See above        Electronically signed by: MINNIE SAMPSON MD  Date:     06/28/17  Time:    20:17              Narrative:    XR CHEST AP PORTABLE    Clinical history: . Z45.2, Encounter for adjustment of vascular access device.    FINDINGS: The right jugular central venous line terminates over the mid right atrium and could be retracted 5 cm.  The endotracheal tube is satisfactory in position.  Patchy bilateral lower lobe interstitial infiltrates are again noted..  No pneumothorax or other new pulmonary disease identified.                             CT Head Without Contrast (Final result)  Result time 06/28/17 18:14:35    Final result by Minnie Sampson III, MD (06/28/17 18:14:35)                 Impression:       No acute intracranial disease identified.       Electronically signed by: MINNIE SAMPSON MD  Date:     06/28/17  Time:    18:14              Narrative:    Head CT without contrast    Clinical history: Altered Mental Status    TECHNIQUE: Routine noncontrast axial head CT. All CT scans at this facility use dose modulation, iterative reconstruction, and/or weight based dosing when appropriate to reduce radiation dose to as low as reasonably achievable.    Comparison: none    FINDINGS: There is no evidence of intracranial hemorrhage, mass-effect, hydrocephalus or other acute disease. There is no CT evidence of acute ischemia or cerebral edema.  There is mild opacification of the right mastoid air cells.  The visualized paranasal sinuses and left mastoid air cells are clear. No calvarial fracture is  identified.                             CT Lumbar Spine Without Contrast (Final result)  Result time 06/28/17 18:20:35    Final result by Minnie Sampson III, MD (06/28/17 18:20:35)                 Impression:       1.  Asymmetric thickening of the right piriformis muscle with surrounding stranding possibly related to muscle strain or inflammatory in etiology.  Findings could cause right sciatica.    2.  No acute osseous abnormality identified.  Mild L3-4 through L5-S1 disc bulging and facet arthropathy causing foraminal stenosis.        Electronically signed by: MINNIE SAMPSON MD  Date:     06/28/17  Time:    18:20              Narrative:    CT LUMBAR SPINE WITHOUT CONTRAST    Clinical history: Weakness (780.79).    Technique:  Axial noncontrast CT scan of the lumbar spine with sagittal and coronal reconstructions. All CT scans at this facility use dose modulation, iterative reconstruction, and/or weight based dosing when appropriate to reduce radiation dose to as low as reasonably achievable.    Comparison: None    Findings:   No fracture or other acute injury is seen in the lumbar spine. Lumbar spine alignment is anatomic. No suspicious lytic or blastic bone marrow lesions identified.  There is asymmetric thickening of the right piriformis muscle with surrounding stranding.  No other acute paravertebral soft tissue abnormality identified. The intervertebral disc heights appear relatively well preserved.    T12-L1: Unremarkable    L1-2: Unremarkable    L2-3: Unremarkable    L3-4: Small annular disc bulge and mild facet arthropathy causing mild left foraminal stenosis.    L4-5: Small annular disc bulge and facet arthropathy causing mild right and moderate left foraminal stenosis.    L5-S1:  Tiny broad-based posterior disc bulge at moderate hypertrophic facet arthropathy causing mild bilateral foraminal stenosis.                             X-Ray Chest AP Portable (Final result)  Result time 06/28/17 18:12:06    Final  result by Minnie Sampson III, MD (06/28/17 18:12:06)                 Impression:     Well positioned endotracheal tube.  Possible patchy lower lobe interstitial infiltrates.      Electronically signed by: MINNIE SAMPSON MD  Date:     06/28/17  Time:    18:12              Narrative:    XR CHEST AP PORTABLE    Clinical history: Endotracheal tube placement. Z46.82 Encounter for fitting and adjustment of non-vascular catheter.    Findings: The endotracheal tube is well positioned terminating approximately 2.5 cm above the kaylie. There are possible patchy bilateral lower lobe interstitial infiltrates. No pneumothorax or other acute pulmonary disease identified.                              07/01/2017    Assessment and Recommendations:  Patient with recurrent overdose. She is addicted to multiple medications. She also has access to her boy friends medications. CPK has improved significantly  Highly recommend not to start her on Opiates, muscle relaxants, Gabapentin etc  If anyone prescribes her these medications and or other CNS modulating drugs she should be in a monitoring program.     No further Neurology input.         Edwin Ramires MD., Ph.D., MS

## 2017-07-01 NOTE — PROGRESS NOTES
Patient had a coughing spell and her SATs dropped to the 60s.  Placed patient on a 100% non-rebreather for a short time to get O2  level up.  Patient was placed back on nasal canula at 5L and patient dropped back down to the mid 80s.  Placed patient on a venti-mask at 40% and patient now SATing in the mid 90s.

## 2017-07-01 NOTE — PLAN OF CARE
Problem: Patient Care Overview  Goal: Plan of Care Review  PT REQUIRES MAX A X2>TOTAL A FOR BED MOBILITY AND T/F TO CHAIR   Outcome: Ongoing (interventions implemented as appropriate)  spo2 97%, will follow.

## 2017-07-01 NOTE — PROGRESS NOTES
Patient becoming more SOB.  O2 dropping to the mid to high 80s on 5L. Patient has shallow breathing.  She sounds very coarse with crackles at the bases.   Called Respiratory for a breathing treatment.  Notified MD Gregory.  He advised to discontinue her fluids and get a STAT CXR and we will probably do dialysis tomorrow.

## 2017-07-01 NOTE — SUBJECTIVE & OBJECTIVE
"Interval History: Pt was seen and examined in ICU today. Remained stable last pm. No new c/o's or events. No SOB reported today. Pt says that HD yesterday helped her with SOB. No discomfort today    Review of patient's allergies indicates:   Allergen Reactions    Corticosteroids (glucocorticoids)      "sets off my anxiety real bad"    Tramadol Rash     Current Facility-Administered Medications   Medication Frequency    0.9%  NaCl infusion Continuous    acetaminophen tablet 650 mg Q8H PRN    albuterol-ipratropium 2.5mg-0.5mg/3mL nebulizer solution 3 mL Q4H PRN    bisacodyl suppository 10 mg Daily PRN    cefTRIAXone (ROCEPHIN) 1 g in dextrose 5 % 50 mL IVPB Q24H    diphenhydrAMINE capsule 50 mg Nightly PRN    docusate sodium capsule 100 mg Daily    ergocalciferol capsule 50,000 Units Q7 Days    fluoxetine capsule 40 mg Daily    heparin (porcine) injection 2,000 Units PRN    lorazepam tablet 0.5 mg BID    nicotine 14 mg/24 hr 1 patch Daily    ondansetron injection 4 mg Q8H PRN    oxycodone 12 hr tablet 20 mg BID PRN    pantoprazole EC tablet 40 mg Daily    quetiapine tablet 50 mg Daily       Objective:     Vital Signs (Most Recent):  Temp: 99.2 °F (37.3 °C) (07/01/17 1102)  Pulse: 101 (07/01/17 1302)  Resp: (!) 31 (07/01/17 1102)  BP: (!) 145/76 (07/01/17 1102)  SpO2: (!) 93 % (07/01/17 1102)  O2 Device (Oxygen Therapy): nasal cannula (07/01/17 1102) Vital Signs (24h Range):  Temp:  [99.2 °F (37.3 °C)-101.1 °F (38.4 °C)] 99.2 °F (37.3 °C)  Pulse:  [] 101  Resp:  [18-38] 31  SpO2:  [89 %-97 %] 93 %  BP: (127-150)/(59-95) 145/76     Weight: 86.4 kg (190 lb 5.9 oz) (07/01/17 0500)  Body mass index is 32.68 kg/m².  Body surface area is 1.98 meters squared.    I/O last 3 completed shifts:  In: 8765 [P.O.:3290; I.V.:5225; IV Piggyback:250]  Out: 5848 [Urine:2346; Other:3500; Stool:2]     Physical Exam   Constitutional: No distress. She is intubated.   Intubated, sedated on propafol, breathing over the " vent, intermittently agitated.   HENT:   Head: Normocephalic.   Eyes: Conjunctivae are normal. No scleral icterus.   Cardiovascular: Regular rhythm, normal heart sounds and intact distal pulses.  Tachycardia present.    No murmur heard.  Pulmonary/Chest: She is intubated. She has rhonchi.   Abdominal: Soft. She exhibits no distension.   Musculoskeletal: She exhibits no edema or deformity.   Lymphadenopathy:     She has no cervical adenopathy.   Neurological:   Intubated and able to communicate    Skin: Skin is warm. She is not diaphoretic. No erythema.   Psychiatric: She is not agitated.   Nursing note and vitals reviewed.      Significant Labs: reviewed    BMP  Lab Results   Component Value Date     (L) 07/01/2017    K 3.6 07/01/2017     07/01/2017    CO2 28 07/01/2017    BUN 14 07/01/2017    CREATININE 2.3 (H) 07/01/2017    CALCIUM 6.8 (LL) 07/01/2017    ANIONGAP 6 (L) 07/01/2017    ESTGFRAFRICA 29 (A) 07/01/2017    EGFRNONAA 25 (A) 07/01/2017     Lab Results   Component Value Date    WBC 12.89 (H) 07/01/2017    HGB 9.5 (L) 07/01/2017    HCT 27.2 (L) 07/01/2017    MCV 90 07/01/2017    PLT 60 (L) 07/01/2017     CK 69848

## 2017-07-01 NOTE — PLAN OF CARE
Problem: SLP Goal  Goal: SLP Goal  Outcome: Ongoing (interventions implemented as appropriate)  TPW recall 2/3 aspiration precautions (small bites/sips, sit upright, slow intake) with min a  TPW follow aspiration precautions during bedside trials with min a

## 2017-07-01 NOTE — PLAN OF CARE
Problem: Patient Care Overview  Goal: Plan of Care Review  PT REQUIRES MAX A X2>TOTAL A FOR BED MOBILITY AND T/F TO CHAIR   Outcome: Ongoing (interventions implemented as appropriate)  Patient AAOx4.  All vitals stable except O2 SATs drop to the high 80s and her respirations are elevated in the high 20s low 30s.  PRN breathing treatments have been ordered and administered for SOB and wheezing.  Patient on 5L of O2.  Speech Therapy evaluated her and they suggest small bites and sips while eating and drinking.  Pain controlled with PRN meds.  Patient still complaining of numbness and tingling to BLE. PT/OT working with patient. Patient still has edema to BLE, BUE, and abdomen.  CPK labs being drawn Q6H CPK trending down.  IV fluids being administered.  IV abx also administered as ordered. Bed alarm activated.  Sitter at bedside.    No falls on shift.  [place] encouraged

## 2017-07-01 NOTE — PLAN OF CARE
Problem: Patient Care Overview  Goal: Plan of Care Review  PT REQUIRES MAX A X2>TOTAL A FOR BED MOBILITY AND T/F TO CHAIR   Outcome: Ongoing (interventions implemented as appropriate)  PT REQUIRES MAX A X 2 FOR SIT>STAND WITH RW

## 2017-07-01 NOTE — PROGRESS NOTES
"Progress Note  Critical Care    Admit Date: 6/28/2017   LOS: 3 days     Follow-up For: Resp Failure post drug OD     SUBJECTIVE:   Change over last 24 hours: increased strength.  Yehuda diet.  CPKs improving.    ROS:  Review of Systems   Constitutional: Negative for chills, fever and malaise/fatigue.   HENT: Negative for congestion.    Eyes: Negative for blurred vision.   Respiratory: Negative for cough, sputum production and shortness of breath.    Cardiovascular: Positive for leg swelling. Negative for chest pain.   Gastrointestinal: Negative for abdominal pain, nausea and vomiting.   Genitourinary: Negative for dysuria.   Musculoskeletal: Positive for back pain (chronic) and myalgias (chronic).   Skin: Negative for rash.   Neurological: Positive for weakness (improving). Negative for dizziness, tingling, tremors, focal weakness, seizures, loss of consciousness and headaches.   Endo/Heme/Allergies: Does not bruise/bleed easily.   Psychiatric/Behavioral: Positive for depression (chronic). Negative for hallucinations. The patient is not nervous/anxious.        Continuous Infusions:   sodium chloride 0.9% 100 mL/hr at 07/01/17 0900     Review of patient's allergies indicates:   Allergen Reactions    Corticosteroids (glucocorticoids)      "sets off my anxiety real bad"    Tramadol Rash       OBJECTIVE:     Vital Signs (Most Recent)  Temp: 99.2 °F (37.3 °C) (07/01/17 0702)  Pulse: 101 (07/01/17 0900)  Resp: (!) 34 (07/01/17 0900)  BP: (!) 146/80 (07/01/17 0900)  SpO2: (!) 94 % (07/01/17 0900)    Vital Signs Range (Last 24H):  Temp:  [99.2 °F (37.3 °C)-101.1 °F (38.4 °C)]   Pulse:  []   Resp:  [18-38]   BP: (122-150)/(59-98)   SpO2:  [89 %-97 %]     I & O (Last 24H):  Intake/Output Summary (Last 24 hours) at 07/01/17 0920  Last data filed at 07/01/17 0900   Gross per 24 hour   Intake          4008.33 ml   Output             5067 ml   Net         -1058.67 ml       Ventilator Data (Last 24H):     Oxygen " Concentration (%):  [40] 40    Physical Exam:  Physical Exam   Constitutional: She is oriented to person, place, and time and well-developed, well-nourished, and in no distress. Vital signs are normal. She appears not lethargic, to not be writhing in pain, not malnourished and not jaundiced. She appears unhealthy.  Non-toxic appearance. No distress.   HENT:   Head: Normocephalic and atraumatic.   Mouth/Throat: Oropharynx is clear and moist. No posterior oropharyngeal edema or posterior oropharyngeal erythema.   Eyes: EOM and lids are normal. Pupils are equal, round, and reactive to light.   Neck: Normal range of motion. Carotid bruit is not present.       Cardiovascular: Normal rate and regular rhythm.    Pulses:       Radial pulses are 2+ on the right side, and 2+ on the left side.        Dorsalis pedis pulses are 2+ on the right side, and 2+ on the left side.   Pulmonary/Chest: No accessory muscle usage. No respiratory distress. She has decreased breath sounds in the right lower field and the left lower field.   Abdominal: Normal appearance. She exhibits no distension. Bowel sounds are hypoactive. There is no tenderness.   Genitourinary:   Genitourinary Comments: Palmer    Musculoskeletal: Normal range of motion.   Diffuse tissue hardening Rhabdo of all extrem and Abd increased from yesterday with some edema component.     Lymphadenopathy:     She has no cervical adenopathy.   Neurological: She is alert and oriented to person, place, and time. She appears not lethargic.   Skin: Skin is warm, dry and intact. She is not diaphoretic. No cyanosis.   Psychiatric: Memory, affect and judgment normal. Her mood appears anxious (mild).       Laboratory (Last 24H):  CBC:    Recent Labs  Lab 07/01/17  0501   WBC 12.89*   HGB 9.5*   HCT 27.2*   PLT 60*     CMP:    Recent Labs  Lab 07/01/17  0501   CALCIUM 6.8*   ALBUMIN 1.5*   PROT 4.1*   *   K 3.6   CO2 28      BUN 14   CREATININE 2.3*   ALKPHOS 126   *    *   BILITOT 0.3       Microbiology Results (last 7 days)     Procedure Component Value Units Date/Time    Blood culture [[id number]] Collected:  06/28/17 1600    Order Status:  Completed Specimen:  Blood from Peripheral, Hand, Left Updated:  07/01/17 0612     Blood Culture, Routine No Growth to date     Blood Culture, Routine No Growth to date     Blood Culture, Routine No Growth to date    Blood culture [[id number]] Collected:  06/28/17 1616    Order Status:  Completed Specimen:  Blood from Peripheral, Hand, Right Updated:  07/01/17 0612     Blood Culture, Routine No Growth to date     Blood Culture, Routine No Growth to date     Blood Culture, Routine No Growth to date    Culture, Respiratory with Gram Stain [[id number]]     Order Status:  No result Specimen:  Respiratory from Endotracheal Aspirate           Chest X-Ray:         No recent film last 24 hours     ASSESSMENT/PLAN:     Problem   Non-Traumatic Rhabdomyolysis   Polysubstance Overdose   Kishore (Acute Kidney Injury)   Hypoalbuminemia   Recurrent Major Depressive Disorder   Thrombocytopenia   Transaminitis   Aspiration Pneumonia of Both Lungs   Tobacco Use       PLAN:    1. Neuro:  neuro monitoring.  CECd but denies SA/SI.  Cont Ativan, Seroquel and Prozac     2. Pulmonary: Encourage C&DB and OOB.  Cont IVAB.  Nicotine patch and encourage tobacco cessation.       3. Cardiac: Cardiac monitoring     4. Renal: Palmer in place, monitor I/Os.  Renal following, RRT yesterday for Rhabdo and polysubstance OD.  Good UOP     5. Infectious Disease: Follow fever curve.  Possible aspiration PNA.  Cont Zosyn.  Blood cultures X 2 NGTD.     6. Hematology/Oncology: monitor for bleeding.  Plt count holding off Heparin.  CBC daily     7. Endocrine:  Monitor glucose stable     8. Fluids/Electrolytes/Nutrition/GI: IVFs per Renal +/-  Future RRT.  Encourage Renal diet.  Vit D     9. Musculoskeletal:  ROM, OOB and PT/OT following     10. Pain Management: PRN Oxycontin      11.  Discharge and Palliative Care: Plan home with family vs Inpt Psych.     Preventive Measures and Monitoring:   Stress Ulcer: PPI  Nutrition: Renal diet  Glucose control:  stable  Bowel prophylaxis: PRN Dulcolax  DVT prophylaxis: SCDs  Hx CAD on B-Blocker: no hx CAD  Head of Bed/Reposition: Elevate HOB and turn Q1-2 hours    Early Mobility: OOB today  Central Line right IJ VasCath Day: #4  Palmer Day: #4  IVAB Day: #4  Code Status: Full    Counseling/Consultation: I have discussed the care of this patient in detail with multidisciplinary team during rounds, bedside nursing staff and Dr. Treadwell and Dr. Paz and Dr. Gregory and Dr. West.    Will transfer to Tele and sign off.     DEVEN Singer [facility] / [facility]

## 2017-07-01 NOTE — ASSESSMENT & PLAN NOTE
Non-traumatic rhabdomyolysis     1. Renal: ALEXUS. Due to non-traumatic rhabdomyolysis  Requiring dialysis support.  Tolerating dialysis well, on CRRT until yesterday  Pt is SOB and O2 sat is relatively low. CXR was reviewed  Will provide conventional HD today  Discussed with pt, her fiance and ICU team.  Electrolytes reviewed  K normal  Metabolic acidosis  IVF's reviewed             Aspiration pneumonia     2. ID: abx reviewed  On zosyn for suspected aspiration pneumonia  Adjust dose for the renal function  Afebrile  WBC slightly elevated       Polysubstance overdose     3. Psych: apparent drug overdose in what is reported as intention to self-harm  H/o of depression  Briefly discussed with fiance  Will defer to primary team.          Plans and recommendations:  As discussed above  HD today  Pt was seen twice in ICU and will be revisited during HD to be reevaluated  Pt received multiple visits and evaluations  Continue NS IVF's at 100 ml/hr  Repeat blood work  Replace all electrolyte deficiencies  Total critical care time spent 60 minutes including time needed to review the records, the patient evaluation, documentation, face-to-face discussion with the patient,   more than 50% of the time was spent on coordination of care and counseling.

## 2017-07-01 NOTE — PT/OT/SLP PROGRESS
Physical Therapy  Treatment    Kaylene Emery   MRN: [id number]   Admitting Diagnosis: Non-traumatic rhabdomyolysis    PT Received On: 07/01/17  PT Start Time: 0831     PT Stop Time: 0856    PT Total Time (min): 25 min       Billable Minutes:  Therapeutic Activity 15 and Therapeutic Exercise 10    Treatment Type: Treatment  PT/PTA: PT             General Precautions: Standard, fall  Orthopedic Precautions:     Braces:           Subjective:  Communicated with NURSE AND EPIC CHART REVIEW  prior to session.   PT AGREED TO TX     Pain/Comfort  Pain Rating 1: 8/10  Location - Side 1: Bilateral  Location 1: leg  Pain Addressed 1: Reposition  Pain Rating Post-Intervention 1: 9/10  Pain Rating 2: 9/10  Location 2: back  Pain Rating Post-Intervention 2: 9/10    Objective:   Patient found with: peripheral IV, telemetry, blood pressure cuff, carroll catheter, pulse ox (continuous)    Functional Mobility:  Bed Mobility:   Rolling/Turning to Left: Maximum assistance  Scooting/Bridging: Moderate Assistance  Supine to Sit: Maximum Assistance  Sit to Supine: Maximum Assistance, With assist of  2    Transfers:  Sit <> Stand Assistance: Maximum Assistance (X2)  Sit <> Stand Assistive Device: Rolling Walker  Bed <> Chair Assistance: Activity did not occur    Gait:   Gait Distance: UNABLE    Therapeutic Activities and Exercises:  PT SEATED EOB FOR B LE TE X 10 REPS OF TKE AND AP WITH AAROM OF R LE. PT SCOOTED TO EOB AND STOOD X 1 TRIAL WITH MAX A X 2 AND FF POSTURE. PT YELLING IN PAIN OF B LE . PT STOOD X APPROX 1 MIN AND SEATED IN BED. PT SUP IN BED WITH MAX A X 2 AND SCOOTED TO HOB WITH MAX A. PT BOYFRIEND EDUCATED ON ROM TE FOR STRENGTHENING WHEN IN BED.      AM-PAC 6 CLICK MOBILITY  How much help from another person does this patient currently need?   1 = Unable, Total/Dependent Assistance  2 = A lot, Maximum/Moderate Assistance  3 = A little, Minimum/Contact Guard/Supervision  4 = None, Modified  [place]/Independent    Turning over in bed (including adjusting bedclothes, sheets and blankets)?: 2  Sitting down on and standing up from a chair with arms (e.g., wheelchair, bedside commode, etc.): 2  Moving from lying on back to sitting on the side of the bed?: 2  Moving to and from a bed to a chair (including a wheelchair)?: 2  Need to walk in hospital room?: 1  Climbing 3-5 steps with a railing?: 1  Total Score: 10    AM-PAC Raw Score CMS G-Code Modifier Level of Impairment Assistance   6 % Total / Unable   7 - 9 CM 80 - 100% Maximal Assist   10 - 14 CL 60 - 80% Moderate Assist   15 - 19 CK 40 - 60% Moderate Assist   20 - 22 CJ 20 - 40% Minimal Assist   23 CI 1-20% SBA / CGA   24 CH 0% Independent/ Mod I     Patient left supine with call button in reach.    Assessment:  PT PROGRESSING WITH MOBILITY, ABLE TO STAND TODAY. PT LIMITED D/T WEAKNESS AND INC PAIN     Rehab identified problem list/impairments: Rehab identified problem list/impairments: weakness, impaired endurance, gait instability, impaired sensation, impaired functional mobilty, impaired self care skills, impaired balance, decreased lower extremity function, decreased upper extremity function, decreased coordination, decreased safety awareness, pain, impaired coordination, abnormal tone, edema, decreased ROM    Rehab potential is good.    Activity tolerance: Fair    Discharge recommendations: Discharge Facility/Level Of Care Needs: nursing facility, skilled     Barriers to discharge: Barriers to Discharge: Inaccessible home environment, Decreased caregiver support    Equipment recommendations: Equipment Needed After Discharge: walker, rolling, wheelchair     GOALS:    Physical Therapy Goals        Problem: Physical Therapy Goal    Goal Priority Disciplines Outcome Goal Variances Interventions   Physical Therapy Goal     PT/OT, PT      Description:  PT WILL BE SEEN FOR P.T. FOR A MIN OF 5 OUT OF 7 DAYS A WEEK  [id number]  1. PT WILL  COMPLETE BED MOBILITY WITH MOD A.  2. PT WILL STAND WITH MAX A AND RW  3. PT WILL T/F TO CHAIR WITH MAX A.  4. PT WILL COMPLETE B LE TE X 20 REPS FOR STRENGTHENING.                     PLAN:    Patient to be seen    to address the above listed problems via gait training, therapeutic activities, therapeutic exercises  Plan of Care expires: 07/07/17  Plan of Care reviewed with: patient, spouse         Estrella Keller, PT  07/01/2017

## 2017-07-01 NOTE — PT/OT/SLP PROGRESS
Occupational Therapy  Treatment    Kaylene Emery   MRN: [id number]   Admitting Diagnosis: Non-traumatic rhabdomyolysis    OT Date of Treatment: 07/01/17   OT Start Time: 0835  OT Stop Time: 0855  OT Total Time (min): 20 min    Billable Minutes:  Therapeutic Activity 20    General Precautions: Standard, fall  Orthopedic Precautions: N/A  Braces: N/A         Subjective:  Communicated with RN prior to session.    Pain/Comfort  Pain Rating 1: 8/10  Location - Side 1: Bilateral  Location 1: leg  Pain Addressed 1: Reposition  Pain Rating Post-Intervention 1: 9/10    Objective:  Patient found with: telemetry, peripheral IV, blood pressure cuff, bed alarm, pulse ox (continuous), carroll catheter, oxygen     Functional Mobility:  Bed Mobility:  Rolling/Turning to Left: Maximum assistance  Rolling/Turning Right: Maximum assistance  Scooting/Bridging: Minimum Assistance  Supine to Sit: Maximum Assistance  Sit to Supine: Maximum Assistance, With assist of 2    Transfers:   Sit <> Stand Assistance: Maximum Assistance (X2)  Sit <> Stand Assistive Device: Rolling Walker    Functional Ambulation: STOOD MAX A X2 WITH RW X1 TRIAL    Activities of Daily Living:       UE Dressing Level of Assistance: Maximum assistance         Balance:   Static Sit: GOOD-: Takes MODERATE challenges from all directions but inconsistently  Dynamic Sit: FAIR: Cannot move trunk without losing balance  Static Stand: 0: Needs MAXIMAL assist to maintain   Dynamic stand: 0: N/A    Therapeutic Activities and Exercises:  PT STOOD MAX A X2 WITH RW X1 ATTEMPT. SAT EOB WITH O2 SATS DECREASING TO 74 AT ONE TIME, RANGING TO 92. MAX CUES FOR TECHNIQUE WITH ALL TRANSFERS AND FUNCTIONAL MOBILITY. INCREASED ANXIETY DURING TREATMENT. PT LEFT SUPINE IN BED WITH LEGS ELEVATED. SPOUSE PRESENT. PT'S SIGNIFICANT OTHER EDUCATED ON UE/LE THEREX'S TO PERFORM WHILE NOT IN THERAPY. VERBALIZED UNDERSTANDING.    AM-PAC 6 CLICK ADL   How much help from another person does this  "patient currently need?   1 = Unable, Total/Dependent Assistance  2 = A lot, Maximum/Moderate Assistance  3 = A little, Minimum/Contact Guard/Supervision  4 = None, Modified [place]/Independent    Putting on and taking off regular lower body clothing? : 1  Bathing (including washing, rinsing, drying)?: 1  Toileting, which includes using toilet, bedpan, or urinal? : 1  Putting on and taking off regular upper body clothing?: 2  Taking care of personal grooming such as brushing teeth?: 2  Eating meals?: 2  Total Score: 9     AM-PAC Raw Score CMS "G-Code Modifier Level of Impairment Assistance   6 % Total / Unable   7 - 8 CM 80 - 100% Maximal Assist   9-13 CL 60 - 80% Moderate Assist   14 - 19 CK 40 - 60% Moderate Assist   20 - 22 CJ 20 - 40% Minimal Assist   23 CI 1-20% SBA / CGA   24 CH 0% Independent/ Mod I       Patient left supine with all lines intact, call button in reach, bed alarm on, NURSE notified and FIANCE present    ASSESSMENT:  Kaylene Emery is a 46 y.o. female with a medical diagnosis of Non-traumatic rhabdomyolysis and presents with DEBILITY, IMPAIRED SAFETY, ADLS AND FUNCTIONAL MOBILITY. PT WOULD BENEFIT FROM CONTINUED SKILLED OT SERVICES TO ADDRESS FUNCTIONAL IMPAIRMENTS.    Rehab identified problem list/impairments: Rehab identified problem list/impairments: weakness, impaired endurance, impaired self care skills, impaired functional mobilty, gait instability, impaired balance, impaired cognition, decreased coordination, decreased lower extremity function, decreased safety awareness, pain, decreased ROM, impaired coordination    Rehab potential is fair.    Activity tolerance: Poor    Discharge recommendations: Discharge Facility/Level Of Care Needs: nursing facility, skilled     Barriers to discharge: Barriers to Discharge: Inaccessible home environment    Equipment recommendations: walker, rolling, wheelchair     GOALS:    Occupational Therapy Goals        Problem: Occupational " Therapy Goal    Goal Priority Disciplines Outcome Interventions   Occupational Therapy Goal     OT, PT/OT Ongoing (interventions implemented as appropriate)    Description:  ot goals to be met by 7-7-17  1. Mod a with ue dressing  2. Mod a with le dressing  3. Pt will tolerate 1 set x 10 reps b ue rom exercise  4. Pt will req mod a with bsc<>bed t/f's                    Plan:  Patient to be seen 3 x/week to address the above listed problems via self-care/home management, therapeutic activities, therapeutic exercises  Plan of Care expires: 07/06/17  Plan of Care reviewed with: patient, significant other         Marva Burgos OT  07/01/2017

## 2017-07-01 NOTE — PT/OT/SLP EVAL
Speech Language Pathology  Evaluation    Kaylene Emery   MRN: [id number]   Admitting Diagnosis: Non-traumatic rhabdomyolysis    Diet recommendations: Solid Diet Level: Mechanical soft  Liquid Diet Level: Thin Feed only when awake/alert, HOB to 90 degrees and Small bites/sips    SLP Treatment Date: 07/01/17  Speech Start Time: 1004     Speech Stop Time: 1022     Speech Total (min): 18 min       TREATMENT BILLABLE MINUTES:  Treatment Swallowing Dysfunction 18    Diagnosis: Non-traumatic rhabdomyolysis  dysphagia    Past Medical History:   Diagnosis Date    Anxiety     Depression     GERD (gastroesophageal reflux disease)     Tobacco use      Past Surgical History:   Procedure Laterality Date    BLADDER REPAIR      CENTRAL LINE  6/28/2017         CHOLECYSTECTOMY  04/07/2017    KNEE ARTHROPLASTY      PARTIAL HYSTERECTOMY      TUBAL LIGATION         Has the patient been evaluated by SLP for swallowing? : No  Keep patient NPO?: No   General Precautions: Standard, aspiration    Current Respiratory Status: nasal cannula     Prior diet: regular.    Subjective:  Alert and seen at bedside with spouse.     Pain/Comfort  Pain Rating 1: 0/10 (related to speech/lang)  Pain Rating Post-Intervention 2: 0/10    Objective:   Patient found with: pulse ox (continuous), peripheral IV, carroll catheter    Oral Musculature Evaluation  Oral Musculature: WFL  Mandibular Strength and Mobility: WFL  Oral Labial Strength and Mobility: WFL  Lingual Strength and Mobility: WFL  Volitional Cough: present  Volitional Swallow: present  Voice Prior to PO Intake: hoarse     Bedside Swallow Eval:  Consistencies Assessed: Thin liquids via spoon & cup, Puree textured via spoon and Solids regular consistency solid  Oral Phase: mouth breathing  Pharyngeal Phase: multiple spontaneous swallows and other (see comments)    Oral mech evaluation unremarkable. Observed po intake of ice chips, thin liquids via spoon/straw, smooth puree, and regular  consistency solid. Pt tolerated ice chips, thin liquids via spoon, moderate sips of thin liquids via straw, smooth puree, and regular consistency solid without any overt s/s of aspiration. Pt exhibited decrease in oxygen stats with large sips of thin liquids.     Assessment:  Kaylene Emery is a 46 y.o. female with a medical diagnosis of Non-traumatic rhabdomyolysis and presents with oropharyngeal dysphagia.          Discharge recommendations: Discharge Facility/Level Of Care Needs: nursing facility, skilled     Goals:    SLP Goals        Problem: SLP Goal    Goal Priority Disciplines Outcome   SLP Goal     SLP                     Plan:   Patient to be seen Therapy Frequency: 3 x/week   Plan of Care expires: 07/08/17  Plan of Care reviewed with: patient, spouse  SLP Follow-up?: Yes              Radha Estrella CCC-SLP  07/01/2017

## 2017-07-01 NOTE — PLAN OF CARE
Problem: Patient Care Overview  Goal: Plan of Care Review  PT REQUIRES MAX A X2>TOTAL A FOR BED MOBILITY AND T/F TO CHAIR   Outcome: Ongoing (interventions implemented as appropriate)  Plan of care review with patient, vital signs stable, temp max 100.7, la monitor closely , see lab result. Turned repositioned Q 2 hrs. Safety measures implemented.

## 2017-07-01 NOTE — PROGRESS NOTES
"[facility]  Nephrology  Progress Note    Patient Name: Kayleen Emery  MRN: [id number]  Admission Date: 6/28/2017  Hospital Length of Stay: 3 days  Attending Provider: Oneil Paz MD   Primary Care Physician: Jonas Jimenez MD  Principal Problem:Non-traumatic rhabdomyolysis and ALEXUS    Subjective:     HPI: Patient is a 46-year-old with multiple psychiatry issues.  Comes in with multiple medication overdose with unclear intent suicidal versus overdose versus toxic injections.  Patient has dark-colored urine along with severe rhabdomyolysis.  Patient is now intubated.  Dr. Giles consulted me for initiation for CRRT for multiple drug poisoning and rhabdo myelolysis.  Patient has had transient episode of hypotension with anxiolytics.  Still has some urine output and is nonoliguric.  Most of the history has been obtained from Dr. Giles, patient's fiancé and from the medical records.    Interval History: Pt was seen and examined in ICU today. Remained stable last pm. No new c/o's or events. No SOB reported today. Pt says that HD yesterday helped her with SOB. No discomfort today    Review of patient's allergies indicates:   Allergen Reactions    Corticosteroids (glucocorticoids)      "sets off my anxiety real bad"    Tramadol Rash     Current Facility-Administered Medications   Medication Frequency    0.9%  NaCl infusion Continuous    acetaminophen tablet 650 mg Q8H PRN    albuterol-ipratropium 2.5mg-0.5mg/3mL nebulizer solution 3 mL Q4H PRN    bisacodyl suppository 10 mg Daily PRN    cefTRIAXone (ROCEPHIN) 1 g in dextrose 5 % 50 mL IVPB Q24H    diphenhydrAMINE capsule 50 mg Nightly PRN    docusate sodium capsule 100 mg Daily    ergocalciferol capsule 50,000 Units Q7 Days    fluoxetine capsule 40 mg Daily    heparin (porcine) injection 2,000 Units PRN    lorazepam tablet 0.5 mg BID    nicotine 14 mg/24 hr 1 patch Daily    ondansetron injection 4 mg Q8H PRN    oxycodone 12 " hr tablet 20 mg BID PRN    pantoprazole EC tablet 40 mg Daily    quetiapine tablet 50 mg Daily       Objective:     Vital Signs (Most Recent):  Temp: 99.2 °F (37.3 °C) (07/01/17 1102)  Pulse: 101 (07/01/17 1302)  Resp: (!) 31 (07/01/17 1102)  BP: (!) 145/76 (07/01/17 1102)  SpO2: (!) 93 % (07/01/17 1102)  O2 Device (Oxygen Therapy): nasal cannula (07/01/17 1102) Vital Signs (24h Range):  Temp:  [99.2 °F (37.3 °C)-101.1 °F (38.4 °C)] 99.2 °F (37.3 °C)  Pulse:  [] 101  Resp:  [18-38] 31  SpO2:  [89 %-97 %] 93 %  BP: (127-150)/(59-95) 145/76     Weight: 86.4 kg (190 lb 5.9 oz) (07/01/17 0500)  Body mass index is 32.68 kg/m².  Body surface area is 1.98 meters squared.    I/O last 3 completed shifts:  In: 8765 [P.O.:3290; I.V.:5225; IV Piggyback:250]  Out: 5848 [Urine:2346; Other:3500; Stool:2]     Physical Exam   Constitutional: No distress. She is extubated  HENT:   Head: Normocephalic.   Eyes: Conjunctivae are normal. No scleral icterus.   Cardiovascular: Regular rhythm, normal heart sounds and intact distal pulses.    No murmur heard.  Pulmonary/Chest: CTA B, no rales  Abdominal: Soft. She exhibits no distension.   Musculoskeletal: She exhibits no edema or deformity.   Lymphadenopathy:     She has no cervical adenopathy.   Neurological: intact, symmetric  Skin: Skin is warm. She is not diaphoretic. No erythema.   Psychiatric: She is not agitated.   Nursing note and vitals reviewed.      Significant Labs: reviewed    BMP  Lab Results   Component Value Date     (L) 07/01/2017    K 3.6 07/01/2017     07/01/2017    CO2 28 07/01/2017    BUN 14 07/01/2017    CREATININE 2.3 (H) 07/01/2017    CALCIUM 6.8 (LL) 07/01/2017    ANIONGAP 6 (L) 07/01/2017    ESTGFRAFRICA 29 (A) 07/01/2017    EGFRNONAA 25 (A) 07/01/2017     Lab Results   Component Value Date    WBC 12.89 (H) 07/01/2017    HGB 9.5 (L) 07/01/2017    HCT 27.2 (L) 07/01/2017    MCV 90 07/01/2017    PLT 60 (L) 07/01/2017     CK  90045    Assessment/Plan:     ALEXUS (acute kidney injury)    Non-traumatic rhabdomyolysis     1. Renal: ALEXUS. Due to non-traumatic rhabdomyolysis  Stil no sign of renal recovery  s Cr higher though pt was dialyzed yesterday  Requiring dialysis support.  Tolerating dialysis well, on intermittent, conventional HD now   Dyspnea improved with HD yesterday  Pt looks more comfortable today  Hemodynamically stable  Discussed with pt, her fiance and ICU team.  Electrolytes reviewed  K normal  Metabolic acidosis improved with HD  IVF's reviewed: on NS IVF at 100 ml/hr, continue same rate             Aspiration pneumonia     2. ID: abx reviewed  On zosyn for suspected aspiration pneumonia  Adjusted dose for the renal function  Afebrile  WBC slightly elevated       Polysubstance overdose     3. Psych: apparent drug overdose in what is reported as intention to self-harm  H/o of depression  Will defer to primary team.          Plans and recommendations:  As discussed above  Hold HD for today  Will evaluate in am for need for HD  Continue NS IVF's at 100 ml/hr  Repeat blood work  Replace all electrolyte deficiencies  Total critical care time spent 45 minutes             Mona Gregory MD  Nephrology  [facility]

## 2017-07-01 NOTE — PROGRESS NOTES
Patient had a coughing spell that lowered her spo2 to about 66%. She was immediately placed on 100% NRB. When spo2 came up, she was placed back on 5L NC. Patient states she is very congested. Spo2 in the high 80s on 5L NC. She is now on 40% VM 8L. Spo2 is 95%. She appears to be dyspneic with very shallow breaths.

## 2017-07-01 NOTE — PROGRESS NOTES
STAT CXR performed  Notified MD Gregory that it appears worse than the one that was done two days ago. Also advised of coughing spell and SATS dropping into the 60s and we now have the patient on a venti-mask at 40%.  He stated that he will do dialysis tonight.

## 2017-07-01 NOTE — PROGRESS NOTES
"History & Physical  Hospital Medicine      SUBJECTIVE:     Chief Complaint/Reason for Admission: Medication overdosed.     History of Present Illness:  Ms. Kaylene Emery is a 46 y.o. who was admitted with medication overdose causing ALEXUS needed dialysis, rhabdomyolysis, muscle weakness. patient has been PEc'd.      Interval history:   Patient is feeling better today. No suicidal or homicidal ideation. Pain controlled.     PTA Medications   Medication Sig    gabapentin (NEURONTIN) 400 MG capsule Take 400 mg by mouth 3 (three) times daily.    oxycodone-acetaminophen (PERCOCET) 7.5-325 mg per tablet Take 1 tablet by mouth every 4 (four) hours as needed for Pain.    fluoxetine (PROZAC) 40 MG capsule Take 40 mg by mouth once daily.    lorazepam (ATIVAN) 1 MG tablet Take 1 mg by mouth every 6 (six) hours as needed for Anxiety.    meloxicam (MOBIC) 7.5 MG tablet Take 1 tablet (7.5 mg total) by mouth once daily.    ondansetron (ZOFRAN-ODT) 4 MG TbDL Take 1 tablet (4 mg total) by mouth every 6 (six) hours as needed.    promethazine (PHENERGAN) 25 MG tablet Take 1 tablet (25 mg total) by mouth every 6 (six) hours as needed for Nausea.    quetiapine (SEROQUEL) 200 MG Tab Take by mouth.        Review of patient's allergies indicates:   Allergen Reactions    Corticosteroids (glucocorticoids)      "sets off my anxiety real bad"    Tramadol Rash       Past Medical History:   Diagnosis Date    Anxiety     Depression     GERD (gastroesophageal reflux disease)     Tobacco use        Past Surgical History:   Procedure Laterality Date    BLADDER REPAIR      CENTRAL LINE  6/28/2017         CHOLECYSTECTOMY  04/07/2017    KNEE ARTHROPLASTY      PARTIAL HYSTERECTOMY      TUBAL LIGATION         Family History   Problem Relation Age of Onset    Hypertension Mother     Diabetes Mother         Social History     Social History    Marital status:      Spouse name: N/A    Number of children: N/A    Years of " education: N/A     Social History Main Topics    Smoking status: Current Every Day Smoker     Packs/day: 1.00     Types: Cigarettes    Smokeless tobacco: Never Used    Alcohol use Yes      Comment: occasionally    Drug use: No    Sexual activity: Yes     Other Topics Concern    None     Social History Narrative    None       Review of Systems:  Constitutional: No fever or chills, no weight changes.  Eyes: No visual changes or photophobia  HEENT: No nasal congestion or sore throat  Respiratory: No cough or shorness of breath  Cardiovascular: No chest pain or palpitations  Gastrointestinal: No nausea or vomiting, no diarrhea or change in bowel habits  Genitourinary: No hematuria or dysuria  Skin: No rash or pruritis  Hematologic/lymphatic: No easy bruising, bleeding or lymphadenopathy  Musculoskeletal: weakness.   Neurological: No seizures or tremors  Endocrine: No heat/cold intolerance.  No polyuria/polydipsia  Psychiatric:  No depression or anxiety.      OBJECTIVE:     Temp:  [99.2 °F (37.3 °C)-101.1 °F (38.4 °C)] 99.2 °F (37.3 °C)  Pulse:  [] 97  Resp:  [18-38] 19  SpO2:  [89 %-97 %] 93 %  BP: (122-150)/(59-98) 141/83  Body mass index is 32.68 kg/m².     Physical Exam:  General appearance: Well developed, well nourished  Head: Normocephalic, atraumatic  Eyes:  Conjunctivae nl. Sclera anicteric. PERRL.  HEENT: Lips, mucosa, and tongue normal;   Neck: Supple, trachea midline, no thyromegaly.  Lungs: wheezing.   Heart: Regular rate and rhythm, S1, S2 normal, no murmur, click, rub or gallop  Abdomen: non-tender, mildly distended; bowel sounds normal; no masses, no organomegaly  Extremities: No cyanosis or clubbing. + edema b/L.   Pulses: 2+ and symmetric  Neurologic: non focal with generalized weakness.   Psychiatric:  Alert and oriented times 3.  Affect appropriate.      Laboratory:  Labs Reviewed   CBC W/ AUTO DIFFERENTIAL - Abnormal; Notable for the following:        Result Value    WBC 14.93 (*)      Hemoglobin 17.3 (*)     MCH 32.3 (*)     MCHC 36.1 (*)     Gran # 11.8 (*)     Mono # 1.3 (*)     Gran% 79.0 (*)     Lymph% 12.3 (*)     All other components within normal limits   COMPREHENSIVE METABOLIC PANEL - Abnormal; Notable for the following:     Sodium 130 (*)     CO2 17 (*)     Glucose 127 (*)     Calcium 8.5 (*)      (*)      (*)     All other components within normal limits   TSH - Abnormal; Notable for the following:     TSH 0.255 (*)     All other components within normal limits   URINALYSIS - Abnormal; Notable for the following:     Color, UA Brown (*)     Appearance, UA Cloudy (*)     Protein, UA 3+ (*)     Ketones, UA 1+ (*)     Bilirubin (UA) 1+ (*)     Occult Blood UA 3+ (*)     Nitrite, UA Positive (*)     Leukocytes, UA Trace (*)     All other components within normal limits   ACETAMINOPHEN LEVEL - Abnormal; Notable for the following:     Acetaminophen (Tylenol), Serum <3.0 (*)     All other components within normal limits   SALICYLATE LEVEL - Abnormal; Notable for the following:     Salicylate Lvl <5.0 (*)     All other components within normal limits   CK - Abnormal; Notable for the following:     CPK >57600 (*)     All other components within normal limits   URINALYSIS MICROSCOPIC - Abnormal; Notable for the following:     RBC, UA 5 (*)     Bacteria, UA Few (*)     Hyaline Casts, UA 2 (*)     All other components within normal limits   ISTAT PROCEDURE - Abnormal; Notable for the following:     POC PCO2 28.1 (*)     POC PO2 78 (*)     POC HCO3 17.7 (*)     All other components within normal limits   CULTURE, RESPIRATORY   DRUG SCREEN PANEL, URINE EMERGENCY   ALCOHOL,MEDICAL (ETHANOL)   T4, FREE   PREGNANCY TEST, URINE RAPID   CK   LACTIC ACID, PLASMA       Diagnostic Tests:      ASSESSMENT/PLAN:     Active Hospital Problems    Diagnosis  POA    *Non-traumatic rhabdomyolysis [M62.82]  Yes    ALEXUS (acute kidney injury) [N17.9]  No    Hypoalbuminemia [E88.09]  Yes    Anasarca  [R60.1]  No    Moderate episode of recurrent major depressive disorder [F33.1]  Unknown     Chronic    Overdose [T50.901A]  Unknown    Recurrent major depressive disorder [F33.9]  Yes     Chronic    Thrombocytopenia [D69.6]  No    Elevated liver enzymes [R74.8]  Yes    Polysubstance overdose [T50.901A]  Yes    Transaminitis [R74.0]  Yes    Aspiration pneumonia of both lungs [J69.0]  Yes      Resolved Hospital Problems    Diagnosis Date Resolved POA    Electrolyte imbalance [E87.8] 06/30/2017 No    Sepsis [A41.9] 06/29/2017 Yes    Acute hypoxemic respiratory failure [J96.01] 06/30/2017 Yes    Acute metabolic encephalopathy [G93.41] 06/29/2017 Yes    Hyponatremia [E87.1] 06/29/2017 Yes       Plan:   Continue IVF.   Monitor renal function and cpk.   Renal following. Dialysis for support.  No source of infections at this time. Cultures negative to date.   Since patient was admitted with sepsis, will continue IV for now and treat for 7 days.   Pt is PEC'd.  Continue to monitor liver function for transaminitis 2/2 polysubstance overdose.   Pain control.   Duonebs prn for wheezing.   Replace electrolytes prn.   Physical therapy.   Continue supportive care.     Dvt/gi ppx.     Dispo: Continue management as outlined above for severe rhabdomyolysis.     Oneil Paz MD

## 2017-07-02 LAB
ALBUMIN SERPL BCP-MCNC: 1.5 G/DL
ALP SERPL-CCNC: 95 U/L
ALT SERPL W/O P-5'-P-CCNC: 132 U/L
ANION GAP SERPL CALC-SCNC: 6 MMOL/L
AST SERPL-CCNC: 237 U/L
BASOPHILS # BLD AUTO: 0.02 K/UL
BASOPHILS NFR BLD: 0.1 %
BILIRUB DIRECT SERPL-MCNC: 0.2 MG/DL
BILIRUB SERPL-MCNC: 0.4 MG/DL
BNP SERPL-MCNC: 373 PG/ML
BUN SERPL-MCNC: 12 MG/DL
CALCIUM SERPL-MCNC: 7.4 MG/DL
CHLORIDE SERPL-SCNC: 101 MMOL/L
CK SERPL-CCNC: 8756 U/L
CK SERPL-CCNC: 8935 U/L
CK SERPL-CCNC: ABNORMAL U/L
CO2 SERPL-SCNC: 27 MMOL/L
CREAT SERPL-MCNC: 2.1 MG/DL
DIASTOLIC DYSFUNCTION: NO
DIFFERENTIAL METHOD: ABNORMAL
EOSINOPHIL # BLD AUTO: 0.1 K/UL
EOSINOPHIL NFR BLD: 0.9 %
ERYTHROCYTE [DISTWIDTH] IN BLOOD BY AUTOMATED COUNT: 14.1 %
EST. GFR  (AFRICAN AMERICAN): 32 ML/MIN/1.73 M^2
EST. GFR  (NON AFRICAN AMERICAN): 28 ML/MIN/1.73 M^2
GLUCOSE SERPL-MCNC: 103 MG/DL
HCT VFR BLD AUTO: 27.9 %
HGB BLD-MCNC: 9.7 G/DL
LYMPHOCYTES # BLD AUTO: 1.5 K/UL
LYMPHOCYTES NFR BLD: 10.1 %
MAGNESIUM SERPL-MCNC: 1.7 MG/DL
MCH RBC QN AUTO: 31 PG
MCHC RBC AUTO-ENTMCNC: 34.8 %
MCV RBC AUTO: 89 FL
MONOCYTES # BLD AUTO: 1 K/UL
MONOCYTES NFR BLD: 6.7 %
NEUTROPHILS # BLD AUTO: 12.6 K/UL
NEUTROPHILS NFR BLD: 82.2 %
PLATELET # BLD AUTO: 85 K/UL
PMV BLD AUTO: 10.6 FL
POTASSIUM SERPL-SCNC: 3.7 MMOL/L
PROT SERPL-MCNC: 4.3 G/DL
RBC # BLD AUTO: 3.13 M/UL
RETIRED EF AND QEF - SEE NOTES: 60 (ref 55–65)
SODIUM SERPL-SCNC: 134 MMOL/L
TRICUSPID VALVE REGURGITATION: NORMAL
WBC # BLD AUTO: 15.3 K/UL

## 2017-07-02 PROCEDURE — 93306 TTE W/DOPPLER COMPLETE: CPT

## 2017-07-02 PROCEDURE — 80076 HEPATIC FUNCTION PANEL: CPT

## 2017-07-02 PROCEDURE — 97530 THERAPEUTIC ACTIVITIES: CPT

## 2017-07-02 PROCEDURE — 83880 ASSAY OF NATRIURETIC PEPTIDE: CPT

## 2017-07-02 PROCEDURE — 63600175 PHARM REV CODE 636 W HCPCS: Performed by: EMERGENCY MEDICINE

## 2017-07-02 PROCEDURE — 99900035 HC TECH TIME PER 15 MIN (STAT)

## 2017-07-02 PROCEDURE — 94799 UNLISTED PULMONARY SVC/PX: CPT

## 2017-07-02 PROCEDURE — 21400001 HC TELEMETRY ROOM

## 2017-07-02 PROCEDURE — 25000003 PHARM REV CODE 250: Performed by: NURSE PRACTITIONER

## 2017-07-02 PROCEDURE — 82550 ASSAY OF CK (CPK): CPT | Mod: 91

## 2017-07-02 PROCEDURE — 99291 CRITICAL CARE FIRST HOUR: CPT | Mod: ,,, | Performed by: INTERNAL MEDICINE

## 2017-07-02 PROCEDURE — 99231 SBSQ HOSP IP/OBS SF/LOW 25: CPT | Mod: ,,, | Performed by: INTERNAL MEDICINE

## 2017-07-02 PROCEDURE — 25000003 PHARM REV CODE 250: Performed by: SPECIALIST

## 2017-07-02 PROCEDURE — 85025 COMPLETE CBC W/AUTO DIFF WBC: CPT

## 2017-07-02 PROCEDURE — 25000003 PHARM REV CODE 250: Performed by: PSYCHIATRY & NEUROLOGY

## 2017-07-02 PROCEDURE — 93306 TTE W/DOPPLER COMPLETE: CPT | Mod: 26,,, | Performed by: INTERNAL MEDICINE

## 2017-07-02 PROCEDURE — 36415 COLL VENOUS BLD VENIPUNCTURE: CPT

## 2017-07-02 PROCEDURE — 82550 ASSAY OF CK (CPK): CPT

## 2017-07-02 PROCEDURE — 63600175 PHARM REV CODE 636 W HCPCS: Performed by: NURSE PRACTITIONER

## 2017-07-02 PROCEDURE — 83735 ASSAY OF MAGNESIUM: CPT

## 2017-07-02 PROCEDURE — 80048 BASIC METABOLIC PNL TOTAL CA: CPT

## 2017-07-02 PROCEDURE — 97110 THERAPEUTIC EXERCISES: CPT

## 2017-07-02 RX ORDER — FLUTICASONE PROPIONATE 50 MCG
2 SPRAY, SUSPENSION (ML) NASAL DAILY
Status: COMPLETED | OUTPATIENT
Start: 2017-07-02 | End: 2017-07-03

## 2017-07-02 RX ADMIN — FLUTICASONE PROPIONATE 2 SPRAY: 50 SPRAY, METERED NASAL at 08:07

## 2017-07-02 RX ADMIN — ACETAMINOPHEN 650 MG: 325 TABLET ORAL at 06:07

## 2017-07-02 RX ADMIN — FLUTICASONE PROPIONATE 2 SPRAY: 50 SPRAY, METERED NASAL at 09:07

## 2017-07-02 RX ADMIN — LORAZEPAM 0.5 MG: 0.5 TABLET ORAL at 08:07

## 2017-07-02 RX ADMIN — ONDANSETRON 4 MG: 2 INJECTION INTRAMUSCULAR; INTRAVENOUS at 05:07

## 2017-07-02 RX ADMIN — DOCUSATE SODIUM 100 MG: 100 CAPSULE, LIQUID FILLED ORAL at 08:07

## 2017-07-02 RX ADMIN — NICOTINE 1 PATCH: 14 PATCH, EXTENDED RELEASE TRANSDERMAL at 08:07

## 2017-07-02 RX ADMIN — OXYCODONE HYDROCHLORIDE 20 MG: 10 TABLET, FILM COATED, EXTENDED RELEASE ORAL at 08:07

## 2017-07-02 RX ADMIN — FLUOXETINE 40 MG: 20 CAPSULE ORAL at 08:07

## 2017-07-02 RX ADMIN — QUETIAPINE FUMARATE 50 MG: 25 TABLET, FILM COATED ORAL at 08:07

## 2017-07-02 RX ADMIN — PANTOPRAZOLE SODIUM 40 MG: 40 TABLET, DELAYED RELEASE ORAL at 08:07

## 2017-07-02 RX ADMIN — CEFTRIAXONE 1 G: 1 INJECTION, SOLUTION INTRAVENOUS at 12:07

## 2017-07-02 NOTE — PROGRESS NOTES
Pulmonary Note    History reviewed , patient examined, lab and radiographic studies reviewed.    Interval history:  Pt was seen and examined in  today. Remained stable last pm. No new c/o's or events. No SOB reported today. Pt says that HD helped her with SOB. No discomfort today       Exam stable.    Physical Exam   Constitutional: She is oriented to person, place, and time and well-developed, well-nourished, and in no distress. Vital signs are normal. She appears not lethargic, to not be writhing in pain, not malnourished and not jaundiced. She appears unhealthy.  Non-toxic appearance. No distress.   HENT:   Head: Normocephalic and atraumatic.   Mouth/Throat: Oropharynx is clear and moist. No posterior oropharyngeal edema or posterior oropharyngeal erythema.   Eyes: EOM and lids are normal. Pupils are equal, round, and reactive to light.   Neck: Normal range of motion. Carotid bruit is not present.      Assessment:  Patient Active Problem List   Diagnosis    Tobacco use    Constipation due to pain medication    Drug induced retention of urine    Postoperative pain    Non-traumatic rhabdomyolysis    Polysubstance overdose    Transaminitis    Aspiration pneumonia of both lungs    Recurrent major depressive disorder    Thrombocytopenia    Elevated liver enzymes    ALEXUS (acute kidney injury)    Hypoalbuminemia    Anasarca    Moderate episode of recurrent major depressive disorder    Overdose    Severe episode of recurrent major depressive disorder, without psychotic features       Plan:  No new suggestions  Will sign off  Please re consult if any new problems develop    Manish Treadwell MD  Pulmonary / Critical Care Medicine

## 2017-07-02 NOTE — PLAN OF CARE
Problem: Patient Care Overview  Goal: Plan of Care Review  PT REQUIRES MAX A X2>TOTAL A FOR BED MOBILITY AND T/F TO CHAIR   Outcome: Ongoing (interventions implemented as appropriate)  Patient is encouraged to do her incentive spirometry byt her cough is bothered her a lot .

## 2017-07-02 NOTE — PLAN OF CARE
Problem: Patient Care Overview  Goal: Plan of Care Review  PT REQUIRES MAX A X2>TOTAL A FOR BED MOBILITY AND T/F TO CHAIR   Outcome: Ongoing (interventions implemented as appropriate)  Patient appeared anxious in therapy today. Fatigued quickly. Significant other reports that patient is doing better with AROM of B LE's since yesterday.

## 2017-07-02 NOTE — PROGRESS NOTES
"History & Physical  Hospital Medicine      SUBJECTIVE:     Chief Complaint/Reason for Admission: Medication overdosed.     History of Present Illness:  Ms. Kaylene Emery is a 46 y.o. who was admitted with medication overdose causing ALEXUS needed dialysis, rhabdomyolysis, muscle weakness. patient has been PEc'd.      Interval history:   Overnight, she had acute dyspnea with fluid overload requiring urgent dialysis. This morning, feeling better. No suicidal or homicidal ideation. Pain controlled.     PTA Medications   Medication Sig    gabapentin (NEURONTIN) 400 MG capsule Take 400 mg by mouth 3 (three) times daily.    oxycodone-acetaminophen (PERCOCET) 7.5-325 mg per tablet Take 1 tablet by mouth every 4 (four) hours as needed for Pain.    fluoxetine (PROZAC) 40 MG capsule Take 40 mg by mouth once daily.    lorazepam (ATIVAN) 1 MG tablet Take 1 mg by mouth every 6 (six) hours as needed for Anxiety.    meloxicam (MOBIC) 7.5 MG tablet Take 1 tablet (7.5 mg total) by mouth once daily.    ondansetron (ZOFRAN-ODT) 4 MG TbDL Take 1 tablet (4 mg total) by mouth every 6 (six) hours as needed.    promethazine (PHENERGAN) 25 MG tablet Take 1 tablet (25 mg total) by mouth every 6 (six) hours as needed for Nausea.    quetiapine (SEROQUEL) 200 MG Tab Take by mouth.        Review of patient's allergies indicates:   Allergen Reactions    Corticosteroids (glucocorticoids)      "sets off my anxiety real bad"    Tramadol Rash       Past Medical History:   Diagnosis Date    Anxiety     Depression     GERD (gastroesophageal reflux disease)     Tobacco use        Past Surgical History:   Procedure Laterality Date    BLADDER REPAIR      CENTRAL LINE  6/28/2017         CHOLECYSTECTOMY  04/07/2017    KNEE ARTHROPLASTY      PARTIAL HYSTERECTOMY      TUBAL LIGATION         Family History   Problem Relation Age of Onset    Hypertension Mother     Diabetes Mother         Social History     Social History    Marital " status:      Spouse name: N/A    Number of children: N/A    Years of education: N/A     Social History Main Topics    Smoking status: Current Every Day Smoker     Packs/day: 1.00     Types: Cigarettes    Smokeless tobacco: Never Used    Alcohol use Yes      Comment: occasionally    Drug use: No    Sexual activity: Yes     Other Topics Concern    None     Social History Narrative    None       Review of Systems:  Constitutional: No fever or chills, no weight changes.  Eyes: No visual changes or photophobia  HEENT: No nasal congestion or sore throat  Cardiovascular: No chest pain or palpitations  Gastrointestinal: No nausea or vomiting, no diarrhea or change in bowel habits  Genitourinary: No hematuria or dysuria  Skin: No rash or pruritis  Hematologic/lymphatic: No easy bruising, bleeding or lymphadenopathy  Musculoskeletal: weakness.   Neurological: No seizures or tremors  Endocrine: No heat/cold intolerance.  No polyuria/polydipsia  Psychiatric:  No depression or anxiety.      OBJECTIVE:     Temp:  [99.2 °F (37.3 °C)-100.6 °F (38.1 °C)] 99.8 °F (37.7 °C)  Pulse:  [] 95  Resp:  [19-44] 31  SpO2:  [90 %-100 %] 94 %  BP: (107-162)/(62-94) 130/81  Body mass index is 30.27 kg/m².     Physical Exam:  General appearance: Well developed, well nourished  Head: Normocephalic, atraumatic  Eyes:  Conjunctivae nl. Sclera anicteric. PERRL.  HEENT: Lips, mucosa, and tongue normal;   Neck: Supple, trachea midline, no thyromegaly.  Lungs: Mild wheezing and bibasilar rales.    Heart: Regular rate and rhythm, S1, S2 normal, no murmur, click, rub or gallop  Abdomen: non-tender, mildly distended; bowel sounds normal; no masses, no organomegaly  Extremities: No cyanosis or clubbing. +1 edema b/L.   Pulses: 2+ and symmetric.  Neurologic: non focal with generalized weakness.   Psychiatric:  Alert and oriented times 3.  Affect appropriate.      Laboratory:  Labs Reviewed   CBC W/ AUTO DIFFERENTIAL - Abnormal; Notable  for the following:        Result Value    WBC 14.93 (*)     Hemoglobin 17.3 (*)     MCH 32.3 (*)     MCHC 36.1 (*)     Gran # 11.8 (*)     Mono # 1.3 (*)     Gran% 79.0 (*)     Lymph% 12.3 (*)     All other components within normal limits   COMPREHENSIVE METABOLIC PANEL - Abnormal; Notable for the following:     Sodium 130 (*)     CO2 17 (*)     Glucose 127 (*)     Calcium 8.5 (*)      (*)      (*)     All other components within normal limits   TSH - Abnormal; Notable for the following:     TSH 0.255 (*)     All other components within normal limits   URINALYSIS - Abnormal; Notable for the following:     Color, UA Brown (*)     Appearance, UA Cloudy (*)     Protein, UA 3+ (*)     Ketones, UA 1+ (*)     Bilirubin (UA) 1+ (*)     Occult Blood UA 3+ (*)     Nitrite, UA Positive (*)     Leukocytes, UA Trace (*)     All other components within normal limits   ACETAMINOPHEN LEVEL - Abnormal; Notable for the following:     Acetaminophen (Tylenol), Serum <3.0 (*)     All other components within normal limits   SALICYLATE LEVEL - Abnormal; Notable for the following:     Salicylate Lvl <5.0 (*)     All other components within normal limits   CK - Abnormal; Notable for the following:     CPK >67839 (*)     All other components within normal limits   URINALYSIS MICROSCOPIC - Abnormal; Notable for the following:     RBC, UA 5 (*)     Bacteria, UA Few (*)     Hyaline Casts, UA 2 (*)     All other components within normal limits   ISTAT PROCEDURE - Abnormal; Notable for the following:     POC PCO2 28.1 (*)     POC PO2 78 (*)     POC HCO3 17.7 (*)     All other components within normal limits   CULTURE, RESPIRATORY   DRUG SCREEN PANEL, URINE EMERGENCY   ALCOHOL,MEDICAL (ETHANOL)   T4, FREE   PREGNANCY TEST, URINE RAPID   CK   LACTIC ACID, PLASMA       Diagnostic Tests:      ASSESSMENT/PLAN:     Active Hospital Problems    Diagnosis  POA    *Non-traumatic rhabdomyolysis [M62.82]  Yes    Severe episode of recurrent  major depressive disorder, without psychotic features [F33.2]  Unknown     Chronic    ALEXUS (acute kidney injury) [N17.9]  No    Hypoalbuminemia [E88.09]  Yes    Anasarca [R60.1]  No    Moderate episode of recurrent major depressive disorder [F33.1]  Unknown     Chronic    Overdose [T50.901A]  Unknown    Recurrent major depressive disorder [F33.9]  Yes     Chronic    Thrombocytopenia [D69.6]  No    Elevated liver enzymes [R74.8]  Yes    Polysubstance overdose [T50.901A]  Yes    Transaminitis [R74.0]  Yes    Aspiration pneumonia of both lungs [J69.0]  Yes      Resolved Hospital Problems    Diagnosis Date Resolved POA    Electrolyte imbalance [E87.8] 06/30/2017 No    Sepsis [A41.9] 06/29/2017 Yes    Acute hypoxemic respiratory failure [J96.01] 06/30/2017 Yes    Acute metabolic encephalopathy [G93.41] 06/29/2017 Yes    Hyponatremia [E87.1] 06/29/2017 Yes       Plan:   IVF discontinued.   Monitor renal function and cpk.   Renal following. Dialysis for support. Will discuss possible need for Lasix.  Will obtain 2D echo and bnp given recent event with fluid overload.   No source of infections at this time. Cultures negative to date.   Since patient was admitted with sepsis, will continue IV for now and treat for 7 days.   Pt is PEC'd.  Continue to monitor liver function for transaminitis 2/2 polysubstance overdose.   Pain control.   Duonebs prn for wheezing.   Replace electrolytes prn.   Physical therapy.   Continue supportive care.     Dvt/gi ppx.     Dispo: Continue management as outlined above for severe rhabdomyolysis.     Oneil Paz MD

## 2017-07-02 NOTE — SUBJECTIVE & OBJECTIVE
"Interval History:  Pt was seen and examined this am in ICU.  Pt required emergent HD last pm for SOB with low O2 sat and did not respond to holding NS IVF's  Pt had to be dialyzed last night. I revisited the pt and checked on her, tolerated HD well.  This am, pt says she is feeling better, no SOB this am.  Labs, meds, and treatment reviewed.    Review of patient's allergies indicates:   Allergen Reactions    Corticosteroids (glucocorticoids)      "sets off my anxiety real bad"    Tramadol Rash     Current Facility-Administered Medications   Medication Frequency    acetaminophen tablet 650 mg Q8H PRN    albuterol-ipratropium 2.5mg-0.5mg/3mL nebulizer solution 3 mL Q4H PRN    bisacodyl suppository 10 mg Daily PRN    cefTRIAXone (ROCEPHIN) 1 g in dextrose 5 % 50 mL IVPB Q24H    diphenhydrAMINE capsule 50 mg Nightly PRN    docusate sodium capsule 100 mg Daily    ergocalciferol capsule 50,000 Units Q7 Days    fluoxetine capsule 40 mg Daily    heparin (porcine) injection 2,000 Units PRN    lorazepam tablet 0.5 mg BID    nicotine 14 mg/24 hr 1 patch Daily    ondansetron injection 4 mg Q8H PRN    oxycodone 12 hr tablet 20 mg BID PRN    pantoprazole EC tablet 40 mg Daily    quetiapine tablet 50 mg Daily       Objective:     Vital Signs (Most Recent):  Temp: 98.7 °F (37.1 °C) (07/02/17 0800)  Pulse: 94 (07/02/17 0800)  Resp: (!) 22 (07/02/17 0800)  BP: (!) 144/89 (07/02/17 0800)  SpO2: 95 % (07/02/17 0800)  O2 Device (Oxygen Therapy): nasal cannula (07/02/17 0800) Vital Signs (24h Range):  Temp:  [98.7 °F (37.1 °C)-100.6 °F (38.1 °C)] 98.7 °F (37.1 °C)  Pulse:  [] 94  Resp:  [21-44] 22  SpO2:  [90 %-100 %] 95 %  BP: (107-162)/(62-94) 144/89     Weight: 80 kg (176 lb 5.9 oz) (07/02/17 0600)  Body mass index is 30.27 kg/m².  Body surface area is 1.9 meters squared.    I/O last 3 completed shifts:  In: 4443.3 [P.O.:1660; I.V.:2233.3; Other:500; IV Piggyback:50]  Out: 5565 [Urine:2063; Other:3500; " Stool:2]    Physical Exam   Constitutional: No distress. She is intubated.   Intubated, sedated on propafol, breathing over the vent, intermittently agitated.   HENT:   Head: Normocephalic.   Eyes: Conjunctivae are normal. No scleral icterus.   Cardiovascular: Regular rhythm, normal heart sounds and intact distal pulses.  Tachycardia present.    No murmur heard.  Pulmonary/Chest: She is intubated. She has rhonchi.   Abdominal: Soft. She exhibits no distension.   Musculoskeletal: She exhibits no edema or deformity.   Lymphadenopathy:     She has no cervical adenopathy.   Neurological:   Intubated and able to communicate    Skin: Skin is warm. She is not diaphoretic. No erythema.   Psychiatric: She is not agitated.   Nursing note and vitals reviewed.      Significant Labs: reviewed  BMP  Lab Results   Component Value Date     (L) 07/02/2017    K 3.7 07/02/2017     07/02/2017    CO2 27 07/02/2017    BUN 12 07/02/2017    CREATININE 2.1 (H) 07/02/2017    CALCIUM 7.4 (L) 07/02/2017    ANIONGAP 6 (L) 07/02/2017    ESTGFRAFRICA 32 (A) 07/02/2017    EGFRNONAA 28 (A) 07/02/2017     Lab Results   Component Value Date    WBC 15.30 (H) 07/02/2017    HGB 9.7 (L) 07/02/2017    HCT 27.9 (L) 07/02/2017    MCV 89 07/02/2017    PLT 85 (L) 07/02/2017         Significant Imaging: reviewed  CXR reveiwed, +pulm edema (taken before HD yesterday)

## 2017-07-02 NOTE — PROGRESS NOTES
"[facility] -   Nephrology  Progress Note    Patient Name: Kaylene Emery  MRN: [id number]  Admission Date: 6/28/2017  Hospital Length of Stay: 4 days  Attending Provider: Oneil Paz MD   Primary Care Physician: Jonas Jimenez MD  Principal Problem:Non-traumatic rhabdomyolysis and ALEXUS    Subjective:     HPI: Patient is a 46-year-old with multiple psychiatry issues.  Comes in with multiple medication overdose with unclear intent suicidal versus overdose versus toxic injections.  Patient has dark-colored urine along with severe rhabdomyolysis.  Patient is now intubated.  Dr. Giles consulted me for initiation for CRRT for multiple drug poisoning and rhabdo myelolysis.  Patient has had transient episode of hypotension with anxiolytics.  Still has some urine output and is nonoliguric.  Most of the history has been obtained from Dr. Giels, patient's fiancé and from the medical records.    Interval History:  Pt was seen and examined this am in ICU.  Pt required emergent HD last pm for SOB with low O2 sat and did not respond to holding NS IVF's  Pt had to be dialyzed last night. I revisited the pt and checked on her, tolerated HD well.  This am, pt says she is feeling better, no SOB this am.  Labs, meds, and treatment reviewed.    Review of patient's allergies indicates:   Allergen Reactions    Corticosteroids (glucocorticoids)      "sets off my anxiety real bad"    Tramadol Rash     Current Facility-Administered Medications   Medication Frequency    acetaminophen tablet 650 mg Q8H PRN    albuterol-ipratropium 2.5mg-0.5mg/3mL nebulizer solution 3 mL Q4H PRN    bisacodyl suppository 10 mg Daily PRN    cefTRIAXone (ROCEPHIN) 1 g in dextrose 5 % 50 mL IVPB Q24H    diphenhydrAMINE capsule 50 mg Nightly PRN    docusate sodium capsule 100 mg Daily    ergocalciferol capsule 50,000 Units Q7 Days    fluoxetine capsule 40 mg Daily    heparin (porcine) injection 2,000 Units PRN    lorazepam " tablet 0.5 mg BID    nicotine 14 mg/24 hr 1 patch Daily    ondansetron injection 4 mg Q8H PRN    oxycodone 12 hr tablet 20 mg BID PRN    pantoprazole EC tablet 40 mg Daily    quetiapine tablet 50 mg Daily       Objective:     Vital Signs (Most Recent):  Temp: 98.7 °F (37.1 °C) (07/02/17 0800)  Pulse: 94 (07/02/17 0800)  Resp: (!) 22 (07/02/17 0800)  BP: (!) 144/89 (07/02/17 0800)  SpO2: 95 % (07/02/17 0800)  O2 Device (Oxygen Therapy): nasal cannula (07/02/17 0800) Vital Signs (24h Range):  Temp:  [98.7 °F (37.1 °C)-100.6 °F (38.1 °C)] 98.7 °F (37.1 °C)  Pulse:  [] 94  Resp:  [21-44] 22  SpO2:  [90 %-100 %] 95 %  BP: (107-162)/(62-94) 144/89     Weight: 80 kg (176 lb 5.9 oz) (07/02/17 0600)  Body mass index is 30.27 kg/m².  Body surface area is 1.9 meters squared.    I/O last 3 completed shifts:  In: 4443.3 [P.O.:1660; I.V.:2233.3; Other:500; IV Piggyback:50]  Out: 5565 [Urine:2063; Other:3500; Stool:2]    Physical Exam   Constitutional: No distress.  Intubated, sedated on propafol, breathing over the vent, intermittently agitated.   HENT:   Head: Normocephalic.   Eyes: Conjunctivae are normal. No scleral icterus.   Cardiovascular: Regular rhythm, normal heart sounds and intact distal pulses.   No murmur heard.  Pulmonary/Chest: CTA B except a few scattered rales, non labored  Abdominal: Soft. She exhibits no distension.   Musculoskeletal: She exhibits no edema or deformity.   Lymphadenopathy:     She has no cervical adenopathy.   Neurological:   Intubated and able to communicate    Skin: Skin is warm. She is not diaphoretic. No erythema.   Psychiatric: She is not agitated.   Nursing note and vitals reviewed.      Significant Labs: reviewed  BMP  Lab Results   Component Value Date     (L) 07/02/2017    K 3.7 07/02/2017     07/02/2017    CO2 27 07/02/2017    BUN 12 07/02/2017    CREATININE 2.1 (H) 07/02/2017    CALCIUM 7.4 (L) 07/02/2017    ANIONGAP 6 (L) 07/02/2017    ESTGFRAFRICA 32 (A)  07/02/2017    EGFRNONAA 28 (A) 07/02/2017     Lab Results   Component Value Date    WBC 15.30 (H) 07/02/2017    HGB 9.7 (L) 07/02/2017    HCT 27.9 (L) 07/02/2017    MCV 89 07/02/2017    PLT 85 (L) 07/02/2017         Significant Imaging: reviewed  CXR reveiwed, +pulm edema (taken before HD yesterday)    Assessment/Plan:     47 y/o female with ALEXUS               Non-traumatic rhabdomyolysis     1. Renal: ALEXUS. Due to non-traumatic rhabdomyolysis  No sign of renal recovery  So far HD dependent,   Becomes SOB without HD  Tolerating dialysis well  Dyspnea improved with HD yesterday  Hemodynamically stable  Discussed with pt, her fiance  Electrolytes reviewed  K normal  Metabolic acidosis improved with HD  Hold IVF's  Advised pt not drink in XS           Aspiration pneumonia     2. ID: abx reviewed  On zosyn for suspected aspiration pneumonia  Afebrile  WBC elevated       Polysubstance overdose     3. Psych: apparent drug overdose in what is reported as intention to self-harm  H/o of depression  Will defer to primary team.          Plans and recommendations:  As discussed above  Hold HD for today  Will evaluate in am for need for HD  Continue NS IVF's at 100 ml/hr  Repeat blood work  Replace all electrolyte deficiencies  Total critical care time spent 35 minutes                   Mona Gregory MD  Nephrology  [facility] -

## 2017-07-02 NOTE — PLAN OF CARE
Problem: Patient Care Overview  Goal: Plan of Care Review  PT REQUIRES MAX A X2>TOTAL A FOR BED MOBILITY AND T/F TO CHAIR   Outcome: Ongoing (interventions implemented as appropriate)  Patient on 4lnc with a spo2 of 95%, 500ml on IS. Will continue to monitor.

## 2017-07-02 NOTE — ASSESSMENT & PLAN NOTE
ALEXUS (acute kidney injury)         Non-traumatic rhabdomyolysis     1. Renal: ALEXUS. Due to non-traumatic rhabdomyolysis  Stil no sign of renal recovery  s Cr higher though pt was dialyzed yesterday  Requiring dialysis support.  Tolerating dialysis well, on intermittent, conventional HD now   Dyspnea improved with HD yesterday  Pt looks more comfortable today  Hemodynamically stable  Discussed with pt, her fiance and ICU team.  Electrolytes reviewed  K normal  Metabolic acidosis improved with HD  IVF's reviewed: on NS IVF at 100 ml/hr, continue same rate             Aspiration pneumonia     2. ID: abx reviewed  On zosyn for suspected aspiration pneumonia  Adjusted dose for the renal function  Afebrile  WBC slightly elevated       Polysubstance overdose     3. Psych: apparent drug overdose in what is reported as intention to self-harm  H/o of depression  Will defer to primary team.          Plans and recommendations:  As discussed above  Hold HD for today  Will evaluate in am for need for HD  Continue NS IVF's at 100 ml/hr  Repeat blood work  Replace all electrolyte deficiencies  Total critical care time spent 45 minutes

## 2017-07-02 NOTE — PT/OT/SLP PROGRESS
Physical Therapy  Treatment    Kaylene Emery   MRN: [id number]   Admitting Diagnosis: Non-traumatic rhabdomyolysis       PT Start Time: 1050     PT Stop Time: 1115    PT Total Time (min): 25 min       Billable Minutes:  Therapeutic Activity 15 and Therapeutic Exercise 10    Treatment Type: Treatment  PT/PTA: PTA     PTA Visit Number: 1       General Precautions: Standard, fall  Orthopedic Precautions: N/A   Braces:           Subjective:  Communicated with nsg   prior to session.      Pain/Comfort  Pain Rating 1: 0/10  Pain Rating Post-Intervention 1: 0/10    Objective:   Patient found with: bed alarm, blood pressure cuff, carroll catheter, peripheral IV, telemetry, pulse ox (continuous)    Functional Mobility:  Bed Mobility:   Rolling/Turning to Left: Moderate assistance  Rolling/Turning Right: Moderate assistance  Scooting/Bridging: With assist of 2, Total Assistance  Supine to Sit: Moderate Assistance  Sit to Supine: Moderate Assistance    Transfers:       Gait:        Stairs:      Balance:   Static Sit: GOOD-: Takes MODERATE challenges from all directions but inconsistently  Dynamic Sit: FAIR+: Maintains balance through MINIMAL excursions of active trunk motion  Static Stand: 0: Needs MAXIMAL assist to maintain   Dynamic stand: 0: N/A     Therapeutic Activities and Exercises:  Sat on eob unsupported with performing the following: laq 2x10, shoulder flexion x10, and scapular retraction x10     AM-PAC 6 CLICK MOBILITY  How much help from another person does this patient currently need?   1 = Unable, Total/Dependent Assistance  2 = A lot, Maximum/Moderate Assistance  3 = A little, Minimum/Contact Guard/Supervision  4 = None, Modified [place]/Independent         AM-PAC Raw Score CMS G-Code Modifier Level of Impairment Assistance   6 % Total / Unable   7 - 9 CM 80 - 100% Maximal Assist   10 - 14 CL 60 - 80% Moderate Assist   15 - 19 CK 40 - 60% Moderate Assist   20 - 22 CJ 20 - 40% Minimal Assist   23  CI 1-20% SBA / CGA   24 CH 0% Independent/ Mod I     Patient left supine with all lines intact, call button in reach, bed alarm on and sig other and sitter present.    Assessment:  Kaylene Emery is a 46 y.o. female with a medical diagnosis of Non-traumatic rhabdomyolysis and presents with .    Rehab identified problem list/impairments: Rehab identified problem list/impairments: weakness, impaired balance, decreased coordination, pain, impaired functional mobilty, impaired endurance, impaired sensation    Rehab potential is fair.    Activity tolerance: Fair    Discharge recommendations: Discharge Facility/Level Of Care Needs: nursing facility, skilled     Barriers to discharge: Barriers to Discharge: None    Equipment recommendations: Equipment Needed After Discharge: bedside commode, walker, rolling     GOALS:    Physical Therapy Goals        Problem: Physical Therapy Goal    Goal Priority Disciplines Outcome Goal Variances Interventions   Physical Therapy Goal     PT/OT, PT      Description:  PT WILL BE SEEN FOR P.T. FOR A MIN OF 5 OUT OF 7 DAYS A WEEK  LT17  1. PT WILL COMPLETE BED MOBILITY WITH MOD A.  2. PT WILL STAND WITH MAX A AND RW  3. PT WILL T/F TO CHAIR WITH MAX A.  4. PT WILL COMPLETE B LE TE X 20 REPS FOR STRENGTHENING.                     PLAN:    Patient to be seen    to address the above listed problems via gait training, therapeutic activities, therapeutic exercises  Plan of Care expires: 17  Plan of Care reviewed with: patient, significant other         Hernan Mosqueda, PTA  2017

## 2017-07-02 NOTE — PT/OT/SLP PROGRESS
Speech Language Pathology      Kaylene Emery  MRN: [id number]    Patient not seen today secondary to Unavailable (Comment) (medical procedure). Will follow-up at a later time/date.    Radha Estrella, FAUSTINO-SLP

## 2017-07-02 NOTE — PLAN OF CARE
Problem: Patient Care Overview  Goal: Plan of Care Review  PT REQUIRES MAX A X2>TOTAL A FOR BED MOBILITY AND T/F TO CHAIR   Pt has been free from falls, injury or trauma this shift.  Pt is CEC'd and is a 1:1, sitter at bedside.  POC reviewed with Pt.  Pt verbalized understanding.  Pt has had good pain control and anxiety control with prn medications.  Bed low and locked.  Call light within reach.  VSS.

## 2017-07-02 NOTE — PLAN OF CARE
Problem: Patient Care Overview  Goal: Plan of Care Review  PT REQUIRES MAX A X2>TOTAL A FOR BED MOBILITY AND T/F TO CHAIR   Plan of care review with patient, patient verbalized complete understanding, vital signs stable low grade fever throughout the shift, with temp max of 100.7. Dialysis done , 3L removed, patient tolerated well, patient on nasal cannula at this time, 3L. Turned repositioned Q2 hrs. Safety measures implemented, patient still CEC with 1:1 sitter.

## 2017-07-03 LAB
ALBUMIN SERPL BCP-MCNC: 1.5 G/DL
ALP SERPL-CCNC: 64 U/L
ALT SERPL W/O P-5'-P-CCNC: 117 U/L
ANION GAP SERPL CALC-SCNC: 8 MMOL/L
AST SERPL-CCNC: 188 U/L
BASOPHILS # BLD AUTO: 0.03 K/UL
BASOPHILS NFR BLD: 0.2 %
BILIRUB DIRECT SERPL-MCNC: 0.2 MG/DL
BILIRUB SERPL-MCNC: 0.4 MG/DL
BUN SERPL-MCNC: 23 MG/DL
CALCIUM SERPL-MCNC: 7.5 MG/DL
CHLORIDE SERPL-SCNC: 101 MMOL/L
CK SERPL-CCNC: 7036 U/L
CK SERPL-CCNC: 7037 U/L
CK SERPL-CCNC: 8690 U/L
CO2 SERPL-SCNC: 24 MMOL/L
CREAT SERPL-MCNC: 3.2 MG/DL
DIFFERENTIAL METHOD: ABNORMAL
EOSINOPHIL # BLD AUTO: 0.4 K/UL
EOSINOPHIL NFR BLD: 2.4 %
ERYTHROCYTE [DISTWIDTH] IN BLOOD BY AUTOMATED COUNT: 14.3 %
EST. GFR  (AFRICAN AMERICAN): 19 ML/MIN/1.73 M^2
EST. GFR  (NON AFRICAN AMERICAN): 17 ML/MIN/1.73 M^2
GLUCOSE SERPL-MCNC: 107 MG/DL
HAV IGM SERPL QL IA: NEGATIVE
HBV CORE IGM SERPL QL IA: NEGATIVE
HBV SURFACE AG SERPL QL IA: NEGATIVE
HBV SURFACE AG SERPL QL IA: NEGATIVE
HCT VFR BLD AUTO: 26.1 %
HCV AB SERPL QL IA: NEGATIVE
HEP. B SURF AB, QUAL: NEGATIVE
HEP. B SURF AB, QUANT.: <3 MIU/ML
HGB BLD-MCNC: 9.2 G/DL
LYMPHOCYTES # BLD AUTO: 1.6 K/UL
LYMPHOCYTES NFR BLD: 9.1 %
MAGNESIUM SERPL-MCNC: 1.9 MG/DL
MCH RBC QN AUTO: 31.3 PG
MCHC RBC AUTO-ENTMCNC: 35.2 %
MCV RBC AUTO: 89 FL
MONOCYTES # BLD AUTO: 1.4 K/UL
MONOCYTES NFR BLD: 8 %
NEUTROPHILS # BLD AUTO: 13.6 K/UL
NEUTROPHILS NFR BLD: 80.3 %
PLATELET # BLD AUTO: 109 K/UL
PMV BLD AUTO: 10.8 FL
POTASSIUM SERPL-SCNC: 3.6 MMOL/L
PROT SERPL-MCNC: 4.2 G/DL
RBC # BLD AUTO: 2.94 M/UL
SODIUM SERPL-SCNC: 133 MMOL/L
WBC # BLD AUTO: 16.99 K/UL

## 2017-07-03 PROCEDURE — 21400001 HC TELEMETRY ROOM

## 2017-07-03 PROCEDURE — 92526 ORAL FUNCTION THERAPY: CPT

## 2017-07-03 PROCEDURE — 25000242 PHARM REV CODE 250 ALT 637 W/ HCPCS: Performed by: NURSE PRACTITIONER

## 2017-07-03 PROCEDURE — 97530 THERAPEUTIC ACTIVITIES: CPT

## 2017-07-03 PROCEDURE — 82550 ASSAY OF CK (CPK): CPT | Mod: 91

## 2017-07-03 PROCEDURE — 97110 THERAPEUTIC EXERCISES: CPT

## 2017-07-03 PROCEDURE — 25000003 PHARM REV CODE 250: Performed by: PSYCHIATRY & NEUROLOGY

## 2017-07-03 PROCEDURE — 97535 SELF CARE MNGMENT TRAINING: CPT

## 2017-07-03 PROCEDURE — 80048 BASIC METABOLIC PNL TOTAL CA: CPT

## 2017-07-03 PROCEDURE — 94799 UNLISTED PULMONARY SVC/PX: CPT

## 2017-07-03 PROCEDURE — 94640 AIRWAY INHALATION TREATMENT: CPT

## 2017-07-03 PROCEDURE — 36415 COLL VENOUS BLD VENIPUNCTURE: CPT

## 2017-07-03 PROCEDURE — 63600175 PHARM REV CODE 636 W HCPCS: Performed by: SPECIALIST

## 2017-07-03 PROCEDURE — 83735 ASSAY OF MAGNESIUM: CPT

## 2017-07-03 PROCEDURE — 80076 HEPATIC FUNCTION PANEL: CPT

## 2017-07-03 PROCEDURE — 99233 SBSQ HOSP IP/OBS HIGH 50: CPT | Mod: ,,, | Performed by: INTERNAL MEDICINE

## 2017-07-03 PROCEDURE — 25000003 PHARM REV CODE 250: Performed by: NURSE PRACTITIONER

## 2017-07-03 PROCEDURE — 27000221 HC OXYGEN, UP TO 24 HOURS

## 2017-07-03 PROCEDURE — 85025 COMPLETE CBC W/AUTO DIFF WBC: CPT

## 2017-07-03 PROCEDURE — 25000003 PHARM REV CODE 250: Performed by: SPECIALIST

## 2017-07-03 RX ORDER — QUETIAPINE FUMARATE 100 MG/1
200 TABLET, FILM COATED ORAL DAILY
Status: DISCONTINUED | OUTPATIENT
Start: 2017-07-04 | End: 2017-07-10 | Stop reason: HOSPADM

## 2017-07-03 RX ORDER — MORPHINE SULFATE 2 MG/ML
2 INJECTION, SOLUTION INTRAMUSCULAR; INTRAVENOUS ONCE
Status: COMPLETED | OUTPATIENT
Start: 2017-07-03 | End: 2017-07-03

## 2017-07-03 RX ORDER — ROPINIROLE 0.25 MG/1
0.5 TABLET, FILM COATED ORAL NIGHTLY
Status: DISCONTINUED | OUTPATIENT
Start: 2017-07-03 | End: 2017-07-10 | Stop reason: HOSPADM

## 2017-07-03 RX ADMIN — CEFTRIAXONE 1 G: 1 INJECTION, SOLUTION INTRAVENOUS at 12:07

## 2017-07-03 RX ADMIN — ACETAMINOPHEN 650 MG: 325 TABLET ORAL at 05:07

## 2017-07-03 RX ADMIN — FLUTICASONE PROPIONATE 2 SPRAY: 50 SPRAY, METERED NASAL at 04:07

## 2017-07-03 RX ADMIN — MORPHINE SULFATE 2 MG: 2 INJECTION, SOLUTION INTRAMUSCULAR; INTRAVENOUS at 03:07

## 2017-07-03 RX ADMIN — PANTOPRAZOLE SODIUM 40 MG: 40 TABLET, DELAYED RELEASE ORAL at 08:07

## 2017-07-03 RX ADMIN — LORAZEPAM 0.5 MG: 0.5 TABLET ORAL at 08:07

## 2017-07-03 RX ADMIN — DOCUSATE SODIUM 100 MG: 100 CAPSULE, LIQUID FILLED ORAL at 08:07

## 2017-07-03 RX ADMIN — FLUOXETINE 40 MG: 20 CAPSULE ORAL at 08:07

## 2017-07-03 RX ADMIN — IPRATROPIUM BROMIDE AND ALBUTEROL SULFATE 3 ML: .5; 3 SOLUTION RESPIRATORY (INHALATION) at 09:07

## 2017-07-03 RX ADMIN — NICOTINE 1 PATCH: 14 PATCH, EXTENDED RELEASE TRANSDERMAL at 08:07

## 2017-07-03 RX ADMIN — QUETIAPINE FUMARATE 50 MG: 25 TABLET, FILM COATED ORAL at 08:07

## 2017-07-03 RX ADMIN — IPRATROPIUM BROMIDE AND ALBUTEROL SULFATE 3 ML: .5; 3 SOLUTION RESPIRATORY (INHALATION) at 12:07

## 2017-07-03 RX ADMIN — OXYCODONE HYDROCHLORIDE 20 MG: 10 TABLET, FILM COATED, EXTENDED RELEASE ORAL at 09:07

## 2017-07-03 RX ADMIN — ROPINIROLE HYDROCHLORIDE 0.5 MG: 0.25 TABLET, FILM COATED ORAL at 08:07

## 2017-07-03 RX ADMIN — OXYCODONE HYDROCHLORIDE 20 MG: 10 TABLET, FILM COATED, EXTENDED RELEASE ORAL at 08:07

## 2017-07-03 NOTE — SUBJECTIVE & OBJECTIVE
"Interval History: Pt was seen and examined. Stable last pm, no new c/o's, no CP. No SOB, feels "OK", does not feel like she needs HD today. Last HD was on 7/1/17.    Review of patient's allergies indicates:   Allergen Reactions    Corticosteroids (glucocorticoids)      "sets off my anxiety real bad"    Tramadol Rash     Current Facility-Administered Medications   Medication Frequency    acetaminophen tablet 650 mg Q8H PRN    albuterol-ipratropium 2.5mg-0.5mg/3mL nebulizer solution 3 mL Q4H PRN    bisacodyl suppository 10 mg Daily PRN    cefTRIAXone (ROCEPHIN) 1 g in dextrose 5 % 50 mL IVPB Q24H    diphenhydrAMINE capsule 50 mg Nightly PRN    docusate sodium capsule 100 mg Daily    ergocalciferol capsule 50,000 Units Q7 Days    fluoxetine capsule 40 mg Daily    fluticasone 50 mcg/actuation nasal spray 2 spray Daily    heparin (porcine) injection 2,000 Units PRN    lorazepam tablet 0.5 mg BID    nicotine 14 mg/24 hr 1 patch Daily    ondansetron injection 4 mg Q8H PRN    oxycodone 12 hr tablet 20 mg BID PRN    pantoprazole EC tablet 40 mg Daily    quetiapine tablet 50 mg Daily       Objective:     Vital Signs (Most Recent):  Temp: 98.3 °F (36.8 °C) (07/03/17 0347)  Pulse: 100 (07/03/17 0921)  Resp: 20 (07/03/17 0921)  BP: (!) 142/90 (07/03/17 0347)  SpO2: (!) 92 % (07/03/17 0921)  O2 Device (Oxygen Therapy): nasal cannula (07/03/17 0921) Vital Signs (24h Range):  Temp:  [98.1 °F (36.7 °C)-98.9 °F (37.2 °C)] 98.3 °F (36.8 °C)  Pulse:  [] 100  Resp:  [18-20] 20  SpO2:  [92 %-98 %] 92 %  BP: (114-143)/(57-90) 142/90     Weight: 80 kg (176 lb 5.9 oz) (07/02/17 0600)  Body mass index is 30.27 kg/m².  Body surface area is 1.9 meters squared.    I/O last 3 completed shifts:  In: 1510 [P.O.:960; Other:500; IV Piggyback:50]  Out: 5010 [Urine:1510; Other:3500]    Physical Exam   Constitutional: No distress. She is intubated.   Intubated, sedated on propafol, breathing over the vent, intermittently " agitated.   HENT:   Head: Normocephalic.   Eyes: Conjunctivae are normal. No scleral icterus.   Cardiovascular: Regular rhythm, normal heart sounds and intact distal pulses.  Tachycardia present.    No murmur heard.  Pulmonary/Chest: She is intubated. She has rhonchi.   Abdominal: Soft. She exhibits no distension.   Musculoskeletal: She exhibits no edema or deformity.   Lymphadenopathy:     She has no cervical adenopathy.   Neurological:   Intubated and able to communicate    Skin: Skin is warm. She is not diaphoretic. No erythema.   Psychiatric: She is not agitated.   Nursing note and vitals reviewed.      Significant Labs: reviewed  BMP  Lab Results   Component Value Date     (L) 07/03/2017    K 3.6 07/03/2017     07/03/2017    CO2 24 07/03/2017    BUN 23 (H) 07/03/2017    CREATININE 3.2 (H) 07/03/2017    CALCIUM 7.5 (L) 07/03/2017    ANIONGAP 8 07/03/2017    ESTGFRAFRICA 19 (A) 07/03/2017    EGFRNONAA 17 (A) 07/03/2017     Lab Results   Component Value Date    WBC 16.99 (H) 07/03/2017    HGB 9.2 (L) 07/03/2017    HCT 26.1 (L) 07/03/2017    MCV 89 07/03/2017     (L) 07/03/2017       Significant Imaging: CXR reviewed. No nne CXR since last HD 2 days ago.

## 2017-07-03 NOTE — PLAN OF CARE
CM sent medical records to [facility] for review to find out if HD can be accommodated if necessary.  Referral made via [facility]

## 2017-07-03 NOTE — PLAN OF CARE
Problem: SLP Goal  Goal: SLP Goal  TPW recall 2/3 aspiration precautions (small bites/sips, sit upright, slow intake) with min a  TPW follow aspiration precautions during bedside trials with min a  Outcome: Ongoing (interventions implemented as appropriate)  Pt unable to recall aspiration precautions.  ST provided education on dysphagia, aspiration, and swallow strategies.

## 2017-07-03 NOTE — PROGRESS NOTES
"[facility] -   Nephrology  Progress Note    Patient Name: Kaylene Emery  MRN: [id number]  Admission Date: 6/28/2017  Hospital Length of Stay: 5 days  Attending Provider: Oneil Paz MD   Primary Care Physician: Jonas Jimenez MD  Principal Problem:Non-traumatic rhabdomyolysis and ALEXUS    Subjective:     HPI: Patient is a 46-year-old with multiple psychiatry issues.  Comes in with multiple medication overdose with unclear intent suicidal versus overdose versus toxic injections.  Patient has dark-colored urine along with severe rhabdomyolysis.  Patient is now intubated.  Dr. Giles consulted me for initiation for CRRT for multiple drug poisoning and rhabdo myelolysis.  Patient has had transient episode of hypotension with anxiolytics.  Still has some urine output and is nonoliguric.  Most of the history has been obtained from Dr. Giles, patient's fiancé and from the medical records.    Interval History: Pt was seen and examined. Stable last pm, no new c/o's, no CP. No SOB, feels "OK", does not feel like she needs HD today. Last HD was on 7/1/17.    Review of patient's allergies indicates:   Allergen Reactions    Corticosteroids (glucocorticoids)      "sets off my anxiety real bad"    Tramadol Rash     Current Facility-Administered Medications   Medication Frequency    acetaminophen tablet 650 mg Q8H PRN    albuterol-ipratropium 2.5mg-0.5mg/3mL nebulizer solution 3 mL Q4H PRN    bisacodyl suppository 10 mg Daily PRN    cefTRIAXone (ROCEPHIN) 1 g in dextrose 5 % 50 mL IVPB Q24H    diphenhydrAMINE capsule 50 mg Nightly PRN    docusate sodium capsule 100 mg Daily    ergocalciferol capsule 50,000 Units Q7 Days    fluoxetine capsule 40 mg Daily    fluticasone 50 mcg/actuation nasal spray 2 spray Daily    heparin (porcine) injection 2,000 Units PRN    lorazepam tablet 0.5 mg BID    nicotine 14 mg/24 hr 1 patch Daily    ondansetron injection 4 mg Q8H PRN    oxycodone 12 hr tablet " 20 mg BID PRN    pantoprazole EC tablet 40 mg Daily    quetiapine tablet 50 mg Daily       Objective:     Vital Signs (Most Recent):  Temp: 98.3 °F (36.8 °C) (07/03/17 0347)  Pulse: 100 (07/03/17 0921)  Resp: 20 (07/03/17 0921)  BP: (!) 142/90 (07/03/17 0347)  SpO2: (!) 92 % (07/03/17 0921)  O2 Device (Oxygen Therapy): nasal cannula (07/03/17 0921) Vital Signs (24h Range):  Temp:  [98.1 °F (36.7 °C)-98.9 °F (37.2 °C)] 98.3 °F (36.8 °C)  Pulse:  [] 100  Resp:  [18-20] 20  SpO2:  [92 %-98 %] 92 %  BP: (114-143)/(57-90) 142/90     Weight: 80 kg (176 lb 5.9 oz) (07/02/17 0600)  Body mass index is 30.27 kg/m².  Body surface area is 1.9 meters squared.    I/O last 3 completed shifts:  In: 1510 [P.O.:960; Other:500; IV Piggyback:50]  Out: 5010 [Urine:1510; Other:3500]    Physical Exam   Constitutional: No distress.  Head: Normocephalic.   Eyes: Conjunctivae are normal. No scleral icterus.   Cardiovascular: Regular rhythm, normal heart sounds and intact distal pulses.  No murmur heard.  Pulmonary/Chest: Abdominal: Soft. She exhibits no distension.   Musculoskeletal: She exhibits no edema or deformity.   Lymphadenopathy:     She has no cervical adenopathy.   Skin: Skin is warm. She is not diaphoretic. No erythema.   Psychiatric: She is not agitated.   Nursing note and vitals reviewed.      Significant Labs: reviewed  BMP  Lab Results   Component Value Date     (L) 07/03/2017    K 3.6 07/03/2017     07/03/2017    CO2 24 07/03/2017    BUN 23 (H) 07/03/2017    CREATININE 3.2 (H) 07/03/2017    CALCIUM 7.5 (L) 07/03/2017    ANIONGAP 8 07/03/2017    ESTGFRAFRICA 19 (A) 07/03/2017    EGFRNONAA 17 (A) 07/03/2017     Lab Results   Component Value Date    WBC 16.99 (H) 07/03/2017    HGB 9.2 (L) 07/03/2017    HCT 26.1 (L) 07/03/2017    MCV 89 07/03/2017     (L) 07/03/2017       Significant Imaging: CXR reviewed. No nne CXR since last HD 2 days ago.    Assessment/Plan:         47 y/o female with ALEXUS                         Non-traumatic rhabdomyolysis     1. Renal: ALEXUS. Due to non-traumatic rhabdomyolysis  Still no sign of renal recovery, but noticed increased UOP  Last HD was 2 days ago on 7/1/17  Will hld HD for today and follow summer  Pt may recover from rhabdomyolysis  Dyspnea improved with HD  Hemodynamically stable  Discussed with pt, her fiance  Electrolytes reviewed  K normal  Metabolic acidosis improved with HD  Hold IVF's  Advised pt not drink water in XS           Aspiration pneumonia     2. ID: abx reviewed  On zosyn for suspected aspiration pneumonia  Afebrile  WBC elevated       Polysubstance overdose     3. Psych: apparent drug overdose in what is reported as intention to self-harm  H/o of depression  Will defer to primary team.          Plans and recommendations:  As discussed above  Hold HD for today  Will evaluate in am for need for HD  Repeat blood work                         Mona Gregory MD  Nephrology  [facility]

## 2017-07-03 NOTE — PT/OT/SLP PROGRESS
Occupational Therapy  Treatment    Kaylene Emery   MRN: [id number]   Admitting Diagnosis: Non-traumatic rhabdomyolysis    OT Date of Treatment: 07/03/17   OT Start Time: 0820  OT Stop Time: 0845  OT Total Time (min): 25 min    Billable Minutes:  Self Care/Home Management 10 minutes and Therapeutic Activity 15 minutes    General Precautions: Standard, fall  Orthopedic Precautions: N/A  Braces:           Subjective:  Communicated with nurse Bernstein and epic chart review prior to session.    Pain/Comfort  Pain Rating 1: 0/10    Objective:  Patient found with: peripheral IV, telemetry     Functional Mobility:  Bed Mobility:  Rolling/Turning to Left: Maximum assistance  Rolling/Turning Right: Maximum assistance  Scooting/Bridging: Maximum Assistance  Supine to Sit: Maximum Assistance  Sit to Supine: Maximum Assistance    Transfers:   Sit <> Stand Assistance: Maximum Assistance (x2 x3 attempts)    Functional Ambulation:     Activities of Daily Living:     Feeding adaptive equipment: na     UE adaptive equipment: na  LE Dressing Level of Assistance: Stand by assistance (ivan socks , however, required extended time to complete activity)  LE adaptive equipment: na  Grooming Position: Seated  Grooming Level of Assistance: Stand by assistance              Bathing adaptive equipment: na    Balance:   Static Sit: 0: Needs MAXIMAL assist to maintain sitting without back support  Dynamic Sit: 0: N/A  Static Stand: na  Dynamic stand: 0: N/A    Therapeutic Activities and Exercises:      AM-PAC 6 CLICK ADL   How much help from another person does this patient currently need?   1 = Unable, Total/Dependent Assistance  2 = A lot, Maximum/Moderate Assistance  3 = A little, Minimum/Contact Guard/Supervision  4 = None, Modified [place]/Independent    Putting on and taking off regular lower body clothing? : 2  Bathing (including washing, rinsing, drying)?: 2  Toileting, which includes using toilet, bedpan, or urinal? : 2  Putting on  "and taking off regular upper body clothing?: 3  Taking care of personal grooming such as brushing teeth?: 3  Eating meals?: 3  Total Score: 15     AM-PAC Raw Score CMS "G-Code Modifier Level of Impairment Assistance   6 % Total / Unable   7 - 8 CM 80 - 100% Maximal Assist   9-13 CL 60 - 80% Moderate Assist   14 - 19 CK 40 - 60% Moderate Assist   20 - 22 CJ 20 - 40% Minimal Assist   23 CI 1-20% SBA / CGA   24 CH 0% Independent/ Mod I       Patient left HOB elevated with all lines intact, call button in reach and nurse  notified    ASSESSMENT:  Kaylene Emery is a 46 y.o. female with a medical diagnosis of Non-traumatic rhabdomyolysis and presents with debility and generalized weakness.    Rehab identified problem list/impairments: Rehab identified problem list/impairments: weakness, gait instability, decreased upper extremity function, decreased ROM, impaired endurance, impaired balance, decreased lower extremity function, impaired coordination, decreased safety awareness, impaired self care skills, pain, impaired skin    Rehab potential is good.    Activity tolerance: Fair    Discharge recommendations: Discharge Facility/Level Of Care Needs: nursing facility, skilled     Barriers to discharge:      Equipment recommendations: wheelchair, hospital bed, walker, rolling, bath bench     GOALS:    Occupational Therapy Goals        Problem: Occupational Therapy Goal    Goal Priority Disciplines Outcome Interventions   Occupational Therapy Goal     OT, PT/OT Ongoing (interventions implemented as appropriate)    Description:  ot goals to be met by 7-7-17  1. Mod a with ue dressing  2. Mod a with le dressing  3. Pt will tolerate 1 set x 10 reps b ue rom exercise  4. Pt will req mod a with bsc<>bed t/f's                    Plan:  Patient to be seen 3 x/week to address the above listed problems via self-care/home management, therapeutic exercises, therapeutic activities  Plan of Care expires: 07/06/17  Plan of " Care reviewed with: patient         Raeann Hdzzier, OT  07/03/2017

## 2017-07-03 NOTE — ASSESSMENT & PLAN NOTE
47 y/o female with ALEXUS                        Non-traumatic rhabdomyolysis     1. Renal: ALEXUS. Due to non-traumatic rhabdomyolysis  No sign of renal recovery  So far HD dependent,   Becomes SOB without HD  Tolerating dialysis well  Dyspnea improved with HD yesterday  Hemodynamically stable  Discussed with pt, her fiance  Electrolytes reviewed  K normal  Metabolic acidosis improved with HD  Hold IVF's  Advised pt not drink in XS           Aspiration pneumonia     2. ID: abx reviewed  On zosyn for suspected aspiration pneumonia  Afebrile  WBC elevated       Polysubstance overdose     3. Psych: apparent drug overdose in what is reported as intention to self-harm  H/o of depression  Will defer to primary team.          Plans and recommendations:  As discussed above  Hold HD for today  Will evaluate in am for need for HD  Continue NS IVF's at 100 ml/hr  Repeat blood work  Replace all electrolyte deficiencies  Total critical care time spent 35 minutes

## 2017-07-03 NOTE — PLAN OF CARE
Problem: Patient Care Overview  Goal: Plan of Care Review  PT REQUIRES MAX A X2>TOTAL A FOR BED MOBILITY AND T/F TO CHAIR   Outcome: Ongoing (interventions implemented as appropriate)  Pt. Currently resting in bed. NSR on monitor on 2L NC.  Safety/fall precautions maintained. Sitter in room for 1:1.  Pt. C/o of back pain not relieved by PRN pain meds; YAQUELIN Thomas notified. No new orders.  Plan of care reviewed with pt. Will continue to monitor.

## 2017-07-03 NOTE — PLAN OF CARE
Problem: Patient Care Overview  Goal: Plan of Care Review  PT REQUIRES MAX A X2>TOTAL A FOR BED MOBILITY AND T/F TO CHAIR   Outcome: Ongoing (interventions implemented as appropriate)  GOOD PARTICIPATION, FAIR TOLERANCE TO P.T. TX, LIMITED BY BLE WEAKNESS AND SWELLING

## 2017-07-03 NOTE — PT/OT/SLP PROGRESS
Speech Language Pathology  Treatment    Kaylene Emery   MRN: [id number]   Admitting Diagnosis: Non-traumatic rhabdomyolysis    Diet recommendations: Solid Diet Level: Mechanical soft  Liquid Diet Level: Thin HOB to 90 degrees, Small bites/sips, Alternating bites/sips and Remain upright 30 minutes post meal    SLP Treatment Date: 07/03/17  Speech Start Time: 1045     Speech Stop Time: 1055     Speech Total (min): 10 min       TREATMENT BILLABLE MINUTES:  Treatment Swallowing Dysfunction 15    Has the patient been evaluated by SLP for swallowing? : Yes  Keep patient NPO?: No   General Precautions: Standard, fall  Current Respiratory Status: nasal cannula       Subjective:  Pt was seen lying in bed very sleepy.  Pt is without complaint.     Pain/Comfort  Pain Rating 1: 0/10 (Pt stated that she was in no pain at time of treatment but requested to speak with nursing to ensure MD wrote an order for her to have pain medication.  Nurse Day notified)    Objective:   Patient found with: telemetry, peripheral IV  Pt unable to recall any safe swallow strategies and requested to have no po intake at time of treatment.  ST educated pt and her significant other on dysphagia, aspiration, and safe swallow strategies.  Pt reported no difficulty with swallowing during breakfast this am.      Assessment:  Kaylene Emery is a 46 y.o. female with a medical diagnosis of Non-traumatic rhabdomyolysis and presents with aspiration risk and dysphagia.    Discharge recommendations: Discharge Facility/Level Of Care Needs: nursing facility, skilled     Goals:    SLP Goals        Problem: SLP Goal    Goal Priority Disciplines Outcome   SLP Goal     SLP Ongoing (interventions implemented as appropriate)   Description:  TPW recall 2/3 aspiration precautions (small bites/sips, sit upright, slow intake) with min a  TPW follow aspiration precautions during bedside trials with min a                     Plan:   Patient to be seen Therapy  Frequency: 3 x/week   Plan of Care expires: 07/08/17  Plan of Care reviewed with: patient, significant other  SLP Follow-up?: Yes              Jennifer Vo CCC-SLP  07/03/2017

## 2017-07-03 NOTE — PROGRESS NOTES
"History & Physical  Hospital Medicine      SUBJECTIVE:     Chief Complaint/Reason for Admission: Medication overdosed.     History of Present Illness:  Ms. Kaylene Emery is a 46 y.o. who was admitted with medication overdose causing ALEXUS needed dialysis, rhabdomyolysis, muscle weakness. patient has been PEc'd.      Interval history:   No acute events overnight. Feeling the same. No suicidal or homicidal ideation. Pain controlled.     PTA Medications   Medication Sig    gabapentin (NEURONTIN) 400 MG capsule Take 400 mg by mouth 3 (three) times daily.    oxycodone-acetaminophen (PERCOCET) 7.5-325 mg per tablet Take 1 tablet by mouth every 4 (four) hours as needed for Pain.    fluoxetine (PROZAC) 40 MG capsule Take 40 mg by mouth once daily.    lorazepam (ATIVAN) 1 MG tablet Take 1 mg by mouth every 6 (six) hours as needed for Anxiety.    meloxicam (MOBIC) 7.5 MG tablet Take 1 tablet (7.5 mg total) by mouth once daily.    ondansetron (ZOFRAN-ODT) 4 MG TbDL Take 1 tablet (4 mg total) by mouth every 6 (six) hours as needed.    promethazine (PHENERGAN) 25 MG tablet Take 1 tablet (25 mg total) by mouth every 6 (six) hours as needed for Nausea.    quetiapine (SEROQUEL) 200 MG Tab Take by mouth.        Review of patient's allergies indicates:   Allergen Reactions    Corticosteroids (glucocorticoids)      "sets off my anxiety real bad"    Tramadol Rash       Past Medical History:   Diagnosis Date    Anxiety     Depression     GERD (gastroesophageal reflux disease)     Tobacco use        Past Surgical History:   Procedure Laterality Date    BLADDER REPAIR      CENTRAL LINE  6/28/2017         CHOLECYSTECTOMY  04/07/2017    KNEE ARTHROPLASTY      PARTIAL HYSTERECTOMY      TUBAL LIGATION         Family History   Problem Relation Age of Onset    Hypertension Mother     Diabetes Mother         Social History     Social History    Marital status:      Spouse name: N/A    Number of children: N/A "    Years of education: N/A     Social History Main Topics    Smoking status: Current Every Day Smoker     Packs/day: 1.00     Types: Cigarettes    Smokeless tobacco: Never Used    Alcohol use Yes      Comment: occasionally    Drug use: No    Sexual activity: Yes     Other Topics Concern    None     Social History Narrative    None       Review of Systems:  Constitutional: No fever or chills, no weight changes.  Eyes: No visual changes or photophobia  HEENT: No nasal congestion or sore throat  Cardiovascular: No chest pain or palpitations  Gastrointestinal: No nausea or vomiting, no diarrhea or change in bowel habits  Genitourinary: No hematuria or dysuria  Skin: No rash or pruritis  Hematologic/lymphatic: No easy bruising, bleeding or lymphadenopathy  Musculoskeletal: weakness.   Neurological: No seizures or tremors  Endocrine: No heat/cold intolerance.  No polyuria/polydipsia  Psychiatric:  No depression or anxiety.      OBJECTIVE:     Temp:  [98.1 °F (36.7 °C)-98.9 °F (37.2 °C)] 98.3 °F (36.8 °C)  Pulse:  [] 100  Resp:  [18-20] 20  SpO2:  [92 %-98 %] 92 %  BP: (114-143)/(57-90) 142/90  Body mass index is 30.27 kg/m².     Physical Exam:  General appearance: Well developed, well nourished  Head: Normocephalic, atraumatic  Eyes:  Conjunctivae nl. Sclera anicteric. PERRL.  HEENT: Lips, mucosa, and tongue normal;   Neck: Supple, trachea midline, no thyromegaly.  Lungs: Mild wheezing and bibasilar rales.    Heart: Regular rate and rhythm, S1, S2 normal, no murmur, click, rub or gallop  Abdomen: non-tender, mildly distended; bowel sounds normal; no masses, no organomegaly  Extremities: No cyanosis or clubbing. +3 edema b/L.   Pulses: 2+ and symmetric.  Neurologic: non focal with generalized weakness.   Psychiatric:  Alert and oriented times 3.  Affect appropriate.      Laboratory:  Labs Reviewed   CBC W/ AUTO DIFFERENTIAL - Abnormal; Notable for the following:        Result Value    WBC 14.93 (*)      Hemoglobin 17.3 (*)     MCH 32.3 (*)     MCHC 36.1 (*)     Gran # 11.8 (*)     Mono # 1.3 (*)     Gran% 79.0 (*)     Lymph% 12.3 (*)     All other components within normal limits   COMPREHENSIVE METABOLIC PANEL - Abnormal; Notable for the following:     Sodium 130 (*)     CO2 17 (*)     Glucose 127 (*)     Calcium 8.5 (*)      (*)      (*)     All other components within normal limits   TSH - Abnormal; Notable for the following:     TSH 0.255 (*)     All other components within normal limits   URINALYSIS - Abnormal; Notable for the following:     Color, UA Brown (*)     Appearance, UA Cloudy (*)     Protein, UA 3+ (*)     Ketones, UA 1+ (*)     Bilirubin (UA) 1+ (*)     Occult Blood UA 3+ (*)     Nitrite, UA Positive (*)     Leukocytes, UA Trace (*)     All other components within normal limits   ACETAMINOPHEN LEVEL - Abnormal; Notable for the following:     Acetaminophen (Tylenol), Serum <3.0 (*)     All other components within normal limits   SALICYLATE LEVEL - Abnormal; Notable for the following:     Salicylate Lvl <5.0 (*)     All other components within normal limits   CK - Abnormal; Notable for the following:     CPK >74870 (*)     All other components within normal limits   URINALYSIS MICROSCOPIC - Abnormal; Notable for the following:     RBC, UA 5 (*)     Bacteria, UA Few (*)     Hyaline Casts, UA 2 (*)     All other components within normal limits   ISTAT PROCEDURE - Abnormal; Notable for the following:     POC PCO2 28.1 (*)     POC PO2 78 (*)     POC HCO3 17.7 (*)     All other components within normal limits   CULTURE, RESPIRATORY   DRUG SCREEN PANEL, URINE EMERGENCY   ALCOHOL,MEDICAL (ETHANOL)   T4, FREE   PREGNANCY TEST, URINE RAPID   CK   LACTIC ACID, PLASMA       Diagnostic Tests:      ASSESSMENT/PLAN:     Active Hospital Problems    Diagnosis  POA    *Non-traumatic rhabdomyolysis [M62.82]  Yes    Severe episode of recurrent major depressive disorder, without psychotic features [F33.2]   Unknown     Chronic    ALEXUS (acute kidney injury) [N17.9]  No    Hypoalbuminemia [E88.09]  Yes    Anasarca [R60.1]  No    Moderate episode of recurrent major depressive disorder [F33.1]  Unknown     Chronic    Overdose [T50.901A]  Unknown    Recurrent major depressive disorder [F33.9]  Yes     Chronic    Thrombocytopenia [D69.6]  No    Elevated liver enzymes [R74.8]  Yes    Polysubstance overdose [T50.901A]  Yes    Transaminitis [R74.0]  Yes    Aspiration pneumonia of both lungs [J69.0]  Yes      Resolved Hospital Problems    Diagnosis Date Resolved POA    Electrolyte imbalance [E87.8] 06/30/2017 No    Sepsis [A41.9] 06/29/2017 Yes    Acute hypoxemic respiratory failure [J96.01] 06/30/2017 Yes    Acute metabolic encephalopathy [G93.41] 06/29/2017 Yes    Hyponatremia [E87.1] 06/29/2017 Yes       Plan:   Renal function worsening with worsening fluid overload clinically.   cpk improving.   Renal following. Pt will likely need dialysis for fluid management.    2D echo: normal EF.    No source of infections at this time. Cultures negative to date.   Since patient was admitted with sepsis, will continue IV for now and treat for 7 days.   Pt is PEC'd.  Continue to monitor liver function for transaminitis 2/2 polysubstance overdose.   Pain control.   Duonebs prn for wheezing.   Replace electrolytes prn.   Physical therapy.   Continue supportive care.     Dvt/gi ppx.     Dispo: Continue management as outlined above for severe rhabdomyolysis.     Oneil Paz MD

## 2017-07-03 NOTE — PT/OT/SLP PROGRESS
Physical Therapy  Treatment    Kaylene Emery   MRN: [id number]   Admitting Diagnosis: Non-traumatic rhabdomyolysis    PT Received On: 07/03/17  PT Start Time: 0850     PT Stop Time: 0915    PT Total Time (min): 25 min       Billable Minutes:  Therapeutic Activity 15 and Therapeutic Exercise 10    Treatment Type: Treatment  PT/PTA: PT          General Precautions: Standard, fall  Orthopedic Precautions: N/A   Braces: N/A    Subjective:  Communicated with NURSE SAVAGE prior to session.  Pain/Comfort  Pain Rating 1: 5/10  Location - Side 1: Bilateral  Location - Orientation 1: generalized  Location 1: leg (PAIN WITH MOVEMENT DUE TO EDEMA)    Objective:   Patient found with: telemetry, carroll catheter, oxygen    Functional Mobility:  Bed Mobility:   Rolling/Turning to Left: Minimum assistance  Rolling/Turning Right: Minimum assistance  Scooting/Bridging: Minimum Assistance  Supine to Sit: Minimum Assistance    Transfers:  Sit <> Stand Assistance: Maximum Assistance (X 2 STAFF, PT UNABLE TO STAND ERECT DESPITE ASSISTANCE, PT ABLE TOLERATE SIT TO 1/2 STAND X 10 TRIALS WITH B KNEE'S BLOCKED FOR SAFETY)  Sit <> Stand Assistive Device: Rolling Walker    Gait:     Balance:   Static Sit: GOOD  Dynamic Sit: FAIR  Static Stand: POOR  Dynamic stand:     Therapeutic Activities and Exercises:  PT EDUCATED IN AND PERFORMED BLE THEREX X 20 REPS AROM WITH REST BREAKS, ENCOURAGED TO PERFORM THROUGHOUT THE DAY, PT DONNED SOCKS WITH EXTRA TIME AND SET UP, PT BRUSHED HAIR WITH SET UP    AM-PAC 6 CLICK MOBILITY  How much help from another person does this patient currently need?   1 = Unable, Total/Dependent Assistance  2 = A lot, Maximum/Moderate Assistance  3 = A little, Minimum/Contact Guard/Supervision  4 = None, Modified [place]/Independent    Turning over in bed (including adjusting bedclothes, sheets and blankets)?: 3  Sitting down on and standing up from a chair with arms (e.g., wheelchair, bedside commode, etc.): 1  Moving  from lying on back to sitting on the side of the bed?: 3  Moving to and from a bed to a chair (including a wheelchair)?: 1  Need to walk in hospital room?: 1  Climbing 3-5 steps with a railing?: 1  Total Score: 10    AM-PAC Raw Score CMS G-Code Modifier Level of Impairment Assistance   6 % Total / Unable   7 - 9 CM 80 - 100% Maximal Assist   10 - 14 CL 60 - 80% Moderate Assist   15 - 19 CK 40 - 60% Moderate Assist   20 - 22 CJ 20 - 40% Minimal Assist   23 CI 1-20% SBA / CGA   24 CH 0% Independent/ Mod I     Patient left SEATED AT EOB with all lines intact, call button in reach, NURSE notified and RT AND 1:1 AIDE present.    Assessment:  Kaylene Emery is a 46 y.o. female with a medical diagnosis of Non-traumatic rhabdomyolysis   PT WILL BENEFIT FROM CONT. SKILLED P.T. TO ADDRESS IMPAIRMENTS    Rehab identified problem list/impairments: Rehab identified problem list/impairments: weakness, impaired endurance, impaired functional mobilty, impaired balance, decreased lower extremity function, decreased safety awareness, pain, edema, impaired skin    Rehab potential is fair.    Activity tolerance: Fair    Discharge recommendations: Discharge Facility/Level Of Care Needs: nursing facility, skilled, LTACH (long term acute care hospital)     Barriers to discharge: Barriers to Discharge: None    Equipment recommendations: Equipment Needed After Discharge: walker, rolling, bath bench, bedside commode     GOALS:    Physical Therapy Goals        Problem: Physical Therapy Goal    Goal Priority Disciplines Outcome Goal Variances Interventions   Physical Therapy Goal     PT/OT, PT      Description:  PT WILL BE SEEN FOR P.T. FOR A MIN OF 5 OUT OF 7 DAYS A WEEK  LT17  1. PT WILL COMPLETE BED MOBILITY WITH MOD A.  2. PT WILL STAND WITH MAX A AND RW  3. PT WILL T/F TO CHAIR WITH MAX A.  4. PT WILL COMPLETE B LE TE X 20 REPS FOR STRENGTHENING.                   PLAN:    Patient to be seen 5 x/week  to address the  above listed problems via gait training, therapeutic activities, therapeutic exercises  Plan of Care expires: 07/07/17  Plan of Care reviewed with: patient    PT ENCOURAGED TO CALL FOR ASSISTANCE WITH ALL NEEDS DUE TO FALL RISK STATUS, PT AGREEABLE    Luz Talley, PT  07/03/2017

## 2017-07-04 LAB
ALBUMIN SERPL BCP-MCNC: 1.5 G/DL
ALP SERPL-CCNC: 70 U/L
ALT SERPL W/O P-5'-P-CCNC: 106 U/L
ANION GAP SERPL CALC-SCNC: 9 MMOL/L
AST SERPL-CCNC: 141 U/L
BACTERIA BLD CULT: NORMAL
BACTERIA BLD CULT: NORMAL
BASOPHILS # BLD AUTO: 0.03 K/UL
BASOPHILS NFR BLD: 0.2 %
BILIRUB DIRECT SERPL-MCNC: 0.2 MG/DL
BILIRUB SERPL-MCNC: 0.4 MG/DL
BNP SERPL-MCNC: 293 PG/ML
BUN SERPL-MCNC: 33 MG/DL
CALCIUM SERPL-MCNC: 8 MG/DL
CHLORIDE SERPL-SCNC: 101 MMOL/L
CK SERPL-CCNC: 5417 U/L
CO2 SERPL-SCNC: 24 MMOL/L
CREAT SERPL-MCNC: 3.7 MG/DL
DIFFERENTIAL METHOD: ABNORMAL
EOSINOPHIL # BLD AUTO: 0.4 K/UL
EOSINOPHIL NFR BLD: 2.8 %
ERYTHROCYTE [DISTWIDTH] IN BLOOD BY AUTOMATED COUNT: 14.3 %
EST. GFR  (AFRICAN AMERICAN): 16 ML/MIN/1.73 M^2
EST. GFR  (NON AFRICAN AMERICAN): 14 ML/MIN/1.73 M^2
GLUCOSE SERPL-MCNC: 105 MG/DL
HCT VFR BLD AUTO: 23.1 %
HGB BLD-MCNC: 8.3 G/DL
LYMPHOCYTES # BLD AUTO: 1.8 K/UL
LYMPHOCYTES NFR BLD: 12.9 %
MAGNESIUM SERPL-MCNC: 1.8 MG/DL
MCH RBC QN AUTO: 31.7 PG
MCHC RBC AUTO-ENTMCNC: 35.9 %
MCV RBC AUTO: 88 FL
MONOCYTES # BLD AUTO: 1 K/UL
MONOCYTES NFR BLD: 7.3 %
NEUTROPHILS # BLD AUTO: 10.8 K/UL
NEUTROPHILS NFR BLD: 77.4 %
PLATELET # BLD AUTO: 150 K/UL
PLATELET BLD QL SMEAR: ABNORMAL
PMV BLD AUTO: 10.4 FL
POTASSIUM SERPL-SCNC: 3.6 MMOL/L
PROT SERPL-MCNC: 4.4 G/DL
RBC # BLD AUTO: 2.62 M/UL
SODIUM SERPL-SCNC: 134 MMOL/L
WBC # BLD AUTO: 14.05 K/UL

## 2017-07-04 PROCEDURE — 97535 SELF CARE MNGMENT TRAINING: CPT

## 2017-07-04 PROCEDURE — 80076 HEPATIC FUNCTION PANEL: CPT

## 2017-07-04 PROCEDURE — 63600175 PHARM REV CODE 636 W HCPCS: Performed by: SPECIALIST

## 2017-07-04 PROCEDURE — 83735 ASSAY OF MAGNESIUM: CPT

## 2017-07-04 PROCEDURE — 97530 THERAPEUTIC ACTIVITIES: CPT

## 2017-07-04 PROCEDURE — 21400001 HC TELEMETRY ROOM

## 2017-07-04 PROCEDURE — 25000003 PHARM REV CODE 250: Performed by: SPECIALIST

## 2017-07-04 PROCEDURE — 36415 COLL VENOUS BLD VENIPUNCTURE: CPT

## 2017-07-04 PROCEDURE — 97116 GAIT TRAINING THERAPY: CPT

## 2017-07-04 PROCEDURE — 94799 UNLISTED PULMONARY SVC/PX: CPT

## 2017-07-04 PROCEDURE — 82550 ASSAY OF CK (CPK): CPT

## 2017-07-04 PROCEDURE — 99233 SBSQ HOSP IP/OBS HIGH 50: CPT | Mod: ,,, | Performed by: INTERNAL MEDICINE

## 2017-07-04 PROCEDURE — 85025 COMPLETE CBC W/AUTO DIFF WBC: CPT

## 2017-07-04 PROCEDURE — 83880 ASSAY OF NATRIURETIC PEPTIDE: CPT

## 2017-07-04 PROCEDURE — 94761 N-INVAS EAR/PLS OXIMETRY MLT: CPT

## 2017-07-04 PROCEDURE — 80048 BASIC METABOLIC PNL TOTAL CA: CPT

## 2017-07-04 PROCEDURE — 27000221 HC OXYGEN, UP TO 24 HOURS

## 2017-07-04 PROCEDURE — 25000003 PHARM REV CODE 250: Performed by: NURSE PRACTITIONER

## 2017-07-04 RX ADMIN — LORAZEPAM 0.5 MG: 0.5 TABLET ORAL at 09:07

## 2017-07-04 RX ADMIN — FLUOXETINE 40 MG: 20 CAPSULE ORAL at 09:07

## 2017-07-04 RX ADMIN — OXYCODONE HYDROCHLORIDE 20 MG: 10 TABLET, FILM COATED, EXTENDED RELEASE ORAL at 10:07

## 2017-07-04 RX ADMIN — ACETAMINOPHEN 650 MG: 325 TABLET ORAL at 06:07

## 2017-07-04 RX ADMIN — NICOTINE 1 PATCH: 14 PATCH, EXTENDED RELEASE TRANSDERMAL at 09:07

## 2017-07-04 RX ADMIN — PANTOPRAZOLE SODIUM 40 MG: 40 TABLET, DELAYED RELEASE ORAL at 09:07

## 2017-07-04 RX ADMIN — OXYCODONE HYDROCHLORIDE 20 MG: 10 TABLET, FILM COATED, EXTENDED RELEASE ORAL at 08:07

## 2017-07-04 RX ADMIN — ROPINIROLE HYDROCHLORIDE 0.5 MG: 0.25 TABLET, FILM COATED ORAL at 08:07

## 2017-07-04 RX ADMIN — QUETIAPINE FUMARATE 200 MG: 100 TABLET, FILM COATED ORAL at 09:07

## 2017-07-04 RX ADMIN — LORAZEPAM 0.5 MG: 0.5 TABLET ORAL at 08:07

## 2017-07-04 RX ADMIN — CEFTRIAXONE 1 G: 1 INJECTION, SOLUTION INTRAVENOUS at 02:07

## 2017-07-04 NOTE — SUBJECTIVE & OBJECTIVE
"Interval History:  Pt was seen and examined. No new c/o's, no SOB this am. Stable last pm, pt does not feel she needs HD today. Reports higher UOP.    Review of patient's allergies indicates:   Allergen Reactions    Corticosteroids (glucocorticoids)      "sets off my anxiety real bad"    Tramadol Rash     Current Facility-Administered Medications   Medication Frequency    acetaminophen tablet 650 mg Q8H PRN    albuterol-ipratropium 2.5mg-0.5mg/3mL nebulizer solution 3 mL Q4H PRN    bisacodyl suppository 10 mg Daily PRN    cefTRIAXone (ROCEPHIN) 1 g in dextrose 5 % 50 mL IVPB Q24H    diphenhydrAMINE capsule 50 mg Nightly PRN    docusate sodium capsule 100 mg Daily    ergocalciferol capsule 50,000 Units Q7 Days    fluoxetine capsule 40 mg Daily    heparin (porcine) injection 2,000 Units PRN    lorazepam tablet 0.5 mg BID    nicotine 14 mg/24 hr 1 patch Daily    ondansetron injection 4 mg Q8H PRN    oxycodone 12 hr tablet 20 mg BID PRN    pantoprazole EC tablet 40 mg Daily    quetiapine tablet 200 mg Daily    ropinirole tablet 0.5 mg QHS       Objective:     Vital Signs (Most Recent):  Temp: 98.9 °F (37.2 °C) (07/04/17 0628)  Pulse: 104 (07/04/17 0330)  Resp: 16 (07/04/17 0330)  BP: 136/86 (07/04/17 0330)  SpO2: 97 % (07/04/17 0855)  O2 Device (Oxygen Therapy): nasal cannula (07/04/17 0855) Vital Signs (24h Range):  Temp:  [98.3 °F (36.8 °C)-99.8 °F (37.7 °C)] 98.9 °F (37.2 °C)  Pulse:  [] 104  Resp:  [16-18] 16  SpO2:  [92 %-97 %] 97 %  BP: (136-151)/(78-94) 136/86     Weight: 80 kg (176 lb 5.9 oz) (07/02/17 0600)  Body mass index is 30.27 kg/m².  Body surface area is 1.9 meters squared.    I/O last 3 completed shifts:  In: 1030 [P.O.:980; IV Piggyback:50]  Out: 2575 [Urine:2575]    Physical Exam    Significant Labs: reviewed, BMP pending this am  CK 5000    Significant Imaging: reviewed CXR  "

## 2017-07-04 NOTE — PLAN OF CARE
Problem: Patient Care Overview  Goal: Plan of Care Review  PT REQUIRES MAX A X2>TOTAL A FOR BED MOBILITY AND T/F TO CHAIR   Outcome: Ongoing (interventions implemented as appropriate)  Patient free from falls and injury. Edema of the LEs has improved. Edema of the genitalia and abdomen has also decreased. Patient sat up in chair today with PT. Patient has no complaints at this time. Will continue to monitor.

## 2017-07-04 NOTE — PROGRESS NOTES
"[facility] -   Nephrology  Progress Note    Patient Name: Kaylene Emery  MRN: [id number]  Admission Date: 6/28/2017  Hospital Length of Stay: 6 days  Attending Provider: Oneil Paz MD   Primary Care Physician: Jonas Jimenez MD  Principal Problem:Non-traumatic rhabdomyolysis and ALEXUS    Subjective:     HPI: Patient is a 46-year-old with multiple psychiatry issues.  Comes in with multiple medication overdose with unclear intent suicidal versus overdose versus toxic injections.  Patient has dark-colored urine along with severe rhabdomyolysis.  Patient is now intubated.  Dr. Giles consulted me for initiation for CRRT for multiple drug poisoning and rhabdo myelolysis.  Patient has had transient episode of hypotension with anxiolytics.  Still has some urine output and is nonoliguric.  Most of the history has been obtained from Dr. Giles, patient's fiancé and from the medical records.    Interval History:  Pt was seen and examined. No new c/o's, no SOB this am. Stable last pm, pt does not feel she needs HD today. Reports higher UOP.    Review of patient's allergies indicates:   Allergen Reactions    Corticosteroids (glucocorticoids)      "sets off my anxiety real bad"    Tramadol Rash     Current Facility-Administered Medications   Medication Frequency    acetaminophen tablet 650 mg Q8H PRN    albuterol-ipratropium 2.5mg-0.5mg/3mL nebulizer solution 3 mL Q4H PRN    bisacodyl suppository 10 mg Daily PRN    cefTRIAXone (ROCEPHIN) 1 g in dextrose 5 % 50 mL IVPB Q24H    diphenhydrAMINE capsule 50 mg Nightly PRN    docusate sodium capsule 100 mg Daily    ergocalciferol capsule 50,000 Units Q7 Days    fluoxetine capsule 40 mg Daily    heparin (porcine) injection 2,000 Units PRN    lorazepam tablet 0.5 mg BID    nicotine 14 mg/24 hr 1 patch Daily    ondansetron injection 4 mg Q8H PRN    oxycodone 12 hr tablet 20 mg BID PRN    pantoprazole EC tablet 40 mg Daily    quetiapine tablet " 200 mg Daily    ropinirole tablet 0.5 mg QHS       Objective:     Vital Signs (Most Recent):  Temp: 98.9 °F (37.2 °C) (07/04/17 0628)  Pulse: 104 (07/04/17 0330)  Resp: 16 (07/04/17 0330)  BP: 136/86 (07/04/17 0330)  SpO2: 97 % (07/04/17 0855)  O2 Device (Oxygen Therapy): nasal cannula (07/04/17 0855) Vital Signs (24h Range):  Temp:  [98.3 °F (36.8 °C)-99.8 °F (37.7 °C)] 98.9 °F (37.2 °C)  Pulse:  [] 104  Resp:  [16-18] 16  SpO2:  [92 %-97 %] 97 %  BP: (136-151)/(78-94) 136/86     Weight: 80 kg (176 lb 5.9 oz) (07/02/17 0600)  Body mass index is 30.27 kg/m².  Body surface area is 1.9 meters squared.    I/O last 3 completed shifts:  In: 1030 [P.O.:980; IV Piggyback:50]  Out: 2575 [Urine:2575]    Physical Exam    Significant Labs: reviewed, BMP pending this am  CK 5000  Lab Results   Component Value Date    WBC 14.05 (H) 07/04/2017    HGB 8.3 (L) 07/04/2017    HCT 23.1 (L) 07/04/2017    MCV 88 07/04/2017     07/04/2017         Significant Imaging: reviewed CXR    Assessment/Plan:     47 y/o female with ALEXUS                          Non-traumatic rhabdomyolysis     1. Renal: ALEXUS. Due to non-traumatic rhabdomyolysis  Still no sign of renal recovery, but labs pending this am  Will f/u with the labs  Noted greater UOP today, good sign  Last HD was 2 days ago on 7/1/17  Will hold HD for today and follow summer  Pt may recover from rhabdomyolysis  Dyspnea improved  Hemodynamically stable  Discussed with pt, her fiance  Hold IVF's  Advised pt not to drink water in XS, until full recovery has ocurred          Aspiration pneumonia     2. ID: abx reviewed  On rocephin for suspected aspiration pneumonia  Afebrile  WBC elevated       Polysubstance overdose     3. Psych: apparent drug overdose in what is reported as intention to self-harm  H/o of depression  Will defer to primary team.          Plans and recommendations:  As discussed above  BMP pending  Hold HD for today  Will evaluate in am for need for HD  Repeat  blood work            Thank you for your consult.    Mona Gregory MD  Nephrology  [facility]

## 2017-07-04 NOTE — PLAN OF CARE
Problem: Patient Care Overview  Goal: Plan of Care Review  PT REQUIRES MAX A X2>TOTAL A FOR BED MOBILITY AND T/F TO CHAIR   Outcome: Ongoing (interventions implemented as appropriate)  PT GT TRAINED X 5' WITH RW AND MAX A X 2

## 2017-07-04 NOTE — PT/OT/SLP PROGRESS
Occupational Therapy  Treatment    Kaylene Emery   MRN: [id number]   Admitting Diagnosis: Non-traumatic rhabdomyolysis    OT Date of Treatment: 07/04/17   OT Start Time: 0904  OT Stop Time: 0927  OT Total Time (min): 23 min    Billable Minutes:  Self Care/Home Management  x 8 min and Therapeutic Activity  x 15 min    General Precautions: Standard, fall  Orthopedic Precautions: N/A  Braces: N/A         Subjective:  Communicated with pt, spouse and nurse- Carrier prior to session.    Pain/Comfort  Pain Rating 1: 0/10    Objective:  Patient found with: peripheral IV, telemetry, oxygen (1:1 sitter)     Functional Mobility:  Bed Mobility:  Rolling/Turning to Left: Moderate assistance  Rolling/Turning Right: Moderate assistance  Scooting/Bridging: Moderate Assistance  Supine to Sit: Moderate Assistance    Transfers:   Sit <> Stand Assistance: Maximum Assistance  Sit <> Stand Assistive Device: Rolling Walker  Bed <> Chair Technique: Stand Pivot  Bed <> Chair Transfer Assistance: Moderate Assistance  Bed <> Chair Assistive Device: Rolling Walker    Functional Ambulation: fx ambulation with RW with max A for sit to stand, mod A with RW x 8 ft, tolerated tx well, fatigue quickely.     Activities of Daily Living:     Feeding adaptive equipment:   UE Dressing Level of Assistance: Moderate assistance  UE adaptive equipment:   LE Dressing Level of Assistance: Total assistance  LE adaptive equipment:                     Bathing adaptive equipment:     Balance:   Static Sit: FAIR+: Able to take MINIMAL challenges from all directions  Dynamic Sit: FAIR: Cannot move trunk without losing balance  Static Stand: POOR: Needs MODERATE assist to maintain  Dynamic stand: POOR: N/A    Therapeutic Activities and Exercises:  Pt requires max A for sit to stand, side stepping to HOB x 4 steps, sit and rest, sit to stand with Max A and stand pivot transfers to chair with mod A. Pt tolerated tx well, cooperative and motivated.     AM-PAC 6  "CLICK ADL   How much help from another person does this patient currently need?   1 = Unable, Total/Dependent Assistance  2 = A lot, Maximum/Moderate Assistance  3 = A little, Minimum/Contact Guard/Supervision  4 = None, Modified [place]/Independent    Putting on and taking off regular lower body clothing? : 2  Bathing (including washing, rinsing, drying)?: 2  Toileting, which includes using toilet, bedpan, or urinal? : 2  Putting on and taking off regular upper body clothing?: 2  Taking care of personal grooming such as brushing teeth?: 3  Eating meals?: 3  Total Score: 14     AM-PAC Raw Score CMS "G-Code Modifier Level of Impairment Assistance   6 % Total / Unable   7 - 8 CM 80 - 100% Maximal Assist   9-13 CL 60 - 80% Moderate Assist   14 - 19 CK 40 - 60% Moderate Assist   20 - 22 CJ 20 - 40% Minimal Assist   23 CI 1-20% SBA / CGA   24 CH 0% Independent/ Mod I       Patient left up in chair with all lines intact, call button in reach and 1:1 sitter present    ASSESSMENT:  Kaylene Emery is a 46 y.o. female with a medical diagnosis of Non-traumatic rhabdomyolysis and presents with impaired self care and fx mobility.    Rehab identified problem list/impairments: Rehab identified problem list/impairments: weakness, impaired endurance, gait instability, impaired functional mobilty, impaired self care skills, impaired balance, decreased coordination, decreased lower extremity function, decreased ROM    Rehab potential is good.    Activity tolerance: Good    Discharge recommendations: Discharge Facility/Level Of Care Needs: nursing facility, skilled     Barriers to discharge: Barriers to Discharge: None    Equipment recommendations: walker, rolling, bath bench, bedside commode     GOALS:    Occupational Therapy Goals        Problem: Occupational Therapy Goal    Goal Priority Disciplines Outcome Interventions   Occupational Therapy Goal     OT, PT/OT Ongoing (interventions implemented as appropriate)  "   Description:  ot goals to be met by 7-7-17  1. Mod a with ue dressing  2. Mod a with le dressing  3. Pt will tolerate 1 set x 10 reps b ue rom exercise  4. Pt will req mod a with bsc<>bed t/f's                    Plan:  Patient to be seen 3 x/week to address the above listed problems via self-care/home management, therapeutic activities, therapeutic exercises  Plan of Care expires: 07/06/17  Plan of Care reviewed with: patient, spouse         Dave Cris, OT  07/04/2017

## 2017-07-04 NOTE — PLAN OF CARE
Problem: Patient Care Overview  Goal: Plan of Care Review  PT REQUIRES MAX A X2>TOTAL A FOR BED MOBILITY AND T/F TO CHAIR   Outcome: Ongoing (interventions implemented as appropriate)  Pt on O2 tolerating well. No distress noted at this time.

## 2017-07-04 NOTE — PT/OT/SLP PROGRESS
Physical Therapy  Treatment    Kaylene Emery   MRN: [id number]   Admitting Diagnosis: Non-traumatic rhabdomyolysis    PT Received On: 07/04/17  PT Start Time: 0902     PT Stop Time: 0930    PT Total Time (min): 28 min       Billable Minutes:  Gait [id number] and Therapeutic Activity 10    Treatment Type: Treatment  PT/PTA: PT     PTA Visit Number: 1       General Precautions: Standard, fall  Orthopedic Precautions: N/A   Braces:           Subjective:  Communicated with NURSE AND EPIC CHART REVIEW  prior to session.   PT AGREED TO TX     Pain/Comfort  Pain Rating 1: 4/10  Location - Side 1: Bilateral  Location 1: leg  Pain Rating Post-Intervention 1: 6/10    Objective:   Patient found with: peripheral IV, telemetry, oxygen    Functional Mobility:  Bed Mobility:   Rolling/Turning Right: Minimum assistance  Scooting/Bridging: Minimum Assistance  Supine to Sit: Minimum Assistance    Transfers:  Sit <> Stand Assistance: Maximum Assistance (X2)  Sit <> Stand Assistive Device: Rolling Walker  Bed <> Chair Technique: Stand Pivot  Bed <> Chair Assistance: Maximum Assistance (X2)  Bed <> Chair Assistive Device: Rolling Walker    Gait:   Gait Distance: PT GT TRAINED X 5' WITH RW AND MAX A X 2 WITH CHAIR IN TOW.   Assistance 1: Maximum assistance (X2)  Gait Assistive Device: Rolling walker  Gait Pattern: swing-to gait  Gait Deviation(s): decreased amy, decreased weight-shifting ability    Therapeutic Activities and Exercises:  PT COMPLETED BED MOBILITY WITH MIN A. PT STOOD X 2 REPS WITH MAX A X 2 WITH FLEXED POSTURE. PT SIDE STEPPED TO RIGHT WITH MAX A X 2. PT SEATED EOB FOR REST. PT STOOD X 2 WITH MAX A X 2 FOR T/F TO CHAIR WITH MAX A X 2. PT STOOD X 3 FOR GT TRAINING X 5' WITH MAX A WITH DEC WT SHIFT ABILITY.  PT COMPLETED AP WITH AAROM OF R LE  AND AROM L LE. PT COMPLETED HIP ADD AND GLUT SETS X 10 REPS. PT SEATED IN CHAIR AND LEFT SEATED WITH CALL BELL IN REACH AND ALL NEEDS MET.      AM-PAC 6 CLICK MOBILITY  How  much help from another person does this patient currently need?   1 = Unable, Total/Dependent Assistance  2 = A lot, Maximum/Moderate Assistance  3 = A little, Minimum/Contact Guard/Supervision  4 = None, Modified [place]/Independent    Turning over in bed (including adjusting bedclothes, sheets and blankets)?: 3  Sitting down on and standing up from a chair with arms (e.g., wheelchair, bedside commode, etc.): 2  Moving from lying on back to sitting on the side of the bed?: 3  Moving to and from a bed to a chair (including a wheelchair)?: 2  Need to walk in hospital room?: 2  Climbing 3-5 steps with a railing?: 1  Total Score: 13    AM-PAC Raw Score CMS G-Code Modifier Level of Impairment Assistance   6 % Total / Unable   7 - 9 CM 80 - 100% Maximal Assist   10 - 14 CL 60 - 80% Moderate Assist   15 - 19 CK 40 - 60% Moderate Assist   20 - 22 CJ 20 - 40% Minimal Assist   23 CI 1-20% SBA / CGA   24 CH 0% Independent/ Mod I     Patient left up in chair with call button in reach.    Assessment:  PT PROGRESSING WITH STANDING AND GT TRAINING. PT CONT TO REQUIRES MAX AX 2    Rehab identified problem list/impairments: Rehab identified problem list/impairments: weakness, impaired endurance, impaired functional mobilty, impaired balance, decreased lower extremity function, pain, edema, decreased ROM, decreased safety awareness, gait instability, impaired sensation, impaired self care skills, abnormal tone    Rehab potential is good.    Activity tolerance: Fair    Discharge recommendations: Discharge Facility/Level Of Care Needs: nursing facility, skilled     Barriers to discharge: Barriers to Discharge: None    Equipment recommendations: Equipment Needed After Discharge: walker, rolling     GOALS:    Physical Therapy Goals        Problem: Physical Therapy Goal    Goal Priority Disciplines Outcome Goal Variances Interventions   Physical Therapy Goal     PT/OT, PT      Description:  PT WILL BE SEEN FOR P.T. FOR A MIN  OF 5 OUT OF 7 DAYS A WEEK  LT17  1. PT WILL COMPLETE BED MOBILITY WITH MOD A.  2. PT WILL STAND WITH MAX A AND RW  3. PT WILL T/F TO CHAIR WITH MAX A.  4. PT WILL COMPLETE B LE TE X 20 REPS FOR STRENGTHENING.                     PLAN:    Patient to be seen 5 x/week  to address the above listed problems via gait training, therapeutic activities, therapeutic exercises  Plan of Care expires: 17  Plan of Care reviewed with: patient, spouse         Estrella Eufemiahailey, PT  2017

## 2017-07-04 NOTE — PLAN OF CARE
Problem: Patient Care Overview  Goal: Plan of Care Review  PT REQUIRES MAX A X2>TOTAL A FOR BED MOBILITY AND T/F TO CHAIR   Outcome: Ongoing (interventions implemented as appropriate)  Pt has been free from falls, injury or trauma this shift.  POC reviewed with Pt.  Pt verbalized understanding.  Bed low and locked.  Pt is 1:1 with sitter at bedside.  Pt complained of back pain not relieved by po pain medication.  Morphine 2 mg IV x 1 dose ordered and given.  Pt with edema from thighs to John feet +3-+4.

## 2017-07-04 NOTE — PLAN OF CARE
"Problem: Patient Care Overview  Goal: Plan of Care Review  PT REQUIRES MAX A X2>TOTAL A FOR BED MOBILITY AND T/F TO CHAIR   Outcome: Ongoing (interventions implemented as appropriate)  Pt AAOx4. POC discussed w/patient, verbalized understanding. NSR on monitor. VSS. Voids per carroll catheter. Carroll catheter continues intact and patent. Patient turns in bed with assist. Patient was very observant to PRN pain medication available times. Patient's significant other left during the night. Returned this morning and apologized for the patient's behavior "I'm sorry about last night, with the thing about the Tylenol. I told her that her doing this [taking pain medications] is what caused this!" Significant other shared this information in the presence of another nurse: Li MACK .  Fall precautions in place, bed alarm on, bed in lowest, call light and personal items within reach.        "

## 2017-07-04 NOTE — ASSESSMENT & PLAN NOTE
45 y/o female with ALEXUS                          Non-traumatic rhabdomyolysis     1. Renal: ALEXUS. Due to non-traumatic rhabdomyolysis  Still no sign of renal recovery, but noticed increased UOP  Last HD was 2 days ago on 7/1/17  Will hld HD for today and follow summer  Pt may recover from rhabdomyolysis  Dyspnea improved with HD  Hemodynamically stable  Discussed with pt, her fiance  Electrolytes reviewed  K normal  Metabolic acidosis improved with HD  Hold IVF's  Advised pt not drink water in XS           Aspiration pneumonia     2. ID: abx reviewed  On zosyn for suspected aspiration pneumonia  Afebrile  WBC elevated       Polysubstance overdose     3. Psych: apparent drug overdose in what is reported as intention to self-harm  H/o of depression  Will defer to primary team.          Plans and recommendations:  As discussed above  Hold HD for today  Will evaluate in am for need for HD  Repeat blood work

## 2017-07-04 NOTE — PROGRESS NOTES
"History & Physical  Hospital Medicine      SUBJECTIVE:     Chief Complaint/Reason for Admission: Medication overdosed.     History of Present Illness:  Ms. Kaylene Emery is a 46 y.o. who was admitted with medication overdose causing ALEXUS needed dialysis, rhabdomyolysis, muscle weakness. patient has been PEc'd.      Interval history:   No acute events overnight. Feeling the same. No suicidal or homicidal ideation. Pain controlled.     PTA Medications   Medication Sig    gabapentin (NEURONTIN) 400 MG capsule Take 400 mg by mouth 3 (three) times daily.    oxycodone-acetaminophen (PERCOCET) 7.5-325 mg per tablet Take 1 tablet by mouth every 4 (four) hours as needed for Pain.    fluoxetine (PROZAC) 40 MG capsule Take 40 mg by mouth once daily.    lorazepam (ATIVAN) 1 MG tablet Take 1 mg by mouth every 6 (six) hours as needed for Anxiety.    meloxicam (MOBIC) 7.5 MG tablet Take 1 tablet (7.5 mg total) by mouth once daily.    ondansetron (ZOFRAN-ODT) 4 MG TbDL Take 1 tablet (4 mg total) by mouth every 6 (six) hours as needed.    promethazine (PHENERGAN) 25 MG tablet Take 1 tablet (25 mg total) by mouth every 6 (six) hours as needed for Nausea.    quetiapine (SEROQUEL) 200 MG Tab Take by mouth.        Review of patient's allergies indicates:   Allergen Reactions    Corticosteroids (glucocorticoids)      "sets off my anxiety real bad"    Tramadol Rash       Past Medical History:   Diagnosis Date    Anxiety     Depression     GERD (gastroesophageal reflux disease)     Tobacco use        Past Surgical History:   Procedure Laterality Date    BLADDER REPAIR      CENTRAL LINE  6/28/2017         CHOLECYSTECTOMY  04/07/2017    KNEE ARTHROPLASTY      PARTIAL HYSTERECTOMY      TUBAL LIGATION         Family History   Problem Relation Age of Onset    Hypertension Mother     Diabetes Mother         Social History     Social History    Marital status:      Spouse name: N/A    Number of children: N/A "    Years of education: N/A     Social History Main Topics    Smoking status: Current Every Day Smoker     Packs/day: 1.00     Types: Cigarettes    Smokeless tobacco: Never Used    Alcohol use Yes      Comment: occasionally    Drug use: No    Sexual activity: Yes     Other Topics Concern    None     Social History Narrative    None       Review of Systems:  Constitutional: No fever or chills, no weight changes.  Eyes: No visual changes or photophobia  HEENT: No nasal congestion or sore throat  Cardiovascular: No chest pain or palpitations  Gastrointestinal: No nausea or vomiting, no diarrhea or change in bowel habits  Genitourinary: No hematuria or dysuria  Skin: No rash or pruritis  Hematologic/lymphatic: No easy bruising, bleeding or lymphadenopathy  Musculoskeletal: weakness.   Neurological: No seizures or tremors  Endocrine: No heat/cold intolerance.  No polyuria/polydipsia  Psychiatric:  No depression or anxiety.      OBJECTIVE:     Temp:  [98 °F (36.7 °C)-99.8 °F (37.7 °C)] 98.9 °F (37.2 °C)  Pulse:  [] 104  Resp:  [16-20] 16  SpO2:  [92 %-97 %] 97 %  BP: (136-151)/(69-94) 136/86  Body mass index is 30.27 kg/m².     Physical Exam:  General appearance: Well developed, well nourished  Head: Normocephalic, atraumatic  Eyes:  Conjunctivae nl. Sclera anicteric. PERRL.  HEENT: Lips, mucosa, and tongue normal;   Neck: Supple, trachea midline, no thyromegaly.  Lungs: Mild wheezing and bibasilar rales.    Heart: Regular rate and rhythm, S1, S2 normal, no murmur, click, rub or gallop  Abdomen: non-tender, mildly distended; bowel sounds normal; no masses, no organomegaly  Extremities: No cyanosis or clubbing. +3 edema b/L.   Pulses: 2+ and symmetric.  Neurologic: non focal with generalized weakness.   Psychiatric:  Alert and oriented times 3.  Affect appropriate.      Laboratory:  Labs Reviewed   CBC W/ AUTO DIFFERENTIAL - Abnormal; Notable for the following:        Result Value    WBC 14.93 (*)      Hemoglobin 17.3 (*)     MCH 32.3 (*)     MCHC 36.1 (*)     Gran # 11.8 (*)     Mono # 1.3 (*)     Gran% 79.0 (*)     Lymph% 12.3 (*)     All other components within normal limits   COMPREHENSIVE METABOLIC PANEL - Abnormal; Notable for the following:     Sodium 130 (*)     CO2 17 (*)     Glucose 127 (*)     Calcium 8.5 (*)      (*)      (*)     All other components within normal limits   TSH - Abnormal; Notable for the following:     TSH 0.255 (*)     All other components within normal limits   URINALYSIS - Abnormal; Notable for the following:     Color, UA Brown (*)     Appearance, UA Cloudy (*)     Protein, UA 3+ (*)     Ketones, UA 1+ (*)     Bilirubin (UA) 1+ (*)     Occult Blood UA 3+ (*)     Nitrite, UA Positive (*)     Leukocytes, UA Trace (*)     All other components within normal limits   ACETAMINOPHEN LEVEL - Abnormal; Notable for the following:     Acetaminophen (Tylenol), Serum <3.0 (*)     All other components within normal limits   SALICYLATE LEVEL - Abnormal; Notable for the following:     Salicylate Lvl <5.0 (*)     All other components within normal limits   CK - Abnormal; Notable for the following:     CPK >51126 (*)     All other components within normal limits   URINALYSIS MICROSCOPIC - Abnormal; Notable for the following:     RBC, UA 5 (*)     Bacteria, UA Few (*)     Hyaline Casts, UA 2 (*)     All other components within normal limits   ISTAT PROCEDURE - Abnormal; Notable for the following:     POC PCO2 28.1 (*)     POC PO2 78 (*)     POC HCO3 17.7 (*)     All other components within normal limits   CULTURE, RESPIRATORY   DRUG SCREEN PANEL, URINE EMERGENCY   ALCOHOL,MEDICAL (ETHANOL)   T4, FREE   PREGNANCY TEST, URINE RAPID   CK   LACTIC ACID, PLASMA       Diagnostic Tests:      ASSESSMENT/PLAN:     Active Hospital Problems    Diagnosis  POA    *Non-traumatic rhabdomyolysis [M62.82]  Yes    Severe episode of recurrent major depressive disorder, without psychotic features [F33.2]   Unknown     Chronic    ALEXUS (acute kidney injury) [N17.9]  No    Hypoalbuminemia [E88.09]  Yes    Anasarca [R60.1]  No    Moderate episode of recurrent major depressive disorder [F33.1]  Unknown     Chronic    Overdose [T50.901A]  Unknown    Recurrent major depressive disorder [F33.9]  Yes     Chronic    Thrombocytopenia [D69.6]  No    Elevated liver enzymes [R74.8]  Yes    Polysubstance overdose [T50.901A]  Yes    Transaminitis [R74.0]  Yes    Aspiration pneumonia of both lungs [J69.0]  Yes      Resolved Hospital Problems    Diagnosis Date Resolved POA    Electrolyte imbalance [E87.8] 06/30/2017 No    Sepsis [A41.9] 06/29/2017 Yes    Acute hypoxemic respiratory failure [J96.01] 06/30/2017 Yes    Acute metabolic encephalopathy [G93.41] 06/29/2017 Yes    Hyponatremia [E87.1] 06/29/2017 Yes       Plan:   Renal function worsening with worsening fluid overload clinically.   cpk improving.   Renal following. Pt will likely need dialysis for fluid management.    2D echo: normal EF.    No source of infections at this time. Cultures negative to date.   Since patient was admitted with sepsis, will continue abx for 7 days.   Pt is PEC'd.  Continue to monitor liver function for transaminitis 2/2 polysubstance overdose.   Pain control.   Duonebs prn for wheezing.   Replace electrolytes prn.   Physical therapy.   Continue supportive care.     Dvt/gi ppx.     Dispo: Continue management as outlined above for severe rhabdomyolysis. Once she is stable medically and cleared by nephrology, Social work will need to be consulted for psych placement.     Oneil Paz MD

## 2017-07-05 LAB
ALBUMIN SERPL BCP-MCNC: 1.7 G/DL
ALP SERPL-CCNC: 74 U/L
ALT SERPL W/O P-5'-P-CCNC: 96 U/L
ANION GAP SERPL CALC-SCNC: 10 MMOL/L
AST SERPL-CCNC: 104 U/L
BASOPHILS # BLD AUTO: 0.03 K/UL
BASOPHILS NFR BLD: 0.3 %
BILIRUB DIRECT SERPL-MCNC: 0.3 MG/DL
BILIRUB SERPL-MCNC: 0.5 MG/DL
BUN SERPL-MCNC: 36 MG/DL
CALCIUM SERPL-MCNC: 8 MG/DL
CHLORIDE SERPL-SCNC: 101 MMOL/L
CK SERPL-CCNC: 3204 U/L
CO2 SERPL-SCNC: 24 MMOL/L
CREAT SERPL-MCNC: 3.9 MG/DL
DIFFERENTIAL METHOD: ABNORMAL
EOSINOPHIL # BLD AUTO: 0.5 K/UL
EOSINOPHIL NFR BLD: 3.8 %
ERYTHROCYTE [DISTWIDTH] IN BLOOD BY AUTOMATED COUNT: 14.2 %
EST. GFR  (AFRICAN AMERICAN): 15 ML/MIN/1.73 M^2
EST. GFR  (NON AFRICAN AMERICAN): 13 ML/MIN/1.73 M^2
GLUCOSE SERPL-MCNC: 111 MG/DL
HCT VFR BLD AUTO: 22.3 %
HGB BLD-MCNC: 7.9 G/DL
LYMPHOCYTES # BLD AUTO: 1.5 K/UL
LYMPHOCYTES NFR BLD: 12.3 %
MAGNESIUM SERPL-MCNC: 1.9 MG/DL
MCH RBC QN AUTO: 31.2 PG
MCHC RBC AUTO-ENTMCNC: 35.4 %
MCV RBC AUTO: 88 FL
MONOCYTES # BLD AUTO: 1.4 K/UL
MONOCYTES NFR BLD: 11.8 %
NEUTROPHILS # BLD AUTO: 8.6 K/UL
NEUTROPHILS NFR BLD: 72.7 %
PLATELET # BLD AUTO: 187 K/UL
PMV BLD AUTO: 10.5 FL
POTASSIUM SERPL-SCNC: 3.5 MMOL/L
PROT SERPL-MCNC: 4.6 G/DL
RBC # BLD AUTO: 2.53 M/UL
SODIUM SERPL-SCNC: 135 MMOL/L
WBC # BLD AUTO: 11.99 K/UL

## 2017-07-05 PROCEDURE — 97530 THERAPEUTIC ACTIVITIES: CPT

## 2017-07-05 PROCEDURE — 27000221 HC OXYGEN, UP TO 24 HOURS

## 2017-07-05 PROCEDURE — 80076 HEPATIC FUNCTION PANEL: CPT

## 2017-07-05 PROCEDURE — 25000003 PHARM REV CODE 250: Performed by: NURSE PRACTITIONER

## 2017-07-05 PROCEDURE — 85025 COMPLETE CBC W/AUTO DIFF WBC: CPT

## 2017-07-05 PROCEDURE — 63600175 PHARM REV CODE 636 W HCPCS: Performed by: SPECIALIST

## 2017-07-05 PROCEDURE — 97110 THERAPEUTIC EXERCISES: CPT

## 2017-07-05 PROCEDURE — 82550 ASSAY OF CK (CPK): CPT

## 2017-07-05 PROCEDURE — 25000003 PHARM REV CODE 250: Performed by: SPECIALIST

## 2017-07-05 PROCEDURE — 21400001 HC TELEMETRY ROOM

## 2017-07-05 PROCEDURE — 99233 SBSQ HOSP IP/OBS HIGH 50: CPT | Mod: ,,, | Performed by: INTERNAL MEDICINE

## 2017-07-05 PROCEDURE — 92526 ORAL FUNCTION THERAPY: CPT

## 2017-07-05 PROCEDURE — 83735 ASSAY OF MAGNESIUM: CPT

## 2017-07-05 PROCEDURE — 80048 BASIC METABOLIC PNL TOTAL CA: CPT

## 2017-07-05 PROCEDURE — 63600175 PHARM REV CODE 636 W HCPCS: Performed by: FAMILY MEDICINE

## 2017-07-05 RX ORDER — MORPHINE SULFATE 2 MG/ML
2 INJECTION, SOLUTION INTRAMUSCULAR; INTRAVENOUS ONCE
Status: COMPLETED | OUTPATIENT
Start: 2017-07-05 | End: 2017-07-05

## 2017-07-05 RX ADMIN — HEPARIN SODIUM 2000 UNITS: 1000 INJECTION, SOLUTION INTRAVENOUS; SUBCUTANEOUS at 01:07

## 2017-07-05 RX ADMIN — LORAZEPAM 0.5 MG: 0.5 TABLET ORAL at 09:07

## 2017-07-05 RX ADMIN — OXYCODONE HYDROCHLORIDE 20 MG: 10 TABLET, FILM COATED, EXTENDED RELEASE ORAL at 09:07

## 2017-07-05 RX ADMIN — MORPHINE SULFATE 2 MG: 2 INJECTION, SOLUTION INTRAMUSCULAR; INTRAVENOUS at 02:07

## 2017-07-05 RX ADMIN — LORAZEPAM 0.5 MG: 0.5 TABLET ORAL at 08:07

## 2017-07-05 RX ADMIN — NICOTINE 1 PATCH: 14 PATCH, EXTENDED RELEASE TRANSDERMAL at 08:07

## 2017-07-05 RX ADMIN — FLUOXETINE 40 MG: 20 CAPSULE ORAL at 08:07

## 2017-07-05 RX ADMIN — ROPINIROLE HYDROCHLORIDE 0.5 MG: 0.25 TABLET, FILM COATED ORAL at 09:07

## 2017-07-05 RX ADMIN — QUETIAPINE FUMARATE 200 MG: 100 TABLET, FILM COATED ORAL at 08:07

## 2017-07-05 RX ADMIN — CEFTRIAXONE 1 G: 1 INJECTION, SOLUTION INTRAVENOUS at 11:07

## 2017-07-05 RX ADMIN — OXYCODONE HYDROCHLORIDE 20 MG: 10 TABLET, FILM COATED, EXTENDED RELEASE ORAL at 08:07

## 2017-07-05 RX ADMIN — PANTOPRAZOLE SODIUM 40 MG: 40 TABLET, DELAYED RELEASE ORAL at 08:07

## 2017-07-05 RX ADMIN — DOCUSATE SODIUM 100 MG: 100 CAPSULE, LIQUID FILLED ORAL at 08:07

## 2017-07-05 NOTE — ASSESSMENT & PLAN NOTE
- CPK < 40,000  - Repeat daily CPK  - Received six liters of NS so far in the ED  - Continue NS at 125 cc/hr  - Discussed with  (nephrology), plan to initiate CRRT tonight    As above-- improving but still very high, still needs IVF    7/5/17: UO improving and continue IVF

## 2017-07-05 NOTE — PROGRESS NOTES
"[facility] -   Nephrology  Progress Note    Patient Name: Kaylene Emery  MRN: [id number]  Admission Date: 6/28/2017  Hospital Length of Stay: 7 days  Attending Provider: Gina Clarke MD   Primary Care Physician: Jonas Jimenez MD  Principal Problem:Non-traumatic rhabdomyolysis and ALEXUS    Subjective:     HPI: Patient is a 46-year-old with multiple psychiatry issues.  Comes in with multiple medication overdose with unclear intent suicidal versus overdose versus toxic injections.  Patient has dark-colored urine along with severe rhabdomyolysis.  Patient is now intubated.  Dr. Giles consulted me for initiation for CRRT for multiple drug poisoning and rhabdo myelolysis.  Patient has had transient episode of hypotension with anxiolytics.  Still has some urine output and is nonoliguric.  Most of the history has been obtained from Dr. Giles, patient's fiancé and from the medical records.    Interval History: Pt was seen and examined. Stable last night. No new events. No SOB. Feels "better."  Leg swelling "better." Pt does not feel she needs HD.    Review of patient's allergies indicates:   Allergen Reactions    Corticosteroids (glucocorticoids)      "sets off my anxiety real bad"    Tramadol Rash     Current Facility-Administered Medications   Medication Frequency    acetaminophen tablet 650 mg Q8H PRN    albuterol-ipratropium 2.5mg-0.5mg/3mL nebulizer solution 3 mL Q4H PRN    bisacodyl suppository 10 mg Daily PRN    cefTRIAXone (ROCEPHIN) 1 g in dextrose 5 % 50 mL IVPB Q24H    diphenhydrAMINE capsule 50 mg Nightly PRN    docusate sodium capsule 100 mg Daily    ergocalciferol capsule 50,000 Units Q7 Days    fluoxetine capsule 40 mg Daily    heparin (porcine) injection 2,000 Units PRN    lorazepam tablet 0.5 mg BID    nicotine 14 mg/24 hr 1 patch Daily    ondansetron injection 4 mg Q8H PRN    oxycodone 12 hr tablet 20 mg BID PRN    pantoprazole EC tablet 40 mg Daily    quetiapine " tablet 200 mg Daily    ropinirole tablet 0.5 mg QHS       Objective:     Vital Signs (Most Recent):  Temp: 98 °F (36.7 °C) (07/05/17 0745)  Pulse: 78 (07/05/17 0852)  Resp: 16 (07/05/17 0745)  BP: (!) 140/73 (07/05/17 0745)  SpO2: 99 % (07/05/17 0745)  O2 Device (Oxygen Therapy): nasal cannula (07/05/17 0745) Vital Signs (24h Range):  Temp:  [98 °F (36.7 °C)-99.4 °F (37.4 °C)] 98 °F (36.7 °C)  Pulse:  [] 78  Resp:  [16-20] 16  SpO2:  [96 %-100 %] 99 %  BP: (127-149)/(64-84) 140/73     Weight: 80 kg (176 lb 5.9 oz) (07/02/17 0600)  Body mass index is 30.27 kg/m².  Body surface area is 1.9 meters squared.    I/O last 3 completed shifts:  In: 1130 [P.O.:1080; IV Piggyback:50]  Out: 3125 [Urine:3125]    Physical Exam  Constitutional: No distress.  Head: Normocephalic.   Eyes: Conjunctivae are normal. No scleral icterus.   Cardiovascular: Regular rhythm, normal heart sounds and intact distal pulses.  No murmur heard.  Pulmonary/Chest: Abdominal: Soft. She exhibits no distension.   Musculoskeletal: She exhibits no edema or deformity.   Lymphadenopathy:     She has no cervical adenopathy.   Skin: Skin is warm. She is not diaphoretic. No erythema.   Psychiatric: She is not agitated.   Nursing note and vitals reviewed    Significant Labs: reviewed  BMP  Lab Results   Component Value Date     (L) 07/05/2017    K 3.5 07/05/2017     07/05/2017    CO2 24 07/05/2017    BUN 36 (H) 07/05/2017    CREATININE 3.9 (H) 07/05/2017    CALCIUM 8.0 (L) 07/05/2017    ANIONGAP 10 07/05/2017    ESTGFRAFRICA 15 (A) 07/05/2017    EGFRNONAA 13 (A) 07/05/2017     Lab Results   Component Value Date    WBC 11.99 07/05/2017    HGB 7.9 (L) 07/05/2017    HCT 22.3 (L) 07/05/2017    MCV 88 07/05/2017     07/05/2017       Significant Imaging: CXR reviewed    Assessment/Plan:     ALEXUS (acute kidney injury)    45 y/o female with ALEXUS                          Non-traumatic rhabdomyolysis     1. Renal: ALEXUS. Due to non-traumatic  rhabdomyolysis  Still no sign of renal recovery, but s Cr is now appears to be plateauing  Good and increased UOP today, good sign  Last HD was on 7/1/17  Will hold HD for today and follow summer  Pt may recover from rhabdomyolysis  Dyspnea improved  Hemodynamically stable  Discussed with pt, her fiance  Hold IVF's  Advised pt not to drink water in XS, until full recovery has occurred  HTN: BP controlled          Aspiration pneumonia     2. ID: abx reviewed  On rocephin for suspected aspiration pneumonia  Afebrile  WBC elevated       Polysubstance overdose     3. Psych: apparent drug overdose in what is reported as intention to self-harm  H/o of depression  Will defer to primary team.          Plans and recommendations:  As discussed above  Hold HD for today  Will evaluate in am for need for HD  Repeat blood work in am              Thank you for your consult.     Mona Gregory MD  Nephrology  [facility]

## 2017-07-05 NOTE — ASSESSMENT & PLAN NOTE
45 y/o female with ALEXUS                          Non-traumatic rhabdomyolysis     1. Renal: ALEXUS. Due to non-traumatic rhabdomyolysis  Still no sign of renal recovery, but labs pending this am  Will f/u with the labs  Noted greater UOP today, good sign  Last HD was 2 days ago on 7/1/17  Will hold HD for today and follow summer  Pt may recover from rhabdomyolysis  Dyspnea improved  Hemodynamically stable  Discussed with pt, her fiance  Hold IVF's  Advised pt not to drink water in XS, until full recovery has ocurred          Aspiration pneumonia     2. ID: abx reviewed  On rocephin for suspected aspiration pneumonia  Afebrile  WBC elevated       Polysubstance overdose     3. Psych: apparent drug overdose in what is reported as intention to self-harm  H/o of depression  Will defer to primary team.          Plans and recommendations:  As discussed above  BMP pending  Hold HD for today  Will evaluate in am for need for HD  Repeat blood work

## 2017-07-05 NOTE — PLAN OF CARE
Problem: SLP Goal  Goal: SLP Goal  TPW recall 2/3 aspiration precautions (small bites/sips, sit upright, slow intake) with min a  TPW follow aspiration precautions during bedside trials with min a   Outcome: Ongoing (interventions implemented as appropriate)  Pt educated on use of compensatory swallow strategies.

## 2017-07-05 NOTE — PLAN OF CARE
Problem: Patient Care Overview  Goal: Plan of Care Review  PT REQUIRES MAX A X2>TOTAL A FOR BED MOBILITY AND T/F TO CHAIR   Outcome: Ongoing (interventions implemented as appropriate)  Patient had uneventful shift. Patient awake, alert and oriented x 4. VS stable. Patient complains of pain to bilateral lower extremities and generalized back pain, controlled with pain medication. Patient on telemetry, NSR/sinus radha on the monitor. Patient bedfast, refusing PT/OT at this time. Fall precautions in place. Bed alarm active and audible. Sitter at bedside. Patient free from fall/injury. Plan of care reviewed with patient. Patient has no questions at this time. Will continue to monitor.

## 2017-07-05 NOTE — PROGRESS NOTES
Pt requesting to speak to attending for additional pain medication;during bedside report, EDWIN Jefferson, day nurse, again, told patient that Dr. Paz does not want to give any additional pain medication then the Oxy 20mg BID prn; spoke to Dr. Gupta about patients concerns about not getting pain medication until 2213 and pain and swelling to ble and labia;ok to give oxy 2213 prn dose now and ultrasound of ble to rule out compartment syndrome;see orders    The above was explained to patient and she understands that she will not get more pain medication until 12 hours from the dose that will be given shortly, voiced understanding

## 2017-07-05 NOTE — SUBJECTIVE & OBJECTIVE
Interval History: 45 yo female admitted with intentional overdose. She developed rhabod and ALEXUS required CRRT. Her urine output has increased.  Her main complaint today is vaginal discomfort and ? prolaspe.  Review of Systems   Gastrointestinal: Negative.    Genitourinary: Positive for pelvic pain.   Musculoskeletal:        Chronic pain     Objective:     Vital Signs (Most Recent):  Temp: 98 °F (36.7 °C) (07/05/17 0745)  Pulse: 78 (07/05/17 0852)  Resp: 16 (07/05/17 0745)  BP: (!) 140/73 (07/05/17 0745)  SpO2: 99 % (07/05/17 0745) Vital Signs (24h Range):  Temp:  [98 °F (36.7 °C)-99.4 °F (37.4 °C)] 98 °F (36.7 °C)  Pulse:  [] 78  Resp:  [16-20] 16  SpO2:  [96 %-100 %] 99 %  BP: (127-149)/(64-84) 140/73     Weight: 80 kg (176 lb 5.9 oz)  Body mass index is 30.27 kg/m².    Intake/Output Summary (Last 24 hours) at 07/05/17 1108  Last data filed at 07/05/17 0340   Gross per 24 hour   Intake              890 ml   Output             2050 ml   Net            -1160 ml      Physical Exam   Constitutional: She is oriented to person, place, and time. She appears well-developed and well-nourished.   HENT:   Head: Normocephalic and atraumatic.   Eyes: EOM are normal.   Neck: Normal range of motion. Neck supple.   Cardiovascular: Normal rate, regular rhythm, normal heart sounds and intact distal pulses.    Pulmonary/Chest: Effort normal and breath sounds normal.   Abdominal: Soft. Bowel sounds are normal.   Genitourinary: There is lesion on the left labia.   Genitourinary Comments: Lesion vs prolaspe from vagina. Palmer in place   Musculoskeletal: Normal range of motion.   Neurological: She is alert and oriented to person, place, and time.   Skin: Skin is warm and dry.   Psychiatric: She has a normal mood and affect.   Nursing note and vitals reviewed.      Significant Labs:   Recent Lab Results       07/05/17  0606 07/04/17  1610      Albumin 1.7(L)      Alkaline Phosphatase 74      ALT 96(H)      Anion Gap 10 9     AST  104(H)      Baso # 0.03      Basophil% 0.3      Bilirubin, Direct 0.3      Total Bilirubin 0.5  Comment:  For infants and newborns, interpretation of results should be based  on gestational age, weight and in agreement with clinical  observations.  Premature Infant recommended reference ranges:  Up to 24 hours.............<8.0 mg/dL  Up to 48 hours............<12.0 mg/dL  3-5 days..................<15.0 mg/dL  6-29 days.................<15.0 mg/dL        BUN, Bld 36(H) 33(H)     Calcium 8.0(L) 8.0(L)     Chloride 101 101     CO2 24 24     CPK 3204(H)      Creatinine 3.9(H) 3.7(H)     Differential Method Automated      eGFR if  15(A) 16(A)     eGFR if non  13  Comment:  Calculation used to obtain the estimated glomerular filtration  rate (eGFR) is the CKD-EPI equation. Since race is unknown   in our information system, the eGFR values for   -American and Non--American patients are given   for each creatinine result.  (A) 14  Comment:  Calculation used to obtain the estimated glomerular filtration  rate (eGFR) is the CKD-EPI equation. Since race is unknown   in our information system, the eGFR values for   -American and Non--American patients are given   for each creatinine result.  (A)     Eos # 0.5      Eosinophil% 3.8      Glucose 111(H) 105     Gran # 8.6(H)      Gran% 72.7      Hematocrit 22.3(L)      Hemoglobin 7.9(L)      Lymph # 1.5      Lymph% 12.3(L)      Magnesium 1.9      MCH 31.2(H)      MCHC 35.4      MCV 88      Mono # 1.4(H)      Mono% 11.8      MPV 10.5      Platelets 187      Potassium 3.5 3.6     Total Protein 4.6(L)      RBC 2.53(L)      RDW 14.2      Sodium 135(L) 134(L)     WBC 11.99            Significant Imaging: I have reviewed and interpreted all pertinent imaging results/findings within the past 24 hours.

## 2017-07-05 NOTE — PLAN OF CARE
Problem: Patient Care Overview  Goal: Plan of Care Review  PT REQUIRES MAX A X2>TOTAL A FOR BED MOBILITY AND T/F TO CHAIR   Outcome: Ongoing (interventions implemented as appropriate)  PT REFUSED OOB TODAY D/T PAIN WITH MAX ENCOURAGEMENT

## 2017-07-05 NOTE — PT/OT/SLP PROGRESS
Speech Language Pathology  Treatment    Kaylene Emery   MRN: [id number]   Admitting Diagnosis: Non-traumatic rhabdomyolysis    Diet recommendations: Solid Diet Level: Mechanical soft  Liquid Diet Level: Thin HOB to 90 degrees, Small bites/sips and 1 bite/sip at a time    SLP Treatment Date: 07/05/17  Speech Start Time: 0955     Speech Stop Time: 1005     Speech Total (min): 10 min       TREATMENT BILLABLE MINUTES:  Treatment Swallowing Dysfunction 10    Has the patient been evaluated by SLP for swallowing? : Yes  Keep patient NPO?: No   General Precautions: Standard, fall  Current Respiratory Status: nasal cannula       Subjective:  Pt alert and c/o nausea. She didn't eat her breakfast and refused po intake for assessment of swallow function.     Pain/Comfort  Pain Rating 1: 0/10  Pain Rating Post-Intervention 2: 0/10    Objective:   Patient found with: peripheral IV, telemetry, oxygen  Pt recalled 1/3 compensatory swallow strategies without cues. Education was provided on all compensatory swallow strategies and importance of use. She expressed understanding but denied swallowing difficulty.   FIM:                                 Assessment:  Kaylene Emery is a 46 y.o. female with a medical diagnosis of Non-traumatic rhabdomyolysis and presents with dysphagia.    Discharge recommendations: Discharge Facility/Level Of Care Needs: nursing facility, skilled     Goals:    SLP Goals        Problem: SLP Goal    Goal Priority Disciplines Outcome   SLP Goal     SLP Ongoing (interventions implemented as appropriate)   Description:  TPW recall 2/3 aspiration precautions (small bites/sips, sit upright, slow intake) with min a  TPW follow aspiration precautions during bedside trials with min a                     Plan:   Patient to be seen Therapy Frequency: 3 x/week   Plan of Care expires: 07/08/17  Plan of Care reviewed with: patient  SLP Follow-up?: Yes              Ofelia Markham CCC-SLP  07/05/2017

## 2017-07-05 NOTE — ASSESSMENT & PLAN NOTE
- Unclear intention.  - PEC'd  - Psychiatry has evaluated patient  - CRRT to be started tonight for polysubstance/toxin removal    Improving on CRRT-- CPK still high but LFTs improving    CRRT clotted off, CPK slightly down but still very high  Will change Bicarb Fluids to NS at a lower rate    7/5/17: intentional overdose  Did have CRRT   She has been Pec'd

## 2017-07-05 NOTE — PT/OT/SLP PROGRESS
Physical Therapy  Treatment    Kaylene Emery   MRN: [id number]   Admitting Diagnosis: Non-traumatic rhabdomyolysis    PT Received On: 07/05/17  PT Start Time: 1011     PT Stop Time: 1035    PT Total Time (min): 24 min       Billable Minutes:  Therapeutic Activity 14 and Therapeutic Exercise 10    Treatment Type: Treatment  PT/PTA: PT     PTA Visit Number: 1       General Precautions: Standard, fall  Orthopedic Precautions: N/A   Braces:           Subjective:  Communicated with NURSE AND EPIC CHART REVIEW  prior to session.   PT AGREED  BED TX WITH MAX ENCOURAGEMENT    Pain/Comfort  Pain Rating 1: 10/10  Location 1: vagina  Pain Rating Post-Intervention 1: 10/10    Objective:   Patient found with: peripheral IV, telemetry, oxygen    Functional Mobility:  Bed Mobility:   Rolling/Turning to Left: Moderate assistance  Rolling/Turning Right: Moderate assistance    Transfers:  Sit <> Stand Assistance: Activity did not occur (PT REFUSED)    Gait:   Gait Distance: PT REFUSED     Therapeutic Activities and Exercises:  PT SUP IN BED FOR B LE TE X X 10 REPS OF AP, HIP ADD/ABD , HS WITH AAROM. PT LOG ROLLED EACH DIRECTION WITH MOD AND INC CUES. PT EDUCATED TO COMPLETE TE LATER DAY AND IMPORTANCE OF PARTICIPATION IN P.T. TO INC MOBILITY. PT LEFT WITH ALL NEEDS MET      AM-PAC 6 CLICK MOBILITY  How much help from another person does this patient currently need?   1 = Unable, Total/Dependent Assistance  2 = A lot, Maximum/Moderate Assistance  3 = A little, Minimum/Contact Guard/Supervision  4 = None, Modified [place]/Independent    Turning over in bed (including adjusting bedclothes, sheets and blankets)?: 2  Sitting down on and standing up from a chair with arms (e.g., wheelchair, bedside commode, etc.): 1  Moving from lying on back to sitting on the side of the bed?: 1  Moving to and from a bed to a chair (including a wheelchair)?: 1  Need to walk in hospital room?: 1  Climbing 3-5 steps with a railing?: 1  Total Score:  7    AM-PAC Raw Score CMS G-Code Modifier Level of Impairment Assistance   6 % Total / Unable   7 - 9 CM 80 - 100% Maximal Assist   10 - 14 CL 60 - 80% Moderate Assist   15 - 19 CK 40 - 60% Moderate Assist   20 - 22 CJ 20 - 40% Minimal Assist   23 CI 1-20% SBA / CGA   24 CH 0% Independent/ Mod I     Patient left supine with call button in reach.    Assessment:  PT NAWAF MIN TE D/T IN PAIN     Rehab identified problem list/impairments: Rehab identified problem list/impairments: weakness, impaired endurance, pain, edema, gait instability, impaired functional mobilty, impaired self care skills, impaired balance, decreased ROM, decreased lower extremity function    Rehab potential is good.    Activity tolerance: Poor    Discharge recommendations: Discharge Facility/Level Of Care Needs: nursing facility, skilled     Barriers to discharge: Barriers to Discharge: None    Equipment recommendations: Equipment Needed After Discharge: none     GOALS:    Physical Therapy Goals        Problem: Physical Therapy Goal    Goal Priority Disciplines Outcome Goal Variances Interventions   Physical Therapy Goal     PT/OT, PT      Description:  PT WILL BE SEEN FOR P.T. FOR A MIN OF 5 OUT OF 7 DAYS A WEEK  LT17  1. PT WILL COMPLETE BED MOBILITY WITH MOD A.  2. PT WILL STAND WITH MAX A AND RW  3. PT WILL T/F TO CHAIR WITH MAX A.  4. PT WILL COMPLETE B LE TE X 20 REPS FOR STRENGTHENING.                     PLAN:    Patient to be seen 5 x/week  to address the above listed problems via gait training, therapeutic exercises, therapeutic activities  Plan of Care expires: 17  Plan of Care reviewed with: patient         Estrella Keller, PT  2017

## 2017-07-05 NOTE — PROGRESS NOTES
[facility] Hospital Medicine  Progress Note    Patient Name: Kaylene Emery  MRN: [id number]  Patient Class: IP- Inpatient   Admission Date: 6/28/2017  Length of Stay: 7 days  Attending Physician: Gina Clarke MD  Primary Care Provider: Jonas Jimenez MD        Subjective:     Principal Problem:Non-traumatic rhabdomyolysis    HPI:  Ms. Emery is a 47 y/o  female with h/o depression, substance OD in the past, was found with AMS earlier today and was brought to the ED. Patient is currently intubated and history obtained per chart review. Family not at bedside.    Apparently patient likely had overdosed on multiple medications including Methocarbamol, Mobic, lorazepam, flexeril, phenergan, Seroquel, Celexa. Her boy friend is on Geodon, Klonopin, Wellbutrin and Effexor.  Most of her recent filled prescription bottles were found empty at the bedside when her bf found her.   Per chart review, yesterday patient was summoned to appear in the court for her DUI that occurred 4 months ago. She was extremely anxious and pacing all day today.     Labs show lactic acid 2.0, CPK > 40,000, CXR show bilateral patchy infiltrates, ANNETTE < 10, tylenol level < 5,  CO2 17, Na 130, , , WBC 15,000, Hgb 17.3.    Patient is currently intubated on the ventilator, breathing over the vent. She received NS 6 liters so far. Patient was evaluated by both neurology and nephrology in the ED. Nephrology plans to initiate patient on CRRT for poison/toxin control.    Hospital Course:  Pt extubated early this am, doing well, still feels anxious but better, denies any SI/ intentions or attempts. She is PECed. debies any prior hx of psych admissions. CRRT started this am and tolerating well. Repeat labs show improving LFTs but CPK still > 40,000/. Denies any injury or pain.     Looks much better, sitting up in chair, has severe LE edema from the Bicarb fluid. CRRT clotted off at 0200 this am, c/o ch back and  muscle pain. No numbness of tingling. Again denies any suicidal ideation or attempt. Getting IV Zosyn too for a presumed Aspiration.     Interval History: 47 yo female admitted with intentional overdose. She developed rhabod and ALEXUS required CRRT. Her urine output has increased.  Her main complaint today is vaginal discomfort and ? prolaspe.  Review of Systems   Gastrointestinal: Negative.    Genitourinary: Positive for pelvic pain.   Musculoskeletal:        Chronic pain     Objective:     Vital Signs (Most Recent):  Temp: 98 °F (36.7 °C) (07/05/17 0745)  Pulse: 78 (07/05/17 0852)  Resp: 16 (07/05/17 0745)  BP: (!) 140/73 (07/05/17 0745)  SpO2: 99 % (07/05/17 0745) Vital Signs (24h Range):  Temp:  [98 °F (36.7 °C)-99.4 °F (37.4 °C)] 98 °F (36.7 °C)  Pulse:  [] 78  Resp:  [16-20] 16  SpO2:  [96 %-100 %] 99 %  BP: (127-149)/(64-84) 140/73     Weight: 80 kg (176 lb 5.9 oz)  Body mass index is 30.27 kg/m².    Intake/Output Summary (Last 24 hours) at 07/05/17 1108  Last data filed at 07/05/17 0340   Gross per 24 hour   Intake              890 ml   Output             2050 ml   Net            -1160 ml      Physical Exam   Constitutional: She is oriented to person, place, and time. She appears well-developed and well-nourished.   HENT:   Head: Normocephalic and atraumatic.   Eyes: EOM are normal.   Neck: Normal range of motion. Neck supple.   Cardiovascular: Normal rate, regular rhythm, normal heart sounds and intact distal pulses.    Pulmonary/Chest: Effort normal and breath sounds normal.   Abdominal: Soft. Bowel sounds are normal.   Genitourinary: There is lesion on the left labia.   Genitourinary Comments: Lesion vs prolaspe from vagina. Palmer in place   Musculoskeletal: Normal range of motion.   Neurological: She is alert and oriented to person, place, and time.   Skin: Skin is warm and dry.   Psychiatric: She has a normal mood and affect.   Nursing note and vitals reviewed.      Significant Labs:   Recent Lab  Results       07/05/17  0606 07/04/17  1610      Albumin 1.7(L)      Alkaline Phosphatase 74      ALT 96(H)      Anion Gap 10 9     (H)      Baso # 0.03      Basophil% 0.3      Bilirubin, Direct 0.3      Total Bilirubin 0.5  Comment:  For infants and newborns, interpretation of results should be based  on gestational age, weight and in agreement with clinical  observations.  Premature Infant recommended reference ranges:  Up to 24 hours.............<8.0 mg/dL  Up to 48 hours............<12.0 mg/dL  3-5 days..................<15.0 mg/dL  6-29 days.................<15.0 mg/dL        BUN, Bld 36(H) 33(H)     Calcium 8.0(L) 8.0(L)     Chloride 101 101     CO2 24 24     CPK 3204(H)      Creatinine 3.9(H) 3.7(H)     Differential Method Automated      eGFR if  15(A) 16(A)     eGFR if non  13  Comment:  Calculation used to obtain the estimated glomerular filtration  rate (eGFR) is the CKD-EPI equation. Since race is unknown   in our information system, the eGFR values for   -American and Non--American patients are given   for each creatinine result.  (A) 14  Comment:  Calculation used to obtain the estimated glomerular filtration  rate (eGFR) is the CKD-EPI equation. Since race is unknown   in our information system, the eGFR values for   -American and Non--American patients are given   for each creatinine result.  (A)     Eos # 0.5      Eosinophil% 3.8      Glucose 111(H) 105     Gran # 8.6(H)      Gran% 72.7      Hematocrit 22.3(L)      Hemoglobin 7.9(L)      Lymph # 1.5      Lymph% 12.3(L)      Magnesium 1.9      MCH 31.2(H)      MCHC 35.4      MCV 88      Mono # 1.4(H)      Mono% 11.8      MPV 10.5      Platelets 187      Potassium 3.5 3.6     Total Protein 4.6(L)      RBC 2.53(L)      RDW 14.2      Sodium 135(L) 134(L)     WBC 11.99            Significant Imaging: I have reviewed and interpreted all pertinent imaging results/findings within the past 24  hours.    Assessment/Plan:      Polysubstance overdose    - Unclear intention.  - PEC'd  - Psychiatry has evaluated patient  - CRRT to be started tonight for polysubstance/toxin removal    Improving on CRRT-- CPK still high but LFTs improving    CRRT clotted off, CPK slightly down but still very high  Will change Bicarb Fluids to NS at a lower rate    7/5/17: intentional overdose  Did have CRRT   She has been Pec'd           * Non-traumatic rhabdomyolysis    - CPK < 40,000  - Repeat daily CPK  - Received six liters of NS so far in the ED  - Continue NS at 125 cc/hr  - Discussed with  (nephrology), plan to initiate CRRT tonight    As above-- improving but still very high, still needs IVF    7/5/17: UO improving and continue IVF        Recurrent major depressive disorder    Needs to be addressed further          Anasarca    Third spacing due to ALI and ALEXUS-- will change IVF to NS at a lower rate  Avoid Lasix at this point to protect the kidneys          Hypoalbuminemia    Probably acute on Ch due to Liver Injury  Will watch          ALEXUS (acute kidney injury)    Possibly Pre renal?  Will watch closely          Elevated liver enzymes    Sec to Polysubstance Drug OD-- improving          Thrombocytopenia    Heparin drip off as CRRT does not require heparin gtt  Platelets still low but stable-- probably sec to Acute Liver Injury          Aspiration pneumonia of both lungs    - Start Vanc and Zosyn (received rocephin, zithromax in the ED)  - Follow up on blood, sputum cultures  - Duonebs q6    No s/s of aspiration, will stop Zosyn          Transaminitis    - Secondary to polysubstance overdose  - Monitor closely  - Gradually improving with CRRT  - Continue present care,, LFTs improving              VTE Risk Mitigation         Ordered     Medium Risk of VTE  Once      06/28/17 2059     Place sequential compression device  Until discontinued      06/28/17 2059          Gina Clarke MD  Department of Hospital  Medicine   [facility]

## 2017-07-05 NOTE — PLAN OF CARE
Problem: Patient Care Overview  Goal: Plan of Care Review  PT REQUIRES MAX A X2>TOTAL A FOR BED MOBILITY AND T/F TO CHAIR   Outcome: Ongoing (interventions implemented as appropriate)  Patient updated on POC, demonstrated teach back  Pain adequately controlled with medication  Monitor sr  VSS   Patient turned q 2;hob up at 30 degrees;heels floated;pt cec'd; sitter at bs;aspiration precautions in effect;ultrasound of lower extremities resulted; [place], NP aware  Fall precautions in place,bed locked,lowest position;call bell within reach;skidproof socks  Bed alarm  Pt remained free from falls; no resp. Distress noted  Clifford RN, charge nurse notified about further instructions and clarification needed for trialysis line;Dr. Gregory also aware

## 2017-07-05 NOTE — PT/OT/SLP PROGRESS
Occupational Therapy      Kaylene Emery  MRN: [id number]    S: req max encouragement to participate with therapy session.  O: pt educated on participating with therapy session. Pt verbalized understanding , however refused to properly engage in therapy session   A: pt self limiting progress.  P: continue with poc.    Raeann Olivares, OT   [phone]  1 ta  7/5/2017

## 2017-07-05 NOTE — SUBJECTIVE & OBJECTIVE
"Interval History: Pt was seen and examined. Stable last night. No new events. No SOB. Feels "better."  Leg swelling "better." Pt does not feel she needs HD.    Review of patient's allergies indicates:   Allergen Reactions    Corticosteroids (glucocorticoids)      "sets off my anxiety real bad"    Tramadol Rash     Current Facility-Administered Medications   Medication Frequency    acetaminophen tablet 650 mg Q8H PRN    albuterol-ipratropium 2.5mg-0.5mg/3mL nebulizer solution 3 mL Q4H PRN    bisacodyl suppository 10 mg Daily PRN    cefTRIAXone (ROCEPHIN) 1 g in dextrose 5 % 50 mL IVPB Q24H    diphenhydrAMINE capsule 50 mg Nightly PRN    docusate sodium capsule 100 mg Daily    ergocalciferol capsule 50,000 Units Q7 Days    fluoxetine capsule 40 mg Daily    heparin (porcine) injection 2,000 Units PRN    lorazepam tablet 0.5 mg BID    nicotine 14 mg/24 hr 1 patch Daily    ondansetron injection 4 mg Q8H PRN    oxycodone 12 hr tablet 20 mg BID PRN    pantoprazole EC tablet 40 mg Daily    quetiapine tablet 200 mg Daily    ropinirole tablet 0.5 mg QHS       Objective:     Vital Signs (Most Recent):  Temp: 98 °F (36.7 °C) (07/05/17 0745)  Pulse: 78 (07/05/17 0852)  Resp: 16 (07/05/17 0745)  BP: (!) 140/73 (07/05/17 0745)  SpO2: 99 % (07/05/17 0745)  O2 Device (Oxygen Therapy): nasal cannula (07/05/17 0745) Vital Signs (24h Range):  Temp:  [98 °F (36.7 °C)-99.4 °F (37.4 °C)] 98 °F (36.7 °C)  Pulse:  [] 78  Resp:  [16-20] 16  SpO2:  [96 %-100 %] 99 %  BP: (127-149)/(64-84) 140/73     Weight: 80 kg (176 lb 5.9 oz) (07/02/17 0600)  Body mass index is 30.27 kg/m².  Body surface area is 1.9 meters squared.    I/O last 3 completed shifts:  In: 1130 [P.O.:1080; IV Piggyback:50]  Out: 3125 [Urine:3125]    Physical Exam    Significant Labs: reviewed  BMP  Lab Results   Component Value Date     (L) 07/05/2017    K 3.5 07/05/2017     07/05/2017    CO2 24 07/05/2017    BUN 36 (H) 07/05/2017    " CREATININE 3.9 (H) 07/05/2017    CALCIUM 8.0 (L) 07/05/2017    ANIONGAP 10 07/05/2017    ESTGFRAFRICA 15 (A) 07/05/2017    EGFRNONAA 13 (A) 07/05/2017     Lab Results   Component Value Date    WBC 11.99 07/05/2017    HGB 7.9 (L) 07/05/2017    HCT 22.3 (L) 07/05/2017    MCV 88 07/05/2017     07/05/2017       Significant Imaging: CXR reviewed

## 2017-07-06 LAB
ALBUMIN SERPL BCP-MCNC: 1.7 G/DL
ALP SERPL-CCNC: 82 U/L
ALT SERPL W/O P-5'-P-CCNC: 80 U/L
ANION GAP SERPL CALC-SCNC: 9 MMOL/L
AST SERPL-CCNC: 73 U/L
BASOPHILS # BLD AUTO: 0.05 K/UL
BASOPHILS NFR BLD: 0.4 %
BILIRUB DIRECT SERPL-MCNC: 0.2 MG/DL
BILIRUB SERPL-MCNC: 0.4 MG/DL
BUN SERPL-MCNC: 40 MG/DL
CALCIUM SERPL-MCNC: 7.9 MG/DL
CHLORIDE SERPL-SCNC: 101 MMOL/L
CK SERPL-CCNC: 1762 U/L
CO2 SERPL-SCNC: 24 MMOL/L
CREAT SERPL-MCNC: 3.9 MG/DL
DIFFERENTIAL METHOD: ABNORMAL
EOSINOPHIL # BLD AUTO: 0.4 K/UL
EOSINOPHIL NFR BLD: 3 %
ERYTHROCYTE [DISTWIDTH] IN BLOOD BY AUTOMATED COUNT: 14.2 %
EST. GFR  (AFRICAN AMERICAN): 15 ML/MIN/1.73 M^2
EST. GFR  (NON AFRICAN AMERICAN): 13 ML/MIN/1.73 M^2
GLUCOSE SERPL-MCNC: 99 MG/DL
HCT VFR BLD AUTO: 21.6 %
HGB BLD-MCNC: 7.7 G/DL
LYMPHOCYTES # BLD AUTO: 2 K/UL
LYMPHOCYTES NFR BLD: 16.2 %
MAGNESIUM SERPL-MCNC: 1.8 MG/DL
MCH RBC QN AUTO: 31.2 PG
MCHC RBC AUTO-ENTMCNC: 35.6 %
MCV RBC AUTO: 87 FL
MONOCYTES # BLD AUTO: 1.2 K/UL
MONOCYTES NFR BLD: 9.5 %
NEUTROPHILS # BLD AUTO: 8.6 K/UL
NEUTROPHILS NFR BLD: 72 %
PLATELET # BLD AUTO: 209 K/UL
PMV BLD AUTO: 10 FL
POTASSIUM SERPL-SCNC: 3.5 MMOL/L
PROT SERPL-MCNC: 4.5 G/DL
RBC # BLD AUTO: 2.47 M/UL
SODIUM SERPL-SCNC: 134 MMOL/L
WBC # BLD AUTO: 12.13 K/UL

## 2017-07-06 PROCEDURE — 99233 SBSQ HOSP IP/OBS HIGH 50: CPT | Mod: ,,, | Performed by: INTERNAL MEDICINE

## 2017-07-06 PROCEDURE — 25000003 PHARM REV CODE 250: Performed by: FAMILY MEDICINE

## 2017-07-06 PROCEDURE — 97802 MEDICAL NUTRITION INDIV IN: CPT

## 2017-07-06 PROCEDURE — 21400001 HC TELEMETRY ROOM

## 2017-07-06 PROCEDURE — 97530 THERAPEUTIC ACTIVITIES: CPT

## 2017-07-06 PROCEDURE — 97110 THERAPEUTIC EXERCISES: CPT

## 2017-07-06 PROCEDURE — 80048 BASIC METABOLIC PNL TOTAL CA: CPT

## 2017-07-06 PROCEDURE — 27000221 HC OXYGEN, UP TO 24 HOURS

## 2017-07-06 PROCEDURE — 25000003 PHARM REV CODE 250: Performed by: SPECIALIST

## 2017-07-06 PROCEDURE — 80076 HEPATIC FUNCTION PANEL: CPT

## 2017-07-06 PROCEDURE — 92526 ORAL FUNCTION THERAPY: CPT

## 2017-07-06 PROCEDURE — 85025 COMPLETE CBC W/AUTO DIFF WBC: CPT

## 2017-07-06 PROCEDURE — 97116 GAIT TRAINING THERAPY: CPT

## 2017-07-06 PROCEDURE — 25000003 PHARM REV CODE 250: Performed by: NURSE PRACTITIONER

## 2017-07-06 PROCEDURE — 82550 ASSAY OF CK (CPK): CPT

## 2017-07-06 PROCEDURE — 83735 ASSAY OF MAGNESIUM: CPT

## 2017-07-06 RX ORDER — FLUTICASONE PROPIONATE 50 MCG
2 SPRAY, SUSPENSION (ML) NASAL DAILY
Status: DISCONTINUED | OUTPATIENT
Start: 2017-07-06 | End: 2017-07-10 | Stop reason: HOSPADM

## 2017-07-06 RX ADMIN — LORAZEPAM 0.5 MG: 0.5 TABLET ORAL at 09:07

## 2017-07-06 RX ADMIN — PANTOPRAZOLE SODIUM 40 MG: 40 TABLET, DELAYED RELEASE ORAL at 08:07

## 2017-07-06 RX ADMIN — OXYCODONE HYDROCHLORIDE 20 MG: 10 TABLET, FILM COATED, EXTENDED RELEASE ORAL at 08:07

## 2017-07-06 RX ADMIN — ROPINIROLE HYDROCHLORIDE 0.5 MG: 0.25 TABLET, FILM COATED ORAL at 09:07

## 2017-07-06 RX ADMIN — FLUOXETINE 40 MG: 20 CAPSULE ORAL at 08:07

## 2017-07-06 RX ADMIN — DOCUSATE SODIUM 100 MG: 100 CAPSULE, LIQUID FILLED ORAL at 08:07

## 2017-07-06 RX ADMIN — FLUTICASONE PROPIONATE 2 SPRAY: 50 SPRAY, METERED NASAL at 08:07

## 2017-07-06 RX ADMIN — LORAZEPAM 0.5 MG: 0.5 TABLET ORAL at 08:07

## 2017-07-06 RX ADMIN — OXYCODONE HYDROCHLORIDE 20 MG: 10 TABLET, FILM COATED, EXTENDED RELEASE ORAL at 09:07

## 2017-07-06 RX ADMIN — NICOTINE 1 PATCH: 14 PATCH, EXTENDED RELEASE TRANSDERMAL at 08:07

## 2017-07-06 RX ADMIN — QUETIAPINE FUMARATE 200 MG: 100 TABLET, FILM COATED ORAL at 08:07

## 2017-07-06 NOTE — SUBJECTIVE & OBJECTIVE
Interval History: 47 yo female with drug overdose. She is feeling better today. Labia swelling is better and less anasarca.    Review of Systems   Constitutional: Negative.    HENT: Negative.    Respiratory: Negative.    Cardiovascular: Positive for leg swelling.   Gastrointestinal: Negative.    Genitourinary:        Labia swelling   Musculoskeletal: Negative.      Objective:     Vital Signs (Most Recent):  Temp: 98.3 °F (36.8 °C) (07/06/17 0750)  Pulse: 85 (07/06/17 0750)  Resp: 16 (07/06/17 0750)  BP: (!) 144/77 (07/06/17 0750)  SpO2: 98 % (07/06/17 0750) Vital Signs (24h Range):  Temp:  [97.6 °F (36.4 °C)-98.3 °F (36.8 °C)] 98.3 °F (36.8 °C)  Pulse:  [55-96] 85  Resp:  [14-18] 16  SpO2:  [97 %-99 %] 98 %  BP: (132-153)/(77-85) 144/77     Weight: 80 kg (176 lb 5.9 oz)  Body mass index is 30.27 kg/m².    Intake/Output Summary (Last 24 hours) at 07/06/17 0846  Last data filed at 07/06/17 0600   Gross per 24 hour   Intake             1060 ml   Output             2600 ml   Net            -1540 ml      Physical Exam   Constitutional: She is oriented to person, place, and time. She appears well-developed and well-nourished.   HENT:   Head: Atraumatic.   Nose: Nose normal.   Mouth/Throat: Oropharynx is clear and moist.   Eyes: EOM are normal. Pupils are equal, round, and reactive to light.   Neck: Normal range of motion. Neck supple.   Cardiovascular: Normal rate, regular rhythm, normal heart sounds and intact distal pulses.    Pulmonary/Chest: Effort normal and breath sounds normal.   Abdominal: Soft. Bowel sounds are normal.   Genitourinary:   Genitourinary Comments: Palmer in place and some labia edema   Musculoskeletal: She exhibits edema.   Neurological: She is alert and oriented to person, place, and time.   Skin: Skin is warm and dry. Capillary refill takes less than 2 seconds.   Psychiatric: She has a normal mood and affect.   Nursing note and vitals reviewed.      Significant Labs:   Recent Lab Results        07/06/17  0505      Albumin 1.7(L)     Alkaline Phosphatase 82     ALT 80(H)     Anion Gap 9     AST 73(H)     Baso # 0.05     Basophil% 0.4     Bilirubin, Direct 0.2     Total Bilirubin 0.4  Comment:  For infants and newborns, interpretation of results should be based  on gestational age, weight and in agreement with clinical  observations.  Premature Infant recommended reference ranges:  Up to 24 hours.............<8.0 mg/dL  Up to 48 hours............<12.0 mg/dL  3-5 days..................<15.0 mg/dL  6-29 days.................<15.0 mg/dL       BUN, Bld 40(H)     Calcium 7.9(L)     Chloride 101     CO2 24     CPK 1762(H)     Creatinine 3.9(H)     Differential Method Automated     eGFR if  15(A)     eGFR if non  13  Comment:  Calculation used to obtain the estimated glomerular filtration  rate (eGFR) is the CKD-EPI equation. Since race is unknown   in our information system, the eGFR values for   -American and Non--American patients are given   for each creatinine result.  (A)     Eos # 0.4     Eosinophil% 3.0     Glucose 99     Gran # 8.6(H)     Gran% 72.0     Hematocrit 21.6(L)     Hemoglobin 7.7(L)     Lymph # 2.0     Lymph% 16.2(L)     Magnesium 1.8     MCH 31.2(H)     MCHC 35.6     MCV 87     Mono # 1.2(H)     Mono% 9.5     MPV 10.0     Platelets 209     Potassium 3.5     Total Protein 4.5(L)     RBC 2.47(L)     RDW 14.2     Sodium 134(L)     WBC 12.13           Significant Imaging: I have reviewed and interpreted all pertinent imaging results/findings within the past 24 hours.

## 2017-07-06 NOTE — PROGRESS NOTES
[facility] Hospital Medicine  Progress Note    Patient Name: Kaylene Emery  MRN: [id number]  Patient Class: IP- Inpatient   Admission Date: 6/28/2017  Length of Stay: 8 days  Attending Physician: Gina Clarke MD  Primary Care Provider: Jonas Jimenez MD        Subjective:     Principal Problem:Non-traumatic rhabdomyolysis    HPI:  Ms. Emery is a 45 y/o  female with h/o depression, substance OD in the past, was found with AMS earlier today and was brought to the ED. Patient is currently intubated and history obtained per chart review. Family not at bedside.    Apparently patient likely had overdosed on multiple medications including Methocarbamol, Mobic, lorazepam, flexeril, phenergan, Seroquel, Celexa. Her boy friend is on Geodon, Klonopin, Wellbutrin and Effexor.  Most of her recent filled prescription bottles were found empty at the bedside when her bf found her.   Per chart review, yesterday patient was summoned to appear in the court for her DUI that occurred 4 months ago. She was extremely anxious and pacing all day today.     Labs show lactic acid 2.0, CPK > 40,000, CXR show bilateral patchy infiltrates, ANNETTE < 10, tylenol level < 5,  CO2 17, Na 130, , , WBC 15,000, Hgb 17.3.    Patient is currently intubated on the ventilator, breathing over the vent. She received NS 6 liters so far. Patient was evaluated by both neurology and nephrology in the ED. Nephrology plans to initiate patient on CRRT for poison/toxin control.    Hospital Course:  Pt extubated early this am, doing well, still feels anxious but better, denies any SI/ intentions or attempts. She is PECed. debies any prior hx of psych admissions. CRRT started this am and tolerating well. Repeat labs show improving LFTs but CPK still > 40,000/. Denies any injury or pain.     Looks much better, sitting up in chair, has severe LE edema from the Bicarb fluid. CRRT clotted off at 0200 this am, c/o ch back and  muscle pain. No numbness of tingling. Again denies any suicidal ideation or attempt. Getting IV Zosyn too for a presumed Aspiration.     Interval History: 45 yo female with drug overdose. She is feeling better today. Labia swelling is better and less anasarca.    Review of Systems   Constitutional: Negative.    HENT: Negative.    Respiratory: Negative.    Cardiovascular: Positive for leg swelling.   Gastrointestinal: Negative.    Genitourinary:        Labia swelling   Musculoskeletal: Negative.      Objective:     Vital Signs (Most Recent):  Temp: 98.3 °F (36.8 °C) (07/06/17 0750)  Pulse: 85 (07/06/17 0750)  Resp: 16 (07/06/17 0750)  BP: (!) 144/77 (07/06/17 0750)  SpO2: 98 % (07/06/17 0750) Vital Signs (24h Range):  Temp:  [97.6 °F (36.4 °C)-98.3 °F (36.8 °C)] 98.3 °F (36.8 °C)  Pulse:  [55-96] 85  Resp:  [14-18] 16  SpO2:  [97 %-99 %] 98 %  BP: (132-153)/(77-85) 144/77     Weight: 80 kg (176 lb 5.9 oz)  Body mass index is 30.27 kg/m².    Intake/Output Summary (Last 24 hours) at 07/06/17 0846  Last data filed at 07/06/17 0600   Gross per 24 hour   Intake             1060 ml   Output             2600 ml   Net            -1540 ml      Physical Exam   Constitutional: She is oriented to person, place, and time. She appears well-developed and well-nourished.   HENT:   Head: Atraumatic.   Nose: Nose normal.   Mouth/Throat: Oropharynx is clear and moist.   Eyes: EOM are normal. Pupils are equal, round, and reactive to light.   Neck: Normal range of motion. Neck supple.   Cardiovascular: Normal rate, regular rhythm, normal heart sounds and intact distal pulses.    Pulmonary/Chest: Effort normal and breath sounds normal.   Abdominal: Soft. Bowel sounds are normal.   Genitourinary:   Genitourinary Comments: Palmer in place and some labia edema   Musculoskeletal: She exhibits edema.   Neurological: She is alert and oriented to person, place, and time.   Skin: Skin is warm and dry. Capillary refill takes less than 2 seconds.    Psychiatric: She has a normal mood and affect.   Nursing note and vitals reviewed.      Significant Labs:   Recent Lab Results       07/06/17  0505      Albumin 1.7(L)     Alkaline Phosphatase 82     ALT 80(H)     Anion Gap 9     AST 73(H)     Baso # 0.05     Basophil% 0.4     Bilirubin, Direct 0.2     Total Bilirubin 0.4  Comment:  For infants and newborns, interpretation of results should be based  on gestational age, weight and in agreement with clinical  observations.  Premature Infant recommended reference ranges:  Up to 24 hours.............<8.0 mg/dL  Up to 48 hours............<12.0 mg/dL  3-5 days..................<15.0 mg/dL  6-29 days.................<15.0 mg/dL       BUN, Bld 40(H)     Calcium 7.9(L)     Chloride 101     CO2 24     CPK 1762(H)     Creatinine 3.9(H)     Differential Method Automated     eGFR if  15(A)     eGFR if non  13  Comment:  Calculation used to obtain the estimated glomerular filtration  rate (eGFR) is the CKD-EPI equation. Since race is unknown   in our information system, the eGFR values for   -American and Non--American patients are given   for each creatinine result.  (A)     Eos # 0.4     Eosinophil% 3.0     Glucose 99     Gran # 8.6(H)     Gran% 72.0     Hematocrit 21.6(L)     Hemoglobin 7.7(L)     Lymph # 2.0     Lymph% 16.2(L)     Magnesium 1.8     MCH 31.2(H)     MCHC 35.6     MCV 87     Mono # 1.2(H)     Mono% 9.5     MPV 10.0     Platelets 209     Potassium 3.5     Total Protein 4.5(L)     RBC 2.47(L)     RDW 14.2     Sodium 134(L)     WBC 12.13           Significant Imaging: I have reviewed and interpreted all pertinent imaging results/findings within the past 24 hours.    Assessment/Plan:      Polysubstance overdose    - Unclear intention.  - PEC'd  - Psychiatry has evaluated patient  - CRRT to be started tonight for polysubstance/toxin removal    Improving on CRRT-- CPK still high but LFTs improving    CRRT clotted off,  CPK slightly down but still very high  Will change Bicarb Fluids to NS at a lower rate    7/5/17: intentional overdose  Did have CRRT   She has been Pec'd           * Non-traumatic rhabdomyolysis    - CPK < 40,000  - Repeat daily CPK  - Received six liters of NS so far in the ED  - Continue NS at 125 cc/hr  - Discussed with  (nephrology), plan to initiate CRRT tonight    As above-- improving but still very high, still needs IVF    7/5/17: UO improving and continue IVF    7/6/17: CK trending down        Recurrent major depressive disorder    Needs to be addressed further          Anasarca    Third spacing due to ALI and ALEXUS-- will change IVF to NS at a lower rate  Avoid Lasix at this point to protect the kidneys    7/6/17:        Hypoalbuminemia    Probably acute on Ch due to Liver Injury  Will watch          ALEXUS (acute kidney injury)    Possibly Pre renal?  Will watch closely    7/6/17: her urine output is increasing and her BUN/Cr is stable for past 2 days.        Elevated liver enzymes    Sec to Polysubstance Drug OD-- improving          Thrombocytopenia    Heparin drip off as CRRT does not require heparin gtt  Platelets still low but stable-- probably sec to Acute Liver Injury          Aspiration pneumonia of both lungs    - Start Vanc and Zosyn (received rocephin, zithromax in the ED)  - Follow up on blood, sputum cultures  - Duonebs q6    No s/s of aspiration, will stop Zosyn          Transaminitis    - Secondary to polysubstance overdose  - Monitor closely  - Gradually improving with CRRT  - Continue present care,, LFTs improving    7/6/17: trending down, continue with gently hydration and monitor            VTE Risk Mitigation         Ordered     Medium Risk of VTE  Once      06/28/17 2059     Place sequential compression device  Until discontinued      06/28/17 2059          Gina Clarke MD  Department of Hospital Medicine   [facility]

## 2017-07-06 NOTE — PT/OT/SLP PROGRESS
Occupational Therapy  Treatment    Kaylene Emery   MRN: [id number]   Admitting Diagnosis: Non-traumatic rhabdomyolysis    OT Date of Treatment: 07/06/17   OT Start Time: 0830  OT Stop Time: 0853  OT Total Time (min): 23 min    Billable Minutes:  Therapeutic Activity  x 13 min and Therapeutic Exercise  x 10 min    General Precautions: Standard, fall  Orthopedic Precautions: N/A  Braces: N/A         Subjective:  Communicated with pt, spouse, nurse - Meera and sitter prior to session.    Pain/Comfort  Pain Rating 1: 0/10    Objective:  Patient found with: peripheral IV, telemetry, oxygen     Functional Mobility:  Bed Mobility:  Rolling/Turning Right: Stand by assistance  Scooting/Bridging: Stand by Assistance  Supine to Sit: Stand by Assistance    Transfers:   Sit <> Stand Assistance: Moderate Assistance  Sit <> Stand Assistive Device: Rolling Walker  Bed <> Chair Technique: Stand Pivot  Bed <> Chair Transfer Assistance: Moderate Assistance  Bed <> Chair Assistive Device: Rolling Walker    Functional Ambulation: fx ambulation with RW x 10 ft x 2 with mod A, slow paced gait.     Activities of Daily Living:     Feeding adaptive equipment:      UE adaptive equipment:      LE adaptive equipment:                     Bathing adaptive equipment:     Balance:   Static Sit: FAIR+: Able to take MINIMAL challenges from all directions  Dynamic Sit: FAIR: Cannot move trunk without losing balance  Static Stand: POOR: Needs MODERATE assist to maintain  Dynamic stand: POOR: N/A    Therapeutic Activities and Exercises:  Pt completed therapeutic ex to BUE via red theraband ex 1 x 10 rps to increase fx strength to impact self care skills. Pt tolerated tx well, improving with fx activity tolerance, bed mobility skills    AM-PAC 6 CLICK ADL   How much help from another person does this patient currently need?   1 = Unable, Total/Dependent Assistance  2 = A lot, Maximum/Moderate Assistance  3 = A little, Minimum/Contact  "Guard/Supervision  4 = None, Modified [place]/Independent    Putting on and taking off regular lower body clothing? : 2  Bathing (including washing, rinsing, drying)?: 2  Toileting, which includes using toilet, bedpan, or urinal? : 2  Putting on and taking off regular upper body clothing?: 2  Taking care of personal grooming such as brushing teeth?: 3  Eating meals?: 3  Total Score: 14     AM-PAC Raw Score CMS "G-Code Modifier Level of Impairment Assistance   6 % Total / Unable   7 - 8 CM 80 - 100% Maximal Assist   9-13 CL 60 - 80% Moderate Assist   14 - 19 CK 40 - 60% Moderate Assist   20 - 22 CJ 20 - 40% Minimal Assist   23 CI 1-20% SBA / CGA   24 CH 0% Independent/ Mod I       Patient left up in chair with all lines intact, call button in reach and sitter and spouse present    ASSESSMENT:  Kaylene Emery is a 46 y.o. female with a medical diagnosis of Non-traumatic rhabdomyolysis and presents with impaired self care and fx mobility.    Rehab identified problem list/impairments: Rehab identified problem list/impairments: weakness, impaired endurance, gait instability, impaired functional mobilty, impaired self care skills, impaired balance, decreased coordination, decreased lower extremity function, decreased safety awareness    Rehab potential is good.    Activity tolerance: Good    Discharge recommendations: Discharge Facility/Level Of Care Needs: nursing facility, skilled, rehabilitation facility     Barriers to discharge: Barriers to Discharge: None    Equipment recommendations: walker, rolling, bedside commode     GOALS:    Occupational Therapy Goals        Problem: Occupational Therapy Goal    Goal Priority Disciplines Outcome Interventions   Occupational Therapy Goal     OT, PT/OT Ongoing (interventions implemented as appropriate)    Description:  ot goals to be met by 7-7-17  1. Mod a with ue dressing  2. Mod a with le dressing  3. Pt will tolerate 1 set x 10 reps b ue rom exercise  4. " Pt will req mod a with bsc<>bed t/f's                    Plan:  Patient to be seen 3 x/week to address the above listed problems via self-care/home management, therapeutic activities, therapeutic exercises  Plan of Care expires: 07/13/17  Plan of Care reviewed with: patient, spouse         Dave Cris, OT  07/06/2017

## 2017-07-06 NOTE — ASSESSMENT & PLAN NOTE
47 y/o female with ALEXUS                          Non-traumatic rhabdomyolysis     1. Renal: ALEXUS. Due to non-traumatic rhabdomyolysis  Still no sign of renal recovery, but s Cr is now appears to be plateauing  Good and increased UOP today, good sign  Last HD was on 7/1/17  Will hold HD for today and follow summer  Pt may recover from rhabdomyolysis  Dyspnea improved  Hemodynamically stable  Discussed with pt, her fiance  Hold IVF's  Advised pt not to drink water in XS, until full recovery has occurred  HTN: BP controlled          Aspiration pneumonia     2. ID: abx reviewed  On rocephin for suspected aspiration pneumonia  Afebrile  WBC elevated       Polysubstance overdose     3. Psych: apparent drug overdose in what is reported as intention to self-harm  H/o of depression  Will defer to primary team.          Plans and recommendations:  As discussed above  Hold HD for today  Will evaluate in am for need for HD  Repeat blood work in am

## 2017-07-06 NOTE — ASSESSMENT & PLAN NOTE
Third spacing due to ALI and ALEXUS-- will change IVF to NS at a lower rate  Avoid Lasix at this point to protect the kidneys    7/6/17:

## 2017-07-06 NOTE — ASSESSMENT & PLAN NOTE
- CPK < 40,000  - Repeat daily CPK  - Received six liters of NS so far in the ED  - Continue NS at 125 cc/hr  - Discussed with  (nephrology), plan to initiate CRRT tonight    As above-- improving but still very high, still needs IVF    7/5/17: UO improving and continue IVF    7/6/17: CK trending down

## 2017-07-06 NOTE — PT/OT/SLP PROGRESS
Speech Language Pathology  Treatment    Kaylene Emery   MRN: [id number]   Admitting Diagnosis: Non-traumatic rhabdomyolysis    Diet recommendations: Solid Diet Level: Mechanical soft  Liquid Diet Level: Thin HOB to 90 degrees, Small bites/sips, Alternating bites/sips and 1 bite/sip at a time    SLP Treatment Date: 07/06/17  Speech Start Time: 1003     Speech Stop Time: 1013     Speech Total (min): 10 min       TREATMENT BILLABLE MINUTES:  Treatment Swallowing Dysfunction 10    Has the patient been evaluated by SLP for swallowing? : Yes  Keep patient NPO?: No   General Precautions: Standard, fall  Current Respiratory Status: nasal cannula       Subjective:  Pt cooperative and feeling better.    Pain/Comfort  Pain Rating 1: 0/10  Pain Rating Post-Intervention 2: 0/10    Objective:    Pt ate bites of cracker and sips of thin liquids without difficulty. Laryngeal elevation and oral motor skills were WNL. Recommend discharge from  at this time.     FIM:                                 Assessment:  Kaylene Emery is a 46 y.o. female with a medical diagnosis of Non-traumatic rhabdomyolysis and presents with safe swallow.    Discharge recommendations: Discharge Facility/Level Of Care Needs: nursing facility, skilled     Goals:    SLP Goals        Problem: SLP Goal    Goal Priority Disciplines Outcome   SLP Goal     SLP Ongoing (interventions implemented as appropriate)   Description:  TPW recall 2/3 aspiration precautions (small bites/sips, sit upright, slow intake) with min a  TPW follow aspiration precautions during bedside trials with min a                     Plan:   Patient to be seen Therapy Frequency: 3 x/week   Plan of Care expires: 07/08/17  Plan of Care reviewed with: patient  SLP Follow-up?: No              Ofelia Markham CCC-SLP  07/06/2017

## 2017-07-06 NOTE — PT/OT/SLP PROGRESS
Physical Therapy  Treatment    Kaylene Emery   MRN: [id number]   Admitting Diagnosis: Non-traumatic rhabdomyolysis    PT Received On: 07/06/17  PT Start Time: 0830     PT Stop Time: 0855    PT Total Time (min): 25 min       Billable Minutes:  Gait [id number] and Therapeutic Activity 10    Treatment Type: Treatment  PT/PTA: PT          General Precautions: Standard, fall, respiratory  Orthopedic Precautions: N/A   Braces: N/A    Subjective:  Communicated with NURSE ZEPEDA prior to session.  Pain/Comfort  Pain Rating 1: 0/10    Objective:   Patient found with: oxygen, telemetry    Functional Mobility:  Bed Mobility:   Rolling/Turning to Left: Stand by assistance  Rolling/Turning Right: Stand by assistance  Scooting/Bridging: Stand by Assistance  Supine to Sit: Stand by Assistance    Transfers:  Sit <> Stand Assistance: Moderate Assistance  Sit <> Stand Assistive Device: Rolling Walker  Bed <> Chair Technique: Stand Pivot  Bed <> Chair Assistance: Moderate Assistance  Bed <> Chair Assistive Device: Rolling Walker    Gait:   Gait Distance: PT AMB 10' X 2 TRIALS WITH RW AND MODA, CHAIR IN TOW FOR SAFETY, UNSTEADY GUARDED GAIT DUE TO BLE WEAKNESS RLE MORE THAN LLE. ONE SEATED REST BREAK  Assistance 1: Moderate assistance  Gait Pattern: swing-to gait  Gait Deviation(s): decreased amy, decreased step length, decreased toe-to-floor clearance, decreased weight-shifting ability    Balance:   Static Sit: GOOD  Dynamic Sit: FAIR  Static Stand: POOR  Dynamic stand:  POOR    Therapeutic Activities and Exercises:  PT REQUIRED EXTRA TIME TO COMPLETE FUNCTIONAL TASKS, SELVIN FOR DONNING ROBE, REVIEW BLE THEREX TO PRACTICE    AM-PAC 6 CLICK MOBILITY  How much help from another person does this patient currently need?   1 = Unable, Total/Dependent Assistance  2 = A lot, Maximum/Moderate Assistance  3 = A little, Minimum/Contact Guard/Supervision  4 = None, Modified [place]/Independent    Turning over in bed (including  adjusting bedclothes, sheets and blankets)?: 3  Sitting down on and standing up from a chair with arms (e.g., wheelchair, bedside commode, etc.): 2  Moving from lying on back to sitting on the side of the bed?: 3  Moving to and from a bed to a chair (including a wheelchair)?: 2  Need to walk in hospital room?: 2  Climbing 3-5 steps with a railing?: 1  Total Score: 13    AM-PAC Raw Score CMS G-Code Modifier Level of Impairment Assistance   6 % Total / Unable   7 - 9 CM 80 - 100% Maximal Assist   10 - 14 CL 60 - 80% Moderate Assist   15 - 19 CK 40 - 60% Moderate Assist   20 - 22 CJ 20 - 40% Minimal Assist   23 CI 1-20% SBA / CGA   24 CH 0% Independent/ Mod I     Patient left up in chair with all lines intact, call button in reach, NURSE notified and FRIEND present.    Assessment:  Kaylene Emery is a 46 y.o. female with a medical diagnosis of Non-traumatic rhabdomyolysis   PT WILL BENEFIT FROM CONT. SKILLED P.T. TO ADDRESS IMPAIRMENTS    Rehab identified problem list/impairments: Rehab identified problem list/impairments: weakness, impaired endurance, impaired functional mobilty, gait instability, impaired balance, decreased coordination, decreased safety awareness, decreased ROM, edema, decreased lower extremity function    Rehab potential is good.    Activity tolerance: Good    Discharge recommendations: Discharge Facility/Level Of Care Needs: nursing facility, skilled, rehabilitation facility     Barriers to discharge: Barriers to Discharge: None    Equipment recommendations: Equipment Needed After Discharge: walker, rolling, bedside commode     GOALS:    Physical Therapy Goals        Problem: Physical Therapy Goal    Goal Priority Disciplines Outcome Goal Variances Interventions   Physical Therapy Goal     PT/OT, PT      Description:  PT WILL BE SEEN FOR P.T. FOR A MIN OF 5 OUT OF 7 DAYS A WEEK  LT17  1. PT WILL COMPLETE BED MOBILITY LATANYA  2. PT WILL AMB 50' WITH RW AND SELVIN  3. PT WILL T/F  TO CHAIR WITH SELVIN.  4. PT WILL COMPLETE B LE TE X 20 REPS FOR STRENGTHENING.                    PLAN:    Patient to be seen 5 x/week  to address the above listed problems via gait training, therapeutic activities, therapeutic exercises  Plan of Care expires: 07/13/17  Plan of Care reviewed with: patient, friend    PT ENCOURAGED TO CALL FOR ASSISTANCE WITH ALL NEEDS DUE TO FALL RISK STATUS, PT AGREEABLE.   PT ENCOURAGED TO INCREASE TIME OOB IN CHAIR, ALL MEALS IN CHAIR OOB, PT AGREEABLE    Luz Talley, PT  07/06/2017

## 2017-07-06 NOTE — ASSESSMENT & PLAN NOTE
- Secondary to polysubstance overdose  - Monitor closely  - Gradually improving with CRRT  - Continue present care,, LFTs improving    7/6/17: trending down, continue with gently hydration and monitor

## 2017-07-06 NOTE — PLAN OF CARE
Problem: Patient Care Overview  Goal: Plan of Care Review  PT REQUIRES MAX A X2>TOTAL A FOR BED MOBILITY AND T/F TO CHAIR   Outcome: Ongoing (interventions implemented as appropriate)  POC discussed w/patient, verbalized understanding. NSR on monitor. VSS. Voids per carroll urine clear yellow no foul odor. Pt c/o pain oxycodone administered. Full relief obtained. Patient turns independently in bed. Fall precautions in place, bed alarm on. Sitter at bedside.

## 2017-07-06 NOTE — ASSESSMENT & PLAN NOTE
Nutrition Problem:   Altered nutrition related laboratory values     Related to (etiology):   ALEXUS    Signs and Symptoms (as evidenced by):   BUN 55, CREA 4.4, GFR 11    Interventions/Recommendations (treatment strategy):  1. Continue Renal Diet  2. Will continue to monitor      Nutrition Diagnosis Status:   Continues

## 2017-07-06 NOTE — SUBJECTIVE & OBJECTIVE
"Asked to evaluate patient for swelling of the labia.  Caregiver in room has noticed swelling for past 2 days    Obstetric History     No data available        Past Medical History:   Diagnosis Date    Anxiety     Depression     GERD (gastroesophageal reflux disease)     Tobacco use      Past Surgical History:   Procedure Laterality Date    BLADDER REPAIR      CENTRAL LINE  6/28/2017         CHOLECYSTECTOMY  04/07/2017    KNEE ARTHROPLASTY      PARTIAL HYSTERECTOMY      TUBAL LIGATION         PTA Medications   Medication Sig    gabapentin (NEURONTIN) 400 MG capsule Take 400 mg by mouth 3 (three) times daily.    oxycodone-acetaminophen (PERCOCET) 7.5-325 mg per tablet Take 1 tablet by mouth every 4 (four) hours as needed for Pain.    fluoxetine (PROZAC) 40 MG capsule Take 40 mg by mouth once daily.    lorazepam (ATIVAN) 1 MG tablet Take 1 mg by mouth every 6 (six) hours as needed for Anxiety.    meloxicam (MOBIC) 7.5 MG tablet Take 1 tablet (7.5 mg total) by mouth once daily.    ondansetron (ZOFRAN-ODT) 4 MG TbDL Take 1 tablet (4 mg total) by mouth every 6 (six) hours as needed.    promethazine (PHENERGAN) 25 MG tablet Take 1 tablet (25 mg total) by mouth every 6 (six) hours as needed for Nausea.    quetiapine (SEROQUEL) 200 MG Tab Take by mouth.       Review of patient's allergies indicates:   Allergen Reactions    Corticosteroids (glucocorticoids)      "sets off my anxiety real bad"    Tramadol Rash        Family History     Problem Relation (Age of Onset)    Diabetes Mother    Hypertension Mother        Social History Main Topics    Smoking status: Current Every Day Smoker     Packs/day: 1.00     Types: Cigarettes    Smokeless tobacco: Never Used    Alcohol use Yes      Comment: occasionally    Drug use: No    Sexual activity: Yes     Review of Systems   Constitutional: Negative for activity change, appetite change, chills, diaphoresis, fatigue, fever and unexpected weight change.   HENT: " Negative for mouth sores and tinnitus.    Eyes: Negative for discharge and visual disturbance.   Respiratory: Negative for cough, shortness of breath and wheezing.    Cardiovascular: Negative for chest pain, palpitations and leg swelling.   Gastrointestinal: Negative for abdominal pain, bloating, blood in stool, constipation, diarrhea, nausea and vomiting.   Endocrine: Negative for diabetes, hair loss, hot flashes, hyperthyroidism and hypothyroidism.   Genitourinary: Positive for vaginal pain. Negative for decreased libido, dyspareunia, dysuria, flank pain, frequency, genital sores, hematuria, menorrhagia, menstrual problem, pelvic pain, urgency, vaginal bleeding, vaginal discharge, dysmenorrhea, urinary incontinence, postcoital bleeding, postmenopausal bleeding and vaginal odor.   Musculoskeletal: Negative for back pain and myalgias.   Skin:  Negative for rash, no acne and hair changes.   Neurological: Negative for seizures, syncope, numbness and headaches.   Hematological: Negative for adenopathy. Does not bruise/bleed easily.   Psychiatric/Behavioral: Negative for depression and sleep disturbance. The patient is not nervous/anxious.    Breast: Negative for breast mass, breast pain, nipple discharge and skin changes       Objective:     Vital Signs (Most Recent):  Temp: 98 °F (36.7 °C) (07/05/17 1247)  Pulse: 86 (07/05/17 1524)  Resp: 18 (07/05/17 1247)  BP: (!) 153/83 (07/05/17 1247)  SpO2: 98 % (07/05/17 1247) Vital Signs (24h Range):  Temp:  [98 °F (36.7 °C)-99.4 °F (37.4 °C)] 98 °F (36.7 °C)  Pulse:  [] 86  Resp:  [16-20] 18  SpO2:  [98 %-100 %] 98 %  BP: (127-153)/(64-84) 153/83     Weight: 80 kg (176 lb 5.9 oz)  Body mass index is 30.27 kg/m².    No LMP recorded. Patient has had a hysterectomy.    Physical Exam:   Constitutional: She appears well-developed.     Eyes: Conjunctivae and EOM are normal. Pupils are equal, round, and reactive to light.    Neck: Normal range of motion. Neck supple.      Pulmonary/Chest: Effort normal. Right breast exhibits no mass, no nipple discharge, no skin change, no tenderness and presence. Left breast exhibits no mass, no nipple discharge, no skin change, no tenderness and presence. Breasts are symmetrical.        Abdominal: Soft.     Genitourinary: Pelvic exam was performed with patient supine.   Genitourinary Comments: Labia edematous            Musculoskeletal: Normal range of motion. She exhibits edema.   Edema from lower extremity to hip       Neurological: She is alert.    Skin: Skin is warm.    Psychiatric: She has a normal mood and affect.       Laboratory:  I have personallly reviewed all pertinent lab results from the last 24 hours.    Diagnostic Results:  n/a

## 2017-07-06 NOTE — PLAN OF CARE
Problem: Patient Care Overview  Goal: Plan of Care Review  PT REQUIRES MAX A X2>TOTAL A FOR BED MOBILITY AND T/F TO CHAIR   Outcome: Ongoing (interventions implemented as appropriate)  Recommendations     Recommendation/Intervention: 1. Continue Current Diet. 2. Will continue to encourage PO intake. 3. Will follow up   Goals: Intake of greater than 80% of meals   Nutrition Goal Status: new  Communication of RD Recs:  (care plan )

## 2017-07-06 NOTE — SUBJECTIVE & OBJECTIVE
"Interval History: Pt was seen and examined. No new c/o's stable last pm, pt feels "better", no SOB, no fever, no new c/o's.    Review of patient's allergies indicates:   Allergen Reactions    Corticosteroids (glucocorticoids)      "sets off my anxiety real bad"    Tramadol Rash     Current Facility-Administered Medications   Medication Frequency    acetaminophen tablet 650 mg Q8H PRN    albuterol-ipratropium 2.5mg-0.5mg/3mL nebulizer solution 3 mL Q4H PRN    bisacodyl suppository 10 mg Daily PRN    diphenhydrAMINE capsule 50 mg Nightly PRN    docusate sodium capsule 100 mg Daily    ergocalciferol capsule 50,000 Units Q7 Days    fluoxetine capsule 40 mg Daily    fluticasone 50 mcg/actuation nasal spray 2 spray Daily    heparin (porcine) injection 2,000 Units PRN    lorazepam tablet 0.5 mg BID    nicotine 14 mg/24 hr 1 patch Daily    ondansetron injection 4 mg Q8H PRN    oxycodone 12 hr tablet 20 mg BID PRN    pantoprazole EC tablet 40 mg Daily    quetiapine tablet 200 mg Daily    ropinirole tablet 0.5 mg QHS       Objective:     Vital Signs (Most Recent):  Temp: 97.9 °F (36.6 °C) (07/06/17 1116)  Pulse: 80 (07/06/17 1116)  Resp: 18 (07/06/17 1116)  BP: (!) 141/88 (07/06/17 1116)  SpO2: 97 % (07/06/17 1116)  O2 Device (Oxygen Therapy): nasal cannula (07/06/17 1116) Vital Signs (24h Range):  Temp:  [97.6 °F (36.4 °C)-98.3 °F (36.8 °C)] 97.9 °F (36.6 °C)  Pulse:  [55-96] 80  Resp:  [14-18] 18  SpO2:  [97 %-98 %] 97 %  BP: (132-153)/(77-88) 141/88     Weight: 80 kg (176 lb 5.9 oz) (07/02/17 0600)  Body mass index is 30.27 kg/m².  Body surface area is 1.9 meters squared.    I/O last 3 completed shifts:  In: 1420 [P.O.:1420]  Out: 3650 [Urine:3650]    Physical Exam    Significant Labs: reviewed  CK 1700  BMP  Lab Results   Component Value Date     (L) 07/06/2017    K 3.5 07/06/2017     07/06/2017    CO2 24 07/06/2017    BUN 40 (H) 07/06/2017    CREATININE 3.9 (H) 07/06/2017    CALCIUM 7.9 (L) " 07/06/2017    ANIONGAP 9 07/06/2017    ESTGFRAFRICA 15 (A) 07/06/2017    EGFRNONAA 13 (A) 07/06/2017     Lab Results   Component Value Date    WBC 12.13 07/06/2017    HGB 7.7 (L) 07/06/2017    HCT 21.6 (L) 07/06/2017    MCV 87 07/06/2017     07/06/2017       Significant Imaging: reviewed CXR

## 2017-07-06 NOTE — PLAN OF CARE
SERGE spoke with Dr Clarke regarding medical clearance.  Patient will likely be ready on Monday for discharge.  She is currently not medically cleared for placement.

## 2017-07-06 NOTE — PROGRESS NOTES
"[facility] -   Obstetrics & Gynecology  Consult Note    Patient Name: Kaylene Emery  MRN: [id number]  Admission Date: 6/28/2017  Primary Care Provider: Jonas Jimenez MD  Principal Problem: Non-traumatic rhabdomyolysis    Subjective:     HPI:    Asked to evaluate patient for swelling of the labia.  Caregiver in room has noticed swelling for past 2 days    Obstetric History     No data available        Past Medical History:   Diagnosis Date    Anxiety     Depression     GERD (gastroesophageal reflux disease)     Tobacco use      Past Surgical History:   Procedure Laterality Date    BLADDER REPAIR      CENTRAL LINE  6/28/2017         CHOLECYSTECTOMY  04/07/2017    KNEE ARTHROPLASTY      PARTIAL HYSTERECTOMY      TUBAL LIGATION         PTA Medications   Medication Sig    gabapentin (NEURONTIN) 400 MG capsule Take 400 mg by mouth 3 (three) times daily.    oxycodone-acetaminophen (PERCOCET) 7.5-325 mg per tablet Take 1 tablet by mouth every 4 (four) hours as needed for Pain.    fluoxetine (PROZAC) 40 MG capsule Take 40 mg by mouth once daily.    lorazepam (ATIVAN) 1 MG tablet Take 1 mg by mouth every 6 (six) hours as needed for Anxiety.    meloxicam (MOBIC) 7.5 MG tablet Take 1 tablet (7.5 mg total) by mouth once daily.    ondansetron (ZOFRAN-ODT) 4 MG TbDL Take 1 tablet (4 mg total) by mouth every 6 (six) hours as needed.    promethazine (PHENERGAN) 25 MG tablet Take 1 tablet (25 mg total) by mouth every 6 (six) hours as needed for Nausea.    quetiapine (SEROQUEL) 200 MG Tab Take by mouth.       Review of patient's allergies indicates:   Allergen Reactions    Corticosteroids (glucocorticoids)      "sets off my anxiety real bad"    Tramadol Rash        Family History     Problem Relation (Age of Onset)    Diabetes Mother    Hypertension Mother        Social History Main Topics    Smoking status: Current Every Day Smoker     Packs/day: 1.00     Types: Cigarettes    Smokeless tobacco: " Never Used    Alcohol use Yes      Comment: occasionally    Drug use: No    Sexual activity: Yes     Review of Systems   Constitutional: Negative for activity change, appetite change, chills, diaphoresis, fatigue, fever and unexpected weight change.   HENT: Negative for mouth sores and tinnitus.    Eyes: Negative for discharge and visual disturbance.   Respiratory: Negative for cough, shortness of breath and wheezing.    Cardiovascular: Negative for chest pain, palpitations and leg swelling.   Gastrointestinal: Negative for abdominal pain, bloating, blood in stool, constipation, diarrhea, nausea and vomiting.   Endocrine: Negative for diabetes, hair loss, hot flashes, hyperthyroidism and hypothyroidism.   Genitourinary: Positive for vaginal pain. Negative for decreased libido, dyspareunia, dysuria, flank pain, frequency, genital sores, hematuria, menorrhagia, menstrual problem, pelvic pain, urgency, vaginal bleeding, vaginal discharge, dysmenorrhea, urinary incontinence, postcoital bleeding, postmenopausal bleeding and vaginal odor.   Musculoskeletal: Negative for back pain and myalgias.   Skin:  Negative for rash, no acne and hair changes.   Neurological: Negative for seizures, syncope, numbness and headaches.   Hematological: Negative for adenopathy. Does not bruise/bleed easily.   Psychiatric/Behavioral: Negative for depression and sleep disturbance. The patient is not nervous/anxious.    Breast: Negative for breast mass, breast pain, nipple discharge and skin changes       Objective:     Vital Signs (Most Recent):  Temp: 98 °F (36.7 °C) (07/05/17 1247)  Pulse: 86 (07/05/17 1524)  Resp: 18 (07/05/17 1247)  BP: (!) 153/83 (07/05/17 1247)  SpO2: 98 % (07/05/17 1247) Vital Signs (24h Range):  Temp:  [98 °F (36.7 °C)-99.4 °F (37.4 °C)] 98 °F (36.7 °C)  Pulse:  [] 86  Resp:  [16-20] 18  SpO2:  [98 %-100 %] 98 %  BP: (127-153)/(64-84) 153/83     Weight: 80 kg (176 lb 5.9 oz)  Body mass index is 30.27  kg/m².    No LMP recorded. Patient has had a hysterectomy.    Physical Exam:   Constitutional: She appears well-developed.     Eyes: Conjunctivae and EOM are normal. Pupils are equal, round, and reactive to light.    Neck: Normal range of motion. Neck supple.     Pulmonary/Chest: Effort normal. Right breast exhibits no mass, no nipple discharge, no skin change, no tenderness and presence. Left breast exhibits no mass, no nipple discharge, no skin change, no tenderness and presence. Breasts are symmetrical.        Abdominal: Soft.     Genitourinary: Pelvic exam was performed with patient supine.   Genitourinary Comments: Labia edematous            Musculoskeletal: Normal range of motion. She exhibits edema.   Edema from lower extremity to hip       Neurological: She is alert.    Skin: Skin is warm.    Psychiatric: She has a normal mood and affect.       Laboratory:  I have personallly reviewed all pertinent lab results from the last 24 hours.    Diagnostic Results:  n/a    Assessment/Plan:     Anasarca    Swelling in labia will improve once her anasarca improves;  Can add ice pack for comfort            Karyn Rondon MD  Obstetrics & Gynecology  [facility]

## 2017-07-06 NOTE — PROGRESS NOTES
"[facility]  Nephrology  Progress Note    Patient Name: Kaylene Emery  MRN: [id number]  Admission Date: 6/28/2017  Hospital Length of Stay: 8 days  Attending Provider: Gina Clarke MD   Primary Care Physician: Jonas Jimenez MD  Principal Problem:Non-traumatic rhabdomyolysis and ALEXUS    Subjective:     HPI: Patient is a 46-year-old with multiple psychiatry issues.  Comes in with multiple medication overdose with unclear intent suicidal versus overdose versus toxic injections.  Patient has dark-colored urine along with severe rhabdomyolysis.  Patient is now intubated.  Dr. Giles consulted me for initiation for CRRT for multiple drug poisoning and rhabdo myelolysis.  Patient has had transient episode of hypotension with anxiolytics.  Still has some urine output and is nonoliguric.  Most of the history has been obtained from Dr. Giles, patient's fiancé and from the medical records.    Interval History: Pt was seen and examined. No new c/o's stable last pm, pt feels "better", no SOB, no fever, no new c/o's.    Review of patient's allergies indicates:   Allergen Reactions    Corticosteroids (glucocorticoids)      "sets off my anxiety real bad"    Tramadol Rash     Current Facility-Administered Medications   Medication Frequency    acetaminophen tablet 650 mg Q8H PRN    albuterol-ipratropium 2.5mg-0.5mg/3mL nebulizer solution 3 mL Q4H PRN    bisacodyl suppository 10 mg Daily PRN    diphenhydrAMINE capsule 50 mg Nightly PRN    docusate sodium capsule 100 mg Daily    ergocalciferol capsule 50,000 Units Q7 Days    fluoxetine capsule 40 mg Daily    fluticasone 50 mcg/actuation nasal spray 2 spray Daily    heparin (porcine) injection 2,000 Units PRN    lorazepam tablet 0.5 mg BID    nicotine 14 mg/24 hr 1 patch Daily    ondansetron injection 4 mg Q8H PRN    oxycodone 12 hr tablet 20 mg BID PRN    pantoprazole EC tablet 40 mg Daily    quetiapine tablet 200 mg Daily    ropinirole " tablet 0.5 mg QHS       Objective:     Vital Signs (Most Recent):  Temp: 97.9 °F (36.6 °C) (07/06/17 1116)  Pulse: 80 (07/06/17 1116)  Resp: 18 (07/06/17 1116)  BP: (!) 141/88 (07/06/17 1116)  SpO2: 97 % (07/06/17 1116)  O2 Device (Oxygen Therapy): nasal cannula (07/06/17 1116) Vital Signs (24h Range):  Temp:  [97.6 °F (36.4 °C)-98.3 °F (36.8 °C)] 97.9 °F (36.6 °C)  Pulse:  [55-96] 80  Resp:  [14-18] 18  SpO2:  [97 %-98 %] 97 %  BP: (132-153)/(77-88) 141/88     Weight: 80 kg (176 lb 5.9 oz) (07/02/17 0600)  Body mass index is 30.27 kg/m².  Body surface area is 1.9 meters squared.    I/O last 3 completed shifts:  In: 1420 [P.O.:1420]  Out: 3650 [Urine:3650]    Physical Exam  Constitutional: No distress.  Head: Normocephalic.   Eyes: Conjunctivae are normal. No scleral icterus.   Cardiovascular: Regular rhythm, normal heart sounds and intact distal pulses.  No murmur heard.  Pulmonary/Chest: Abdominal: Soft. She exhibits no distension.   Musculoskeletal: She exhibits no edema or deformity.   Skin: Skin is warm. She is not diaphoretic. No erythema.   Psychiatric: She is not agitated.   Nursing note and vitals reviewed    Significant Labs: reviewed  CK 1700  BMP  Lab Results   Component Value Date     (L) 07/06/2017    K 3.5 07/06/2017     07/06/2017    CO2 24 07/06/2017    BUN 40 (H) 07/06/2017    CREATININE 3.9 (H) 07/06/2017    CALCIUM 7.9 (L) 07/06/2017    ANIONGAP 9 07/06/2017    ESTGFRAFRICA 15 (A) 07/06/2017    EGFRNONAA 13 (A) 07/06/2017     Lab Results   Component Value Date    WBC 12.13 07/06/2017    HGB 7.7 (L) 07/06/2017    HCT 21.6 (L) 07/06/2017    MCV 87 07/06/2017     07/06/2017       Significant Imaging: reviewed CXR    Assessment/Plan:                  47 y/o female with ALEXUS                          Non-traumatic rhabdomyolysis     1. Renal: ALEXUS. Due to non-traumatic rhabdomyolysis  S Cr today is the same as it was yesterday, renal failure is now appears to be plateauing  Further  increased UOP today, good sign  Last HD was on 7/1/17  Will hold HD for today and follow summer  Pt likely will recover from rhabdomyolysis  Dyspnea improved, no sx's today  Hemodynamically stable  Discussed with pt, her fiance  If s Cr same or lower in the next few days, will then remove the IJ vas cath.  HTN: BP controlled          Aspiration pneumonia     2. ID: abx reviewed  On rocephin for suspected aspiration pneumonia  Afebrile  WBC elevated       Polysubstance overdose     3. Psych: apparent drug overdose in what is reported as intention to self-harm  H/o of depression  Will defer to primary team.          Plans and recommendations:  As discussed above  Hold HD for today  IJ vas cath can be removed in the next few days if s Cr continues to improve  Expect no further need for acute HD  Repeat blood work in am               Thank you for your consult.     Mona Gregory MD  Nephrology  [facility]

## 2017-07-06 NOTE — PLAN OF CARE
Problem: Patient Care Overview  Goal: Plan of Care Review  PT REQUIRES MAX A X2>TOTAL A FOR BED MOBILITY AND T/F TO CHAIR   Outcome: Ongoing (interventions implemented as appropriate)  Patient had uneventful shift. Patient awake, alert and oriented x 4. VS stable. Patient complains of bilateral leg pain. Patient on telemetry, NSR on the monitor. Patient ambulates with assistance. Fall precautions in place. Sitter at bedside. Bed alarm active and audible. Patient free from fall/injury. Plan of care reviewed with patient. Patient has no questions at this time. Will continue to monitor.

## 2017-07-06 NOTE — CONSULTS
"  [facility]   Adult Nutrition  Consult Note    SUMMARY     Recommendations    Recommendation/Intervention: 1. Continue Current Diet. 2. Will continue to encourage PO intake. 3. Will follow up   Goals: Intake of greater than 80% of meals   Nutrition Goal Status: new  Communication of RD Recs:  (care plan )    Reason for Assessment    Reason for Assessment: length of stay  Diagnosis:  (Drugoverdose, Non-traumatic rhabdomyolysis, ALEXUS)  Relevent Medical History: Depression, Anxiety, GERD, Tobacco use.    General Information Comments: Patient reports that diet is well tolerated. No N/V/D. Pt reports good appetite. However today she ate less than 50 % because pt dislikes renal diet foods.    Nutrition Discharge Planning: too soon to determine     Nutrition Prescription Ordered    Current Diet Order: Renal diet      Evaluation of Received Nutrients/Fluid Intake  % Intake of Estimated Energy Needs: 50 - 75 %  % Meal Intake: 50%     Nutrition/Diet History  Typical Food/Fluid Intake: good appetite and intake prior admission   Food Preferences: none reported including cultural or Buddhist     Labs/Tests/Procedures/Meds    Pertinent Labs Reviewed: reviewed  Pertinent Labs Comments: Na 134, Bun 40, Crea 3.9, GFR 13, Ca 7.9, Total Protein 4.5, Alb 1.7, AST 73, ALT 80.   Pertinent Medications Reviewed: reviewed     Physical Findings    Overall Physical Appearance: obese  Oral/Mouth Cavity: decay/carries  Skin:  (bruised, Avtar Score 16)    Anthropometrics  Height: 5' 4.02" (162.6 cm)  Weight Method: Bed Scale  Weight: 80 kg (176 lb 5.9 oz)  Ideal Body Weight (IBW), Female: 120.1 lb  % Ideal Body Weight, Female (lb): 146.85 lb  BMI (Calculated): 30.3  BMI Grade: 30 - 34.9- obesity - grade I     Estimated/Assessed Needs    Weight Used For Calorie Calculations: 62 kg (136 lb 11 oz) (Based on NHANES II per Renal )   Height (cm): 162.6 cm  Energy Calorie Requirements (kcal): 1550  Energy Need Method: Kcal/kg " (25cal/kg)  25 kcal/kg (kcal): 1550   Weight Used For Protein Calculations: 62 kg (136 lb 11 oz)  1.0 gm Protein (gm): 62.13  Fluid Need Method: RDA Method (1 ml/riddhi or as needeed per MD)  RDA Method (mL): 1550    Assessment and Plan    ALEXUS (acute kidney injury)    Nutrition Problem:   Altered nutrition related laboratory values     Related to (etiology):   ALEXUS    Signs and Symptoms (as evidenced by):   BUN 40, CREA 3.9, GFR 13    Interventions/Recommendations (treatment strategy):  1. Continue Renal Diet t. 2. Will continue to encourage PO intake. 3. Will follow up     Nutrition Diagnosis Status:   New              Monitor and Evaluation    Food and Nutrient Intake: energy intake    Biochemical Data, Medical Tests and Procedures: electrolyte and renal panel, glucose/endocrine profile  Nutrition-Focused Physical Findings: overall appearance      Nutrition Follow-Up    RD Follow-up?: Yes

## 2017-07-06 NOTE — PLAN OF CARE
Problem: SLP Goal  Goal: SLP Goal  TPW recall 2/3 aspiration precautions (small bites/sips, sit upright, slow intake) with min a  TPW follow aspiration precautions during bedside trials with min a   Outcome: Ongoing (interventions implemented as appropriate)  Pt tolerated sips of thin and bites of solids without overt s/s of aspiration or dysphagia.

## 2017-07-06 NOTE — PT/OT/SLP PROGRESS
Physical Therapy      Kaylene Emery  MRN: [id number]    S: PT AGREEABLE TO SUPINE BLE THEREX WITH ENCOURAGEMENT  O: PT FOUND SUPINE IN BED UPON ARRIVAL, NO C/O PAIN.  PT EDUCATED IN AND PERFORMED BLE THEREX X 10 REPS AROM WITH REST BREAKS IN ALL AVAILABLE PLANES OF MOVEMENT.  PT TOLERATED SUPINE BLE BRIDGING X 10 REPS WITH REST.  PT LEFT SUPINE IN BED WITH ALL NEEDS MET. PT ENCOURAGED TO CALL FOR ASSISTANCE WITH ALL NEEDS DUE TO FALL RISK STATUS, PT AGREEABLE.   PT ENCOURAGED TO INCREASE TIME OOB IN CHAIR, ALL MEALS IN CHAIR OOB, PT AGREEABLE  A: GOOD PARTICIPATION  P: CONT PER POC    Luz Talley, PT   7/6/2017  [phone]

## 2017-07-06 NOTE — ASSESSMENT & PLAN NOTE
Possibly Pre renal?  Will watch closely    7/6/17: her urine output is increasing and her BUN/Cr is stable for past 2 days.

## 2017-07-06 NOTE — CONSULTS
[facility]  Obstetrics & Gynecology  Consult Note    Patient Name: Kaylene Emery  MRN: [id number]  Admission Date: 6/28/2017  Hospital Length of Stay: 7 days  Attending Physician: Gina Clarke MD  Primary Care Provider: Jonas Jimenez MD    Consults    Subjective:     Chief Complaint: Asked to evaluate labial swelling    History of Present Illness:  Patient and caregiver have noticed swelling of labia for past 2 days;     No new subjective & objective note has been filed under this hospital service since the last note was generated.    Physical exam  Labial swelling noted bilaterally; no lesions;ulcers noted  Edema (anasarca) noted  from feet to hips    Assessment/Plan:     Anasarca    Swelling in labia will improve once her anasarca improves;  Can add ice pack for comfort            Thank you for your consult. I will sign off. Please contact us if you have any additional questions.    Karyn Rondon MD  Obstetrics & Gynecology  [facility]

## 2017-07-06 NOTE — PLAN OF CARE
Problem: Patient Care Overview  Goal: Plan of Care Review  PT REQUIRES MAX A X2>TOTAL A FOR BED MOBILITY AND T/F TO CHAIR   Outcome: Ongoing (interventions implemented as appropriate)  PT WITH IMPROVED FUNCTIONAL MOBILITY TODAY, PROGRESS TO GAIT, 10' X 2 TRIALS WITH RW AND MODA WITH SEATED REST

## 2017-07-07 LAB
ALBUMIN SERPL BCP-MCNC: 1.8 G/DL
ALP SERPL-CCNC: 66 U/L
ALT SERPL W/O P-5'-P-CCNC: 74 U/L
ANION GAP SERPL CALC-SCNC: 11 MMOL/L
AST SERPL-CCNC: 62 U/L
BASOPHILS # BLD AUTO: 0.05 K/UL
BASOPHILS NFR BLD: 0.4 %
BILIRUB DIRECT SERPL-MCNC: 0.2 MG/DL
BILIRUB SERPL-MCNC: 0.5 MG/DL
BUN SERPL-MCNC: 45 MG/DL
CALCIUM SERPL-MCNC: 8.1 MG/DL
CHLORIDE SERPL-SCNC: 102 MMOL/L
CK SERPL-CCNC: 1215 U/L
CO2 SERPL-SCNC: 23 MMOL/L
CREAT SERPL-MCNC: 4 MG/DL
DIFFERENTIAL METHOD: ABNORMAL
EOSINOPHIL # BLD AUTO: 0.5 K/UL
EOSINOPHIL NFR BLD: 4.2 %
ERYTHROCYTE [DISTWIDTH] IN BLOOD BY AUTOMATED COUNT: 14.6 %
EST. GFR  (AFRICAN AMERICAN): 15 ML/MIN/1.73 M^2
EST. GFR  (NON AFRICAN AMERICAN): 13 ML/MIN/1.73 M^2
GIANT PLATELETS BLD QL SMEAR: PRESENT
GLUCOSE SERPL-MCNC: 101 MG/DL
HCT VFR BLD AUTO: 21.1 %
HGB BLD-MCNC: 7.5 G/DL
LYMPHOCYTES # BLD AUTO: 2.1 K/UL
LYMPHOCYTES NFR BLD: 17.6 %
MAGNESIUM SERPL-MCNC: 1.9 MG/DL
MCH RBC QN AUTO: 31.4 PG
MCHC RBC AUTO-ENTMCNC: 35.5 %
MCV RBC AUTO: 88 FL
MONOCYTES # BLD AUTO: 1.4 K/UL
MONOCYTES NFR BLD: 11.8 %
NEUTROPHILS # BLD AUTO: 7.8 K/UL
NEUTROPHILS NFR BLD: 67.2 %
PLATELET # BLD AUTO: 239 K/UL
PLATELET BLD QL SMEAR: ABNORMAL
PMV BLD AUTO: 10 FL
POTASSIUM SERPL-SCNC: 3.7 MMOL/L
PROT SERPL-MCNC: 4.7 G/DL
RBC # BLD AUTO: 2.39 M/UL
SODIUM SERPL-SCNC: 136 MMOL/L
WBC # BLD AUTO: 11.9 K/UL

## 2017-07-07 PROCEDURE — 25000003 PHARM REV CODE 250: Performed by: SPECIALIST

## 2017-07-07 PROCEDURE — 25000003 PHARM REV CODE 250: Performed by: PSYCHIATRY & NEUROLOGY

## 2017-07-07 PROCEDURE — 25000003 PHARM REV CODE 250: Performed by: NURSE PRACTITIONER

## 2017-07-07 PROCEDURE — 83735 ASSAY OF MAGNESIUM: CPT

## 2017-07-07 PROCEDURE — 85025 COMPLETE CBC W/AUTO DIFF WBC: CPT

## 2017-07-07 PROCEDURE — 80048 BASIC METABOLIC PNL TOTAL CA: CPT

## 2017-07-07 PROCEDURE — 82550 ASSAY OF CK (CPK): CPT

## 2017-07-07 PROCEDURE — 80076 HEPATIC FUNCTION PANEL: CPT

## 2017-07-07 PROCEDURE — 99232 SBSQ HOSP IP/OBS MODERATE 35: CPT | Mod: ,,, | Performed by: INTERNAL MEDICINE

## 2017-07-07 PROCEDURE — 21400001 HC TELEMETRY ROOM

## 2017-07-07 RX ADMIN — FLUOXETINE 40 MG: 20 CAPSULE ORAL at 08:07

## 2017-07-07 RX ADMIN — NICOTINE 1 PATCH: 14 PATCH, EXTENDED RELEASE TRANSDERMAL at 08:07

## 2017-07-07 RX ADMIN — ERGOCALCIFEROL 50000 UNITS: 1.25 CAPSULE ORAL at 08:07

## 2017-07-07 RX ADMIN — FLUTICASONE PROPIONATE 2 SPRAY: 50 SPRAY, METERED NASAL at 08:07

## 2017-07-07 RX ADMIN — DOCUSATE SODIUM 100 MG: 100 CAPSULE, LIQUID FILLED ORAL at 08:07

## 2017-07-07 RX ADMIN — LORAZEPAM 0.5 MG: 0.5 TABLET ORAL at 08:07

## 2017-07-07 RX ADMIN — OXYCODONE HYDROCHLORIDE 20 MG: 10 TABLET, FILM COATED, EXTENDED RELEASE ORAL at 08:07

## 2017-07-07 RX ADMIN — LORAZEPAM 0.5 MG: 0.5 TABLET ORAL at 09:07

## 2017-07-07 RX ADMIN — QUETIAPINE FUMARATE 200 MG: 100 TABLET, FILM COATED ORAL at 08:07

## 2017-07-07 RX ADMIN — OXYCODONE HYDROCHLORIDE 20 MG: 10 TABLET, FILM COATED, EXTENDED RELEASE ORAL at 09:07

## 2017-07-07 RX ADMIN — ROPINIROLE HYDROCHLORIDE 0.5 MG: 0.25 TABLET, FILM COATED ORAL at 09:07

## 2017-07-07 RX ADMIN — PANTOPRAZOLE SODIUM 40 MG: 40 TABLET, DELAYED RELEASE ORAL at 08:07

## 2017-07-07 NOTE — SUBJECTIVE & OBJECTIVE
Interval History: 45 yo female presented with intentional overdose and developed acute renal failure secondary to rhabdo. Clinically her urine output has increased and her BUN/Cr is stable. Still with anasarca but improving.  Review of Systems   Constitutional: Negative.    HENT: Negative.    Cardiovascular: Positive for leg swelling.   All other systems reviewed and are negative.    Objective:     Vital Signs (Most Recent):  Temp: 97.7 °F (36.5 °C) (07/07/17 0730)  Pulse: 83 (07/07/17 0730)  Resp: 18 (07/07/17 0730)  BP: (!) 159/88 (07/07/17 0730)  SpO2: 98 % (07/07/17 0730) Vital Signs (24h Range):  Temp:  [97.7 °F (36.5 °C)-98.6 °F (37 °C)] 97.7 °F (36.5 °C)  Pulse:  [] 83  Resp:  [18] 18  SpO2:  [97 %-98 %] 98 %  BP: (133-159)/(72-88) 159/88     Weight: 79.9 kg (176 lb 1.6 oz)  Body mass index is 30.21 kg/m².    Intake/Output Summary (Last 24 hours) at 07/07/17 1121  Last data filed at 07/07/17 0613   Gross per 24 hour   Intake              720 ml   Output             2301 ml   Net            -1581 ml      Physical Exam   Constitutional: She is oriented to person, place, and time. She appears well-developed and well-nourished.   HENT:   Head: Normocephalic and atraumatic.   Nose: Nose normal.   Mouth/Throat: Oropharynx is clear and moist.   Eyes: Pupils are equal, round, and reactive to light.   Neck: Neck supple.   Cardiovascular: Normal rate, regular rhythm, normal heart sounds and intact distal pulses.    Pulmonary/Chest: Effort normal and breath sounds normal.   Abdominal: Soft. Bowel sounds are normal.   Musculoskeletal: She exhibits edema.   Neurological: She is alert and oriented to person, place, and time.   Skin: Skin is warm and dry. Capillary refill takes less than 2 seconds.   Psychiatric: She has a normal mood and affect.   Nursing note and vitals reviewed.      Significant Labs:   Recent Lab Results       07/07/17  0505      Albumin 1.8(L)     Alkaline Phosphatase 66     ALT 74(H)     Anion  Gap 11     AST 62(H)     Baso # 0.05     Basophil% 0.4     Bilirubin, Direct 0.2     Total Bilirubin 0.5  Comment:  For infants and newborns, interpretation of results should be based  on gestational age, weight and in agreement with clinical  observations.  Premature Infant recommended reference ranges:  Up to 24 hours.............<8.0 mg/dL  Up to 48 hours............<12.0 mg/dL  3-5 days..................<15.0 mg/dL  6-29 days.................<15.0 mg/dL       BUN, Bld 45(H)     Calcium 8.1(L)     Chloride 102     CO2 23     CPK 1215(H)     Creatinine 4.0(H)     Differential Method Automated     eGFR if  15(A)     eGFR if non  13  Comment:  Calculation used to obtain the estimated glomerular filtration  rate (eGFR) is the CKD-EPI equation. Since race is unknown   in our information system, the eGFR values for   -American and Non--American patients are given   for each creatinine result.  (A)     Eos # 0.5     Eosinophil% 4.2     Large/Giant Platelets Present     Glucose 101     Gran # 7.8(H)     Gran% 67.2     Hematocrit 21.1(L)     Hemoglobin 7.5(L)     Lymph # 2.1     Lymph% 17.6(L)     Magnesium 1.9     MCH 31.4(H)     MCHC 35.5     MCV 88     Mono # 1.4(H)     Mono% 11.8     MPV 10.0     Platelet Estimate Appears normal     Platelets 239     Potassium 3.7     Total Protein 4.7(L)     RBC 2.39(L)     RDW 14.6(H)     Sodium 136     WBC 11.90           Significant Imaging: I have reviewed and interpreted all pertinent imaging results/findings within the past 24 hours.

## 2017-07-07 NOTE — PROGRESS NOTES
[facility] Hospital Medicine  Progress Note    Patient Name: Kaylene Emery  MRN: [id number]  Patient Class: IP- Inpatient   Admission Date: 6/28/2017  Length of Stay: 9 days  Attending Physician: Gina Calrke MD  Primary Care Provider: Jonas Jimenez MD        Subjective:     Principal Problem:Non-traumatic rhabdomyolysis    HPI:  Ms. Emery is a 45 y/o  female with h/o depression, substance OD in the past, was found with AMS earlier today and was brought to the ED. Patient is currently intubated and history obtained per chart review. Family not at bedside.    Apparently patient likely had overdosed on multiple medications including Methocarbamol, Mobic, lorazepam, flexeril, phenergan, Seroquel, Celexa. Her boy friend is on Geodon, Klonopin, Wellbutrin and Effexor.  Most of her recent filled prescription bottles were found empty at the bedside when her bf found her.   Per chart review, yesterday patient was summoned to appear in the court for her DUI that occurred 4 months ago. She was extremely anxious and pacing all day today.     Labs show lactic acid 2.0, CPK > 40,000, CXR show bilateral patchy infiltrates, ANNETTE < 10, tylenol level < 5,  CO2 17, Na 130, , , WBC 15,000, Hgb 17.3.    Patient is currently intubated on the ventilator, breathing over the vent. She received NS 6 liters so far. Patient was evaluated by both neurology and nephrology in the ED. Nephrology plans to initiate patient on CRRT for poison/toxin control.    Hospital Course:  Pt extubated early this am, doing well, still feels anxious but better, denies any SI/ intentions or attempts. She is PECed. debies any prior hx of psych admissions. CRRT started this am and tolerating well. Repeat labs show improving LFTs but CPK still > 40,000/. Denies any injury or pain.     Looks much better, sitting up in chair, has severe LE edema from the Bicarb fluid. CRRT clotted off at 0200 this am, c/o ch back and  muscle pain. No numbness of tingling. Again denies any suicidal ideation or attempt. Getting IV Zosyn too for a presumed Aspiration.     Interval History: 45 yo female presented with intentional overdose and developed acute renal failure secondary to rhabdo. Clinically her urine output has increased and her BUN/Cr is stable. Still with anasarca but improving.  Review of Systems   Constitutional: Negative.    HENT: Negative.    Cardiovascular: Positive for leg swelling.   All other systems reviewed and are negative.    Objective:     Vital Signs (Most Recent):  Temp: 97.7 °F (36.5 °C) (07/07/17 0730)  Pulse: 83 (07/07/17 0730)  Resp: 18 (07/07/17 0730)  BP: (!) 159/88 (07/07/17 0730)  SpO2: 98 % (07/07/17 0730) Vital Signs (24h Range):  Temp:  [97.7 °F (36.5 °C)-98.6 °F (37 °C)] 97.7 °F (36.5 °C)  Pulse:  [] 83  Resp:  [18] 18  SpO2:  [97 %-98 %] 98 %  BP: (133-159)/(72-88) 159/88     Weight: 79.9 kg (176 lb 1.6 oz)  Body mass index is 30.21 kg/m².    Intake/Output Summary (Last 24 hours) at 07/07/17 1121  Last data filed at 07/07/17 0613   Gross per 24 hour   Intake              720 ml   Output             2301 ml   Net            -1581 ml      Physical Exam   Constitutional: She is oriented to person, place, and time. She appears well-developed and well-nourished.   HENT:   Head: Normocephalic and atraumatic.   Nose: Nose normal.   Mouth/Throat: Oropharynx is clear and moist.   Eyes: Pupils are equal, round, and reactive to light.   Neck: Neck supple.   Cardiovascular: Normal rate, regular rhythm, normal heart sounds and intact distal pulses.    Pulmonary/Chest: Effort normal and breath sounds normal.   Abdominal: Soft. Bowel sounds are normal.   Musculoskeletal: She exhibits edema.   Neurological: She is alert and oriented to person, place, and time.   Skin: Skin is warm and dry. Capillary refill takes less than 2 seconds.   Psychiatric: She has a normal mood and affect.   Nursing note and vitals  reviewed.      Significant Labs:   Recent Lab Results       07/07/17  0505      Albumin 1.8(L)     Alkaline Phosphatase 66     ALT 74(H)     Anion Gap 11     AST 62(H)     Baso # 0.05     Basophil% 0.4     Bilirubin, Direct 0.2     Total Bilirubin 0.5  Comment:  For infants and newborns, interpretation of results should be based  on gestational age, weight and in agreement with clinical  observations.  Premature Infant recommended reference ranges:  Up to 24 hours.............<8.0 mg/dL  Up to 48 hours............<12.0 mg/dL  3-5 days..................<15.0 mg/dL  6-29 days.................<15.0 mg/dL       BUN, Bld 45(H)     Calcium 8.1(L)     Chloride 102     CO2 23     CPK 1215(H)     Creatinine 4.0(H)     Differential Method Automated     eGFR if  15(A)     eGFR if non  13  Comment:  Calculation used to obtain the estimated glomerular filtration  rate (eGFR) is the CKD-EPI equation. Since race is unknown   in our information system, the eGFR values for   -American and Non--American patients are given   for each creatinine result.  (A)     Eos # 0.5     Eosinophil% 4.2     Large/Giant Platelets Present     Glucose 101     Gran # 7.8(H)     Gran% 67.2     Hematocrit 21.1(L)     Hemoglobin 7.5(L)     Lymph # 2.1     Lymph% 17.6(L)     Magnesium 1.9     MCH 31.4(H)     MCHC 35.5     MCV 88     Mono # 1.4(H)     Mono% 11.8     MPV 10.0     Platelet Estimate Appears normal     Platelets 239     Potassium 3.7     Total Protein 4.7(L)     RBC 2.39(L)     RDW 14.6(H)     Sodium 136     WBC 11.90           Significant Imaging: I have reviewed and interpreted all pertinent imaging results/findings within the past 24 hours.    Assessment/Plan:      Polysubstance overdose    - Unclear intention.  - PEC'd  - Psychiatry has evaluated patient  - CRRT to be started tonight for polysubstance/toxin removal    Improving on CRRT-- CPK still high but LFTs improving    CRRT clotted off,  CPK slightly down but still very high  Will change Bicarb Fluids to NS at a lower rate    7/5/17: intentional overdose  Did have CRRT   She has been Pec'd           * Non-traumatic rhabdomyolysis    - CPK < 40,000  - Repeat daily CPK  - Received six liters of NS so far in the ED  - Continue NS at 125 cc/hr  - Discussed with  (nephrology), plan to initiate CRRT tonight    As above-- improving but still very high, still needs IVF    7/5/17: UO improving and continue IVF    7/6/17: CK trending down        Recurrent major depressive disorder    Needs to be addressed further    7/7/17: plan is to find placement once medically clear        Anasarca    Third spacing due to ALI and ALEXUS-- will change IVF to NS at a lower rate  Avoid Lasix at this point to protect the kidneys    7/6/17:improving        Hypoalbuminemia    Probably acute on Ch due to Liver Injury  Will watch          ALEXUS (acute kidney injury)    Possibly Pre renal?  Will watch closely    7/6/17: her urine output is increasing and her BUN/Cr is stable for past 2 days.    7/7/17: renal function is stable but her UOP is steady increasing.  Discussed with Nephrology is to pull  Cath she has for HD, d/c carroll as kidney's are recovering.        Elevated liver enzymes    Sec to Polysubstance Drug OD-- improving          Thrombocytopenia    Heparin drip off as CRRT does not require heparin gtt  Platelets still low but stable-- probably sec to Acute Liver Injury          Aspiration pneumonia of both lungs    - Start Vanc and Zosyn (received rocephin, zithromax in the ED)  - Follow up on blood, sputum cultures  - Duonebs q6    No s/s of aspiration, will stop Zosyn          Transaminitis    - Secondary to polysubstance overdose  - Monitor closely  - Gradually improving with CRRT  - Continue present care,, LFTs improving    7/6/17: trending down, continue with gently hydration and monitor            VTE Risk Mitigation         Ordered     Medium Risk of VTE  Once       06/28/17 2059     Place sequential compression device  Until discontinued      06/28/17 2059          Gina Clarke MD  Department of Hospital Medicine   [facility]

## 2017-07-07 NOTE — ASSESSMENT & PLAN NOTE
Third spacing due to ALI and ALEXUS-- will change IVF to NS at a lower rate  Avoid Lasix at this point to protect the kidneys    7/6/17:improving

## 2017-07-07 NOTE — PT/OT/SLP PROGRESS
Physical Therapy      Kaylene Emery  MRN: [id number]    ATTEMPTED P.T. TX 2 TIMES THIS AM AND PT REFUSES DUE TO PAIN DESPITE ENCOURAGEMENT FROM THERAPIST AND FRIEND, WILL ASSESS PT NEXT VISIT    Luz Talley, PT   7/7/2017  0915, 1100

## 2017-07-07 NOTE — PLAN OF CARE
Problem: Pressure Ulcer Risk (Avtar Scale) (Adult,Obstetrics,Pediatric)  Intervention: Turn/Reposition Often  Pt slept through out the day, after morning medications given, waking up for meals. Pt tolerated q2hr turn with heels floated off bed.   Palmer catheter d/c'd at 1530hr without incident pt tolerated well. Pt verbalized understanding to call for assistance with toileting (bedpan).

## 2017-07-07 NOTE — PLAN OF CARE
Problem: Patient Care Overview  Goal: Plan of Care Review  PT REQUIRES MAX A X2>TOTAL A FOR BED MOBILITY AND T/F TO CHAIR   Outcome: Ongoing (interventions implemented as appropriate)  POC discussed w/patient, verbalized understanding. NSR on monitor. VSS. Voids per carroll. Urine clear yellow no foul odor. Pt c/o of pain. Medication administered. Pillows in place. Sitter at bedside.  Patient turns independently in bed. Fall precautions in place, bed alarm on.

## 2017-07-07 NOTE — ASSESSMENT & PLAN NOTE
Possibly Pre renal?  Will watch closely    7/6/17: her urine output is increasing and her BUN/Cr is stable for past 2 days.    7/7/17: renal function is stable but her UOP is steady increasing.  Discussed with Nephrology is to pull  Cath she has for HD, d/c carroll as kidney's are recovering.

## 2017-07-07 NOTE — PT/OT/SLP PROGRESS
Occupational Therapy      Kaylene Emery  MRN: [id number]    Patient not seen today secondary to pt's refused, c/o pain. Will follow-up at next visit. Attempted: 9:51    Dave Lynch OT  7/7/2017

## 2017-07-08 LAB
ALBUMIN SERPL BCP-MCNC: 1.8 G/DL
ALP SERPL-CCNC: 81 U/L
ALT SERPL W/O P-5'-P-CCNC: 63 U/L
ANION GAP SERPL CALC-SCNC: 9 MMOL/L
AST SERPL-CCNC: 45 U/L
BASOPHILS # BLD AUTO: 0.08 K/UL
BASOPHILS NFR BLD: 0.6 %
BILIRUB DIRECT SERPL-MCNC: 0.2 MG/DL
BILIRUB SERPL-MCNC: 0.5 MG/DL
BILIRUB UR QL STRIP: NEGATIVE
BUN SERPL-MCNC: 51 MG/DL
CALCIUM SERPL-MCNC: 8 MG/DL
CHLORIDE SERPL-SCNC: 103 MMOL/L
CK SERPL-CCNC: 756 U/L
CLARITY UR: ABNORMAL
CO2 SERPL-SCNC: 25 MMOL/L
COLOR UR: YELLOW
CREAT SERPL-MCNC: 4.3 MG/DL
CREAT UR-MCNC: 32.2 MG/DL
DIFFERENTIAL METHOD: ABNORMAL
EOSINOPHIL # BLD AUTO: 0.4 K/UL
EOSINOPHIL NFR BLD: 3.1 %
ERYTHROCYTE [DISTWIDTH] IN BLOOD BY AUTOMATED COUNT: 14.6 %
EST. GFR  (AFRICAN AMERICAN): 14 ML/MIN/1.73 M^2
EST. GFR  (NON AFRICAN AMERICAN): 12 ML/MIN/1.73 M^2
GLUCOSE SERPL-MCNC: 116 MG/DL
GLUCOSE UR QL STRIP: NEGATIVE
HCT VFR BLD AUTO: 20.1 %
HGB BLD-MCNC: 7.1 G/DL
HGB UR QL STRIP: ABNORMAL
KETONES UR QL STRIP: NEGATIVE
LEUKOCYTE ESTERASE UR QL STRIP: NEGATIVE
LYMPHOCYTES # BLD AUTO: 2.2 K/UL
LYMPHOCYTES NFR BLD: 15.9 %
MAGNESIUM SERPL-MCNC: 1.6 MG/DL
MCH RBC QN AUTO: 31 PG
MCHC RBC AUTO-ENTMCNC: 35.3 %
MCV RBC AUTO: 88 FL
MICROSCOPIC COMMENT: NORMAL
MONOCYTES # BLD AUTO: 1.3 K/UL
MONOCYTES NFR BLD: 9.2 %
NEUTROPHILS # BLD AUTO: 9.9 K/UL
NEUTROPHILS NFR BLD: 72.4 %
NITRITE UR QL STRIP: NEGATIVE
PH UR STRIP: 6 [PH] (ref 5–8)
PLATELET # BLD AUTO: 265 K/UL
PMV BLD AUTO: 10 FL
POTASSIUM SERPL-SCNC: 4.2 MMOL/L
PROT SERPL-MCNC: 4.7 G/DL
PROT UR QL STRIP: NEGATIVE
RBC # BLD AUTO: 2.29 M/UL
RBC #/AREA URNS HPF: 1 /HPF (ref 0–4)
SODIUM SERPL-SCNC: 137 MMOL/L
SODIUM UR-SCNC: 52 MMOL/L
SP GR UR STRIP: <=1.005 (ref 1–1.03)
SQUAMOUS #/AREA URNS HPF: 3 /HPF
URN SPEC COLLECT METH UR: ABNORMAL
UROBILINOGEN UR STRIP-ACNC: NEGATIVE EU/DL
WBC # BLD AUTO: 13.87 K/UL

## 2017-07-08 PROCEDURE — 97110 THERAPEUTIC EXERCISES: CPT

## 2017-07-08 PROCEDURE — 25000003 PHARM REV CODE 250: Performed by: SPECIALIST

## 2017-07-08 PROCEDURE — 85025 COMPLETE CBC W/AUTO DIFF WBC: CPT

## 2017-07-08 PROCEDURE — 25000003 PHARM REV CODE 250: Performed by: INTERNAL MEDICINE

## 2017-07-08 PROCEDURE — 82570 ASSAY OF URINE CREATININE: CPT

## 2017-07-08 PROCEDURE — 99232 SBSQ HOSP IP/OBS MODERATE 35: CPT | Mod: ,,, | Performed by: INTERNAL MEDICINE

## 2017-07-08 PROCEDURE — 97530 THERAPEUTIC ACTIVITIES: CPT

## 2017-07-08 PROCEDURE — 84300 ASSAY OF URINE SODIUM: CPT

## 2017-07-08 PROCEDURE — 97116 GAIT TRAINING THERAPY: CPT

## 2017-07-08 PROCEDURE — 80048 BASIC METABOLIC PNL TOTAL CA: CPT

## 2017-07-08 PROCEDURE — 83735 ASSAY OF MAGNESIUM: CPT

## 2017-07-08 PROCEDURE — 80076 HEPATIC FUNCTION PANEL: CPT

## 2017-07-08 PROCEDURE — 21400001 HC TELEMETRY ROOM

## 2017-07-08 PROCEDURE — 82550 ASSAY OF CK (CPK): CPT

## 2017-07-08 PROCEDURE — 81000 URINALYSIS NONAUTO W/SCOPE: CPT

## 2017-07-08 PROCEDURE — 25000003 PHARM REV CODE 250: Performed by: NURSE PRACTITIONER

## 2017-07-08 RX ORDER — SODIUM CHLORIDE 450 MG/100ML
INJECTION, SOLUTION INTRAVENOUS CONTINUOUS
Status: DISCONTINUED | OUTPATIENT
Start: 2017-07-08 | End: 2017-07-09

## 2017-07-08 RX ADMIN — LORAZEPAM 0.5 MG: 0.5 TABLET ORAL at 09:07

## 2017-07-08 RX ADMIN — SODIUM CHLORIDE: 0.45 INJECTION, SOLUTION INTRAVENOUS at 05:07

## 2017-07-08 RX ADMIN — PANTOPRAZOLE SODIUM 40 MG: 40 TABLET, DELAYED RELEASE ORAL at 09:07

## 2017-07-08 RX ADMIN — OXYCODONE HYDROCHLORIDE 20 MG: 10 TABLET, FILM COATED, EXTENDED RELEASE ORAL at 09:07

## 2017-07-08 RX ADMIN — FLUOXETINE 40 MG: 20 CAPSULE ORAL at 09:07

## 2017-07-08 RX ADMIN — NICOTINE 1 PATCH: 14 PATCH, EXTENDED RELEASE TRANSDERMAL at 09:07

## 2017-07-08 RX ADMIN — FLUTICASONE PROPIONATE 2 SPRAY: 50 SPRAY, METERED NASAL at 09:07

## 2017-07-08 RX ADMIN — ROPINIROLE HYDROCHLORIDE 0.5 MG: 0.25 TABLET, FILM COATED ORAL at 08:07

## 2017-07-08 RX ADMIN — QUETIAPINE FUMARATE 200 MG: 100 TABLET, FILM COATED ORAL at 09:07

## 2017-07-08 RX ADMIN — DOCUSATE SODIUM 100 MG: 100 CAPSULE, LIQUID FILLED ORAL at 09:07

## 2017-07-08 RX ADMIN — LORAZEPAM 0.5 MG: 0.5 TABLET ORAL at 08:07

## 2017-07-08 NOTE — PT/OT/SLP PROGRESS
Physical Therapy  Treatment    Kaylene Emery   MRN: [id number]   Admitting Diagnosis: Non-traumatic rhabdomyolysis    PT Received On: 07/08/17  PT Start Time: 0901     PT Stop Time: 0926    PT Total Time (min): 25 min       Billable Minutes:  Gait [id number] minutes and Therapeutic Exercise 15 minutes    Treatment Type: Treatment  PT/PTA: PTA     PTA Visit Number: 1       General Precautions: Standard, fall  Orthopedic Precautions: N/A   Braces:           Subjective:  Communicated with epic documentation and Nurse Isaac prior to session.      Pain/Comfort  Pain Rating 1: 3/10  Location - Side 1: Right  Location 1: leg  Pain Addressed 1: Nurse notified    Objective:   Patient found with: telemetry, oxygen    Functional Mobility:  Bed Mobility:   Rolling/Turning to Left: Stand by assistance  Rolling/Turning Right: Stand by assistance  Scooting/Bridging: Stand by Assistance  Supine to Sit: Stand by Assistance    Transfers:  Sit <> Stand Assistance: Minimum Assistance  Sit <> Stand Assistive Device: Rolling Walker  Bed <> Chair Technique: Stand Pivot  Bed <> Chair Assistance: Minimum Assistance  Bed <> Chair Assistive Device: Rolling Walker    Gait:   Gait Distance:  (25 feet)  Assistance 1: Minimum assistance  Gait Assistive Device: Rolling walker  Gait Pattern: swing-to gait  Gait Deviation(s): decreased amy, decreased step length, decreased stride length, decreased weight-shifting ability    Stairs:      Balance:   Static Sit: GOOD-: Takes MODERATE challenges from all directions but inconsistently  Dynamic Sit: FAIR+: Maintains balance through MINIMAL excursions of active trunk motion  Static Stand: FAIR+: Takes MINIMAL challenges from all directions  Dynamic stand: FAIR: Needs CONTACT GUARD during gait     Therapeutic Activities and Exercises:  Bed mobility, transfers, gait training and therex     AM-PAC 6 CLICK MOBILITY  How much help from another person does this patient currently need?   1 = Unable,  Total/Dependent Assistance  2 = A lot, Maximum/Moderate Assistance  3 = A little, Minimum/Contact Guard/Supervision  4 = None, Modified [place]/Independent         AM-PAC Raw Score CMS G-Code Modifier Level of Impairment Assistance   6 % Total / Unable   7 - 9 CM 80 - 100% Maximal Assist   10 - 14 CL 60 - 80% Moderate Assist   15 - 19 CK 40 - 60% Moderate Assist   20 - 22 CJ 20 - 40% Minimal Assist   23 CI 1-20% SBA / CGA   24 CH 0% Independent/ Mod I     Patient left up in chair with all lines intact, call button in reach, nursing notified and sitter present.    Assessment:  Kaylene Emery is a 46 y.o. female with a medical diagnosis of Non-traumatic rhabdomyolysis and presents with weakness, pain.    Rehab identified problem list/impairments: Rehab identified problem list/impairments: weakness, impaired endurance, gait instability, decreased lower extremity function, pain, impaired balance, decreased coordination, decreased safety awareness    Rehab potential is good.    Activity tolerance: Good    Discharge recommendations: Discharge Facility/Level Of Care Needs: rehabilitation facility     Barriers to discharge:      Equipment recommendations:       GOALS:    Physical Therapy Goals        Problem: Physical Therapy Goal    Goal Priority Disciplines Outcome Goal Variances Interventions   Physical Therapy Goal     PT/OT, PT      Description:  PT WILL BE SEEN FOR P.T. FOR A MIN OF 5 OUT OF 7 DAYS A WEEK  LT17  1. PT WILL COMPLETE BED MOBILITY LATANYA  2. PT WILL AMB 50' WITH RW AND SELVIN  3. PT WILL T/F TO CHAIR WITH SELVIN.  4. PT WILL COMPLETE B LE TE X 20 REPS FOR STRENGTHENING.                      PLAN:    Patient to be seen 5 x/week  to address the above listed problems via therapeutic exercises, therapeutic activities, gait training  Plan of Care expires: 17  Plan of Care reviewed with: patient, spouse         Wero Barrera, PTA  2017

## 2017-07-08 NOTE — SUBJECTIVE & OBJECTIVE
"Interval History: Pt was seen and examined. No new c/o's stable last pm, pt feels "better", no SOB, no fever, no new c/o's.    Review of patient's allergies indicates:   Allergen Reactions    Corticosteroids (glucocorticoids)      "sets off my anxiety real bad"    Tramadol Rash     Current Facility-Administered Medications   Medication Frequency    acetaminophen tablet 650 mg Q8H PRN    albuterol-ipratropium 2.5mg-0.5mg/3mL nebulizer solution 3 mL Q4H PRN    bisacodyl suppository 10 mg Daily PRN    diphenhydrAMINE capsule 50 mg Nightly PRN    docusate sodium capsule 100 mg Daily    ergocalciferol capsule 50,000 Units Q7 Days    fluoxetine capsule 40 mg Daily    fluticasone 50 mcg/actuation nasal spray 2 spray Daily    heparin (porcine) injection 2,000 Units PRN    lorazepam tablet 0.5 mg BID    nicotine 14 mg/24 hr 1 patch Daily    ondansetron injection 4 mg Q8H PRN    oxycodone 12 hr tablet 20 mg BID PRN    pantoprazole EC tablet 40 mg Daily    quetiapine tablet 200 mg Daily    ropinirole tablet 0.5 mg QHS       Objective:     Vital Signs (Most Recent):  Temp: 99.2 °F (37.3 °C) (07/07/17 1953)  Pulse: 94 (07/07/17 1953)  Resp: 18 (07/07/17 0730)  BP: (!) 145/86 (07/07/17 1953)  SpO2: 98 % (07/07/17 2100)  O2 Device (Oxygen Therapy): nasal cannula w/ humidification (07/07/17 2100) Vital Signs (24h Range):  Temp:  [97.7 °F (36.5 °C)-99.2 °F (37.3 °C)] 99.2 °F (37.3 °C)  Pulse:  [79-94] 94  Resp:  [18] 18  SpO2:  [90 %-98 %] 98 %  BP: (131-159)/(72-88) 145/86     Weight: 79.9 kg (176 lb 1.6 oz) (07/07/17 0007)  Body mass index is 30.21 kg/m².  Body surface area is 1.9 meters squared.    I/O last 3 completed shifts:  In: 1040 [P.O.:1040]  Out: 3801 [Urine:3800; Stool:1]    Physical Exam      Significant Labs: reviewed  CK 1700  BMP  Lab Results   Component Value Date     07/07/2017    K 3.7 07/07/2017     07/07/2017    CO2 23 07/07/2017    BUN 45 (H) 07/07/2017    CREATININE 4.0 (H) " 07/07/2017    CALCIUM 8.1 (L) 07/07/2017    ANIONGAP 11 07/07/2017    ESTGFRAFRICA 15 (A) 07/07/2017    EGFRNONAA 13 (A) 07/07/2017     Lab Results   Component Value Date    WBC 11.90 07/07/2017    HGB 7.5 (L) 07/07/2017    HCT 21.1 (L) 07/07/2017    MCV 88 07/07/2017     07/07/2017       Significant Imaging: reviewed CXR

## 2017-07-08 NOTE — ASSESSMENT & PLAN NOTE
Acute kidney injury has stabilized with good urine output.  Last 3 days of function has been stable with creatinine ranging between 3.7 and 4.0.  He has discussed with Dr. Clarke.  With hospital medicine.  Patient is cleared from nephrology standpoint to go to the rehabilitation with psychiatry admission.  At the time of discharge we will remove the central line for dialysis and IV access

## 2017-07-08 NOTE — ASSESSMENT & PLAN NOTE
- CPK < 40,000  - Repeat daily CPK  - Received six liters of NS so far in the ED  - Continue NS at 125 cc/hr  - Discussed with  (nephrology), plan to initiate CRRT tonight    As above-- improving but still very high, still needs IVF    7/5/17: UO improving and continue IVF    7/8/17: resolving    7/6/17: CK trending down

## 2017-07-08 NOTE — SUBJECTIVE & OBJECTIVE
"Interval History:   07/08/2017  Palmer's catheter has been taken out.  Patient has not been eating very well.    Review of patient's allergies indicates:   Allergen Reactions    Corticosteroids (glucocorticoids)      "sets off my anxiety real bad"    Tramadol Rash     Current Facility-Administered Medications   Medication Frequency    0.45% NaCl infusion Continuous    acetaminophen tablet 650 mg Q8H PRN    albuterol-ipratropium 2.5mg-0.5mg/3mL nebulizer solution 3 mL Q4H PRN    bisacodyl suppository 10 mg Daily PRN    diphenhydrAMINE capsule 50 mg Nightly PRN    docusate sodium capsule 100 mg Daily    ergocalciferol capsule 50,000 Units Q7 Days    fluoxetine capsule 40 mg Daily    fluticasone 50 mcg/actuation nasal spray 2 spray Daily    heparin (porcine) injection 2,000 Units PRN    lorazepam tablet 0.5 mg BID    nicotine 14 mg/24 hr 1 patch Daily    ondansetron injection 4 mg Q8H PRN    oxycodone 12 hr tablet 20 mg BID PRN    pantoprazole EC tablet 40 mg Daily    quetiapine tablet 200 mg Daily    ropinirole tablet 0.5 mg QHS       Objective:     Vital Signs (Most Recent):  Temp: 98.6 °F (37 °C) (07/08/17 0802)  Pulse: 97 (07/08/17 0802)  Resp: 18 (07/08/17 0802)  BP: (!) 143/77 (07/08/17 0802)  SpO2: 99 % (07/08/17 0802)  O2 Device (Oxygen Therapy): nasal cannula w/ humidification (07/07/17 2100) Vital Signs (24h Range):  Temp:  [97.9 °F (36.6 °C)-99.2 °F (37.3 °C)] 98.6 °F (37 °C)  Pulse:  [] 97  Resp:  [18] 18  SpO2:  [90 %-99 %] 99 %  BP: (142-157)/(77-86) 143/77     Weight: 80.5 kg (177 lb 8 oz) (07/08/17 0408)  Body mass index is 30.45 kg/m².  Body surface area is 1.91 meters squared.    I/O last 3 completed shifts:  In: 120 [P.O.:120]  Out: 2801 [Urine:2800; Stool:1]    Physical Exam   Constitutional: She is oriented to person, place, and time. No distress.   HENT:   Head: Normocephalic and atraumatic.   Nose: Nose normal.   Eyes: Conjunctivae and EOM are normal. Pupils are equal, " round, and reactive to light.   Neck: Normal range of motion. No JVD present. No tracheal deviation present. No thyromegaly present.   Cardiovascular: Normal rate, regular rhythm, normal heart sounds and intact distal pulses.  Exam reveals no gallop and no friction rub.    No murmur heard.  Pulmonary/Chest: Effort normal and breath sounds normal. No respiratory distress. She has no wheezes. She has no rales. She exhibits no tenderness.   Abdominal: Soft. Bowel sounds are normal. She exhibits no distension and no mass. There is no tenderness. No hernia.   Bladder is palpable and is slightly tender   Musculoskeletal: Normal range of motion. She exhibits no edema, tenderness or deformity.   Neurological: She is alert and oriented to person, place, and time. She has normal reflexes. She displays normal reflexes. No cranial nerve deficit. She exhibits normal muscle tone. Coordination normal.   Skin: Skin is warm. She is not diaphoretic. No erythema. There is pallor.   Psychiatric: She has a normal mood and affect. Her behavior is normal. Judgment and thought content normal.   Nursing note and vitals reviewed.      Significant Labs: reviewed  CK 1700  BMP  Lab Results   Component Value Date     07/08/2017    K 4.2 07/08/2017     07/08/2017    CO2 25 07/08/2017    BUN 51 (H) 07/08/2017    CREATININE 4.3 (H) 07/08/2017    CALCIUM 8.0 (L) 07/08/2017    ANIONGAP 9 07/08/2017    ESTGFRAFRICA 14 (A) 07/08/2017    EGFRNONAA 12 (A) 07/08/2017     Lab Results   Component Value Date    WBC 13.87 (H) 07/08/2017    HGB 7.1 (L) 07/08/2017    HCT 20.1 (L) 07/08/2017    MCV 88 07/08/2017     07/08/2017       Significant Imaging: reviewed CXR

## 2017-07-08 NOTE — PLAN OF CARE
Problem: Patient Care Overview  Goal: Plan of Care Review  PT REQUIRES MAX A X2>TOTAL A FOR BED MOBILITY AND T/F TO CHAIR   Outcome: Ongoing (interventions implemented as appropriate)  Pt had an uneventful night. No falls or injury. Pt pain to bilateral legs r/t edema controlled with prn medications. Lab work drawn. NSR noted on monitor. HR= 80-90. Pt assisted with repositioning q 2 hours and prn to improve skin integrity. Will continue to monitor.

## 2017-07-08 NOTE — ASSESSMENT & PLAN NOTE
Possibly Pre renal?  Will watch closely    7/6/17: her urine output is increasing and her BUN/Cr is stable for past 2 days.    7/7/17: renal function is stable but her UOP is steady increasing.  Discussed with Nephrology is to pull  Cath she has for HD, d/c carroll as kidney's are recovering.    7/8/17: renal function stable with good urine output

## 2017-07-08 NOTE — PROGRESS NOTES
"[facility] -   Nephrology  Progress Note    Patient Name: Kaylene Emery  MRN: [id number]  Admission Date: 6/28/2017  Hospital Length of Stay: 9 days  Attending Provider: Gina Clarke MD   Primary Care Physician: Jonas Jimenez MD  Principal Problem:Non-traumatic rhabdomyolysis    Subjective:     HPI: Patient is a 46-year-old with multiple psychiatry issues.  Comes in with multiple medication overdose with unclear intent suicidal versus overdose versus toxic injections.  Patient has dark-colored urine along with severe rhabdomyolysis.  Patient is now intubated.  Dr. Giles consulted me for initiation for CRRT for multiple drug poisoning and rhabdo myelolysis.  Patient has had transient episode of hypotension with anxiolytics.  Still has some urine output and is nonoliguric.  Most of the history has been obtained from Dr. Giles, patient's fiancé and from the medical records.    Interval History: Pt was seen and examined. No new c/o's stable last pm, pt feels "better", no SOB, no fever, no new c/o's.    Review of patient's allergies indicates:   Allergen Reactions    Corticosteroids (glucocorticoids)      "sets off my anxiety real bad"    Tramadol Rash     Current Facility-Administered Medications   Medication Frequency    acetaminophen tablet 650 mg Q8H PRN    albuterol-ipratropium 2.5mg-0.5mg/3mL nebulizer solution 3 mL Q4H PRN    bisacodyl suppository 10 mg Daily PRN    diphenhydrAMINE capsule 50 mg Nightly PRN    docusate sodium capsule 100 mg Daily    ergocalciferol capsule 50,000 Units Q7 Days    fluoxetine capsule 40 mg Daily    fluticasone 50 mcg/actuation nasal spray 2 spray Daily    heparin (porcine) injection 2,000 Units PRN    lorazepam tablet 0.5 mg BID    nicotine 14 mg/24 hr 1 patch Daily    ondansetron injection 4 mg Q8H PRN    oxycodone 12 hr tablet 20 mg BID PRN    pantoprazole EC tablet 40 mg Daily    quetiapine tablet 200 mg Daily    ropinirole tablet " 0.5 mg QHS       Objective:     Vital Signs (Most Recent):  Temp: 99.2 °F (37.3 °C) (07/07/17 1953)  Pulse: 94 (07/07/17 1953)  Resp: 18 (07/07/17 0730)  BP: (!) 145/86 (07/07/17 1953)  SpO2: 98 % (07/07/17 2100)  O2 Device (Oxygen Therapy): nasal cannula w/ humidification (07/07/17 2100) Vital Signs (24h Range):  Temp:  [97.7 °F (36.5 °C)-99.2 °F (37.3 °C)] 99.2 °F (37.3 °C)  Pulse:  [79-94] 94  Resp:  [18] 18  SpO2:  [90 %-98 %] 98 %  BP: (131-159)/(72-88) 145/86     Weight: 79.9 kg (176 lb 1.6 oz) (07/07/17 0007)  Body mass index is 30.21 kg/m².  Body surface area is 1.9 meters squared.    I/O last 3 completed shifts:  In: 1040 [P.O.:1040]  Out: 3801 [Urine:3800; Stool:1]    Physical Exam      Significant Labs: reviewed  CK 1700  BMP  Lab Results   Component Value Date     07/07/2017    K 3.7 07/07/2017     07/07/2017    CO2 23 07/07/2017    BUN 45 (H) 07/07/2017    CREATININE 4.0 (H) 07/07/2017    CALCIUM 8.1 (L) 07/07/2017    ANIONGAP 11 07/07/2017    ESTGFRAFRICA 15 (A) 07/07/2017    EGFRNONAA 13 (A) 07/07/2017     Lab Results   Component Value Date    WBC 11.90 07/07/2017    HGB 7.5 (L) 07/07/2017    HCT 21.1 (L) 07/07/2017    MCV 88 07/07/2017     07/07/2017       Significant Imaging: reviewed CXR    Assessment/Plan:     ALEXUS (acute kidney injury)    Acute kidney injury has stabilized with good urine output.  Last 3 days of function has been stable with creatinine ranging between 3.7 and 4.0.  He has discussed with Dr. Clarke.  With hospital medicine.  Patient is cleared from nephrology standpoint to go to the rehabilitation with psychiatry admission.  At the time of discharge we will remove the central line for dialysis and IV access            Thank you for your consult.     Suzy Sarah MD  Nephrology  [facility] -     Physical exam:General: well developed, well nourished, appears stated age, no distress, mildly obese, pale   Eyes: conjunctivae/corneas clear. PERRL..    HENT: Head:normocephalic, atraumatic. Ears:not examined. Nose: Nares normal. Septum midline. Mucosa normal. No drainage or sinus tenderness., no discharge. Throat: lips, mucosa, and tongue normal; teeth and gums normal and no throat erythema.   Neck: no carotid bruit, no JVD, supple, symmetrical, trachea midline, no JVD and thyroid not enlarged, symmetric, no tenderness/mass/nodules   Lungs: clear to auscultation bilaterally and normal respiratory effort   Cardiovascular: Heart: regular rate and rhythm, S1, S2 normal, no murmur, click, rub or gallop and no rub. Chest Wall: no tenderness. Extremities: no cyanosis or edema, or clubbing. Pulses: 2+ and symmetric.   Abdomen/Rectal: Abdomen: soft, non-tender non-distented; bowel sounds normal; no masses, no organomegaly. Rectal: not examined   Musculoskeletal:NAD

## 2017-07-08 NOTE — ASSESSMENT & PLAN NOTE
Acute kidney injury has stabilized with good urine output.  I would recheck urine electrolytes as the creatinine is progressively getting worse every day.  Gentle IV fluids.  Check bladder ultrasound.  Plans discussed with Dr. Borjas with [facility] medicine

## 2017-07-08 NOTE — PLAN OF CARE
Problem: Physical Therapy Goal  Goal: Physical Therapy Goal  PT WILL BE SEEN FOR P.T. FOR A MIN OF 5 OUT OF 7 DAYS A WEEK  LT17  1. PT WILL COMPLETE BED MOBILITY LATANYA  2. PT WILL AMB 50' WITH RW AND SELVIN  3. PT WILL T/F TO CHAIR WITH SELVIN.  4. PT WILL COMPLETE B LE TE X 20 REPS FOR STRENGTHENING.     Outcome: Ongoing (interventions implemented as appropriate)  Patient demonstrated improved independence with bed mobility. Slow movements but no balance disturbances noted. Increased gait distance to ~ 25 feet without sitting rest break. Educated on sitting and supine BLE exercises, demo and return demon performed.

## 2017-07-08 NOTE — SUBJECTIVE & OBJECTIVE
Interval History: 47 yo female with drug overdose. Doing well and her urine output is still good.    Review of Systems   Cardiovascular: Positive for leg swelling.   All other systems reviewed and are negative.    Objective:     Vital Signs (Most Recent):  Temp: 98.6 °F (37 °C) (07/08/17 0802)  Pulse: 97 (07/08/17 0802)  Resp: 18 (07/08/17 0802)  BP: (!) 143/77 (07/08/17 0802)  SpO2: 99 % (07/08/17 0802) Vital Signs (24h Range):  Temp:  [97.9 °F (36.6 °C)-99.2 °F (37.3 °C)] 98.6 °F (37 °C)  Pulse:  [] 97  Resp:  [18] 18  SpO2:  [90 %-99 %] 99 %  BP: (131-157)/(72-86) 143/77     Weight: 80.5 kg (177 lb 8 oz)  Body mass index is 30.45 kg/m².    Intake/Output Summary (Last 24 hours) at 07/08/17 0942  Last data filed at 07/07/17 1500   Gross per 24 hour   Intake                0 ml   Output             1500 ml   Net            -1500 ml      Physical Exam   Constitutional: She is oriented to person, place, and time.   HENT:   Head: Normocephalic.   Eyes: Conjunctivae and EOM are normal. Pupils are equal, round, and reactive to light.   Neck: Normal range of motion. Neck supple.   Cardiovascular: Normal rate, regular rhythm, normal heart sounds and intact distal pulses.    Pulmonary/Chest: Effort normal and breath sounds normal.   Abdominal: Soft. Bowel sounds are normal.   Musculoskeletal: Normal range of motion.   Neurological: She is alert and oriented to person, place, and time.   Skin: Skin is warm and dry. Capillary refill takes less than 2 seconds.   Psychiatric: She has a normal mood and affect.   Nursing note and vitals reviewed.      Significant Labs:   Recent Lab Results       07/08/17  0356      Albumin 1.8(L)     Alkaline Phosphatase 81     ALT 63(H)     Anion Gap 9     AST 45(H)     Baso # 0.08     Basophil% 0.6     Bilirubin, Direct 0.2     Total Bilirubin 0.5  Comment:  For infants and newborns, interpretation of results should be based  on gestational age, weight and in agreement with  clinical  observations.  Premature Infant recommended reference ranges:  Up to 24 hours.............<8.0 mg/dL  Up to 48 hours............<12.0 mg/dL  3-5 days..................<15.0 mg/dL  6-29 days.................<15.0 mg/dL       BUN, Bld 51(H)     Calcium 8.0(L)     Chloride 103     CO2 25     (H)     Creatinine 4.3(H)     Differential Method Automated     eGFR if  14(A)     eGFR if non  12  Comment:  Calculation used to obtain the estimated glomerular filtration  rate (eGFR) is the CKD-EPI equation. Since race is unknown   in our information system, the eGFR values for   -American and Non--American patients are given   for each creatinine result.  (A)     Eos # 0.4     Eosinophil% 3.1     Glucose 116(H)     Gran # 9.9(H)     Gran% 72.4     Hematocrit 20.1(L)     Hemoglobin 7.1(L)     Lymph # 2.2     Lymph% 15.9(L)     Magnesium 1.6     MCH 31.0     MCHC 35.3     MCV 88     Mono # 1.3(H)     Mono% 9.2     MPV 10.0     Platelets 265     Potassium 4.2     Total Protein 4.7(L)     RBC 2.29(L)     RDW 14.6(H)     Sodium 137     WBC 13.87(H)           Significant Imaging: I have reviewed and interpreted all pertinent imaging results/findings within the past 24 hours.

## 2017-07-08 NOTE — PROGRESS NOTES
"[facility] -   Nephrology  Progress Note    Patient Name: Kaylene Emery  MRN: [id number]  Admission Date: 6/28/2017  Hospital Length of Stay: 10 days  Attending Provider: Gina Clarke MD   Primary Care Physician: Jonas Jimenez MD  Principal Problem:Non-traumatic rhabdomyolysis    Subjective:     HPI: Patient is a 46-year-old with multiple psychiatry issues.  Comes in with multiple medication overdose with unclear intent suicidal versus overdose versus toxic injections.  Patient has dark-colored urine along with severe rhabdomyolysis.  Patient is now intubated.  Dr. Giles consulted me for initiation for CRRT for multiple drug poisoning and rhabdo myelolysis.  Patient has had transient episode of hypotension with anxiolytics.  Still has some urine output and is nonoliguric.  Most of the history has been obtained from Dr. Giles, patient's fiancé and from the medical records.    Interval History:   07/08/2017  Palmer's catheter has been taken out.  Patient has not been eating very well.    Review of patient's allergies indicates:   Allergen Reactions    Corticosteroids (glucocorticoids)      "sets off my anxiety real bad"    Tramadol Rash     Current Facility-Administered Medications   Medication Frequency    0.45% NaCl infusion Continuous    acetaminophen tablet 650 mg Q8H PRN    albuterol-ipratropium 2.5mg-0.5mg/3mL nebulizer solution 3 mL Q4H PRN    bisacodyl suppository 10 mg Daily PRN    diphenhydrAMINE capsule 50 mg Nightly PRN    docusate sodium capsule 100 mg Daily    ergocalciferol capsule 50,000 Units Q7 Days    fluoxetine capsule 40 mg Daily    fluticasone 50 mcg/actuation nasal spray 2 spray Daily    heparin (porcine) injection 2,000 Units PRN    lorazepam tablet 0.5 mg BID    nicotine 14 mg/24 hr 1 patch Daily    ondansetron injection 4 mg Q8H PRN    oxycodone 12 hr tablet 20 mg BID PRN    pantoprazole EC tablet 40 mg Daily    quetiapine tablet 200 mg Daily    " ropinirole tablet 0.5 mg QHS       Objective:     Vital Signs (Most Recent):  Temp: 98.6 °F (37 °C) (07/08/17 0802)  Pulse: 97 (07/08/17 0802)  Resp: 18 (07/08/17 0802)  BP: (!) 143/77 (07/08/17 0802)  SpO2: 99 % (07/08/17 0802)  O2 Device (Oxygen Therapy): nasal cannula w/ humidification (07/07/17 2100) Vital Signs (24h Range):  Temp:  [97.9 °F (36.6 °C)-99.2 °F (37.3 °C)] 98.6 °F (37 °C)  Pulse:  [] 97  Resp:  [18] 18  SpO2:  [90 %-99 %] 99 %  BP: (142-157)/(77-86) 143/77     Weight: 80.5 kg (177 lb 8 oz) (07/08/17 0408)  Body mass index is 30.45 kg/m².  Body surface area is 1.91 meters squared.    I/O last 3 completed shifts:  In: 120 [P.O.:120]  Out: 2801 [Urine:2800; Stool:1]    Physical Exam   Constitutional: She is oriented to person, place, and time. No distress.   HENT:   Head: Normocephalic and atraumatic.   Nose: Nose normal.   Eyes: Conjunctivae and EOM are normal. Pupils are equal, round, and reactive to light.   Neck: Normal range of motion. No JVD present. No tracheal deviation present. No thyromegaly present.   Cardiovascular: Normal rate, regular rhythm, normal heart sounds and intact distal pulses.  Exam reveals no gallop and no friction rub.    No murmur heard.  Pulmonary/Chest: Effort normal and breath sounds normal. No respiratory distress. She has no wheezes. She has no rales. She exhibits no tenderness.   Abdominal: Soft. Bowel sounds are normal. She exhibits no distension and no mass. There is no tenderness. No hernia.   Bladder is palpable and is slightly tender   Musculoskeletal: Normal range of motion. She exhibits no edema, tenderness or deformity.   Neurological: She is alert and oriented to person, place, and time. She has normal reflexes. She displays normal reflexes. No cranial nerve deficit. She exhibits normal muscle tone. Coordination normal.   Skin: Skin is warm. She is not diaphoretic. No erythema. There is pallor.   Psychiatric: She has a normal mood and affect. Her  behavior is normal. Judgment and thought content normal.   Nursing note and vitals reviewed.      Significant Labs: reviewed  CK 1700  BMP  Lab Results   Component Value Date     07/08/2017    K 4.2 07/08/2017     07/08/2017    CO2 25 07/08/2017    BUN 51 (H) 07/08/2017    CREATININE 4.3 (H) 07/08/2017    CALCIUM 8.0 (L) 07/08/2017    ANIONGAP 9 07/08/2017    ESTGFRAFRICA 14 (A) 07/08/2017    EGFRNONAA 12 (A) 07/08/2017     Lab Results   Component Value Date    WBC 13.87 (H) 07/08/2017    HGB 7.1 (L) 07/08/2017    HCT 20.1 (L) 07/08/2017    MCV 88 07/08/2017     07/08/2017       Significant Imaging: reviewed CXR    Assessment/Plan:     ALEXUS (acute kidney injury)    Acute kidney injury has stabilized with good urine output.  I would recheck urine electrolytes as the creatinine is progressively getting worse every day.  Gentle IV fluids.  Check bladder ultrasound.  Plans discussed with Dr. Borjas with hospital medicine            Thank you for your consult.     Suzy Sarah MD  Nephrology  [facility] -

## 2017-07-08 NOTE — PLAN OF CARE
Problem: Patient Care Overview  Goal: Plan of Care Review  Outcome: Ongoing (interventions implemented as appropriate)  Pt is without fall or injury this shift. Continues with PEC. Oxycodone given x 1 for pain. 1/2 NS initiated at 75 ml/hr, pt urinating without difficulty. Bed low, call light within reach. Awaiting placement for discharge planning.  Will continue to monitor.

## 2017-07-08 NOTE — PLAN OF CARE
CM met with pt and significant other for d/c planning assessment.  Pt asleep and irritable upon being woken up.  Pt denies that she overdosed intentionally and denies that she was trying to commit suicide - she states that she took the pills because she was trying to go to sleep.  Pt acknowledges history of depression and anxiety and states that she sees Dr. Walker in [place] for treatment of illnesses.  According to significant other, pt is currently unable to walk and they are not clear about when/if she will regain functioning.  Significant other is concerned this will be an issue at home because though currently unemployed, he intends to return to work soon, and pt will not have any help at home.      The pt states that she wants to go to rehab and does not have a preference for facility, other than a desire for the facility to be close to [facility].        07/08/17 1209   Discharge Assessment   Assessment Type Discharge Planning Assessment   Confirmed/corrected address and phone number on facesheet? Yes   Assessment information obtained from? Patient;Caregiver;Medical Record   Expected Length of Stay (days) (TBD)   Communicated expected length of stay with patient/caregiver yes   Prior to hospitilization cognitive status: Alert/Oriented   Prior to hospitalization functional status: Independent   Current cognitive status: Alert/Oriented   Current Functional Status: Independent   Arrived From home or self-care   Lives With significant other   Able to Return to Prior Arrangements unable to determine at this time (comments)   Is patient able to care for self after discharge? Unable to determine at this time (comments)   How many people do you have in your home that can help with your care after discharge? 1   Who are your caregiver(s) and their phone number(s)? Rafa Mullins, significant other: [phone]   Patient's perception of discharge disposition home or selfcare   Readmission Within  The Last 30 Days no previous admission in last 30 days   Patient currently being followed by outpatient case management? No   Patient currently receives home health services? No   Does the patient currently use HME? No   Patient currently receives private duty nursing? No   Patient currently receives any other outside agency services? No   Equipment Currently Used at Home none   Do you have any problems affording any of your prescribed medications? No   Is the patient taking medications as prescribed? no   Do you have any financial concerns preventing you from receiving the healthcare you need? No   Does the patient have transportation to healthcare appointments? Yes   Transportation Available family or friend will provide;Medicaid transportation   On Dialysis? No   Does the patient receive services at the [facility]? No   Are there any open cases? No   Discharge Plan A Rehab   Discharge Plan B Rehab   Patient/Family In Agreement With Plan yes

## 2017-07-08 NOTE — PT/OT/SLP PROGRESS
Occupational Therapy      Kaylene Emery  MRN: [id number]    S: pt seen in room. Pt requested toileting. Pt verbally aggressive with therapist this date  O: pt educated of participating with tx session the patient req mod a with sit<>stand t/f's with mod a x 2 with bedside chair>bsc.pt req max vc and tactile cues for hand placement. Pt req mod/ min a with bsc>bed sit<.stand t/f's with mod cues with hand placement. Once standing pt req. Min a with rw . Pt req mod a with bed mobility with leg lift. Pt verbally aggressive with therapist this date  A: pt with incremental improvements with functional t/f's.  P: continue with poc    Raeann Olivares OT   10:30-11:00  2 ta  7/8/2017

## 2017-07-08 NOTE — PROGRESS NOTES
[facility] Hospital Medicine  Progress Note    Patient Name: Kaylene Emery  MRN: [id number]  Patient Class: IP- Inpatient   Admission Date: 6/28/2017  Length of Stay: 10 days  Attending Physician: Gina Clarke MD  Primary Care Provider: Jonas Jimenez MD        Subjective:     Principal Problem:Non-traumatic rhabdomyolysis    HPI:  Ms. Emery is a 47 y/o  female with h/o depression, substance OD in the past, was found with AMS earlier today and was brought to the ED. Patient is currently intubated and history obtained per chart review. Family not at bedside.    Apparently patient likely had overdosed on multiple medications including Methocarbamol, Mobic, lorazepam, flexeril, phenergan, Seroquel, Celexa. Her boy friend is on Geodon, Klonopin, Wellbutrin and Effexor.  Most of her recent filled prescription bottles were found empty at the bedside when her bf found her.   Per chart review, yesterday patient was summoned to appear in the court for her DUI that occurred 4 months ago. She was extremely anxious and pacing all day today.     Labs show lactic acid 2.0, CPK > 40,000, CXR show bilateral patchy infiltrates, ANNETTE < 10, tylenol level < 5,  CO2 17, Na 130, , , WBC 15,000, Hgb 17.3.    Patient is currently intubated on the ventilator, breathing over the vent. She received NS 6 liters so far. Patient was evaluated by both neurology and nephrology in the ED. Nephrology plans to initiate patient on CRRT for poison/toxin control.    Hospital Course:  Pt extubated early this am, doing well, still feels anxious but better, denies any SI/ intentions or attempts. She is PECed. debies any prior hx of psych admissions. CRRT started this am and tolerating well. Repeat labs show improving LFTs but CPK still > 40,000/. Denies any injury or pain.     Looks much better, sitting up in chair, has severe LE edema from the Bicarb fluid. CRRT clotted off at 0200 this am, c/o ch back and  muscle pain. No numbness of tingling. Again denies any suicidal ideation or attempt. Getting IV Zosyn too for a presumed Aspiration.     Interval History: 47 yo female with drug overdose. Doing well and her urine output is still good.    Review of Systems   Cardiovascular: Positive for leg swelling.   All other systems reviewed and are negative.    Objective:     Vital Signs (Most Recent):  Temp: 98.6 °F (37 °C) (07/08/17 0802)  Pulse: 97 (07/08/17 0802)  Resp: 18 (07/08/17 0802)  BP: (!) 143/77 (07/08/17 0802)  SpO2: 99 % (07/08/17 0802) Vital Signs (24h Range):  Temp:  [97.9 °F (36.6 °C)-99.2 °F (37.3 °C)] 98.6 °F (37 °C)  Pulse:  [] 97  Resp:  [18] 18  SpO2:  [90 %-99 %] 99 %  BP: (131-157)/(72-86) 143/77     Weight: 80.5 kg (177 lb 8 oz)  Body mass index is 30.45 kg/m².    Intake/Output Summary (Last 24 hours) at 07/08/17 0942  Last data filed at 07/07/17 1500   Gross per 24 hour   Intake                0 ml   Output             1500 ml   Net            -1500 ml      Physical Exam   Constitutional: She is oriented to person, place, and time.   HENT:   Head: Normocephalic.   Eyes: Conjunctivae and EOM are normal. Pupils are equal, round, and reactive to light.   Neck: Normal range of motion. Neck supple.   Cardiovascular: Normal rate, regular rhythm, normal heart sounds and intact distal pulses.    Pulmonary/Chest: Effort normal and breath sounds normal.   Abdominal: Soft. Bowel sounds are normal.   Musculoskeletal: Normal range of motion.   Neurological: She is alert and oriented to person, place, and time.   Skin: Skin is warm and dry. Capillary refill takes less than 2 seconds.   Psychiatric: She has a normal mood and affect.   Nursing note and vitals reviewed.      Significant Labs:   Recent Lab Results       07/08/17  0356      Albumin 1.8(L)     Alkaline Phosphatase 81     ALT 63(H)     Anion Gap 9     AST 45(H)     Baso # 0.08     Basophil% 0.6     Bilirubin, Direct 0.2     Total Bilirubin  0.5  Comment:  For infants and newborns, interpretation of results should be based  on gestational age, weight and in agreement with clinical  observations.  Premature Infant recommended reference ranges:  Up to 24 hours.............<8.0 mg/dL  Up to 48 hours............<12.0 mg/dL  3-5 days..................<15.0 mg/dL  6-29 days.................<15.0 mg/dL       BUN, Bld 51(H)     Calcium 8.0(L)     Chloride 103     CO2 25     (H)     Creatinine 4.3(H)     Differential Method Automated     eGFR if  14(A)     eGFR if non  12  Comment:  Calculation used to obtain the estimated glomerular filtration  rate (eGFR) is the CKD-EPI equation. Since race is unknown   in our information system, the eGFR values for   -American and Non--American patients are given   for each creatinine result.  (A)     Eos # 0.4     Eosinophil% 3.1     Glucose 116(H)     Gran # 9.9(H)     Gran% 72.4     Hematocrit 20.1(L)     Hemoglobin 7.1(L)     Lymph # 2.2     Lymph% 15.9(L)     Magnesium 1.6     MCH 31.0     MCHC 35.3     MCV 88     Mono # 1.3(H)     Mono% 9.2     MPV 10.0     Platelets 265     Potassium 4.2     Total Protein 4.7(L)     RBC 2.29(L)     RDW 14.6(H)     Sodium 137     WBC 13.87(H)           Significant Imaging: I have reviewed and interpreted all pertinent imaging results/findings within the past 24 hours.    Assessment/Plan:      Polysubstance overdose    - Unclear intention.  - PEC'd  - Psychiatry has evaluated patient  - CRRT to be started tonight for polysubstance/toxin removal    Improving on CRRT-- CPK still high but LFTs improving    CRRT clotted off, CPK slightly down but still very high  Will change Bicarb Fluids to NS at a lower rate    7/5/17: intentional overdose  Did have CRRT   She has been Pec'd           * Non-traumatic rhabdomyolysis    - CPK < 40,000  - Repeat daily CPK  - Received six liters of NS so far in the ED  - Continue NS at 125 cc/hr  - Discussed  with  (nephrology), plan to initiate CRRT tonight    As above-- improving but still very high, still needs IVF    7/5/17: UO improving and continue IVF    7/8/17: resolving    7/6/17: CK trending down        Recurrent major depressive disorder    Needs to be addressed further    7/7/17: plan is to find placement once medically clear        Anasarca    Third spacing due to ALI and ALEXUS-- will change IVF to NS at a lower rate  Avoid Lasix at this point to protect the kidneys    7/6/17:improving        Hypoalbuminemia    Probably acute on Ch due to Liver Injury  Will watch          ALEXUS (acute kidney injury)    Possibly Pre renal?  Will watch closely    7/6/17: her urine output is increasing and her BUN/Cr is stable for past 2 days.    7/7/17: renal function is stable but her UOP is steady increasing.  Discussed with Nephrology is to pull  Cath she has for HD, d/c carroll as kidney's are recovering.    7/8/17: renal function stable with good urine output        Elevated liver enzymes    Sec to Polysubstance Drug OD-- improving          Thrombocytopenia    Heparin drip off as CRRT does not require heparin gtt  Platelets still low but stable-- probably sec to Acute Liver Injury          Aspiration pneumonia of both lungs    - Start Vanc and Zosyn (received rocephin, zithromax in the ED)  - Follow up on blood, sputum cultures  - Duonebs q6    No s/s of aspiration, will stop Zosyn          Transaminitis    - Secondary to polysubstance overdose  - Monitor closely  - Gradually improving with CRRT  - Continue present care,, LFTs improving    7/6/17: trending down, continue with gently hydration and monitor            VTE Risk Mitigation         Ordered     Medium Risk of VTE  Once      06/28/17 2059     Place sequential compression device  Until discontinued      06/28/17 2059          Gina Clarke MD  Department of Hospital Medicine   [facility]

## 2017-07-09 PROBLEM — D64.9 ANEMIA: Status: ACTIVE | Noted: 2017-07-09

## 2017-07-09 LAB
ABO + RH BLD: NORMAL
ALBUMIN SERPL BCP-MCNC: 1.9 G/DL
ALP SERPL-CCNC: 79 U/L
ALT SERPL W/O P-5'-P-CCNC: 54 U/L
ANION GAP SERPL CALC-SCNC: 10 MMOL/L
ANION GAP SERPL CALC-SCNC: 10 MMOL/L
AST SERPL-CCNC: 37 U/L
BASOPHILS # BLD AUTO: 0.07 K/UL
BASOPHILS NFR BLD: 0.6 %
BILIRUB DIRECT SERPL-MCNC: 0.3 MG/DL
BILIRUB SERPL-MCNC: 0.7 MG/DL
BLD GP AB SCN CELLS X3 SERPL QL: NORMAL
BLD PROD TYP BPU: NORMAL
BLD PROD TYP BPU: NORMAL
BLOOD UNIT EXPIRATION DATE: NORMAL
BLOOD UNIT EXPIRATION DATE: NORMAL
BLOOD UNIT TYPE CODE: 6200
BLOOD UNIT TYPE CODE: NORMAL
BLOOD UNIT TYPE: NORMAL
BLOOD UNIT TYPE: NORMAL
BUN SERPL-MCNC: 53 MG/DL
BUN SERPL-MCNC: 53 MG/DL
CALCIUM SERPL-MCNC: 8.2 MG/DL
CALCIUM SERPL-MCNC: 8.2 MG/DL
CHLORIDE SERPL-SCNC: 102 MMOL/L
CHLORIDE SERPL-SCNC: 102 MMOL/L
CK SERPL-CCNC: 501 U/L
CO2 SERPL-SCNC: 24 MMOL/L
CO2 SERPL-SCNC: 24 MMOL/L
CODING SYSTEM: NORMAL
CODING SYSTEM: NORMAL
CREAT SERPL-MCNC: 4.5 MG/DL
CREAT SERPL-MCNC: 4.5 MG/DL
DIFFERENTIAL METHOD: ABNORMAL
DISPENSE STATUS: NORMAL
DISPENSE STATUS: NORMAL
EOSINOPHIL # BLD AUTO: 0.4 K/UL
EOSINOPHIL NFR BLD: 3.5 %
ERYTHROCYTE [DISTWIDTH] IN BLOOD BY AUTOMATED COUNT: 15.3 %
EST. GFR  (AFRICAN AMERICAN): 13 ML/MIN/1.73 M^2
EST. GFR  (AFRICAN AMERICAN): 13 ML/MIN/1.73 M^2
EST. GFR  (NON AFRICAN AMERICAN): 11 ML/MIN/1.73 M^2
EST. GFR  (NON AFRICAN AMERICAN): 11 ML/MIN/1.73 M^2
GLUCOSE SERPL-MCNC: 109 MG/DL
GLUCOSE SERPL-MCNC: 109 MG/DL
HCT VFR BLD AUTO: 19.8 %
HGB BLD-MCNC: 7 G/DL
LYMPHOCYTES # BLD AUTO: 1.9 K/UL
LYMPHOCYTES NFR BLD: 16.1 %
MAGNESIUM SERPL-MCNC: 1.6 MG/DL
MCH RBC QN AUTO: 31.3 PG
MCHC RBC AUTO-ENTMCNC: 35.4 %
MCV RBC AUTO: 88 FL
MONOCYTES # BLD AUTO: 1.2 K/UL
MONOCYTES NFR BLD: 10.2 %
NEUTROPHILS # BLD AUTO: 8.1 K/UL
NEUTROPHILS NFR BLD: 71.3 %
NUM UNITS TRANS PACKED RBC: NORMAL
NUM UNITS TRANS PACKED RBC: NORMAL
PLATELET # BLD AUTO: 267 K/UL
PMV BLD AUTO: 9.9 FL
POTASSIUM SERPL-SCNC: 3.9 MMOL/L
POTASSIUM SERPL-SCNC: 3.9 MMOL/L
PROT SERPL-MCNC: 4.8 G/DL
RBC # BLD AUTO: 2.24 M/UL
SODIUM SERPL-SCNC: 136 MMOL/L
SODIUM SERPL-SCNC: 136 MMOL/L
WBC # BLD AUTO: 11.59 K/UL

## 2017-07-09 PROCEDURE — 86900 BLOOD TYPING SEROLOGIC ABO: CPT

## 2017-07-09 PROCEDURE — 99232 SBSQ HOSP IP/OBS MODERATE 35: CPT | Mod: ,,, | Performed by: INTERNAL MEDICINE

## 2017-07-09 PROCEDURE — 63600175 PHARM REV CODE 636 W HCPCS: Performed by: FAMILY MEDICINE

## 2017-07-09 PROCEDURE — 86920 COMPATIBILITY TEST SPIN: CPT

## 2017-07-09 PROCEDURE — 36430 TRANSFUSION BLD/BLD COMPNT: CPT

## 2017-07-09 PROCEDURE — 97530 THERAPEUTIC ACTIVITIES: CPT

## 2017-07-09 PROCEDURE — 80048 BASIC METABOLIC PNL TOTAL CA: CPT

## 2017-07-09 PROCEDURE — 85025 COMPLETE CBC W/AUTO DIFF WBC: CPT

## 2017-07-09 PROCEDURE — 25000003 PHARM REV CODE 250: Performed by: NURSE PRACTITIONER

## 2017-07-09 PROCEDURE — 82550 ASSAY OF CK (CPK): CPT

## 2017-07-09 PROCEDURE — 21400001 HC TELEMETRY ROOM

## 2017-07-09 PROCEDURE — 97110 THERAPEUTIC EXERCISES: CPT

## 2017-07-09 PROCEDURE — 27000221 HC OXYGEN, UP TO 24 HOURS

## 2017-07-09 PROCEDURE — 25000003 PHARM REV CODE 250: Performed by: FAMILY MEDICINE

## 2017-07-09 PROCEDURE — 25000003 PHARM REV CODE 250: Performed by: SPECIALIST

## 2017-07-09 PROCEDURE — 83735 ASSAY OF MAGNESIUM: CPT

## 2017-07-09 PROCEDURE — 86850 RBC ANTIBODY SCREEN: CPT

## 2017-07-09 PROCEDURE — P9047 ALBUMIN (HUMAN), 25%, 50ML: HCPCS | Performed by: FAMILY MEDICINE

## 2017-07-09 PROCEDURE — P9016 RBC LEUKOCYTES REDUCED: HCPCS

## 2017-07-09 PROCEDURE — 80076 HEPATIC FUNCTION PANEL: CPT

## 2017-07-09 RX ORDER — FUROSEMIDE 10 MG/ML
20 INJECTION INTRAMUSCULAR; INTRAVENOUS ONCE
Status: COMPLETED | OUTPATIENT
Start: 2017-07-09 | End: 2017-07-09

## 2017-07-09 RX ORDER — HYDROCODONE BITARTRATE AND ACETAMINOPHEN 500; 5 MG/1; MG/1
TABLET ORAL
Status: DISCONTINUED | OUTPATIENT
Start: 2017-07-09 | End: 2017-07-10 | Stop reason: HOSPADM

## 2017-07-09 RX ORDER — ALBUMIN HUMAN 250 G/1000ML
25 SOLUTION INTRAVENOUS ONCE
Status: COMPLETED | OUTPATIENT
Start: 2017-07-09 | End: 2017-07-09

## 2017-07-09 RX ORDER — MORPHINE SULFATE 2 MG/ML
1 INJECTION, SOLUTION INTRAMUSCULAR; INTRAVENOUS ONCE
Status: COMPLETED | OUTPATIENT
Start: 2017-07-09 | End: 2017-07-09

## 2017-07-09 RX ADMIN — LORAZEPAM 0.5 MG: 0.5 TABLET ORAL at 09:07

## 2017-07-09 RX ADMIN — FLUOXETINE 40 MG: 20 CAPSULE ORAL at 08:07

## 2017-07-09 RX ADMIN — OXYCODONE HYDROCHLORIDE 20 MG: 10 TABLET, FILM COATED, EXTENDED RELEASE ORAL at 09:07

## 2017-07-09 RX ADMIN — QUETIAPINE FUMARATE 200 MG: 100 TABLET, FILM COATED ORAL at 08:07

## 2017-07-09 RX ADMIN — FUROSEMIDE 20 MG: 10 INJECTION, SOLUTION INTRAMUSCULAR; INTRAVENOUS at 10:07

## 2017-07-09 RX ADMIN — DOCUSATE SODIUM 100 MG: 100 CAPSULE, LIQUID FILLED ORAL at 08:07

## 2017-07-09 RX ADMIN — ROPINIROLE HYDROCHLORIDE 0.5 MG: 0.25 TABLET, FILM COATED ORAL at 09:07

## 2017-07-09 RX ADMIN — OXYCODONE HYDROCHLORIDE 20 MG: 10 TABLET, FILM COATED, EXTENDED RELEASE ORAL at 08:07

## 2017-07-09 RX ADMIN — LORAZEPAM 0.5 MG: 0.5 TABLET ORAL at 10:07

## 2017-07-09 RX ADMIN — MORPHINE SULFATE 1 MG: 2 INJECTION, SOLUTION INTRAMUSCULAR; INTRAVENOUS at 06:07

## 2017-07-09 RX ADMIN — NICOTINE 1 PATCH: 14 PATCH, EXTENDED RELEASE TRANSDERMAL at 08:07

## 2017-07-09 RX ADMIN — FLUTICASONE PROPIONATE 2 SPRAY: 50 SPRAY, METERED NASAL at 08:07

## 2017-07-09 RX ADMIN — ALBUMIN HUMAN 25 G: 0.25 SOLUTION INTRAVENOUS at 06:07

## 2017-07-09 RX ADMIN — PANTOPRAZOLE SODIUM 40 MG: 40 TABLET, DELAYED RELEASE ORAL at 08:07

## 2017-07-09 NOTE — ASSESSMENT & PLAN NOTE
Possibly Pre renal?  Will watch closely    7/6/17: her urine output is increasing and her BUN/Cr is stable for past 2 days.    7/7/17: renal function is stable but her UOP is steady increasing.  Discussed with Nephrology is to pull  Cath she has for HD, d/c carroll as kidney's are recovering.    7/8/17: renal function stable with good urine output    7/9/17: BUN/Cr still increasing despite okay output  Given IVF overnight and worse edema  Hold fluids as she may have to have repeat dialysis

## 2017-07-09 NOTE — SUBJECTIVE & OBJECTIVE
"Interval History:   07/09/2017  Palmer's catheter has been taken out.  Patient has not been eating very well.    Review of patient's allergies indicates:   Allergen Reactions    Corticosteroids (glucocorticoids)      "sets off my anxiety real bad"    Tramadol Rash     Current Facility-Administered Medications   Medication Frequency    0.9%  NaCl infusion (for blood administration) Q24H PRN    acetaminophen tablet 650 mg Q8H PRN    albuterol-ipratropium 2.5mg-0.5mg/3mL nebulizer solution 3 mL Q4H PRN    bisacodyl suppository 10 mg Daily PRN    diphenhydrAMINE capsule 50 mg Nightly PRN    docusate sodium capsule 100 mg Daily    ergocalciferol capsule 50,000 Units Q7 Days    fluoxetine capsule 40 mg Daily    fluticasone 50 mcg/actuation nasal spray 2 spray Daily    furosemide injection 20 mg Once    heparin (porcine) injection 2,000 Units PRN    lorazepam tablet 0.5 mg BID    nicotine 14 mg/24 hr 1 patch Daily    ondansetron injection 4 mg Q8H PRN    oxycodone 12 hr tablet 20 mg BID PRN    pantoprazole EC tablet 40 mg Daily    quetiapine tablet 200 mg Daily    ropinirole tablet 0.5 mg QHS       Objective:     Vital Signs (Most Recent):  Temp: 98.5 °F (36.9 °C) (07/09/17 1700)  Pulse: 86 (07/09/17 1734)  Resp: 20 (07/09/17 1700)  BP: (!) 143/91 (07/09/17 1700)  SpO2: 96 % (07/09/17 1734)  O2 Device (Oxygen Therapy): nasal cannula (07/09/17 1734) Vital Signs (24h Range):  Temp:  [98.2 °F (36.8 °C)-99 °F (37.2 °C)] 98.5 °F (36.9 °C)  Pulse:  [82-94] 86  Resp:  [15-20] 20  SpO2:  [94 %-98 %] 96 %  BP: (137-155)/(74-91) 143/91     Weight: 80.5 kg (177 lb 8 oz) (07/08/17 0408)  Body mass index is 30.45 kg/m².  Body surface area is 1.91 meters squared.    I/O last 3 completed shifts:  In: 890 [P.O.:890]  Out: 2150 [Urine:2150]    Physical Exam   Constitutional: She is oriented to person, place, and time. No distress.   HENT:   Head: Normocephalic and atraumatic.   Nose: Nose normal.   Eyes: Conjunctivae and " EOM are normal. Pupils are equal, round, and reactive to light.   Neck: Normal range of motion. No JVD present. No tracheal deviation present. No thyromegaly present.   Cardiovascular: Normal rate, regular rhythm, normal heart sounds and intact distal pulses.  Exam reveals no gallop and no friction rub.    No murmur heard.  Pulmonary/Chest: Effort normal and breath sounds normal. No respiratory distress. She has no wheezes. She has no rales. She exhibits no tenderness.   Abdominal: Soft. Bowel sounds are normal. She exhibits no distension and no mass. There is no tenderness. No hernia.   Musculoskeletal: Normal range of motion. She exhibits edema. She exhibits no tenderness or deformity.   Neurological: She is alert and oriented to person, place, and time. She has normal reflexes. She displays normal reflexes. No cranial nerve deficit. She exhibits normal muscle tone. Coordination normal.   Skin: Skin is warm. She is not diaphoretic. No erythema. There is pallor.   Psychiatric: She has a normal mood and affect. Her behavior is normal. Judgment and thought content normal.   Nursing note and vitals reviewed.      Significant Labs: reviewed  CK 1700  BMP  Lab Results   Component Value Date     07/09/2017     07/09/2017    K 3.9 07/09/2017    K 3.9 07/09/2017     07/09/2017     07/09/2017    CO2 24 07/09/2017    CO2 24 07/09/2017    BUN 53 (H) 07/09/2017    BUN 53 (H) 07/09/2017    CREATININE 4.5 (H) 07/09/2017    CREATININE 4.5 (H) 07/09/2017    CALCIUM 8.2 (L) 07/09/2017    CALCIUM 8.2 (L) 07/09/2017    ANIONGAP 10 07/09/2017    ANIONGAP 10 07/09/2017    ESTGFRAFRICA 13 (A) 07/09/2017    ESTGFRAFRICA 13 (A) 07/09/2017    EGFRNONAA 11 (A) 07/09/2017    EGFRNONAA 11 (A) 07/09/2017     Lab Results   Component Value Date    WBC 11.59 07/09/2017    HGB 7.0 (L) 07/09/2017    HCT 19.8 (LL) 07/09/2017    MCV 88 07/09/2017     07/09/2017       Significant Imaging: reviewed CXR

## 2017-07-09 NOTE — PLAN OF CARE
Problem: Patient Care Overview  Goal: Plan of Care Review  PT REQUIRES MAX A X2>TOTAL A FOR BED MOBILITY AND T/F TO CHAIR   Outcome: Ongoing (interventions implemented as appropriate)  Plan of care reviewed with patient. AAO x3. V/S stable. Pain med given x1 during shift. Patient on telemetry NS rhythm noted. 1/2 NS infusing. Patient ambulating with assistance, sitter in room, fall precautions in place, patient free from fall/injury. Patient has no questions at this time. Will continue to monitor.

## 2017-07-09 NOTE — SUBJECTIVE & OBJECTIVE
Interval History: 45 yo with ALEXUS after intentional overdose, despite okay urine output her Cr still increasing. She got IV fluids overnight and again with anasarca.    Review of Systems   Cardiovascular: Positive for leg swelling.   All other systems reviewed and are negative.    Objective:     Vital Signs (Most Recent):  Temp: 99 °F (37.2 °C) (07/09/17 1200)  Pulse: 88 (07/09/17 1200)  Resp: 20 (07/09/17 1200)  BP: 137/74 (07/09/17 1200)  SpO2: 98 % (07/09/17 1200) Vital Signs (24h Range):  Temp:  [98.2 °F (36.8 °C)-99 °F (37.2 °C)] 99 °F (37.2 °C)  Pulse:  [82-90] 88  Resp:  [15-20] 20  SpO2:  [94 %-98 %] 98 %  BP: (137-155)/(74-90) 137/74     Weight: 80.5 kg (177 lb 8 oz)  Body mass index is 30.45 kg/m².    Intake/Output Summary (Last 24 hours) at 07/09/17 1624  Last data filed at 07/09/17 1400   Gross per 24 hour   Intake             1540 ml   Output             2750 ml   Net            -1210 ml      Physical Exam   Constitutional: She is oriented to person, place, and time. She appears well-developed and well-nourished.   HENT:   Head: Normocephalic and atraumatic.   Eyes: EOM are normal. Pupils are equal, round, and reactive to light.   Neck: Normal range of motion. Neck supple.   Cardiovascular: Normal rate, regular rhythm, normal heart sounds and intact distal pulses.    Pulmonary/Chest: Effort normal and breath sounds normal.   Abdominal: Soft. Bowel sounds are normal.   Musculoskeletal: She exhibits edema.   Neurological: She is alert and oriented to person, place, and time.   Skin: Skin is warm and dry. Capillary refill takes less than 2 seconds.   Psychiatric: She has a normal mood and affect.   Nursing note and vitals reviewed.      Significant Labs:   Recent Lab Results       07/09/17  0500 07/08/17  2113 07/08/17  2111      Albumin 1.9(L)       Alkaline Phosphatase 79       ALT 54(H)       Anion Gap 10        10       Appearance, UA  Hazy(A)      AST 37       Baso # 0.07       Basophil% 0.6        Bilirubin (UA)  Negative      Bilirubin, Direct 0.3       Total Bilirubin 0.7  Comment:  For infants and newborns, interpretation of results should be based  on gestational age, weight and in agreement with clinical  observations.  Premature Infant recommended reference ranges:  Up to 24 hours.............<8.0 mg/dL  Up to 48 hours............<12.0 mg/dL  3-5 days..................<15.0 mg/dL  6-29 days.................<15.0 mg/dL         BUN, Bld 53(H)        53(H)       Calcium 8.2(L)        8.2(L)       Chloride 102        102       CO2 24        24       Color, UA  Yellow      (H)       Creatinine 4.5(H)        4.5(H)       Creatinine, Random Ur   32.2  Comment:  The random urine reference ranges provided were established   for 24 hour urine collections.  No reference ranges exist for  random urine specimens.  Correlate clinically.       Differential Method Automated       eGFR if  13(A)        13(A)       eGFR if non  11  Comment:  Calculation used to obtain the estimated glomerular filtration  rate (eGFR) is the CKD-EPI equation. Since race is unknown   in our information system, the eGFR values for   -American and Non--American patients are given   for each creatinine result.  (A)        11  Comment:  Calculation used to obtain the estimated glomerular filtration  rate (eGFR) is the CKD-EPI equation. Since race is unknown   in our information system, the eGFR values for   -American and Non--American patients are given   for each creatinine result.  (A)       Eos # 0.4       Eosinophil% 3.5       Glucose 109        109       Glucose, UA  Negative      Gran # 8.1(H)       Gran% 71.3       Hematocrit 19.8  Comment:  HCT critical result(s) called and verbal readback obtained from   Beckie Sutton RN, 07/09/2017 05:26  (LL)       Hemoglobin 7.0(L)       Ketones, UA  Negative      Leukocytes, UA  Negative      Lymph # 1.9       Lymph% 16.1(L)        Magnesium 1.6       MCH 31.3(H)       MCHC 35.4       MCV 88       Microscopic Comment  SEE COMMENT  Comment:  Other formed elements not mentioned in the report are not   present in the microscopic examination.         Mono # 1.2(H)       Mono% 10.2       MPV 9.9       Nitrite, UA  Negative      Occult Blood UA  1+(A)      pH, UA  6.0      Platelets 267       Potassium 3.9        3.9       Total Protein 4.8(L)       Protein, UA  Negative  Comment:  Recommend a 24 hour urine protein or a urine   protein/creatinine ratio if globulin induced proteinuria is  clinically suspected.        RBC 2.24(L)       RBC, UA  1      RDW 15.3(H)       Sodium 136        136       Sodium Urine Random   52  Comment:  The random urine reference ranges provided were established   for 24 hour urine collections.  No reference ranges exist for  random urine specimens.  Correlate clinically.       Specific Gravity, UA  <=1.005(A)      Specimen UA  Urine, Clean Catch      Squam Epithel, UA  3      Urobilinogen, UA  Negative      WBC 11.59             Significant Imaging: I have reviewed and interpreted all pertinent imaging results/findings within the past 24 hours.

## 2017-07-09 NOTE — PT/OT/SLP PROGRESS
Physical Therapy  Treatment    Kaylene Emery   MRN: [id number]   Admitting Diagnosis: Non-traumatic rhabdomyolysis    PT Received On: 07/09/17  PT Start Time: 0801     PT Stop Time: 0826    PT Total Time (min): 25 min       Billable Minutes:  Therapeutic Activity 10 minutes and Therapeutic Exercise 15 minutes    Treatment Type: Treatment  PT/PTA: PTA     PTA Visit Number: 2       General Precautions: Standard, fall  Orthopedic Precautions: N/A   Braces:           Subjective:  Communicated with epic documentation and FREDERICK Graff prior to session.      Pain/Comfort  Pain Rating 1: 7/10  Location - Side 1: Right  Location 1: leg  Pain Addressed 1: Nurse notified    Objective:   Patient found with: oxygen, peripheral IV, telemetry    Functional Mobility:  Bed Mobility:   Rolling/Turning to Left: Stand by assistance  Rolling/Turning Right: Stand by assistance  Scooting/Bridging: Stand by Assistance  Supine to Sit: Contact Guard Assistance  Sit to Supine: Minimum Assistance    Transfers:  Sit <> Stand Assistance: Moderate Assistance  Sit <> Stand Assistive Device: No Assistive Device    Gait:   Gait Distance:  (declined this session)    Stairs:      Balance:   Static Sit: GOOD+: Takes MAXIMAL challenges from all directions.    Dynamic Sit: GOOD: Maintains balance through MODERATE excursions of active trunk movement  Static Stand: N/A this session  Dynamic stand: N/A this session     Therapeutic Activities and Exercises:  Bed mobility, sit <--> stand, LE therapeutic exercises     AM-PAC 6 CLICK MOBILITY  How much help from another person does this patient currently need?   1 = Unable, Total/Dependent Assistance  2 = A lot, Maximum/Moderate Assistance  3 = A little, Minimum/Contact Guard/Supervision  4 = None, Modified [place]/Independent         AM-PAC Raw Score CMS G-Code Modifier Level of Impairment Assistance   6 % Total / Unable   7 - 9 CM 80 - 100% Maximal Assist   10 - 14 CL 60 - 80% Moderate Assist    15 - 19 CK 40 - 60% Moderate Assist   20 - 22 CJ 20 - 40% Minimal Assist   23 CI 1-20% SBA / CGA   24 CH 0% Independent/ Mod I     Patient left supine with all lines intact, call button in reach and nursing present.    Assessment:  Kaylene Emery is a 46 y.o. female with a medical diagnosis of Non-traumatic rhabdomyolysis and presents with Non-traumatic rhabdomyolysis.    Rehab identified problem list/impairments: Rehab identified problem list/impairments: weakness, impaired endurance, impaired balance, pain    Rehab potential is good.    Activity tolerance: Good    Discharge recommendations: Discharge Facility/Level Of Care Needs: rehabilitation facility     Barriers to discharge:      Equipment recommendations:       GOALS:    Physical Therapy Goals        Problem: Physical Therapy Goal    Goal Priority Disciplines Outcome Goal Variances Interventions   Physical Therapy Goal     PT/OT, PT Ongoing (interventions implemented as appropriate)     Description:  PT WILL BE SEEN FOR P.T. FOR A MIN OF 5 OUT OF 7 DAYS A WEEK  LT17  1. PT WILL COMPLETE BED MOBILITY LATANYA  2. PT WILL AMB 50' WITH RW AND SELVIN  3. PT WILL T/F TO CHAIR WITH SELVIN.  4. PT WILL COMPLETE B LE TE X 20 REPS FOR STRENGTHENING.                      PLAN:    Patient to be seen 5 x/week  to address the above listed problems via therapeutic exercises, therapeutic activities, gait training  Plan of Care expires: 17  Plan of Care reviewed with: patient         Wero Bruce, PTA  2017

## 2017-07-09 NOTE — ASSESSMENT & PLAN NOTE
7/9/17: her H/h is decline and now obvious signs of bleeding  Could be related to ALEXUS  Transfuse 1 unit

## 2017-07-09 NOTE — PLAN OF CARE
Problem: Physical Therapy Goal  Goal: Physical Therapy Goal  PT WILL BE SEEN FOR P.T. FOR A MIN OF 5 OUT OF 7 DAYS A WEEK  LT17  1. PT WILL COMPLETE BED MOBILITY LATANYA  2. PT WILL AMB 50' WITH RW AND SELVIN  3. PT WILL T/F TO CHAIR WITH SELVIN.  4. PT WILL COMPLETE B LE TE X 20 REPS FOR STRENGTHENING.     Outcome: Ongoing (interventions implemented as appropriate)  Patient declined gait training this morning. Completed BLE therex in bed with some active assistance needed for RLE. Demonstrated bed mobility with CGA to min assist to come supine to sitting EOB. Mod assist to stand secondary to pain.

## 2017-07-09 NOTE — ASSESSMENT & PLAN NOTE
Third spacing due to ALI and ALEXUS-- will change IVF to NS at a lower rate  Avoid Lasix at this point to protect the kidneys    7/6/17:improving    7/9/17: was improving but again worse as she got fluids  Hold fluids and  Give albumin

## 2017-07-09 NOTE — PROGRESS NOTES
[facility] Hospital Medicine  Progress Note    Patient Name: Kaylene Emery  MRN: [id number]  Patient Class: IP- Inpatient   Admission Date: 6/28/2017  Length of Stay: 11 days  Attending Physician: Gina Clarke MD  Primary Care Provider: Jonas Jimenez MD        Subjective:     Principal Problem:Non-traumatic rhabdomyolysis    HPI:  Ms. Emery is a 45 y/o  female with h/o depression, substance OD in the past, was found with AMS earlier today and was brought to the ED. Patient is currently intubated and history obtained per chart review. Family not at bedside.    Apparently patient likely had overdosed on multiple medications including Methocarbamol, Mobic, lorazepam, flexeril, phenergan, Seroquel, Celexa. Her boy friend is on Geodon, Klonopin, Wellbutrin and Effexor.  Most of her recent filled prescription bottles were found empty at the bedside when her bf found her.   Per chart review, yesterday patient was summoned to appear in the court for her DUI that occurred 4 months ago. She was extremely anxious and pacing all day today.     Labs show lactic acid 2.0, CPK > 40,000, CXR show bilateral patchy infiltrates, ANNETTE < 10, tylenol level < 5,  CO2 17, Na 130, , , WBC 15,000, Hgb 17.3.    Patient is currently intubated on the ventilator, breathing over the vent. She received NS 6 liters so far. Patient was evaluated by both neurology and nephrology in the ED. Nephrology plans to initiate patient on CRRT for poison/toxin control.    Hospital Course:  Pt extubated early this am, doing well, still feels anxious but better, denies any SI/ intentions or attempts. She is PECed. debies any prior hx of psych admissions. CRRT started this am and tolerating well. Repeat labs show improving LFTs but CPK still > 40,000/. Denies any injury or pain.     Looks much better, sitting up in chair, has severe LE edema from the Bicarb fluid. CRRT clotted off at 0200 this am, c/o ch back and  muscle pain. No numbness of tingling. Again denies any suicidal ideation or attempt. Getting IV Zosyn too for a presumed Aspiration.     Interval History: 45 yo with ALEXUS after intentional overdose, despite okay urine output her Cr still increasing. She got IV fluids overnight and again with anasarca.    Review of Systems   Cardiovascular: Positive for leg swelling.   All other systems reviewed and are negative.    Objective:     Vital Signs (Most Recent):  Temp: 99 °F (37.2 °C) (07/09/17 1200)  Pulse: 88 (07/09/17 1200)  Resp: 20 (07/09/17 1200)  BP: 137/74 (07/09/17 1200)  SpO2: 98 % (07/09/17 1200) Vital Signs (24h Range):  Temp:  [98.2 °F (36.8 °C)-99 °F (37.2 °C)] 99 °F (37.2 °C)  Pulse:  [82-90] 88  Resp:  [15-20] 20  SpO2:  [94 %-98 %] 98 %  BP: (137-155)/(74-90) 137/74     Weight: 80.5 kg (177 lb 8 oz)  Body mass index is 30.45 kg/m².    Intake/Output Summary (Last 24 hours) at 07/09/17 1624  Last data filed at 07/09/17 1400   Gross per 24 hour   Intake             1540 ml   Output             2750 ml   Net            -1210 ml      Physical Exam   Constitutional: She is oriented to person, place, and time. She appears well-developed and well-nourished.   HENT:   Head: Normocephalic and atraumatic.   Eyes: EOM are normal. Pupils are equal, round, and reactive to light.   Neck: Normal range of motion. Neck supple.   Cardiovascular: Normal rate, regular rhythm, normal heart sounds and intact distal pulses.    Pulmonary/Chest: Effort normal and breath sounds normal.   Abdominal: Soft. Bowel sounds are normal.   Musculoskeletal: She exhibits edema.   Neurological: She is alert and oriented to person, place, and time.   Skin: Skin is warm and dry. Capillary refill takes less than 2 seconds.   Psychiatric: She has a normal mood and affect.   Nursing note and vitals reviewed.      Significant Labs:   Recent Lab Results       07/09/17  0500 07/08/17  2113 07/08/17  2111      Albumin 1.9(L)       Alkaline Phosphatase  79       ALT 54(H)       Anion Gap 10        10       Appearance, UA  Hazy(A)      AST 37       Baso # 0.07       Basophil% 0.6       Bilirubin (UA)  Negative      Bilirubin, Direct 0.3       Total Bilirubin 0.7  Comment:  For infants and newborns, interpretation of results should be based  on gestational age, weight and in agreement with clinical  observations.  Premature Infant recommended reference ranges:  Up to 24 hours.............<8.0 mg/dL  Up to 48 hours............<12.0 mg/dL  3-5 days..................<15.0 mg/dL  6-29 days.................<15.0 mg/dL         BUN, Bld 53(H)        53(H)       Calcium 8.2(L)        8.2(L)       Chloride 102        102       CO2 24        24       Color, UA  Yellow      (H)       Creatinine 4.5(H)        4.5(H)       Creatinine, Random Ur   32.2  Comment:  The random urine reference ranges provided were established   for 24 hour urine collections.  No reference ranges exist for  random urine specimens.  Correlate clinically.       Differential Method Automated       eGFR if  13(A)        13(A)       eGFR if non  11  Comment:  Calculation used to obtain the estimated glomerular filtration  rate (eGFR) is the CKD-EPI equation. Since race is unknown   in our information system, the eGFR values for   -American and Non--American patients are given   for each creatinine result.  (A)        11  Comment:  Calculation used to obtain the estimated glomerular filtration  rate (eGFR) is the CKD-EPI equation. Since race is unknown   in our information system, the eGFR values for   -American and Non--American patients are given   for each creatinine result.  (A)       Eos # 0.4       Eosinophil% 3.5       Glucose 109        109       Glucose, UA  Negative      Gran # 8.1(H)       Gran% 71.3       Hematocrit 19.8  Comment:  HCT critical result(s) called and verbal readback obtained from   Beckie Sutton RN, 07/09/2017  05:26  (LL)       Hemoglobin 7.0(L)       Ketones, UA  Negative      Leukocytes, UA  Negative      Lymph # 1.9       Lymph% 16.1(L)       Magnesium 1.6       MCH 31.3(H)       MCHC 35.4       MCV 88       Microscopic Comment  SEE COMMENT  Comment:  Other formed elements not mentioned in the report are not   present in the microscopic examination.         Mono # 1.2(H)       Mono% 10.2       MPV 9.9       Nitrite, UA  Negative      Occult Blood UA  1+(A)      pH, UA  6.0      Platelets 267       Potassium 3.9        3.9       Total Protein 4.8(L)       Protein, UA  Negative  Comment:  Recommend a 24 hour urine protein or a urine   protein/creatinine ratio if globulin induced proteinuria is  clinically suspected.        RBC 2.24(L)       RBC, UA  1      RDW 15.3(H)       Sodium 136        136       Sodium Urine Random   52  Comment:  The random urine reference ranges provided were established   for 24 hour urine collections.  No reference ranges exist for  random urine specimens.  Correlate clinically.       Specific Gravity, UA  <=1.005(A)      Specimen UA  Urine, Clean Catch      Squam Epithel, UA  3      Urobilinogen, UA  Negative      WBC 11.59             Significant Imaging: I have reviewed and interpreted all pertinent imaging results/findings within the past 24 hours.    Assessment/Plan:      Polysubstance overdose    - Unclear intention.  - PEC'd  - Psychiatry has evaluated patient  - CRRT to be started tonight for polysubstance/toxin removal    Improving on CRRT-- CPK still high but LFTs improving    CRRT clotted off, CPK slightly down but still very high  Will change Bicarb Fluids to NS at a lower rate    7/5/17: intentional overdose  Did have CRRT   She has been Pec'd           * Non-traumatic rhabdomyolysis    - CPK < 40,000  - Repeat daily CPK  - Received six liters of NS so far in the ED  - Continue NS at 125 cc/hr  - Discussed with  (nephrology), plan to initiate CRRT tonight    As above--  improving but still very high, still needs IVF    7/5/17: UO improving and continue IVF    7/8/17: resolving    7/6/17: CK trending down        Recurrent major depressive disorder    Needs to be addressed further    7/7/17: plan is to find placement once medically clear        Anemia    7/9/17: her H/h is decline and now obvious signs of bleeding  Could be related to ALEXUS  Transfuse 1 unit          Anasarca    Third spacing due to ALI and ALEXUS-- will change IVF to NS at a lower rate  Avoid Lasix at this point to protect the kidneys    7/6/17:improving    7/9/17: was improving but again worse as she got fluids  Hold fluids and  Give albumin         Hypoalbuminemia    Probably acute on Ch due to Liver Injury  Will watch    7/9/17: will give albumin        ALEXUS (acute kidney injury)    Possibly Pre renal?  Will watch closely    7/6/17: her urine output is increasing and her BUN/Cr is stable for past 2 days.    7/7/17: renal function is stable but her UOP is steady increasing.  Discussed with Nephrology is to pull  Cath she has for HD, d/c carroll as kidney's are recovering.    7/8/17: renal function stable with good urine output    7/9/17: BUN/Cr still increasing despite okay output  Given IVF overnight and worse edema  Hold fluids as she may have to have repeat dialysis        Elevated liver enzymes    Sec to Polysubstance Drug OD-- improving          Thrombocytopenia    Heparin drip off as CRRT does not require heparin gtt  Platelets still low but stable-- probably sec to Acute Liver Injury          Aspiration pneumonia of both lungs    - Start Vanc and Zosyn (received rocephin, zithromax in the ED)  - Follow up on blood, sputum cultures  - Duonebs q6    No s/s of aspiration, will stop Zosyn          Transaminitis    - Secondary to polysubstance overdose  - Monitor closely  - Gradually improving with CRRT  - Continue present care,, LFTs improving    7/6/17: trending down, continue with gently hydration and monitor             VTE Risk Mitigation         Ordered     Medium Risk of VTE  Once      06/28/17 2059     Place sequential compression device  Until discontinued      06/28/17 2059          Gina Clarke MD  Department of Hospital Medicine   [facility]

## 2017-07-09 NOTE — PROGRESS NOTES
"[facility]   Nephrology  Progress Note    Patient Name: Kaylene Emery  MRN: [id number]  Admission Date: 6/28/2017  Hospital Length of Stay: 11 days  Attending Provider: Gina Clarke MD   Primary Care Physician: Jonas Jimenez MD  Principal Problem:Non-traumatic rhabdomyolysis    Subjective:     HPI: Patient is a 46-year-old with multiple psychiatry issues.  Comes in with multiple medication overdose with unclear intent suicidal versus overdose versus toxic injections.  Patient has dark-colored urine along with severe rhabdomyolysis.  Patient is now intubated.  Dr. Giles consulted me for initiation for CRRT for multiple drug poisoning and rhabdo myelolysis.  Patient has had transient episode of hypotension with anxiolytics.  Still has some urine output and is nonoliguric.  Most of the history has been obtained from Dr. Giles, patient's fiancé and from the medical records.    Interval History:   07/09/2017  Palmer's catheter has been taken out.  Patient has not been eating very well.    Review of patient's allergies indicates:   Allergen Reactions    Corticosteroids (glucocorticoids)      "sets off my anxiety real bad"    Tramadol Rash     Current Facility-Administered Medications   Medication Frequency    0.9%  NaCl infusion (for blood administration) Q24H PRN    acetaminophen tablet 650 mg Q8H PRN    albuterol-ipratropium 2.5mg-0.5mg/3mL nebulizer solution 3 mL Q4H PRN    bisacodyl suppository 10 mg Daily PRN    diphenhydrAMINE capsule 50 mg Nightly PRN    docusate sodium capsule 100 mg Daily    ergocalciferol capsule 50,000 Units Q7 Days    fluoxetine capsule 40 mg Daily    fluticasone 50 mcg/actuation nasal spray 2 spray Daily    furosemide injection 20 mg Once    heparin (porcine) injection 2,000 Units PRN    lorazepam tablet 0.5 mg BID    nicotine 14 mg/24 hr 1 patch Daily    ondansetron injection 4 mg Q8H PRN    oxycodone 12 hr tablet 20 mg BID PRN    pantoprazole " EC tablet 40 mg Daily    quetiapine tablet 200 mg Daily    ropinirole tablet 0.5 mg QHS       Objective:     Vital Signs (Most Recent):  Temp: 98.5 °F (36.9 °C) (07/09/17 1700)  Pulse: 86 (07/09/17 1734)  Resp: 20 (07/09/17 1700)  BP: (!) 143/91 (07/09/17 1700)  SpO2: 96 % (07/09/17 1734)  O2 Device (Oxygen Therapy): nasal cannula (07/09/17 1734) Vital Signs (24h Range):  Temp:  [98.2 °F (36.8 °C)-99 °F (37.2 °C)] 98.5 °F (36.9 °C)  Pulse:  [82-94] 86  Resp:  [15-20] 20  SpO2:  [94 %-98 %] 96 %  BP: (137-155)/(74-91) 143/91     Weight: 80.5 kg (177 lb 8 oz) (07/08/17 0408)  Body mass index is 30.45 kg/m².  Body surface area is 1.91 meters squared.    I/O last 3 completed shifts:  In: 890 [P.O.:890]  Out: 2150 [Urine:2150]    Physical Exam   Constitutional: She is oriented to person, place, and time. No distress.   HENT:   Head: Normocephalic and atraumatic.   Nose: Nose normal.   Eyes: Conjunctivae and EOM are normal. Pupils are equal, round, and reactive to light.   Neck: Normal range of motion. No JVD present. No tracheal deviation present. No thyromegaly present.   Cardiovascular: Normal rate, regular rhythm, normal heart sounds and intact distal pulses.  Exam reveals no gallop and no friction rub.    No murmur heard.  Pulmonary/Chest: Effort normal and breath sounds normal. No respiratory distress. She has no wheezes. She has no rales. She exhibits no tenderness.   Abdominal: Soft. Bowel sounds are normal. She exhibits no distension and no mass. There is no tenderness. No hernia.   Musculoskeletal: Normal range of motion. She exhibits edema. She exhibits no tenderness or deformity.   Neurological: She is alert and oriented to person, place, and time. She has normal reflexes. She displays normal reflexes. No cranial nerve deficit. She exhibits normal muscle tone. Coordination normal.   Skin: Skin is warm. She is not diaphoretic. No erythema. There is pallor.   Psychiatric: She has a normal mood and affect. Her  behavior is normal. Judgment and thought content normal.   Nursing note and vitals reviewed.      Significant Labs: reviewed  CK 1700  BMP  Lab Results   Component Value Date     07/09/2017     07/09/2017    K 3.9 07/09/2017    K 3.9 07/09/2017     07/09/2017     07/09/2017    CO2 24 07/09/2017    CO2 24 07/09/2017    BUN 53 (H) 07/09/2017    BUN 53 (H) 07/09/2017    CREATININE 4.5 (H) 07/09/2017    CREATININE 4.5 (H) 07/09/2017    CALCIUM 8.2 (L) 07/09/2017    CALCIUM 8.2 (L) 07/09/2017    ANIONGAP 10 07/09/2017    ANIONGAP 10 07/09/2017    ESTGFRAFRICA 13 (A) 07/09/2017    ESTGFRAFRICA 13 (A) 07/09/2017    EGFRNONAA 11 (A) 07/09/2017    EGFRNONAA 11 (A) 07/09/2017     Lab Results   Component Value Date    WBC 11.59 07/09/2017    HGB 7.0 (L) 07/09/2017    HCT 19.8 (LL) 07/09/2017    MCV 88 07/09/2017     07/09/2017       Significant Imaging: reviewed CXR    Assessment/Plan:     ALEXUS (acute kidney injury)    Acute kidney injury is getting worse again.    Agree with blood transfusion.  Plans discussed with Dr. Borjas with hospital medicine            Thank you for your consult.     Suzy Sarah MD  Nephrology  [facility]

## 2017-07-09 NOTE — ASSESSMENT & PLAN NOTE
Acute kidney injury is getting worse again.    Agree with blood transfusion.  Plans discussed with Dr. Borjas with [facility] medicine

## 2017-07-10 ENCOUNTER — HOSPITAL ENCOUNTER (INPATIENT)
Facility: HOSPITAL | Age: 47
LOS: 4 days | Discharge: PSYCHIATRIC OR SUBSTANCE USE FACILITY | DRG: 917 | End: 2017-07-14
Attending: INTERNAL MEDICINE | Admitting: INTERNAL MEDICINE
Payer: MEDICAID

## 2017-07-10 VITALS
RESPIRATION RATE: 20 BRPM | OXYGEN SATURATION: 94 % | HEART RATE: 90 BPM | DIASTOLIC BLOOD PRESSURE: 81 MMHG | SYSTOLIC BLOOD PRESSURE: 148 MMHG | BODY MASS INDEX: 30.3 KG/M2 | TEMPERATURE: 99 F | HEIGHT: 64 IN | WEIGHT: 177.5 LBS

## 2017-07-10 DIAGNOSIS — F33.2 SEVERE EPISODE OF RECURRENT MAJOR DEPRESSIVE DISORDER, WITHOUT PSYCHOTIC FEATURES: ICD-10-CM

## 2017-07-10 DIAGNOSIS — D64.9 ANEMIA, UNSPECIFIED TYPE: ICD-10-CM

## 2017-07-10 DIAGNOSIS — Z72.0 TOBACCO USE: Chronic | ICD-10-CM

## 2017-07-10 DIAGNOSIS — E88.09 HYPOALBUMINEMIA: ICD-10-CM

## 2017-07-10 DIAGNOSIS — F19.90 SUBSTANCE USE DISORDER: ICD-10-CM

## 2017-07-10 DIAGNOSIS — N17.9 ACUTE RENAL FAILURE: ICD-10-CM

## 2017-07-10 DIAGNOSIS — M62.82 NON-TRAUMATIC RHABDOMYOLYSIS: ICD-10-CM

## 2017-07-10 DIAGNOSIS — N17.9 AKI (ACUTE KIDNEY INJURY): ICD-10-CM

## 2017-07-10 DIAGNOSIS — N17.9 ACUTE RENAL FAILURE, UNSPECIFIED ACUTE RENAL FAILURE TYPE: ICD-10-CM

## 2017-07-10 DIAGNOSIS — T50.901A POLYSUBSTANCE OVERDOSE, ACCIDENTAL OR UNINTENTIONAL, INITIAL ENCOUNTER: ICD-10-CM

## 2017-07-10 DIAGNOSIS — R60.1 ANASARCA: ICD-10-CM

## 2017-07-10 PROBLEM — D69.6 THROMBOCYTOPENIA: Status: RESOLVED | Noted: 2017-06-29 | Resolved: 2017-07-10

## 2017-07-10 PROBLEM — R74.01 TRANSAMINITIS: Status: RESOLVED | Noted: 2017-06-28 | Resolved: 2017-07-10

## 2017-07-10 PROBLEM — G89.18 POSTOPERATIVE PAIN: Status: RESOLVED | Noted: 2017-05-03 | Resolved: 2017-07-10

## 2017-07-10 LAB
ALBUMIN SERPL BCP-MCNC: 2.5 G/DL
ALP SERPL-CCNC: 80 U/L
ALT SERPL W/O P-5'-P-CCNC: 41 U/L
ANION GAP SERPL CALC-SCNC: 12 MMOL/L
AST SERPL-CCNC: 30 U/L
BASOPHILS # BLD AUTO: 0.08 K/UL
BASOPHILS NFR BLD: 0.6 %
BILIRUB DIRECT SERPL-MCNC: 0.7 MG/DL
BILIRUB SERPL-MCNC: 1.3 MG/DL
BUN SERPL-MCNC: 55 MG/DL
CALCIUM SERPL-MCNC: 8.4 MG/DL
CHLORIDE SERPL-SCNC: 102 MMOL/L
CK SERPL-CCNC: 360 U/L
CO2 SERPL-SCNC: 24 MMOL/L
CREAT SERPL-MCNC: 4.4 MG/DL
DIFFERENTIAL METHOD: ABNORMAL
EOSINOPHIL # BLD AUTO: 0.5 K/UL
EOSINOPHIL NFR BLD: 3.7 %
ERYTHROCYTE [DISTWIDTH] IN BLOOD BY AUTOMATED COUNT: 16 %
EST. GFR  (AFRICAN AMERICAN): 13 ML/MIN/1.73 M^2
EST. GFR  (NON AFRICAN AMERICAN): 11 ML/MIN/1.73 M^2
GLUCOSE SERPL-MCNC: 103 MG/DL
HCT VFR BLD AUTO: 21.6 %
HGB BLD-MCNC: 7.5 G/DL
LYMPHOCYTES # BLD AUTO: 2.3 K/UL
LYMPHOCYTES NFR BLD: 18.1 %
MAGNESIUM SERPL-MCNC: 1.7 MG/DL
MCH RBC QN AUTO: 30.4 PG
MCHC RBC AUTO-ENTMCNC: 34.7 %
MCV RBC AUTO: 87 FL
MONOCYTES # BLD AUTO: 1 K/UL
MONOCYTES NFR BLD: 7.4 %
NEUTROPHILS # BLD AUTO: 9 K/UL
NEUTROPHILS NFR BLD: 71.9 %
PLATELET # BLD AUTO: 295 K/UL
PMV BLD AUTO: 9.7 FL
POTASSIUM SERPL-SCNC: 3.9 MMOL/L
PROT SERPL-MCNC: 5.2 G/DL
RBC # BLD AUTO: 2.47 M/UL
SODIUM SERPL-SCNC: 138 MMOL/L
WBC # BLD AUTO: 12.87 K/UL

## 2017-07-10 PROCEDURE — 80048 BASIC METABOLIC PNL TOTAL CA: CPT

## 2017-07-10 PROCEDURE — 25000003 PHARM REV CODE 250: Performed by: FAMILY MEDICINE

## 2017-07-10 PROCEDURE — 80076 HEPATIC FUNCTION PANEL: CPT

## 2017-07-10 PROCEDURE — 99232 SBSQ HOSP IP/OBS MODERATE 35: CPT | Mod: ,,, | Performed by: INTERNAL MEDICINE

## 2017-07-10 PROCEDURE — 27000221 HC OXYGEN, UP TO 24 HOURS

## 2017-07-10 PROCEDURE — 97803 MED NUTRITION INDIV SUBSEQ: CPT

## 2017-07-10 PROCEDURE — 25000003 PHARM REV CODE 250: Performed by: NURSE PRACTITIONER

## 2017-07-10 PROCEDURE — 25000003 PHARM REV CODE 250: Performed by: SPECIALIST

## 2017-07-10 PROCEDURE — 83735 ASSAY OF MAGNESIUM: CPT

## 2017-07-10 PROCEDURE — 11000001 HC ACUTE MED/SURG PRIVATE ROOM

## 2017-07-10 PROCEDURE — 82550 ASSAY OF CK (CPK): CPT

## 2017-07-10 PROCEDURE — 85025 COMPLETE CBC W/AUTO DIFF WBC: CPT

## 2017-07-10 PROCEDURE — 5A1935Z RESPIRATORY VENTILATION, LESS THAN 24 CONSECUTIVE HOURS: ICD-10-PCS | Performed by: INTERNAL MEDICINE

## 2017-07-10 RX ORDER — DIPHENHYDRAMINE HCL 50 MG
50 CAPSULE ORAL NIGHTLY PRN
Refills: 0 | Status: ON HOLD | COMMUNITY
Start: 2017-07-10 | End: 2017-07-14 | Stop reason: HOSPADM

## 2017-07-10 RX ADMIN — LORAZEPAM 0.5 MG: 0.5 TABLET ORAL at 08:07

## 2017-07-10 RX ADMIN — PANTOPRAZOLE SODIUM 40 MG: 40 TABLET, DELAYED RELEASE ORAL at 08:07

## 2017-07-10 RX ADMIN — NICOTINE 1 PATCH: 14 PATCH, EXTENDED RELEASE TRANSDERMAL at 08:07

## 2017-07-10 RX ADMIN — FLUOXETINE 40 MG: 20 CAPSULE ORAL at 08:07

## 2017-07-10 RX ADMIN — OXYCODONE HYDROCHLORIDE 20 MG: 10 TABLET, FILM COATED, EXTENDED RELEASE ORAL at 08:07

## 2017-07-10 RX ADMIN — OXYCODONE HYDROCHLORIDE 20 MG: 10 TABLET, FILM COATED, EXTENDED RELEASE ORAL at 09:07

## 2017-07-10 RX ADMIN — QUETIAPINE FUMARATE 200 MG: 100 TABLET, FILM COATED ORAL at 08:07

## 2017-07-10 RX ADMIN — DOCUSATE SODIUM 100 MG: 100 CAPSULE, LIQUID FILLED ORAL at 08:07

## 2017-07-10 RX ADMIN — FLUTICASONE PROPIONATE 2 SPRAY: 50 SPRAY, METERED NASAL at 08:07

## 2017-07-10 RX ADMIN — ROPINIROLE HYDROCHLORIDE 0.5 MG: 0.25 TABLET, FILM COATED ORAL at 08:07

## 2017-07-10 NOTE — PLAN OF CARE
Contacted [facility] [place] . Dr Clarke to discuss with hospital medicine for  possible transfer to Med / Psych. .     Per Dr Clarke hospital medicine has accepted the patient and are waiting on a bed.  Update at 1650 to [facility] nurse.Accepting physician is Dr Oleary.    @ 1655 Contacted [facility] [place] spoke with Rico to confirm that patient is on the waiting list and was told that he called the primary nurse [place] 10 minutes ago and gave her the bed. Contacted [facility] with the updated information. Reminded of the patient transfer check list and consent. Also reminded to send the original PEC and CEC paperwork with the patient ( copies stay with the chart. ) Update to Dr Clarke via messaging.

## 2017-07-10 NOTE — PROGRESS NOTES
[facility] Hospital Medicine  Progress Note    Patient Name: Kaylene Emery  MRN: [id number]  Patient Class: IP- Inpatient   Admission Date: 6/28/2017  Length of Stay: 12 days  Attending Physician: Gina Clarke MD  Primary Care Provider: Jonas Jimenez MD        Subjective:     Principal Problem:Non-traumatic rhabdomyolysis    HPI:  Ms. Emery is a 47 y/o  female with h/o depression, substance OD in the past, was found with AMS earlier today and was brought to the ED. Patient is currently intubated and history obtained per chart review. Family not at bedside.    Apparently patient likely had overdosed on multiple medications including Methocarbamol, Mobic, lorazepam, flexeril, phenergan, Seroquel, Celexa. Her boy friend is on Geodon, Klonopin, Wellbutrin and Effexor.  Most of her recent filled prescription bottles were found empty at the bedside when her bf found her.   Per chart review, yesterday patient was summoned to appear in the court for her DUI that occurred 4 months ago. She was extremely anxious and pacing all day today.     Labs show lactic acid 2.0, CPK > 40,000, CXR show bilateral patchy infiltrates, ANNETTE < 10, tylenol level < 5,  CO2 17, Na 130, , , WBC 15,000, Hgb 17.3.    Patient is currently intubated on the ventilator, breathing over the vent. She received NS 6 liters so far. Patient was evaluated by both neurology and nephrology in the ED. Nephrology plans to initiate patient on CRRT for poison/toxin control.    Hospital Course:  Pt extubated early this am, doing well, still feels anxious but better, denies any SI/ intentions or attempts. She is PECed. debies any prior hx of psych admissions. CRRT started this am and tolerating well. Repeat labs show improving LFTs but CPK still > 40,000/. Denies any injury or pain.     Looks much better, sitting up in chair, has severe LE edema from the Bicarb fluid. CRRT clotted off at 0200 this am, c/o ch back and  muscle pain. No numbness of tingling. Again denies any suicidal ideation or attempt. Getting IV Zosyn too for a presumed Aspiration.     Interval History: 47 yo female with intentional overdose who developed rhabdomyolysis and ARF leading to temporary CRRT. Clinically she is better and having good UOP despite slight upward trend in her BUN/Cr. It appears she has severe depression and from records attempted suicide in the past.    Review of Systems   Respiratory: Negative.    Cardiovascular: Negative.    Genitourinary: Negative.    Skin: Negative.      Objective:     Vital Signs (Most Recent):  Temp: 98.6 °F (37 °C) (07/10/17 1200)  Pulse: 78 (07/10/17 1300)  Resp: 18 (07/10/17 1300)  BP: (!) 141/70 (07/10/17 1200)  SpO2: 96 % (07/10/17 1300) Vital Signs (24h Range):  Temp:  [97.9 °F (36.6 °C)-98.6 °F (37 °C)] 98.6 °F (37 °C)  Pulse:  [75-94] 78  Resp:  [17-21] 18  SpO2:  [95 %-100 %] 96 %  BP: (141-175)/(70-91) 141/70     Weight: 80.5 kg (177 lb 8 oz)  Body mass index is 30.45 kg/m².    Intake/Output Summary (Last 24 hours) at 07/10/17 1536  Last data filed at 07/10/17 1300   Gross per 24 hour   Intake             1465 ml   Output             3400 ml   Net            -1935 ml      Physical Exam   Constitutional: She is oriented to person, place, and time. She appears well-developed and well-nourished.   HENT:   Head: Normocephalic and atraumatic.   Nose: Nose normal.   Mouth/Throat: Oropharynx is clear and moist.   Eyes: EOM are normal. Pupils are equal, round, and reactive to light.   Neck: Normal range of motion. Neck supple.   Cardiovascular: Normal rate, regular rhythm, normal heart sounds and intact distal pulses.    Pulmonary/Chest: Effort normal and breath sounds normal.   Abdominal: Soft. Bowel sounds are normal.   Neurological: She is alert and oriented to person, place, and time.   Skin: Skin is warm and dry.   Psychiatric: Her speech is normal. Cognition and memory are normal. She exhibits a depressed  mood.   Nursing note and vitals reviewed.      Significant Labs:   Recent Lab Results       07/10/17  0548 07/09/17  1626 07/09/17  1623      Unit Blood Type Code   6200        CANCELED  Comment:  Result canceled by the ancillary     Unit Expiration   [id number]        CANCELED  Comment:  RBCs LeukoR was cancelled on 07/09/2017 at 18:28 by ; not needed, only ordered 1 RBC unit    Result canceled by the ancillary       Unit Blood Type   A POS        CANCELED  Comment:  Result canceled by the ancillary     Albumin 2.5(L)       Alkaline Phosphatase 80       ALT 41       Anion Gap 12       AST 30       Baso # 0.08       Basophil% 0.6       Bilirubin, Direct 0.7(H)       Total Bilirubin 1.3  Comment:  For infants and newborns, interpretation of results should be based  on gestational age, weight and in agreement with clinical  observations.  Premature Infant recommended reference ranges:  Up to 24 hours.............<8.0 mg/dL  Up to 48 hours............<12.0 mg/dL  3-5 days..................<15.0 mg/dL  6-29 days.................<15.0 mg/dL  (H)       BUN, Bld 55(H)       Calcium 8.4(L)       Chloride 102       CO2 24       CODING SYSTEM   [id number]        CANCELED  Comment:  Result canceled by the ancillary     (H)       Creatinine 4.4(H)       Differential Method Automated       DISPENSE STATUS   TRANSFUSED        CANCELED  Comment:  Result canceled by the ancillary     eGFR if  13(A)       eGFR if non  11  Comment:  Calculation used to obtain the estimated glomerular filtration  rate (eGFR) is the CKD-EPI equation. Since race is unknown   in our information system, the eGFR values for   -American and Non--American patients are given   for each creatinine result.  (A)       Eos # 0.5       Eosinophil% 3.7       Glucose 103       Gran # 9.0(H)       Gran% 71.9       Group & Rh  A POS      Hematocrit 21.6(L)       Hemoglobin 7.5(L)       INDIRECT BRIAN  NEG      Lymph  # 2.3       Lymph% 18.1       Magnesium 1.7       MCH 30.4       MCHC 34.7       MCV 87       Mono # 1.0       Mono% 7.4       MPV 9.7       Platelets 295       Potassium 3.9       PRODUCT CODE   [id number]        CANCELED  Comment:  Result canceled by the ancillary     Total Protein 5.2(L)       RBC 2.47(L)       RDW 16.0(H)       Sodium 138       UNIT NUMBER   [id number]        CANCELED  Comment:  Result canceled by the ancillary     WBC 12.87(H)             Significant Imaging: I have reviewed and interpreted all pertinent imaging results/findings within the past 24 hours.    Assessment/Plan:      Polysubstance overdose    - Unclear intention.  - PEC'd  - Psychiatry has evaluated patient  - CRRT to be started tonight for polysubstance/toxin removal    Improving on CRRT-- CPK still high but LFTs improving    CRRT clotted off, CPK slightly down but still very high  Will change Bicarb Fluids to NS at a lower rate    7/5/17: intentional overdose  Did have CRRT   She has been Pec'd           * Non-traumatic rhabdomyolysis    - CPK < 40,000  - Repeat daily CPK  - Received six liters of NS so far in the ED  - Continue NS at 125 cc/hr  - Discussed with  (nephrology), plan to initiate CRRT tonight    As above-- improving but still very high, still needs IVF    7/5/17: UO improving and continue IVF    7/10/17: CPK at 360 today    7/8/17: resolving    7/6/17: CK trending down        Recurrent major depressive disorder    Needs to be addressed further    7/7/17: plan is to find placement once medically clear    7/10/17: she will need psych evaluation as admitted with drug overdose        Anemia    7/9/17: her H/h is decline and now obvious signs of bleeding  Could be related to ALEXUS  Transfuse 1 unit          Anasarca    Third spacing due to ALI and ALEXUS-- will change IVF to NS at a lower rate  Avoid Lasix at this point to protect the kidneys    7/6/17:improving    7/9/17: was improving but again worse as she got  fluids  Hold fluids and  Give albumin         Hypoalbuminemia    Probably acute on Ch due to Liver Injury  Will watch    7/9/17: will give albumin        ALEXUS (acute kidney injury)    Possibly Pre renal?  Will watch closely    7/6/17: her urine output is increasing and her BUN/Cr is stable for past 2 days.    7/7/17: renal function is stable but her UOP is steady increasing.  Discussed with Nephrology is to pull  Cath she has for HD, d/c carroll as kidney's are recovering.    7/8/17: renal function stable with good urine output    7/9/17: BUN/Cr still increasing despite okay output  Given IVF overnight and worse edema  Hold fluids as she may have to have repeat dialysis    7/10/17: continue to monitor closely she has been having good urine output.        Aspiration pneumonia of both lungs    - Start Vanc and Zosyn (received rocephin, zithromax in the ED)  - Follow up on blood, sputum cultures  - Duonebs q6    No s/s of aspiration, will stop Zosyn            VTE Risk Mitigation         Ordered     Medium Risk of VTE  Once      06/28/17 2059     Place sequential compression device  Until discontinued      06/28/17 2059          Gina Clarke MD  Department of Hospital Medicine   [facility]

## 2017-07-10 NOTE — ASSESSMENT & PLAN NOTE
- CPK < 40,000  - Repeat daily CPK  - Received six liters of NS so far in the ED  - Continue NS at 125 cc/hr  - Discussed with  (nephrology), plan to initiate CRRT tonight    As above-- improving but still very high, still needs IVF    7/5/17: UO improving and continue IVF    7/10/17: CPK at 360 today    7/8/17: resolving    7/6/17: CK trending down

## 2017-07-10 NOTE — PT/OT/SLP PROGRESS
Physical Therapy      Kaylene Emery  MRN: [id number]    ATTEMPTED P.T. TX. THIS AM, PT UNABLE TO BE AROUSED, NURSE SINGH REPORTS PT HAS BEEN SEDATED, WILL ASSESS NEXT VISIT    Luz Talley, PT   7/10/2017  5741

## 2017-07-10 NOTE — DISCHARGE SUMMARY
[facility] Hospital Medicine  Discharge Summary      Patient Name: Kaylene Emery  MRN: [id number]  Admission Date: 6/28/2017  Hospital Length of Stay: 12 days  Discharge Date and Time:  07/10/2017 5:26 PM  Attending Physician: Gina Clarke MD   Discharging Provider: Gina Clarke MD  Primary Care Provider: Jonas Jimenez MD      HPI:   Ms. Emery is a 45 y/o  female with h/o depression, substance OD in the past, was found with AMS earlier today and was brought to the ED. Patient is currently intubated and history obtained per chart review. Family not at bedside.    Apparently patient likely had overdosed on multiple medications including Methocarbamol, Mobic, lorazepam, flexeril, phenergan, Seroquel, Celexa. Her boy friend is on Geodon, Klonopin, Wellbutrin and Effexor.  Most of her recent filled prescription bottles were found empty at the bedside when her bf found her.   Per chart review, yesterday patient was summoned to appear in the court for her DUI that occurred 4 months ago. She was extremely anxious and pacing all day today.     Labs show lactic acid 2.0, CPK > 40,000, CXR show bilateral patchy infiltrates, ANNETTE < 10, tylenol level < 5,  CO2 17, Na 130, , , WBC 15,000, Hgb 17.3.    Patient is currently intubated on the ventilator, breathing over the vent. She received NS 6 liters so far. Patient was evaluated by both neurology and nephrology in the ED. Nephrology plans to initiate patient on CRRT for poison/toxin control.    * No surgery found *      Indwelling Lines/Drains at time of discharge:   Lines/Drains/Airways          No matching active lines, drains, or airways        Hospital Course:   Pt extubated early this am, doing well, still feels anxious but better, denies any SI/ intentions or attempts. She is PECed. debies any prior hx of psych admissions. CRRT started this am and tolerating well. Repeat labs show improving LFTs but CPK still > 40,000/.  Denies any injury or pain.     Looks much better, sitting up in chair, has severe LE edema from the Bicarb fluid. CRRT clotted off at 0200 this am, c/o ch back and muscle pain. No numbness of tingling. Again denies any suicidal ideation or attempt. Getting IV Zosyn too for a presumed Aspiration.     7/7/10: patient feeling better she still with leg edema but feels it is better. Her urine output has increased despite her renal function stable. Her CPK is trending down. She currently denies any homicidal ideation.    7/8/17:Her Cr increased and Nephrology decided to restart her IV fluids overnight and today she is complaining of worsening anasarca which was getting better. She will get albumin and IVF will be held.    7/10/17: He catheter will be pulled as her urine output still good. She has less edema today. I have discussed with her going to [facility] for [facility] evaluation and they will continue to monitor her renal function. She became upset today as her mother who was on hospice for COPD brought in to the hospital and now in ICU intubated. She is crying currently but I have discussed the importance of her getting the help she needs. Today her BUN/Cr is 55/4.4.      Consults:   Consults         Status Ordering Provider     Inpatient consult to Nephrology  Once     Provider:  (Not yet assigned)    Acknowledged MARY ANNE MCCLELLAN     Inpatient consult to OB/GYN  Once     Provider:  (Not yet assigned)    Acknowledged MIRNA BURGOS     Inpatient consult to Pulmonology  Once     Provider:  Manish Treadwell MD    Acknowledged ANNETTE MOORE     Inpatient consult to Telemedicine - Psyc  Once     Provider:  Ricardo Red MD    Completed ISAAC BREAUX     Pharmacy to dose Vancomycin consult  Once     Provider:  (Not yet assigned)    Completed ANNETTE MOORE          Significant Diagnostic Studies: Labs:   CMP   Recent Labs  Lab 07/09/17  0500 07/10/17  0548     136 138   K 3.9  3.9 3.9     102 102   CO2 24   24 24     109 103   BUN 53*  53* 55*   CREATININE 4.5*  4.5* 4.4*   CALCIUM 8.2*  8.2* 8.4*   PROT 4.8* 5.2*   ALBUMIN 1.9* 2.5*   BILITOT 0.7 1.3*   ALKPHOS 79 80   AST 37 30   ALT 54* 41   ANIONGAP 10  10 12   ESTGFRAFRICA 13*  13* 13*   EGFRNONAA 11*  11* 11*    and CBC   Recent Labs  Lab 07/09/17  0500 07/10/17  0548   WBC 11.59 12.87*   HGB 7.0* 7.5*   HCT 19.8* 21.6*    295     Radiology: X-Ray: CXR: X-Ray Chest 1 View (CXR): No results found for this visit on 06/28/17.  CT scan: CT ABDOMEN PELVIS WITH CONTRAST: No results found for this visit on 06/28/17.    Pending Diagnostic Studies:     Procedure Component Value Units Date/Time    Basic metabolic panel [[id number]] Collected:  07/04/17 0330    Order Status:  Sent Lab Status:  In process Updated:  07/04/17 0839    Specimen:  Blood from Blood     CBC auto differential [[id number]] Collected:  07/04/17 0330    Order Status:  Sent Lab Status:  In process Updated:  07/04/17 0839    Specimen:  Blood from Blood     CK [[id number]] Collected:  07/04/17 0330    Order Status:  Sent Lab Status:  In process Updated:  07/04/17 0839    Specimen:  Blood from Blood     Hepatic function panel [[id number]] Collected:  07/04/17 0330    Order Status:  Sent Lab Status:  In process Updated:  07/04/17 0839    Specimen:  Blood from Blood     Magnesium [[id number]] Collected:  07/04/17 0330    Order Status:  Sent Lab Status:  In process Updated:  07/04/17 0839    Specimen:  Blood from Blood         Final Active Diagnoses:    Diagnosis Date Noted POA    PRINCIPAL PROBLEM:  Non-traumatic rhabdomyolysis [M62.82] 06/28/2017 Yes    Polysubstance overdose [T50.901A] 06/28/2017 Yes    Recurrent major depressive disorder [F33.9] 06/29/2017 Yes     Chronic    Anemia [D64.9] 07/09/2017 Clinically Undetermined    Severe episode of recurrent major depressive disorder, without psychotic features [F33.2]  Yes     Chronic    ALEXUS (acute kidney injury) [N17.9] 06/30/2017 No     Hypoalbuminemia [E88.09] 06/30/2017 Yes    Anasarca [R60.1] 06/30/2017 No    Moderate episode of recurrent major depressive disorder [F33.1]  Yes     Chronic    Overdose [T50.901A]  Unknown    Aspiration pneumonia of both lungs [J69.0] 06/28/2017 Yes      Problems Resolved During this Admission:    Diagnosis Date Noted Date Resolved POA    Electrolyte imbalance [E87.8] 06/29/2017 06/30/2017 No    Thrombocytopenia [D69.6] 06/29/2017 07/10/2017 No    Elevated liver enzymes [R74.8]  07/10/2017 Yes    Sepsis [A41.9] 06/28/2017 06/29/2017 Yes    Acute hypoxemic respiratory failure [J96.01] 06/28/2017 06/30/2017 Yes    Acute metabolic encephalopathy [G93.41] 06/28/2017 06/29/2017 Yes    Transaminitis [R74.0] 06/28/2017 07/10/2017 Yes    Hyponatremia [E87.1] 06/28/2017 06/29/2017 Yes      No new Assessment & Plan notes have been filed under this hospital service since the last note was generated.  Service: Hospital Medicine      Discharged Condition: stable    Disposition:     Follow Up:    Patient Instructions:   No discharge procedures on file.  Medications:  Transfer Medications (for Discharge Readmit only):   Current Facility-Administered Medications   Medication Dose Route Frequency Provider Last Rate Last Dose    0.9%  NaCl infusion (for blood administration)   Intravenous Q24H PRN Gina Clarke MD        acetaminophen tablet 650 mg  650 mg Oral Q8H PRN Bertram Singer NP   650 mg at 07/04/17 0628    albuterol-ipratropium 2.5mg-0.5mg/3mL nebulizer solution 3 mL  3 mL Nebulization Q4H PRN Bertram Singer NP   3 mL at 07/03/17 1220    bisacodyl suppository 10 mg  10 mg Rectal Daily PRN Bertram Signer NP        diphenhydrAMINE capsule 50 mg  50 mg Oral Nightly PRN LAYLA Baeza MD   50 mg at 06/29/17 2242    docusate sodium capsule 100 mg  100 mg Oral Daily Bertram Singer NP   100 mg at 07/10/17 0843    ergocalciferol capsule 50,000 Units  50,000 Units Oral Q7 Days Edwin  MD Jenna PhD   50,000 Units at 07/07/17 0847    fluoxetine capsule 40 mg  40 mg Oral Daily Bertram Singer NP   40 mg at 07/10/17 0843    fluticasone 50 mcg/actuation nasal spray 2 spray  2 spray Each Nare Daily Gina Clarke MD   2 spray at 07/10/17 0844    heparin (porcine) injection 2,000 Units  2,000 Units Intravenous PRN Bertram Singer NP   2,000 Units at 07/05/17 1346    lorazepam tablet 0.5 mg  0.5 mg Oral BID Bertram Singer NP   0.5 mg at 07/10/17 0843    nicotine 14 mg/24 hr 1 patch  1 patch Transdermal Daily Bertram Singer NP   1 patch at 07/10/17 0844    ondansetron injection 4 mg  4 mg Intravenous Q8H PRN Bertram Singer NP   4 mg at 07/02/17 0539    oxycodone 12 hr tablet 20 mg  20 mg Oral BID PRN Bertram Singer NP   20 mg at 07/10/17 0915    pantoprazole EC tablet 40 mg  40 mg Oral Daily Bertram Singer NP   40 mg at 07/10/17 0843    quetiapine tablet 200 mg  200 mg Oral Daily Oneil Paz MD   200 mg at 07/10/17 0843    ropinirole tablet 0.5 mg  0.5 mg Oral QHS Oneil Paz MD   0.5 mg at 07/09/17 2102     Time spent on the discharge of patient: 45 minutes    Gina Clarke MD  Department of Hospital Medicine  [facility]

## 2017-07-10 NOTE — ASSESSMENT & PLAN NOTE
Possibly Pre renal?  Will watch closely    7/6/17: her urine output is increasing and her BUN/Cr is stable for past 2 days.    7/7/17: renal function is stable but her UOP is steady increasing.  Discussed with Nephrology is to pull  Cath she has for HD, d/c carroll as kidney's are recovering.    7/8/17: renal function stable with good urine output    7/9/17: BUN/Cr still increasing despite okay output  Given IVF overnight and worse edema  Hold fluids as she may have to have repeat dialysis    7/10/17: continue to monitor closely she has been having good urine output.

## 2017-07-10 NOTE — PROCEDURES
"Kaylene Emery is a 46 y.o. female patient.    Temp: 98.1 °F (36.7 °C) (07/10/17 0730)  Pulse: 80 (07/10/17 0915)  Resp: 17 (07/10/17 0730)  BP: (!) 175/79 (07/10/17 0730)  SpO2: 98 % (07/10/17 0915)  Weight: 80.5 kg (177 lb 8 oz) (07/08/17 0408)  Height: 5' 4.02" (162.6 cm) (07/06/17 1600)       Procedures     (trialysis )Catheter removed as it is no longer needed.  Applied pressure for 5 minutes.  No bleeding noted.  Dressing placed.  Catheter and all sharps disposal.  Sutures removed.  No complications noted     Suzy Sarah  7/10/2017  "

## 2017-07-10 NOTE — SUBJECTIVE & OBJECTIVE
Interval History: 45 yo female with intentional overdose who developed rhabdomyolysis and ARF leading to temporary CRRT. Clinically she is better and having good UOP despite slight upward trend in her BUN/Cr. It appears she has severe depression and from records attempted suicide in the past.    Review of Systems   Respiratory: Negative.    Cardiovascular: Negative.    Genitourinary: Negative.    Skin: Negative.      Objective:     Vital Signs (Most Recent):  Temp: 98.6 °F (37 °C) (07/10/17 1200)  Pulse: 78 (07/10/17 1300)  Resp: 18 (07/10/17 1300)  BP: (!) 141/70 (07/10/17 1200)  SpO2: 96 % (07/10/17 1300) Vital Signs (24h Range):  Temp:  [97.9 °F (36.6 °C)-98.6 °F (37 °C)] 98.6 °F (37 °C)  Pulse:  [75-94] 78  Resp:  [17-21] 18  SpO2:  [95 %-100 %] 96 %  BP: (141-175)/(70-91) 141/70     Weight: 80.5 kg (177 lb 8 oz)  Body mass index is 30.45 kg/m².    Intake/Output Summary (Last 24 hours) at 07/10/17 1536  Last data filed at 07/10/17 1300   Gross per 24 hour   Intake             1465 ml   Output             3400 ml   Net            -1935 ml      Physical Exam   Constitutional: She is oriented to person, place, and time. She appears well-developed and well-nourished.   HENT:   Head: Normocephalic and atraumatic.   Nose: Nose normal.   Mouth/Throat: Oropharynx is clear and moist.   Eyes: EOM are normal. Pupils are equal, round, and reactive to light.   Neck: Normal range of motion. Neck supple.   Cardiovascular: Normal rate, regular rhythm, normal heart sounds and intact distal pulses.    Pulmonary/Chest: Effort normal and breath sounds normal.   Abdominal: Soft. Bowel sounds are normal.   Neurological: She is alert and oriented to person, place, and time.   Skin: Skin is warm and dry.   Psychiatric: Her speech is normal. Cognition and memory are normal. She exhibits a depressed mood.   Nursing note and vitals reviewed.      Significant Labs:   Recent Lab Results       07/10/17  0548 07/09/17  1626 07/09/17  1623       Unit Blood Type Code   6200        CANCELED  Comment:  Result canceled by the ancillary     Unit Expiration   [id number]        CANCELED  Comment:  RBCs LeukoR was cancelled on 07/09/2017 at 18:28 by ; not needed, only ordered 1 RBC unit    Result canceled by the ancillary       Unit Blood Type   A POS        CANCELED  Comment:  Result canceled by the ancillary     Albumin 2.5(L)       Alkaline Phosphatase 80       ALT 41       Anion Gap 12       AST 30       Baso # 0.08       Basophil% 0.6       Bilirubin, Direct 0.7(H)       Total Bilirubin 1.3  Comment:  For infants and newborns, interpretation of results should be based  on gestational age, weight and in agreement with clinical  observations.  Premature Infant recommended reference ranges:  Up to 24 hours.............<8.0 mg/dL  Up to 48 hours............<12.0 mg/dL  3-5 days..................<15.0 mg/dL  6-29 days.................<15.0 mg/dL  (H)       BUN, Bld 55(H)       Calcium 8.4(L)       Chloride 102       CO2 24       CODING SYSTEM   [id number]        CANCELED  Comment:  Result canceled by the ancillary     (H)       Creatinine 4.4(H)       Differential Method Automated       DISPENSE STATUS   TRANSFUSED        CANCELED  Comment:  Result canceled by the ancillary     eGFR if  13(A)       eGFR if non  11  Comment:  Calculation used to obtain the estimated glomerular filtration  rate (eGFR) is the CKD-EPI equation. Since race is unknown   in our information system, the eGFR values for   -American and Non--American patients are given   for each creatinine result.  (A)       Eos # 0.5       Eosinophil% 3.7       Glucose 103       Gran # 9.0(H)       Gran% 71.9       Group & Rh  A POS      Hematocrit 21.6(L)       Hemoglobin 7.5(L)       INDIRECT BRIAN  NEG      Lymph # 2.3       Lymph% 18.1       Magnesium 1.7       MCH 30.4       MCHC 34.7       MCV 87       Mono # 1.0       Mono% 7.4       MPV 9.7        Platelets 295       Potassium 3.9       PRODUCT CODE   [id number]        CANCELED  Comment:  Result canceled by the ancillary     Total Protein 5.2(L)       RBC 2.47(L)       RDW 16.0(H)       Sodium 138       UNIT NUMBER   [id number]        CANCELED  Comment:  Result canceled by the ancillary     WBC 12.87(H)             Significant Imaging: I have reviewed and interpreted all pertinent imaging results/findings within the past 24 hours.

## 2017-07-10 NOTE — PLAN OF CARE
Problem: Patient Care Overview  Goal: Plan of Care Review  PT REQUIRES MAX A X2>TOTAL A FOR BED MOBILITY AND T/F TO CHAIR   Plan of care reviewed with patient. AAOx3. VS stable. Sitter at bedside. Tele monitoring. PRN oxy given. Worked with physical therapy and ambulated with assistance. Free of injuries and fall at this time thus far in this shift. Free of infection signs and symptoms thus far in this shift. Care clustered for rest. Patient positioned independently. Will continue to monitor.

## 2017-07-10 NOTE — CONSULTS
"  [facility] -   Adult Nutrition  Consult Note    SUMMARY     Recommendations    Recommendation/Intervention: 1. Continue current Diet. 2. Will continue to monitor   Goals: Intake of greater than 80% of meals   Nutrition Goal Status: goal met  Communication of RD Recs:  (care plan )    Reason for Assessment    Reason for Assessment: RD follow-up  Diagnosis:  (Drugoverdose, Non-traumatic rhabdomyolysis, ALEXUS)  Relevent Medical History: Depression, Anxiety, GERD, Tobacco use.    General Information Comments:   7/6/17 Patient reports that diet is well tolerated. No N/V/D. Pt reports good appetite. However today she ate less than 50 % because pt dislikes renal diet foods.  7/10/17 Per Nurse PO intake ~ 100%.   Nutrition Discharge Planning:  (Current diet if GFR does not improve. )    Nutrition Prescription Ordered    Current Diet Order: Renal diet     Evaluation of Received Nutrients/Fluid Intake    % Intake of Estimated Energy Needs: 75 - 100 %  % Meal Intake: 100%     Nutrition/Diet History    Typical Food/Fluid Intake: good appetite and intake prior admission   Food Preferences: none reported including cultural or Adventist      Labs/Tests/Procedures/Meds    Pertinent Labs Reviewed: reviewed  Pertinent Labs Comments: BUN 55, Crea 4.4, GFR 11, Ca 8.4, Total Protein 5.2, Alb 2.5, Total bilirubin 1.3  Pertinent Medications Reviewed: reviewed    Physical Findings    Overall Physical Appearance: obese  Oral/Mouth Cavity: decay/carries  Skin:  (bruised, Avtar Score 18)    Anthropometrics    Height: 5' 4.02" (162.6 cm)  Weight Method: Bed Scale  Weight: 80.5 kg (177 lb 8 oz)  Ideal Body Weight (IBW), Female: 120.1 lb  % Ideal Body Weight, Female (lb): 146.85 lb  BMI (Calculated): 30.3  BMI Grade: 30 - 34.9- obesity - grade I    Estimated/Assessed Needs    Weight Used For Calorie Calculations: 62 kg (136 lb 11 oz) (Based on NHANES II per Renal )   Height (cm): 162.6 cm  Energy Calorie Requirements (kcal): " 1550  Energy Need Method: Kcal/kg (25cal/kg)  25 kcal/kg (kcal): 1550   RMR (DuPage-St. Jeor Equation): 1255.25   Weight Used For Protein Calculations: 62 kg (136 lb 11 oz)  1.0 gm Protein (gm): 62.13  Fluid Need Method: RDA Method (1 ml/riddhi or as needeed per MD)  RDA Method (mL): 1550    Assessment and Plan    ALEXUS (acute kidney injury)    Nutrition Problem:   Altered nutrition related laboratory values     Related to (etiology):   ALEXUS    Signs and Symptoms (as evidenced by):   BUN 55, CREA 4.4, GFR 11    Interventions/Recommendations (treatment strategy):  1. Continue Renal Diet  2. Will continue to monitor      Nutrition Diagnosis Status:   Continues              Monitor and Evaluation    Food and Nutrient Intake: energy intake  Biochemical Data, Medical Tests and Procedures: electrolyte and renal panel, glucose/endocrine profile  Nutrition-Focused Physical Findings: overall appearance    Nutrition Follow-Up    RD Follow-up?: Yes

## 2017-07-10 NOTE — PT/OT/SLP PROGRESS
Occupational Therapy      Kaylene Emery  MRN: [id number]    ATTEMPTED O.T. TX. THIS AM, PT UNABLE TO BE AROUSED, NURSE SINGH REPORTS PT HAS BEEN SEDATED, WILL ASSESS NEXT VISIT    Raeann Olivares, OT   [phone]  7/10/2017

## 2017-07-10 NOTE — PLAN OF CARE
Outside Transfer Acceptance Note    Transferring Physician or Mid Level Provider/Speciality: Dr. Gina Clarke, [facility]     Accepting Physician: Shannon Oleary     Date of Acceptance: 07/10/2017     Transferring Facility/Hospital: [facility] in [place], [place]    Reason for Transfer to [facility]: Needs Psychiatric evaluation as patient is [facility] and has not had Psych evaluation since admit and needs Psych evaluation for depression in patient with serious suicide attempt; No psychiatry is available at [facility]     Report from Transferring Physician or Mid-Level provider/Hospital course: 45 y/o female with history of major depression and anxiety and substance abuse was admitted to [facility] on 6/28/2017 after multi-substance drug overdose in a suicide attempt. Patient apparently overdosed on multiple medications including Methocarbamol, Mobic, Lorazepam, Flexeril, Phenergan, Seroquel and Celexa. Patient was initially intubated and admitted to MICU. Patient was noted to be in acute rhabdomyolysis with CPK > 40,000. Patient also noted to have acute liver injury and acute renal failure. Patient required CRRT on admit for toxin removal. Patient aggressively hydrated with IVFs with about 6 liters of fluid on presentation. Patient was initially [facility]'ed and now [facility]ed for past 10 days. Patient supposedly has no previous Psych admits and according to Dr. Clarke denies any current suicidal ideation but does have an extensive Psychiatric history of anxiety and depression. Patient now extubated and on the medical floor. Patient has not required any dialysis for past 10 days and Nephrology removed dialysis catheter today, 7/10. Patient's urine output has improved but patient has anasarca on exam. Nephrology feels renal function is stable but remains elevated with Cr 4.4. Patient is not medically stable for discharge but concern is that patient's CEC is close to running out so patient needs  Psychiatric evaluation due to the seriousness of her suicide attempt and may need inpatient psychiatric placement once medically stable. There is no Psychiatry available at [facility] to evaluate patient and determine if safe for home discharge or needs further psychiatric care so request made for transfer to [facility] for continued management of acute medical needs as well as Psychiatry evaluation.     To do list upon patient arrival:   · Consult Psychiatry for evaluation of depression and suicidal ideation  · Continued monitoring and management of ALEXUS    Please call extension [phone] upon patient arrival to floor for Hospital Medicine admit team assignment and for additional admit orders. If patient is coming from another [facility] facility please also call [phone] to inform the admit team/office that patient has arrived from the [facility] facility to the floor so patient can be evaluated.      JAVED THIBODEAUX MD  Attending Staff Physician   [facility]  Pager: [phone]  Spectralink: [phone]

## 2017-07-10 NOTE — ASSESSMENT & PLAN NOTE
Acute kidney injury is stable.  Her status post one packed RBCs.  Dialysis catheter removed    Plans discussed with Dr. Borjas with \[facility]\"" medicine

## 2017-07-10 NOTE — PLAN OF CARE
Problem: Patient Care Overview  Goal: Plan of Care Review  PT REQUIRES MAX A X2>TOTAL A FOR BED MOBILITY AND T/F TO CHAIR   Outcome: Ongoing (interventions implemented as appropriate)  Plan of care reviewed with patient. AAO x3. V/S stable. PRN oxy given. Pt received 1 unit of PRBC and 1x dose of lasix after. Patient on telemetry NS rhythm noted. Sitter at bedside. Patient ambulating with assistance, fall precautions in place, patient free from fall/injury. Patient has no questions at this time. Will continue to monitor.

## 2017-07-10 NOTE — ASSESSMENT & PLAN NOTE
Needs to be addressed further    7/7/17: plan is to find placement once medically clear    7/10/17: she will need psych evaluation as admitted with drug overdose

## 2017-07-10 NOTE — PLAN OF CARE
Problem: Patient Care Overview  Goal: Plan of Care Review  PT REQUIRES MAX A X2>TOTAL A FOR BED MOBILITY AND T/F TO CHAIR   Outcome: Ongoing (interventions implemented as appropriate)  Recommendations     Recommendation/Intervention: 1. Continue current Diet. 2. Will continue to monitor   Goals: Intake of greater than 80% of meals   Nutrition Goal Status: goal met  Communication of RD Recs:  (care plan )

## 2017-07-11 PROBLEM — F19.90 SUBSTANCE USE DISORDER: Status: ACTIVE | Noted: 2017-07-11

## 2017-07-11 LAB
AMPHET+METHAMPHET UR QL: NEGATIVE
BARBITURATES UR QL SCN>200 NG/ML: NEGATIVE
BENZODIAZ UR QL SCN>200 NG/ML: NEGATIVE
BZE UR QL SCN: NEGATIVE
CANNABINOIDS UR QL SCN: NEGATIVE
CREAT UR-MCNC: 37 MG/DL
ETHANOL UR-MCNC: <10 MG/DL
METHADONE UR QL SCN>300 NG/ML: NEGATIVE
OPIATES UR QL SCN: NORMAL
PCP UR QL SCN>25 NG/ML: NEGATIVE
TOXICOLOGY INFORMATION: NORMAL

## 2017-07-11 PROCEDURE — 25000003 PHARM REV CODE 250: Performed by: HOSPITALIST

## 2017-07-11 PROCEDURE — 11000001 HC ACUTE MED/SURG PRIVATE ROOM

## 2017-07-11 PROCEDURE — 63600175 PHARM REV CODE 636 W HCPCS: Performed by: HOSPITALIST

## 2017-07-11 PROCEDURE — 99233 SBSQ HOSP IP/OBS HIGH 50: CPT | Mod: ,,, | Performed by: HOSPITALIST

## 2017-07-11 PROCEDURE — 80307 DRUG TEST PRSMV CHEM ANLYZR: CPT

## 2017-07-11 RX ORDER — FLUOXETINE HYDROCHLORIDE 20 MG/1
40 CAPSULE ORAL DAILY
Status: DISCONTINUED | OUTPATIENT
Start: 2017-07-11 | End: 2017-07-14 | Stop reason: HOSPADM

## 2017-07-11 RX ORDER — GABAPENTIN 300 MG/1
300 CAPSULE ORAL 3 TIMES DAILY
Status: DISCONTINUED | OUTPATIENT
Start: 2017-07-11 | End: 2017-07-14 | Stop reason: HOSPADM

## 2017-07-11 RX ORDER — SODIUM CHLORIDE 0.9 % (FLUSH) 0.9 %
3 SYRINGE (ML) INJECTION EVERY 8 HOURS
Status: DISCONTINUED | OUTPATIENT
Start: 2017-07-11 | End: 2017-07-14 | Stop reason: HOSPADM

## 2017-07-11 RX ORDER — ENOXAPARIN SODIUM 100 MG/ML
30 INJECTION SUBCUTANEOUS EVERY 24 HOURS
Status: DISCONTINUED | OUTPATIENT
Start: 2017-07-11 | End: 2017-07-14 | Stop reason: HOSPADM

## 2017-07-11 RX ORDER — ACETAMINOPHEN 325 MG/1
650 TABLET ORAL EVERY 8 HOURS PRN
Status: DISCONTINUED | OUTPATIENT
Start: 2017-07-11 | End: 2017-07-14 | Stop reason: HOSPADM

## 2017-07-11 RX ORDER — MORPHINE SULFATE 4 MG/ML
4 INJECTION, SOLUTION INTRAMUSCULAR; INTRAVENOUS EVERY 6 HOURS PRN
Status: DISCONTINUED | OUTPATIENT
Start: 2017-07-11 | End: 2017-07-12

## 2017-07-11 RX ADMIN — GABAPENTIN 300 MG: 300 CAPSULE ORAL at 09:07

## 2017-07-11 RX ADMIN — Medication 3 ML: at 10:07

## 2017-07-11 RX ADMIN — FLUOXETINE 40 MG: 20 CAPSULE ORAL at 03:07

## 2017-07-11 RX ADMIN — MORPHINE SULFATE 4 MG: 4 INJECTION INTRAVENOUS at 08:07

## 2017-07-11 RX ADMIN — MORPHINE SULFATE 4 MG: 4 INJECTION INTRAVENOUS at 02:07

## 2017-07-11 RX ADMIN — ENOXAPARIN SODIUM 30 MG: 100 INJECTION SUBCUTANEOUS at 05:07

## 2017-07-11 NOTE — SUBJECTIVE & OBJECTIVE
Interval History: conversing and smiling, good historian    Review of Systems   Constitutional: Negative for chills and fatigue.   HENT: Negative for congestion, sore throat and trouble swallowing.    Eyes: Negative for photophobia and visual disturbance.   Respiratory: Negative for cough, chest tightness and shortness of breath.    Cardiovascular: Negative for chest pain and leg swelling.   Gastrointestinal: Negative for abdominal pain, constipation, diarrhea, nausea and vomiting.   Endocrine: Negative for polydipsia and polyuria.   Genitourinary: Negative for difficulty urinating, dysuria, flank pain, hematuria and pelvic pain.   Musculoskeletal: Negative for arthralgias and back pain.   Skin: Negative for pallor, rash and wound.   Neurological: Negative for speech difficulty and weakness.   Hematological: Does not bruise/bleed easily.   Psychiatric/Behavioral: Negative for agitation, behavioral problems and sleep disturbance.     Objective:     Vital Signs (Most Recent):  Temp: 98.1 °F (36.7 °C) (07/11/17 1136)  Pulse: 82 (07/11/17 1136)  Resp: 18 (07/11/17 1136)  BP: (!) 164/77 (07/11/17 1143)  SpO2: (!) 90 % (07/11/17 1136) Vital Signs (24h Range):  Temp:  [97.9 °F (36.6 °C)-99.1 °F (37.3 °C)] 98.1 °F (36.7 °C)  Pulse:  [74-95] 82  Resp:  [18-20] 18  SpO2:  [90 %-98 %] 90 %  BP: (135-169)/(65-81) 164/77     Weight: 72.8 kg (160 lb 6.4 oz)  Body mass index is 27.53 kg/m².  No intake or output data in the 24 hours ending 07/11/17 1456   Physical Exam   Constitutional: She is oriented to person, place, and time. She appears well-developed and well-nourished.   HENT:   Head: Normocephalic.   Mouth/Throat: Oropharynx is clear and moist.   Eyes: Conjunctivae are normal. Pupils are equal, round, and reactive to light. No scleral icterus.   Neck: Normal range of motion. Neck supple. No JVD present.   Cardiovascular: Normal rate, regular rhythm, normal heart sounds and intact distal pulses.  Exam reveals no gallop and  no friction rub.    No murmur heard.  Pulmonary/Chest: Effort normal and breath sounds normal. No respiratory distress. She has no wheezes. She has no rales. She exhibits no tenderness.   Abdominal: Soft. Bowel sounds are normal. She exhibits no distension and no mass. There is no tenderness. There is no rebound and no guarding.   Musculoskeletal: Normal range of motion. She exhibits no edema or tenderness.   Lymphadenopathy:     She has no cervical adenopathy.   Neurological: She is alert and oriented to person, place, and time. She has normal strength. She exhibits normal muscle tone. Coordination normal.   Skin: Skin is warm and dry.   Psychiatric: She has a normal mood and affect. Her speech is normal and behavior is normal. Thought content normal. Cognition and memory are normal.   Nursing note and vitals reviewed.      Significant Labs:   CBC:   Recent Labs  Lab 07/10/17  0548   WBC 12.87*   HGB 7.5*   HCT 21.6*        CMP:   Recent Labs  Lab 07/10/17  0548      K 3.9      CO2 24      BUN 55*   CREATININE 4.4*   CALCIUM 8.4*   PROT 5.2*   ALBUMIN 2.5*   BILITOT 1.3*   ALKPHOS 80   AST 30   ALT 41   ANIONGAP 12   EGFRNONAA 11*

## 2017-07-11 NOTE — SUBJECTIVE & OBJECTIVE
Interval History: conversing and smiling, good historian    Review of Systems   Constitutional: Negative for chills and fatigue.   HENT: Negative for congestion, sore throat and trouble swallowing.    Eyes: Negative for photophobia and visual disturbance.   Respiratory: Negative for cough, chest tightness and shortness of breath.    Cardiovascular: Negative for chest pain and leg swelling.   Gastrointestinal: Negative for abdominal pain, constipation, diarrhea, nausea and vomiting.   Endocrine: Negative for polydipsia and polyuria.   Genitourinary: Negative for difficulty urinating, dysuria, flank pain, hematuria and pelvic pain.   Musculoskeletal: Negative for arthralgias and back pain.   Skin: Negative for pallor, rash and wound.   Neurological: Negative for speech difficulty and weakness.   Hematological: Does not bruise/bleed easily.   Psychiatric/Behavioral: Negative for agitation, behavioral problems and sleep disturbance.     Objective:     Vital Signs (Most Recent):  Temp: 98.1 °F (36.7 °C) (07/11/17 1545)  Pulse: 76 (07/11/17 1545)  Resp: 18 (07/11/17 1545)  BP: (!) 169/77 (07/11/17 1545)  SpO2: 95 % (07/11/17 1545) Vital Signs (24h Range):  Temp:  [97.9 °F (36.6 °C)-99.1 °F (37.3 °C)] 98.1 °F (36.7 °C)  Pulse:  [74-90] 76  Resp:  [18-20] 18  SpO2:  [90 %-98 %] 95 %  BP: (135-169)/(65-81) 169/77     Weight: 72.8 kg (160 lb 6.4 oz)  Body mass index is 27.53 kg/m².    Intake/Output Summary (Last 24 hours) at 07/11/17 1857  Last data filed at 07/11/17 1225   Gross per 24 hour   Intake              480 ml   Output                0 ml   Net              480 ml      Physical Exam   Constitutional: She is oriented to person, place, and time. She appears well-developed and well-nourished.   HENT:   Head: Normocephalic.   Mouth/Throat: Oropharynx is clear and moist.   Eyes: Conjunctivae are normal. Pupils are equal, round, and reactive to light. No scleral icterus.   Neck: Normal range of motion. Neck supple. No JVD  present.   Cardiovascular: Normal rate, regular rhythm, normal heart sounds and intact distal pulses.  Exam reveals no gallop and no friction rub.    No murmur heard.  Pulmonary/Chest: Effort normal and breath sounds normal. No respiratory distress. She has no wheezes. She has no rales. She exhibits no tenderness.   Abdominal: Soft. Bowel sounds are normal. She exhibits no distension and no mass. There is no tenderness. There is no rebound and no guarding.   Musculoskeletal: Normal range of motion. She exhibits no edema or tenderness.   Lymphadenopathy:     She has no cervical adenopathy.   Neurological: She is alert and oriented to person, place, and time. She has normal strength. She exhibits normal muscle tone. Coordination normal.   Skin: Skin is warm and dry.   Psychiatric: She has a normal mood and affect. Her speech is normal and behavior is normal. Thought content normal. Cognition and memory are normal.   Nursing note and vitals reviewed.    Interval History: conversing and smiling, good historian    Review of Systems   Constitutional: Negative for chills and fatigue.   HENT: Negative for congestion, sore throat and trouble swallowing.    Eyes: Negative for photophobia and visual disturbance.   Respiratory: Negative for cough, chest tightness and shortness of breath.    Cardiovascular: Negative for chest pain and leg swelling.   Gastrointestinal: Negative for abdominal pain, constipation, diarrhea, nausea and vomiting.   Endocrine: Negative for polydipsia and polyuria.   Genitourinary: Negative for difficulty urinating, dysuria, flank pain, hematuria and pelvic pain.   Musculoskeletal: Negative for arthralgias and back pain.   Skin: Negative for pallor, rash and wound.   Neurological: Negative for speech difficulty and weakness.   Hematological: Does not bruise/bleed easily.   Psychiatric/Behavioral: Negative for agitation, behavioral problems and sleep disturbance.     Objective:     Vital Signs (Most  Recent):  Temp: 98.1 °F (36.7 °C) (07/11/17 1545)  Pulse: 76 (07/11/17 1545)  Resp: 18 (07/11/17 1545)  BP: (!) 169/77 (07/11/17 1545)  SpO2: 95 % (07/11/17 1545) Vital Signs (24h Range):  Temp:  [97.9 °F (36.6 °C)-99.1 °F (37.3 °C)] 98.1 °F (36.7 °C)  Pulse:  [74-90] 76  Resp:  [18-20] 18  SpO2:  [90 %-98 %] 95 %  BP: (135-169)/(65-81) 169/77     Weight: 72.8 kg (160 lb 6.4 oz)  Body mass index is 27.53 kg/m².    Intake/Output Summary (Last 24 hours) at 07/11/17 1857  Last data filed at 07/11/17 1225   Gross per 24 hour   Intake              480 ml   Output                0 ml   Net              480 ml      Physical Exam   Constitutional: She is oriented to person, place, and time. She appears well-developed and well-nourished.   HENT:   Head: Normocephalic.   Mouth/Throat: Oropharynx is clear and moist.   Eyes: Conjunctivae are normal. Pupils are equal, round, and reactive to light. No scleral icterus.   Neck: Normal range of motion. Neck supple. No JVD present.   Cardiovascular: Normal rate, regular rhythm, normal heart sounds and intact distal pulses.  Exam reveals no gallop and no friction rub.    No murmur heard.  Pulmonary/Chest: Effort normal and breath sounds normal. No respiratory distress. She has no wheezes. She has no rales. She exhibits no tenderness.   Abdominal: Soft. Bowel sounds are normal. She exhibits no distension and no mass. There is no tenderness. There is no rebound and no guarding.   Musculoskeletal: Normal range of motion. She exhibits no edema or tenderness.   Lymphadenopathy:     She has no cervical adenopathy.   Neurological: She is alert and oriented to person, place, and time. She has normal strength. She exhibits normal muscle tone. Coordination normal.   Skin: Skin is warm and dry.   Psychiatric: She has a normal mood and affect. Her speech is normal and behavior is normal. Thought content normal. Cognition and memory are normal.   Nursing note and vitals reviewed.      Significant  Labs:   CBC:     Recent Labs  Lab 07/10/17  0548   WBC 12.87*   HGB 7.5*   HCT 21.6*        CMP:     Recent Labs  Lab 07/10/17  0548      K 3.9      CO2 24      BUN 55*   CREATININE 4.4*   CALCIUM 8.4*   PROT 5.2*   ALBUMIN 2.5*   BILITOT 1.3*   ALKPHOS 80   AST 30   ALT 41   ANIONGAP 12   EGFRNONAA 11*           Significant Labs:   CBC:     Recent Labs  Lab 07/10/17  0548   WBC 12.87*   HGB 7.5*   HCT 21.6*        CMP:     Recent Labs  Lab 07/10/17  0548      K 3.9      CO2 24      BUN 55*   CREATININE 4.4*   CALCIUM 8.4*   PROT 5.2*   ALBUMIN 2.5*   BILITOT 1.3*   ALKPHOS 80   AST 30   ALT 41   ANIONGAP 12   EGFRNONAA 11*

## 2017-07-11 NOTE — PROGRESS NOTES
[facility] at bedside, given pt paperwork. Patient assisted to stretcher. Heart monitor removed. Pt has no personal belongings in room. Pt accepting of transfer and no questions at this time. Pt leaving at this time.

## 2017-07-11 NOTE — PROGRESS NOTES
[facility] Hospital Medicine  Progress Note    Patient Name: Kaylene Emery  MRN: [id number]  Patient Class: IP- Inpatient   Admission Date: 7/10/2017  Length of Stay: 1 days  Attending Physician: Latoya Abarca MD  Primary Care Provider: Jonas Jimenez MD    [facility] Medicine Team: [facility] HOSP MED B Latoya Abarca MD    Subjective:     Principal Problem:Acute renal failure    HPI:  Ms. Emery is a 47 y/o  female with h/o depression, substance OD in the past, was found with AMS earlier today and was brought to the ED. Patient is currently intubated and history obtained per chart review. Family not at bedside.     Apparently patient likely had overdosed on multiple medications including Methocarbamol, Mobic, lorazepam, flexeril, phenergan, Seroquel, Celexa. Her boy friend is on Geodon, Klonopin, Wellbutrin and Effexor.  Most of her recent filled prescription bottles were found empty at the bedside when her bf found her.   Per chart review, yesterday patient was summoned to appear in the court for her DUI that occurred 4 months ago. She was extremely anxious and pacing all day today.      Labs show lactic acid 2.0, CPK > 40,000, CXR show bilateral patchy infiltrates, ANNETTE < 10, tylenol level < 5,  CO2 17, Na 130, , , WBC 15,000, Hgb 17.3.     Patient is currently intubated on the ventilator, breathing over the vent. She received NS 6 liters so far. Patient was evaluated by both neurology and nephrology in the ED. Nephrology plans to initiate patient on CRRT for poison/toxin control.    Hospital  Course in [facility]. 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 Autonomic instability  ? Hyperthermia  ? Elevated CPK   ? Diaphoresis  ? AMS  Continue PEC and  Hold all psych medications until then.      was transferred to [facility] for psychiatry evaluation           Hospital Course:  7/10 - trialysis catheter removed.  7/11 - she is comfortable, does not appear anxious, asking for her medications.  Seroquel dc's, opioids and benzodiazepines discontinued.       Interval History: conversing and smiling, good historian    Review of Systems   Constitutional: Negative for chills and fatigue.   HENT: Negative for congestion, sore throat and trouble swallowing.    Eyes: Negative for photophobia and visual disturbance.   Respiratory: Negative for cough, chest tightness and shortness of breath.    Cardiovascular: Negative for chest pain and leg swelling.   Gastrointestinal: Negative for abdominal pain, constipation, diarrhea, nausea and vomiting.   Endocrine: Negative for polydipsia and polyuria.   Genitourinary: Negative for difficulty urinating, dysuria, flank pain, hematuria and pelvic pain.   Musculoskeletal: Negative for arthralgias and back pain.   Skin: Negative for pallor, rash and wound.   Neurological: Negative for speech difficulty and weakness.   Hematological: Does not bruise/bleed easily.   Psychiatric/Behavioral: Negative for agitation, behavioral problems and sleep disturbance.     Objective:     Vital Signs (Most Recent):  Temp: 98.1 °F (36.7 °C) (07/11/17 1136)  Pulse: 82 (07/11/17 1136)  Resp: 18 (07/11/17 1136)  BP: (!) 164/77 (07/11/17 1143)  SpO2: (!) 90 % (07/11/17 1136) Vital Signs (24h Range):  Temp:  [97.9 °F (36.6 °C)-99.1 °F (37.3 °C)] 98.1 °F (36.7 °C)  Pulse:  [74-95] 82  Resp:  [18-20] 18  SpO2:  [90 %-98 %] 90 %  BP: (135-169)/(65-81) 164/77     Weight: 72.8 kg (160 lb 6.4 oz)  Body mass index is 27.53 kg/m².  No intake or output data in the 24 hours ending 07/11/17 1456   Physical Exam   Constitutional: She is oriented to person, place, and time. She appears well-developed and well-nourished.   HENT:   Head: Normocephalic.   Mouth/Throat:  Oropharynx is clear and moist.   Eyes: Conjunctivae are normal. Pupils are equal, round, and reactive to light. No scleral icterus.   Neck: Normal range of motion. Neck supple. No JVD present.   Cardiovascular: Normal rate, regular rhythm, normal heart sounds and intact distal pulses.  Exam reveals no gallop and no friction rub.    No murmur heard.  Pulmonary/Chest: Effort normal and breath sounds normal. No respiratory distress. She has no wheezes. She has no rales. She exhibits no tenderness.   Abdominal: Soft. Bowel sounds are normal. She exhibits no distension and no mass. There is no tenderness. There is no rebound and no guarding.   Musculoskeletal: Normal range of motion. She exhibits no edema or tenderness.   Lymphadenopathy:     She has no cervical adenopathy.   Neurological: She is alert and oriented to person, place, and time. She has normal strength. She exhibits normal muscle tone. Coordination normal.   Skin: Skin is warm and dry.   Psychiatric: She has a normal mood and affect. Her speech is normal and behavior is normal. Thought content normal. Cognition and memory are normal.   Nursing note and vitals reviewed.      Significant Labs:   CBC:   Recent Labs  Lab 07/10/17  0548   WBC 12.87*   HGB 7.5*   HCT 21.6*        CMP:   Recent Labs  Lab 07/10/17  0548      K 3.9      CO2 24      BUN 55*   CREATININE 4.4*   CALCIUM 8.4*   PROT 5.2*   ALBUMIN 2.5*   BILITOT 1.3*   ALKPHOS 80   AST 30   ALT 41   ANIONGAP 12   EGFRNONAA 11*           Assessment/Plan:      * Acute renal failure    Cr 4.4  Good urine output          Substance use disorder    opoioids and benzos  iatrogenic          Non-traumatic rhabdomyolysis    cpk  360, resolving          Polysubstance overdose    Multiple substances        Severe episode of recurrent major depressive disorder, without psychotic features    Transferred from  for inpatient psych eval and treatment          Anemia    Hb 7.5 due to ARF         Hypoalbuminemia    Alb 1.9 -> 2.5,           Tobacco use              Anasarca    Resolving, involves vulva          ALEXUS (acute kidney injury)    ARF requiring CRRT            VTE Risk Mitigation         Ordered     enoxaparin injection 40 mg  Daily     Route:  Subcutaneous        07/11/17 1443     Place JUAN M hose  Until discontinued      07/11/17 1443     Place sequential compression device  Until discontinued      07/11/17 1443     Medium Risk of VTE  Once      07/11/17 1443          Latoya Abarca MD  Department of Hospital Medicine   [facility]

## 2017-07-11 NOTE — HPI
Ms. Emery is a 47 y/o  female with h/o depression, substance OD in the past, was found with AMS earlier today and was brought to the ED. Patient is currently intubated and history obtained per chart review. Family not at bedside.     Apparently patient likely had overdosed on multiple medications including Methocarbamol, Mobic, lorazepam, flexeril, phenergan, Seroquel, Celexa. Her boy friend is on Geodon, Klonopin, Wellbutrin and Effexor.  Most of her recent filled prescription bottles were found empty at the bedside when her bf found her.   Per chart review, yesterday patient was summoned to appear in the court for her DUI that occurred 4 months ago. She was extremely anxious and pacing all day today.      Labs show lactic acid 2.0, CPK > 40,000, CXR show bilateral patchy infiltrates, ANNETTE < 10, tylenol level < 5,  CO2 17, Na 130, , , WBC 15,000, Hgb 17.3.     Patient is currently intubated on the ventilator, breathing over the vent. She received NS 6 liters so far. Patient was evaluated by both neurology and nephrology in the ED. Nephrology plans to initiate patient on CRRT for poison/toxin control.    Hospital  Course in [facility]. 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 Autonomic instability  ? Hyperthermia  ? Elevated CPK   ? Diaphoresis  ? AMS  Continue PEC and Hold all psych medications until then.     Se was transferred to [facility] for psychiatry evaluation

## 2017-07-11 NOTE — HOSPITAL COURSE
7/10 - trialysis catheter removed.  7/11 - she is comfortable, does not appear anxious, asking for her medications.  Seroquel dc's, opioids and benzodiazepines discontinued.   7/12 - asking for ativan, no asterixis or change in irritabiity, saw Psychiatry and plan is to continue prozac for now nd hold other meds.  Reporting RLE swelling.  7/12,  7:30 pm - came back to see pt for worsening leg pain throughout the day.  US RLE neg for DVT.  She states pain is located in the right hip and radiates through right pelvis.  She reports pain with weight bearing.    Exam: + edema ant and post, gravity dependent in upper thigh >  calf, Tamiko's neg, pain with internal rotation of the hip, tender to palpation right ant thigh, right groin and along right pelvic girdle, left side non-tender.  Pt crying during exam    She reports taking oxycodoen 20 mg in [place].  Discussed riske of opioids with renal failure, and recent OD.  I informed her that opioid therapy would be short term and quickly weaned, once we have an understanding of the cause of her pain.  Morphine 5 mg q 6 hours ordered.  Ativan also ordered per psych recs.  xrays ordered. No weight bearing until results back.     7/13 - xrays right hip, femur, knee and pelvis all negative for fracture, exam more focal findings today with tenderness  internal thigh muscles, external rotation causes pain in the medial thigh area.   Suspect crush injury from prolonged positioning after fall.  Also NMS can cause focal rhabdo and she did have muscle spasms in the area. Dicussed case with psych.  They will take her tomorrow if all ok.     7/14 - pt walking w PT and walker, Cr 3.2 ( slow improvement), discussed w pat she eusebio have an abnormal cr level and baseling is to be determined.  She expressed understanding.  Her painful condition has improved.  It is time to start weaning Morphine 5 mg q 6 hours. She is medically stable for discharge from [facility].  Psychiatry plans of taking  her to [facility].

## 2017-07-11 NOTE — PLAN OF CARE
Problem: Fall Risk (Adult)  Goal: Absence of Falls  Patient will demonstrate the desired outcomes by discharge/transition of care.   Fall precaution maintained this shift call bell in reach bed in low position will monitor. Sitter at bedside

## 2017-07-11 NOTE — PLAN OF CARE
07/11/17 1307   Discharge Assessment   Assessment Type Discharge Planning Assessment   Confirmed/corrected address and phone number on facesheet? Yes   Assessment information obtained from? Patient   Expected Length of Stay (days) 3   Communicated expected length of stay with patient/caregiver yes   Prior to hospitilization cognitive status: Alert/Oriented   Prior to hospitalization functional status: Independent   Current cognitive status: Alert/Oriented   Current Functional Status: Independent   Arrived From acute care hospital   Lives With significant other   Able to Return to Prior Arrangements unable to determine at this time (comments)   Is patient able to care for self after discharge? Unable to determine at this time (comments)   How many people do you have in your home that can help with your care after discharge? 1   Who are your caregiver(s) and their phone number(s)? Rafa Swan [phone]   Patient's perception of discharge disposition home or selfcare   Readmission Within The Last 30 Days no previous admission in last 30 days   Patient currently being followed by outpatient case management? No   Patient currently receives home health services? No   Does the patient currently use HME? No   Equipment Currently Used at Home none   Transportation Available family or friend will provide   On Dialysis? No   Discharge Plan A Psychiatric hospital   Discharge Plan B Home   Patient/Family In Agreement With Plan other (see comments)   Psychiatry to see patient. Will follow for d/c needs.

## 2017-07-12 PROBLEM — R60.0 EDEMA, LOWER EXTREMITY: Status: ACTIVE | Noted: 2017-07-12

## 2017-07-12 LAB
ALBUMIN SERPL BCP-MCNC: 2.4 G/DL
ALP SERPL-CCNC: 81 U/L
ALT SERPL W/O P-5'-P-CCNC: 36 U/L
ANION GAP SERPL CALC-SCNC: 11 MMOL/L
AST SERPL-CCNC: 22 U/L
BASOPHILS # BLD AUTO: 0.11 K/UL
BASOPHILS NFR BLD: 1.1 %
BILIRUB SERPL-MCNC: 0.5 MG/DL
BUN SERPL-MCNC: 47 MG/DL
CALCIUM SERPL-MCNC: 8.5 MG/DL
CHLORIDE SERPL-SCNC: 103 MMOL/L
CO2 SERPL-SCNC: 26 MMOL/L
CREAT SERPL-MCNC: 3.8 MG/DL
DIFFERENTIAL METHOD: ABNORMAL
EOSINOPHIL # BLD AUTO: 0.5 K/UL
EOSINOPHIL NFR BLD: 4.8 %
ERYTHROCYTE [DISTWIDTH] IN BLOOD BY AUTOMATED COUNT: 15.5 %
EST. GFR  (AFRICAN AMERICAN): 15.8 ML/MIN/1.73 M^2
EST. GFR  (NON AFRICAN AMERICAN): 13.7 ML/MIN/1.73 M^2
GLUCOSE SERPL-MCNC: 100 MG/DL
HCT VFR BLD AUTO: 23.3 %
HGB BLD-MCNC: 7.9 G/DL
LYMPHOCYTES # BLD AUTO: 2.2 K/UL
LYMPHOCYTES NFR BLD: 22.7 %
MAGNESIUM SERPL-MCNC: 1.3 MG/DL
MCH RBC QN AUTO: 30.7 PG
MCHC RBC AUTO-ENTMCNC: 33.9 %
MCV RBC AUTO: 91 FL
MONOCYTES # BLD AUTO: 1.1 K/UL
MONOCYTES NFR BLD: 11.2 %
NEUTROPHILS # BLD AUTO: 5.7 K/UL
NEUTROPHILS NFR BLD: 59 %
PHOSPHATE SERPL-MCNC: 5.9 MG/DL
PLATELET # BLD AUTO: 342 K/UL
PMV BLD AUTO: 10.3 FL
POTASSIUM SERPL-SCNC: 3.8 MMOL/L
PROT SERPL-MCNC: 5.4 G/DL
RBC # BLD AUTO: 2.57 M/UL
SODIUM SERPL-SCNC: 140 MMOL/L
TSH SERPL DL<=0.005 MIU/L-ACNC: 2.68 UIU/ML
WBC # BLD AUTO: 9.64 K/UL

## 2017-07-12 PROCEDURE — 11000001 HC ACUTE MED/SURG PRIVATE ROOM

## 2017-07-12 PROCEDURE — 83735 ASSAY OF MAGNESIUM: CPT

## 2017-07-12 PROCEDURE — 63600175 PHARM REV CODE 636 W HCPCS: Performed by: HOSPITALIST

## 2017-07-12 PROCEDURE — 25000003 PHARM REV CODE 250: Performed by: HOSPITALIST

## 2017-07-12 PROCEDURE — 36415 COLL VENOUS BLD VENIPUNCTURE: CPT

## 2017-07-12 PROCEDURE — 80053 COMPREHEN METABOLIC PANEL: CPT

## 2017-07-12 PROCEDURE — 85025 COMPLETE CBC W/AUTO DIFF WBC: CPT

## 2017-07-12 PROCEDURE — 99233 SBSQ HOSP IP/OBS HIGH 50: CPT | Mod: ,,, | Performed by: HOSPITALIST

## 2017-07-12 PROCEDURE — 84443 ASSAY THYROID STIM HORMONE: CPT

## 2017-07-12 PROCEDURE — 84100 ASSAY OF PHOSPHORUS: CPT

## 2017-07-12 PROCEDURE — 99233 SBSQ HOSP IP/OBS HIGH 50: CPT | Mod: AF,HB,S$PBB, | Performed by: PSYCHIATRY & NEUROLOGY

## 2017-07-12 RX ORDER — IBUPROFEN 200 MG
1 TABLET ORAL DAILY
Status: DISCONTINUED | OUTPATIENT
Start: 2017-07-12 | End: 2017-07-14 | Stop reason: HOSPADM

## 2017-07-12 RX ORDER — MORPHINE SULFATE ORAL SOLUTION 10 MG/5ML
5 SOLUTION ORAL EVERY 6 HOURS PRN
Status: DISCONTINUED | OUTPATIENT
Start: 2017-07-12 | End: 2017-07-14 | Stop reason: HOSPADM

## 2017-07-12 RX ORDER — LORAZEPAM 0.5 MG/1
0.5 TABLET ORAL EVERY 12 HOURS
Status: DISCONTINUED | OUTPATIENT
Start: 2017-07-12 | End: 2017-07-14 | Stop reason: HOSPADM

## 2017-07-12 RX ORDER — LORAZEPAM 1 MG/1
2 TABLET ORAL EVERY 8 HOURS PRN
Status: DISCONTINUED | OUTPATIENT
Start: 2017-07-12 | End: 2017-07-14 | Stop reason: HOSPADM

## 2017-07-12 RX ADMIN — NICOTINE 1 PATCH: 21 PATCH, EXTENDED RELEASE TRANSDERMAL at 03:07

## 2017-07-12 RX ADMIN — ACETAMINOPHEN 650 MG: 325 TABLET ORAL at 06:07

## 2017-07-12 RX ADMIN — Medication 3 ML: at 01:07

## 2017-07-12 RX ADMIN — MORPHINE SULFATE 5 MG: 10 SOLUTION ORAL at 10:07

## 2017-07-12 RX ADMIN — GABAPENTIN 300 MG: 300 CAPSULE ORAL at 01:07

## 2017-07-12 RX ADMIN — LORAZEPAM 0.5 MG: 0.5 TABLET ORAL at 09:07

## 2017-07-12 RX ADMIN — ENOXAPARIN SODIUM 30 MG: 100 INJECTION SUBCUTANEOUS at 06:07

## 2017-07-12 RX ADMIN — GABAPENTIN 300 MG: 300 CAPSULE ORAL at 09:07

## 2017-07-12 RX ADMIN — MORPHINE SULFATE 4 MG: 4 INJECTION INTRAVENOUS at 03:07

## 2017-07-12 RX ADMIN — Medication 3 ML: at 05:07

## 2017-07-12 RX ADMIN — Medication 3 ML: at 09:07

## 2017-07-12 RX ADMIN — MORPHINE SULFATE 4 MG: 4 INJECTION INTRAVENOUS at 09:07

## 2017-07-12 RX ADMIN — GABAPENTIN 300 MG: 300 CAPSULE ORAL at 05:07

## 2017-07-12 RX ADMIN — FLUOXETINE 40 MG: 20 CAPSULE ORAL at 08:07

## 2017-07-12 NOTE — SUBJECTIVE & OBJECTIVE
Interval History:resting comfortably, walking independently to BR    Review of Systems   Constitutional: Negative for chills and fatigue.   HENT: Negative for congestion, sore throat and trouble swallowing.    Eyes: Negative for photophobia and visual disturbance.   Respiratory: Negative for cough, chest tightness and shortness of breath.    Cardiovascular: Negative for chest pain and leg swelling.   Gastrointestinal: Negative for abdominal pain, constipation, diarrhea, nausea and vomiting.   Endocrine: Negative for polydipsia and polyuria.   Genitourinary: Negative for difficulty urinating, dysuria, flank pain, hematuria and pelvic pain.   Musculoskeletal: Negative for arthralgias and back pain.   Skin: Negative for pallor, rash and wound.   Neurological: Negative for speech difficulty and weakness.   Hematological: Does not bruise/bleed easily.   Psychiatric/Behavioral: Negative for agitation, behavioral problems and sleep disturbance.     Objective:     Vital Signs (Most Recent):  Temp: 99 °F (37.2 °C) (07/12/17 1120)  Pulse: 69 (07/12/17 1120)  Resp: 18 (07/12/17 1120)  BP: (!) 163/84 (07/12/17 1120)  SpO2: 97 % (07/12/17 1120) Vital Signs (24h Range):  Temp:  [97.5 °F (36.4 °C)-99 °F (37.2 °C)] 99 °F (37.2 °C)  Pulse:  [69-79] 69  Resp:  [18] 18  SpO2:  [93 %-98 %] 97 %  BP: (144-178)/(73-89) 163/84     Weight: 72.8 kg (160 lb 6.4 oz)  Body mass index is 27.53 kg/m².    Intake/Output Summary (Last 24 hours) at 07/12/17 1336  Last data filed at 07/12/17 0824   Gross per 24 hour   Intake              200 ml   Output                0 ml   Net              200 ml      Physical Exam   Constitutional: She is oriented to person, place, and time. She appears well-developed and well-nourished.   HENT:   Head: Normocephalic.   Mouth/Throat: Oropharynx is clear and moist.   Eyes: Conjunctivae are normal. Pupils are equal, round, and reactive to light. No scleral icterus.   Neck: Normal range of motion. Neck supple. No JVD  present.   Cardiovascular: Normal rate, regular rhythm, normal heart sounds and intact distal pulses.  Exam reveals no gallop and no friction rub.    No murmur heard.  Pulmonary/Chest: Effort normal and breath sounds normal. No respiratory distress. She has no wheezes. She has no rales. She exhibits no tenderness.   Abdominal: Soft. Bowel sounds are normal. She exhibits no distension and no mass. There is no tenderness. There is no rebound and no guarding.   Musculoskeletal: Normal range of motion. She exhibits no edema or tenderness.   Lymphadenopathy:     She has no cervical adenopathy.   Neurological: She is alert and oriented to person, place, and time. She has normal strength. She exhibits normal muscle tone. Coordination normal.   Skin: Skin is warm and dry.   Psychiatric: She has a normal mood and affect. Her speech is normal and behavior is normal. Thought content normal. Cognition and memory are normal.   Nursing note and vitals reviewed.    Interval History: conversing and smiling, good historian    Review of Systems   Constitutional: Negative for chills and fatigue.   HENT: Negative for congestion, sore throat and trouble swallowing.    Eyes: Negative for photophobia and visual disturbance.   Respiratory: Negative for cough, chest tightness and shortness of breath.    Cardiovascular: Negative for chest pain and leg swelling.   Gastrointestinal: Negative for abdominal pain, constipation, diarrhea, nausea and vomiting.   Endocrine: Negative for polydipsia and polyuria.   Genitourinary: Negative for difficulty urinating, dysuria, flank pain, hematuria and pelvic pain.   Musculoskeletal: Negative for arthralgias and back pain.   Skin: Negative for pallor, rash and wound.   Neurological: Negative for speech difficulty and weakness.   Hematological: Does not bruise/bleed easily.   Psychiatric/Behavioral: Negative for agitation, behavioral problems and sleep disturbance.     Objective:     Vital Signs (Most  Recent):  Temp: 99 °F (37.2 °C) (07/12/17 1120)  Pulse: 69 (07/12/17 1120)  Resp: 18 (07/12/17 1120)  BP: (!) 163/84 (07/12/17 1120)  SpO2: 97 % (07/12/17 1120) Vital Signs (24h Range):  Temp:  [97.5 °F (36.4 °C)-99 °F (37.2 °C)] 99 °F (37.2 °C)  Pulse:  [69-79] 69  Resp:  [18] 18  SpO2:  [93 %-98 %] 97 %  BP: (144-178)/(73-89) 163/84     Weight: 72.8 kg (160 lb 6.4 oz)  Body mass index is 27.53 kg/m².    Intake/Output Summary (Last 24 hours) at 07/12/17 1336  Last data filed at 07/12/17 0824   Gross per 24 hour   Intake              200 ml   Output                0 ml   Net              200 ml      Physical Exam   Constitutional: She is oriented to person, place, and time. She appears well-developed and well-nourished.   HENT:   Head: Normocephalic.   Mouth/Throat: Oropharynx is clear and moist.   Eyes: Conjunctivae are normal. Pupils are equal, round, and reactive to light. No scleral icterus.   Neck: Normal range of motion. Neck supple. No JVD present.   Cardiovascular: Normal rate, regular rhythm, normal heart sounds and intact distal pulses.  Exam reveals no gallop and no friction rub.    No murmur heard.  Pulmonary/Chest: Effort normal and breath sounds normal. No respiratory distress. She has no wheezes. She has no rales. She exhibits no tenderness.   Abdominal: Soft. Bowel sounds are normal. She exhibits no distension and no mass. There is no tenderness. There is no rebound and no guarding.   Musculoskeletal: Normal range of motion. She exhibits no edema or tenderness.   Lymphadenopathy:     She has no cervical adenopathy.   Neurological: She is alert and oriented to person, place, and time. She has normal strength. She exhibits normal muscle tone. Coordination normal.   Skin: Skin is warm and dry.   Psychiatric: She has a normal mood and affect. Her speech is normal and behavior is normal. Thought content normal. Cognition and memory are normal.   Nursing note and vitals reviewed.      Significant Labs:    CBC:     Recent Labs  Lab 07/12/17 0518   WBC 9.64   HGB 7.9*   HCT 23.3*        CMP:     Recent Labs  Lab 07/12/17 0518      K 3.8      CO2 26      BUN 47*   CREATININE 3.8*   CALCIUM 8.5*   PROT 5.4*   ALBUMIN 2.4*   BILITOT 0.5   ALKPHOS 81   AST 22   ALT 36   ANIONGAP 11   EGFRNONAA 13.7*           Significant Labs:   CBC:     Recent Labs  Lab 07/12/17 0518   WBC 9.64   HGB 7.9*   HCT 23.3*        CMP:     Recent Labs  Lab 07/12/17 0518      K 3.8      CO2 26      BUN 47*   CREATININE 3.8*   CALCIUM 8.5*   PROT 5.4*   ALBUMIN 2.4*   BILITOT 0.5   ALKPHOS 81   AST 22   ALT 36   ANIONGAP 11   EGFRNONAA 13.7*
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
## 2017-07-12 NOTE — SUBJECTIVE & OBJECTIVE
"  Psychotherapeutics     Start     Stop Route Frequency Ordered    07/11/17 1545  fluoxetine capsule 40 mg      -- Oral Daily 07/11/17 1446        Scheduled Meds:   enoxaparin  30 mg Subcutaneous Daily    fluoxetine  40 mg Oral Daily    gabapentin  300 mg Oral TID    sodium chloride 0.9%  3 mL Intravenous Q8H     Continuous Infusions:   PRN Meds:.acetaminophen, morphine     Review of Systems - as stated in HPI  Objective:     Vital Signs (Most Recent):  Temp: 98.1 °F (36.7 °C) (07/11/17 1545)  Pulse: 76 (07/11/17 1545)  Resp: 18 (07/11/17 1545)  BP: (!) 169/77 (07/11/17 1545)  SpO2: 95 % (07/11/17 1545) Vital Signs (24h Range):  Temp:  [97.9 °F (36.6 °C)-98.4 °F (36.9 °C)] 98.1 °F (36.7 °C)  Pulse:  [74-85] 76  Resp:  [18-20] 18  SpO2:  [90 %-98 %] 95 %  BP: (135-169)/(65-81) 169/77     Height: 5' 4" (162.6 cm)  Weight: 72.8 kg (160 lb 6.4 oz)  Body mass index is 27.53 kg/m².      Intake/Output Summary (Last 24 hours) at 07/11/17 2052  Last data filed at 07/11/17 1225   Gross per 24 hour   Intake              480 ml   Output                0 ml   Net              480 ml       Physical Exam  Mental Status Exam:  Appearance: age appropriate, hirsutism, LE edema evident  Behavior/Cooperation: appropriate normal, cooperative, seems somewhat evasive to questioning  Speech: appropriate rate, volume and tone   Language: uses words appropriately; NO aphasia or dysarthria  Mood: euthymic  Affect:  congruent with mood and appropriate to situation/content   Thought Process: normal and logical  Thought Content: normal, no suicidality, no homicidality, delusions, or paranoia  Level of Consciousness: Alert and Oriented x3  Memory:  Intact, 3/3 immediate, 3/3 at 5 minutes    Recent:  intact   Remote: Intact; Named 3/3 past presidents   Attention/concentration: appropriate for age/education.   Fund of Knowledge: appears adequate  Insight:  fair  Judgment: questionable       Significant Labs: All pertinent labs within the past " 24 hours have been reviewed.    Significant Imaging: I have reviewed all pertinent imaging results/findings within the past 24 hours.

## 2017-07-12 NOTE — PROGRESS NOTES
Dr. Abarca called back and said that pt cannot get additional prn pain meds. Dr. Abarca also awared of pt right foot swell more than the left foot. No new order received as of now.

## 2017-07-12 NOTE — ASSESSMENT & PLAN NOTE
- Continue Prozac 40mg daily  - In the setting of recent possible NMS, will defer further medication recommendations to day CL team  - Continue PEC for now

## 2017-07-12 NOTE — ASSESSMENT & PLAN NOTE
Collateral provided gives further concern for chronic overuse of prescription medications. Current recommendations:  -due to withdrawal symptoms, including increased BP, would recommend Ativan .5 mg PO BID PRN anxiety and recommend Ativan 2mg PO/IM PRN withdrawal precautions, 2+ of the following   -Sys BP> 160   -Ericka BP> 106   -Pulse> 110   -visible gross tremor   -diaphoresis  -continue Prozac 40 mg daily  -in the setting of recent possible NMS and the history of chronic overuse of seroquel, will NOT re-start seroquel at this time  -MARCO paperwork will be filed prior to CEC expiration on 7/13 at 7490, continue CEC at this time  -once medically clear, will seek inpatient psychiatry placement    Psychiatry will continue to follow.
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
## 2017-07-12 NOTE — HOSPITAL COURSE
7/11 Patient transferred from [place] to [facility] for inpatient psychiatry. Psychiatry consulted. Denied SI or SA.  7/12 Patient continues to deny SA. Denies SI. MARCO papers to be filed.

## 2017-07-12 NOTE — PROGRESS NOTES
ILEANAB paged 2x to make aware that pt requesting additional pain med and pt right lower extremity swell more than the left extremity. PRn IV morphine gave 1 hour ago and pt stated pain decreased from 8 to 6/10. Pt stated prn tylenol does not help her and she does not want it. Awaiting call from MD.

## 2017-07-12 NOTE — PLAN OF CARE
07/11/17 1700   Final Note   Assessment Type Final Discharge Note   Discharge Disposition Short Term  ([facility])

## 2017-07-12 NOTE — ASSESSMENT & PLAN NOTE
7/12: Cr 4.4 -> 3/8  Good urine output  Suspect ATN due to Hypotension, or NSAID induced renal failure

## 2017-07-12 NOTE — PROGRESS NOTES
[facility] Hospital Medicine  Progress Note    Patient Name: Kaylene Emery  MRN: [id number]  Patient Class: IP- Inpatient   Admission Date: 7/10/2017  Length of Stay: 2 days  Attending Physician: Latoya Abarca MD  Primary Care Provider: Jonas Jimenez MD    [facility] Medicine Team: [facility]OSP MED B Latoya Abarca MD    Subjective:     Principal Problem:Acute renal failure    HPI:  Ms. Emery is a 45 y/o  female with h/o depression, substance OD in the past, was found with AMS earlier today and was brought to the ED. Patient is currently intubated and history obtained per chart review. Family not at bedside.     Apparently patient likely had overdosed on multiple medications including Methocarbamol, Mobic, lorazepam, flexeril, phenergan, Seroquel, Celexa. Her boy friend is on Geodon, Klonopin, Wellbutrin and Effexor.  Most of her recent filled prescription bottles were found empty at the bedside when her bf found her.   Per chart review, yesterday patient was summoned to appear in the court for her DUI that occurred 4 months ago. She was extremely anxious and pacing all day today.      Labs show lactic acid 2.0, CPK > 40,000, CXR show bilateral patchy infiltrates, ANNETTE < 10, tylenol level < 5,  CO2 17, Na 130, , , WBC 15,000, Hgb 17.3.     Patient is currently intubated on the ventilator, breathing over the vent. She received NS 6 liters so far. Patient was evaluated by both neurology and nephrology in the ED. Nephrology plans to initiate patient on CRRT for poison/toxin control.    Hospital  Course in [facility]. 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 Autonomic instability  ? Hyperthermia  ? Elevated CPK   ? Diaphoresis  ? AMS  Continue PEC and  Hold all psych medications until then.      was transferred to [facility] for psychiatry evaluation           Hospital Course:  7/10 - trialysis catheter removed.  7/11 - she is comfortable, does not appear anxious, asking for her medications.  Seroquel dc's, opioids and benzodiazepines discontinued.   7/12 - asking for ativan, no asterixis or change in irritabiity, saw Psychiatry and plan is to continue prozac for now nd hold other meds.  Reporting RLE swelling.    Interval History:resting comfortably, walking independently to BR    Review of Systems   Constitutional: Negative for chills and fatigue.   HENT: Negative for congestion, sore throat and trouble swallowing.    Eyes: Negative for photophobia and visual disturbance.   Respiratory: Negative for cough, chest tightness and shortness of breath.    Cardiovascular: Negative for chest pain and leg swelling.   Gastrointestinal: Negative for abdominal pain, constipation, diarrhea, nausea and vomiting.   Endocrine: Negative for polydipsia and polyuria.   Genitourinary: Negative for difficulty urinating, dysuria, flank pain, hematuria and pelvic pain.   Musculoskeletal: Negative for arthralgias and back pain.   Skin: Negative for pallor, rash and wound.   Neurological: Negative for speech difficulty and weakness.   Hematological: Does not bruise/bleed easily.   Psychiatric/Behavioral: Negative for agitation, behavioral problems and sleep disturbance.     Objective:     Vital Signs (Most Recent):  Temp: 99 °F (37.2 °C) (07/12/17 1120)  Pulse: 69 (07/12/17 1120)  Resp: 18 (07/12/17 1120)  BP: (!) 163/84 (07/12/17 1120)  SpO2: 97 % (07/12/17 1120) Vital Signs (24h Range):  Temp:  [97.5 °F (36.4 °C)-99 °F (37.2 °C)] 99 °F (37.2 °C)  Pulse:  [69-79] 69  Resp:  [18] 18  SpO2:  [93 %-98 %] 97 %  BP: (144-178)/(73-89) 163/84     Weight: 72.8 kg (160 lb 6.4 oz)  Body mass index is 27.53 kg/m².    Intake/Output Summary (Last 24 hours) at 07/12/17 6259  Last data filed at  07/12/17 0824   Gross per 24 hour   Intake              200 ml   Output                0 ml   Net              200 ml      Physical Exam   Constitutional: She is oriented to person, place, and time. She appears well-developed and well-nourished.   HENT:   Head: Normocephalic.   Mouth/Throat: Oropharynx is clear and moist.   Eyes: Conjunctivae are normal. Pupils are equal, round, and reactive to light. No scleral icterus.   Neck: Normal range of motion. Neck supple. No JVD present.   Cardiovascular: Normal rate, regular rhythm, normal heart sounds and intact distal pulses.  Exam reveals no gallop and no friction rub.    No murmur heard.  Pulmonary/Chest: Effort normal and breath sounds normal. No respiratory distress. She has no wheezes. She has no rales. She exhibits no tenderness.   Abdominal: Soft. Bowel sounds are normal. She exhibits no distension and no mass. There is no tenderness. There is no rebound and no guarding.   Musculoskeletal: Normal range of motion. She exhibits no edema or tenderness.   Lymphadenopathy:     She has no cervical adenopathy.   Neurological: She is alert and oriented to person, place, and time. She has normal strength. She exhibits normal muscle tone. Coordination normal.   Skin: Skin is warm and dry.   Psychiatric: She has a normal mood and affect. Her speech is normal and behavior is normal. Thought content normal. Cognition and memory are normal.   Nursing note and vitals reviewed.        Significant Labs:   CBC:     Recent Labs  Lab 07/12/17 0518   WBC 9.64   HGB 7.9*   HCT 23.3*        CMP:     Recent Labs  Lab 07/12/17 0518      K 3.8      CO2 26      BUN 47*   CREATININE 3.8*   CALCIUM 8.5*   PROT 5.4*   ALBUMIN 2.4*   BILITOT 0.5   ALKPHOS 81   AST 22   ALT 36   ANIONGAP 11   EGFRNONAA 13.7*           Significant Labs:   CBC:     Recent Labs  Lab 07/12/17 0518   WBC 9.64   HGB 7.9*   HCT 23.3*        CMP:     Recent Labs  Lab 07/12/17 0518       K 3.8      CO2 26      BUN 47*   CREATININE 3.8*   CALCIUM 8.5*   PROT 5.4*   ALBUMIN 2.4*   BILITOT 0.5   ALKPHOS 81   AST 22   ALT 36   ANIONGAP 11   EGFRNONAA 13.7*       7/12,  7:30 pm - came back to see pt for worsening leg pain throughout the day.  US RLE neg for DVT.  She states pain is located in the right hip and radiates through right pelvis.  She reports pain with weight bearing.    Exam: + edema ant and post, gravity dependent in upper thigh >  calf, Tamiko's neg, pain with internal rotation of the hip, tender to palpation right ant thigh, right groin and along right pelvic girdle, left side non-tender.  Pt crying during exam    She reports taking oxycodoen 20 mg in [place].  Discussed riske of opioids with renal failure, and recent OD.  I informed her that opioid therapy would be short term and quickly weaned, once we have an understanding of the cause of her pain.  Morphine 5 mg q 6 hours ordered.  Ativan also ordered per psych recs.  xrays ordered. No weight bearing until results back.       Assessment/Plan:      * Acute renal failure    7/12: Cr 4.4 -> 3/8  Good urine output  Suspect ATN due to Hypotension, or NSAID induced renal failure          Edema, lower extremity    Right  US RLE pending  Elevate  On DVT prophylaxis        Substance use disorder    opoioids and benzos  iatrogenic          Non-traumatic rhabdomyolysis    cpk  360, resolving          Polysubstance overdose    Multiple substances          Severe episode of recurrent major depressive disorder, without psychotic features    Transferred from  for inpatient psych eval and treatment  Psych consulted          Anemia    Hb 7.5 due to ARF -> 7.9        Hypoalbuminemia    Alb 1.9 -> 2.5,   nagative acute phase reactant          Tobacco use    nicoderm ptch          Anasarca    Resolving, involves vulva  Sp paracentesis in BR.         ALEXUS (acute kidney injury)    ARF requiring CRRT in [facility]           Aspiration pneumonia of both lungs    bilat patchy infiltrates suspect ARDS,  vs aspitation        Constipation due to pain medication                VTE Risk Mitigation         Ordered     enoxaparin injection 30 mg  Daily     Route:  Subcutaneous        07/11/17 1443     Place JUAN M hose  Until discontinued      07/11/17 1443     Place sequential compression device  Until discontinued      07/11/17 1443     Medium Risk of VTE  Once      07/11/17 1443          Latoya Abarca MD  Department of Hospital Medicine   [facility]

## 2017-07-13 LAB
ALBUMIN SERPL BCP-MCNC: 2.6 G/DL
ALP SERPL-CCNC: 79 U/L
ALT SERPL W/O P-5'-P-CCNC: 31 U/L
ANION GAP SERPL CALC-SCNC: 12 MMOL/L
AST SERPL-CCNC: 17 U/L
BASOPHILS # BLD AUTO: 0.06 K/UL
BASOPHILS NFR BLD: 0.7 %
BILIRUB SERPL-MCNC: 0.6 MG/DL
BUN SERPL-MCNC: 45 MG/DL
CALCIUM SERPL-MCNC: 8.9 MG/DL
CHLORIDE SERPL-SCNC: 103 MMOL/L
CO2 SERPL-SCNC: 24 MMOL/L
CREAT SERPL-MCNC: 3.4 MG/DL
DIFFERENTIAL METHOD: ABNORMAL
EOSINOPHIL # BLD AUTO: 0.4 K/UL
EOSINOPHIL NFR BLD: 3.9 %
ERYTHROCYTE [DISTWIDTH] IN BLOOD BY AUTOMATED COUNT: 15.5 %
EST. GFR  (AFRICAN AMERICAN): 18 ML/MIN/1.73 M^2
EST. GFR  (NON AFRICAN AMERICAN): 15.6 ML/MIN/1.73 M^2
GLUCOSE SERPL-MCNC: 87 MG/DL
HCT VFR BLD AUTO: 23.8 %
HGB BLD-MCNC: 8.1 G/DL
LYMPHOCYTES # BLD AUTO: 2.3 K/UL
LYMPHOCYTES NFR BLD: 25.8 %
MAGNESIUM SERPL-MCNC: 1.4 MG/DL
MCH RBC QN AUTO: 30.8 PG
MCHC RBC AUTO-ENTMCNC: 34 %
MCV RBC AUTO: 91 FL
MONOCYTES # BLD AUTO: 0.9 K/UL
MONOCYTES NFR BLD: 9.5 %
NEUTROPHILS # BLD AUTO: 5.4 K/UL
NEUTROPHILS NFR BLD: 59.5 %
PHOSPHATE SERPL-MCNC: 5.8 MG/DL
PLATELET # BLD AUTO: 359 K/UL
PMV BLD AUTO: 9.9 FL
POTASSIUM SERPL-SCNC: 4.1 MMOL/L
PROT SERPL-MCNC: 5.8 G/DL
RBC # BLD AUTO: 2.63 M/UL
SODIUM SERPL-SCNC: 139 MMOL/L
WBC # BLD AUTO: 8.97 K/UL

## 2017-07-13 PROCEDURE — 84100 ASSAY OF PHOSPHORUS: CPT

## 2017-07-13 PROCEDURE — 25000003 PHARM REV CODE 250: Performed by: HOSPITALIST

## 2017-07-13 PROCEDURE — 63600175 PHARM REV CODE 636 W HCPCS: Performed by: HOSPITALIST

## 2017-07-13 PROCEDURE — 99233 SBSQ HOSP IP/OBS HIGH 50: CPT | Mod: ,,, | Performed by: HOSPITALIST

## 2017-07-13 PROCEDURE — 25000003 PHARM REV CODE 250: Performed by: NURSE PRACTITIONER

## 2017-07-13 PROCEDURE — 11000001 HC ACUTE MED/SURG PRIVATE ROOM

## 2017-07-13 PROCEDURE — 80053 COMPREHEN METABOLIC PANEL: CPT

## 2017-07-13 PROCEDURE — 99233 SBSQ HOSP IP/OBS HIGH 50: CPT | Mod: AF,HB,S$PBB, | Performed by: PSYCHIATRY & NEUROLOGY

## 2017-07-13 PROCEDURE — 83735 ASSAY OF MAGNESIUM: CPT

## 2017-07-13 PROCEDURE — 36415 COLL VENOUS BLD VENIPUNCTURE: CPT

## 2017-07-13 PROCEDURE — 85025 COMPLETE CBC W/AUTO DIFF WBC: CPT

## 2017-07-13 RX ORDER — LURASIDONE HYDROCHLORIDE 20 MG/1
20 TABLET, FILM COATED ORAL NIGHTLY
Status: DISCONTINUED | OUTPATIENT
Start: 2017-07-13 | End: 2017-07-14 | Stop reason: HOSPADM

## 2017-07-13 RX ORDER — HYDRALAZINE HYDROCHLORIDE 25 MG/1
25 TABLET, FILM COATED ORAL EVERY 8 HOURS PRN
Status: DISCONTINUED | OUTPATIENT
Start: 2017-07-13 | End: 2017-07-14 | Stop reason: HOSPADM

## 2017-07-13 RX ORDER — MAGNESIUM SULFATE HEPTAHYDRATE 40 MG/ML
2 INJECTION, SOLUTION INTRAVENOUS ONCE
Status: COMPLETED | OUTPATIENT
Start: 2017-07-13 | End: 2017-07-13

## 2017-07-13 RX ADMIN — LORAZEPAM 0.5 MG: 0.5 TABLET ORAL at 09:07

## 2017-07-13 RX ADMIN — Medication 3 ML: at 09:07

## 2017-07-13 RX ADMIN — MORPHINE SULFATE 5 MG: 10 SOLUTION ORAL at 04:07

## 2017-07-13 RX ADMIN — MORPHINE SULFATE 5 MG: 10 SOLUTION ORAL at 10:07

## 2017-07-13 RX ADMIN — GABAPENTIN 300 MG: 300 CAPSULE ORAL at 05:07

## 2017-07-13 RX ADMIN — MAGNESIUM SULFATE IN WATER 2 G: 40 INJECTION, SOLUTION INTRAVENOUS at 09:07

## 2017-07-13 RX ADMIN — NICOTINE 1 PATCH: 21 PATCH, EXTENDED RELEASE TRANSDERMAL at 09:07

## 2017-07-13 RX ADMIN — ENOXAPARIN SODIUM 30 MG: 100 INJECTION SUBCUTANEOUS at 04:07

## 2017-07-13 RX ADMIN — GABAPENTIN 300 MG: 300 CAPSULE ORAL at 01:07

## 2017-07-13 RX ADMIN — Medication 3 ML: at 06:07

## 2017-07-13 RX ADMIN — Medication 3 ML: at 01:07

## 2017-07-13 RX ADMIN — LURASIDONE HYDROCHLORIDE 20 MG: 20 TABLET, FILM COATED ORAL at 10:07

## 2017-07-13 RX ADMIN — FLUOXETINE 40 MG: 20 CAPSULE ORAL at 09:07

## 2017-07-13 RX ADMIN — GABAPENTIN 300 MG: 300 CAPSULE ORAL at 09:07

## 2017-07-13 NOTE — ASSESSMENT & PLAN NOTE
Right  US RLE negative  xrays negative for pelvic, hip, femur, knee fractures  Suspect crush injury from prolonged positioning after fall.    Also NMS can cause focal rhabdo and she did have muscle spasms in the area.  Elevate  On DVT prophylaxis

## 2017-07-13 NOTE — ASSESSMENT & PLAN NOTE
ARF requiring CRRT in [facility]  Improving  Likely to have new high baseline  Not acidotic, no electrolyte abnormalities
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
## 2017-07-13 NOTE — PROGRESS NOTES
[facility] Hospital Medicine  Progress Note    Patient Name: Kaylene Emery  MRN: [id number]  Patient Class: IP- Inpatient   Admission Date: 7/10/2017  Length of Stay: 3 days  Attending Physician: Latoya Abarca MD  Primary Care Provider: Jonas Jimenez MD    [facility] Medicine Team: [facility]OSP MED B Latoya Abarca MD    Subjective:     Principal Problem:Acute renal failure    HPI:  Ms. Emery is a 45 y/o  female with h/o depression, substance OD in the past, was found with AMS earlier today and was brought to the ED. Patient is currently intubated and history obtained per chart review. Family not at bedside.     Apparently patient likely had overdosed on multiple medications including Methocarbamol, Mobic, lorazepam, flexeril, phenergan, Seroquel, Celexa. Her boy friend is on Geodon, Klonopin, Wellbutrin and Effexor.  Most of her recent filled prescription bottles were found empty at the bedside when her bf found her.   Per chart review, yesterday patient was summoned to appear in the court for her DUI that occurred 4 months ago. She was extremely anxious and pacing all day today.      Labs show lactic acid 2.0, CPK > 40,000, CXR show bilateral patchy infiltrates, ANNETTE < 10, tylenol level < 5,  CO2 17, Na 130, , , WBC 15,000, Hgb 17.3.     Patient is currently intubated on the ventilator, breathing over the vent. She received NS 6 liters so far. Patient was evaluated by both neurology and nephrology in the ED. Nephrology plans to initiate patient on CRRT for poison/toxin control.    Hospital  Course in [facility]. 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 Autonomic instability  ? Hyperthermia  ? Elevated CPK   ? Diaphoresis  ? AMS  Continue PEC and  Hold all psych medications until then.     Se was transferred to [facility] for psychiatry evaluation           Hospital Course:  7/10 - trialysis catheter removed.  7/11 - she is comfortable, does not appear anxious, asking for her medications.  Seroquel dc's, opioids and benzodiazepines discontinued.   7/12 - asking for ativan, no asterixis or change in irritabiity, saw Psychiatry and plan is to continue prozac for now nd hold other meds.  Reporting RLE swelling.  7/12,  7:30 pm - came back to see pt for worsening leg pain throughout the day.  US RLE neg for DVT.  She states pain is located in the right hip and radiates through right pelvis.  She reports pain with weight bearing.    Exam: + edema ant and post, gravity dependent in upper thigh >  calf, Tamiko's neg, pain with internal rotation of the hip, tender to palpation right ant thigh, right groin and along right pelvic girdle, left side non-tender.  Pt crying during exam    She reports taking oxycodoen 20 mg in [place].  Discussed riske of opioids with renal failure, and recent OD.  I informed her that opioid therapy would be short term and quickly weaned, once we have an understanding of the cause of her pain.  Morphine 5 mg q 6 hours ordered.  Ativan also ordered per psych recs.  xrays ordered. No weight bearing until results back.     7/13 - xrays right hip, femur, knee and pelvis all negative for fracture, exam more focal findings today with tenderness  internal thigh muscles, external rotation causes pain in the medial thigh area.   Suspect crush injury from prolonged positioning after fall.  Also NMS can cause focal rhabdo and she did have muscle spasms in the area. Dicussed case with psych.  They will take her tomorrow if all ok.       Interval History: better disposition, talkative,     Review of Systems   Constitutional: Negative for chills and fatigue.   HENT: Negative for congestion, sore throat and trouble swallowing.    Respiratory:  Negative for cough, chest tightness and shortness of breath.    Cardiovascular: Positive for leg swelling (R>L). Negative for chest pain.   Gastrointestinal: Negative for abdominal pain, constipation, diarrhea, nausea and vomiting.   Endocrine: Negative for polyuria.   Genitourinary: Positive for pelvic pain. Negative for flank pain.   Musculoskeletal: Positive for myalgias. Negative for arthralgias, back pain, neck pain and neck stiffness.   Skin: Negative for pallor and rash.   Neurological: Negative for speech difficulty and weakness.   Hematological: Does not bruise/bleed easily.   Psychiatric/Behavioral: Negative for agitation, behavioral problems and sleep disturbance.     Objective:     Vital Signs (Most Recent):  Temp: 98.6 °F (37 °C) (07/13/17 1144)  Pulse: 81 (07/13/17 1144)  Resp: 18 (07/13/17 1144)  BP: (!) 140/75 (07/13/17 1144)  SpO2: 97 % (07/13/17 1144) Vital Signs (24h Range):  Temp:  [98.1 °F (36.7 °C)-99 °F (37.2 °C)] 98.6 °F (37 °C)  Pulse:  [74-84] 81  Resp:  [16-19] 18  SpO2:  [95 %-98 %] 97 %  BP: (140-177)/(75-88) 140/75     Weight: 72.8 kg (160 lb 6.4 oz)  Body mass index is 27.53 kg/m².  No intake or output data in the 24 hours ending 07/13/17 1510   Physical Exam   Constitutional: She is oriented to person, place, and time. She appears well-developed and well-nourished.   HENT:   Head: Normocephalic.   Mouth/Throat: Oropharynx is clear and moist.   Eyes: Conjunctivae are normal. Pupils are equal, round, and reactive to light. No scleral icterus.   Neck: Normal range of motion. Neck supple. No JVD present.   Cardiovascular: Normal rate, regular rhythm, normal heart sounds and intact distal pulses.  Exam reveals no gallop and no friction rub.    No murmur heard.  Pulmonary/Chest: Effort normal and breath sounds normal. No respiratory distress. She has no wheezes. She has no rales. She exhibits no tenderness.   Abdominal: Soft. Bowel sounds are normal. She exhibits no distension and no mass.  There is no tenderness. There is no rebound and no guarding.   Musculoskeletal: Normal range of motion. She exhibits no edema or tenderness.   RLE: more focal findings today with tenderness  internal thigh muscles, external rotation causes pain in the medial thigh area.  Diffuses tenderness throughout is better, not resolved completely,  Edema is better.   Suspect crush injury from prolonged positioning after fall.  Also NMS can cause focal rhabdo and she did have muscle spasms in the area.   Lymphadenopathy:     She has no cervical adenopathy.   Neurological: She is alert and oriented to person, place, and time. She has normal strength. She exhibits normal muscle tone. Coordination normal.   Skin: Skin is warm and dry.   Psychiatric: She has a normal mood and affect. Her speech is normal and behavior is normal. Thought content normal. Cognition and memory are normal.   Nursing note and vitals reviewed.    Significant Labs:   CBC:     Recent Labs  Lab 07/12/17 0518 07/13/17 0721   WBC 9.64 8.97   HGB 7.9* 8.1*   HCT 23.3* 23.8*    359*     CMP:     Recent Labs  Lab 07/12/17 0518 07/13/17 0721    139   K 3.8 4.1    103   CO2 26 24    87   BUN 47* 45*   CREATININE 3.8* 3.4*   CALCIUM 8.5* 8.9   PROT 5.4* 5.8*   ALBUMIN 2.4* 2.6*   BILITOT 0.5 0.6   ALKPHOS 81 79   AST 22 17   ALT 36 31   ANIONGAP 11 12   EGFRNONAA 13.7* 15.6*           Significant Labs:   CBC:     Recent Labs  Lab 07/12/17 0518 07/13/17 0721   WBC 9.64 8.97   HGB 7.9* 8.1*   HCT 23.3* 23.8*    359*     CMP:     Recent Labs  Lab 07/12/17 0518 07/13/17 0721    139   K 3.8 4.1    103   CO2 26 24    87   BUN 47* 45*   CREATININE 3.8* 3.4*   CALCIUM 8.5* 8.9   PROT 5.4* 5.8*   ALBUMIN 2.4* 2.6*   BILITOT 0.5 0.6   ALKPHOS 81 79   AST 22 17   ALT 36 31   ANIONGAP 11 12   EGFRNONAA 13.7* 15.6*           Assessment/Plan:      * Acute renal failure    7/13: Cr 4.4 -> 3.8 -> 3.4  Good urine output -  voided 3 times  Suspect ATN due to Hypotension, or NSAID induced renal failure          Edema, lower extremity    Right  US RLE negative  xrays negative for pelvic, hip, femur, knee fractures  Suspect crush injury from prolonged positioning after fall.    Also NMS can cause focal rhabdo and she did have muscle spasms in the area.  Elevate  On DVT prophylaxis        Substance use disorder    opoioids and benzos  iatrogenic          Non-traumatic rhabdomyolysis    cpk  360, resolved  Avoid drugs that cause NMS if possible        Polysubstance overdose    Multiple substances          Severe episode of recurrent major depressive disorder, without psychotic features    Transferred from  for inpatient psych eval and treatment  Psych consulted          Anemia    7/13 - Hb 7.5 due to ARF -> 7.9 -> 8.1        Hypoalbuminemia    7/13 -Alb 1.9 -> 2.6  nagative acute phase reactant          Tobacco use    nicoderm patch          Anasarca    Resolving, involves vulva  Sp paracentesis in [place].         ALEXUS (acute kidney injury)    ARF requiring CRRT in [facility]  Improving  Likely to have new high baseline  Not acidotic, no electrolyte abnormalities        Recurrent major depressive disorder              Aspiration pneumonia of both lungs    bilat patchy infiltrates suspect ARDS,  vs aspitation        Drug induced retention of urine              Constipation due to pain medication    Need to avoid opioids            VTE Risk Mitigation         Ordered     enoxaparin injection 30 mg  Daily     Route:  Subcutaneous        07/11/17 1443     Place JUAN M hose  Until discontinued      07/11/17 1443     Place sequential compression device  Until discontinued      07/11/17 1443     Medium Risk of VTE  Once      07/11/17 1443          Latoya Abarca MD  Department of Hospital Medicine   [facility]

## 2017-07-13 NOTE — PROGRESS NOTES
CEC  at 12:16. Spoke with Yesi Mcghee in psychiatry who recommends sitter due to pts history of SI and to prevent pt leaving AMA. Pt has signed documents to voluntary commit herself to a psychiatric facility.

## 2017-07-13 NOTE — SUBJECTIVE & OBJECTIVE
Interval History: better disposition, talkative,     Review of Systems   Constitutional: Negative for chills and fatigue.   HENT: Negative for congestion, sore throat and trouble swallowing.    Respiratory: Negative for cough, chest tightness and shortness of breath.    Cardiovascular: Positive for leg swelling (R>L). Negative for chest pain.   Gastrointestinal: Negative for abdominal pain, constipation, diarrhea, nausea and vomiting.   Endocrine: Negative for polyuria.   Genitourinary: Positive for pelvic pain. Negative for flank pain.   Musculoskeletal: Positive for myalgias. Negative for arthralgias, back pain, neck pain and neck stiffness.   Skin: Negative for pallor and rash.   Neurological: Negative for speech difficulty and weakness.   Hematological: Does not bruise/bleed easily.   Psychiatric/Behavioral: Negative for agitation, behavioral problems and sleep disturbance.     Objective:     Vital Signs (Most Recent):  Temp: 98.6 °F (37 °C) (07/13/17 1144)  Pulse: 81 (07/13/17 1144)  Resp: 18 (07/13/17 1144)  BP: (!) 140/75 (07/13/17 1144)  SpO2: 97 % (07/13/17 1144) Vital Signs (24h Range):  Temp:  [98.1 °F (36.7 °C)-99 °F (37.2 °C)] 98.6 °F (37 °C)  Pulse:  [74-84] 81  Resp:  [16-19] 18  SpO2:  [95 %-98 %] 97 %  BP: (140-177)/(75-88) 140/75     Weight: 72.8 kg (160 lb 6.4 oz)  Body mass index is 27.53 kg/m².  No intake or output data in the 24 hours ending 07/13/17 1510   Physical Exam   Constitutional: She is oriented to person, place, and time. She appears well-developed and well-nourished.   HENT:   Head: Normocephalic.   Mouth/Throat: Oropharynx is clear and moist.   Eyes: Conjunctivae are normal. Pupils are equal, round, and reactive to light. No scleral icterus.   Neck: Normal range of motion. Neck supple. No JVD present.   Cardiovascular: Normal rate, regular rhythm, normal heart sounds and intact distal pulses.  Exam reveals no gallop and no friction rub.    No murmur heard.  Pulmonary/Chest: Effort  normal and breath sounds normal. No respiratory distress. She has no wheezes. She has no rales. She exhibits no tenderness.   Abdominal: Soft. Bowel sounds are normal. She exhibits no distension and no mass. There is no tenderness. There is no rebound and no guarding.   Musculoskeletal: Normal range of motion. She exhibits no edema or tenderness.   RLE: more focal findings today with tenderness  internal thigh muscles, external rotation causes pain in the medial thigh area.  Diffuses tenderness throughout is better, not resolved completely,  Edema is better.   Suspect crush injury from prolonged positioning after fall.  Also NMS can cause focal rhabdo and she did have muscle spasms in the area.   Lymphadenopathy:     She has no cervical adenopathy.   Neurological: She is alert and oriented to person, place, and time. She has normal strength. She exhibits normal muscle tone. Coordination normal.   Skin: Skin is warm and dry.   Psychiatric: She has a normal mood and affect. Her speech is normal and behavior is normal. Thought content normal. Cognition and memory are normal.   Nursing note and vitals reviewed.    Significant Labs:   CBC:     Recent Labs  Lab 07/12/17 0518 07/13/17 0721   WBC 9.64 8.97   HGB 7.9* 8.1*   HCT 23.3* 23.8*    359*     CMP:     Recent Labs  Lab 07/12/17 0518 07/13/17 0721    139   K 3.8 4.1    103   CO2 26 24    87   BUN 47* 45*   CREATININE 3.8* 3.4*   CALCIUM 8.5* 8.9   PROT 5.4* 5.8*   ALBUMIN 2.4* 2.6*   BILITOT 0.5 0.6   ALKPHOS 81 79   AST 22 17   ALT 36 31   ANIONGAP 11 12   EGFRNONAA 13.7* 15.6*           Significant Labs:   CBC:     Recent Labs  Lab 07/12/17 0518 07/13/17 0721   WBC 9.64 8.97   HGB 7.9* 8.1*   HCT 23.3* 23.8*    359*     CMP:     Recent Labs  Lab 07/12/17 0518 07/13/17 0721    139   K 3.8 4.1    103   CO2 26 24    87   BUN 47* 45*   CREATININE 3.8* 3.4*   CALCIUM 8.5* 8.9   PROT 5.4* 5.8*   ALBUMIN 2.4*  2.6*   BILITOT 0.5 0.6   ALKPHOS 81 79   AST 22 17   ALT 36 31   ANIONGAP 11 12   EGFRNONAA 13.7* 15.6*

## 2017-07-13 NOTE — PLAN OF CARE
Problem: Patient Care Overview  Goal: Plan of Care Review  Outcome: Ongoing (interventions implemented as appropriate)  Pt free of falls or injury during shift. Pt c/o 10/10 pain to right leg, PO morphine administered per MD order, moderate relief obtained. Xray of right leg performed during shift, awaiting results. Bed locked and in lowest position, side rails raised x2, right leg elevated, call light within reach, sitter at bedside. Will continue to monitor.

## 2017-07-13 NOTE — PLAN OF CARE
Problem: Patient Care Overview  Goal: Plan of Care Review  Outcome: Ongoing (interventions implemented as appropriate)  Pt remained free from falls/injuires. VSS. AAO x 4. C/o pain in right leg. Elevated right leg and administered morphine prn. Pain went from 8/10 to 4/10. Pt resting comfortably in bed with side rails up x 3, wheels locked, bed in lowest position, sitter at the bedside.

## 2017-07-13 NOTE — ASSESSMENT & PLAN NOTE
7/13: Cr 4.4 -> 3.8 -> 3.4  Good urine output - voided 3 times  Suspect ATN due to Hypotension, or NSAID induced renal failure

## 2017-07-13 NOTE — SUBJECTIVE & OBJECTIVE
"Interval History:   Patient endorses a "better"mood. She thinks the Ativan helped with her anxiety.    She denies SI/HI/AH/VH. She continues to deny that it was a SA. She perseverates on going outpatient so that she can see her mom who is in the hospital. She thinks she can get through a period of time that it normally takes to get an outpatient appointment. She thinks the issue that her fiance may not let her return home is a separate issue. She says she understands that her fiance and son were negatively affected by her overdose. She wants to speak with her family before making a decision on FVA.    Collateral:  Everette, son  [phone]  Once per week, he talks to her. He saw her most recently at [place] every day while she was in the hospital. It is not like her to overdose. From his perspective, she has an addiction, but he does not think she was trying to hurt herself. Never tried to hurt herself to his knowledge. Her mood has been "normal." She needs help, or she will end up dead. He knows she does not want to go inpatient; however, he thinks she needs to get inpatient help against her will.    Psychotherapeutics     Start     Stop Route Frequency Ordered    07/12/17 2100  lorazepam tablet 0.5 mg      -- Oral Every 12 hours 07/12/17 1918 07/12/17 2019  lorazepam tablet 2 mg      -- Oral Every 8 hours PRN 07/12/17 1921    07/11/17 1545  fluoxetine capsule 40 mg      -- Oral Daily 07/11/17 1446           Review of Systems   Constitutional: Negative for appetite change, chills, diaphoresis and fever.   Respiratory: Negative for choking and shortness of breath.    Cardiovascular: Negative for chest pain and palpitations.   Musculoskeletal: Positive for myalgias.   Skin:        No bruising   Psychiatric/Behavioral: Negative for confusion, hallucinations and suicidal ideas. The patient is nervous/anxious.      Objective:     Vital Signs (Most Recent):  Temp: 98.1 °F (36.7 °C) (07/13/17 0741)  Pulse: 74 " "(07/13/17 0741)  Resp: 18 (07/13/17 0741)  BP: (!) 162/87 (07/13/17 0741)  SpO2: 95 % (07/13/17 0741) Vital Signs (24h Range):  Temp:  [98.1 °F (36.7 °C)-99 °F (37.2 °C)] 98.1 °F (36.7 °C)  Pulse:  [69-84] 74  Resp:  [16-19] 18  SpO2:  [95 %-98 %] 95 %  BP: (162-177)/(79-88) 162/87     Height: 5' 4" (162.6 cm)  Weight: 72.8 kg (160 lb 6.4 oz)  Body mass index is 27.53 kg/m².    No intake or output data in the 24 hours ending 07/13/17 1011    Physical Exam   Psychiatric:   Mental Status Exam:  Appearance: unremarkable, age appropriate, normal weight, lying in bed  Behavior/Cooperation: limited/ appropriate normal, cooperative  Speech: appropriate rate, volume and tone normal tone, normal rate, normal pitch, normal volume  Language: uses words appropriately; NO aphasia or dysarthria  Mood: "better"  Affect:  congruent with mood and appropriate to situation/content   Thought Process: normal and logical  Thought Content: normal, no suicidality, no homicidality, delusions, or paranoia  Level of Consciousness: Alert and Oriented x3  Memory:  Intact to conversation  Attention/concentration: appropriate for age/education.   Fund of Knowledge: appears adequate  Insight:  Impaired  Judgment: Impaired            Significant Labs:   Last 24 Hours:   Recent Lab Results       07/13/17  0721      Albumin 2.6(L)     Alkaline Phosphatase 79     ALT 31     Anion Gap 12     AST 17     Baso # 0.06     Basophil% 0.7     Total Bilirubin 0.6  Comment:  For infants and newborns, interpretation of results should be based  on gestational age, weight and in agreement with clinical  observations.  Premature Infant recommended reference ranges:  Up to 24 hours.............<8.0 mg/dL  Up to 48 hours............<12.0 mg/dL  3-5 days..................<15.0 mg/dL  6-29 days.................<15.0 mg/dL       BUN, Bld 45(H)     Calcium 8.9     Chloride 103     CO2 24     Creatinine 3.4(H)     Differential Method Automated     eGFR if  " 18.0(A)     eGFR if non  15.6  Comment:  Calculation used to obtain the estimated glomerular filtration  rate (eGFR) is the CKD-EPI equation. Since race is unknown   in our information system, the eGFR values for   -American and Non--American patients are given   for each creatinine result.  (A)     Eos # 0.4     Eosinophil% 3.9     Glucose 87     Gran # 5.4     Gran% 59.5     Hematocrit 23.8(L)     Hemoglobin 8.1(L)     Lymph # 2.3     Lymph% 25.8     Magnesium 1.4(L)     MCH 30.8     MCHC 34.0     MCV 91     Mono # 0.9     Mono% 9.5     MPV 9.9     Phosphorus 5.8(H)     Platelets 359(H)     Potassium 4.1     Total Protein 5.8(L)     RBC 2.63(L)     RDW 15.5(H)     Sodium 139     WBC 8.97           Significant Imaging: I have reviewed all pertinent imaging results/findings within the past 24 hours.

## 2017-07-13 NOTE — ASSESSMENT & PLAN NOTE
Collateral provided gives further concern for chronic overuse of prescription medications. Current recommendations:  -due to withdrawal symptoms, including increased BP, would recommend Ativan .5 mg PO BID PRN anxiety and recommend Ativan 2mg PO/IM PRN withdrawal precautions, 2+ of the following   -Sys BP> 160   -Ericka BP> 106   -Pulse> 110   -visible gross tremor   -diaphoresis  -continue Prozac 40 mg daily  -in the setting of recent possible NMS and the history of chronic overuse of seroquel, will NOT re-start seroquel at this time  -Patient deciding whether or not to sign in FVA; if she refuses, will likely filed MAROC paperwork prior to CEC expiration on 7/13 at 1216, continue CEC at this time  -once medically clear, will seek inpatient psychiatry placement    Psychiatry will continue to follow.

## 2017-07-13 NOTE — NURSING
Pt returned from x-ray. Pt c/o 10/10 pain to RLE, PO morphine administered. Will continue to monitor.

## 2017-07-14 ENCOUNTER — HOSPITAL ENCOUNTER (INPATIENT)
Facility: HOSPITAL | Age: 47
LOS: 5 days | Discharge: HOME | DRG: 897 | End: 2017-07-19
Attending: PSYCHIATRY & NEUROLOGY | Admitting: PSYCHIATRY & NEUROLOGY
Payer: MEDICAID

## 2017-07-14 VITALS
SYSTOLIC BLOOD PRESSURE: 172 MMHG | TEMPERATURE: 99 F | HEART RATE: 76 BPM | WEIGHT: 160.38 LBS | BODY MASS INDEX: 27.38 KG/M2 | RESPIRATION RATE: 18 BRPM | OXYGEN SATURATION: 98 % | HEIGHT: 64 IN | DIASTOLIC BLOOD PRESSURE: 79 MMHG

## 2017-07-14 DIAGNOSIS — F33.9 MAJOR DEPRESSIVE DISORDER, RECURRENT EPISODE: ICD-10-CM

## 2017-07-14 DIAGNOSIS — T50.901D: ICD-10-CM

## 2017-07-14 DIAGNOSIS — F19.10 POLYSUBSTANCE ABUSE: ICD-10-CM

## 2017-07-14 DIAGNOSIS — T50.901S: ICD-10-CM

## 2017-07-14 DIAGNOSIS — N17.9 ACUTE RENAL FAILURE, UNSPECIFIED ACUTE RENAL FAILURE TYPE: ICD-10-CM

## 2017-07-14 DIAGNOSIS — F19.94 SUBSTANCE INDUCED MOOD DISORDER: Primary | ICD-10-CM

## 2017-07-14 PROBLEM — R52 PAIN: Status: ACTIVE | Noted: 2017-07-14

## 2017-07-14 LAB
ALBUMIN SERPL BCP-MCNC: 2.8 G/DL
ALP SERPL-CCNC: 74 U/L
ALT SERPL W/O P-5'-P-CCNC: 27 U/L
ANION GAP SERPL CALC-SCNC: 12 MMOL/L
AST SERPL-CCNC: 17 U/L
BASOPHILS # BLD AUTO: 0.09 K/UL
BASOPHILS NFR BLD: 1 %
BILIRUB SERPL-MCNC: 0.5 MG/DL
BUN SERPL-MCNC: 46 MG/DL
CALCIUM SERPL-MCNC: 9.3 MG/DL
CHLORIDE SERPL-SCNC: 102 MMOL/L
CO2 SERPL-SCNC: 25 MMOL/L
CREAT SERPL-MCNC: 3.2 MG/DL
DIFFERENTIAL METHOD: ABNORMAL
EOSINOPHIL # BLD AUTO: 0.4 K/UL
EOSINOPHIL NFR BLD: 4.7 %
ERYTHROCYTE [DISTWIDTH] IN BLOOD BY AUTOMATED COUNT: 15.3 %
EST. GFR  (AFRICAN AMERICAN): 19.4 ML/MIN/1.73 M^2
EST. GFR  (NON AFRICAN AMERICAN): 16.8 ML/MIN/1.73 M^2
GLUCOSE SERPL-MCNC: 89 MG/DL
HCT VFR BLD AUTO: 23.3 %
HGB BLD-MCNC: 7.9 G/DL
LYMPHOCYTES # BLD AUTO: 2.5 K/UL
LYMPHOCYTES NFR BLD: 27.7 %
MAGNESIUM SERPL-MCNC: 1.9 MG/DL
MCH RBC QN AUTO: 31.1 PG
MCHC RBC AUTO-ENTMCNC: 33.9 %
MCV RBC AUTO: 92 FL
MONOCYTES # BLD AUTO: 0.9 K/UL
MONOCYTES NFR BLD: 10 %
NEUTROPHILS # BLD AUTO: 5.1 K/UL
NEUTROPHILS NFR BLD: 56 %
PHOSPHATE SERPL-MCNC: 5.7 MG/DL
PLATELET # BLD AUTO: 318 K/UL
PMV BLD AUTO: 9.8 FL
POTASSIUM SERPL-SCNC: 4.3 MMOL/L
PROT SERPL-MCNC: 6 G/DL
RBC # BLD AUTO: 2.54 M/UL
SODIUM SERPL-SCNC: 139 MMOL/L
WBC # BLD AUTO: 9.03 K/UL

## 2017-07-14 PROCEDURE — 84100 ASSAY OF PHOSPHORUS: CPT

## 2017-07-14 PROCEDURE — 85025 COMPLETE CBC W/AUTO DIFF WBC: CPT

## 2017-07-14 PROCEDURE — 80053 COMPREHEN METABOLIC PANEL: CPT

## 2017-07-14 PROCEDURE — 25000003 PHARM REV CODE 250: Performed by: PSYCHIATRY & NEUROLOGY

## 2017-07-14 PROCEDURE — 25000003 PHARM REV CODE 250: Performed by: NURSE PRACTITIONER

## 2017-07-14 PROCEDURE — 83735 ASSAY OF MAGNESIUM: CPT

## 2017-07-14 PROCEDURE — 12400001 HC PSYCH SEMI-PRIVATE ROOM

## 2017-07-14 PROCEDURE — 36415 COLL VENOUS BLD VENIPUNCTURE: CPT

## 2017-07-14 PROCEDURE — 99239 HOSP IP/OBS DSCHRG MGMT >30: CPT | Mod: ,,, | Performed by: HOSPITALIST

## 2017-07-14 PROCEDURE — 97162 PT EVAL MOD COMPLEX 30 MIN: CPT

## 2017-07-14 PROCEDURE — 25000003 PHARM REV CODE 250: Performed by: HOSPITALIST

## 2017-07-14 PROCEDURE — 99222 1ST HOSP IP/OBS MODERATE 55: CPT | Mod: AF,HB,S$PBB, | Performed by: PSYCHIATRY & NEUROLOGY

## 2017-07-14 RX ORDER — ACETAMINOPHEN 325 MG/1
650 TABLET ORAL EVERY 8 HOURS PRN
Status: CANCELLED | OUTPATIENT
Start: 2017-07-14

## 2017-07-14 RX ORDER — LORAZEPAM 0.5 MG/1
0.5 TABLET ORAL 2 TIMES DAILY
Status: DISCONTINUED | OUTPATIENT
Start: 2017-07-14 | End: 2017-07-15

## 2017-07-14 RX ORDER — IBUPROFEN 200 MG
1 TABLET ORAL DAILY
Status: DISCONTINUED | OUTPATIENT
Start: 2017-07-15 | End: 2017-07-17

## 2017-07-14 RX ORDER — GABAPENTIN 300 MG/1
300 CAPSULE ORAL 3 TIMES DAILY
Qty: 90 CAPSULE | Refills: 0 | Status: ON HOLD | OUTPATIENT
Start: 2017-07-14 | End: 2017-07-19 | Stop reason: HOSPADM

## 2017-07-14 RX ORDER — MORPHINE SULFATE ORAL SOLUTION 10 MG/5ML
5 SOLUTION ORAL EVERY 12 HOURS
Status: DISCONTINUED | OUTPATIENT
Start: 2017-07-17 | End: 2017-07-17

## 2017-07-14 RX ORDER — GABAPENTIN 300 MG/1
300 CAPSULE ORAL 3 TIMES DAILY
Status: DISCONTINUED | OUTPATIENT
Start: 2017-07-14 | End: 2017-07-15

## 2017-07-14 RX ORDER — MORPHINE SULFATE ORAL SOLUTION 10 MG/5ML
5 SOLUTION ORAL EVERY 8 HOURS
Status: COMPLETED | OUTPATIENT
Start: 2017-07-16 | End: 2017-07-16

## 2017-07-14 RX ORDER — LORAZEPAM 0.5 MG/1
0.5 TABLET ORAL EVERY 12 HOURS
Qty: 30 TABLET | Refills: 0 | Status: ON HOLD | OUTPATIENT
Start: 2017-07-14 | End: 2017-07-19 | Stop reason: HOSPADM

## 2017-07-14 RX ORDER — LURASIDONE HYDROCHLORIDE 20 MG/1
20 TABLET, FILM COATED ORAL NIGHTLY
Qty: 30 TABLET | Refills: 0 | Status: ON HOLD | OUTPATIENT
Start: 2017-07-14 | End: 2017-07-19 | Stop reason: HOSPADM

## 2017-07-14 RX ORDER — FLUOXETINE HYDROCHLORIDE 20 MG/1
40 CAPSULE ORAL DAILY
Status: CANCELLED | OUTPATIENT
Start: 2017-07-15

## 2017-07-14 RX ORDER — FLUOXETINE HYDROCHLORIDE 20 MG/1
40 CAPSULE ORAL DAILY
Status: DISCONTINUED | OUTPATIENT
Start: 2017-07-15 | End: 2017-07-19 | Stop reason: HOSPADM

## 2017-07-14 RX ORDER — MORPHINE SULFATE ORAL SOLUTION 10 MG/5ML
5 SOLUTION ORAL EVERY 6 HOURS PRN
Status: CANCELLED | OUTPATIENT
Start: 2017-07-14

## 2017-07-14 RX ORDER — IBUPROFEN 200 MG
1 TABLET ORAL DAILY
Status: CANCELLED | OUTPATIENT
Start: 2017-07-15

## 2017-07-14 RX ORDER — LORAZEPAM 1 MG/1
2 TABLET ORAL EVERY 4 HOURS PRN
Status: DISCONTINUED | OUTPATIENT
Start: 2017-07-14 | End: 2017-07-19 | Stop reason: HOSPADM

## 2017-07-14 RX ORDER — ENOXAPARIN SODIUM 100 MG/ML
30 INJECTION SUBCUTANEOUS EVERY 24 HOURS
Status: CANCELLED | OUTPATIENT
Start: 2017-07-14

## 2017-07-14 RX ORDER — IBUPROFEN 200 MG
1 TABLET ORAL DAILY
Refills: 0 | Status: ON HOLD | COMMUNITY
Start: 2017-07-14 | End: 2017-07-19 | Stop reason: HOSPADM

## 2017-07-14 RX ORDER — MORPHINE SULFATE ORAL SOLUTION 10 MG/5ML
5 SOLUTION ORAL DAILY
Status: DISCONTINUED | OUTPATIENT
Start: 2017-07-18 | End: 2017-07-17

## 2017-07-14 RX ORDER — LURASIDONE HYDROCHLORIDE 20 MG/1
20 TABLET, FILM COATED ORAL NIGHTLY
Status: DISCONTINUED | OUTPATIENT
Start: 2017-07-14 | End: 2017-07-17

## 2017-07-14 RX ORDER — THIAMINE HCL 100 MG
100 TABLET ORAL DAILY
Status: DISCONTINUED | OUTPATIENT
Start: 2017-07-15 | End: 2017-07-19 | Stop reason: HOSPADM

## 2017-07-14 RX ORDER — LURASIDONE HYDROCHLORIDE 20 MG/1
20 TABLET, FILM COATED ORAL NIGHTLY
Status: CANCELLED | OUTPATIENT
Start: 2017-07-14

## 2017-07-14 RX ORDER — LORAZEPAM 0.5 MG/1
0.5 TABLET ORAL EVERY 12 HOURS
Status: CANCELLED | OUTPATIENT
Start: 2017-07-14

## 2017-07-14 RX ORDER — MORPHINE SULFATE ORAL SOLUTION 10 MG/5ML
5 SOLUTION ORAL EVERY 6 HOURS
Status: COMPLETED | OUTPATIENT
Start: 2017-07-15 | End: 2017-07-15

## 2017-07-14 RX ORDER — MORPHINE SULFATE ORAL SOLUTION 10 MG/5ML
5 SOLUTION ORAL EVERY 6 HOURS PRN
Qty: 10 ML | Refills: 0 | Status: ON HOLD | OUTPATIENT
Start: 2017-07-14 | End: 2017-07-19 | Stop reason: HOSPADM

## 2017-07-14 RX ORDER — GABAPENTIN 300 MG/1
300 CAPSULE ORAL 3 TIMES DAILY
Status: CANCELLED | OUTPATIENT
Start: 2017-07-14

## 2017-07-14 RX ORDER — FOLIC ACID 1 MG/1
1 TABLET ORAL DAILY
Status: DISCONTINUED | OUTPATIENT
Start: 2017-07-15 | End: 2017-07-19 | Stop reason: HOSPADM

## 2017-07-14 RX ORDER — HYDRALAZINE HYDROCHLORIDE 25 MG/1
25 TABLET, FILM COATED ORAL EVERY 8 HOURS PRN
Status: DISCONTINUED | OUTPATIENT
Start: 2017-07-14 | End: 2017-07-19 | Stop reason: HOSPADM

## 2017-07-14 RX ADMIN — Medication 3 ML: at 03:07

## 2017-07-14 RX ADMIN — LORAZEPAM 0.5 MG: 0.5 TABLET ORAL at 09:07

## 2017-07-14 RX ADMIN — GABAPENTIN 300 MG: 300 CAPSULE ORAL at 03:07

## 2017-07-14 RX ADMIN — FLUOXETINE 40 MG: 20 CAPSULE ORAL at 09:07

## 2017-07-14 RX ADMIN — GABAPENTIN 300 MG: 300 CAPSULE ORAL at 10:07

## 2017-07-14 RX ADMIN — LURASIDONE HYDROCHLORIDE 20 MG: 20 TABLET, FILM COATED ORAL at 08:07

## 2017-07-14 RX ADMIN — NICOTINE 1 PATCH: 21 PATCH, EXTENDED RELEASE TRANSDERMAL at 09:07

## 2017-07-14 RX ADMIN — MORPHINE SULFATE 5 MG: 10 SOLUTION ORAL at 05:07

## 2017-07-14 RX ADMIN — GABAPENTIN 300 MG: 300 CAPSULE ORAL at 05:07

## 2017-07-14 RX ADMIN — MORPHINE SULFATE 5 MG: 10 SOLUTION ORAL at 11:07

## 2017-07-14 RX ADMIN — Medication 3 ML: at 05:07

## 2017-07-14 RX ADMIN — LORAZEPAM 0.5 MG: 0.5 TABLET ORAL at 08:07

## 2017-07-14 NOTE — PT/OT/SLP EVAL
"Physical Therapy  Evaluation    Kaylene Emery   MRN: [id number]   Admitting Diagnosis: Acute renal failure    PT Received On: 07/14/17  PT Start Time: 0841     PT Stop Time: 0858    PT Total Time (min): 17 min       Billable Minutes:  Evaluation 17    Diagnosis: Acute renal failure  impaired mobility    Past Medical History:   Diagnosis Date    Anxiety     Depression     GERD (gastroesophageal reflux disease)     Tobacco use       Past Surgical History:   Procedure Laterality Date    BLADDER REPAIR      CENTRAL LINE  6/28/2017         CHOLECYSTECTOMY  04/07/2017    KNEE ARTHROPLASTY      PARTIAL HYSTERECTOMY      TUBAL LIGATION         Referring physician: Latoya Abarca MD    Date referred to PT: 7/13/2017    General Precautions: Standard, fall  Orthopedic Precautions: N/A   Braces: N/A       Do you have any cultural, spiritual, Taoism conflicts, given your current situation?: none given    Patient History:  Lives With: significant other  Living Arrangements: house  Home Layout: Able to live on 1st floor  Transportation Available: family or friend will provide  Living Environment Comment: Pt lives in single story home with no steps to enter with her fiance per pt. PLOF independnet with all self-cares, ADL's and functional mobility. standaard toilet, tub/shower combo  Equipment Currently Used at Home: none  DME owned (not currently used): none    Previous Level of Function:  Ambulation Skills: independent  Transfer Skills: independent  ADL Skills: independent  Work/Leisure Activity: independent    Subjective:  Communicated with nursingDALIA prior to session. Contacted Dalia at end of session requesting RW for room to encourage safe ambulation in room to bedside chair and to bathroom with assist.  Pt reports PLOF independent with all activity prior to hospitalization. "I am using ice right now, the back of my knee is swollen, the whole leg was but not it is just with back of my knee"  Chief " Complaint: pain in popliteal fossa  Patient goals: return home, PLOF with walking    Pain/Comfort  Pain Rating 1: 5/10  Location - Side 1: Right  Location - Orientation 1: posterior  Location 1: knee  Pain Addressed 1: Reposition, Distraction (nurse aware. Pt utilizing ice pack to reduce pain)  Pain Rating Post-Intervention 1: 5/10      Objective:   Patient found with: peripheral IV     Cognitive Exam:  Oriented to: Person, Place, Time and Situation    Follows Commands/attention: Follows two-step commands  Communication: clear/fluent  Safety awareness/insight to disability: impaired    Physical Exam:  Postural examination/scapula alignment: Rounded shoulder and Head forward    Skin integrity: dry scabs to wai ankle on lateral surface  Edema: Mild WAI LE    Sensation:   Intact    Upper Extremity Range of Motion:  Right Upper Extremity: WFL  Left Upper Extremity: WFL    Upper Extremity Strength:  Right Upper Extremity: WFL  Left Upper Extremity: WFL    Lower Extremity Range of Motion:  Right Lower Extremity: WFL except pain with knee flexion  Left Lower Extremity: WFL    Lower Extremity Strength:  Right Lower Extremity: Deficits: secondary to pian, Hip flexion against gravity, knee flxion and extension against gravity, dorsiflexion against gravity limited to ~25% of available ROM. with muscle activation felt at muscle belly of Tibialis anterior  Left Lower Extremity: grossly 4/5     Fine motor coordination:  Intact    Gross motor coordination: WFL    Functional Mobility:  Bed Mobility:  Rolling/Turning to Left: Supervision  Scooting/Bridging: Supervision  Supine to Sit: Supervision  Sit to Supine:  (sitting EOB at end of session)    Transfers:  Sit <> Stand Assistance: Contact Guard Assistance, Minimum Assistance  Sit <> Stand Assistive Device: No Assistive Device (HHA)    Gait:   Gait Distance: 10' HHA at Min A. cues for right foot clearance. 100' with RW CGA-SBA with cues to increase flexion at hip and knee to  increase foot clearance due to decreased active dorsiflexion.  Assistance 1: Minimum assistance, Contact Guard Assistance, Stand by Assistance  Gait Assistive Device: No device, Hand held assist, Rolling walker  Gait Pattern: swing-to gait  Gait Deviation(s): increased time in double stance, decreased amy, decreased velocity of limb motion, decreased step length, decreased toe-to-floor clearance, decreased weight-shifting ability, decreased stride length, decreased swing-to-stance ratio    Stairs: not tested due to fatigue      Balance:   Static Sit: FAIR+: Able to take MINIMAL challenges from all directions  Dynamic Sit: FAIR+: Maintains balance through MINIMAL excursions of active trunk motion  Static Stand: FAIR: Maintains without assist but unable to take challenges  Dynamic stand: FAIR: Needs CONTACT GUARD during gait    Therapeutic Activities and Exercises:  Evaluation with bed mobility, transfers and gait.   Pain in right knee, limited activation of tibialis anterior.   Pt needing to adopt steppage gait on right for right foot clearance and utilize RW for safety with gait.     AM-PAC 6 CLICK MOBILITY  How much help from another person does this patient currently need?   1 = Unable, Total/Dependent Assistance  2 = A lot, Maximum/Moderate Assistance  3 = A little, Minimum/Contact Guard/Supervision  4 = None, Modified [place]/Independent    Turning over in bed (including adjusting bedclothes, sheets and blankets)?: 4  Sitting down on and standing up from a chair with arms (e.g., wheelchair, bedside commode, etc.): 3  Moving from lying on back to sitting on the side of the bed?: 4  Moving to and from a bed to a chair (including a wheelchair)?: 3  Need to walk in hospital room?: 3  Climbing 3-5 steps with a railing?: 1  Total Score: 18     AM-PAC Raw Score CMS G-Code Modifier Level of Impairment Assistance   6 % Total / Unable   7 - 9 CM 80 - 100% Maximal Assist   10 - 14 CL 60 - 80% Moderate  Assist   15 - 19 CK 40 - 60% Moderate Assist   20 - 22 CJ 20 - 40% Minimal Assist   23 CI 1-20% SBA / CGA   24 CH 0% Independent/ Mod I     Patient left sitting EOB with all lines intact, call button in reach, nursing notified and MD Abarca and niels present.    Assessment:   Kaylene Emery is a 46 y.o. female with a medical diagnosis of Acute renal failure and presents with impaired mobility, weakness and pain in RLE causing decreased foot clearance, decreased balance, decreased weight shift to right leg in stance, decreased safety with gait and transfers. Pt improved safety with use of RW but will benefit from Rehab placement after medically stable to address mobility impairments to return to OF of independent with all functional mobility. Therapy will follow while on unit to address mobility impairments    Rehab identified problem list/impairments: Rehab identified problem list/impairments: weakness, gait instability, impaired endurance, impaired balance, decreased lower extremity function, decreased coordination, impaired functional mobilty, impaired self care skills, pain    Rehab potential is good.    Activity tolerance: Good    Discharge recommendations: Discharge Facility/Level Of Care Needs: rehabilitation facility     Barriers to discharge: Barriers to Discharge: None    Equipment recommendations: Equipment Needed After Discharge: walker, rolling, bedside commode     GOALS:    Physical Therapy Goals        Problem: Physical Therapy Goal    Goal Priority Disciplines Outcome Goal Variances Interventions   Physical Therapy Goal     PT/OT, PT      Description:  Goals to be met by: 2017     Patient will increase functional independence with mobility by performin. Sit to supine with [place]  2. Sit to stand transfer with [place]  3. Bed to chair transfer with Modified [place] using Rolling Walker or no AD  4. Gait  x 200 feet with Modified [place] using Rolling Walker or  LRAD.   5. Lower extremity exercise program x20 reps per handout, with independence for strength and endurance to increase safety and independence with above mobility goals                      PLAN:    Patient to be seen 5 x/week to address the above listed problems via gait training, therapeutic activities, therapeutic exercises, neuromuscular re-education  Plan of Care expires: 08/14/17  Plan of Care reviewed with: patient          Gina ZHENG Kobe, PT  07/14/2017

## 2017-07-14 NOTE — ASSESSMENT & PLAN NOTE
7/14: Cr 4.4 -> 3.8 -> 3.4 -> 3.2  Good urine output - voided 3 times  Suspect ATN due to Hypotension, or NSAID induced renal failure  F/u nephrology as outpatient after psychiatry stay

## 2017-07-14 NOTE — NURSING
Patient arrived to the unit per wheel chair from the 11th floor.  Accompanied by a nurse and security.  Patients on a FVA.  She reports that she is here for 72 hours.  Attempted to explain that FVA does not guarantee that she will leave in 72 hours.  Patient is irritable.  When ask why she wanted to be in the hospital, she reports that she had to sign a FVA because she was CEC'd.  Does not understand legal status despite several attempts to explain.  She was asking for pain medication and valium  Upon admit.  Answered admit questions but was rhys and irritable during the interview.  Patient very vague about overdose saying that it was not a suicide attempt that she was trying to get high.  Report abusing her seroquel prior to admit.  Very vague about substance abuse issues.  Denies using street drugs.  Patient reports that she needs pain medication and benzo's and she does not want to detox.  She has swelling in her legs bilateral.  3+ in the right leg and 2+ in the left.   Walking with a walk.  Gait appears steady.  Denies drinking.  Patient is a smoker.  Denies medical issues prior to overdose and fall.   Patient reports not sleeping well only 5 hours a night.  Denies any previous suicide attempts.  Patient lives with her fiancee. States she does not work.  Previous psych diagnosis of MDD and anxiety with no previous [facility] hospitalizations.

## 2017-07-14 NOTE — PLAN OF CARE
Problem: Physical Therapy Goal  Goal: Physical Therapy Goal  Goals to be met by: 2017     Patient will increase functional independence with mobility by performin. Sit to supine with [place]  2. Sit to stand transfer with [place]  3. Bed to chair transfer with Modified [place] using Rolling Walker or no AD  4. Gait  x 200 feet with Modified [place] using Rolling Walker or LRAD.   5. Lower extremity exercise program x20 reps per handout, with independence for strength and endurance to increase safety and independence with above mobility goals    Goals established

## 2017-07-14 NOTE — PROGRESS NOTES
Pt transferred with all personal belongings via wheelchair to the ABU unit with the charge nurse and security.

## 2017-07-14 NOTE — ASSESSMENT & PLAN NOTE
opoioids and benzos  Pt misuses other prescription meds as well:  Need to reduce everything as much as possible  iatrogenic

## 2017-07-14 NOTE — ASSESSMENT & PLAN NOTE
ARF requiring CRRT in [facility]  Improving  Likely to have new high baseline  Not acidotic, no electrolyte abnormalities

## 2017-07-15 PROCEDURE — 25000003 PHARM REV CODE 250: Performed by: PSYCHIATRY & NEUROLOGY

## 2017-07-15 PROCEDURE — 12400001 HC PSYCH SEMI-PRIVATE ROOM

## 2017-07-15 PROCEDURE — 25000003 PHARM REV CODE 250: Performed by: NURSE PRACTITIONER

## 2017-07-15 RX ORDER — LORAZEPAM 0.5 MG/1
0.5 TABLET ORAL NIGHTLY
Status: DISCONTINUED | OUTPATIENT
Start: 2017-07-15 | End: 2017-07-18

## 2017-07-15 RX ORDER — GABAPENTIN 300 MG/1
600 CAPSULE ORAL 3 TIMES DAILY
Status: DISCONTINUED | OUTPATIENT
Start: 2017-07-15 | End: 2017-07-19 | Stop reason: HOSPADM

## 2017-07-15 RX ADMIN — THERA TABS 1 TABLET: TAB at 09:07

## 2017-07-15 RX ADMIN — MORPHINE SULFATE 5 MG: 10 SOLUTION ORAL at 11:07

## 2017-07-15 RX ADMIN — NICOTINE 1 PATCH: 21 PATCH, EXTENDED RELEASE TRANSDERMAL at 09:07

## 2017-07-15 RX ADMIN — MORPHINE SULFATE 5 MG: 10 SOLUTION ORAL at 06:07

## 2017-07-15 RX ADMIN — GABAPENTIN 300 MG: 300 CAPSULE ORAL at 06:07

## 2017-07-15 RX ADMIN — MORPHINE SULFATE 5 MG: 10 SOLUTION ORAL at 05:07

## 2017-07-15 RX ADMIN — LURASIDONE HYDROCHLORIDE 20 MG: 20 TABLET, FILM COATED ORAL at 08:07

## 2017-07-15 RX ADMIN — GABAPENTIN 600 MG: 300 CAPSULE ORAL at 09:07

## 2017-07-15 RX ADMIN — Medication 100 MG: at 09:07

## 2017-07-15 RX ADMIN — FLUOXETINE 40 MG: 20 CAPSULE ORAL at 09:07

## 2017-07-15 RX ADMIN — LORAZEPAM 0.5 MG: 0.5 TABLET ORAL at 09:07

## 2017-07-15 RX ADMIN — FOLIC ACID 1 MG: 1 TABLET ORAL at 09:07

## 2017-07-15 RX ADMIN — LORAZEPAM 0.5 MG: 0.5 TABLET ORAL at 08:07

## 2017-07-15 NOTE — SUBJECTIVE & OBJECTIVE
Patient History           Medical as of 7/14/2017     Past Medical History Date Comments Source    Anxiety -- -- Provider    Depression -- -- Provider    GERD (gastroesophageal reflux disease) -- -- Provider    Tobacco use -- -- Provider    Pertinent Negatives Date Noted Comments Source    Diabetes mellitus 8/30/2013 -- Provider    Diabetes mellitus 3/20/2014 -- Provider    History of psychiatric hospitalization 7/14/2017 -- Provider    Hypertension 8/30/2013 -- Provider            Surgical as of 7/14/2017     Past Surgical History Laterality Date Comments Source    PARTIAL HYSTERECTOMY -- -- -- Provider    BLADDER REPAIR -- -- -- Provider    KNEE ARTHROPLASTY -- -- -- Provider    TUBAL LIGATION -- -- -- Provider    CHOLECYSTECTOMY -- 04/07/2017 -- Provider    CENTRAL LINE -- 6/28/2017   Provider            Family as of 7/14/2017     Problem Relation Name Age of Onset Comments Source    Hypertension Mother -- -- -- Provider    Diabetes Mother -- -- -- Provider            Tobacco Use as of 7/14/2017     Smoking Status Smoking Start Date Smoking Quit Date Packs/day Years Used    Current Every Day Smoker -- -- 1.00 --    Types Comments Smokeless Tobacco Status Smokeless Tobacco Quit Date Source     Cigarettes -- Never Used -- Provider            Alcohol Use as of 7/14/2017     Alcohol Use Drinks/Week Alcohol/Week Comments Source    Yes -- -- occasionally Provider            Drug Use as of 7/14/2017     Drug Use Types Frequency Comments Source    No -- -- -- Provider            Sexual Activity as of 7/14/2017     Sexually Active Birth Control Partners Comments Source    Yes -- -- -- Provider            Activities of Daily Living as of 7/14/2017     Activities of Daily Living Question Response Comments Source    Patient feels they ought to cut down on drinking/drug use No -- Provider    Patient annoyed by others criticizing their drinking/drug use No -- Provider    Patient has felt bad or guilty about drinking/drug  "use No -- Provider    Patient has had a drink/used drugs as an eye opener in the AM No -- Provider            Social Documentation as of 7/14/2017    **None**           Occupational as of 7/14/2017    **None**           Socioeconomic as of 7/14/2017     Marital Status Spouse Name Number of Children Years Education Preferred Language Ethnicity Race Source     -- -- -- English /White White --         Additional Pertinent History Q A Comments    as of 7/14/2017 Place in Birth Order [address]     Number of Siblings 4     Raised by biological parents     Childhood Trauma uneventful     Financial Status unemployed     Highest Level of Education unfinished highschool     Lives with significant other     Lives in home     Criminal History of none     Legal Involvement none     Does patient have access to a firearm? No      Service No     Primary Leisure Activity time with family     Spirituality non-practicing     Caffeine Use clinically insignificant        Review of patient's allergies indicates:   Allergen Reactions    Corticosteroids (glucocorticoids)      "sets off my anxiety real bad"    Tramadol Rash       Current Facility-Administered Medications on File Prior to Encounter   Medication    [DISCONTINUED] acetaminophen tablet 650 mg    [DISCONTINUED] enoxaparin injection 30 mg    [DISCONTINUED] fluoxetine capsule 40 mg    [DISCONTINUED] gabapentin capsule 300 mg    [DISCONTINUED] hydrALAZINE tablet 25 mg    [DISCONTINUED] lorazepam tablet 0.5 mg    [DISCONTINUED] lorazepam tablet 2 mg    [DISCONTINUED] lurasidone tablet 20 mg    [DISCONTINUED] morphine 10 mg/5 mL solution 5 mg    [DISCONTINUED] nicotine 21 mg/24 hr 1 patch    [DISCONTINUED] sodium chloride 0.9% flush 3 mL     Current Outpatient Prescriptions on File Prior to Encounter   Medication Sig    fluoxetine (PROZAC) 40 MG capsule Take 40 mg by mouth once daily.    gabapentin (NEURONTIN) 300 MG capsule Take 1 capsule (300 mg " total) by mouth 3 (three) times daily.    lorazepam (ATIVAN) 0.5 MG tablet Take 1 tablet (0.5 mg total) by mouth every 12 (twelve) hours.    lurasidone 20 mg Tab tablet Take 1 tablet (20 mg total) by mouth every evening.    morphine 10 mg/5 mL solution Take 2.5 mLs (5 mg total) by mouth every 6 (six) hours as needed for Pain.    nicotine (NICODERM CQ) 21 mg/24 hr Place 1 patch onto the skin once daily.    [DISCONTINUED] diphenhydrAMINE (BENADRYL) 50 MG capsule Take 1 capsule (50 mg total) by mouth nightly as needed for Itching.    [DISCONTINUED] gabapentin (NEURONTIN) 400 MG capsule Take 400 mg by mouth 3 (three) times daily.    [DISCONTINUED] lorazepam (ATIVAN) 1 MG tablet Take 1 mg by mouth every 6 (six) hours as needed for Anxiety.    [DISCONTINUED] ondansetron (ZOFRAN-ODT) 4 MG TbDL Take 1 tablet (4 mg total) by mouth every 6 (six) hours as needed.    [DISCONTINUED] oxycodone-acetaminophen (PERCOCET) 7.5-325 mg per tablet Take 1 tablet by mouth every 4 (four) hours as needed for Pain.    [DISCONTINUED] promethazine (PHENERGAN) 25 MG tablet Take 1 tablet (25 mg total) by mouth every 6 (six) hours as needed for Nausea.    [DISCONTINUED] quetiapine (SEROQUEL) 200 MG Tab Take by mouth.     Psychotherapeutics     Start     Stop Route Frequency Ordered    07/15/17 0900  fluoxetine capsule 40 mg     Question:  Is the patient competent?  Answer:  Yes    -- Oral Daily 07/14/17 1800 07/14/17 2100  lorazepam tablet 0.5 mg     Question:  Is the patient competent?  Answer:  Yes    -- Oral 2 times daily 07/14/17 1800 07/14/17 2100  lurasidone tablet 20 mg      -- Oral Nightly 07/14/17 1800 07/14/17 1800  lorazepam tablet 2 mg      -- Oral Every 4 hours PRN 07/14/17 1800        Review of Systems   Constitutional: Negative for chills, diaphoresis and fever.   HENT: Negative for hearing loss.    Eyes: Negative for visual disturbance.   Respiratory: Negative for cough and shortness of breath.   "  Cardiovascular: Negative for chest pain.   Gastrointestinal: Negative for abdominal pain, constipation, diarrhea, nausea and vomiting.   Genitourinary: Negative for dysuria, frequency and urgency.   Musculoskeletal: Positive for myalgias.        Pain that feels like bruising of her RLE   Neurological: Negative for tremors.   Psychiatric/Behavioral: Negative for suicidal ideas.     Objective:     Vital Signs (Most Recent):    Vital Signs (24h Range):  Temp:  [98.1 °F (36.7 °C)-98.9 °F (37.2 °C)] 98.6 °F (37 °C)  Pulse:  [70-83] 76  Resp:  [16-18] 18  SpO2:  [95 %-99 %] 98 %  BP: (140-173)/(64-84) 172/79     Height: 5' 4" (162.6 cm)  Weight: 69.3 kg (152 lb 12.5 oz)  Body mass index is 26.22 kg/m².    No intake or output data in the 24 hours ending 07/14/17 1942    Physical Exam   Constitutional: She is oriented to person, place, and time. She appears well-developed and well-nourished.   HENT:   Head: Normocephalic and atraumatic.   Eyes: EOM are normal. Pupils are equal, round, and reactive to light.   Neck: Normal range of motion. Neck supple. No thyromegaly present.   Cardiovascular: Normal rate, regular rhythm, normal heart sounds and intact distal pulses.    Pulmonary/Chest: Effort normal and breath sounds normal. No respiratory distress. She has no wheezes.   Abdominal: Soft. Bowel sounds are normal.   Musculoskeletal:   Decreased ROM of RLE due to pain; otherwise unremarkable   Lymphadenopathy:     She has no cervical adenopathy.   Neurological: She is alert and oriented to person, place, and time.   Skin: Skin is warm and dry.   Psychiatric:   Mental Status Exam:  Appearance: unremarkable, age appropriate, normal weight, lying in bed  Behavior/Cooperation: limited/ appropriate normal, cooperative  Speech: appropriate rate, volume and tone normal tone, normal rate, normal pitch, normal volume  Language: uses words appropriately; NO aphasia or dysarthria  Mood: "better"  Affect:  congruent with mood and " appropriate to situation/content   Thought Process: normal and logical  Thought Content: normal, no suicidality, no homicidality, delusions, or paranoia  Level of Consciousness: Alert and Oriented x3  Memory:  Intact to conversation  Attention/concentration: appropriate for age/education.   Fund of Knowledge: appears adequate  Insight:  Improving  Judgment: Improving       NEUROLOGICAL EXAMINATION:     MENTAL STATUS   Oriented to person, place, and time.     CRANIAL NERVES     CN III, IV, VI   Pupils are equal, round, and reactive to light.  Extraocular motions are normal.       Significant Labs:   Last 24 Hours:   Recent Lab Results       07/14/17  0420      Albumin 2.8(L)     Alkaline Phosphatase 74     ALT 27     Anion Gap 12     AST 17     Baso # 0.09     Basophil% 1.0     Total Bilirubin 0.5  Comment:  For infants and newborns, interpretation of results should be based  on gestational age, weight and in agreement with clinical  observations.  Premature Infant recommended reference ranges:  Up to 24 hours.............<8.0 mg/dL  Up to 48 hours............<12.0 mg/dL  3-5 days..................<15.0 mg/dL  6-29 days.................<15.0 mg/dL       BUN, Bld 46(H)     Calcium 9.3     Chloride 102     CO2 25     Creatinine 3.2(H)     Differential Method Automated     eGFR if  19.4(A)     eGFR if non  16.8  Comment:  Calculation used to obtain the estimated glomerular filtration  rate (eGFR) is the CKD-EPI equation. Since race is unknown   in our information system, the eGFR values for   -American and Non--American patients are given   for each creatinine result.  (A)     Eos # 0.4     Eosinophil% 4.7     Glucose 89     Gran # 5.1     Gran% 56.0     Hematocrit 23.3(L)     Hemoglobin 7.9(L)     Lymph # 2.5     Lymph% 27.7     Magnesium 1.9     MCH 31.1(H)     MCHC 33.9     MCV 92     Mono # 0.9     Mono% 10.0     MPV 9.8     Phosphorus 5.7(H)     Platelets 318      Potassium 4.3     Total Protein 6.0     RBC 2.54(L)     RDW 15.3(H)     Sodium 139     WBC 9.03           Significant Imaging: I have reviewed all pertinent imaging results/findings within the past 24 hours.

## 2017-07-15 NOTE — PROGRESS NOTES
Acute Psychiatric Unit  Psychosocial History and Assessment  Progress Note      Patient Name: Kaylene Emery YOB: 1970 [place] [facility] [place] 7/15/2017    Chief Complaint:  Major Depressive Disorder    Consent:     Did the patient consent for an interview with the ?  Yes    Did the patient consent for the  to contact family/friend/caregiver?  No    Did the patient give consent for the  to inform family/friend/caregiver of his/her whereabouts or to discuss discharge planning? Yes    Source of Information: Chart review and Treatment team meeting/rounds    Is information obtained from interviews considered reliable? Yes      Reason for Admission:     Active Hospital Problems    Diagnosis  POA    Major depressive disorder, recurrent episode [F33.9]  Yes    Pain [R52]  Unknown      Resolved Hospital Problems    Diagnosis Date Resolved POA   No resolved problems to display.       History of Present Illness - Depression      History of Present Illness - Substance abuse prescription pills    Present biopsychosocial functioning: Pt lives in [facility] with boyfriend for nine years. She is unemployed . Did not complete high school went to tenth grade. Her mother is sick and in hospice.  Pt is a daily use of opiates and Benzos. Pt have no history of mental issues.  She uses prescription drugs daily. She gets off the street. Never been in rehab.  Pt has a charge pending DUI, AND POSSESSION OF PILLS.  Pt not receptive to go to rehab.       Past biopsychosocial functioning:  Lived in [facility] pt has three son live in [facility].      Family and Marital/Relationship History:     Significant Other/Partner Relationships:  Single with with boy friend      Family Relationships: Intact    Culture and Judaism:     Judaism: Temple    How strong of a role does Adventism and spirituality play in patient's life? Non practicing     Denominational or  spiritual concerns regarding treatment: not applicable     History of Abuse:   History of Abuse: Denies      Outcome:  None    Psychiatric and Medical History:     History of psychiatric illness or treatment: none    Medical history:   Past Medical History:   Diagnosis Date    Anxiety     Depression     GERD (gastroesophageal reflux disease)     Tobacco use        Substance Abuse History:     Alcohol - (Patient Perspective): Drink occassional  History   Alcohol Use    Yes     Comment: occasionally       Alcohol - (Collateral Perspective): No alcohol    Drugs - (Patient Perspective): prescription pills  History   Drug Use No       Drugs - (Collateral Perspective): Abuse prescription pills  Additional Comments: None    Education:     Currently Enrolled? No    Special Education? No    Interested in Completing Education/GED: No    Employment and Financial:     Currently employed? unemployed    Source of Income: unemployed    Able to afford basic needs (food, shelter, utilities)? Self/ boyfriend    Who manages finances/personal affairs? Self/boyfriend      Service:     ? No    Combat Service? No     Community Resources:     Describe present use of community resources: None     Identify previously used community resources   (Include previous mental health treatment - outpatient and inpatient): [place] [facility] [place] [facility] for overdose on opiate.     Environmental:     Current living situation: Live with boyfriend [place] in [facility].     Social Evaluation:     Patient Assets: Work skills and Communicable skills    Patient Limitations: unemployed    High risk psychosocial issues that may impact discharge planning:   inability to afford medications or follow up outpatient treatment    Recommendations:     Anticipated discharge plan:  Return to home with boyfriend.      High risk issues requiring early treatment planning and immediate intervention:  Aftercare follow up    Community resources needed for discharge planning: Outpat mental health clinic/substance abuse residential treatment center.      Anticipated social work role(s) in treatment and discharge planning: Contact family/friends for family meeting. Discuss substance abuse program for addiction to drugs. Set up discharge plans and follow up with outpt providers

## 2017-07-15 NOTE — ASSESSMENT & PLAN NOTE
Pain in RLE likely due to rhabdomyolysis.  -started a 4 day oral morphine taper  -gabapentin 300mg PO TID
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
## 2017-07-15 NOTE — PLAN OF CARE
"Pt often asking for medication for anxiety. Pt was offered vistaril PO as ordered but pt refused, stating, "That doesn't work." Pt specifically asked for Ativan PRN but was informed that her vital signs did not meet parameters for PRN administration. Pt remains on falls precautions and ambulated well with walker. Pt complained of pain to bilateral lower legs and was given scheduled morphine PO. Depressed mood displayed. Denies SI/HI. Denies AH/VH. Attended select groups. Medication compliant. NAD observed. Will cont to monitor.  "

## 2017-07-16 LAB
CHOLEST/HDLC SERPL: 5.3 {RATIO}
HDL/CHOLESTEROL RATIO: 18.8 %
HDLC SERPL-MCNC: 207 MG/DL
HDLC SERPL-MCNC: 39 MG/DL
LDLC SERPL CALC-MCNC: 142.2 MG/DL
NONHDLC SERPL-MCNC: 168 MG/DL
TRIGL SERPL-MCNC: 129 MG/DL

## 2017-07-16 PROCEDURE — 25000003 PHARM REV CODE 250: Performed by: STUDENT IN AN ORGANIZED HEALTH CARE EDUCATION/TRAINING PROGRAM

## 2017-07-16 PROCEDURE — 25000003 PHARM REV CODE 250: Performed by: PSYCHIATRY & NEUROLOGY

## 2017-07-16 PROCEDURE — 36415 COLL VENOUS BLD VENIPUNCTURE: CPT

## 2017-07-16 PROCEDURE — 25000003 PHARM REV CODE 250: Performed by: NURSE PRACTITIONER

## 2017-07-16 PROCEDURE — 12400001 HC PSYCH SEMI-PRIVATE ROOM

## 2017-07-16 PROCEDURE — 99232 SBSQ HOSP IP/OBS MODERATE 35: CPT | Mod: AF,HB,S$PBB, | Performed by: PSYCHIATRY & NEUROLOGY

## 2017-07-16 PROCEDURE — 80061 LIPID PANEL: CPT

## 2017-07-16 RX ORDER — HYDROXYZINE HYDROCHLORIDE 50 MG/1
50 TABLET, FILM COATED ORAL ONCE AS NEEDED
Status: ACTIVE | OUTPATIENT
Start: 2017-07-16 | End: 2017-07-16

## 2017-07-16 RX ADMIN — HYDRALAZINE HYDROCHLORIDE 25 MG: 25 TABLET ORAL at 05:07

## 2017-07-16 RX ADMIN — Medication 100 MG: at 09:07

## 2017-07-16 RX ADMIN — MORPHINE SULFATE 5 MG: 10 SOLUTION ORAL at 01:07

## 2017-07-16 RX ADMIN — LURASIDONE HYDROCHLORIDE 20 MG: 20 TABLET, FILM COATED ORAL at 08:07

## 2017-07-16 RX ADMIN — THERA TABS 1 TABLET: TAB at 09:07

## 2017-07-16 RX ADMIN — NICOTINE 1 PATCH: 21 PATCH, EXTENDED RELEASE TRANSDERMAL at 09:07

## 2017-07-16 RX ADMIN — LORAZEPAM 0.5 MG: 0.5 TABLET ORAL at 08:07

## 2017-07-16 RX ADMIN — GABAPENTIN 600 MG: 300 CAPSULE ORAL at 01:07

## 2017-07-16 RX ADMIN — MORPHINE SULFATE 5 MG: 10 SOLUTION ORAL at 09:07

## 2017-07-16 RX ADMIN — MORPHINE SULFATE 5 MG: 10 SOLUTION ORAL at 05:07

## 2017-07-16 RX ADMIN — GABAPENTIN 600 MG: 300 CAPSULE ORAL at 05:07

## 2017-07-16 RX ADMIN — FLUOXETINE 40 MG: 20 CAPSULE ORAL at 09:07

## 2017-07-16 RX ADMIN — FOLIC ACID 1 MG: 1 TABLET ORAL at 09:07

## 2017-07-16 RX ADMIN — GABAPENTIN 600 MG: 300 CAPSULE ORAL at 09:07

## 2017-07-16 RX ADMIN — DOCUSATE SODIUM 50 MG: 50 CAPSULE, LIQUID FILLED ORAL at 01:07

## 2017-07-16 NOTE — PLAN OF CARE
Pt less somatic than observed in previous shifts. Pt did express some disappointment this morning when she was informed that there was no scheduled Ativan ordered. This RN further educated pt about purpose of tapering medications and progressing towards discharge, which pt accepted. Denies SI/HI. Denies AH/VH. Pt complained of having constipation and requested a stool softener; given colace PO PRN-see MAR. Pt remains on falls precautions and ambulated well with walker. Complained of pain to bilateral lower legs and was given scheduled morphine and gabapentin PO- see MAR. Attended select unit activities and was medication compliant. NAD observed. Will cont to monitor.

## 2017-07-16 NOTE — ASSESSMENT & PLAN NOTE
Collateral provided further concern for chronic overuse of prescription medications. Both son and fiance thought inpatient psychiatry admit would benefit patient.     1. Dispo/Legal Status:  Patient signed in [facility]VA.    2. Scheduled Medications:  -continue Prozac 40 mg daily and Latuda 20 mg  -Ativan decreased to .5 mg PO nightly for anxiety; discussed plan with patient to taper off  -in the setting of recent possible NMS and the history of chronic overuse of seroquel, will NOT re-start seroquel at this time    3. PRN Medications:   -due to withdrawal symptoms, including increased BP, would recommend Ativan .5 mg PO BID PRN anxiety and recommend Ativan 2mg PO/IM PRN withdrawal precautions, 2+ of the following                        -Sys BP> 160                        -Ericka BP> 106                        -Pulse> 110                        -visible gross tremor                        -diaphoresis    4. Precautions/Nursing:  Suicide precautions    5. Case Discussed with: Dr. Loyd

## 2017-07-16 NOTE — SUBJECTIVE & OBJECTIVE
"Interval History: Patient reports she is doing well this morning. She does complain of continued foot/leg pain. Using walker to ambulate. Patient reports her mood is "good" overall, with no further complaints. Feels physically fairly well other than the foot.     Psychotherapeutics     Start     Stop Route Frequency Ordered    07/15/17 1415  lorazepam tablet 0.5 mg     Question:  Is the patient competent?  Answer:  Yes    -- Oral Nightly 07/15/17 1416    07/15/17 0900  fluoxetine capsule 40 mg     Question:  Is the patient competent?  Answer:  Yes    -- Oral Daily 07/14/17 1800    07/14/17 2100  lurasidone tablet 20 mg      -- Oral Nightly 07/14/17 1800    07/14/17 1800  lorazepam tablet 2 mg      -- Oral Every 4 hours PRN 07/14/17 1800           Review of Systems  Objective:     Vital Signs (Most Recent):  Temp: 98.8 °F (37.1 °C) (07/15/17 1915)  Pulse: 76 (07/15/17 1915)  Resp: 18 (07/15/17 1915)  BP: (!) 145/59 (07/15/17 1915) Vital Signs (24h Range):  Temp:  [98.8 °F (37.1 °C)] 98.8 °F (37.1 °C)  Pulse:  [76] 76  Resp:  [18-19] 18  BP: (145-163)/(59-92) 145/59     Height: 5' 4" (162.6 cm)  Weight: 69.3 kg (152 lb 12.5 oz)  Body mass index is 26.22 kg/m².    No intake or output data in the 24 hours ending 07/16/17 0900    Physical Exam   Psychiatric:   Mental Status Exam:  Appearance: unremarkable, age appropriate, normal weight  Behavior/Cooperation: appropriate normal, cooperative  Speech: appropriate rate, volume and tone normal tone, normal rate, normal pitch, normal volume  Language: uses words appropriately; NO aphasia or dysarthria  Mood: "good"  Affect:  congruent with mood and appropriate to situation/content   Thought Process: normal and logical  Thought Content: normal, no suicidality, no homicidality, delusions, or paranoia  Level of Consciousness: Alert and Oriented x3  Memory:  Intact to conversation  Attention/concentration: appropriate for age/education.   Fund of Knowledge: appears " adequate  Insight:  Impaired  Judgment: Impaired         Significant Labs:   Last 24 Hours:   Recent Lab Results       07/16/17  0545      Cholesterol 207  Comment:  The National Cholesterol Education Program (NCEP) has set the  following guidelines (reference ranges) for Cholesterol:  Optimal.....................<200 mg/dL  Borderline High.............200-239 mg/dL  High........................> or = 240 mg/dL  (H)     HDL 39  Comment:  The National Cholesterol Education Program (NCEP) has set the  following guidelines (reference values) for HDL Cholesterol:  Low...............<40 mg/dL  Optimal...........>60 mg/dL  (L)     HDL/Chol Ratio 18.8(L)     LDL Cholesterol 142.2  Comment:  The National Cholesterol Education Program (NCEP) has set the  following guidelines (reference values) for LDL Cholesterol:  Optimal.......................<130 mg/dL  Borderline High...............130-159 mg/dL  High..........................160-189 mg/dL  Very High.....................>190 mg/dL       Non-HDL Cholesterol 168  Comment:  Risk category and Non-HDL cholesterol goals:  Coronary heart disease (CHD)or equivalent (10-year risk of CHD >20%):  Non-HDL cholesterol goal     <130 mg/dL  Two or more CHD risk factors and 10-year risk of CHD <= 20%:  Non-HDL cholesterol goal     <160 mg/dL  0 to 1 CHD risk factor:  Non-HDL cholesterol goal     <190 mg/dL       Total Cholesterol/HDL Ratio 5.3(H)     Triglycerides 129  Comment:  The National Cholesterol Education Program (NCEP) has set the  following guidelines (reference values) for triglycerides:  Normal......................<150 mg/dL  Borderline High.............150-199 mg/dL  High........................200-499 mg/dL             Significant Imaging: None

## 2017-07-16 NOTE — ASSESSMENT & PLAN NOTE
Pain in RLE likely due to rhabdomyolysis.  -4 day oral morphine taper  -gabapentin increased to 600mg PO TID
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
## 2017-07-16 NOTE — PLAN OF CARE
Called by nursing; patient is very anxious and upset after having gotten off the phone. Reports that she has just been informed that her mother is ill and may be dying. Patient requesting benzodiazepines. Was told by nursing that benzodiazepines would not be offered unless vital signs met parameters. Per RN, patient's vital signs, when taken manually, do not meet withdrawal criteria but BP is high (automated /109 HR 95, manual /92, HR 92).     Plan to recheck vitals in ~1hr and please call if still significantly elevated.    Will offer one-time dose of hydroxyzine 50mg for anxiety.    Fredo Luevano MD  Resident, [facility]   Pager [phone]

## 2017-07-16 NOTE — PSYCH
"Pt informed that new order was placed for hydroxyzine and offered medication.Pt shook her head, stating, "No that won't work." Pt then said, " I guess let me try that hydralazine." Given PO hydralazine at 1722. Pt calmer and respirations even and unlabored. Pt now lying in bed comfortably. NAD observed. Will cont to monitor.  "

## 2017-07-16 NOTE — PSYCH
"Pt observed using phone and hung up, requesting to speak with this RN. Pt stammering, stated, "I- I just got- got some bad news. My-my mother is in the process of dying and it may be only 3 weeks." Pt escorted to chair near [facility] station and vitals taken (automated): VV=437/109, HR=95. Retaken manually: OR=700/92, HR=92. Pt offered ordered PRN hydralazine PO; pt refused and isnsisted that this RN call the doctor to see "if there is anything else that can be given- like what about ativan?" Dr. Luevano notified. New orders given. NAD observed. Will cont to monitor.  "

## 2017-07-17 PROBLEM — R52 PAIN: Status: ACTIVE | Noted: 2017-07-17

## 2017-07-17 PROCEDURE — 97110 THERAPEUTIC EXERCISES: CPT

## 2017-07-17 PROCEDURE — 97116 GAIT TRAINING THERAPY: CPT

## 2017-07-17 PROCEDURE — 97161 PT EVAL LOW COMPLEX 20 MIN: CPT

## 2017-07-17 PROCEDURE — 25000003 PHARM REV CODE 250: Performed by: PSYCHIATRY & NEUROLOGY

## 2017-07-17 PROCEDURE — 12400001 HC PSYCH SEMI-PRIVATE ROOM

## 2017-07-17 PROCEDURE — 25000003 PHARM REV CODE 250: Performed by: NURSE PRACTITIONER

## 2017-07-17 RX ORDER — ACETAMINOPHEN 325 MG/1
650 TABLET ORAL EVERY 6 HOURS PRN
Status: DISCONTINUED | OUTPATIENT
Start: 2017-07-17 | End: 2017-07-19 | Stop reason: HOSPADM

## 2017-07-17 RX ORDER — NICOTINE 7MG/24HR
1 PATCH, TRANSDERMAL 24 HOURS TRANSDERMAL DAILY
Status: DISCONTINUED | OUTPATIENT
Start: 2017-07-17 | End: 2017-07-19 | Stop reason: HOSPADM

## 2017-07-17 RX ORDER — DOXEPIN HYDROCHLORIDE 10 MG/1
10 CAPSULE ORAL NIGHTLY
Status: DISCONTINUED | OUTPATIENT
Start: 2017-07-17 | End: 2017-07-19 | Stop reason: HOSPADM

## 2017-07-17 RX ADMIN — MORPHINE SULFATE 5 MG: 10 SOLUTION ORAL at 08:07

## 2017-07-17 RX ADMIN — Medication 100 MG: at 08:07

## 2017-07-17 RX ADMIN — FOLIC ACID 1 MG: 1 TABLET ORAL at 08:07

## 2017-07-17 RX ADMIN — THERA TABS 1 TABLET: TAB at 08:07

## 2017-07-17 RX ADMIN — GABAPENTIN 600 MG: 300 CAPSULE ORAL at 02:07

## 2017-07-17 RX ADMIN — FLUOXETINE 40 MG: 20 CAPSULE ORAL at 08:07

## 2017-07-17 RX ADMIN — NICOTINE 1 PATCH: 21 PATCH, EXTENDED RELEASE TRANSDERMAL at 08:07

## 2017-07-17 RX ADMIN — DOXEPIN HYDROCHLORIDE 10 MG: 10 CAPSULE ORAL at 09:07

## 2017-07-17 RX ADMIN — GABAPENTIN 600 MG: 300 CAPSULE ORAL at 09:07

## 2017-07-17 RX ADMIN — LORAZEPAM 0.5 MG: 0.5 TABLET ORAL at 09:07

## 2017-07-17 RX ADMIN — GABAPENTIN 600 MG: 300 CAPSULE ORAL at 06:07

## 2017-07-17 NOTE — ASSESSMENT & PLAN NOTE
Collateral provided further concern for chronic overuse of prescription medications. Both son and fiance thought inpatient psychiatry admit would benefit patient.     1. Dispo/Legal Status:  Patient signed in FVA.    2. Scheduled Medications:  -continue Prozac 40 mg daily and Latuda 20 mg  -Ativan decreased to .5 mg PO nightly for anxiety; discussed plan with patient to taper off  -in the setting of recent possible NMS and the history of chronic overuse of seroquel, will NOT re-start seroquel at this time  -Started Doxepin 10 mg PO nightly for mood and insomnia 7/17    3. PRN Medications:   -Nicotine patch dose decreased to 7 mg daily for nicotine withdrawal  -due to withdrawal symptoms, including increased BP, would recommend Ativan .5 mg PO BID PRN anxiety and recommend Ativan 2mg PO/IM PRN withdrawal precautions, 2+ of the following                        -Sys BP> 160                        -Ericka BP> 106                        -Pulse> 110                        -visible gross tremor                        -diaphoresis    4. Precautions/Nursing:  Suicide precautions    5. Hirsutism: Check Testosterone level and Androstenedione level    6. Case Discussed with: Dr. Pineda

## 2017-07-17 NOTE — SUBJECTIVE & OBJECTIVE
"Interval History: No acute events overnight. Patient denies SI today, states that her overdose occurred because she was "trying to get to sleep". The patient is anxious to be discharged as soon as possible. She states that her main problems are insomnia and generalized anxiety.    Psychotherapeutics     Start     Stop Route Frequency Ordered    07/15/17 1415  lorazepam tablet 0.5 mg     Question:  Is the patient competent?  Answer:  Yes    -- Oral Nightly 07/15/17 1416    07/15/17 0900  fluoxetine capsule 40 mg     Question:  Is the patient competent?  Answer:  Yes    -- Oral Daily 07/14/17 1800    07/14/17 2100  lurasidone tablet 20 mg      -- Oral Nightly 07/14/17 1800    07/14/17 1800  lorazepam tablet 2 mg      -- Oral Every 4 hours PRN 07/14/17 1800           Review of Systems  Objective:     Vital Signs (Most Recent):  Temp: 98 °F (36.7 °C) (07/16/17 1930)  Pulse: 72 (07/16/17 1930)  Resp: 18 (07/16/17 1930)  BP: (!) 156/81 (07/16/17 1930) Vital Signs (24h Range):  Temp:  [98 °F (36.7 °C)] 98 °F (36.7 °C)  Pulse:  [72-95] 72  Resp:  [17-19] 18  BP: (132-200)/() 156/81     Height: 5' 4" (162.6 cm)  Weight: 69.3 kg (152 lb 12.5 oz)  Body mass index is 26.22 kg/m².    No intake or output data in the 24 hours ending 07/17/17 1057    Physical Exam     Significant Labs:   Last 72 Hours:   Recent Lab Results       07/16/17  0545      Cholesterol 207  Comment:  The National Cholesterol Education Program (NCEP) has set the  following guidelines (reference ranges) for Cholesterol:  Optimal.....................<200 mg/dL  Borderline High.............200-239 mg/dL  High........................> or = 240 mg/dL  (H)     HDL 39  Comment:  The National Cholesterol Education Program (NCEP) has set the  following guidelines (reference values) for HDL Cholesterol:  Low...............<40 mg/dL  Optimal...........>60 mg/dL  (L)     HDL/Chol Ratio 18.8(L)     LDL Cholesterol 142.2  Comment:  The National Cholesterol Education " Program (NCEP) has set the  following guidelines (reference values) for LDL Cholesterol:  Optimal.......................<130 mg/dL  Borderline High...............130-159 mg/dL  High..........................160-189 mg/dL  Very High.....................>190 mg/dL       Non-HDL Cholesterol 168  Comment:  Risk category and Non-HDL cholesterol goals:  Coronary heart disease (CHD)or equivalent (10-year risk of CHD >20%):  Non-HDL cholesterol goal     <130 mg/dL  Two or more CHD risk factors and 10-year risk of CHD <= 20%:  Non-HDL cholesterol goal     <160 mg/dL  0 to 1 CHD risk factor:  Non-HDL cholesterol goal     <190 mg/dL       Total Cholesterol/HDL Ratio 5.3(H)     Triglycerides 129  Comment:  The National Cholesterol Education Program (NCEP) has set the  following guidelines (reference values) for triglycerides:  Normal......................<150 mg/dL  Borderline High.............150-199 mg/dL  High........................200-499 mg/dL             Significant Imaging: None

## 2017-07-17 NOTE — PT/OT/SLP EVAL
"Physical Therapy  Evaluation    Kaylene Emery   MRN: [id number]   Admitting Diagnosis: MDD    PT Received On: 07/17/17  PT Start Time: 1413     PT Stop Time: 1446    PT Total Time (min): 33 min       Billable Minutes:  Evaluation 10, Gait [id number] and Therapeutic Exercise 12    Diagnosis: MDD      Past Medical History:   Diagnosis Date    Anxiety     Depression     GERD (gastroesophageal reflux disease)     Tobacco use       Past Surgical History:   Procedure Laterality Date    BLADDER REPAIR      CENTRAL LINE  6/28/2017         CHOLECYSTECTOMY  04/07/2017    KNEE ARTHROPLASTY      PARTIAL HYSTERECTOMY      TUBAL LIGATION         Referring physician: MD Bassem  Date referred to PT: 7/16/17    General Precautions: Standard, fall (Suicide watch)  Orthopedic Precautions: N/A   Braces: N/A       Do you have any cultural, spiritual, Sabianism conflicts, given your current situation?: no    Patient History:  Lives With:  (Fiance)  Living Arrangements: house  Home Accessibility: stairs to enter home  Home Layout: Able to live on 1st floor  Number of Stairs to Enter Home: 1  Stair Railings at Home: none  Transportation Available: car, family or friend will provide  Equipment Currently Used at Home: none  DME owned (not currently used): none    Previous Level of Function:  Ambulation Skills: independent  Transfer Skills: independent  ADL Skills: independent  Work/Leisure Activity: independent    Subjective:  Communicated with nursing prior to session.  "They told me I tore all my muscles in the leg when I fell."    Chief Complaint: R LE pain  Patient goals: To get out of the psych mejia    Pain/Comfort  Pain Rating 1: 10/10  Location - Side 1: Right  Location 1: leg  Pain Addressed 1: Distraction, Nurse notified      Objective:         Cognitive Exam:  Oriented to: Person, Place, Time and Situation    Follows Commands/attention: Follows multistep  commands  Communication: clear/fluent  Safety " awareness/insight to disability: intact    Physical Exam:  Postural examination/scapula alignment: No postural abnormalities identified    Skin integrity: Visible skin intact  Edema: Moderate R>L    Sensation:   Intact  light/touch B LEs    Upper Extremity Range of Motion:  Right Upper Extremity: WFL  Left Upper Extremity: WFL    Upper Extremity Strength:  Right Upper Extremity: WFL  Left Upper Extremity: WFL    Lower Extremity Range of Motion:  Right Lower Extremity: WFL except Hip flex decreased, impaired DF  Left Lower Extremity: WFL    Lower Extremity Strength:  Right Lower Extremity: 3+/5 hip flex, 1+/5 DF  Left Lower Extremity: WFL     Fine motor coordination:  Intact  RLE heel shin and LLE heel shin    Gross motor coordination: WFL    Functional Mobility:  Bed Mobility:  Scooting/Bridging: Independent  Supine to Sit: Independent  Sit to Supine: Independent    Transfers:  Sit <> Stand Assistance: Independent  Sit <> Stand Assistive Device: Rolling Walker  Bed <> Chair Technique: Stand Pivot  Bed <> Chair Assistance: Supervision  Bed <> Chair Assistive Device: Rolling Walker  Toilet Transfer Technique: Stand Pivot  Toilet Transfer Assistance: Independent  Toilet Transfer Assistive Device: No Assistive Device    Gait:   Gait Distance: Pt amb 640'  Assistance 1: Minimum assistance, Supervision (S to amb with RW.  Nirali amb without AD with lateral sway, inc R kn flex, decreased WBing on R)  Gait Assistive Device: No device, Rolling walker  Gait Pattern: swing-through gait (Swing-to without AD)  Gait Deviation(s): decreased amy, increased time in double stance, decreased step length, decreased stride length, excessive knee flexion, decreased toe-to-floor clearance, decreased weight-shifting ability    Balance:   Static Sit: FAIR+: Able to take MINIMAL challenges from all directions  Dynamic Sit: FAIR+: Maintains balance through MINIMAL excursions of active trunk motion  Static Stand: FAIR: Maintains without  assist but unable to take challenges  Dynamic stand: GOOD-: Needs SUPERVISION only during gait and able to self right with moderate     Therapeutic Activities and Exercises:  Czuka 10x sit<>stand: 24.74 sec, 4/10 RPE following  6MWT: Pt amb 534', S, RW, 6/10 RPE following  Manual therapy: cross fiber with passive pump to R popliteus 30 reps x 3 sets, pt ed how to perf for self  Ankle pumps: 20 reps - with limited ROM of the R  LAQs: 20 reps each LE perf individually, in sit  Hip abd/add: 20 reps each LE perf individually, in sit  Hip flex: 20 reps on L LE, in sit, attempted to perf on R with complaints of pain  Sit-ups: 10 reps    AM-PAC 6 CLICK MOBILITY  How much help from another person does this patient currently need?   1 = Unable, Total/Dependent Assistance  2 = A lot, Maximum/Moderate Assistance  3 = A little, Minimum/Contact Guard/Supervision  4 = None, Modified [place]/Independent    Turning over in bed (including adjusting bedclothes, sheets and blankets)?: 4  Sitting down on and standing up from a chair with arms (e.g., wheelchair, bedside commode, etc.): 4  Moving from lying on back to sitting on the side of the bed?: 4  Moving to and from a bed to a chair (including a wheelchair)?: 4  Need to walk in hospital room?: 3  Climbing 3-5 steps with a railing?: 3  Total Score: 22     AM-PAC Raw Score CMS G-Code Modifier Level of Impairment Assistance   6 % Total / Unable   7 - 9 CM 80 - 100% Maximal Assist   10 - 14 CL 60 - 80% Moderate Assist   15 - 19 CK 40 - 60% Moderate Assist   20 - 22 CJ 20 - 40% Minimal Assist   23 CI 1-20% SBA / CGA   24 CH 0% Independent/ Mod I     Patient left sitting EOB with nursing notified.    Assessment:   Kaylene Emery is a 46 y.o. female with a medical diagnosis of MDD and presents with presents with impaired mobility, weakness and pain in RLE causing decreased foot clearance, decreased balance, decreased weight shift to right leg in stance, decreased  safety with gait and transfers. Pt improved safety with use of RW but will benefit from outpatient PT placement after medically stable to address mobility impairments to return to OF of independent with all functional mobility. Therapy will follow while on unit to address mobility impairments.  Contacted orthotic company to have pt receive a customized AFO to facilitate gait and reduce risk for falls.  Pt ed on purpose of AFO.      Rehab identified problem list/impairments: Rehab identified problem list/impairments: pain, decreased lower extremity function, weakness, gait instability, impaired balance, edema, decreased ROM, impaired endurance    Rehab potential is good.    Activity tolerance: Fair    Discharge recommendations: Discharge Facility/Level Of Care Needs: outpatient PT     Barriers to discharge: Barriers to Discharge: None    Equipment recommendations: Equipment Needed After Discharge: walker, rolling (R AFO)     GOALS:    Physical Therapy Goals        Problem: Physical Therapy Goal    Goal Priority Disciplines Outcome Goal Variances Interventions   Physical Therapy Goal     PT/OT, PT Ongoing (interventions implemented as appropriate)     Description:  Goals to be met by: 17     Patient will increase functional independence with mobility by performin. Pt to perf Czuka 10x sit<>stand: 22.74 sec, to demonstrate improvements in B LE mm strength.   2. Pt to perf 6MWT: 634', with AD PRN, to demonstrate a clinically significant improvement in aerobic capacity.     3. Lower extremity exercise program x 20 reps per handout, with assistance as needed.                      PLAN:    Patient to be seen 2 x/week to address the above listed problems via gait training, therapeutic activities, therapeutic exercises, neuromuscular re-education  Plan of Care expires: 17  Plan of Care reviewed with: patient          Charleen Erin, PT  2017

## 2017-07-17 NOTE — PLAN OF CARE
Problem: Patient Care Overview (Adult)  Goal: Plan of Care Review  Outcome: Ongoing (interventions implemented as appropriate)  Patient is isolative to her room  Walks with a walker.  Mood is depressed and irritable.  Reports that she is not now and was not previously suicidal.  She report she was trying to sleep and get high. Patient signed a 72 hour notice this afternoon after her moraphine was discontinued.  She report leg pain is a 10.  Appearance is unkempt.  Patient reports that her mother is sick and dying and she needs to get out to be with her.   Medication copliant but is frequently med seeking for pain meds and benzo's.  Wellbutrin discontinue and patient started on a valium taper to prevent seizures.      Problem: Overarching Goals (Adult)  Goal: Adheres to Safety Considerations for Self and Others  Outcome: Ongoing (interventions implemented as appropriate)  Patient shows poor insight into safety issues.    Goal: Optimized Coping Skills in Response to Life Stressors  Outcome: Ongoing (interventions implemented as appropriate)  No coping skills being utilized at this time.   Goal: Develops/Participates in Therapeutic [place] to Support Successful Transition  Outcome: Ongoing (interventions implemented as appropriate)  Patient does not partipate  In therapeutic alliance.      Problem: Depression (Adult,Obstetrics,Pediatric)  Goal: Establish/Maintain Self-Care Routine  Patient will demonstrate the desired outcomes by discharge/transition of care.   Outcome: Ongoing (interventions implemented as appropriate)  Self care is appropriate  Goal: Improved/Stable Mood  Patient will demonstrate the desired outcomes by discharge/transition of care.   Outcome: Ongoing (interventions implemented as appropriate)  Mood is labile.  Patient becomes easily irritable.      Problem: Impaired Control (Excessive Substance Use) (Adult)  Goal: Participates in Recovery Program  Outcome: Ongoing (interventions implemented as  appropriate)  Currently not participating in recovery program.      Problem: Safety Awareness Impairment (Excessive Substance Use) (Adult)  Goal: Enhanced Safety Awareness  Outcome: Ongoing (interventions implemented as appropriate)  Patient has poor insight into substance abuse issues.  Minimize overdose

## 2017-07-17 NOTE — PROGRESS NOTES
07/17/17 0900 07/17/17 1100 07/17/17 1300   [facility] Group Therapy   Group Name Community Reintegration Education Therapeutic Recreation   Specific Interventions Current Events Relapse Prevention Crafts   Participation Level Minimal --  --    Participation Quality Cooperative Refused;Sleeping Refused;Withdrawn   Insight/Motivation Good --  --    Affect/Mood Display Irritable;Appropriate --  Blunted;Flat   Cognition Alert;Oriented --  --
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
## 2017-07-17 NOTE — PLAN OF CARE
Collateral from [facility] [phone]    Reports that patient overdosed on Seroquel and Ativan. She took 22/30 Seroquel pills on Tuesday. He reports that she has never tried this in the past. She has been to half-way 3 times in the past and the most recent times were 2/2 DUI. She was under the influence of Seroquel and other prescription medications. He believes that she is safe to come home with him when discharged. He usually locks up her medications however he did not this last episode which led to the overdose. He is amenable to a family meeting upon discharge.    -Leonel Rowell DO PGY-2

## 2017-07-17 NOTE — PLAN OF CARE
Problem: Physical Therapy Goal  Goal: Physical Therapy Goal  Goals to be met by: 17     Patient will increase functional independence with mobility by performin. Pt to perf Czuka 10x sit<>stand: 22.74 sec, to demonstrate improvements in B LE mm strength.   2. Pt to perf 6MWT: 634', with AD PRN, to demonstrate a clinically significant improvement in aerobic capacity.     3. Lower extremity exercise program x 20 reps per handout, with assistance as needed.    Outcome: Ongoing (interventions implemented as appropriate)  PT goals established.

## 2017-07-17 NOTE — PT/OT/SLP PROGRESS
Occupational Therapy  Pt Update    Kaylene Emery  MRN: [id number]    Patient did not attend OT group today 2/2 meeting with MD team. No billable units today, will re-attempt OT evaluation at next available time.    RODRI Zaldivar  7/17/2017

## 2017-07-17 NOTE — NURSING
Patient cont to be isolative to her room most of the shift.  Mood remains depressed and irritable.  Patient reports that she needs her morphine pain medication.  Signed a 72 hour release after the doctor discontinued this medication.  Patient also report high anxiety and is asking for benzo's for anxiety.  Poor insight into substance abuse issues.  Cont to deny her overdose was a suicide attempt.  Patient reports her leg pain as a 10 on a scale of 0 to 10.  Does not engage peers in conversation.  Does note attend groups.  Appearance is unkempt.  Compliant with medication.
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
## 2017-07-17 NOTE — NURSING
Patient asked to sign a 72 hour notice when told that the doctor discontinued her Morphine.  Mood irritable and anxious.   Cont to minimize her overdose and deny that it was a suicide attempt.

## 2017-07-17 NOTE — PLAN OF CARE
Problem: Patient Care Overview (Adult)  Goal: Plan of Care Review  Outcome: Ongoing (interventions implemented as appropriate)  Patient is depressed and irritable.  She reports that she was to leave.  Focused on getting medication.  Shows no insight into the overdose that she had.  Minimizes and denies substance abuse problem.  Patient is isolative to her room most of the time.  Come out for meals.  Does not attend groups.  Insight is poor.      Problem: Overarching Goals (Adult)  Goal: Adheres to Safety Considerations for Self and Others  Outcome: Ongoing (interventions implemented as appropriate)  Poor insight into safety issues.  Goal: Optimized Coping Skills in Response to Life Stressors  Not using coping skills at this time.   Goal: Develops/Participates in Therapeutic [place] to Support Successful Transition  Outcome: Ongoing (interventions implemented as appropriate)  Does not participate in therapeutic alliance     Problem: Depression (Adult,Obstetrics,Pediatric)  Goal: Establish/Maintain Self-Care Routine  Patient will demonstrate the desired outcomes by discharge/transition of care.   Outcome: Outcome(s) achieved Date Met: 07/17/17  Self care is appropriate.  Goal: Improved/Stable Mood  Patient will demonstrate the desired outcomes by discharge/transition of care.   Outcome: Ongoing (interventions implemented as appropriate)  Mood is depressed and irritable.     Problem: Impaired Control (Excessive Substance Use) (Adult)  Goal: Participates in Recovery Program  Outcome: Ongoing (interventions implemented as appropriate)  Not participating in recovery program    Problem: Safety Awareness Impairment (Excessive Substance Use) (Adult)  Goal: Enhanced Safety Awareness  Outcome: Ongoing (interventions implemented as appropriate)  Patient shows poor insight into safety and substance abuse problem

## 2017-07-18 PROBLEM — F19.10 POLYSUBSTANCE ABUSE: Status: ACTIVE | Noted: 2017-07-18

## 2017-07-18 PROBLEM — F19.94 SUBSTANCE INDUCED MOOD DISORDER: Status: ACTIVE | Noted: 2017-07-11

## 2017-07-18 LAB
ALBUMIN SERPL BCP-MCNC: 3.5 G/DL
ALP SERPL-CCNC: 87 U/L
ALT SERPL W/O P-5'-P-CCNC: 18 U/L
ANION GAP SERPL CALC-SCNC: 12 MMOL/L
AST SERPL-CCNC: 13 U/L
BILIRUB SERPL-MCNC: 0.5 MG/DL
BUN SERPL-MCNC: 38 MG/DL
CALCIUM SERPL-MCNC: 9.6 MG/DL
CHLORIDE SERPL-SCNC: 103 MMOL/L
CO2 SERPL-SCNC: 24 MMOL/L
CREAT SERPL-MCNC: 2.2 MG/DL
EST. GFR  (AFRICAN AMERICAN): 30.5 ML/MIN/1.73 M^2
EST. GFR  (NON AFRICAN AMERICAN): 26.5 ML/MIN/1.73 M^2
GLUCOSE SERPL-MCNC: 98 MG/DL
POTASSIUM SERPL-SCNC: 4.3 MMOL/L
PROT SERPL-MCNC: 7.7 G/DL
SODIUM SERPL-SCNC: 139 MMOL/L
TESTOST SERPL-MCNC: 25 NG/DL

## 2017-07-18 PROCEDURE — 36415 COLL VENOUS BLD VENIPUNCTURE: CPT

## 2017-07-18 PROCEDURE — 25000003 PHARM REV CODE 250: Performed by: PSYCHIATRY & NEUROLOGY

## 2017-07-18 PROCEDURE — 99232 SBSQ HOSP IP/OBS MODERATE 35: CPT | Mod: AF,HB,S$PBB, | Performed by: PSYCHIATRY & NEUROLOGY

## 2017-07-18 PROCEDURE — 84403 ASSAY OF TOTAL TESTOSTERONE: CPT

## 2017-07-18 PROCEDURE — 82157 ASSAY OF ANDROSTENEDIONE: CPT

## 2017-07-18 PROCEDURE — 25000003 PHARM REV CODE 250: Performed by: NURSE PRACTITIONER

## 2017-07-18 PROCEDURE — 12400001 HC PSYCH SEMI-PRIVATE ROOM

## 2017-07-18 PROCEDURE — 80053 COMPREHEN METABOLIC PANEL: CPT

## 2017-07-18 RX ORDER — PROMETHAZINE HYDROCHLORIDE 12.5 MG/1
12.5 TABLET ORAL EVERY 6 HOURS PRN
Status: DISCONTINUED | OUTPATIENT
Start: 2017-07-18 | End: 2017-07-19 | Stop reason: HOSPADM

## 2017-07-18 RX ORDER — LOPERAMIDE HYDROCHLORIDE 2 MG/1
2 CAPSULE ORAL 4 TIMES DAILY PRN
Status: DISCONTINUED | OUTPATIENT
Start: 2017-07-18 | End: 2017-07-19 | Stop reason: HOSPADM

## 2017-07-18 RX ADMIN — DOXEPIN HYDROCHLORIDE 10 MG: 10 CAPSULE ORAL at 09:07

## 2017-07-18 RX ADMIN — LOPERAMIDE HYDROCHLORIDE 2 MG: 2 CAPSULE ORAL at 09:07

## 2017-07-18 RX ADMIN — THERA TABS 1 TABLET: TAB at 08:07

## 2017-07-18 RX ADMIN — FLUOXETINE 40 MG: 20 CAPSULE ORAL at 08:07

## 2017-07-18 RX ADMIN — GABAPENTIN 600 MG: 300 CAPSULE ORAL at 09:07

## 2017-07-18 RX ADMIN — GABAPENTIN 600 MG: 300 CAPSULE ORAL at 03:07

## 2017-07-18 RX ADMIN — PROMETHAZINE HYDROCHLORIDE 12.5 MG: 12.5 TABLET ORAL at 09:07

## 2017-07-18 RX ADMIN — LOPERAMIDE HYDROCHLORIDE 2 MG: 2 CAPSULE ORAL at 11:07

## 2017-07-18 RX ADMIN — FOLIC ACID 1 MG: 1 TABLET ORAL at 08:07

## 2017-07-18 RX ADMIN — GABAPENTIN 600 MG: 300 CAPSULE ORAL at 05:07

## 2017-07-18 RX ADMIN — Medication 100 MG: at 08:07

## 2017-07-18 RX ADMIN — PROMETHAZINE HYDROCHLORIDE 12.5 MG: 12.5 TABLET ORAL at 11:07

## 2017-07-18 RX ADMIN — NICOTINE 1 PATCH: 7 PATCH, EXTENDED RELEASE TRANSDERMAL at 08:07

## 2017-07-18 NOTE — PROGRESS NOTES
07/18/17 [address] [place] [address] Group Therapy   Group Name Therapeutic Recreation   Specific Interventions (basketball toss)   Participation Level None

## 2017-07-18 NOTE — NURSING
Remains isolative to her room and bed.  Did get up for meal.  No further complaints of nausea.  Patients mood remains irritable and depressed.  Appearance is unkempt.  Cont to deny suicidal ideations.  Poor insight and judgement into substance abuse issues.  Medication compliant.

## 2017-07-18 NOTE — SUBJECTIVE & OBJECTIVE
"Interval History: No acute events overnight. Patient complaints of opioid withdrawal symptoms today.     Psychotherapeutics     Start     Stop Route Frequency Ordered    07/17/17 2100  doxepin capsule 10 mg      -- Oral Nightly 07/17/17 1058    07/15/17 1415  lorazepam tablet 0.5 mg     Question:  Is the patient competent?  Answer:  Yes    -- Oral Nightly 07/15/17 1416    07/15/17 0900  fluoxetine capsule 40 mg     Question:  Is the patient competent?  Answer:  Yes    -- Oral Daily 07/14/17 1800    07/14/17 1800  lorazepam tablet 2 mg      -- Oral Every 4 hours PRN 07/14/17 1800           Review of Systems   Constitutional: Positive for diaphoresis.   Gastrointestinal: Positive for diarrhea and nausea.     Objective:     Vital Signs (Most Recent):  Temp: 99.7 °F (37.6 °C) (07/17/17 1915)  Pulse: 97 (07/17/17 1915)  Resp: 18 (07/17/17 1915)  BP: (!) 144/73 (07/17/17 1915) Vital Signs (24h Range):  Temp:  [99.7 °F (37.6 °C)] 99.7 °F (37.6 °C)  Pulse:  [97] 97  Resp:  [18] 18  BP: (144)/(73) 144/73     Height: 5' 4" (162.6 cm)  Weight: 69.3 kg (152 lb 12.5 oz)  Body mass index is 26.22 kg/m².    No intake or output data in the 24 hours ending 07/18/17 1019    Physical Exam     Significant Labs:   Last 24 Hours:   Recent Lab Results       07/18/17  0549      Albumin 3.5     Alkaline Phosphatase 87     ALT 18     Anion Gap 12     AST 13     Total Bilirubin 0.5  Comment:  For infants and newborns, interpretation of results should be based  on gestational age, weight and in agreement with clinical  observations.  Premature Infant recommended reference ranges:  Up to 24 hours.............<8.0 mg/dL  Up to 48 hours............<12.0 mg/dL  3-5 days..................<15.0 mg/dL  6-29 days.................<15.0 mg/dL       BUN, Bld 38(H)     Calcium 9.6     Chloride 103     CO2 24     Creatinine 2.2(H)     eGFR if  30.5(A)     eGFR if non  26.5  Comment:  Calculation used to obtain the estimated " glomerular filtration  rate (eGFR) is the CKD-EPI equation. Since race is unknown   in our information system, the eGFR values for   -American and Non--American patients are given   for each creatinine result.  (A)     Glucose 98     Potassium 4.3     Total Protein 7.7     Sodium 139     Testosterone, Total 25           Significant Imaging: None

## 2017-07-18 NOTE — ASSESSMENT & PLAN NOTE
-Start Loperamide 2 mg QID prn as needed for diarrhea for opioid withdrawal  -Start Promethazine 12.5 mg PO q6 PRN nausea for opioid withdrawal  -Discontinued Ativan 0.5 mg PO nightly on 7/18/17

## 2017-07-18 NOTE — PLAN OF CARE
Problem: Patient Care Overview (Adult)  Goal: Plan of Care Review  Patient is isolative to her room.  Does not attend meetings or groups.  Avoids social contacts.  Patients mood is depressed and irritable.  Reports detox symptoms.  Using comfort medication as needed.  Insight and judgement are poor.  Patient reports pain in her left leg.  Dressed in a hospital gown.  Appearance is unkempt.  Eating without problems.  Reports having some problem sleeping.     Problem: Overarching Goals (Adult)  Goal: Adheres to Safety Considerations for Self and Others  Outcome: Ongoing (interventions implemented as appropriate)  Cooperative with safety protocols.   Goal: Optimized Coping Skills in Response to Life Stressors  Outcome: Ongoing (interventions implemented as appropriate)  Patient report understanding of coping skills  Goal: Develops/Participates in Therapeutic [place] to Support Successful Transition  Outcome: Ongoing (interventions implemented as appropriate)  Not currently cooperating with treatment plan .     Problem: Depression (Adult,Obstetrics,Pediatric)  Goal: Improved/Stable Mood  Patient will demonstrate the desired outcomes by discharge/transition of care.   Outcome: Ongoing (interventions implemented as appropriate)  Mood is still depressed.  Patient is irritable.     Problem: Impaired Control (Excessive Substance Use) (Adult)  Goal: Participates in Recovery Program  Outcome: Ongoing (interventions implemented as appropriate)  Patient has signed a 72 hour notice.  States she will get treatment as an outpatient.      Problem: Safety Awareness Impairment (Excessive Substance Use) (Adult)  Goal: Enhanced Safety Awareness  Outcome: Ongoing (interventions implemented as appropriate)  Patient shows poor insight into substance abuse issues.

## 2017-07-18 NOTE — PLAN OF CARE
Problem: Patient Care Overview (Adult)  Goal: Plan of Care Review  Outcome: Ongoing (interventions implemented as appropriate)  Calm, cooperative. Remains with depressive signs and symptoms. Isolated, minimal interaction with peers and staff. Did not participate in evening group. Continues with complaints of chronic pain. Resident notified. Currently denies suicidal ideations. Compliant with HS medications. Suicide precautions and modified visual contact maintained per MD orders.
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
## 2017-07-18 NOTE — MEDICAL/APP STUDENT
Called anna Craig to notify him that pt Kaylene Emery is stable, and is not endorsing thoughts of injuring herself or others. Recommended that she enroll in an inpatient or outpatient rehabilitation program upon discharge. Set up family meeting with Rafa for 10 AM tomorrow 7/19/17 before pt is discharged from unit.

## 2017-07-19 VITALS
RESPIRATION RATE: 18 BRPM | BODY MASS INDEX: 26.08 KG/M2 | HEIGHT: 64 IN | SYSTOLIC BLOOD PRESSURE: 124 MMHG | DIASTOLIC BLOOD PRESSURE: 64 MMHG | WEIGHT: 152.75 LBS | HEART RATE: 80 BPM | TEMPERATURE: 99 F

## 2017-07-19 PROBLEM — N17.9 ACUTE RENAL FAILURE: Status: RESOLVED | Noted: 2017-07-10 | Resolved: 2017-07-19

## 2017-07-19 PROBLEM — F19.94 SUBSTANCE INDUCED MOOD DISORDER: Status: RESOLVED | Noted: 2017-07-11 | Resolved: 2017-07-19

## 2017-07-19 PROBLEM — T50.901A POLYSUBSTANCE OVERDOSE: Status: RESOLVED | Noted: 2017-06-28 | Resolved: 2017-07-19

## 2017-07-19 PROBLEM — R52 PAIN: Status: RESOLVED | Noted: 2017-07-17 | Resolved: 2017-07-19

## 2017-07-19 LAB — CK SERPL-CCNC: 84 U/L

## 2017-07-19 PROCEDURE — 99238 HOSP IP/OBS DSCHRG MGMT 30/<: CPT | Mod: 25,AF,HB,S$PBB | Performed by: PSYCHIATRY & NEUROLOGY

## 2017-07-19 PROCEDURE — 82550 ASSAY OF CK (CPK): CPT

## 2017-07-19 PROCEDURE — 25000003 PHARM REV CODE 250: Performed by: PSYCHIATRY & NEUROLOGY

## 2017-07-19 PROCEDURE — 36415 COLL VENOUS BLD VENIPUNCTURE: CPT

## 2017-07-19 PROCEDURE — 90847 FAMILY PSYTX W/PT 50 MIN: CPT | Mod: AF,HB,S$PBB, | Performed by: PSYCHIATRY & NEUROLOGY

## 2017-07-19 PROCEDURE — 25000003 PHARM REV CODE 250: Performed by: NURSE PRACTITIONER

## 2017-07-19 RX ORDER — NICOTINE 7MG/24HR
1 PATCH, TRANSDERMAL 24 HOURS TRANSDERMAL DAILY
Refills: 0 | COMMUNITY
Start: 2017-07-19 | End: 2017-09-15

## 2017-07-19 RX ORDER — GABAPENTIN 300 MG/1
600 CAPSULE ORAL 3 TIMES DAILY
Qty: 180 CAPSULE | Refills: 0 | Status: SHIPPED | OUTPATIENT
Start: 2017-07-19 | End: 2018-06-12

## 2017-07-19 RX ORDER — FOLIC ACID 1 MG/1
1 TABLET ORAL DAILY
Qty: 30 TABLET | Refills: 0 | Status: SHIPPED | OUTPATIENT
Start: 2017-07-19 | End: 2018-06-13 | Stop reason: CLARIF

## 2017-07-19 RX ORDER — DOXEPIN HYDROCHLORIDE 10 MG/1
10 CAPSULE ORAL NIGHTLY
Qty: 30 CAPSULE | Refills: 0 | Status: SHIPPED | OUTPATIENT
Start: 2017-07-19 | End: 2018-06-13 | Stop reason: CLARIF

## 2017-07-19 RX ADMIN — FOLIC ACID 1 MG: 1 TABLET ORAL at 11:07

## 2017-07-19 RX ADMIN — GABAPENTIN 600 MG: 300 CAPSULE ORAL at 06:07

## 2017-07-19 RX ADMIN — THERA TABS 1 TABLET: TAB at 11:07

## 2017-07-19 RX ADMIN — NICOTINE 1 PATCH: 7 PATCH, EXTENDED RELEASE TRANSDERMAL at 11:07

## 2017-07-19 RX ADMIN — Medication 100 MG: at 11:07

## 2017-07-19 RX ADMIN — LOPERAMIDE HYDROCHLORIDE 2 MG: 2 CAPSULE ORAL at 11:07

## 2017-07-19 RX ADMIN — FLUOXETINE 40 MG: 20 CAPSULE ORAL at 11:07

## 2017-07-19 NOTE — PLAN OF CARE
Discharge instructions given to pt. Pt verbalized understanding of discharge in instructions. Pt fiance arrived to unit. Escorted to vehicle with PCT.

## 2017-07-19 NOTE — PROGRESS NOTES
H&P, D/C summary, Med. Rec., transmitted to next level of care. Referral send to [facility] FOR AFTER CARE FOLLOW UP APPOINTMENT.   PT TO WALK IN FOR APPOINTMENT  ON THURSDAY July 20, 2017 AT 8:30 AM.

## 2017-07-19 NOTE — PROGRESS NOTES
Family meeting with boyfriend Adrien attended. Discussed pt addiction to drugs and the need for residential treatment center. Pt did not agree to go inpat due to her mother is dying and refuse to go at this time. Discussed follow up with [facility] for substance abuse  classes and  depression. She did agreed to follow up, she was a patient there in the past.  SW will send referral for follow up.  Pt is discharge with boy friend home.
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
## 2017-07-20 NOTE — PT/OT/SLP DISCHARGE
Physical Therapy Discharge Summary    Kaylene Emery  MRN: [id number]   Polysubstance overdose   Patient Discharged from acute Physical Therapy on 17.  Please refer to prior PT noted date on 17 for functional status.     Assessment:   Patient appropriate for care in another setting.  GOALS:    Physical Therapy Goals     Not on file          Multidisciplinary Problems (Resolved)        Problem: Physical Therapy Goal    Goal Priority Disciplines Outcome Goal Variances Interventions   Physical Therapy Goal   (Resolved)     PT/OT, PT Outcome(s) achieved     Description:  Goals to be met by: 17     Patient will increase functional independence with mobility by performin. Pt to perf Czuka 10x sit<>stand: 22.74 sec, to demonstrate improvements in B LE mm strength.   2. Pt to perf 6MWT: 634', with AD PRN, to demonstrate a clinically significant improvement in aerobic capacity.     3. Lower extremity exercise program x 20 reps per handout, with assistance as needed.                    Reasons for Discontinuation of Therapy Services  Transfer to alternate level of care.      Plan:  Patient Discharged to: Outpatient Therapy Services.  D/C'd with kenzie only.    Charleen Khan, PT  2017
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
## 2017-07-24 LAB — ANDROST SERPL-MCNC: 94 NG/DL

## 2017-08-11 ENCOUNTER — HOSPITAL ENCOUNTER (EMERGENCY)
Facility: HOSPITAL | Age: 47
Discharge: HOME | End: 2017-08-11
Attending: SPECIALIST
Payer: MEDICAID

## 2017-08-11 VITALS
RESPIRATION RATE: 20 BRPM | TEMPERATURE: 98 F | HEIGHT: 64 IN | OXYGEN SATURATION: 96 % | WEIGHT: 135 LBS | DIASTOLIC BLOOD PRESSURE: 92 MMHG | SYSTOLIC BLOOD PRESSURE: 136 MMHG | HEART RATE: 100 BPM | BODY MASS INDEX: 23.05 KG/M2

## 2017-08-11 DIAGNOSIS — M79.672 ACUTE FOOT PAIN, LEFT: ICD-10-CM

## 2017-08-11 DIAGNOSIS — G60.9 PERIPHERAL NEUROPATHY, IDIOPATHIC: ICD-10-CM

## 2017-08-11 DIAGNOSIS — M79.671 ACUTE FOOT PAIN, RIGHT: Primary | ICD-10-CM

## 2017-08-11 PROCEDURE — 99283 EMERGENCY DEPT VISIT LOW MDM: CPT

## 2017-08-11 RX ORDER — CYCLOBENZAPRINE HCL 10 MG
10 TABLET ORAL 3 TIMES DAILY PRN
Qty: 15 TABLET | Refills: 0 | Status: SHIPPED | OUTPATIENT
Start: 2017-08-11 | End: 2017-08-16

## 2017-08-11 RX ORDER — ACETAMINOPHEN AND CODEINE PHOSPHATE 300; 30 MG/1; MG/1
1-2 TABLET ORAL EVERY 6 HOURS PRN
Qty: 14 TABLET | Refills: 0 | Status: SHIPPED | OUTPATIENT
Start: 2017-08-11 | End: 2017-09-15

## 2017-08-11 RX ORDER — DICLOFENAC SODIUM 75 MG/1
75 TABLET, DELAYED RELEASE ORAL 2 TIMES DAILY
Qty: 14 TABLET | Refills: 0 | Status: SHIPPED | OUTPATIENT
Start: 2017-08-11 | End: 2017-09-15 | Stop reason: ALTCHOICE

## 2017-08-11 NOTE — ED PROVIDER NOTES
"Encounter Date: 8/11/2017       History     Chief Complaint   Patient presents with    Peripheral Neuropathy     pt c/o burning/tingling sensation to janessa feet, worsened by walking, unrelieved by gabapentin x5 days     46 year old female with complaint of bilateral foot pain X 5 days with worsening X 2-3 days. Reports history of peripheral neuropathy over the past few years.  No fever or chills.  No fall.  No trauma.            Review of patient's allergies indicates:   Allergen Reactions    Corticosteroids (glucocorticoids)      "sets off my anxiety real bad"  "sets off my anxiety real bad"    Tramadol Rash     Past Medical History:   Diagnosis Date    Anxiety     Depression     GERD (gastroesophageal reflux disease)     Tobacco use      Past Surgical History:   Procedure Laterality Date    BLADDER REPAIR      CENTRAL LINE  6/28/2017         CHOLECYSTECTOMY  04/07/2017    KNEE ARTHROPLASTY      PARTIAL HYSTERECTOMY      TUBAL LIGATION       Family History   Problem Relation Age of Onset    Hypertension Mother     Diabetes Mother      Social History   Substance Use Topics    Smoking status: Current Every Day Smoker     Packs/day: 1.00     Types: Cigarettes    Smokeless tobacco: Never Used    Alcohol use Yes      Comment: occasionally     Review of Systems   Constitutional: Negative for fever.   HENT: Negative for sore throat.    Respiratory: Negative for shortness of breath.    Cardiovascular: Negative for chest pain.   Gastrointestinal: Negative for nausea.   Genitourinary: Negative for dysuria.   Musculoskeletal: Negative for back pain.        Bilateral foot pain    Skin: Negative for rash.   Neurological: Negative for weakness.   Hematological: Does not bruise/bleed easily.       Physical Exam     Initial Vitals [08/11/17 0911]   BP Pulse Resp Temp SpO2   (!) 136/92 100 20 98.1 °F (36.7 °C) 96 %      MAP       106.67         Physical Exam    Nursing note and vitals reviewed.  Constitutional: She " appears well-developed and well-nourished.   HENT:   Head: Normocephalic and atraumatic.   Eyes: Conjunctivae and EOM are normal. Pupils are equal, round, and reactive to light.   Neck: Normal range of motion. Neck supple.   Cardiovascular: Normal rate, regular rhythm, normal heart sounds and intact distal pulses.   Pulmonary/Chest: Breath sounds normal.   Abdominal: Soft. There is no tenderness. There is no rebound and no guarding.   Musculoskeletal: Normal range of motion.   2+ dorsalis pedis pulses, no swelling, no erythema   Neurological: She is alert and oriented to person, place, and time. She has normal strength and normal reflexes.   Skin: Skin is warm and dry.   Psychiatric: She has a normal mood and affect. Her behavior is normal. Thought content normal.         ED Course   Procedures  Labs Reviewed - No data to display                            ED Course     Clinical Impression:   The primary encounter diagnosis was Acute foot pain, right. Diagnoses of Acute foot pain, left and Peripheral neuropathy, idiopathic were also pertinent to this visit.                           Bridger Patrick NP  08/11/17 0951

## 2017-09-14 ENCOUNTER — HOSPITAL ENCOUNTER (EMERGENCY)
Facility: HOSPITAL | Age: 47
Discharge: HOME | End: 2017-09-15
Attending: EMERGENCY MEDICINE
Payer: MEDICAID

## 2017-09-14 DIAGNOSIS — K59.00 CONSTIPATION, UNSPECIFIED CONSTIPATION TYPE: ICD-10-CM

## 2017-09-14 DIAGNOSIS — D72.829 LEUKOCYTOSIS, UNSPECIFIED TYPE: ICD-10-CM

## 2017-09-14 DIAGNOSIS — R10.9 BILATERAL FLANK PAIN: ICD-10-CM

## 2017-09-14 DIAGNOSIS — S39.012A BACK STRAIN, INITIAL ENCOUNTER: Primary | ICD-10-CM

## 2017-09-14 LAB
B-HCG UR QL: NEGATIVE
BILIRUB UR QL STRIP: NEGATIVE
CLARITY UR: CLEAR
COLOR UR: YELLOW
GLUCOSE UR QL STRIP: NEGATIVE
HGB UR QL STRIP: NEGATIVE
KETONES UR QL STRIP: NEGATIVE
LEUKOCYTE ESTERASE UR QL STRIP: NEGATIVE
NITRITE UR QL STRIP: NEGATIVE
PH UR STRIP: 5 [PH] (ref 5–8)
PROT UR QL STRIP: NEGATIVE
SP GR UR STRIP: <=1.005 (ref 1–1.03)
URN SPEC COLLECT METH UR: ABNORMAL
UROBILINOGEN UR STRIP-ACNC: NEGATIVE EU/DL

## 2017-09-14 PROCEDURE — 85025 COMPLETE CBC W/AUTO DIFF WBC: CPT

## 2017-09-14 PROCEDURE — 81003 URINALYSIS AUTO W/O SCOPE: CPT

## 2017-09-14 PROCEDURE — 83690 ASSAY OF LIPASE: CPT

## 2017-09-14 PROCEDURE — 81025 URINE PREGNANCY TEST: CPT

## 2017-09-14 PROCEDURE — 80053 COMPREHEN METABOLIC PANEL: CPT

## 2017-09-14 PROCEDURE — 99284 EMERGENCY DEPT VISIT MOD MDM: CPT | Mod: 25

## 2017-09-14 RX ORDER — KETOROLAC TROMETHAMINE 30 MG/ML
15 INJECTION, SOLUTION INTRAMUSCULAR; INTRAVENOUS
Status: COMPLETED | OUTPATIENT
Start: 2017-09-15 | End: 2017-09-15

## 2017-09-14 RX ORDER — ONDANSETRON 2 MG/ML
4 INJECTION INTRAMUSCULAR; INTRAVENOUS
Status: COMPLETED | OUTPATIENT
Start: 2017-09-15 | End: 2017-09-15

## 2017-09-15 VITALS
BODY MASS INDEX: 22.2 KG/M2 | OXYGEN SATURATION: 98 % | HEIGHT: 64 IN | DIASTOLIC BLOOD PRESSURE: 76 MMHG | HEART RATE: 88 BPM | WEIGHT: 130 LBS | SYSTOLIC BLOOD PRESSURE: 128 MMHG | TEMPERATURE: 98 F | RESPIRATION RATE: 18 BRPM

## 2017-09-15 PROBLEM — R10.9 BILATERAL FLANK PAIN: Status: ACTIVE | Noted: 2017-09-15

## 2017-09-15 PROBLEM — S39.012A BACK STRAIN: Status: ACTIVE | Noted: 2017-09-15

## 2017-09-15 PROBLEM — D72.829 LEUKOCYTOSIS: Status: ACTIVE | Noted: 2017-09-15

## 2017-09-15 PROBLEM — K59.00 CONSTIPATION: Status: ACTIVE | Noted: 2017-05-03

## 2017-09-15 LAB
ALBUMIN SERPL BCP-MCNC: 3.9 G/DL
ALP SERPL-CCNC: 86 U/L
ALT SERPL W/O P-5'-P-CCNC: 14 U/L
ANION GAP SERPL CALC-SCNC: 13 MMOL/L
AST SERPL-CCNC: 13 U/L
BASOPHILS # BLD AUTO: 0.05 K/UL
BASOPHILS NFR BLD: 0.3 %
BILIRUB SERPL-MCNC: 0.1 MG/DL
BUN SERPL-MCNC: 13 MG/DL
CALCIUM SERPL-MCNC: 9.6 MG/DL
CHLORIDE SERPL-SCNC: 103 MMOL/L
CO2 SERPL-SCNC: 19 MMOL/L
CREAT SERPL-MCNC: 0.8 MG/DL
DIFFERENTIAL METHOD: ABNORMAL
EOSINOPHIL # BLD AUTO: 0.1 K/UL
EOSINOPHIL NFR BLD: 0.7 %
ERYTHROCYTE [DISTWIDTH] IN BLOOD BY AUTOMATED COUNT: 15.2 %
EST. GFR  (AFRICAN AMERICAN): >60 ML/MIN/1.73 M^2
EST. GFR  (NON AFRICAN AMERICAN): >60 ML/MIN/1.73 M^2
GLUCOSE SERPL-MCNC: 80 MG/DL
HCT VFR BLD AUTO: 46.7 %
HGB BLD-MCNC: 15.4 G/DL
LIPASE SERPL-CCNC: 28 U/L
LYMPHOCYTES # BLD AUTO: 3.8 K/UL
LYMPHOCYTES NFR BLD: 19.3 %
MCH RBC QN AUTO: 30.6 PG
MCHC RBC AUTO-ENTMCNC: 33 G/DL
MCV RBC AUTO: 93 FL
MONOCYTES # BLD AUTO: 1.1 K/UL
MONOCYTES NFR BLD: 5.5 %
NEUTROPHILS # BLD AUTO: 14.5 K/UL
NEUTROPHILS NFR BLD: 74.2 %
PLATELET # BLD AUTO: 320 K/UL
PMV BLD AUTO: 10.3 FL
POTASSIUM SERPL-SCNC: 4.1 MMOL/L
PROT SERPL-MCNC: 8.5 G/DL
RBC # BLD AUTO: 5.04 M/UL
SODIUM SERPL-SCNC: 135 MMOL/L
WBC # BLD AUTO: 19.51 K/UL

## 2017-09-15 PROCEDURE — 96375 TX/PRO/DX INJ NEW DRUG ADDON: CPT

## 2017-09-15 PROCEDURE — 25000003 PHARM REV CODE 250: Performed by: EMERGENCY MEDICINE

## 2017-09-15 PROCEDURE — 96361 HYDRATE IV INFUSION ADD-ON: CPT

## 2017-09-15 PROCEDURE — 63600175 PHARM REV CODE 636 W HCPCS: Performed by: EMERGENCY MEDICINE

## 2017-09-15 PROCEDURE — 96374 THER/PROPH/DIAG INJ IV PUSH: CPT

## 2017-09-15 RX ORDER — PROMETHAZINE HYDROCHLORIDE 25 MG/1
25 TABLET ORAL
Status: COMPLETED | OUTPATIENT
Start: 2017-09-15 | End: 2017-09-15

## 2017-09-15 RX ORDER — PROMETHAZINE HYDROCHLORIDE 25 MG/1
25 TABLET ORAL EVERY 6 HOURS PRN
COMMUNITY
End: 2018-06-13 | Stop reason: CLARIF

## 2017-09-15 RX ORDER — MORPHINE SULFATE 4 MG/ML
4 INJECTION, SOLUTION INTRAMUSCULAR; INTRAVENOUS
Status: COMPLETED | OUTPATIENT
Start: 2017-09-15 | End: 2017-09-15

## 2017-09-15 RX ORDER — MELOXICAM 7.5 MG/1
7.5 TABLET ORAL DAILY
Qty: 20 TABLET | Refills: 0 | Status: SHIPPED | OUTPATIENT
Start: 2017-09-15 | End: 2018-06-13 | Stop reason: CLARIF

## 2017-09-15 RX ORDER — CYCLOBENZAPRINE HCL 10 MG
10 TABLET ORAL 3 TIMES DAILY PRN
COMMUNITY
End: 2018-06-13 | Stop reason: CLARIF

## 2017-09-15 RX ADMIN — ONDANSETRON 4 MG: 2 INJECTION INTRAMUSCULAR; INTRAVENOUS at 12:09

## 2017-09-15 RX ADMIN — PROMETHAZINE HYDROCHLORIDE 25 MG: 25 TABLET ORAL at 01:09

## 2017-09-15 RX ADMIN — MORPHINE SULFATE 4 MG: 4 INJECTION, SOLUTION INTRAMUSCULAR; INTRAVENOUS at 12:09

## 2017-09-15 RX ADMIN — KETOROLAC TROMETHAMINE 15 MG: 30 INJECTION, SOLUTION INTRAMUSCULAR at 12:09

## 2017-09-15 RX ADMIN — SODIUM CHLORIDE 1000 ML: 0.9 INJECTION, SOLUTION INTRAVENOUS at 12:09

## 2017-09-15 NOTE — ED PROVIDER NOTES
"SCRIBE #1 NOTE: I, Percy Nation, am scribing for, and in the presence of, Angel Armstrong Jr., MD. I have scribed the entire note.      History      Chief Complaint   Patient presents with    Back Pain     reports having back pain around her kidneys, reports decreased urine output        Review of patient's allergies indicates:   Allergen Reactions    Corticosteroids (glucocorticoids)      "sets off my anxiety real bad"  "sets off my anxiety real bad"    Tramadol Rash        HPI   HPI    9/14/2017, 11:33 PM   History obtained from the patient      History of Present Illness: Kaylene Emery is a 46 y.o. female patient who presents to the Emergency Department for lower back pain which onset gradually 2 days ago. Symptoms are constant and moderate in severity.  No mitigating or exacerbating factors reported. Associated sxs include difficulty urinating and nausea. Pt also complains of chronic peripheral neuropathy to BLE, which onset 2 months ago, but states that no acute changes have occurred. Patient denies any fever, chills, saddle anesthesia, bladder/ bowel incontinence, dysuria, hematuria, hematochezia, v/d, and all other sxs at this time. No further complaints or concerns at this time.         Arrival mode: Personal vehicle    PCP: Jonas Jimenez MD       Past Medical History:  Past Medical History:   Diagnosis Date    Anxiety     Depression     GERD (gastroesophageal reflux disease)     Tobacco use        Past Surgical History:  Past Surgical History:   Procedure Laterality Date    BLADDER REPAIR      CENTRAL LINE  6/28/2017         CHOLECYSTECTOMY  04/07/2017    KNEE ARTHROPLASTY      PARTIAL HYSTERECTOMY      TUBAL LIGATION           Family History:  Family History   Problem Relation Age of Onset    Hypertension Mother     Diabetes Mother        Social History:  Social History     Social History Main Topics    Smoking status: Current Every Day Smoker     Packs/day: 1.00     Types: " Cigarettes    Smokeless tobacco: Never Used    Alcohol use Yes      Comment: occasionally    Drug use: No    Sexual activity: Yes       ROS   Review of Systems   Constitutional: Negative for chills, diaphoresis and fever.   Respiratory: Negative for shortness of breath.    Cardiovascular: Negative for chest pain and palpitations.   Gastrointestinal: Positive for nausea. Negative for abdominal pain, blood in stool, constipation, diarrhea and vomiting.        - bowel incontinence   Genitourinary: Positive for difficulty urinating. Negative for dysuria, hematuria and urgency.        - bladder incontinence   Musculoskeletal: Positive for back pain (lower). Negative for arthralgias, myalgias and neck pain.   Skin: Negative for rash.   Neurological: Negative for dizziness, syncope, weakness, light-headedness, numbness and headaches.        - saddle anesthesia       Physical Exam      Initial Vitals [09/14/17 2313]   BP Pulse Resp Temp SpO2   (!) 140/87 110 20 97.7 °F (36.5 °C) 100 %      MAP       104.67          Physical Exam  Nursing Notes and Vital Signs Reviewed.  Constitutional: Patient is in no apparent distress. Well-developed and well-nourished.  Head: Atraumatic. Normocephalic.  Eyes: PERRL. EOM intact. Conjunctivae are not pale. No scleral icterus.  ENT: Mucous membranes are moist. Oropharynx is clear and symmetric.    Neck: Supple. No cervical midline bony tenderness, deformities, or step-offs.   Cardiovascular: Regular rate. Regular rhythm. No murmurs, rubs, or gallops. Distal pulses are 2+ and symmetric.  Pulmonary/Chest: No respiratory distress. Clear to auscultation bilaterally. No wheezing, rales, or rhonchi.  Abdominal: Soft and non-distended.  There is no tenderness.  No rebound, guarding, or rigidity. Good bowel sounds.  Genitourinary: Bilateral CVA tenderness.   Musculoskeletal: Moves all extremities. No obvious deformities. No edema. No calf tenderness.   Back: Paraspinal musculature tenderness.  "No midline bony tenderness, deformities, or step-offs of the T-spine or L-spine. Skin appears normal without abrasions or bruising. No erythema, induration, or fluctuance.   Skin: Warm and dry.  Neurological: Awake and alert. Appropriate for age. No strength deficit; equal and 5/5 in bilateral upper and lower extremities. No light touch sensory deficit. DTRs 2+ and equal. Normal gait. No acute focal neurological deficits noted.  Psychiatric: Normal affect. Good eye contact. Appropriate in content.    ED Course    Procedures  ED Vital Signs:  Vitals:    09/14/17 2313 09/15/17 0153   BP: (!) 140/87 128/76   Pulse: 110 88   Resp: 20 18   Temp: 97.7 °F (36.5 °C) 98 °F (36.7 °C)   TempSrc: Oral    SpO2: 100% 98%   Weight: 59 kg (130 lb)    Height: 5' 4" (1.626 m)        Abnormal Lab Results:  Labs Reviewed   URINALYSIS - Abnormal; Notable for the following:        Result Value    Specific Gravity, UA <=1.005 (*)     All other components within normal limits   CBC W/ AUTO DIFFERENTIAL - Abnormal; Notable for the following:     WBC 19.51 (*)     RDW 15.2 (*)     Gran # 14.5 (*)     Mono # 1.1 (*)     Gran% 74.2 (*)     All other components within normal limits   COMPREHENSIVE METABOLIC PANEL - Abnormal; Notable for the following:     Sodium 135 (*)     CO2 19 (*)     Total Protein 8.5 (*)     All other components within normal limits   PREGNANCY TEST, URINE RAPID   LIPASE        All Lab Results:  Results for orders placed or performed during the hospital encounter of 09/14/17   Pregnancy, urine rapid (UPT)   Result Value Ref Range    Preg Test, Ur Negative    Urinalysis   Result Value Ref Range    Specimen UA Urine, Clean Catch     Color, UA Yellow Yellow, Straw, Lisa    Appearance, UA Clear Clear    pH, UA 5.0 5.0 - 8.0    Specific Gravity, UA <=1.005 (A) 1.005 - 1.030    Protein, UA Negative Negative    Glucose, UA Negative Negative    Ketones, UA Negative Negative    Bilirubin (UA) Negative Negative    Occult Blood UA " Negative Negative    Nitrite, UA Negative Negative    Urobilinogen, UA Negative <2.0 EU/dL    Leukocytes, UA Negative Negative   CBC W/ AUTO DIFFERENTIAL   Result Value Ref Range    WBC 19.51 (H) 3.90 - 12.70 K/uL    RBC 5.04 4.00 - 5.40 M/uL    Hemoglobin 15.4 12.0 - 16.0 g/dL    Hematocrit 46.7 37.0 - 48.5 %    MCV 93 82 - 98 fL    MCH 30.6 27.0 - 31.0 pg    MCHC 33.0 32.0 - 36.0 g/dL    RDW 15.2 (H) 11.5 - 14.5 %    Platelets 320 150 - 350 K/uL    MPV 10.3 9.2 - 12.9 fL    Gran # 14.5 (H) 1.8 - 7.7 K/uL    Lymph # 3.8 1.0 - 4.8 K/uL    Mono # 1.1 (H) 0.3 - 1.0 K/uL    Eos # 0.1 0.0 - 0.5 K/uL    Baso # 0.05 0.00 - 0.20 K/uL    Gran% 74.2 (H) 38.0 - 73.0 %    Lymph% 19.3 18.0 - 48.0 %    Mono% 5.5 4.0 - 15.0 %    Eosinophil% 0.7 0.0 - 8.0 %    Basophil% 0.3 0.0 - 1.9 %    Differential Method Automated    Comp. Metabolic Panel   Result Value Ref Range    Sodium 135 (L) 136 - 145 mmol/L    Potassium 4.1 3.5 - 5.1 mmol/L    Chloride 103 95 - 110 mmol/L    CO2 19 (L) 23 - 29 mmol/L    Glucose 80 70 - 110 mg/dL    BUN, Bld 13 6 - 20 mg/dL    Creatinine 0.8 0.5 - 1.4 mg/dL    Calcium 9.6 8.7 - 10.5 mg/dL    Total Protein 8.5 (H) 6.0 - 8.4 g/dL    Albumin 3.9 3.5 - 5.2 g/dL    Total Bilirubin 0.1 0.1 - 1.0 mg/dL    Alkaline Phosphatase 86 55 - 135 U/L    AST 13 10 - 40 U/L    ALT 14 10 - 44 U/L    Anion Gap 13 8 - 16 mmol/L    eGFR if African American >60 >60 mL/min/1.73 m^2    eGFR if non African American >60 >60 mL/min/1.73 m^2   Lipase   Result Value Ref Range    Lipase 28 4 - 60 U/L         Imaging Results:  Imaging Results           CT Renal Stone Study ABD Pelvis WO (In process)    Per Virtual radiology, pt's CT impression:  1.) No hydronephrosis or ureteral calculus  2.) Significant colonic stool which may be ileus/ constipation. No mechanical bowel obstruction.                          The Emergency Provider reviewed the vital signs and test results, which are outlined above.    ED Discussion     1:43 AM:  Reassessed pt at this time. Pt is afebrile.  Pt states that she has a prescription for flexeril. Pt complains of her chronic neuropathy, and I advised pt to f/u with her PCP. Pt has no overt sign of infection, and her back pain has improved with treatment in the ED. Noted leukocytosis. Unsure on the etiology, but negative CT scan and pt's vital signs remain normal. Discussed with pt all pertinent ED information and results. Discussed pt dx and plan of tx. Gave pt all f/u and return to the ED instructions. Advised PCP to return to the ER if fever or worsening of pain develops. Discussed with patient possiblity for admission to observation, but the pt declines admission at this time. All questions and concerns were addressed at this time. Pt expresses understanding of information and instructions, and is comfortable with plan to discharge. Pt is stable for discharge.    I discussed with patient and/or family/caretaker that evaluation in the ED does not suggest any emergent or life threatening medical conditions requiring immediate intervention beyond what was provided in the ED, and I believe patient is safe for discharge.  Regardless, an unremarkable evaluation in the ED does not preclude the development or presence of a serious of life threatening condition. As such, patient was instructed to return immediately for any worsening or change in current symptoms.    Regarding BACK PAIN, I advised patient to rest and slowly start to increase activity as pain decreases or as tolerable.  Also recommend the use of ice and heat. Explained to patient that ice will help decrease swelling and pain and prevent tissue damage (verbalized importance of covering ice with a towel and not applying it directly to skin and applying it for 20-30 minutes every 2 hours as needed). Further explained that heat will help decrease pain and muscle spasms (instructed patient to not fall asleep with heating pad and to apply heat to lower back for  20-30 minutes every 2 hours as needed). For prevention, I recommended that the patient use correct body movements (bend at the hips and knees when picking up objects and use leg muscles as you lift the load; keep objects close to chest when lifting it; and avoid twisting or lifting anything above the waist; change position often when standing for long periods of time; never reach, pull, or push while seated; exercise frequently and warm up before exercising; and maintain a healthy weight.  Follow up with primary care provider if no improvement is noticed in next three days.  Take all medications as prescribed and avoid operating heavy machinery or driving if prescribed pain medications or muscle relaxants.  Instructed patient to return to the emergency department if they develop incontinence, notice increased swelling or pain in lower back; begin to have difficulty moving lower extremities; or develop numbness to legs.     Regarding CONSTIPATION, I informed patient of common causes of constipation (low-fiber diet, lack of physical activity, decreased fluid intake, and delays in going to the bathroom when urge is present) and ways to prevent it (eat lots of fiber, drink plenty of fluids daily, exercise regularly, and go to the bathroom when you urge presents.  For treatment, advised patient to use OTC medications:  stool softeners (docusate sodium), bulk laxatives (psyllium) to help add fluid and bulk to the stool, suppositories or gentle laxatives (milk of magnesia liquid), and to reserve enemas or stimulant laxatives for severe cases only. Reiterated the importance of following up with primary care provider for management of their constipation.      ED Medication(s):  Medications   sodium chloride 0.9% bolus 1,000 mL (0 mLs Intravenous Stopped 9/15/17 0112)   ketorolac injection 15 mg (15 mg Intravenous Given 9/15/17 0002)   ondansetron injection 4 mg (4 mg Intravenous Given 9/15/17 0002)   morphine injection 4 mg  (4 mg Intravenous Given 9/15/17 0046)   promethazine tablet 25 mg (25 mg Oral Given 9/15/17 0110)       Discharge Medication List as of 9/15/2017  1:50 AM      START taking these medications    Details   meloxicam (MOBIC) 7.5 MG tablet Take 1 tablet (7.5 mg total) by mouth once daily., Starting Fri 9/15/2017, Print             Follow-up Information     Jonas Jimenez MD. Schedule an appointment as soon as possible for a visit in 1 week.    Specialty:  Family Medicine  Contact information:  [address] [facility]   [address]  [facility] [address] [phone]             [facility]    Specialty:  Emergency Medicine  Why:  As needed, If symptoms worsen  Contact information:  [address] [facility] [address] [phone]           [facility]. Schedule an appointment as soon as possible for a visit in 1 week.    Contact information:  [address] [facility] [address] [phone]             [facility]. Schedule an appointment as soon as possible for a visit in 1 week.    Specialty:  Internal Medicine  Contact information:  [address] [facility] [address] [phone]  Additional information:  (off [facility]) 1st floor                   Medical Decision Making    Medical Decision Making:   Clinical Tests:   Lab Tests: Ordered and Reviewed  Radiological Study: Ordered and Reviewed           Scribe Attestation:   Scribe #1: I performed the above scribed service and the documentation accurately describes the services I performed. I attest to the accuracy of the note.    Attending:   Physician Attestation Statement for Scribe #1: I, Angel Armstrong Jr., MD, personally performed the services described in this documentation, as scribed by Percy Nation, in my presence, and it is both accurate and complete.          Clinical Impression       ICD-10-CM ICD-9-CM   1. Back strain, initial encounter S39.012A 847.9   2. Bilateral  flank pain R10.9 789.09   3. Constipation, unspecified constipation type K59.00 564.00   4. Leukocytosis, unspecified type D72.829 288.60       Disposition:   Disposition: Discharged  Condition: Stable         Angel Armstrong Jr., MD  09/15/17 0255

## 2017-09-15 NOTE — DISCHARGE INSTRUCTIONS
Regarding BACK PAIN, I advised patient to rest and slowly start to increase activity as pain decreases or as tolerable.  Also recommend the use of ice and heat. Explained to patient that ice will help decrease swelling and pain and prevent tissue damage (verbalized importance of covering ice with a towel and not applying it directly to skin and applying it for 20-30 minutes every 2 hours as needed). Further explained that heat will help decrease pain and muscle spasms (instructed patient to not fall asleep with heating pad and to apply heat to lower back for 20-30 minutes every 2 hours as needed). For prevention, I recommended that the patient use correct body movements (bend at the hips and knees when picking up objects and use leg muscles as you lift the load; keep objects close to chest when lifting it; and avoid twisting or lifting anything above the waist; change position often when standing for long periods of time; never reach, pull, or push while seated; exercise frequently and warm up before exercising; and maintain a healthy weight.  Follow up with primary care provider if no improvement is noticed in next three days.  Take all medications as prescribed and avoid operating heavy machinery or driving if prescribed pain medications or muscle relaxants.  Instructed patient to return to the emergency department if they develop incontinence, notice increased swelling or pain in lower back; begin to have difficulty moving lower extremities; or develop numbness to legs.    Regarding CONSTIPATION, I informed patient of common causes of constipation (low-fiber diet, lack of physical activity, decreased fluid intake, and delays in going to the bathroom when urge is present) and ways to prevent it (eat lots of fiber, drink plenty of fluids daily, exercise regularly, and go to the bathroom when you urge presents.  For treatment, advised patient to use OTC medications:  stool softeners (docusate sodium), bulk laxatives  (psyllium) to help add fluid and bulk to the stool, suppositories or gentle laxatives (milk of magnesia liquid), and to reserve enemas or stimulant laxatives for severe cases only. Reiterated the importance of following up with primary care provider for management of their constipation.

## 2017-09-15 NOTE — ED NOTES
"Pt reports "if the pain medicine was just a little bit stronger, it would completely knock out the pain. Could you tell the doctor that?" MD informed.  "

## 2017-11-14 ENCOUNTER — HOSPITAL ENCOUNTER (EMERGENCY)
Facility: HOSPITAL | Age: 47
Discharge: PSYCHIATRIC OR SUBSTANCE USE FACILITY | End: 2017-11-14
Attending: EMERGENCY MEDICINE
Payer: MEDICAID

## 2017-11-14 VITALS
RESPIRATION RATE: 19 BRPM | HEART RATE: 82 BPM | SYSTOLIC BLOOD PRESSURE: 114 MMHG | OXYGEN SATURATION: 96 % | TEMPERATURE: 98 F | DIASTOLIC BLOOD PRESSURE: 61 MMHG | HEIGHT: 64 IN

## 2017-11-14 DIAGNOSIS — F32.A DEPRESSIVE DISORDER: Primary | ICD-10-CM

## 2017-11-14 DIAGNOSIS — F17.210 CIGARETTE NICOTINE DEPENDENCE WITHOUT COMPLICATION: ICD-10-CM

## 2017-11-14 DIAGNOSIS — T50.901A OVERDOSE: ICD-10-CM

## 2017-11-14 LAB
ALBUMIN SERPL BCP-MCNC: 3 G/DL
ALP SERPL-CCNC: 80 U/L
ALT SERPL W/O P-5'-P-CCNC: 12 U/L
AMPHET+METHAMPHET UR QL: NEGATIVE
ANION GAP SERPL CALC-SCNC: 9 MMOL/L
APAP SERPL-MCNC: 19 UG/ML
APAP SERPL-MCNC: 4 UG/ML
AST SERPL-CCNC: 15 U/L
B-HCG UR QL: NEGATIVE
BARBITURATES UR QL SCN>200 NG/ML: NEGATIVE
BASOPHILS # BLD AUTO: 0.05 K/UL
BASOPHILS NFR BLD: 0.3 %
BENZODIAZ UR QL SCN>200 NG/ML: NEGATIVE
BILIRUB SERPL-MCNC: 0.1 MG/DL
BILIRUB UR QL STRIP: NEGATIVE
BUN SERPL-MCNC: 6 MG/DL
BZE UR QL SCN: NEGATIVE
CALCIUM SERPL-MCNC: 9 MG/DL
CANNABINOIDS UR QL SCN: NEGATIVE
CHLORIDE SERPL-SCNC: 102 MMOL/L
CK SERPL-CCNC: 52 U/L
CLARITY UR: CLEAR
CO2 SERPL-SCNC: 26 MMOL/L
COLOR UR: YELLOW
CREAT SERPL-MCNC: 0.7 MG/DL
CREAT UR-MCNC: 15.8 MG/DL
DIFFERENTIAL METHOD: ABNORMAL
EOSINOPHIL # BLD AUTO: 0.3 K/UL
EOSINOPHIL NFR BLD: 1.7 %
ERYTHROCYTE [DISTWIDTH] IN BLOOD BY AUTOMATED COUNT: 15.4 %
EST. GFR  (AFRICAN AMERICAN): >60 ML/MIN/1.73 M^2
EST. GFR  (NON AFRICAN AMERICAN): >60 ML/MIN/1.73 M^2
ETHANOL SERPL-MCNC: <10 MG/DL
GLUCOSE SERPL-MCNC: 115 MG/DL
GLUCOSE UR QL STRIP: NEGATIVE
HCT VFR BLD AUTO: 38.9 %
HGB BLD-MCNC: 12.7 G/DL
HGB UR QL STRIP: NEGATIVE
KETONES UR QL STRIP: NEGATIVE
LEUKOCYTE ESTERASE UR QL STRIP: NEGATIVE
LYMPHOCYTES # BLD AUTO: 2.9 K/UL
LYMPHOCYTES NFR BLD: 18.1 %
MCH RBC QN AUTO: 29.7 PG
MCHC RBC AUTO-ENTMCNC: 32.6 G/DL
MCV RBC AUTO: 91 FL
METHADONE UR QL SCN>300 NG/ML: NEGATIVE
MONOCYTES # BLD AUTO: 0.9 K/UL
MONOCYTES NFR BLD: 5.7 %
NEUTROPHILS # BLD AUTO: 12 K/UL
NEUTROPHILS NFR BLD: 74.2 %
NITRITE UR QL STRIP: NEGATIVE
OPIATES UR QL SCN: NORMAL
PCP UR QL SCN>25 NG/ML: NEGATIVE
PH UR STRIP: 5 [PH] (ref 5–8)
PLATELET # BLD AUTO: 255 K/UL
PMV BLD AUTO: 9.6 FL
POTASSIUM SERPL-SCNC: 3.9 MMOL/L
PROT SERPL-MCNC: 6.6 G/DL
PROT UR QL STRIP: NEGATIVE
RBC # BLD AUTO: 4.27 M/UL
SALICYLATES SERPL-MCNC: <5 MG/DL
SODIUM SERPL-SCNC: 137 MMOL/L
SP GR UR STRIP: 1 (ref 1–1.03)
TOXICOLOGY INFORMATION: NORMAL
TSH SERPL DL<=0.005 MIU/L-ACNC: 1.12 UIU/ML
URN SPEC COLLECT METH UR: NORMAL
UROBILINOGEN UR STRIP-ACNC: NEGATIVE EU/DL
WBC # BLD AUTO: 16.2 K/UL

## 2017-11-14 PROCEDURE — 93010 ELECTROCARDIOGRAM REPORT: CPT | Mod: ,,, | Performed by: INTERNAL MEDICINE

## 2017-11-14 PROCEDURE — 80320 DRUG SCREEN QUANTALCOHOLS: CPT

## 2017-11-14 PROCEDURE — 99283 EMERGENCY DEPT VISIT LOW MDM: CPT | Mod: GT,AF,HB, | Performed by: PSYCHIATRY & NEUROLOGY

## 2017-11-14 PROCEDURE — 85025 COMPLETE CBC W/AUTO DIFF WBC: CPT

## 2017-11-14 PROCEDURE — 81025 URINE PREGNANCY TEST: CPT

## 2017-11-14 PROCEDURE — 82550 ASSAY OF CK (CPK): CPT

## 2017-11-14 PROCEDURE — 81003 URINALYSIS AUTO W/O SCOPE: CPT

## 2017-11-14 PROCEDURE — 80329 ANALGESICS NON-OPIOID 1 OR 2: CPT | Mod: 59

## 2017-11-14 PROCEDURE — 80053 COMPREHEN METABOLIC PANEL: CPT

## 2017-11-14 PROCEDURE — 80307 DRUG TEST PRSMV CHEM ANLYZR: CPT

## 2017-11-14 PROCEDURE — 93005 ELECTROCARDIOGRAM TRACING: CPT

## 2017-11-14 PROCEDURE — 96361 HYDRATE IV INFUSION ADD-ON: CPT

## 2017-11-14 PROCEDURE — 99285 EMERGENCY DEPT VISIT HI MDM: CPT | Mod: 25

## 2017-11-14 PROCEDURE — 84443 ASSAY THYROID STIM HORMONE: CPT

## 2017-11-14 PROCEDURE — 96360 HYDRATION IV INFUSION INIT: CPT

## 2017-11-14 PROCEDURE — 25000003 PHARM REV CODE 250: Performed by: EMERGENCY MEDICINE

## 2017-11-14 RX ORDER — IBUPROFEN 200 MG
1 TABLET ORAL
Status: DISCONTINUED | OUTPATIENT
Start: 2017-11-14 | End: 2017-11-14 | Stop reason: HOSPADM

## 2017-11-14 RX ADMIN — SODIUM CHLORIDE 1000 ML: 0.9 INJECTION, SOLUTION INTRAVENOUS at 07:11

## 2017-11-14 NOTE — ED NOTES
Pt belongings: underwear, shorts, shirt, slippers, watch. Placed in locker 1.   Medications sent home with anna
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
## 2017-11-14 NOTE — ED NOTES
Pt has been placed at [facility]. Accepted by Aneta report is to be called to [phone].

## 2017-11-14 NOTE — ED NOTES
"Pt is more awake at this time, pt has been given juice. Pt does not understand why she needs to stay here states she is ready to sign her papers and go. Explained to patient that she is under a PEC. Pt states "I don't know why yall think im so hostile, and need a watch dog". Pt reoriented, Jess is at bedside. Will continue to monitor pt.   "

## 2017-11-14 NOTE — ED NOTES
Poision control has been contacted about pts consumption of Ativan 1mg x 26. States to not give romazicon, but to just observe pt.

## 2017-11-14 NOTE — ED PROVIDER NOTES
"SCRIBE #1 NOTE: I, Evaristo Garzon, am scribing for, and in the presence of, Amirah Giles DO. I have scribed the entire note.      History      Chief Complaint   Patient presents with    Drug Overdose     pt took aproximatedly 26-28 1mg ativan pills       Review of patient's allergies indicates:   Allergen Reactions    Corticosteroids (glucocorticoids)      "sets off my anxiety real bad"  "sets off my anxiety real bad"    Tramadol Rash        HPI   HPI    11/14/2017, 6:50 AM   History obtained from the patient      History of Present Illness: Kaylene Emery is a 46 y.o. female patient who presents to the Emergency Department for an evaluation following a drug overdose which onset gradually yesterday. Pt reportedly took approximately x26-28 1mg Ativan pills and an unknown number of Flexeril over the course of yesterday. Pt was prescribed 30 Ativan  1 mg on the 13th and only x2 remain in the bottle. Pt was prescribed x30 Flexeril on the 9th of this month and only x2 remain. Pt was reportedly found 20 mins PTA which prompted her  to call EMS. EN route pt was alert, and sating 96% on RA. Pt denies any SI and states she only took x26 ativan to "help her relax." Symptoms are constant and moderate in severity. Exacerbated by nothing and relieved by nothing. Patient denies any suicidal ideations, homicidal ideations, auditory or visual hallucinations, recent increased stress, sleep changes, alcohol use, IV drug use, and all other sxs at this time. No further complaints or concerns at this time.     Arrival mode: EMS    PCP: Jonas Jimenez MD       Past Medical History:  Past Medical History:   Diagnosis Date    Anxiety     Depression     GERD (gastroesophageal reflux disease)     Tobacco use        Past Surgical History:  Past Surgical History:   Procedure Laterality Date    BLADDER REPAIR      CENTRAL LINE  6/28/2017         CHOLECYSTECTOMY  04/07/2017    KNEE ARTHROPLASTY      PARTIAL " HYSTERECTOMY      TUBAL LIGATION           Family History:  Family History   Problem Relation Age of Onset    Hypertension Mother     Diabetes Mother        Social History:  Social History     Social History Main Topics    Smoking status: Current Every Day Smoker     Packs/day: 1.00     Types: Cigarettes    Smokeless tobacco: Never Used    Alcohol use Yes      Comment: occasionally    Drug use: No    Sexual activity: Yes       ROS   Review of Systems   Constitutional: Negative for chills and fever.   HENT: Negative for congestion and sore throat.    Respiratory: Negative for shortness of breath.    Cardiovascular: Negative for chest pain.   Gastrointestinal: Negative for abdominal pain, nausea and vomiting.   Genitourinary: Negative for dysuria and hematuria.   Musculoskeletal: Negative for back pain.   Skin: Negative for rash and wound.   Neurological: Negative for weakness and headaches.   Hematological: Does not bruise/bleed easily.   Psychiatric/Behavioral: Negative for confusion, hallucinations, sleep disturbance and suicidal ideas (HI). The patient is not nervous/anxious.         (-) IV drug use  (-) Alcohol use     Physical Exam      Initial Vitals   BP Pulse Resp Temp SpO2   -- -- -- -- --      MAP       --          Physical Exam  Nursing Notes and Vital Signs Reviewed.  Constitutional: Patient is in no acute distress. Well-developed and well-nourished. Groggy.  Patient easily awoken with verbals.  Head: Atraumatic. Normocephalic.  Eyes: PERRL. EOM intact. Conjunctivae are not pale. No scleral icterus.  ENT: Mucous membranes are moist. Oropharynx is clear and symmetric.  gag intact.  Neck: Supple. Full ROM. No lymphadenopathy.  Cardiovascular: Regular rate. Regular rhythm.  Pulmonary/Chest: No respiratory distress. Clear to auscultation bilaterally. No wheezing or rales.  Abdominal: Soft and non-distended. There is no tenderness.  No rebound, guarding, or rigidity. Good bowel sounds.  Genitourinary:  "No CVA tenderness  Musculoskeletal: Moves all extremities. No obvious deformities. No edema. No calf tenderness.  Skin: Warm and dry.  Neurological:  Alert, awake, and appropriate.  Normal speech. Able to answer questions, but slowly. No acute focal neurological deficits are appreciated.  Psychiatric: Normal affect. Good eye contact. Appropriate in content.    ED Course    Procedures  ED Vital Signs:  Vitals:    11/14/17 0701 11/14/17 0740 11/14/17 1028 11/14/17 1038   BP: (!) 102/53 100/71 94/63 95/65   Pulse: 86 89 76 76   Resp: 20 18 17 17   Temp: 98 °F (36.7 °C)      TempSrc: Oral      SpO2: 96% 96% 95% 96%   Height: 5' 4" (1.626 m)       11/14/17 1256 11/14/17 1347   BP: 98/64 114/61   Pulse: 79 82   Resp: 20 19   Temp:     TempSrc:     SpO2: 96% 96%   Height:         Abnormal Lab Results:  Labs Reviewed   CBC W/ AUTO DIFFERENTIAL - Abnormal; Notable for the following:        Result Value    WBC 16.20 (*)     RDW 15.4 (*)     Gran # 12.0 (*)     Gran% 74.2 (*)     All other components within normal limits   COMPREHENSIVE METABOLIC PANEL - Abnormal; Notable for the following:     Glucose 115 (*)     Albumin 3.0 (*)     All other components within normal limits   SALICYLATE LEVEL - Abnormal; Notable for the following:     Salicylate Lvl <5.0 (*)     All other components within normal limits   ACETAMINOPHEN LEVEL - Abnormal; Notable for the following:     Acetaminophen (Tylenol), Serum 4.0 (*)     All other components within normal limits   TSH   URINALYSIS   DRUG SCREEN PANEL, URINE EMERGENCY   ALCOHOL,MEDICAL (ETHANOL)   ACETAMINOPHEN LEVEL   PREGNANCY TEST, URINE RAPID   CK   CK        All Lab Results:  Results for orders placed or performed during the hospital encounter of 11/14/17   CBC auto differential   Result Value Ref Range    WBC 16.20 (H) 3.90 - 12.70 K/uL    RBC 4.27 4.00 - 5.40 M/uL    Hemoglobin 12.7 12.0 - 16.0 g/dL    Hematocrit 38.9 37.0 - 48.5 %    MCV 91 82 - 98 fL    MCH 29.7 27.0 - 31.0 pg    " MCHC 32.6 32.0 - 36.0 g/dL    RDW 15.4 (H) 11.5 - 14.5 %    Platelets 255 150 - 350 K/uL    MPV 9.6 9.2 - 12.9 fL    Gran # 12.0 (H) 1.8 - 7.7 K/uL    Lymph # 2.9 1.0 - 4.8 K/uL    Mono # 0.9 0.3 - 1.0 K/uL    Eos # 0.3 0.0 - 0.5 K/uL    Baso # 0.05 0.00 - 0.20 K/uL    Gran% 74.2 (H) 38.0 - 73.0 %    Lymph% 18.1 18.0 - 48.0 %    Mono% 5.7 4.0 - 15.0 %    Eosinophil% 1.7 0.0 - 8.0 %    Basophil% 0.3 0.0 - 1.9 %    Differential Method Automated    Comprehensive metabolic panel   Result Value Ref Range    Sodium 137 136 - 145 mmol/L    Potassium 3.9 3.5 - 5.1 mmol/L    Chloride 102 95 - 110 mmol/L    CO2 26 23 - 29 mmol/L    Glucose 115 (H) 70 - 110 mg/dL    BUN, Bld 6 6 - 20 mg/dL    Creatinine 0.7 0.5 - 1.4 mg/dL    Calcium 9.0 8.7 - 10.5 mg/dL    Total Protein 6.6 6.0 - 8.4 g/dL    Albumin 3.0 (L) 3.5 - 5.2 g/dL    Total Bilirubin 0.1 0.1 - 1.0 mg/dL    Alkaline Phosphatase 80 55 - 135 U/L    AST 15 10 - 40 U/L    ALT 12 10 - 44 U/L    Anion Gap 9 8 - 16 mmol/L    eGFR if African American >60 >60 mL/min/1.73 m^2    eGFR if non African American >60 >60 mL/min/1.73 m^2   TSH   Result Value Ref Range    TSH 1.123 0.400 - 4.000 uIU/mL   Urinalysis - clean catch   Result Value Ref Range    Specimen UA Urine, Clean Catch     Color, UA Yellow Yellow, Straw, Lisa    Appearance, UA Clear Clear    pH, UA 5.0 5.0 - 8.0    Specific Gravity, UA 1.005 1.005 - 1.030    Protein, UA Negative Negative    Glucose, UA Negative Negative    Ketones, UA Negative Negative    Bilirubin (UA) Negative Negative    Occult Blood UA Negative Negative    Nitrite, UA Negative Negative    Urobilinogen, UA Negative <2.0 EU/dL    Leukocytes, UA Negative Negative   Drug screen panel, emergency   Result Value Ref Range    Benzodiazepines Negative     Methadone metabolites Negative     Cocaine (Metab.) Negative     Opiate Scrn, Ur Presumptive Positive     Barbiturate Screen, Ur Negative     Amphetamine Screen, Ur Negative     THC Negative      Phencyclidine Negative     Creatinine, Random Ur 15.8 15.0 - 325.0 mg/dL    Toxicology Information SEE COMMENT    Ethanol   Result Value Ref Range    Alcohol, Medical, Serum <10 <10 mg/dL   Acetaminophen level   Result Value Ref Range    Acetaminophen (Tylenol), Serum 19.0 10.0 - 20.0 ug/mL   Salicylate level   Result Value Ref Range    Salicylate Lvl <5.0 (L) 15.0 - 30.0 mg/dL   Pregnancy, urine rapid   Result Value Ref Range    Preg Test, Ur Negative    Acetaminophen level   Result Value Ref Range    Acetaminophen (Tylenol), Serum 4.0 (L) 10.0 - 20.0 ug/mL   CK   Result Value Ref Range    CPK 52 20 - 180 U/L         Imaging Results:  Imaging Results          X-Ray Chest AP Portable (Final result)  Result time 11/14/17 07:45:34    Final result by JENNA Bell Sr., MD (11/14/17 07:45:34)                 Impression:        1. The lungs are clear.  2. Surgical changes.      Electronically signed by: JENNA BELL MD  Date:     11/14/17  Time:    07:45              Narrative:    One-view chest x-ray    Clinical History: Poisoning by unspecified drugs, medicaments and biological substances, accidental (unintentional), initial encounter    Finding: Comparison was made to a prior examination performed on 7/1/2017. There are bilateral breast implants in place. The size of the heart is normal. The lungs are clear. There is no pneumothorax.  The costophrenic angles are sharp. There surgical clips projected over right upper quadrant of the abdomen.                                  The EKG was ordered, reviewed, and independently interpreted by the ED provider.  Interpretation time: 6:54  Rate: 85 BPM  Rhythm: normal sinus rhythm  Interpretation: No STEMI.        The Emergency Provider reviewed the vital signs and test results, which are outlined above.    ED Discussion     6:51 AM: Dr. Giles discussed the pt's case with Poison control who state no charcoal activation is necessary and advised to monitor pt.    7:04 AM:  The PEC hold has been issued by Dr. Giles at this time for a psychiatric evaluation.  Although patient is not suicidal, she is gravely disabled.    8:13 AM: Re-evaluated pt. Pt is resting comfortably and is in no acute distress. Spoke with pt's boyfriend concerning pt's condition and plan of care. D/w boyfriend any concerns expressed at this time.  Patient awaken with verbal stimulus.  Answered all questions. Pt's boyfriend expresses understanding at this time.    12:59 PM: Dr. Giles discussed the pt's case with Telepsych physician who recommends continued PEC of pt.    12:59 PM: Pt has been medically cleared by Dr. Giles at this time.     1:49 PM: I discussed the PEC process and transfer with pt in ED room, pt understands and is requesting a nicotine patch . Patient has eaten lunch.  She has ambulated to the bathroom without assistance. Reassessed pt at this time. Pt is resting comfortably and appears in no acute distress. There are no psychiatric services offered at this facility. D/w pt all pertinent ED information and plan to transfer to psychiatric facility for psychiatric treatment. Pt verbalizes understanding. Patient being transferred by [facility] for ongoing personal protection en route. Pt will be transported by personnel trained in CPR and CPI.Risks:   Death, loss of vitals, MVC,  Permanent neurologic damage, loss of vitals.  Benefits:   Inpatient psychiatric care.  All questions and complaints have been addressed at this time. Pt condition is stable at this time and is clear to transfer to psychiatric facility at this time.   Admitting Physician:    Admitting Facility: [facility]        ED Medication(s):  Medications   sodium chloride 0.9% bolus 1,000 mL (0 mLs Intravenous Stopped 11/14/17 1042)       Discharge Medication List as of 11/14/2017  2:13 PM                Medical Decision Making    Medical Decision Making:   Clinical Tests:   Lab Tests: Ordered and Reviewed  Radiological Study:  Ordered and Reviewed  Medical Tests: Ordered and Reviewed           Scribe Attestation:   Scribe #1: I performed the above scribed service and the documentation accurately describes the services I performed. I attest to the accuracy of the note.    Attending:   Physician Attestation Statement for Scribe #1: I, Amirah Giles DO, personally performed the services described in this documentation, as scribed by Evaristo Garzon, in my presence, and it is both accurate and complete.          Clinical Impression       ICD-10-CM ICD-9-CM   1. Depressive disorder F32.9 311   2. Overdose T50.901A 977.9     E980.5   3. Cigarette nicotine dependence without complication F17.210 305.1       Disposition:   Disposition: Transferred  Condition: Stable         Amirah Giles DO  11/14/17 9948

## 2017-12-13 ENCOUNTER — HOSPITAL ENCOUNTER (EMERGENCY)
Facility: HOSPITAL | Age: 47
Discharge: HOME | End: 2017-12-13
Payer: MEDICAID

## 2017-12-13 VITALS
BODY MASS INDEX: 27.52 KG/M2 | SYSTOLIC BLOOD PRESSURE: 142 MMHG | OXYGEN SATURATION: 98 % | TEMPERATURE: 98 F | WEIGHT: 161.19 LBS | DIASTOLIC BLOOD PRESSURE: 79 MMHG | RESPIRATION RATE: 20 BRPM | HEIGHT: 64 IN | HEART RATE: 98 BPM

## 2017-12-13 DIAGNOSIS — K08.89 PAIN, DENTAL: Primary | ICD-10-CM

## 2017-12-13 DIAGNOSIS — K02.9 DENTAL CAVITY: ICD-10-CM

## 2017-12-13 DIAGNOSIS — R52 PAIN: ICD-10-CM

## 2017-12-13 PROCEDURE — 96372 THER/PROPH/DIAG INJ SC/IM: CPT

## 2017-12-13 PROCEDURE — 99283 EMERGENCY DEPT VISIT LOW MDM: CPT | Mod: 25

## 2017-12-13 PROCEDURE — 25000003 PHARM REV CODE 250: Performed by: PHYSICIAN ASSISTANT

## 2017-12-13 PROCEDURE — 63600175 PHARM REV CODE 636 W HCPCS: Performed by: PHYSICIAN ASSISTANT

## 2017-12-13 RX ORDER — MORPHINE SULFATE 2 MG/ML
2 INJECTION, SOLUTION INTRAMUSCULAR; INTRAVENOUS
Status: COMPLETED | OUTPATIENT
Start: 2017-12-13 | End: 2017-12-13

## 2017-12-13 RX ORDER — ONDANSETRON 4 MG/1
4 TABLET, ORALLY DISINTEGRATING ORAL
Status: COMPLETED | OUTPATIENT
Start: 2017-12-13 | End: 2017-12-13

## 2017-12-13 RX ADMIN — ONDANSETRON 4 MG: 4 TABLET, ORALLY DISINTEGRATING ORAL at 01:12

## 2017-12-13 RX ADMIN — Medication 2 MG: at 01:12

## 2017-12-13 NOTE — ED NOTES
Pt told a third time that provider will not give her a prescriptions for pain medications. Pt states she has dentist apt this morning. Pt is to follow up with dentist.

## 2017-12-13 NOTE — ED PROVIDER NOTES
"SCRIBE #1 NOTE: I, Bridget Vicente, am scribing for, and in the presence of, Krystal Velasquez PA-C. I have scribed the entire note.      History      Chief Complaint   Patient presents with    Dental Pain     Pt reports known cavity, dental appt tomorrow, pain increased tonight.       Review of patient's allergies indicates:   Allergen Reactions    Corticosteroids (glucocorticoids)      "sets off my anxiety real bad"  "sets off my anxiety real bad"    Tramadol Rash        HPI   HPI    12/13/2017, 1:08 AM   History obtained from the patient      History of Present Illness: Kaylene Emery is a 47 y.o. female patient who presents to the Emergency Department for dental pain which onset gradually 1 day ago. Symptoms are constant and moderate in severity. Pt states she has a cavity and has a dentist appt tomorrow. No mitigating or exacerbating factors reported. No associated sxs. Patient denies any fever, chills, difficulty swallowing, SOB, drooling, facial swelling, nausea, emesis, HA, and all other sxs at this time. Prior Tx includes Motrin, Aleve, and Tylenol with no relief. No further complaints or concerns at this time.   Patient requesting a shot of pain medication and a prescription for pain medication.  Explained that if patient opted for shot of pain medication, she would not be prescribed pain medication as her appointment with dentist is tomorrow.  Patient opted to shot of pain medication.         Arrival mode: Personal vehicle      PCP: Jonas Jimenez MD       Past Medical History:  Past Medical History:   Diagnosis Date    Anxiety     Depression     GERD (gastroesophageal reflux disease)     Tobacco use        Past Surgical History:  Past Surgical History:   Procedure Laterality Date    BLADDER REPAIR      CENTRAL LINE  6/28/2017         CHOLECYSTECTOMY  04/07/2017    KNEE ARTHROPLASTY      PARTIAL HYSTERECTOMY      TUBAL LIGATION           Family History:  Family History   Problem Relation Age " of Onset    Hypertension Mother     Diabetes Mother        Social History:  Social History     Social History Main Topics    Smoking status: Current Every Day Smoker     Packs/day: 1.00     Types: Cigarettes    Smokeless tobacco: Never Used    Alcohol use Yes      Comment: occasionally    Drug use: No    Sexual activity: Yes       ROS   Review of Systems   Constitutional: Negative for chills and fever.   HENT: Positive for dental problem (pain). Negative for drooling, facial swelling, sore throat and trouble swallowing.    Respiratory: Negative for shortness of breath.    Cardiovascular: Negative for chest pain.   Gastrointestinal: Negative for nausea and vomiting.   Genitourinary: Negative for dysuria.   Musculoskeletal: Negative for back pain.   Skin: Negative for rash.   Neurological: Negative for weakness and headaches.   Hematological: Does not bruise/bleed easily.   All other systems reviewed and are negative.    Physical Exam      Initial Vitals [12/13/17 0044]   BP Pulse Resp Temp SpO2   (!) 142/79 98 20 98.4 °F (36.9 °C) 98 %      MAP       100          Physical Exam  Nursing Notes and Vital Signs Reviewed.  Constitutional: Patient is in no apparent distress. Well-developed and well-nourished.  Head: Atraumatic. Normocephalic.  Eyes: PERRL. EOM intact. Conjunctivae are not pale. No scleral icterus.  ENT: Mucous membranes are moist. Oropharynx is clear and symmetric. No facial swelling.    Mouth: Multiple cavities and missing teeth. No erythema of gums. No swelling of gums.  Handles secretions without difficulty.   Neck: Supple. Full ROM. No lymphadenopathy.  Cardiovascular: Regular rate. Regular rhythm. No murmurs, rubs, or gallops. Distal pulses are 2+ and symmetric.  Pulmonary/Chest: No respiratory distress. Clear to auscultation bilaterally. No wheezing or rales.  Abdominal: Soft and non-distended.  There is no tenderness.  No rebound, guarding, or rigidity. Good bowel sounds.  Genitourinary: No  "CVA tenderness  Musculoskeletal: Moves all extremities. No obvious deformities. No edema. No calf tenderness.  Skin: Warm and dry.  Neurological:  Alert, awake, and appropriate.  Normal speech.  No acute focal neurological deficits are appreciated.  Psychiatric: Normal affect. Good eye contact. Appropriate in content.    ED Course    Procedures  ED Vital Signs:  Vitals:    12/13/17 0044   BP: (!) 142/79   Pulse: 98   Resp: 20   Temp: 98.4 °F (36.9 °C)   TempSrc: Oral   SpO2: 98%   Weight: 73.1 kg (161 lb 2.5 oz)   Height: 5' 4" (1.626 m)            The Emergency Provider reviewed the vital signs and test results, which are outlined above.    ED Discussion   1:19 AM:. Discussed with pt all pertinent ED information and results. Discussed pt dx and plan of tx. Gave pt all f/u and return to the ED instructions. All questions and concerns were addressed at this time. Pt expresses understanding of information and instructions, and is comfortable with plan to discharge. Pt is stable for discharge.    I discussed with patient and/or family/caretaker that evaluation in the ED does not suggest any emergent or life threatening medical conditions requiring immediate intervention beyond what was provided in the ED, and I believe patient is safe for discharge.  Regardless, an unremarkable evaluation in the ED does not preclude the development or presence of a serious of life threatening condition. As such, patient was instructed to return immediately for any worsening or change in current symptoms.      ED Medication(s):  Medications   morphine injection 2 mg (2 mg Intramuscular Given 12/13/17 0126)   ondansetron disintegrating tablet 4 mg (4 mg Oral Given 12/13/17 0126)       Discharge Medication List as of 12/13/2017  1:18 AM          Follow-up Information     Dentist.    Contact information:  Keep appointment scheduled for tomorrow                   Medical Decision Making              Scribe Attestation:   Scribe #1: I " performed the above scribed service and the documentation accurately describes the services I performed. I attest to the accuracy of the note.    Attending:   Physician Attestation Statement for Scribe #1: I, Krystal Velasquez PA-C, personally performed the services described in this documentation, as scribed by Bridget Vicente, in my presence, and it is both accurate and complete.          Clinical Impression       ICD-10-CM ICD-9-CM   1. Pain, dental K08.89 525.9   2. Dental cavity K02.9 521.00   3. Pain R52 780.96       Disposition:   Disposition: Discharged  Condition: Stable         Krystal Velasquez PA-C  12/13/17 0233

## 2017-12-28 ENCOUNTER — HOSPITAL ENCOUNTER (EMERGENCY)
Facility: HOSPITAL | Age: 47
Discharge: HOME | End: 2017-12-28
Attending: EMERGENCY MEDICINE
Payer: MEDICAID

## 2017-12-28 VITALS
WEIGHT: 160.06 LBS | DIASTOLIC BLOOD PRESSURE: 93 MMHG | RESPIRATION RATE: 18 BRPM | TEMPERATURE: 99 F | HEART RATE: 84 BPM | HEIGHT: 64 IN | OXYGEN SATURATION: 98 % | BODY MASS INDEX: 27.33 KG/M2 | SYSTOLIC BLOOD PRESSURE: 142 MMHG

## 2017-12-28 DIAGNOSIS — Z78.9 HAS RUN OUT OF MEDICATIONS: ICD-10-CM

## 2017-12-28 DIAGNOSIS — J06.9 UPPER RESPIRATORY TRACT INFECTION, UNSPECIFIED TYPE: ICD-10-CM

## 2017-12-28 DIAGNOSIS — G62.9 PERIPHERAL POLYNEUROPATHY: Primary | ICD-10-CM

## 2017-12-28 PROCEDURE — 99283 EMERGENCY DEPT VISIT LOW MDM: CPT

## 2017-12-28 PROCEDURE — 25000003 PHARM REV CODE 250: Performed by: EMERGENCY MEDICINE

## 2017-12-28 RX ORDER — GABAPENTIN 800 MG/1
800 TABLET ORAL 3 TIMES DAILY
Qty: 90 TABLET | Refills: 0 | Status: SHIPPED | OUTPATIENT
Start: 2017-12-28 | End: 2018-06-12

## 2017-12-28 RX ORDER — GABAPENTIN 400 MG/1
800 CAPSULE ORAL ONCE
Status: COMPLETED | OUTPATIENT
Start: 2017-12-28 | End: 2017-12-28

## 2017-12-28 RX ADMIN — GABAPENTIN 800 MG: 400 CAPSULE ORAL at 10:12

## 2017-12-29 NOTE — ED PROVIDER NOTES
"SCRIBE #1 NOTE: I, Carmel Perez, am scribing for, and in the presence of, Angel Armstrong Jr., MD. I have scribed the entire note.      History      Chief Complaint   Patient presents with    Foot Pain     bilateral foot pain from peripheral neuropathy    URI     chest and nasal congestion       Review of patient's allergies indicates:   Allergen Reactions    Corticosteroids (glucocorticoids)      "sets off my anxiety real bad"  "sets off my anxiety real bad"    Tramadol Rash        HPI   HPI    12/28/2017, 10:37 PM   History obtained from the patient      History of Present Illness: Kaylene Emery is a 47 y.o. female patient who presents to the Emergency Department for bilateral foot pain which onset gradually 2 days ago. Patient reports hx of peripheral neuropathy secondary to DM. Patient reports loosing her gabapentin prescription 2 days ago. Patient reports taking 800mg tid. Patient denies any recent injury/trauma. Symptoms are constant and moderate in severity. The patient describes the sxs as nerve pain. No mitigating or exacerbating factors reported. No associated sxs included. Patient also c/o congestion and rhinorrhea onset a couple days ago. Patient reports being around multiple family members with similar sxs. Patient denies any fever, chills, sore throat, ear pain, increased swelling/redness/warmth to bilat feet, ankle pain, and all other sxs at this time. No further complaints or concerns at this time.     Arrival mode: Personal vehicle      PCP: Jonas Jimenez MD       Past Medical History:  Past Medical History:   Diagnosis Date    Anxiety     Depression     GERD (gastroesophageal reflux disease)     Tobacco use        Past Surgical History:  Past Surgical History:   Procedure Laterality Date    BLADDER REPAIR      CENTRAL LINE  6/28/2017         CHOLECYSTECTOMY  04/07/2017    KNEE ARTHROPLASTY      PARTIAL HYSTERECTOMY      TUBAL LIGATION           Family History:  Family History "   Problem Relation Age of Onset    Hypertension Mother     Diabetes Mother        Social History:  Social History     Social History Main Topics    Smoking status: Current Every Day Smoker     Packs/day: 1.00     Types: Cigarettes    Smokeless tobacco: Never Used    Alcohol use Yes      Comment: occasionally    Drug use: No    Sexual activity: Yes       ROS   Review of Systems   Constitutional: Negative for chills and fever.   HENT: Positive for congestion and rhinorrhea. Negative for ear pain and sore throat.    Respiratory: Negative for shortness of breath.    Cardiovascular: Negative for chest pain.   Gastrointestinal: Negative for nausea.   Genitourinary: Negative for dysuria.   Musculoskeletal: Negative for back pain.        (+) bilat feet pain  (-)  increased swelling/redness/warmth to bilat feet, ankle pain   Skin: Negative for rash.   Neurological: Negative for weakness.   Hematological: Does not bruise/bleed easily.   All other systems reviewed and are negative.      Physical Exam      Initial Vitals [12/28/17 2111]   BP Pulse Resp Temp SpO2   (!) 155/94 88 20 98.5 °F (36.9 °C) 98 %      MAP       114.33          Physical Exam  Nursing Notes and Vital Signs Reviewed.  Constitutional: Patient is in no acute distress. Well-developed and well-nourished.  Head: Atraumatic. Normocephalic.  Eyes: PERRL. EOM intact. Conjunctivae are not pale. No scleral icterus.  Ears: Right TM normal. Left TM normal. No erythema. No bulging. No effusion or air-fluid levels. No perforation.   Nose: Patent nares. Turbinates are normal. No drainage. Nasal congestion.  Throat: Moist mucous membranes. Posterior oropharynx is symmetric with erythema. Tonsillar exudate is not present. No trismus. Normal handling of secretions. No stridor.  Neck: Supple. Full ROM. No lymphadenopathy.  Cardiovascular: Regular rate. Regular rhythm. No murmurs, rubs, or gallops. Distal pulses are 2+ and symmetric.  Pulmonary/Chest: No respiratory  "distress. Clear to auscultation bilaterally. No wheezing or rales.  Abdominal: Soft and non-distended.  There is no tenderness.  No rebound, guarding, or rigidity. Good bowel sounds.  Genitourinary: No CVA tenderness  Musculoskeletal: Moves all extremities. No obvious deformities. No edema. No calf tenderness.  RLE, LLE: no evident deformity. Negative for swelling. Bilat feet tender to light touch, consistent with peripheral neuropathy. ROM is normal. Cap refill distally is <2 seconds. DP and PT pulses are equal and 2+ bilaterally. No motor deficit. No distal sensory deficit. Skin intact.   Skin: Warm and dry.  Neurological:  Alert, awake, and appropriate.  Normal speech.  No acute focal neurological deficits are appreciated.  Psychiatric: Normal affect. Good eye contact. Appropriate in content.    ED Course    Procedures  ED Vital Signs:  Vitals:    12/28/17 2111 12/28/17 2301   BP: (!) 155/94 (!) 142/93   Pulse: 88 84   Resp: 20 18   Temp: 98.5 °F (36.9 °C)    TempSrc: Oral    SpO2: 98% 98%   Weight: 72.6 kg (160 lb 0.9 oz)    Height: 5' 4" (1.626 m)           The Emergency Provider reviewed the vital signs and test results, which are outlined above.    ED Discussion     10:47 PM: Reassessed pt at this time. Discussed with pt all pertinent ED information and results. Discussed pt dx and plan of tx. Gave pt all f/u and return to the ED instructions. All questions and concerns were addressed at this time. Pt expresses understanding of information and instructions, and is comfortable with plan to discharge. Pt is stable for discharge.  Discussed with patient to f/u with PCP.    I discussed with patient and/or family/caretaker that evaluation in the ED does not suggest any emergent or life threatening medical conditions requiring immediate intervention beyond what was provided in the ED, and I believe patient is safe for discharge.  Regardless, an unremarkable evaluation in the ED does not preclude the development or " presence of a serious of life threatening condition. As such, patient was instructed to return immediately for any worsening or change in current symptoms.    Regarding UPPER RESPIRATORY ILLNESS, for treatment, I encouraged patient to: drink plenty of fluids; get lots of rest; take medications as prescribed; use over-the-counter medications for symptomatic treatment;  and use a humidifier or steam in the bathroom to improve patency of upper airway.  Patient instructed to notify primary care provider, or return to the emergency department, if the they: have a cough most days or have a cough that returns frequently; begin coughing up blood; develop a high fever or shaking chills; have a low-grade fever for three or more days; develop thick, greenish mucus, especially if it has a bad smell; and experience shortness of breath or chest pain. For prevention, discussed with patient the importance of refraining from smoking (if applicable), getting annual influenza vaccines, reducing exposure to air pollution, and the need to frequently wash hands to avoid spread of infection.       ED Medication(s):  Medications   gabapentin capsule 800 mg (800 mg Oral Given 12/28/17 2258)       Discharge Medication List as of 12/28/2017 10:51 PM      START taking these medications    Details   gabapentin (NEURONTIN) 800 MG tablet Take 1 tablet (800 mg total) by mouth 3 (three) times daily., Starting Thu 12/28/2017, Until Fri 12/28/2018, Print             Follow-up Information     Jonas Jimenez MD. Schedule an appointment as soon as possible for a visit in 1 week.    Specialty:  Family Medicine  Contact information:  [address] [id number] [phone]             [facility]    Specialty:  Emergency Medicine  Why:  As needed, If symptoms worsen  Contact information:  [address] [facility] [address] [phone]                   Medical Decision Making               Scribe Attestation:   Scribe #1: I performed the above scribed service and the documentation accurately describes the services I performed. I attest to the accuracy of the note.    Attending:   Physician Attestation Statement for Scribe #1: I, Angel Armstrong Jr., MD, personally performed the services described in this documentation, as scribed by Carmel Perez, in my presence, and it is both accurate and complete.          Clinical Impression       ICD-10-CM ICD-9-CM   1. Peripheral polyneuropathy G62.9 356.9   2. Has run out of medications Z78.9 V49.89   3. Upper respiratory tract infection, unspecified type J06.9 465.9       Disposition:   Disposition: Discharged  Condition: Stable         Angel Armstrong Jr., MD  01/02/18 0818

## 2018-01-04 ENCOUNTER — HOSPITAL ENCOUNTER (EMERGENCY)
Facility: HOSPITAL | Age: 48
Discharge: HOME | End: 2018-01-04
Payer: MEDICAID

## 2018-01-04 VITALS
WEIGHT: 163.38 LBS | RESPIRATION RATE: 20 BRPM | SYSTOLIC BLOOD PRESSURE: 141 MMHG | TEMPERATURE: 98 F | HEART RATE: 81 BPM | DIASTOLIC BLOOD PRESSURE: 90 MMHG | BODY MASS INDEX: 27.89 KG/M2 | HEIGHT: 64 IN | OXYGEN SATURATION: 100 %

## 2018-01-04 DIAGNOSIS — J32.9 SINUSITIS, UNSPECIFIED CHRONICITY, UNSPECIFIED LOCATION: ICD-10-CM

## 2018-01-04 DIAGNOSIS — M79.2 PERIPHERAL NEUROPATHIC PAIN: Primary | ICD-10-CM

## 2018-01-04 PROCEDURE — 99283 EMERGENCY DEPT VISIT LOW MDM: CPT

## 2018-01-04 RX ORDER — AZITHROMYCIN 250 MG/1
TABLET, FILM COATED ORAL
Qty: 6 TABLET | Refills: 0 | Status: SHIPPED | OUTPATIENT
Start: 2018-01-04 | End: 2018-01-09

## 2018-01-04 RX ORDER — HYDROCODONE BITARTRATE AND ACETAMINOPHEN 10; 325 MG/1; MG/1
1 TABLET ORAL EVERY 4 HOURS PRN
Qty: 6 TABLET | Refills: 0 | Status: SHIPPED | OUTPATIENT
Start: 2018-01-04 | End: 2018-01-14

## 2018-01-04 NOTE — ED PROVIDER NOTES
"SCRIBE #1 NOTE: I, Preeti Barry, am scribing for, and in the presence of, ALAINA Francis. I have scribed the entire note.      History      Chief Complaint   Patient presents with    Peripheral Neuropathy     pt c/o peripheral neuropathy as well head cold; has appt with PCP tomorrow at 1030       Review of patient's allergies indicates:   Allergen Reactions    Corticosteroids (glucocorticoids)      "sets off my anxiety real bad"  "sets off my anxiety real bad"    Tramadol Rash        HPI   HPI    1/4/2018, 4:28 PM   History obtained from the patient      History of Present Illness: Kaylene Emery is a 47 y.o. female patient who presents to the Emergency Department for peripheral neuropathy for about a week. Symptoms are constant and moderate in severity. Pt reports having an appointment with her PCP tomorrow at 10:30AM. No mitigating or exacerbating factors reported. Pt also c/o of congestion, cough, and sinus pressure. Patient denies any CP, SOB, fever, chills, n/v/d, abd pain, and all other sxs at this time. Prior Tx includes gabapentin. No further complaints or concerns at this time.         Arrival mode: Personal vehicle    PCP: Jonas Jimenez MD       Past Medical History:  Past Medical History:   Diagnosis Date    Anxiety     Depression     GERD (gastroesophageal reflux disease)     Tobacco use        Past Surgical History:  Past Surgical History:   Procedure Laterality Date    BLADDER REPAIR      CENTRAL LINE  6/28/2017         CHOLECYSTECTOMY  04/07/2017    KNEE ARTHROPLASTY      PARTIAL HYSTERECTOMY      TUBAL LIGATION           Family History:  Family History   Problem Relation Age of Onset    Hypertension Mother     Diabetes Mother        Social History:  Social History     Social History Main Topics    Smoking status: Current Every Day Smoker     Packs/day: 1.00     Types: Cigarettes    Smokeless tobacco: Never Used    Alcohol use Yes      Comment: occasionally    Drug " use: No    Sexual activity: Yes       ROS   Review of Systems   Constitutional: Negative for chills and fever.   HENT: Positive for congestion and sinus pressure. Negative for sore throat.    Respiratory: Positive for cough. Negative for shortness of breath.    Cardiovascular: Negative for chest pain.   Gastrointestinal: Negative for abdominal pain, diarrhea, nausea and vomiting.   Genitourinary: Negative for dysuria.   Musculoskeletal: Negative for back pain and neck pain.        (+) Peripheral Neuropathy   Skin: Negative for rash.   Neurological: Negative for weakness.   Hematological: Does not bruise/bleed easily.   All other systems reviewed and are negative.    Physical Exam      Initial Vitals [01/04/18 1623]   BP Pulse Resp Temp SpO2   (!) 141/90 81 20 97.7 °F (36.5 °C) 100 %      MAP       107          Physical Exam  Nursing Notes and Vital Signs Reviewed.  Constitutional: Patient is in no acute distress. Well-developed and well-nourished.  Head: Atraumatic. Normocephalic.  Eyes: PERRL. EOM intact. Conjunctivae are not pale. No scleral icterus.  ENT: Nasal mucosal edema. Oropharynx is clear and symmetric.    Neck: Supple. Full ROM. No lymphadenopathy.  Cardiovascular: Regular rate. Regular rhythm. No murmurs, rubs, or gallops. Distal pulses are 2+ and symmetric.  Pulmonary/Chest: No respiratory distress. Clear to auscultation bilaterally. No wheezing or rales.  Abdominal: Soft and non-distended.  There is no tenderness.  No rebound, guarding, or rigidity. Good bowel sounds.  Genitourinary: No CVA tenderness  Musculoskeletal: Moves all extremities. No obvious deformities. No edema. No calf tenderness. Tenderness dorsal aspect of feet bilaterally.  Skin: Warm and dry.  Neurological:  Alert, awake, and appropriate.  Normal speech.  No acute focal neurological deficits are appreciated.  Psychiatric: Normal affect. Good eye contact. Appropriate in content.    ED Course    Procedures  ED Vital Signs:  Vitals:     "01/04/18 1623   BP: (!) 141/90   Pulse: 81   Resp: 20   Temp: 97.7 °F (36.5 °C)   TempSrc: Oral   SpO2: 100%   Weight: 74.1 kg (163 lb 5.8 oz)   Height: 5' 4" (1.626 m)              The Emergency Provider reviewed the vital signs and test results, which are outlined above.    ED Discussion     4:31 PM: Discussed with pt all pertinent ED information and results. Discussed pt dx and plan of tx. Gave pt all f/u and return to the ED instructions. All questions and concerns were addressed at this time. Pt expresses understanding of information and instructions, and is comfortable with plan to discharge. Pt is stable for discharge.    Patient presents with upper respiratory and flulike symptoms. Based on my assessment in the ED, I do not suspect any respiratory, airway, pulmonary, cardiovascular (including myocarditis), metabolic, CNS, medical, or surgical emergency medical condition. I have discussed with the patient and/or caregiver signs and symptoms for secondary bacterial infections, such as pneumonia. I believe that the patient's symptoms are most consistent with a viral illness, possibly influenza. Patient is safe for discharge home with conservative therapy.      ED Medication(s):  Medications - No data to display    Discharge Medication List as of 1/4/2018  4:30 PM      START taking these medications    Details   azithromycin (Z-PETER) 250 MG tablet Take 2 tablets by mouth on day 1; Take 1 tablet by mouth on days 2-5, Print      hydrocodone-acetaminophen 10-325mg (NORCO)  mg Tab Take 1 tablet by mouth every 4 (four) hours as needed., Starting Thu 1/4/2018, Until Sun 1/14/2018, Print             Follow-up Information     Jonas Jimenez MD. Go in 2 days.    Specialty:  Family Medicine  Contact information:  [address] [facility] [address]  [facility] [address]  [phone]                     Medical Decision Making              Scribe Attestation:   Scribe #1: I performed the above scribed service and the " documentation accurately describes the services I performed. I attest to the accuracy of the note.    Attending:   Physician Attestation Statement for Scribe #1: I, ALAINA Francis, personally performed the services described in this documentation, as scribed by Preeti Reddy, in my presence, and it is both accurate and complete.          Clinical Impression       ICD-10-CM ICD-9-CM   1. Peripheral neuropathic pain M79.2 729.2   2. Sinusitis, unspecified chronicity, unspecified location J32.9 473.9       Disposition:   Disposition: Discharged  Condition: Stable         ALAINA Carrero  01/05/18 0819

## 2018-04-30 VITALS
RESPIRATION RATE: 20 BRPM | BODY MASS INDEX: 28.93 KG/M2 | HEIGHT: 64 IN | SYSTOLIC BLOOD PRESSURE: 140 MMHG | OXYGEN SATURATION: 97 % | DIASTOLIC BLOOD PRESSURE: 77 MMHG | HEART RATE: 79 BPM | TEMPERATURE: 99 F | WEIGHT: 169.44 LBS

## 2018-04-30 PROCEDURE — 99283 EMERGENCY DEPT VISIT LOW MDM: CPT

## 2018-05-01 ENCOUNTER — HOSPITAL ENCOUNTER (EMERGENCY)
Facility: HOSPITAL | Age: 48
Discharge: HOME | End: 2018-05-01
Attending: EMERGENCY MEDICINE
Payer: MEDICAID

## 2018-05-01 DIAGNOSIS — G57.93 NEUROPATHY OF BOTH FEET: Primary | ICD-10-CM

## 2018-05-01 PROCEDURE — 25000003 PHARM REV CODE 250: Performed by: EMERGENCY MEDICINE

## 2018-05-01 RX ORDER — AMITRIPTYLINE HYDROCHLORIDE 25 MG/1
25 TABLET, FILM COATED ORAL
Status: COMPLETED | OUTPATIENT
Start: 2018-05-01 | End: 2018-05-01

## 2018-05-01 RX ORDER — AMITRIPTYLINE HYDROCHLORIDE 25 MG/1
25 TABLET, FILM COATED ORAL NIGHTLY PRN
Qty: 10 TABLET | Refills: 0 | Status: SHIPPED | OUTPATIENT
Start: 2018-05-01 | End: 2018-06-12

## 2018-05-01 RX ADMIN — AMITRIPTYLINE HYDROCHLORIDE 25 MG: 25 TABLET, FILM COATED ORAL at 12:05

## 2018-05-01 NOTE — ED PROVIDER NOTES
"SCRIBE #1 NOTE: I, Carmel Chris, am scribing for, and in the presence of, Melita Saavedra MD. I have scribed the entire note.      History      Chief Complaint   Patient presents with    Peripheral Neuropathy     pt c/o pain to bilateral foot/toe pain from neuropathy       Review of patient's allergies indicates:   Allergen Reactions    Corticosteroids (glucocorticoids)      "sets off my anxiety real bad"  "sets off my anxiety real bad"    Tramadol Rash        HPI   HPI    5/1/2018, 12:22 AM   History obtained from the patient      History of Present Illness: Kaylene Emery is a 47 y.o. female patient with PMHx of DM and neuropathy who presents to the Emergency Department for numbness to bilat feet which worsened a few days ago. Patient states her PCP prescribed her Lyrica, but has been having difficulty filling the prescription secondary to insurance issues. Symptoms are constant and moderate in severity. No mitigating or exacerbating factors reported. No associated sxs included. Prior tx includes 600mg Gabapentin with minimal relief. Patient denies any fever, chills, HA, dizziness, CP, SOB, N/V/D, and all other sxs at this time. No further complaints or concerns at this time.     Arrival mode: Personal vehicle     PCP: Jonas Jimenez MD       Past Medical History:  Past Medical History:   Diagnosis Date    Anxiety     Depression     GERD (gastroesophageal reflux disease)     Tobacco use        Past Surgical History:  Past Surgical History:   Procedure Laterality Date    BLADDER REPAIR      CENTRAL LINE  6/28/2017         CHOLECYSTECTOMY  04/07/2017    KNEE ARTHROPLASTY      PARTIAL HYSTERECTOMY      TUBAL LIGATION           Family History:  Family History   Problem Relation Age of Onset    Hypertension Mother     Diabetes Mother        Social History:  Social History     Social History Main Topics    Smoking status: Current Every Day Smoker     Packs/day: 1.00     Types: Cigarettes    Smokeless " tobacco: Never Used    Alcohol use Yes      Comment: occasionally    Drug use: No    Sexual activity: Yes       ROS   Review of Systems   Constitutional: Negative for chills and fever.   HENT: Negative for sore throat.    Respiratory: Negative for shortness of breath.    Cardiovascular: Negative for chest pain.   Gastrointestinal: Negative for diarrhea, nausea and vomiting.   Genitourinary: Negative for dysuria.   Musculoskeletal: Negative for back pain.   Skin: Negative for rash.   Neurological: Positive for numbness (bilat feet). Negative for dizziness, weakness and headaches.   Hematological: Does not bruise/bleed easily.   All other systems reviewed and are negative.      Physical Exam      Initial Vitals [04/30/18 2250]   BP Pulse Resp Temp SpO2   (!) 140/77 79 20 98.5 °F (36.9 °C) 97 %      MAP       98          Physical Exam  Nursing Notes and Vital Signs Reviewed.  Constitutional: Patient is in no acute distress. Well-developed and well-nourished.  Head: Atraumatic. Normocephalic.  Eyes: PERRL. EOM intact. Conjunctivae are not pale. No scleral icterus.  ENT: Mucous membranes are moist. Oropharynx is clear and symmetric.    Neck: Supple. Full ROM. No lymphadenopathy.  Cardiovascular: Regular rate. Regular rhythm. No murmurs, rubs, or gallops. Distal pulses are 2+ and symmetric.  Pulmonary/Chest: No respiratory distress. Clear to auscultation bilaterally. No wheezing or rales.  Abdominal: Soft and non-distended.  There is no tenderness.  No rebound, guarding, or rigidity. Good bowel sounds.  Genitourinary: No CVA tenderness  Musculoskeletal: Moves all extremities. No obvious deformities. No edema. No calf tenderness.  RLE, LLE: no evident deformity. Negative for swelling. Negative for tenderness. ROM is normal. Cap refill distally is <2 seconds. DP and PT pulses are equal and 2+ bilaterally. No motor deficit. No distal sensory deficit  Skin: Warm and dry.  Neurological:  Alert, awake, and appropriate.   "Normal speech.  No acute focal neurological deficits are appreciated.  Psychiatric: Normal affect. Good eye contact. Appropriate in content.    ED Course    Procedures  ED Vital Signs:  Vitals:    04/30/18 2250   BP: (!) 140/77   Pulse: 79   Resp: 20   Temp: 98.5 °F (36.9 °C)   TempSrc: Oral   SpO2: 97%   Weight: 76.9 kg (169 lb 6.8 oz)   Height: 5' 4" (1.626 m)              The Emergency Provider reviewed the vital signs and test results, which are outlined above.    ED Discussion     12:34 AM: Reassessed pt at this time. Discussed with pt all pertinent ED information and results. Discussed pt dx and plan of tx. Gave pt all f/u and return to the ED instructions. All questions and concerns were addressed at this time. Pt expresses understanding of information and instructions, and is comfortable with plan to discharge. Pt is stable for discharge.  Advised patient to f/u with PCP.    I discussed with patient and/or family/caretaker that evaluation in the ED does not suggest any emergent or life threatening medical conditions requiring immediate intervention beyond what was provided in the ED, and I believe patient is safe for discharge.  Regardless, an unremarkable evaluation in the ED does not preclude the development or presence of a serious of life threatening condition. As such, patient was instructed to return immediately for any worsening or change in current symptoms.      ED Medication(s):  Medications   amitriptyline tablet 25 mg (25 mg Oral Given 5/1/18 0046)       Discharge Medication List as of 5/1/2018 12:34 AM      START taking these medications    Details   amitriptyline (ELAVIL) 25 MG tablet Take 1 tablet (25 mg total) by mouth nightly as needed for Pain., Starting Tue 5/1/2018, Until Wed 5/1/2019, Print             Follow-up Information     Jonas Jimenez MD.    Specialty:  Family Medicine  Contact information:  [address] [facility] [address]  [facility] [id number] [phone]                 "     Medical Decision Making              Scribe Attestation:   Scribe #1: I performed the above scribed service and the documentation accurately describes the services I performed. I attest to the accuracy of the note.    Attending:   Physician Attestation Statement for Scribe #1: I, Melita Saavedra MD, personally performed the services described in this documentation, as scribed by Carmel Perez, in my presence, and it is both accurate and complete.          Clinical Impression       ICD-10-CM ICD-9-CM   1. Neuropathy of both feet G57.93 356.9       Disposition:   Disposition: Discharged  Condition: Stable         Melita Saavedra MD  05/01/18 0542

## 2018-06-12 ENCOUNTER — HOSPITAL ENCOUNTER (EMERGENCY)
Facility: HOSPITAL | Age: 48
Discharge: HOME | End: 2018-06-12
Attending: EMERGENCY MEDICINE
Payer: MEDICAID

## 2018-06-12 VITALS
WEIGHT: 175 LBS | HEART RATE: 104 BPM | OXYGEN SATURATION: 96 % | HEIGHT: 64 IN | DIASTOLIC BLOOD PRESSURE: 83 MMHG | TEMPERATURE: 98 F | SYSTOLIC BLOOD PRESSURE: 144 MMHG | RESPIRATION RATE: 18 BRPM | BODY MASS INDEX: 29.88 KG/M2

## 2018-06-12 DIAGNOSIS — G62.9 PERIPHERAL POLYNEUROPATHY: Primary | ICD-10-CM

## 2018-06-12 PROCEDURE — 99283 EMERGENCY DEPT VISIT LOW MDM: CPT

## 2018-06-12 PROCEDURE — 25000003 PHARM REV CODE 250: Performed by: EMERGENCY MEDICINE

## 2018-06-12 RX ORDER — PREGABALIN 75 MG/1
75 CAPSULE ORAL 3 TIMES DAILY
Qty: 90 CAPSULE | Refills: 0 | Status: SHIPPED | OUTPATIENT
Start: 2018-06-12 | End: 2019-08-10 | Stop reason: CLARIF

## 2018-06-12 RX ORDER — HYDROCODONE BITARTRATE AND ACETAMINOPHEN 10; 325 MG/1; MG/1
1 TABLET ORAL
Status: COMPLETED | OUTPATIENT
Start: 2018-06-12 | End: 2018-06-12

## 2018-06-12 RX ADMIN — HYDROCODONE BITARTRATE AND ACETAMINOPHEN 1 TABLET: 10; 325 TABLET ORAL at 10:06

## 2018-06-13 ENCOUNTER — HOSPITAL ENCOUNTER (EMERGENCY)
Facility: HOSPITAL | Age: 48
Discharge: HOME | End: 2018-06-13
Payer: MEDICAID

## 2018-06-13 VITALS
DIASTOLIC BLOOD PRESSURE: 88 MMHG | WEIGHT: 175 LBS | HEIGHT: 64 IN | OXYGEN SATURATION: 97 % | TEMPERATURE: 99 F | SYSTOLIC BLOOD PRESSURE: 134 MMHG | RESPIRATION RATE: 20 BRPM | HEART RATE: 101 BPM | BODY MASS INDEX: 29.88 KG/M2

## 2018-06-13 DIAGNOSIS — S80.812A ABRASION OF LEFT LOWER EXTREMITY, INITIAL ENCOUNTER: ICD-10-CM
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
## 2018-06-13 DIAGNOSIS — S80.11XA CONTUSION OF RIGHT LOWER EXTREMITY, INITIAL ENCOUNTER: ICD-10-CM

## 2018-06-13 DIAGNOSIS — M79.606 LEG PAIN: ICD-10-CM

## 2018-06-13 DIAGNOSIS — F17.200 CURRENT SMOKER: ICD-10-CM

## 2018-06-13 DIAGNOSIS — R03.0 ELEVATED BLOOD PRESSURE READING: ICD-10-CM

## 2018-06-13 PROCEDURE — 99283 EMERGENCY DEPT VISIT LOW MDM: CPT

## 2018-06-13 PROCEDURE — 25000003 PHARM REV CODE 250: Performed by: PHYSICIAN ASSISTANT

## 2018-06-13 RX ORDER — METFORMIN HYDROCHLORIDE 500 MG/1
500 TABLET ORAL 2 TIMES DAILY WITH MEALS
COMMUNITY

## 2018-06-13 RX ORDER — HYDROCODONE BITARTRATE AND ACETAMINOPHEN 5; 325 MG/1; MG/1
1 TABLET ORAL
Status: COMPLETED | OUTPATIENT
Start: 2018-06-13 | End: 2018-06-13

## 2018-06-13 RX ORDER — CYCLOBENZAPRINE HCL 10 MG
10 TABLET ORAL NIGHTLY PRN
Qty: 10 TABLET | Refills: 0 | Status: SHIPPED | OUTPATIENT
Start: 2018-06-13 | End: 2018-06-23

## 2018-06-13 RX ADMIN — HYDROCODONE BITARTRATE AND ACETAMINOPHEN 1 TABLET: 5; 325 TABLET ORAL at 08:06

## 2018-06-13 NOTE — ED PROVIDER NOTES
"   History      Chief Complaint   Patient presents with    Fall     Pt states, "I went to check my mail and fell and skinned up my left leg and my right leg feels like I pulled a muscle."       Review of patient's allergies indicates:   Allergen Reactions    Corticosteroids (glucocorticoids)      "sets off my anxiety real bad"  "sets off my anxiety real bad"    Tramadol Rash        HPI   HPI    6/13/2018, 8:35 AM   History obtained from the patient      History of Present Illness: Kaylene Emery is a 47 y.o. female patient who presents to the Emergency Department for fall that occurred this morning when she was walking to mailbox.  Patient complains of muscle pain in right leg and abrasion of left leg. Tetanus is up to date.  Patient has history of peripheral neuropathy.  Current medications include Lyrica and Metformin.  Denies LOC, head injury, fever, vomiting, diarrhea, chest pain, SOB, headache, dizziness.        Arrival mode: Personal vehicle      PCP: Jonas Jimenez MD       Past Medical History:  Past Medical History:   Diagnosis Date    Anxiety     Depression     GERD (gastroesophageal reflux disease)     Tobacco use        Past Surgical History:  Past Surgical History:   Procedure Laterality Date    BLADDER REPAIR      CENTRAL LINE  6/28/2017         CHOLECYSTECTOMY  04/07/2017    KNEE ARTHROPLASTY      PARTIAL HYSTERECTOMY      TUBAL LIGATION           Family History:  Family History   Problem Relation Age of Onset    Hypertension Mother     Diabetes Mother        Social History:  Social History     Social History Main Topics    Smoking status: Current Every Day Smoker     Packs/day: 1.00     Types: Cigarettes    Smokeless tobacco: Never Used    Alcohol use Yes      Comment: occasionally    Drug use: No    Sexual activity: Yes       ROS   Review of Systems   Constitutional: Negative for chills and fever.   HENT: Negative for congestion and rhinorrhea.    Eyes: Negative for " discharge and redness.   Respiratory: Negative for cough and wheezing.    Cardiovascular: Negative for chest pain and palpitations.   Gastrointestinal: Negative for diarrhea and vomiting.   Genitourinary: Negative for dysuria and frequency.   Musculoskeletal: Positive for arthralgias, back pain and myalgias.   Skin:        Abrasion   Neurological: Negative for dizziness and headaches.       Physical Exam      Initial Vitals [06/13/18 0813]   BP Pulse Resp Temp SpO2   127/85 106 20 98.9 °F (37.2 °C) 96 %      MAP       --          Physical Exam  Nursing Notes and Vital Signs Reviewed.  Constitutional: Patient is in no apparent distress. Awake and alert. Well-developed and well-nourished.  Head: Atraumatic. Normocephalic.  Eyes: PERRL. EOM intact. Conjunctivae are not pale. No scleral icterus.  ENT: Mucous membranes are moist. Oropharynx is clear and symmetric.    Neck: Supple. Full ROM. No lymphadenopathy.  Cardiovascular: Regular rate. Regular rhythm. No murmurs, rubs, or gallops. Distal pulses are 2+ and symmetric.  Pulmonary/Chest: No respiratory distress. Clear to auscultation bilaterally. No wheezing, rales, or rhonchi.  Abdominal: Soft and non-distended.  There is no tenderness.  No rebound, guarding, or rigidity. Genitourinary: No CVA tenderness  Musculoskeletal: Moves all extremities. No obvious deformities. No edema. No calf tenderness.  RLE:  Abrasion of shin.  Dorsalis pedis pulse 2+.  Brisk capillary refill.  Full ROM.  LLE:  TTP anterior shin. Pain with flexion and extension of knee.  Full ROM.  Dorsalis pedis pulse 2+.  Brisk capillary refill.  Back:  Pain with lumbar flexion and extension.  No tenderness over lumbar spine.   Skin: Warm and dry.  Neurological:  Alert, awake, and appropriate.  Normal speech.  No acute focal neurological deficits are appreciated.  Negative SLR bilaterally.   Psychiatric: Normal affect. Good eye contact. Appropriate in content.    ED Course    Procedures  ED Vital  "Signs:  Vitals:    06/13/18 0813   BP: 127/85   Pulse: 106   Resp: 20   Temp: 98.9 °F (37.2 °C)   TempSrc: Oral   SpO2: 96%   Weight: 79.4 kg (175 lb)   Height: 5' 4" (1.626 m)       Abnormal Lab Results:  Labs Reviewed - No data to display       Imaging Results:  Imaging Results          X-Ray Tibia Fibula 2 View Right (Final result)  Result time 06/13/18 08:58:53    Final result by JENNA Bell Sr., MD (06/13/18 08:58:53)                 Impression:      Normal study.      Electronically signed by: Nuno Bell MD  Date:    06/13/2018  Time:    08:58             Narrative:    EXAMINATION:  XR TIBIA FIBULA 2 VIEW RIGHT    CLINICAL HISTORY:  Pain in leg, unspecified    COMPARISON:  None    FINDINGS:  There is no fracture. There is no dislocation.                                        The Emergency Provider reviewed the vital signs and test results, which are outlined above.    ED Discussion     9:37 AM: Discussed with pt all pertinent ED information and results. Discussed pt dx and plan of tx. Gave pt all f/u and return to the ED instructions. All questions and concerns were addressed at this time. Pt expresses understanding of information and instructions, and is comfortable with plan to discharge. Pt is stable for discharge.    Pre-hypertension/Hypertension: The pt has been informed that they may have pre-hypertension or hypertension based on a blood pressure reading in the ED. I recommend that the pt call the PCP listed on their discharge instructions or a physician of their choice this week to arrange f/u for further evaluation of possible pre-hypertension or hypertension.     I discussed with patient and/or family/caretaker that negative X-ray does not rule out occult fracture or other soft tissue injury.  Persistent pain greater than 7-10 days or increased pain requires follow up, specifically with orthopedics.       ED Medication(s):  Medications   HYDROcodone-acetaminophen 5-325 mg per tablet 1 " tablet (1 tablet Oral Given 6/13/18 0855)       New Prescriptions    CYCLOBENZAPRINE (FLEXERIL) 10 MG TABLET    Take 1 tablet (10 mg total) by mouth nightly as needed for Muscle spasms.       Follow-up Information     Jonas Jimenez MD In 3 days.    Specialty:  Family Medicine  Contact information:  [address] [facility] [address]  [facility] [address]  [phone]                     Medical Decision Making        Additional MDM:   Smoking Cessation: The patient was counseled on tobacco related  health complications.            Clinical Impression       ICD-10-CM ICD-9-CM   1. Fall, initial encounter W19.XXXA E888.9   2. Leg pain M79.606 729.5   3. Contusion of right lower extremity, initial encounter S80.11XA 924.5   4. Abrasion of left lower extremity, initial encounter S80.812A 916.0   5. Elevated blood pressure reading R03.0 796.2   6. Current smoker F17.200 305.1       Disposition:   Disposition: Discharged  Condition: Stable           Krystal Velasquez PA-C  06/13/18 0938

## 2018-06-13 NOTE — ED PROVIDER NOTES
"SCRIBE #1 NOTE: I, Chantel Charles, am scribing for, and in the presence of, Angel Armstrong Jr., MD. I have scribed the entire note.      History      Chief Complaint   Patient presents with    Peripheral Neuropathy     Pt c/o bilateral foot pain, states "I have peripheral neuropathy and I take lyrica but it is not helping during this flare up that started this morning."       Review of patient's allergies indicates:   Allergen Reactions    Corticosteroids (glucocorticoids)      "sets off my anxiety real bad"  "sets off my anxiety real bad"    Tramadol Rash        HPI   HPI    6/12/2018, 9:59 PM   History obtained from the patient      History of Present Illness: Kaylene Emery is a 47 y.o. female patient with a PMHx of peripheral neuropathy who presents to the Emergency Department for acute exacerbation of neuropathy pain to BLE which onset gradually last night. Pt reports pain and tingling starts just above her ankles and migrates down to her toes. Symptoms are constant and moderate in severity and consistent with her chronic issue of peripheral neuropathy.  No mitigating or exacerbating factors reported.  No other associated sxs reported. Patient denies any recent trauma/injury, focal weakness/numbness, color change, and all other sxs at this time. Prior tx includes tylenol, ibuprofen, and lyrica 75mg twice daily with minimal improvement. Recently transitioned from Neurontin to lyrica. Pt reports blood sugar was 143 today.  No further complaints or concerns at this time.         Arrival mode: Personal vehicle    PCP: Jonas Jimenez MD       Past Medical History:  Past Medical History:   Diagnosis Date    Anxiety     Depression     GERD (gastroesophageal reflux disease)     Tobacco use        Past Surgical History:  Past Surgical History:   Procedure Laterality Date    BLADDER REPAIR      CENTRAL LINE  6/28/2017         CHOLECYSTECTOMY  04/07/2017    KNEE ARTHROPLASTY      PARTIAL HYSTERECTOMY   "    TUBAL LIGATION           Family History:  Family History   Problem Relation Age of Onset    Hypertension Mother     Diabetes Mother        Social History:  Social History     Social History Main Topics    Smoking status: Current Every Day Smoker     Packs/day: 1.00     Types: Cigarettes    Smokeless tobacco: Never Used    Alcohol use Yes      Comment: occasionally    Drug use: No    Sexual activity: Yes       ROS   Review of Systems   Constitutional: Negative for fever.   HENT: Negative for sore throat.    Respiratory: Negative for shortness of breath.    Cardiovascular: Negative for chest pain.   Gastrointestinal: Negative for nausea.   Genitourinary: Negative for dysuria.   Musculoskeletal: Negative for back pain.   Skin: Negative for rash.   Neurological: Negative for weakness and numbness.        +neuropathy pain to BLE +tingling to BLE   Hematological: Does not bruise/bleed easily.   All other systems reviewed and are negative.      Physical Exam      Initial Vitals [06/12/18 2113]   BP Pulse Resp Temp SpO2   (!) 144/83 104 18 97.8 °F (36.6 °C) 96 %      MAP       --          Physical Exam  Nursing Notes and Vital Signs Reviewed.  Constitutional: Patient is in no acute distress. Well-developed and well-nourished.  Head: Atraumatic. Normocephalic.  Eyes: PERRL. EOM intact. Conjunctivae are not pale. No scleral icterus.  ENT: Mucous membranes are moist. Oropharynx is clear and symmetric.    Neck: Supple. Full ROM. No lymphadenopathy.  Cardiovascular: Regular rate. Regular rhythm. No murmurs, rubs, or gallops. Distal pulses are 2+ and symmetric.  Pulmonary/Chest: No respiratory distress. Clear to auscultation bilaterally. No wheezing or rales.  Abdominal: Soft and non-distended.  There is no tenderness.  No rebound, guarding, or rigidity.   Musculoskeletal: Moves all extremities. No obvious deformities. No edema. No calf tenderness.  BLE: no evident deformity. Negative for swelling. ROM is normal. Cap  "refill distally is <2 seconds. DP and PT pulses are equal and 2+ bilaterally. No motor deficit. Decreased sensation bilaterally consistent with peripheral neuropathy.  Skin: Warm and dry.  Neurological:  Alert, awake, and appropriate.  Normal speech.  No acute focal neurological deficits are appreciated. Chronic bilateral paresthesia from ankle to toes.  Skin intact.  Psychiatric: Normal affect. Good eye contact. Appropriate in content.    ED Course    Procedures  ED Vital Signs:  Vitals:    06/12/18 2113   BP: (!) 144/83   Pulse: 104   Resp: 18   Temp: 97.8 °F (36.6 °C)   TempSrc: Oral   SpO2: 96%   Weight: 79.4 kg (175 lb)   Height: 5' 4" (1.626 m)              The Emergency Provider reviewed the vital signs and test results, which are outlined above.    ED Discussion     10:09 PM: Reassessed pt at this time.  Pt is awake, alert, and in NAD at this time. Discussed with pt all pertinent ED information. Discussed pt's dx and plan of tx.  Will increase lyrica to 75mg tid. Gave pt all f/u and return to the ED instructions. All questions and concerns were addressed at this time. Pt expresses understanding of information and instructions, and is comfortable with plan to discharge. Pt is stable for discharge.    I discussed with patient and/or family/caretaker that evaluation in the ED does not suggest any emergent or life threatening medical conditions requiring immediate intervention beyond what was provided in the ED, and I believe patient is safe for discharge.  Regardless, an unremarkable evaluation in the ED does not preclude the development or presence of a serious of life threatening condition. As such, patient was instructed to return immediately for any worsening or change in current symptoms.    Regarding NEUROPATHY, I explained to patient that the treatment focuses on slowing the development of the condition through controlling blood sugar levels and making lifestyle changes to help slow nerve damage.  " Encouraged patient to: eat a healthy diet; exercise regularly; maintain/obtain healthy weight; control blood pressure; refrain from smoking; limit alcohol intake; and take all medications as prescribed.    ED Medication(s):  Medications   HYDROcodone-acetaminophen  mg per tablet 1 tablet (1 tablet Oral Given 6/12/18 2225)       Discharge Medication List as of 6/12/2018 10:22 PM      START taking these medications    Details   pregabalin (LYRICA) 75 MG capsule Take 1 capsule (75 mg total) by mouth 3 (three) times daily., Starting Tue 6/12/2018, Until Tue 12/11/2018, Print             Follow-up Information     Jonas Jimenez MD. Schedule an appointment as soon as possible for a visit in 1 week.    Specialty:  Family Medicine  Contact information:  [address] [address] [phone]             [facility]    Specialty:  Emergency Medicine  Why:  As needed, If symptoms worsen  Contact information:  [address]  [facility] [address] [phone]                   Medical Decision Making              Scribe Attestation:   Scribe #1: I performed the above scribed service and the documentation accurately describes the services I performed. I attest to the accuracy of the note.    Attending:   Physician Attestation Statement for Scribe #1: I, Angel Armstrong Jr., MD, personally performed the services described in this documentation, as scribed by Chantel Charles, in my presence, and it is both accurate and complete.          Clinical Impression       ICD-10-CM ICD-9-CM   1. Peripheral polyneuropathy G62.9 356.9       Disposition:   Disposition: Discharged  Condition: Stable         Angel Armstrong Jr., MD  06/13/18 9789

## 2018-06-13 NOTE — DISCHARGE INSTRUCTIONS
Regarding NEUROPATHY, I explained to patient that the treatment focuses on slowing the development of the condition through controlling blood sugar levels and making lifestyle changes to help slow nerve damage.  Encouraged patient to: eat a healthy diet; exercise regularly; maintain/obtain healthy weight; control blood pressure; refrain from smoking; limit alcohol intake; and take all medications as prescribed.

## 2018-06-14 ENCOUNTER — HOSPITAL ENCOUNTER (EMERGENCY)
Facility: HOSPITAL | Age: 48
Discharge: HOME | End: 2018-06-14
Payer: MEDICAID

## 2018-06-14 VITALS
SYSTOLIC BLOOD PRESSURE: 127 MMHG | DIASTOLIC BLOOD PRESSURE: 89 MMHG | HEART RATE: 77 BPM | BODY MASS INDEX: 30.05 KG/M2 | HEIGHT: 64 IN | TEMPERATURE: 98 F | OXYGEN SATURATION: 100 % | RESPIRATION RATE: 20 BRPM | WEIGHT: 176 LBS

## 2018-06-14 DIAGNOSIS — M79.661 PAIN OF RIGHT LOWER LEG: ICD-10-CM

## 2018-06-14 DIAGNOSIS — R63.8 INCREASED BMI: ICD-10-CM

## 2018-06-14 DIAGNOSIS — S80.11XA CONTUSION OF RIGHT CALF, INITIAL ENCOUNTER: Primary | ICD-10-CM

## 2018-06-14 PROCEDURE — 25000003 PHARM REV CODE 250: Performed by: NURSE PRACTITIONER

## 2018-06-14 PROCEDURE — 99283 EMERGENCY DEPT VISIT LOW MDM: CPT

## 2018-06-14 RX ORDER — HYDROCODONE BITARTRATE AND ACETAMINOPHEN 10; 325 MG/1; MG/1
1 TABLET ORAL EVERY 6 HOURS PRN
Status: DISCONTINUED | OUTPATIENT
Start: 2018-06-14 | End: 2018-06-14 | Stop reason: HOSPADM

## 2018-06-14 RX ORDER — DICLOFENAC SODIUM 50 MG/1
50 TABLET, DELAYED RELEASE ORAL 3 TIMES DAILY PRN
Qty: 20 TABLET | Refills: 0 | Status: SHIPPED | OUTPATIENT
Start: 2018-06-14 | End: 2018-10-02 | Stop reason: SDUPTHER

## 2018-06-14 RX ADMIN — HYDROCODONE BITARTRATE AND ACETAMINOPHEN 1 TABLET: 10; 325 TABLET ORAL at 07:06

## 2018-06-14 NOTE — ED PROVIDER NOTES
"SCRIBE #1 NOTE: I, Chantel Charles, am scribing for, and in the presence of, David Sewell NP. I have scribed the entire note.      History      Chief Complaint   Patient presents with    Leg Pain     fell yesterday and was seen here was given flexeril with no relief pt requesting pain medication       Review of patient's allergies indicates:   Allergen Reactions    Corticosteroids (glucocorticoids)      "sets off my anxiety real bad"  "sets off my anxiety real bad"    Tramadol Rash        HPI   HPI    6/14/2018, 6:48 PM   History obtained from the patient      History of Present Illness: Kaylene Emery is a 47 y.o. female patient who presents to the Emergency Department for R leg pain which onset gradually yesterday after a fall. Pt was evaluated in ER yesterday and was sent home with flexeril. Pt reports no relief and is requesting pain medication. Symptoms are constant and moderate in severity. No mitigating or exacerbating factors reported. No associated sxs reported. Patient denies any fever, chills, n/v, weakness/numbness, HA, dizziness, back pain, abd pain, CP, SOB, and all other sxs at this time. Prior tx also includes motrin. No further complaints or concerns at this time.         Arrival mode: Personal vehicle      PCP: Jonas Jimenez MD       Past Medical History:  Past Medical History:   Diagnosis Date    Anxiety     Depression     GERD (gastroesophageal reflux disease)     Tobacco use        Past Surgical History:  Past Surgical History:   Procedure Laterality Date    BLADDER REPAIR      CENTRAL LINE  6/28/2017         CHOLECYSTECTOMY  04/07/2017    KNEE ARTHROPLASTY      PARTIAL HYSTERECTOMY      TUBAL LIGATION           Family History:  Family History   Problem Relation Age of Onset    Hypertension Mother     Diabetes Mother        Social History:  Social History     Social History Main Topics    Smoking status: Current Every Day Smoker     Packs/day: 1.00     Types: Cigarettes "    Smokeless tobacco: Never Used    Alcohol use Yes      Comment: occasionally    Drug use: No    Sexual activity: Yes       ROS   Review of Systems   Constitutional: Negative for chills and fever.   HENT: Negative for sore throat.    Respiratory: Negative for shortness of breath.    Cardiovascular: Negative for chest pain.   Gastrointestinal: Negative for abdominal pain, nausea and vomiting.   Genitourinary: Negative for dysuria.   Musculoskeletal: Negative for back pain.        + R leg pain   Skin: Negative for rash.   Neurological: Negative for dizziness, weakness, numbness and headaches.   Hematological: Does not bruise/bleed easily.   All other systems reviewed and are negative.      Physical Exam      Initial Vitals [06/14/18 1824]   BP Pulse Resp Temp SpO2   133/88 89 18 98.3 °F (36.8 °C) 98 %      MAP       --          Physical Exam  Nursing Notes and Vital Signs Reviewed.  Constitutional: Patient is in no acute distress. Well-developed and well-nourished.  Head: Atraumatic. Normocephalic.  Eyes: PERRL. EOM intact. Conjunctivae are not pale. No scleral icterus.  ENT: Mucous membranes are moist. Oropharynx is clear and symmetric.    Neck: Supple. Full ROM. No lymphadenopathy.  Cardiovascular: Regular rate. Regular rhythm. No murmurs, rubs, or gallops. Distal pulses are 2+ and symmetric.  Pulmonary/Chest: No respiratory distress. Clear to auscultation bilaterally. No wheezing or rales.  Abdominal: Soft and non-distended.  There is no tenderness.  No rebound, guarding, or rigidity.   Musculoskeletal: Moves all extremities. No obvious deformities. No edema.   BLE: no evident deformity. R calf tenderness. Abrasions to L calf. ROM is normal. Cap refill distally is <2 seconds. DP and PT pulses are equal and 2+ bilaterally. No motor deficit. No distal sensory deficit  Skin: Warm and dry.  Neurological:  Alert, awake, and appropriate.  Normal speech.  No acute focal neurological deficits are  "appreciated.  Psychiatric: Normal affect. Good eye contact. Appropriate in content.    ED Course    Procedures  ED Vital Signs:  Vitals:    06/14/18 1824 06/14/18 1935   BP: 133/88 127/89   Pulse: 89 77   Resp: 18 20   Temp: 98.3 °F (36.8 °C) 98.4 °F (36.9 °C)   TempSrc: Oral Oral   SpO2: 98% 100%   Weight: 79.8 kg (176 lb)    Height: 5' 4" (1.626 m)               The Emergency Provider reviewed the vital signs and test results, which are outlined above.    ED Discussion     7:01 PM: Initial assessment.  Pt is awake, alert, and in NAD at this time. Discussed with pt all pertinent ED information. Discussed pt's dx and plan of tx. Gave pt all f/u and return to the ED instructions. All questions and concerns were addressed at this time. Pt expresses understanding of information and instructions, and is comfortable with plan to discharge. Pt is stable for discharge.    I discussed with patient and/or family/caretaker that evaluation in the ED does not suggest any emergent or life threatening medical conditions requiring immediate intervention beyond what was provided in the ED, and I believe patient is safe for discharge.  Regardless, an unremarkable evaluation in the ED does not preclude the development or presence of a serious of life threatening condition. As such, patient was instructed to return immediately for any worsening or change in current symptoms.      ED Medication(s):  Medications - No data to display    Discharge Medication List as of 6/14/2018  7:06 PM      START taking these medications    Details   diclofenac (VOLTAREN) 50 MG EC tablet Take 1 tablet (50 mg total) by mouth 3 (three) times daily as needed., Starting Thu 6/14/2018, Print             Follow-up Information     Jonas Jimenez MD. Schedule an appointment as soon as possible for a visit in 2 days.    Specialty:  Family Medicine  Contact information:  [address] [facility] [facility] [address]  [facility] [id number] [phone]             [facility]    Specialty:  Emergency Medicine  Why:  As needed, If symptoms worsen  Contact information:  [address]  [facility] [address] [phone]                   Medical Decision Making              Scribe Attestation:   Scribe #1: I performed the above scribed service and the documentation accurately describes the services I performed. I attest to the accuracy of the note.    Attending:   Physician Attestation Statement for Scribe #1: I, David Sewell NP, personally performed the services described in this documentation, as scribed by Chantel Charles, in my presence, and it is both accurate and complete.          Clinical Impression       ICD-10-CM ICD-9-CM   1. Contusion of right calf, initial encounter S80.11XA 924.10   2. Pain of right lower leg M79.661 729.5   3. Increased BMI R63.8 783.9       Disposition:   Disposition: Discharged  Condition: Stable         David Sewell NP  06/15/18 0038

## 2018-07-10 ENCOUNTER — HOSPITAL ENCOUNTER (EMERGENCY)
Facility: HOSPITAL | Age: 48
Discharge: HOME | End: 2018-07-10
Attending: EMERGENCY MEDICINE
Payer: MEDICAID

## 2018-07-10 VITALS
BODY MASS INDEX: 28.86 KG/M2 | HEART RATE: 99 BPM | TEMPERATURE: 99 F | SYSTOLIC BLOOD PRESSURE: 154 MMHG | WEIGHT: 169.06 LBS | OXYGEN SATURATION: 97 % | DIASTOLIC BLOOD PRESSURE: 106 MMHG | HEIGHT: 64 IN | RESPIRATION RATE: 20 BRPM

## 2018-07-10 DIAGNOSIS — K02.9 DENTAL CARIES: Primary | ICD-10-CM

## 2018-07-10 PROCEDURE — 99283 EMERGENCY DEPT VISIT LOW MDM: CPT

## 2018-07-10 RX ORDER — AMOXICILLIN 500 MG/1
500 CAPSULE ORAL 3 TIMES DAILY
Qty: 21 CAPSULE | Refills: 0 | Status: SHIPPED | OUTPATIENT
Start: 2018-07-10 | End: 2018-07-17

## 2018-07-10 RX ORDER — ACETAMINOPHEN AND CODEINE PHOSPHATE 300; 30 MG/1; MG/1
1-2 TABLET ORAL EVERY 6 HOURS PRN
Qty: 14 TABLET | Refills: 0 | Status: SHIPPED | OUTPATIENT
Start: 2018-07-10 | End: 2018-10-02 | Stop reason: SDUPTHER

## 2018-07-10 RX ORDER — PROMETHAZINE HYDROCHLORIDE 25 MG/1
25 TABLET ORAL EVERY 6 HOURS PRN
Qty: 15 TABLET | Refills: 0 | Status: SHIPPED | OUTPATIENT
Start: 2018-07-10 | End: 2019-08-10 | Stop reason: CLARIF

## 2018-07-10 NOTE — ED PROVIDER NOTES
"Encounter Date: 7/10/2018       History     Chief Complaint   Patient presents with    Dental Pain     + tooth pain started last night      47 year old female with complaint of right bottom tooth ache X several days.  Moderate pain.  Worse with eating and drinking. Constant aching pain. No radiation of pain. No alleviating factors.           Review of patient's allergies indicates:   Allergen Reactions    Corticosteroids (glucocorticoids)      "sets off my anxiety real bad"  "sets off my anxiety real bad"    Tramadol Rash     Past Medical History:   Diagnosis Date    Anxiety     Depression     GERD (gastroesophageal reflux disease)     Tobacco use      Past Surgical History:   Procedure Laterality Date    BLADDER REPAIR      CENTRAL LINE  6/28/2017         CHOLECYSTECTOMY  04/07/2017    KNEE ARTHROPLASTY      PARTIAL HYSTERECTOMY      TUBAL LIGATION       Family History   Problem Relation Age of Onset    Hypertension Mother     Diabetes Mother      Social History   Substance Use Topics    Smoking status: Current Every Day Smoker     Packs/day: 1.00     Types: Cigarettes    Smokeless tobacco: Never Used    Alcohol use Yes      Comment: occasionally     Review of Systems   Constitutional: Negative for fever.   HENT: Positive for dental problem. Negative for sore throat.    Respiratory: Negative for shortness of breath.    Cardiovascular: Negative for chest pain.   Gastrointestinal: Negative for nausea.   Genitourinary: Negative for dysuria.   Musculoskeletal: Negative for back pain.   Skin: Negative for rash.   Neurological: Negative for weakness.   Hematological: Does not bruise/bleed easily.       Physical Exam     Initial Vitals [07/10/18 0908]   BP Pulse Resp Temp SpO2   (!) 154/106 99 20 98.6 °F (37 °C) 97 %      MAP       --         Physical Exam    Nursing note and vitals reviewed.  Constitutional: She appears well-developed and well-nourished.   HENT:   Head: Normocephalic and atraumatic. "   Multiple decayed teeth diffusely, right bottom pre molar #1 tenderness and decay, no facial swelling   Eyes: Conjunctivae and EOM are normal. Pupils are equal, round, and reactive to light.   Neck: Normal range of motion. Neck supple.   Cardiovascular: Normal rate, regular rhythm, normal heart sounds and intact distal pulses.   Pulmonary/Chest: Breath sounds normal.   Abdominal: Soft. There is no tenderness. There is no rebound and no guarding.   Musculoskeletal: Normal range of motion.   Neurological: She is alert and oriented to person, place, and time. She has normal strength and normal reflexes.   Skin: Skin is warm and dry.   Psychiatric: She has a normal mood and affect. Her behavior is normal. Thought content normal.         ED Course   Procedures  Labs Reviewed - No data to display       Imaging Results    None                               Clinical Impression:   The encounter diagnosis was Dental caries.                             Bridger Patrick NP  07/10/18 0918

## 2018-09-16 NOTE — ASSESSMENT & PLAN NOTE
In 10 you urinary retention after Palmer catheter removal.    Replace Palmer  Flomax  Urology consult  
Narcotics as needed, try to minimize the need as this is contributing to the constipation  
Narcotics as needed, try to minimize the need as this is contributing to the constipation  
Nicotine patch  
Nicotine patch,  Patient will be given information on smoking cessation and instructed to discuss this further with her primary care doctor  
No results with magnesium citrate.    Therapeutic Gastrografin enema  
Patient will be admitted.  She'll be started on a clear liquid diet.  We will give her magnesium citrate followed by water to help with the constipation repeat abdominal film in the morning  
Removed the Palmer catheter in the morning and see if the patient is able to urinate.  If she is unable to urinate the Palmer catheter be reinserted  
denies pain/discomfort

## 2018-10-02 ENCOUNTER — HOSPITAL ENCOUNTER (EMERGENCY)
Facility: HOSPITAL | Age: 48
Discharge: HOME | End: 2018-10-02
Attending: EMERGENCY MEDICINE
Payer: MEDICAID

## 2018-10-02 VITALS
TEMPERATURE: 98 F | HEART RATE: 91 BPM | WEIGHT: 171.75 LBS | OXYGEN SATURATION: 99 % | RESPIRATION RATE: 18 BRPM | BODY MASS INDEX: 29.32 KG/M2 | DIASTOLIC BLOOD PRESSURE: 77 MMHG | HEIGHT: 64 IN | SYSTOLIC BLOOD PRESSURE: 141 MMHG

## 2018-10-02 DIAGNOSIS — M25.512 SHOULDER PAIN, LEFT: ICD-10-CM

## 2018-10-02 DIAGNOSIS — Z72.0 TOBACCO ABUSE DISORDER: ICD-10-CM

## 2018-10-02 DIAGNOSIS — S43.102A SEPARATION OF LEFT ACROMIOCLAVICULAR JOINT, TYPE 1, INITIAL ENCOUNTER: Primary | ICD-10-CM

## 2018-10-02 PROCEDURE — 25000003 PHARM REV CODE 250: Performed by: NURSE PRACTITIONER

## 2018-10-02 PROCEDURE — 99284 EMERGENCY DEPT VISIT MOD MDM: CPT | Mod: 25

## 2018-10-02 RX ORDER — DICLOFENAC SODIUM 50 MG/1
50 TABLET, DELAYED RELEASE ORAL 3 TIMES DAILY PRN
Qty: 20 TABLET | Refills: 0 | OUTPATIENT
Start: 2018-10-02 | End: 2019-06-30

## 2018-10-02 RX ORDER — HYDROCODONE BITARTRATE AND ACETAMINOPHEN 5; 325 MG/1; MG/1
1 TABLET ORAL
Status: COMPLETED | OUTPATIENT
Start: 2018-10-02 | End: 2018-10-02

## 2018-10-02 RX ORDER — ORPHENADRINE CITRATE 100 MG/1
100 TABLET, EXTENDED RELEASE ORAL 2 TIMES DAILY
Qty: 20 TABLET | Refills: 0 | Status: SHIPPED | OUTPATIENT
Start: 2018-10-02 | End: 2018-10-12

## 2018-10-02 RX ORDER — ACETAMINOPHEN AND CODEINE PHOSPHATE 300; 30 MG/1; MG/1
1-2 TABLET ORAL EVERY 6 HOURS PRN
Qty: 14 TABLET | Refills: 0 | Status: SHIPPED | OUTPATIENT
Start: 2018-10-02 | End: 2019-06-24 | Stop reason: SDUPTHER

## 2018-10-02 RX ORDER — ALBUTEROL SULFATE 90 UG/1
AEROSOL, METERED RESPIRATORY (INHALATION)
COMMUNITY

## 2018-10-02 RX ADMIN — HYDROCODONE BITARTRATE AND ACETAMINOPHEN 1 TABLET: 5; 325 TABLET ORAL at 08:10

## 2018-10-03 NOTE — ED PROVIDER NOTES
"SCRIBE #1 NOTE: I, Celestina Saavedra, am scribing for, and in the presence of, David Sewell NP. I have scribed the entire note.      History      Chief Complaint   Patient presents with    Shoulder Pain     L shoulder pain x couple of days. worsened when picking up grandson today.       Review of patient's allergies indicates:   Allergen Reactions    Corticosteroids (glucocorticoids)      "sets off my anxiety real bad"  "sets off my anxiety real bad"    Tramadol Rash        HPI   HPI    10/2/2018, 8:26 PM   History obtained from the patient      History of Present Illness: Kaylene Emery is a 47 y.o. female patient with a hx of DM who presents to the Emergency Department for L shoulder pain which onset gradually 4 days ago but worsened today when picking up her grandson. Pt has an appt scheduled with her PCP 1 week from today but the pt states the pain was so severe that she could not wait another week. Symptoms are constant and moderate in severity. No mitigating or exacerbating factors reported. No associated sxs. Patient denies any fever, chills, LOC, head injury, fall, weakness, numbness, abd pain, N/V, CP, SOB, and all other sxs at this time. Prior Tx includes Tylenol and Motrin. No further complaints or concerns at this time.       Arrival mode: Personal vehicle      PCP: Jonas Jimenez MD       Past Medical History:  Past Medical History:   Diagnosis Date    Anxiety     Depression     GERD (gastroesophageal reflux disease)     Tobacco use        Past Surgical History:  Past Surgical History:   Procedure Laterality Date    BLADDER REPAIR      CENTRAL LINE  6/28/2017         CHOLECYSTECTOMY  04/07/2017    CHOLECYSTECTOMY-LAPAROSCOPIC N/A 4/7/2017    [facility]formed by [facility] OR    KNEE ARTHROPLASTY      PARTIAL HYSTERECTOMY      TUBAL LIGATION           Family History:  Family History   Problem Relation Age of Onset    Hypertension Mother     Diabetes Mother        Social " History:  Social History     Tobacco Use    Smoking status: Current Every Day Smoker     Packs/day: 1.00     Types: Cigarettes    Smokeless tobacco: Never Used   Substance and Sexual Activity    Alcohol use: Yes     Comment: occasionally    Drug use: No    Sexual activity: Yes       ROS   Review of Systems   Constitutional: Negative for chills, diaphoresis and fever.        (-) fall   HENT: Negative for congestion, rhinorrhea and sore throat.         (-) head injury   Respiratory: Negative for cough and shortness of breath.    Cardiovascular: Negative for chest pain and leg swelling.   Gastrointestinal: Negative for diarrhea, nausea and vomiting.   Genitourinary: Negative for dysuria, frequency and hematuria.   Musculoskeletal: Positive for arthralgias (L shoulder). Negative for back pain and neck pain.   Skin: Negative for rash and wound.   Neurological: Negative for dizziness, syncope, weakness, numbness and headaches.   Hematological: Does not bruise/bleed easily.       Physical Exam      Initial Vitals [10/02/18 1935]   BP Pulse Resp Temp SpO2   139/88 99 18 98 °F (36.7 °C) 95 %      MAP       --          Physical Exam  Nursing Notes and Vital Signs Reviewed.  Constitutional: Patient is in no acute distress. Well-developed and well-nourished.  Head: Atraumatic. Normocephalic.  Eyes: PERRL. EOM intact. Conjunctivae are not pale. No scleral icterus.  ENT: Mucous membranes are moist. Oropharynx is clear and symmetric.    Neck: Supple. Full ROM. No lymphadenopathy.  Cardiovascular: Regular rate. Regular rhythm. No murmurs, rubs, or gallops. Distal pulses are 2+ and symmetric.  Pulmonary/Chest: No respiratory distress. Clear to auscultation bilaterally. No wheezing or rales.  Abdominal: Soft and non-distended.  There is no tenderness.  No rebound, guarding, or rigidity.   Musculoskeletal: No obvious deformities. No edema.  LUE: has no evident deformity. Negative for swelling. Positive for tenderness. ROM is  "limited secondary to pain. Cap refill distally is <2 seconds. Radial pulses are equal and 2+ bilaterally. No motor deficit. No distal sensory deficit.  Skin: Warm and dry.  Neurological:  Alert, awake, and appropriate.  Normal speech.  No acute focal neurological deficits are appreciated.  Psychiatric: Normal affect. Good eye contact. Appropriate in content.    ED Course    Procedures  ED Vital Signs:  Vitals:    10/02/18 1935 10/02/18 2136   BP: 139/88 (!) 141/77   Pulse: 99 91   Resp: 18 18   Temp: 98 °F (36.7 °C) 98.3 °F (36.8 °C)   TempSrc: Oral Oral   SpO2: 95% 99%   Weight: 77.9 kg (171 lb 11.8 oz)    Height: 5' 4" (1.626 m)        Imaging Results:  Imaging Results          X-Ray Shoulder Trauma Left (Final result)  Result time 10/02/18 21:05:33    Final result by Martir Murphy MD (10/02/18 21:05:33)                 Impression:      Widening of the acromioclavicular joint.  No acute injury/fracture.      Electronically signed by: Martir Murphy MD  Date:    10/02/2018  Time:    21:05             Narrative:    EXAMINATION:  XR SHOULDER TRAUMA 3 VIEW LEFT    CLINICAL HISTORY:  Pain in left shoulder    TECHNIQUE:  Three views of the left shoulder were performed.    COMPARISON  None    FINDINGS:  Mild widening of the acromioclavicular joint.  The glenohumeral joint appears normal.                                        The Emergency Provider reviewed the vital signs and test results, which are outlined above.    ED Discussion     9:12 PM: Reassessed pt at this time.  Pt states her condition has improved at this time. Discussed with pt all pertinent ED information and results. Discussed pt dx and plan of tx. Gave pt all f/u and return to the ED instructions. All questions and concerns were addressed at this time. Pt expresses understanding of information and instructions, and is comfortable with plan to discharge. Pt is stable for discharge.    I discussed with patient and/or family/caretaker that negative X-ray does " not rule out occult fracture or other soft tissue injury.  Persistent pain greater than 7-10 days or increased pain requires follow up, specifically with orthopedics.     I discussed with patient and/or family/caretaker that evaluation in the ED does not suggest any emergent or life threatening medical conditions requiring immediate intervention beyond what was provided in the ED, and I believe patient is safe for discharge.  Regardless, an unremarkable evaluation in the ED does not preclude the development or presence of a serious of life threatening condition. As such, patient was instructed to return immediately for any worsening or change in current symptoms.      ED Medication(s):  Medications   HYDROcodone-acetaminophen 5-325 mg per tablet 1 tablet (1 tablet Oral Given 10/2/18 2057)       Follow-up Information     Schedule an appointment as soon as possible for a visit  with Jonas Jimenez MD.    Specialty:  Family Medicine  Why:  ortho referral  Contact information:  [address] [id number] [phone]             Schedule an appointment as soon as possible for a visit  with [facility] - Orthopedics.    Specialty:  Orthopedics  Contact information:  [address] [facility] [address] [phone]  Additional information:  ([address]) 1st floor           [facility] - .    Specialty:  Emergency Medicine  Why:  As needed, If symptoms worsen  Contact information:  [address] [facility] [address] [phone]                   Medical Decision Making    Medical Decision Making:   Clinical Tests:   Radiological Study: Reviewed and Ordered           Scribe Attestation:   Scribe #1: I performed the above scribed service and the documentation accurately describes the services I performed. I attest to the accuracy of the note.    Attending:   Physician Attestation Statement for Scribe #1: I, David Sewell NP, personally  performed the services described in this documentation, as scribed by Celestina Saavedra, in my presence, and it is both accurate and complete.          Clinical Impression       ICD-10-CM ICD-9-CM   1. Separation of left acromioclavicular joint, type 1, initial encounter S43.102A 831.04   2. Shoulder pain, left M25.512 719.41   3. Tobacco abuse disorder Z72.0 305.1       Disposition:   Disposition: Discharged  Condition: Stable         David Sewell NP  10/03/18 0942

## 2019-01-22 NOTE — PROGRESS NOTES
"[facility] -   Nephrology  Progress Note    Patient Name: Kaylene Emery  MRN: [id number]  Admission Date: 6/28/2017  Hospital Length of Stay: 12 days  Attending Provider: Gina Clarke MD   Primary Care Physician: Jonas Jimenez MD  Principal Problem:Non-traumatic rhabdomyolysis    Subjective:     HPI: Patient is a 46-year-old with multiple psychiatry issues.  Comes in with multiple medication overdose with unclear intent suicidal versus overdose versus toxic injections.  Patient has dark-colored urine along with severe rhabdomyolysis.  Patient is now intubated.  Dr. Giles consulted me for initiation for CRRT for multiple drug poisoning and rhabdo myelolysis.  Patient has had transient episode of hypotension with anxiolytics.  Still has some urine output and is nonoliguric.  Most of the history has been obtained from Dr. Giles, patient's fiancé and from the medical records.    Interval History:   07/10/2017  Palmer's catheter has been taken out.  Patient has not been eating very well.  Dialysis catheter has been removed    Review of patient's allergies indicates:   Allergen Reactions    Corticosteroids (glucocorticoids)      "sets off my anxiety real bad"    Tramadol Rash     Current Facility-Administered Medications   Medication Frequency    0.9%  NaCl infusion (for blood administration) Q24H PRN    acetaminophen tablet 650 mg Q8H PRN    albuterol-ipratropium 2.5mg-0.5mg/3mL nebulizer solution 3 mL Q4H PRN    bisacodyl suppository 10 mg Daily PRN    diphenhydrAMINE capsule 50 mg Nightly PRN    docusate sodium capsule 100 mg Daily    ergocalciferol capsule 50,000 Units Q7 Days    fluoxetine capsule 40 mg Daily    fluticasone 50 mcg/actuation nasal spray 2 spray Daily    heparin (porcine) injection 2,000 Units PRN    lorazepam tablet 0.5 mg BID    nicotine 14 mg/24 hr 1 patch Daily    ondansetron injection 4 mg Q8H PRN    oxycodone 12 hr tablet 20 mg BID PRN    pantoprazole " 53yo F hx htn here with L sided mid/upper back pain since sunday which developed while folding laundry. Pain is worse with any type of truncal movement or deep breaths but always has a baseline level of pain. No fever, chills, cp, sob, n/v/d, abd pain, urinary sx, neurologic sx in any extremity, or loss of bowel/urinary control. EC tablet 40 mg Daily    quetiapine tablet 200 mg Daily    ropinirole tablet 0.5 mg QHS       Objective:     Vital Signs (Most Recent):  Temp: 98.1 °F (36.7 °C) (07/10/17 0730)  Pulse: 80 (07/10/17 0915)  Resp: 17 (07/10/17 0730)  BP: (!) 175/79 (07/10/17 0730)  SpO2: 98 % (07/10/17 0915)  O2 Device (Oxygen Therapy): nasal cannula (07/10/17 0915) Vital Signs (24h Range):  Temp:  [97.9 °F (36.6 °C)-99 °F (37.2 °C)] 98.1 °F (36.7 °C)  Pulse:  [79-94] 80  Resp:  [17-21] 17  SpO2:  [95 %-100 %] 98 %  BP: (137-175)/(72-91) 175/79     Weight: 80.5 kg (177 lb 8 oz) (07/08/17 0408)  Body mass index is 30.45 kg/m².  Body surface area is 1.91 meters squared.    I/O last 3 completed shifts:  In: 2595 [P.O.:1820; I.V.:100; Blood:675]  Out: 4550 [Urine:4550]    Physical Exam   Constitutional: She is oriented to person, place, and time. No distress.   HENT:   Head: Normocephalic and atraumatic.   Nose: Nose normal.   Eyes: Conjunctivae and EOM are normal. Pupils are equal, round, and reactive to light.   Neck: Normal range of motion. No JVD present. No tracheal deviation present. No thyromegaly present.   Cardiovascular: Normal rate, regular rhythm, normal heart sounds and intact distal pulses.  Exam reveals no gallop and no friction rub.    No murmur heard.  Pulmonary/Chest: Effort normal and breath sounds normal. No respiratory distress. She has no wheezes. She has no rales. She exhibits no tenderness.   Abdominal: Soft. Bowel sounds are normal. She exhibits no distension and no mass. There is no tenderness. No hernia.   Musculoskeletal: Normal range of motion. She exhibits edema. She exhibits no tenderness or deformity.   Neurological: She is alert and oriented to person, place, and time. She has normal reflexes. She displays normal reflexes. No cranial nerve deficit. She exhibits normal muscle tone. Coordination normal.   Skin: Skin is warm. She is not diaphoretic. No erythema. There is pallor.   Psychiatric: She has a  normal mood and affect. Her behavior is normal. Judgment and thought content normal.   Nursing note and vitals reviewed.      Significant Labs: reviewed  CK 1700  BMP  Lab Results   Component Value Date     07/10/2017    K 3.9 07/10/2017     07/10/2017    CO2 24 07/10/2017    BUN 55 (H) 07/10/2017    CREATININE 4.4 (H) 07/10/2017    CALCIUM 8.4 (L) 07/10/2017    ANIONGAP 12 07/10/2017    ESTGFRAFRICA 13 (A) 07/10/2017    EGFRNONAA 11 (A) 07/10/2017     Lab Results   Component Value Date    WBC 12.87 (H) 07/10/2017    HGB 7.5 (L) 07/10/2017    HCT 21.6 (L) 07/10/2017    MCV 87 07/10/2017     07/10/2017       Significant Imaging: reviewed CXR    Assessment/Plan:     ALEXUS (acute kidney injury)    Acute kidney injury is stable.  Her status post one packed RBCs.  Dialysis catheter removed    Plans discussed with Dr. Borjas with hospital medicine            Thank you for your consult.     Suzy Sarah MD  Nephrology  [facility] -

## 2019-06-23 ENCOUNTER — HOSPITAL ENCOUNTER (EMERGENCY)
Facility: HOSPITAL | Age: 49
Discharge: HOME | End: 2019-06-24
Attending: EMERGENCY MEDICINE
Payer: MEDICAID

## 2019-06-23 DIAGNOSIS — R10.31 ABDOMINAL PAIN, RIGHT LOWER QUADRANT: Primary | ICD-10-CM

## 2019-06-23 LAB
ALBUMIN SERPL BCP-MCNC: 4.2 G/DL (ref 3.5–5.2)
ALP SERPL-CCNC: 85 U/L (ref 55–135)
ALT SERPL W/O P-5'-P-CCNC: 14 U/L (ref 10–44)
ANION GAP SERPL CALC-SCNC: 14 MMOL/L (ref 8–16)
AST SERPL-CCNC: 11 U/L (ref 10–40)
BASOPHILS # BLD AUTO: 0.03 K/UL (ref 0–0.2)
BASOPHILS NFR BLD: 0.3 % (ref 0–1.9)
BILIRUB SERPL-MCNC: 0.2 MG/DL (ref 0.1–1)
BILIRUB UR QL STRIP: NEGATIVE
BUN SERPL-MCNC: 13 MG/DL (ref 6–20)
CALCIUM SERPL-MCNC: 10.1 MG/DL (ref 8.7–10.5)
CHLORIDE SERPL-SCNC: 101 MMOL/L (ref 95–110)
CLARITY UR: CLEAR
CO2 SERPL-SCNC: 23 MMOL/L (ref 23–29)
COLOR UR: YELLOW
CREAT SERPL-MCNC: 1.1 MG/DL (ref 0.5–1.4)
DIFFERENTIAL METHOD: ABNORMAL
EOSINOPHIL # BLD AUTO: 0.2 K/UL (ref 0–0.5)
EOSINOPHIL NFR BLD: 1.7 % (ref 0–8)
ERYTHROCYTE [DISTWIDTH] IN BLOOD BY AUTOMATED COUNT: 14 % (ref 11.5–14.5)
EST. GFR  (AFRICAN AMERICAN): >60 ML/MIN/1.73 M^2
EST. GFR  (NON AFRICAN AMERICAN): >60 ML/MIN/1.73 M^2
GLUCOSE SERPL-MCNC: 103 MG/DL (ref 70–110)
GLUCOSE UR QL STRIP: NEGATIVE
HCT VFR BLD AUTO: 44.6 % (ref 37–48.5)
HGB BLD-MCNC: 15.7 G/DL (ref 12–16)
HGB UR QL STRIP: NEGATIVE
KETONES UR QL STRIP: NEGATIVE
LEUKOCYTE ESTERASE UR QL STRIP: NEGATIVE
LIPASE SERPL-CCNC: 24 U/L (ref 4–60)
LYMPHOCYTES # BLD AUTO: 3.5 K/UL (ref 1–4.8)
LYMPHOCYTES NFR BLD: 33.8 % (ref 18–48)
MCH RBC QN AUTO: 32.3 PG (ref 27–31)
MCHC RBC AUTO-ENTMCNC: 35.2 G/DL (ref 32–36)
MCV RBC AUTO: 92 FL (ref 82–98)
MONOCYTES # BLD AUTO: 0.8 K/UL (ref 0.3–1)
MONOCYTES NFR BLD: 7.4 % (ref 4–15)
NEUTROPHILS # BLD AUTO: 5.9 K/UL (ref 1.8–7.7)
NEUTROPHILS NFR BLD: 56.8 % (ref 38–73)
NITRITE UR QL STRIP: NEGATIVE
PH UR STRIP: 7 [PH] (ref 5–8)
PLATELET # BLD AUTO: 259 K/UL (ref 150–350)
PMV BLD AUTO: 10.7 FL (ref 9.2–12.9)
POTASSIUM SERPL-SCNC: 4.1 MMOL/L (ref 3.5–5.1)
PROT SERPL-MCNC: 7.9 G/DL (ref 6–8.4)
PROT UR QL STRIP: NEGATIVE
RBC # BLD AUTO: 4.86 M/UL (ref 4–5.4)
SODIUM SERPL-SCNC: 138 MMOL/L (ref 136–145)
SP GR UR STRIP: <=1.005 (ref 1–1.03)
URN SPEC COLLECT METH UR: ABNORMAL
UROBILINOGEN UR STRIP-ACNC: NEGATIVE EU/DL
WBC # BLD AUTO: 10.44 K/UL (ref 3.9–12.7)

## 2019-06-23 PROCEDURE — 36415 COLL VENOUS BLD VENIPUNCTURE: CPT

## 2019-06-23 PROCEDURE — 81003 URINALYSIS AUTO W/O SCOPE: CPT

## 2019-06-23 PROCEDURE — 99285 EMERGENCY DEPT VISIT HI MDM: CPT | Mod: 25

## 2019-06-23 PROCEDURE — 96375 TX/PRO/DX INJ NEW DRUG ADDON: CPT

## 2019-06-23 PROCEDURE — 96374 THER/PROPH/DIAG INJ IV PUSH: CPT

## 2019-06-23 PROCEDURE — 85025 COMPLETE CBC W/AUTO DIFF WBC: CPT

## 2019-06-23 PROCEDURE — 83690 ASSAY OF LIPASE: CPT

## 2019-06-23 PROCEDURE — 80053 COMPREHEN METABOLIC PANEL: CPT

## 2019-06-23 PROCEDURE — 63600175 PHARM REV CODE 636 W HCPCS: Performed by: EMERGENCY MEDICINE

## 2019-06-23 RX ORDER — ONDANSETRON 2 MG/ML
4 INJECTION INTRAMUSCULAR; INTRAVENOUS
Status: COMPLETED | OUTPATIENT
Start: 2019-06-23 | End: 2019-06-23

## 2019-06-23 RX ORDER — MORPHINE SULFATE 4 MG/ML
4 INJECTION, SOLUTION INTRAMUSCULAR; INTRAVENOUS
Status: COMPLETED | OUTPATIENT
Start: 2019-06-23 | End: 2019-06-23

## 2019-06-23 RX ADMIN — MORPHINE SULFATE 4 MG: 4 INJECTION INTRAVENOUS at 09:06

## 2019-06-23 RX ADMIN — ONDANSETRON 4 MG: 2 INJECTION INTRAMUSCULAR; INTRAVENOUS at 09:06

## 2019-06-24 VITALS
HEIGHT: 64 IN | SYSTOLIC BLOOD PRESSURE: 102 MMHG | HEART RATE: 74 BPM | DIASTOLIC BLOOD PRESSURE: 67 MMHG | TEMPERATURE: 99 F | BODY MASS INDEX: 29.55 KG/M2 | OXYGEN SATURATION: 95 % | WEIGHT: 173.06 LBS | RESPIRATION RATE: 18 BRPM

## 2019-06-24 PROCEDURE — 25500020 PHARM REV CODE 255: Performed by: EMERGENCY MEDICINE

## 2019-06-24 RX ORDER — NAPROXEN 375 MG/1
375 TABLET ORAL 2 TIMES DAILY WITH MEALS
Qty: 60 TABLET | Refills: 0 | Status: SHIPPED | OUTPATIENT
Start: 2019-06-24 | End: 2019-06-30

## 2019-06-24 RX ORDER — ACETAMINOPHEN AND CODEINE PHOSPHATE 300; 30 MG/1; MG/1
1-2 TABLET ORAL EVERY 6 HOURS PRN
Qty: 20 TABLET | Refills: 0 | OUTPATIENT
Start: 2019-06-24 | End: 2019-06-26

## 2019-06-24 RX ADMIN — IOHEXOL 75 ML: 350 INJECTION, SOLUTION INTRAVENOUS at 12:06

## 2019-06-24 NOTE — ED PROVIDER NOTES
"SCRIBE #1 NOTE: I, Marcio Hand, am scribing for, and in the presence of, Ramila Deutsch Do, MD. I have scribed the entire note.       History     Chief Complaint   Patient presents with    Abdominal Pain     RLQ abdominal pain x 4-5 days; worse today     Review of patient's allergies indicates:   Allergen Reactions    Corticosteroids (glucocorticoids)      "sets off my anxiety real bad"  "sets off my anxiety real bad"    Tramadol Rash         History of Present Illness     HPI    6/23/2019, 9:34 PM  History obtained from the patient      History of Present Illness: Kaylene Emery is a 48 y.o. female patient with a PMHx of GERD who presents to the Emergency Department for evaluation of worsening RLQ abdominal pain which onset gradually 4-5 days ago. Pt reports that the pain is constant and dull, however, she will occasionally get very sharp stabbing pain whenever she is walking. She is s/p cholecystectomy, bladder repair, tubal ligation, and partial hysterectomy, but she still has both ovaries. Symptoms are constant and moderate in severity. Exertion exacerbates. Associated sxs include nausea, HA, dizziness, and difficulty urinating. Patient denies any fever, chills, diarrhea, blood in stool, abd distention, emesis, dysuria, hematuria, frequency, vaginal bleeding/discharge, numbness, weakness, syncope, and all other sxs at this time. No further complaints or concerns at this time.     Arrival mode: Personal vehicle    PCP: Jonsa Jimenez MD        Past Medical History:  Past Medical History:   Diagnosis Date    Anxiety     Depression     GERD (gastroesophageal reflux disease)     Tobacco use        Past Surgical History:  Past Surgical History:   Procedure Laterality Date    BLADDER REPAIR      CENTRAL LINE  6/28/2017         CHOLECYSTECTOMY  04/07/2017    CHOLECYSTECTOMY-LAPAROSCOPIC N/A 4/7/2017    [facility]    KNEE ARTHROPLASTY      PARTIAL HYSTERECTOMY   "    TUBAL LIGATION           Family History:  Family History   Problem Relation Age of Onset    Hypertension Mother     Diabetes Mother        Social History:  Social History     Tobacco Use    Smoking status: Current Every Day Smoker     Packs/day: 1.00     Types: Cigarettes    Smokeless tobacco: Never Used   Substance and Sexual Activity    Alcohol use: Yes     Comment: occasionally    Drug use: No    Sexual activity: Yes        Review of Systems     Review of Systems   Constitutional: Negative for chills and fever.   HENT: Negative for sore throat.    Respiratory: Negative for shortness of breath.    Cardiovascular: Negative for chest pain.   Gastrointestinal: Positive for abdominal pain (RLQ) and nausea. Negative for abdominal distention, anal bleeding, blood in stool, constipation, diarrhea and vomiting.   Genitourinary: Positive for difficulty urinating. Negative for dysuria, flank pain, frequency, hematuria, vaginal bleeding and vaginal discharge.   Musculoskeletal: Negative for back pain.   Skin: Negative for rash.   Neurological: Positive for dizziness and headaches. Negative for syncope, weakness and numbness.   Hematological: Does not bruise/bleed easily.   All other systems reviewed and are negative.       Physical Exam     Initial Vitals [06/23/19 2114]   BP Pulse Resp Temp SpO2   139/85 97 18 98.7 °F (37.1 °C) 95 %      MAP       --          Physical Exam  Nursing Notes and Vital Signs Reviewed.  Constitutional: Patient is in no acute distress. Well-developed and well-nourished.  Head: Atraumatic. Normocephalic.  Eyes: PERRL. EOM intact. Conjunctivae are not pale. No scleral icterus.  ENT: Mucous membranes are moist. Oropharynx is clear and symmetric.    Neck: Supple. Full ROM. No lymphadenopathy.  Cardiovascular: Regular rate. Regular rhythm. No murmurs, rubs, or gallops. Distal pulses are 2+ and symmetric.  Pulmonary/Chest: No respiratory distress. Clear to auscultation bilaterally. No  "wheezing or rales.  Abdominal: Soft and non-distended.  There is RLQ tenderness with mild guarding. No rebound or rigidity. Good bowel sounds.  Genitourinary: No CVA tenderness  Musculoskeletal: Moves all extremities. No obvious deformities. No edema. No calf tenderness.  Skin: Warm and dry.  Neurological:  Alert, awake, and appropriate.  Normal speech.  No acute focal neurological deficits are appreciated.  Psychiatric: Normal affect. Good eye contact. Appropriate in content.     ED Course   Procedures  ED Vital Signs:  Vitals:    06/23/19 2114 06/23/19 2129 06/24/19 0002   BP: 139/85 131/85 102/67   Pulse: 97 95 74   Resp: 18 18    Temp: 98.7 °F (37.1 °C)     TempSrc: Oral     SpO2: 95% 97% 95%   Weight: 78.5 kg (173 lb 1 oz)     Height: 5' 4" (1.626 m)         Abnormal Lab Results:  Labs Reviewed   CBC W/ AUTO DIFFERENTIAL - Abnormal; Notable for the following components:       Result Value    Mean Corpuscular Hemoglobin 32.3 (*)     All other components within normal limits   URINALYSIS, REFLEX TO URINE CULTURE - Abnormal; Notable for the following components:    Specific Gravity, UA <=1.005 (*)     All other components within normal limits    Narrative:     Preferred Collection Type->Urine, Clean Catch   COMPREHENSIVE METABOLIC PANEL   LIPASE        All Lab Results:  Results for orders placed or performed during the hospital encounter of 06/23/19   CBC W/ AUTO DIFFERENTIAL   Result Value Ref Range    WBC 10.44 3.90 - 12.70 K/uL    RBC 4.86 4.00 - 5.40 M/uL    Hemoglobin 15.7 12.0 - 16.0 g/dL    Hematocrit 44.6 37.0 - 48.5 %    Mean Corpuscular Volume 92 82 - 98 fL    Mean Corpuscular Hemoglobin 32.3 (H) 27.0 - 31.0 pg    Mean Corpuscular Hemoglobin Conc 35.2 32.0 - 36.0 g/dL    RDW 14.0 11.5 - 14.5 %    Platelets 259 150 - 350 K/uL    MPV 10.7 9.2 - 12.9 fL    Gran # (ANC) 5.9 1.8 - 7.7 K/uL    Lymph # 3.5 1.0 - 4.8 K/uL    Mono # 0.8 0.3 - 1.0 K/uL    Eos # 0.2 0.0 - 0.5 K/uL    Baso # 0.03 0.00 - 0.20 K/uL    " Gran% 56.8 38.0 - 73.0 %    Lymph% 33.8 18.0 - 48.0 %    Mono% 7.4 4.0 - 15.0 %    Eosinophil% 1.7 0.0 - 8.0 %    Basophil% 0.3 0.0 - 1.9 %    Differential Method Automated    Comp. Metabolic Panel   Result Value Ref Range    Sodium 138 136 - 145 mmol/L    Potassium 4.1 3.5 - 5.1 mmol/L    Chloride 101 95 - 110 mmol/L    CO2 23 23 - 29 mmol/L    Glucose 103 70 - 110 mg/dL    BUN, Bld 13 6 - 20 mg/dL    Creatinine 1.1 0.5 - 1.4 mg/dL    Calcium 10.1 8.7 - 10.5 mg/dL    Total Protein 7.9 6.0 - 8.4 g/dL    Albumin 4.2 3.5 - 5.2 g/dL    Total Bilirubin 0.2 0.1 - 1.0 mg/dL    Alkaline Phosphatase 85 55 - 135 U/L    AST 11 10 - 40 U/L    ALT 14 10 - 44 U/L    Anion Gap 14 8 - 16 mmol/L    eGFR if African American >60 >60 mL/min/1.73 m^2    eGFR if non African American >60 >60 mL/min/1.73 m^2   Lipase   Result Value Ref Range    Lipase 24 4 - 60 U/L   Urinalysis, Reflex to Urine Culture Urine, Clean Catch   Result Value Ref Range    Specimen UA Urine, Clean Catch     Color, UA Yellow Yellow, Straw, Lisa    Appearance, UA Clear Clear    pH, UA 7.0 5.0 - 8.0    Specific Gravity, UA <=1.005 (A) 1.005 - 1.030    Protein, UA Negative Negative    Glucose, UA Negative Negative    Ketones, UA Negative Negative    Bilirubin (UA) Negative Negative    Occult Blood UA Negative Negative    Nitrite, UA Negative Negative    Urobilinogen, UA Negative <2.0 EU/dL    Leukocytes, UA Negative Negative         Imaging Results:  Imaging Results          CT Abdomen Pelvis With Contrast (In process)                12:38 AM: Per [facility] radi[facility], pt's CT results: No acute findings.               The Emergency Provider reviewed the vital signs and test results, which are outlined above.     ED Discussion     12:42 AM: Reassessed pt at this time.  Pt states her condition has improved at this time. Discussed with pt all pertinent ED information and results. Discussed pt dx and plan of tx. Gave pt all f/u and return to the ED instructions. All  questions and concerns were addressed at this time. Pt expresses understanding of information and instructions, and is comfortable with plan to discharge. Pt is stable for discharge.    I discussed with patient and/or family/caretaker that evaluation in the ED does not suggest any emergent or life threatening medical conditions requiring immediate intervention beyond what was provided in the ED, and I believe patient is safe for discharge.  Regardless, an unremarkable evaluation in the ED does not preclude the development or presence of a serious of life threatening condition. As such, patient was instructed to return immediately for any worsening or change in current symptoms.      ED Medication(s):  Medications   ondansetron injection 4 mg (4 mg Intravenous Given 6/23/19 2154)   morphine injection 4 mg (4 mg Intravenous Given 6/23/19 2154)   iohexol (OMNIPAQUE 350) injection 100 mL (75 mLs Intravenous Given 6/24/19 0009)       New Prescriptions    NAPROXEN (NAPROSYN) 375 MG TABLET    Take 1 tablet (375 mg total) by mouth 2 (two) times daily with meals.       Follow-up Information     Jonas Jimenez MD In 2 days.    Specialty:  Family Medicine  Contact information:  [address] [id number] [phone]                         Medical Decision Making:   Clinical Tests:   Lab Tests: Ordered and Reviewed  Radiological Study: Ordered and Reviewed             Scribe Attestation:   Scribe #1: I performed the above scribed service and the documentation accurately describes the services I performed. I attest to the accuracy of the note.     Attending:   Physician Attestation Statement for Scribe #1: I, Ramila Deutsch Do, MD, personally performed the services described in this documentation, as scribed by Marcio Hand, in my presence, and it is both accurate and complete.           Clinical Impression       ICD-10-CM ICD-9-CM   1. Abdominal pain, right lower quadrant R10.31 789.03        Disposition:   Disposition: Discharged  Condition: Stable         Ramila Deutsch Do, MD  06/24/19 0151

## 2019-06-24 NOTE — ED NOTES
Pt reports RLQ pain x 4 days. Pt report nausea, some dizziness; denies v/d, fever. Pt reports partial hysterectomy (uterus only) 14 yr ago.

## 2019-06-24 NOTE — ED NOTES
"Pt refusing to provide urine sample. When explained to pt that we would need to do a straight cath in order to obtain sample pt unwillingly decided "she would try" and she "doesn't know why she needs a urine sample". Pt is currently attempting to obtain sample at this time.   "

## 2019-06-26 ENCOUNTER — HOSPITAL ENCOUNTER (EMERGENCY)
Facility: HOSPITAL | Age: 49
Discharge: HOME | End: 2019-06-26
Attending: EMERGENCY MEDICINE
Payer: MEDICAID

## 2019-06-26 VITALS
SYSTOLIC BLOOD PRESSURE: 169 MMHG | HEART RATE: 112 BPM | TEMPERATURE: 99 F | BODY MASS INDEX: 29.35 KG/M2 | WEIGHT: 171.94 LBS | DIASTOLIC BLOOD PRESSURE: 92 MMHG | OXYGEN SATURATION: 95 % | HEIGHT: 64 IN | RESPIRATION RATE: 20 BRPM

## 2019-06-26 DIAGNOSIS — K08.89 PAIN, DENTAL: Primary | ICD-10-CM

## 2019-06-26 DIAGNOSIS — K08.89 TOOTH LOOSE: ICD-10-CM

## 2019-06-26 DIAGNOSIS — Z72.0 TOBACCO USE: ICD-10-CM

## 2019-06-26 PROCEDURE — 99284 EMERGENCY DEPT VISIT MOD MDM: CPT

## 2019-06-26 RX ORDER — AMOXICILLIN AND CLAVULANATE POTASSIUM 875; 125 MG/1; MG/1
1 TABLET, FILM COATED ORAL 2 TIMES DAILY
Qty: 14 TABLET | Refills: 0 | Status: SHIPPED | OUTPATIENT
Start: 2019-06-26 | End: 2019-07-03

## 2019-06-26 RX ORDER — HYDROCODONE BITARTRATE AND ACETAMINOPHEN 5; 325 MG/1; MG/1
1 TABLET ORAL EVERY 4 HOURS PRN
Qty: 5 TABLET | Refills: 0 | Status: SHIPPED | OUTPATIENT
Start: 2019-06-26 | End: 2019-06-30 | Stop reason: SDUPTHER

## 2019-06-26 NOTE — ED NOTES
Patient examined, evaluated, and educated on discharge prescriptions and instructions by GEETHA. [facility]

## 2019-06-30 ENCOUNTER — HOSPITAL ENCOUNTER (EMERGENCY)
Facility: HOSPITAL | Age: 49
Discharge: HOME | End: 2019-06-30
Attending: EMERGENCY MEDICINE
Payer: MEDICAID

## 2019-06-30 VITALS
DIASTOLIC BLOOD PRESSURE: 84 MMHG | RESPIRATION RATE: 18 BRPM | SYSTOLIC BLOOD PRESSURE: 114 MMHG | TEMPERATURE: 98 F | HEIGHT: 64 IN | HEART RATE: 93 BPM | BODY MASS INDEX: 29.03 KG/M2 | OXYGEN SATURATION: 99 % | WEIGHT: 170.06 LBS

## 2019-06-30 DIAGNOSIS — M87.9 OSTEONECROSIS OF BOTH HIPS: Primary | ICD-10-CM

## 2019-06-30 PROCEDURE — 25000003 PHARM REV CODE 250: Performed by: PHYSICIAN ASSISTANT

## 2019-06-30 PROCEDURE — 99284 EMERGENCY DEPT VISIT MOD MDM: CPT

## 2019-06-30 RX ORDER — DOCUSATE SODIUM 100 MG/1
100 CAPSULE, LIQUID FILLED ORAL 2 TIMES DAILY
Qty: 14 CAPSULE | Refills: 0 | Status: SHIPPED | OUTPATIENT
Start: 2019-06-30 | End: 2019-08-10 | Stop reason: CLARIF

## 2019-06-30 RX ORDER — HYDROCODONE BITARTRATE AND ACETAMINOPHEN 7.5; 325 MG/1; MG/1
1 TABLET ORAL
Status: COMPLETED | OUTPATIENT
Start: 2019-06-30 | End: 2019-06-30

## 2019-06-30 RX ORDER — MELOXICAM 7.5 MG/1
7.5 TABLET ORAL DAILY
Qty: 10 TABLET | Refills: 0 | Status: SHIPPED | OUTPATIENT
Start: 2019-06-30 | End: 2019-08-10 | Stop reason: CLARIF

## 2019-06-30 RX ORDER — HYDROCODONE BITARTRATE AND ACETAMINOPHEN 5; 325 MG/1; MG/1
1 TABLET ORAL EVERY 4 HOURS PRN
Qty: 12 TABLET | Refills: 0 | Status: SHIPPED | OUTPATIENT
Start: 2019-06-30 | End: 2019-08-10 | Stop reason: CLARIF

## 2019-06-30 RX ADMIN — HYDROCODONE BITARTRATE AND ACETAMINOPHEN 1 TABLET: 7.5; 325 TABLET ORAL at 06:06

## 2019-06-30 NOTE — ED PROVIDER NOTES
"   History      Chief Complaint   Patient presents with    Groin Pain     reports she pulled a muscle in her r groin area       Review of patient's allergies indicates:   Allergen Reactions    Corticosteroids (glucocorticoids)      "sets off my anxiety real bad"  "sets off my anxiety real bad"    Tramadol Rash        HPI   HPI    6/30/2019, 6:21 PM   History obtained from the patient      History of Present Illness: Kaylene Emery is a 48 y.o. female patient who presents to the Emergency Department for right groin pain for over a week. Her pcp told her she probably pulled muscle.  She was seen here a week ago and had CT abd/pelvis and labwork. Symptoms are moderate in severity.     No further complaints or concerns at this time.           PCP: Robby Dos Santos NP       Past Medical History:  Past Medical History:   Diagnosis Date    Anxiety     Depression     GERD (gastroesophageal reflux disease)     Tobacco use          Past Surgical History:  Past Surgical History:   Procedure Laterality Date    BLADDER REPAIR      CENTRAL LINE  6/28/2017         CHOLECYSTECTOMY  04/07/2017    CHOLECYSTECTOMY-LAPAROSCOPIC N/A 4/7/2017    [facility]    KNEE ARTHROPLASTY      PARTIAL HYSTERECTOMY      TUBAL LIGATION             Family History:  Family History   Problem Relation Age of Onset    Hypertension Mother     Diabetes Mother            Social History:  Social History     Tobacco Use    Smoking status: Current Every Day Smoker     Packs/day: 1.00     Types: Cigarettes    Smokeless tobacco: Never Used   Substance and Sexual Activity    Alcohol use: Yes     Comment: occasionally    Drug use: No    Sexual activity: Yes       ROS     Review of Systems   Constitutional: Negative for chills and fever.   HENT: Negative for facial swelling and trouble swallowing.    Eyes: Negative for discharge, redness and visual disturbance.   Respiratory: Negative for chest tightness and " "shortness of breath.    Cardiovascular: Negative for chest pain and leg swelling.   Gastrointestinal: Negative for diarrhea and vomiting.   Genitourinary: Negative for decreased urine volume and dysuria.   Musculoskeletal: Positive for arthralgias. Negative for joint swelling and neck stiffness.   Skin: Negative for rash and wound.   Neurological: Negative for syncope and facial asymmetry.   All other systems reviewed and are negative.      Physical Exam      Initial Vitals [06/30/19 1728]   BP Pulse Resp Temp SpO2   (!) 162/104 (!) 120 20 98 °F (36.7 °C) 95 %      MAP       --         Physical Exam  Vital signs and nursing notes reviewed.  Constitutional: Patient is in NAD. Awake and alert. Well-developed and well-nourished.  Head: Atraumatic. Normocephalic.  Eyes: PERRL. EOM intact. Conjunctivae nl. No scleral icterus.  ENT: Mucous membranes are moist. Oropharynx is clear.  Neck: Supple. No JVD. No lymphadenopathy.  No meningismus  Cardiovascular: Regular rate and rhythm. No murmurs, rubs, or gallops. Distal pulses are 2+ and symmetric.  Pulmonary/Chest: No respiratory distress. Clear to auscultation bilaterally. No wheezing, rales, or rhonchi.  Abdominal: Soft. Non-distended. No TTP. No rebound, guarding, or rigidity. Good bowel sounds.  Genitourinary: No CVA tenderness  Musculoskeletal: Moves all extremities. No edema.  Right hip with FROM that exacerbates pain.  No heat edema or erythema.  2+dp  Skin: Warm and dry.  Neurological: Awake and alert. No acute focal neurological deficits are appreciated.  Psychiatric: Normal affect. Good eye contact. Appropriate in content.      ED Course          Procedures  ED Vital Signs:  Vitals:    06/30/19 1728 06/30/19 1900   BP: (!) 162/104 114/84   Pulse: (!) 120 93   Resp: 20 18   Temp: 98 °F (36.7 °C) 98.2 °F (36.8 °C)   TempSrc: Oral    SpO2: 95% 99%   Weight: 77.2 kg (170 lb 1.4 oz)    Height: 5' 4" (1.626 m)                  Imaging Results:  Imaging Results    None   "          The Emergency Provider reviewed the vital signs and test results, which are outlined above.    ED Discussion             Medication(s) given in the ER:  Medications   HYDROcodone-acetaminophen 7.5-325 mg per tablet 1 tablet (1 tablet Oral Given 6/30/19 1834)           Follow-up Information     Your Primary Care Doctor In 2 days.    Why:  REFERRAL FORM HAS BEEN FAXED TO [facility].                        Medication List      START taking these medications    docusate sodium 100 MG capsule  Commonly known as:  COLACE  Take 1 capsule (100 mg total) by mouth 2 (two) times daily.     meloxicam 7.5 MG tablet  Commonly known as:  MOBIC  Take 1 tablet (7.5 mg total) by mouth once daily.        CONTINUE taking these medications    HYDROcodone-acetaminophen 5-325 mg per tablet  Commonly known as:  NORCO  Take 1 tablet by mouth every 4 (four) hours as needed for Pain.        STOP taking these medications    diclofenac 50 MG EC tablet  Commonly known as:  VOLTAREN     naproxen 375 MG tablet  Commonly known as:  NAPROSYN        ASK your doctor about these medications    amoxicillin-clavulanate 875-125mg 875-125 mg per tablet  Commonly known as:  AUGMENTIN  Take 1 tablet by mouth 2 (two) times daily. for 7 days     metFORMIN 500 MG tablet  Commonly known as:  GLUCOPHAGE     pregabalin 75 MG capsule  Commonly known as:  LYRICA  Take 1 capsule (75 mg total) by mouth 3 (three) times daily.     promethazine 25 MG tablet  Commonly known as:  PHENERGAN  Take 1 tablet (25 mg total) by mouth every 6 (six) hours as needed for Nausea.     VENTOLIN HFA 90 mcg/actuation inhaler  Generic drug:  albuterol           Where to Get Your Medications      You can get these medications from any pharmacy    Bring a paper prescription for each of these medications  · docusate sodium 100 MG capsule  · HYDROcodone-acetaminophen 5-325 mg per tablet  · meloxicam 7.5 MG tablet             Medical Decision Making      Review of imaging done  6/23/19 shows grade 3 osteonecrosis of bilateral femoral heads with flattening of right.  Shared this with patient and sent referral form to [facility].        All findings were reviewed with the patient/family in detail.   All remaining questions and concerns were addressed at that time.  Patient/family has been counseled regarding the need for follow-up as well as the indication to return to the emergency room should new or worrisome developments occur.        MDM               Clinical Impression:        ICD-10-CM ICD-9-CM   1. Osteonecrosis of both hips M87.9 733.42             Vika Ayala PA-C  06/30/19 1920

## 2019-07-27 ENCOUNTER — HOSPITAL ENCOUNTER (EMERGENCY)
Facility: HOSPITAL | Age: 49
Discharge: HOME | End: 2019-07-27
Attending: EMERGENCY MEDICINE
Payer: MEDICAID

## 2019-07-27 VITALS
SYSTOLIC BLOOD PRESSURE: 94 MMHG | WEIGHT: 170.88 LBS | TEMPERATURE: 98 F | HEIGHT: 64 IN | RESPIRATION RATE: 18 BRPM | OXYGEN SATURATION: 98 % | HEART RATE: 96 BPM | DIASTOLIC BLOOD PRESSURE: 64 MMHG | BODY MASS INDEX: 29.17 KG/M2

## 2019-07-27 DIAGNOSIS — M25.551 PAIN OF RIGHT HIP JOINT: Primary | ICD-10-CM

## 2019-07-27 PROCEDURE — 96372 THER/PROPH/DIAG INJ SC/IM: CPT

## 2019-07-27 PROCEDURE — 63600175 PHARM REV CODE 636 W HCPCS: Performed by: PHYSICIAN ASSISTANT

## 2019-07-27 PROCEDURE — 99284 EMERGENCY DEPT VISIT MOD MDM: CPT | Mod: 25

## 2019-07-27 PROCEDURE — 25000003 PHARM REV CODE 250: Performed by: PHYSICIAN ASSISTANT

## 2019-07-27 RX ORDER — ONDANSETRON 4 MG/1
4 TABLET, ORALLY DISINTEGRATING ORAL ONCE
Status: COMPLETED | OUTPATIENT
Start: 2019-07-27 | End: 2019-07-27

## 2019-07-27 RX ORDER — HYDROMORPHONE HYDROCHLORIDE 2 MG/ML
1 INJECTION, SOLUTION INTRAMUSCULAR; INTRAVENOUS; SUBCUTANEOUS
Status: COMPLETED | OUTPATIENT
Start: 2019-07-27 | End: 2019-07-27

## 2019-07-27 RX ORDER — GABAPENTIN 600 MG/1
600 TABLET ORAL 3 TIMES DAILY
COMMUNITY

## 2019-07-27 RX ADMIN — ONDANSETRON 4 MG: 4 TABLET, ORALLY DISINTEGRATING ORAL at 08:07

## 2019-07-27 RX ADMIN — HYDROMORPHONE HYDROCHLORIDE 1 MG: 2 INJECTION, SOLUTION INTRAMUSCULAR; INTRAVENOUS; SUBCUTANEOUS at 08:07

## 2019-07-27 NOTE — ED NOTES
Pt c/o fall while getting out of shower x 2 days ago. Pain rated 10/10. Denies hitting head or LOC.     Patient identifiers verified and correct for Kaylene Emery.    LOC: The patient is awake, alert and aware of environment with an appropriate affect, the patient is oriented x 3 and speaking appropriately.  APPEARANCE: Patient resting comfortably and in no acute distress, patient is clean and well groomed, patient's clothing is properly fastened.  SKIN: The skin is warm and dry, color consistent with ethnicity, patient has normal skin turgor and moist mucus membranes, skin intact, no breakdown or bruising noted.  MUSCULOSKELETAL: Patient moving all extremities spontaneously.  RESPIRATORY: Airway is open and patent, respirations are spontaneous.  CARDIAC: Patient tachycardic, no periphreal edema noted, capillary refill < 3 seconds.  ABDOMEN: Soft and non tender to palpation.

## 2019-07-27 NOTE — ED PROVIDER NOTES
"Encounter Date: 7/27/2019       History     Chief Complaint   Patient presents with    Fall      two days ago. bilateral hip pain and tailbone since fall. seen at [facility] with no fx     Pt states " I know I need a right hip replacement, I just need something for pain to hold me over until my appointment."    The history is provided by the patient.   Leg Pain    There was no injury mechanism. The incident occurred several weeks ago. The pain is present in the right hip. The quality of the pain is described as aching. The pain is at a severity of 3/10. The pain has been constant since onset. Associated symptoms include loss of motion. Pertinent negatives include no numbness, no inability to bear weight, no muscle weakness, no loss of sensation and no tingling. She reports no foreign bodies present. The symptoms are aggravated by activity, bearing weight and palpation. She has tried nothing for the symptoms.     Review of patient's allergies indicates:   Allergen Reactions    Corticosteroids (glucocorticoids)      "sets off my anxiety real bad"  "sets off my anxiety real bad"    Tylenol-codeine [acetaminophen-codeine] Itching    Tramadol Rash     Past Medical History:   Diagnosis Date    Anxiety     Depression     GERD (gastroesophageal reflux disease)     Tobacco use      Past Surgical History:   Procedure Laterality Date    BLADDER REPAIR      CENTRAL LINE  6/28/2017         CHOLECYSTECTOMY  04/07/2017    CHOLECYSTECTOMY-LAPAROSCOPIC N/A 4/7/2017    [facility]    KNEE ARTHROPLASTY      PARTIAL HYSTERECTOMY      TUBAL LIGATION       Family History   Problem Relation Age of Onset    Hypertension Mother     Diabetes Mother      Social History     Tobacco Use    Smoking status: Current Every Day Smoker     Packs/day: 1.00     Types: Cigarettes    Smokeless tobacco: Never Used   Substance Use Topics    Alcohol use: Yes     Comment: occasionally    Drug use: No     Review " of Systems   Constitutional: Negative for chills and fever.   HENT: Negative for sore throat.    Eyes: Negative for photophobia and redness.   Respiratory: Negative for cough and shortness of breath.    Cardiovascular: Negative for chest pain.   Gastrointestinal: Negative for abdominal pain, diarrhea and nausea.   Endocrine: Negative for polydipsia and polyphagia.   Genitourinary: Negative for dysuria.   Musculoskeletal: Negative for arthralgias, back pain and myalgias.        Right hip pain   Skin: Negative for rash.   Neurological: Negative for tingling, weakness, numbness and headaches.   Hematological: Does not bruise/bleed easily.   Psychiatric/Behavioral: The patient is not nervous/anxious.    All other systems reviewed and are negative.      Physical Exam     Initial Vitals [07/27/19 0754]   BP Pulse Resp Temp SpO2   127/85 (!) 116 18 97.6 °F (36.4 °C) 98 %      MAP       --         Physical Exam    Nursing note and vitals reviewed.  Constitutional: Vital signs are normal. She appears well-developed and well-nourished. She appears distressed (secondary to pain).   HENT:   Head: Normocephalic and atraumatic.   Right Ear: External ear normal.   Left Ear: External ear normal.   Nose: Nose normal.   Mouth/Throat: Oropharynx is clear and moist.   Eyes: Conjunctivae, EOM and lids are normal. Pupils are equal, round, and reactive to light.   Neck: Normal range of motion and full passive range of motion without pain. Neck supple.   Cardiovascular: Normal rate, regular rhythm, S1 normal, S2 normal, normal heart sounds, intact distal pulses and normal pulses.   Pulse 98 bpm   Pulmonary/Chest: Breath sounds normal. No respiratory distress. She has no wheezes. She has no rales.   Abdominal: Soft. Normal appearance and bowel sounds are normal. She exhibits no distension. There is no tenderness.   Musculoskeletal:        Right hip: She exhibits decreased range of motion, decreased strength, tenderness and bony tenderness.  She exhibits no swelling, no crepitus, no deformity and no laceration.   Antalgic gait   Lymphadenopathy:     She has no cervical adenopathy.   Neurological: She is alert and oriented to person, place, and time. She has normal strength. No cranial nerve deficit or sensory deficit. Coordination and gait normal.   Skin: Skin is warm, dry and intact.   Psychiatric: She has a normal mood and affect. Her speech is normal and behavior is normal. Judgment and thought content normal. Cognition and memory are normal.         ED Course   Procedures  Labs Reviewed - No data to display       Imaging Results    None                               Clinical Impression:       ICD-10-CM ICD-9-CM   1. Pain of right hip joint M25.551 719.45         Disposition:   Disposition: Discharged  Condition: Stable                        ALAINA Carrero  07/27/19 0814

## 2019-08-10 ENCOUNTER — HOSPITAL ENCOUNTER (EMERGENCY)
Facility: HOSPITAL | Age: 49
Discharge: HOME | End: 2019-08-10
Attending: EMERGENCY MEDICINE
Payer: MEDICAID

## 2019-08-10 VITALS
HEIGHT: 64 IN | HEART RATE: 115 BPM | TEMPERATURE: 98 F | OXYGEN SATURATION: 100 % | DIASTOLIC BLOOD PRESSURE: 101 MMHG | RESPIRATION RATE: 18 BRPM | WEIGHT: 165.81 LBS | SYSTOLIC BLOOD PRESSURE: 176 MMHG | BODY MASS INDEX: 28.31 KG/M2

## 2019-08-10 DIAGNOSIS — M87.9 OSTEONECROSIS OF RIGHT HIP: ICD-10-CM

## 2019-08-10 PROCEDURE — 63600175 PHARM REV CODE 636 W HCPCS: Performed by: NURSE PRACTITIONER

## 2019-08-10 PROCEDURE — 99284 EMERGENCY DEPT VISIT MOD MDM: CPT | Mod: 25

## 2019-08-10 PROCEDURE — 96372 THER/PROPH/DIAG INJ SC/IM: CPT

## 2019-08-10 RX ORDER — KETOROLAC TROMETHAMINE 30 MG/ML
60 INJECTION, SOLUTION INTRAMUSCULAR; INTRAVENOUS
Status: COMPLETED | OUTPATIENT
Start: 2019-08-10 | End: 2019-08-10

## 2019-08-10 RX ORDER — HYDROCODONE BITARTRATE AND ACETAMINOPHEN 10; 325 MG/1; MG/1
1 TABLET ORAL EVERY 4 HOURS PRN
Qty: 12 TABLET | Refills: 0 | OUTPATIENT
Start: 2019-08-10 | End: 2020-03-15

## 2019-08-10 RX ORDER — MELOXICAM 15 MG/1
15 TABLET ORAL DAILY
Qty: 30 TABLET | Refills: 0 | Status: SHIPPED | OUTPATIENT
Start: 2019-08-10

## 2019-08-10 RX ORDER — NAPROXEN 500 MG/1
500 TABLET ORAL 2 TIMES DAILY
COMMUNITY
End: 2019-08-10

## 2019-08-10 RX ORDER — CYCLOBENZAPRINE HCL 10 MG
10 TABLET ORAL 3 TIMES DAILY PRN
COMMUNITY

## 2019-08-10 RX ADMIN — KETOROLAC TROMETHAMINE 60 MG: 30 INJECTION, SOLUTION INTRAMUSCULAR at 01:08

## 2019-08-10 NOTE — ED NOTES
"Patient identifiers verified and correct for Kaylene Emery.    Patient presented to the ED with c/o right hip pain. Patient reports she "always has this pain". Pain is worse with movement. Patient denies any other issues at this time      LOC: The patient is awake, alert and aware of environment with an appropriate affect, the patient is oriented x 3 and speaking appropriately.  APPEARANCE: Patient resting comfortably and in no acute distress, patient is clean and well groomed, patient's clothing is properly fastened.  HEENT: WNL   SKIN: The skin is warm and dry, color consistent with ethnicity, patient has normal skin turgor and moist mucus membranes, skin intact, no breakdown or bruising noted.  MUSCULOSKELETAL: Patient moving all extremities spontaneously.   RESPIRATORY: Airway is open and patent, respirations are spontaneous, and unlabored. Breath sounds are clear.   CARDIAC: Patient has a normal rate, no periphreal edema noted, capillary refill < 3 seconds.   ABDOMEN: Soft and non tender to palpation. No distention noted.   : Normal urinary patterns. Urine is yellow without foul odor.    "

## 2019-08-10 NOTE — ED PROVIDER NOTES
"Encounter Date: 8/10/2019       History     Chief Complaint   Patient presents with    Hip Pain     R hip pain x 3 days, reports she is waiting for her hip replacement sx to be scheduled but she had her grandson this weekend and "over did it"      48-year-old female presents to the emergency room with complaints of chronic right hip pain of the right groin region.  Patient has known history of avascular necrosis of the right greater than left hip.  No recent falls.  She had a fall in her bathroom that has been evaluated with no acute fractures in July 2019.  She reports that she was caring for her 4-year-old grandchild about 2 days ago states "over did it caring for the child".  She presents to the emergency room stating she would like something to help calm down the acute on chronic flare of her right hip pain.  She is taking narcotic pain medicine the past that has helped temporarily.   Last Norco filled was 7/1/2019 12 tablets obtained from her primary care physician, Dr. Dos Santos, verified with the narcotic prescription monitoring system.           Review of patient's allergies indicates:   Allergen Reactions    Corticosteroids (glucocorticoids)      "sets off my anxiety real bad"  "sets off my anxiety real bad"    Tylenol-codeine [acetaminophen-codeine] Itching    Tramadol Rash     Past Medical History:   Diagnosis Date    Anxiety     Depression     GERD (gastroesophageal reflux disease)     Osteonecrosis of right hip 2019    Tobacco use      Past Surgical History:   Procedure Laterality Date    BLADDER REPAIR      CENTRAL LINE  6/28/2017         CHOLECYSTECTOMY  04/07/2017    CHOLECYSTECTOMY-LAPAROSCOPIC N/A 4/7/2017    [facility] at [facility]    KNEE ARTHROPLASTY      PARTIAL HYSTERECTOMY      TUBAL LIGATION       Family History   Problem Relation Age of Onset    Hypertension Mother     Diabetes Mother     Hypoglycemic Father     Lung cancer Father     COPD Father  "     Social History     Tobacco Use    Smoking status: Current Every Day Smoker     Packs/day: 1.00     Years: 29.00     Pack years: 29.00     Types: Cigarettes    Smokeless tobacco: Never Used    Tobacco comment: began smoking 1990.    Substance Use Topics    Alcohol use: Yes     Comment: special occasions only, 3-4 times a year 1 glass of wine    Drug use: No     Review of Systems   Constitutional: Negative.  Negative for fever.   HENT: Negative for sore throat.    Respiratory: Negative for shortness of breath.    Cardiovascular: Negative for chest pain.   Gastrointestinal: Negative for nausea.   Genitourinary: Negative for dysuria.   Musculoskeletal: Positive for arthralgias (right hip ) and gait problem. Negative for back pain.   Skin: Negative.  Negative for rash.   Neurological: Negative for weakness.   Hematological: Does not bruise/bleed easily.   All other systems reviewed and are negative.      Physical Exam     Initial Vitals [08/10/19 1227]   BP Pulse Resp Temp SpO2   (!) 176/101 (!) 115 18 98.4 °F (36.9 °C) 100 %      MAP       --         Physical Exam    Vitals reviewed.  Constitutional: She appears well-developed and well-nourished.   HENT:   Head: Normocephalic and atraumatic.   Eyes: Conjunctivae are normal.   Neck: Normal range of motion. Neck supple.   Cardiovascular: Normal rate, regular rhythm, normal heart sounds and intact distal pulses.   Pulmonary/Chest: Breath sounds normal.   Abdominal: Soft.   Musculoskeletal:        Right hip: She exhibits decreased range of motion. She exhibits normal strength, no tenderness, no bony tenderness, no swelling and no deformity.        Left hip: Normal.   Neurological: She is alert and oriented to person, place, and time. She has normal strength and normal reflexes.   Skin: Skin is warm and dry. Capillary refill takes less than 2 seconds.   Psychiatric: She has a normal mood and affect. Her behavior is normal. Thought content normal.         ED Course    Procedures  Labs Reviewed - No data to display       Imaging Results    None         7/26/2019 at [facility]   XR HIP 2 OR 3 VIEW RIGHT W OR WO PELVIS    History of injury with pain. 3 views.    There are severe changes of avascular necrosis related to the right hip. There is collapse and flattening of the articular surface. I cannot detect a fracture line. No dislocation is noted. Earlier changes of avascular necrosis are seen in the left hip.   Other Result Information   Interface, Rad Results In - 07/26/2019  8:16 AM CDT  XR HIP 2 OR 3 VIEW RIGHT W OR WO PELVIS    History of injury with pain. 3 views.    There are severe changes of avascular necrosis related to the right hip. There is collapse and flattening of the articular surface. I cannot detect a fracture line. No dislocation is noted. Earlier changes of avascular necrosis are seen in the left hip.     Discuss conservative measures of positional support.  Decreasing weight-bearing activities.  Utilizing her crutches or obtaining a walker to reduce the pressure of weight on her osteonecrosis right hip.  Utilize ice/heat.  Nonweightbearing exercises and pool.  Patient understands that narcotic pain medicine should be limited especially since she is planning surgical intervention.  She states understanding.  She has received narcotic pain medicine requested in June, July and August 2019 at this visit.  She understands as best obtain her pain medicine from her PCP or her surgeon.  Patient is to utilize Mobic or other NSAID before she utilizes narcotic type pain medicine and to include conservative measures.                        Clinical Impression:       ICD-10-CM ICD-9-CM   1. Osteonecrosis of right hip M87.9 733.42     Osteonecrosis of right hip    Other orders  -     ketorolac injection 60 mg  -     HYDROcodone-acetaminophen (NORCO)  mg per tablet; Take 1 tablet by mouth every 4 (four) hours as needed for Pain.  Dispense: 12 tablet; Refill: 0  -      meloxicam (MOBIC) 15 MG tablet; Take 1 tablet (15 mg total) by mouth once daily.  Dispense: 30 tablet; Refill: 0      I discussed risk of narcotic pain control with risk of addiction, dependence, constipation, GI upset. She is to discard any unused narcotic pain medication and not give to anyone else.   She is to try otc ibuprofen or aleve or tylenol and other supportive measure before use of narcotic pain control. Patient states understanding.   Patient is to utilize Mobic before she and other conservative measures before she tries narcotic relief.  Toradol provided in clinic patient is to begin Mobic tomorrow.     Disposition:   Disposition: Discharged  Condition: Stable                        Fatmata Suggs NP  08/10/19 7006

## 2019-08-27 ENCOUNTER — HOSPITAL ENCOUNTER (EMERGENCY)
Facility: HOSPITAL | Age: 49
Discharge: HOME | End: 2019-08-28
Attending: EMERGENCY MEDICINE
Payer: MEDICAID

## 2019-08-27 VITALS
SYSTOLIC BLOOD PRESSURE: 124 MMHG | RESPIRATION RATE: 18 BRPM | TEMPERATURE: 98 F | HEART RATE: 110 BPM | DIASTOLIC BLOOD PRESSURE: 73 MMHG | BODY MASS INDEX: 29.18 KG/M2 | OXYGEN SATURATION: 100 % | WEIGHT: 170 LBS

## 2019-08-27 DIAGNOSIS — M25.551 PAIN OF RIGHT HIP JOINT: Primary | ICD-10-CM

## 2019-08-27 PROCEDURE — 99283 EMERGENCY DEPT VISIT LOW MDM: CPT

## 2019-08-27 RX ORDER — HYDROCODONE BITARTRATE AND ACETAMINOPHEN 7.5; 325 MG/1; MG/1
1 TABLET ORAL EVERY 6 HOURS PRN
Qty: 12 TABLET | Refills: 0 | OUTPATIENT
Start: 2019-08-27 | End: 2020-03-15

## 2019-08-28 NOTE — ED PROVIDER NOTES
"SCRIBE #1 NOTE: I, Rufino Ragini, am scribing for, and in the presence of, Jackson Pérez Jr., RAYMOND. I have scribed the entire note.       History     Chief Complaint   Patient presents with    Hip Pain     had xrays, CTs, states, "I need a new right hip." Being followed by [facility], states process is very slow. c/o pain and states she is having a "flare-up" and needs pain medication      Review of patient's allergies indicates:   Allergen Reactions    Corticosteroids (glucocorticoids)      "sets off my anxiety real bad"  "sets off my anxiety real bad"    Tylenol-codeine [acetaminophen-codeine] Itching    Tramadol Rash         History of Present Illness     HPI    8/27/2019, 8:14 PM  History obtained from the patient      History of Present Illness: Kaylene Emery is a 48 y.o. female patient with a PMHx of Osteonecrosis of right hip who presents to the Emergency Department for evaluation of right hip pain. Pt states she has been referred to [facility] surgery for a hip replacement. Pt is c/o of increased pain. Symptoms are constant and moderate in severity. No associated sxs reported. Patient denies any CP, SOB, N/V, fever, and all other sxs at this time. No prior Tx reported. No further complaints or concerns at this time.         Arrival mode: Personal vehicle    PCP: Robby Dos Santos NP        Past Medical History:  Past Medical History:   Diagnosis Date    Anxiety     Depression     GERD (gastroesophageal reflux disease)     Osteonecrosis of right hip 2019    Tobacco use        Past Surgical History:  Past Surgical History:   Procedure Laterality Date    BLADDER REPAIR      CENTRAL LINE  6/28/2017         CHOLECYSTECTOMY  04/07/2017    CHOLECYSTECTOMY-LAPAROSCOPIC N/A 4/7/2017    [facility] at [facility]    KNEE ARTHROPLASTY      PARTIAL HYSTERECTOMY      TUBAL LIGATION           Family History:  Family History   Problem Relation Age of Onset    Hypertension Mother "     Diabetes Mother     Hypoglycemic Father     Lung cancer Father     COPD Father        Social History:  Social History     Tobacco Use    Smoking status: Current Every Day Smoker     Packs/day: 1.00     Years: 29.00     Pack years: 29.00     Types: Cigarettes    Smokeless tobacco: Never Used    Tobacco comment: began smoking 1990.    Substance and Sexual Activity    Alcohol use: Yes     Comment: special occasions only, 3-4 times a year 1 glass of wine    Drug use: No    Sexual activity: Yes     Partners: Male     Comment:         Review of Systems     Review of Systems   Constitutional: Negative for chills and fever.   HENT: Negative for sore throat.    Respiratory: Negative for shortness of breath.    Cardiovascular: Negative for chest pain.   Gastrointestinal: Negative for nausea and vomiting.   Genitourinary: Negative for dysuria.   Musculoskeletal: Positive for arthralgias. Back pain: right hip.   Skin: Negative for rash.   Neurological: Negative for weakness.   Hematological: Does not bruise/bleed easily.   All other systems reviewed and are negative.       Physical Exam     Initial Vitals [08/27/19 1929]   BP Pulse Resp Temp SpO2   124/73 110 18 98.2 °F (36.8 °C) 100 %      MAP       --          Physical Exam  Nursing Notes and Vital Signs Reviewed.  Constitutional: Patient is in no acute distress. Well-developed and well-nourished.  Head: Atraumatic. Normocephalic.  Eyes: PERRL. EOM intact. Conjunctivae are not pale. No scleral icterus.  ENT: Mucous membranes are moist. Oropharynx is clear and symmetric.    Neck: Supple. Full ROM. No lymphadenopathy.  Cardiovascular: Regular rate. Regular rhythm. No murmurs, rubs, or gallops. Distal pulses are 2+ and symmetric.  Pulmonary/Chest: No respiratory distress. Clear to auscultation bilaterally. No wheezing or rales.  Abdominal: Soft and non-distended.  There is no tenderness.  No rebound, guarding, or rigidity. Good bowel sounds.  Genitourinary:  No CVA tenderness  Musculoskeletal: Tenderness to right lateral hip. Pt is able to ambulate. Pain with ambulation. Moves all extremities. No obvious deformities. No edema. No calf tenderness.  Skin: Warm and dry.  Neurological:  Alert, awake, and appropriate.  Normal speech.  No acute focal neurological deficits are appreciated.  Psychiatric: Normal affect. Good eye contact. Appropriate in content.     ED Course   Procedures  ED Vital Signs:  Vitals:    08/27/19 1929 08/27/19 1930   BP: 124/73    Pulse: 110    Resp: 18    Temp: 98.2 °F (36.8 °C)    TempSrc: Oral    SpO2: 100%    Weight:  77.1 kg (170 lb)       Abnormal Lab Results:  Labs Reviewed - No data to display     All Lab Results:  None    Imaging Results:  Imaging Results    None                   The Emergency Provider reviewed the vital signs and test results, which are outlined above.     ED Discussion     8:17 PM: Reassessed pt at this time.  Pt states her condition has improved at this time. Discussed with pt all pertinent ED information and results. Discussed pt dx and plan of tx. Gave pt all f/u and return to the ED instructions. All questions and concerns were addressed at this time. Pt expresses understanding of information and instructions, and is comfortable with plan to discharge. Pt is stable for discharge.    I discussed with patient and/or family/caretaker that evaluation in the ED does not suggest any emergent or life threatening medical conditions requiring immediate intervention beyond what was provided in the ED, and I believe patient is safe for discharge.  Regardless, an unremarkable evaluation in the ED does not preclude the development or presence of a serious of life threatening condition. As such, patient was instructed to return immediately for any worsening or change in current symptoms.                     ED Medication(s):  Medications - No data to display    New Prescriptions    HYDROCODONE-ACETAMINOPHEN (NORCO) 7.5-325 MG PER  TABLET    Take 1 tablet by mouth every 6 (six) hours as needed.       Follow-up Information     Robby Dos Santos NP In 3 days.    Specialty:  Family Medicine  Contact information:  [address]  [facility] [address]  [phone]                       Scribe Attestation:   Scribe #1: I performed the above scribed service and the documentation accurately describes the services I performed. I attest to the accuracy of the note.     Attending:   Physician Attestation Statement for Scribe #1: I, RAYMOND Bae Jr., personally performed the services described in this documentation, as scribed by Rufino Eid, in my presence, and it is both accurate and complete.           Clinical Impression       ICD-10-CM ICD-9-CM   1. Pain of right hip joint M25.551 719.45       Disposition:   Disposition: Discharged  Condition: Stable  ;       RAYMOND Bae Jr.  08/28/19 1115

## 2019-09-19 ENCOUNTER — HOSPITAL ENCOUNTER (EMERGENCY)
Facility: HOSPITAL | Age: 49
Discharge: HOME | End: 2019-09-19
Attending: EMERGENCY MEDICINE
Payer: MEDICAID

## 2019-09-19 VITALS
WEIGHT: 170 LBS | SYSTOLIC BLOOD PRESSURE: 140 MMHG | TEMPERATURE: 98 F | HEART RATE: 107 BPM | RESPIRATION RATE: 20 BRPM | OXYGEN SATURATION: 99 % | BODY MASS INDEX: 29.02 KG/M2 | DIASTOLIC BLOOD PRESSURE: 93 MMHG | HEIGHT: 64 IN

## 2019-09-19 DIAGNOSIS — M25.552 PAIN OF LEFT HIP JOINT: Primary | ICD-10-CM

## 2019-09-19 PROCEDURE — 99283 EMERGENCY DEPT VISIT LOW MDM: CPT

## 2019-09-19 RX ORDER — HYDROCODONE BITARTRATE AND ACETAMINOPHEN 7.5; 325 MG/1; MG/1
1 TABLET ORAL EVERY 6 HOURS PRN
Qty: 12 TABLET | Refills: 0 | OUTPATIENT
Start: 2019-09-19 | End: 2020-03-15

## 2019-09-20 NOTE — ED PROVIDER NOTES
"SCRIBE #1 NOTE: I, Zandra Kristieon, am scribing for, and in the presence of, Jackson Pérez Jr., [facility]. I have scribed the entire note.       History     Chief Complaint   Patient presents with    Hip Pain     left hip     Review of patient's allergies indicates:   Allergen Reactions    Corticosteroids (glucocorticoids)      "sets off my anxiety real bad"  "sets off my anxiety real bad"    Tylenol-codeine [acetaminophen-codeine] Itching    Tramadol Rash         History of Present Illness     HPI    9/19/2019, 7:32 PM  History obtained from the patient      History of Present Illness: Kaylene Emery is a 48 y.o. female patient with a PMHx of chronic hip pain who presents to the Emergency Department for evaluation of L hip pain which onset gradually a few days ago. Pt is waiting to have a hip replacement done at [facility]. She states she recently ran out of her pain medication. Symptoms are constant and moderate in severity. No mitigating or exacerbating factors reported. No associated sxs included. Patient denies any fever, chills, n/v, back pain, gait problem, dizziness, extremity weakness/numbness, and all other sxs at this time. No prior Tx. No further complaints or concerns at this time.       Arrival mode: Personal vehicle     PCP: Robby Dos Santos NP        Past Medical History:  Past Medical History:   Diagnosis Date    Anxiety     Depression     GERD (gastroesophageal reflux disease)     Osteonecrosis of right hip 2019    Tobacco use        Past Surgical History:  Past Surgical History:   Procedure Laterality Date    BLADDER REPAIR      CENTRAL LINE  6/28/2017         CHOLECYSTECTOMY  04/07/2017    CHOLECYSTECTOMY-LAPAROSCOPIC N/A 4/7/2017    Performed by Td Church MD at [facility] OR    KNEE ARTHROPLASTY      PARTIAL HYSTERECTOMY      TUBAL LIGATION           Family History:  Family History   Problem Relation Age of Onset    Hypertension Mother     Diabetes Mother     Hypoglycemic " Father     Lung cancer Father     COPD Father        Social History:  Social History     Tobacco Use    Smoking status: Current Every Day Smoker     Packs/day: 1.00     Years: 29.00     Pack years: 29.00     Types: Cigarettes    Smokeless tobacco: Never Used    Tobacco comment: began smoking 1990.    Substance and Sexual Activity    Alcohol use: Yes     Comment: special occasions only, 3-4 times a year 1 glass of wine    Drug use: No    Sexual activity: Yes     Partners: Male     Comment:         Review of Systems     Review of Systems   Constitutional: Negative for chills and fever.   HENT: Negative for sore throat.    Respiratory: Negative for cough and shortness of breath.    Cardiovascular: Negative for chest pain.   Gastrointestinal: Negative for abdominal pain, diarrhea, nausea and vomiting.   Genitourinary: Negative for dysuria, frequency and urgency.   Musculoskeletal: Positive for arthralgias (L hip). Negative for back pain and gait problem.   Skin: Negative for rash.   Neurological: Negative for dizziness, weakness and numbness.   Hematological: Does not bruise/bleed easily.   All other systems reviewed and are negative.       Physical Exam     Initial Vitals [09/19/19 1921]   BP Pulse Resp Temp SpO2   (!) 140/93 107 20 98.3 °F (36.8 °C) 99 %      MAP       --          Physical Exam  Nursing Notes and Vital Signs Reviewed.  Constitutional: Patient is in no acute distress. Well-developed and well-nourished.  Head: Atraumatic. Normocephalic.  Eyes: PERRL. EOM intact. Conjunctivae are not pale. No scleral icterus.  ENT: Mucous membranes are moist. Oropharynx is clear and symmetric.    Neck: Supple. Full ROM. No lymphadenopathy.  Cardiovascular: Regular rate. Regular rhythm. No murmurs, rubs, or gallops. Distal pulses are 2+ and symmetric.  Pulmonary/Chest: No respiratory distress. Clear to auscultation bilaterally. No wheezing or rales.  Abdominal: Soft and non-distended.  There is no  "tenderness.  No rebound, guarding, or rigidity.   Musculoskeletal: Moves all extremities. No obvious deformities. No edema. No calf tenderness.  LLE: no evident deformity. Tenderness of the L lateral hip. ROM is normal. Cap refill distally is <2 seconds. DP and PT pulses are equal and 2+ bilaterally. No motor deficit. No distal sensory deficit.  Skin: Warm and dry.  Neurological:  Alert, awake, and appropriate.  Normal speech.  No acute focal neurological deficits are appreciated.  Psychiatric: Normal affect. Good eye contact. Appropriate in content.     ED Course   Procedures  ED Vital Signs:  Vitals:    09/19/19 1921   BP: (!) 140/93   Pulse: 107   Resp: 20   Temp: 98.3 °F (36.8 °C)   TempSrc: Oral   SpO2: 99%   Weight: 77.1 kg (169 lb 15.6 oz)   Height: 5' 4" (1.626 m)       Abnormal Lab Results:  Labs Reviewed - No data to display     Imaging Results:  Imaging Results    None                 The Emergency Provider reviewed the vital signs and test results, which are outlined above.     ED Discussion     7:36 PM: Reassessed pt at this time. Discussed with pt all pertinent ED information and results. Discussed pt dx and plan of tx. Gave pt all f/u and return to the ED instructions. All questions and concerns were addressed at this time. Pt expresses understanding of information and instructions, and is comfortable with plan to discharge. Pt is stable for discharge.    I discussed with patient and/or family/caretaker that evaluation in the ED does not suggest any emergent or life threatening medical conditions requiring immediate intervention beyond what was provided in the ED, and I believe patient is safe for discharge.  Regardless, an unremarkable evaluation in the ED does not preclude the development or presence of a serious of life threatening condition. As such, patient was instructed to return immediately for any worsening or change in current symptoms.                   ED Medication(s):  Medications - No " data to display    New Prescriptions    HYDROCODONE-ACETAMINOPHEN (NORCO) 7.5-325 MG PER TABLET    Take 1 tablet by mouth every 6 (six) hours as needed.       Follow-up Information     Robby Dos Santos NP In 3 days.    Specialty:  Family Medicine  Contact information:  [address]  [facility] [address]  [phone]                       Scribe Attestation:   Scribe #1: I performed the above scribed service and the documentation accurately describes the services I performed. I attest to the accuracy of the note.     Attending:   Physician Attestation Statement for Scribe #1: I, RAYMOND Bae Jr., personally performed the services described in this documentation, as scribed by Zandra López, in my presence, and it is both accurate and complete.           Clinical Impression       ICD-10-CM ICD-9-CM   1. Pain of left hip joint M25.552 719.45       Disposition:   Disposition: Discharged  Condition: Stable         RAYMOND Bae Jr.  09/20/19 0056

## 2019-10-11 ENCOUNTER — HOSPITAL ENCOUNTER (EMERGENCY)
Facility: HOSPITAL | Age: 49
Discharge: HOME | End: 2019-10-11
Attending: FAMILY MEDICINE
Payer: MEDICAID

## 2019-10-11 VITALS
DIASTOLIC BLOOD PRESSURE: 80 MMHG | RESPIRATION RATE: 20 BRPM | SYSTOLIC BLOOD PRESSURE: 112 MMHG | TEMPERATURE: 98 F | WEIGHT: 173 LBS | HEART RATE: 120 BPM | BODY MASS INDEX: 29.7 KG/M2 | OXYGEN SATURATION: 97 %

## 2019-10-11 DIAGNOSIS — M25.551 PAIN OF BOTH HIP JOINTS: Primary | ICD-10-CM

## 2019-10-11 DIAGNOSIS — M25.552 PAIN OF BOTH HIP JOINTS: Primary | ICD-10-CM

## 2019-10-11 PROCEDURE — 99283 EMERGENCY DEPT VISIT LOW MDM: CPT

## 2019-10-11 PROCEDURE — 25000003 PHARM REV CODE 250: Performed by: NURSE PRACTITIONER

## 2019-10-11 RX ORDER — HYDROCODONE BITARTRATE AND ACETAMINOPHEN 5; 325 MG/1; MG/1
1 TABLET ORAL EVERY 6 HOURS PRN
Qty: 12 TABLET | Refills: 0 | Status: SHIPPED | OUTPATIENT
Start: 2019-10-11 | End: 2019-10-14

## 2019-10-11 RX ORDER — HYDROCODONE BITARTRATE AND ACETAMINOPHEN 5; 325 MG/1; MG/1
1 TABLET ORAL
Status: COMPLETED | OUTPATIENT
Start: 2019-10-11 | End: 2019-10-11

## 2019-10-11 RX ORDER — NAPROXEN 500 MG/1
TABLET ORAL
COMMUNITY
Start: 2016-08-29

## 2019-10-11 RX ADMIN — HYDROCODONE BITARTRATE AND ACETAMINOPHEN 1 TABLET: 5; 325 TABLET ORAL at 07:10

## 2019-10-11 NOTE — ED PROVIDER NOTES
"SCRIBE #1 NOTE: I, Adam Pollack, am scribing for, and in the presence of, Gerald Hogan NP. I have scribed the entire note.       History     Chief Complaint   Patient presents with    Hip Pain     bilateral hip pain; attempting to get replacements scheduled     Review of patient's allergies indicates:   Allergen Reactions    Corticosteroids (glucocorticoids)      "sets off my anxiety real bad"  "sets off my anxiety real bad"    Tylenol-codeine [acetaminophen-codeine] Itching    Tramadol Rash         History of Present Illness     HPI    10/11/2019, 6:56 PM  History obtained from the patient      History of Present Illness: Kaylene Emery is a 48 y.o. female patient (current smoker, 1 ppd) who presents to the Emergency Department for evaluation of bilateral hip pain radiating to the BLE which is chronic but has recently exacerbated since 3 days PTA. Pt is awaiting approval for bilateral hip replacements at the [facility] in [place]. She currently has a prescription for Flexeril and Naproxen, but it has not helped pain as of late and is requesting something stronger. Pt reports taking Norco in the past with some relief. Symptoms are constant and moderate in severity. No mitigating factors reported. Pt reports sx exacerbation and compared with previous flare ups and with movement and touch. Associated sxs include BLE pain. Patient denies any fever, chills, diaphoresis, CP, leg swelling, back pain, numbness, cough, SOB, dizziness, headache, and all other sxs at this time. Prior Tx includes Flexeril and Naproxin with minimal sx improvement. No further complaints or concerns at this time.         Arrival mode: Personal vehicle    PCP: Robby Dos Santos NP        Past Medical History:  Past Medical History:   Diagnosis Date    Anxiety     Depression     GERD (gastroesophageal reflux disease)     Osteonecrosis of right hip 2019    Tobacco use        Past Surgical History:  Past " Surgical History:   Procedure Laterality Date    BLADDER REPAIR      CENTRAL LINE  6/28/2017         CHOLECYSTECTOMY  04/07/2017    KNEE ARTHROPLASTY      PARTIAL HYSTERECTOMY      TUBAL LIGATION           Family History:  Family History   Problem Relation Age of Onset    Hypertension Mother     Diabetes Mother     Hypoglycemic Father     Lung cancer Father     COPD Father        Social History:  Social History     Tobacco Use    Smoking status: Current Every Day Smoker     Packs/day: 1.00     Years: 29.00     Pack years: 29.00     Types: Cigarettes    Smokeless tobacco: Never Used    Tobacco comment: began smoking 1990.    Substance and Sexual Activity    Alcohol use: Yes     Comment: special occasions only, 3-4 times a year 1 glass of wine    Drug use: No    Sexual activity: Yes     Partners: Male     Comment:         Review of Systems     Review of Systems   Constitutional: Negative for chills, diaphoresis and fever.   Respiratory: Negative for cough and shortness of breath.    Cardiovascular: Negative for chest pain and leg swelling.   Gastrointestinal: Negative for abdominal pain, diarrhea, nausea and vomiting.   Musculoskeletal: Positive for arthralgias (Bilateral hip; radiates to BLE) and myalgias (BLE). Negative for back pain, neck pain and neck stiffness.   Skin: Negative for rash and wound.   Neurological: Negative for dizziness, light-headedness, numbness and headaches.   All other systems reviewed and are negative.     Physical Exam     Initial Vitals [10/11/19 1810]   BP Pulse Resp Temp SpO2   107/76 106 20 98.1 °F (36.7 °C) 99 %      MAP       --          Physical Exam  Nursing Notes and Vital Signs Reviewed.  Constitutional: Patient is in no acute distress. Well-developed and well-nourished.  Head: Atraumatic. Normocephalic.  Eyes: PERRL. EOM intact. Conjunctivae are not pale. No scleral icterus.  ENT: Mucous membranes are moist. Oropharynx is clear and symmetric.    Neck:  Supple. Full ROM. No lymphadenopathy.  Cardiovascular: Regular rate. Regular rhythm. No murmurs, rubs, or gallops. Distal pulses are 2+ and symmetric.  Pulmonary/Chest: No respiratory distress. Clear to auscultation bilaterally. No wheezing or rales.  Abdominal: Soft and non-distended.  There is no tenderness.  No rebound, guarding, or rigidity. Good bowel sounds.  Genitourinary: No CVA tenderness  Musculoskeletal: Moves all extremities. No obvious deformities. No edema. No calf tenderness. Bilateral hip tenderness; pt reports it is similar to exacerbation of bilateral hip pain.  Skin: Warm and dry.  Neurological:  Alert, awake, and appropriate.  Normal speech.  No acute focal neurological deficits are appreciated.  Psychiatric: Normal affect. Good eye contact. Appropriate in content.     ED Course   Procedures  ED Vital Signs:  Vitals:    10/11/19 1810   BP: 107/76   Pulse: 106   Resp: 20   Temp: 98.1 °F (36.7 °C)   TempSrc: Oral   SpO2: 99%       Abnormal Lab Results:  Labs Reviewed - No data to display     All Lab Results:  None    Imaging Results:  Imaging Results    None                 The Emergency Provider reviewed the vital signs and test results, which are outlined above.     ED Discussion       7:18 PM: Reassessed pt at this time. Discussed with pt all pertinent ED information and results. Discussed pt dx and plan of tx. Gave pt all f/u and return to the ED instructions. All questions and concerns were addressed at this time. Pt expresses understanding of information and instructions, and is comfortable with plan to discharge. Pt is stable for discharge.    I discussed with patient and/or family/caretaker that evaluation in the ED does not suggest any emergent or life threatening medical conditions requiring immediate intervention beyond what was provided in the ED, and I believe patient is safe for discharge.  Regardless, an unremarkable evaluation in the ED does not preclude the development or presence  of a serious of life threatening condition. As such, patient was instructed to return immediately for any worsening or change in current symptoms.                 ED Medication(s):  Medications   HYDROcodone-acetaminophen 5-325 mg per tablet 1 tablet (has no administration in time range)       New Prescriptions    HYDROCODONE-ACETAMINOPHEN (NORCO) 5-325 MG PER TABLET    Take 1 tablet by mouth every 6 (six) hours as needed for Pain.       Follow-up Information     Robby Dos Santos NP.    Specialty:  Family Medicine  Why:  for further eval and approval for surgery  Contact information:  [address] [address]  [phone]                       Scribe Attestation:   Scribe #1: I performed the above scribed service and the documentation accurately describes the services I performed. I attest to the accuracy of the note.     Attending:   Physician Attestation Statement for Scribe #1: I, Gerald Hogan NP, personally performed the services described in this documentation, as scribed by Adam Pollack, in my presence, and it is both accurate and complete.           Clinical Impression       ICD-10-CM ICD-9-CM   1. Pain of both hip joints M25.551 719.45    M25.552        Disposition:   Disposition: Discharged  Condition: Stable         Gerald Hogan NP  10/11/19 1920

## 2019-10-20 ENCOUNTER — HOSPITAL ENCOUNTER (EMERGENCY)
Facility: HOSPITAL | Age: 49
Discharge: HOME | End: 2019-10-20
Attending: FAMILY MEDICINE
Payer: MEDICAID

## 2019-10-20 VITALS
OXYGEN SATURATION: 99 % | HEART RATE: 103 BPM | BODY MASS INDEX: 29.7 KG/M2 | DIASTOLIC BLOOD PRESSURE: 86 MMHG | RESPIRATION RATE: 18 BRPM | SYSTOLIC BLOOD PRESSURE: 139 MMHG | HEIGHT: 64 IN | TEMPERATURE: 99 F

## 2019-10-20 DIAGNOSIS — M87.9 OSTEONECROSIS OF RIGHT HIP: Primary | ICD-10-CM

## 2019-10-20 DIAGNOSIS — M25.551 RIGHT HIP PAIN: ICD-10-CM

## 2019-10-20 PROCEDURE — 25000003 PHARM REV CODE 250: Performed by: REGISTERED NURSE

## 2019-10-20 PROCEDURE — 99283 EMERGENCY DEPT VISIT LOW MDM: CPT | Mod: 25

## 2019-10-20 RX ORDER — ONDANSETRON 4 MG/1
4 TABLET, ORALLY DISINTEGRATING ORAL
Status: COMPLETED | OUTPATIENT
Start: 2019-10-20 | End: 2019-10-20

## 2019-10-20 RX ORDER — HYDROCODONE BITARTRATE AND ACETAMINOPHEN 10; 325 MG/1; MG/1
1 TABLET ORAL
Status: COMPLETED | OUTPATIENT
Start: 2019-10-20 | End: 2019-10-20

## 2019-10-20 RX ADMIN — HYDROCODONE BITARTRATE AND ACETAMINOPHEN 1 TABLET: 10; 325 TABLET ORAL at 07:10

## 2019-10-20 RX ADMIN — ONDANSETRON 4 MG: 4 TABLET, ORALLY DISINTEGRATING ORAL at 08:10

## 2019-10-21 NOTE — ED PROVIDER NOTES
"SCRIBE #1 NOTE: I, Leonid Maier, am scribing for, and in the presence of, David Pérez Jr., [facility]. I have scribed the entire note.       History     Chief Complaint   Patient presents with    Hip Pain     worsening bilateral hip pain     Review of patient's allergies indicates:   Allergen Reactions    Corticosteroids (glucocorticoids)      "sets off my anxiety real bad"  "sets off my anxiety real bad"    Tylenol-codeine [acetaminophen-codeine] Itching    Tramadol Rash         History of Present Illness     HPI    10/20/2019, 7:33 PM  History obtained from the patient      History of Present Illness: Kaylnee Emery is a 48 y.o. female patient with a PMHx of osteonecrosis of R hip who presents to the Emergency Department for evaluation of R hip pain. Pt reports having chronic WAI hip pain, but c/o severe pain of R hip which onset gradually several hours ago. Pt expresses feeling "grinding and popping" in R hip. Symptoms are constant and moderate in severity. Pt states pain is exacerbated when ambulating. No associated sxs reported. Patient denies any fever, chills, abd pain, HA, leg pain, SOB, and all other sxs at this time. No prior Tx reported. No further complaints or concerns at this time.         Arrival mode: Personal vehicle    PCP: Robby Dos Santos NP        Past Medical History:  Past Medical History:   Diagnosis Date    Anxiety     Depression     GERD (gastroesophageal reflux disease)     Osteonecrosis of right hip 2019    Tobacco use        Past Surgical History:  Past Surgical History:   Procedure Laterality Date    BLADDER REPAIR      CENTRAL LINE  6/28/2017         CHOLECYSTECTOMY  04/07/2017    KNEE ARTHROPLASTY      PARTIAL HYSTERECTOMY      TUBAL LIGATION           Family History:  Family History   Problem Relation Age of Onset    Hypertension Mother     Diabetes Mother     Hypoglycemic Father     Lung cancer Father     COPD Father        Social History:  Social History "     Tobacco Use    Smoking status: Current Every Day Smoker     Packs/day: 1.00     Years: 29.00     Pack years: 29.00     Types: Cigarettes    Smokeless tobacco: Never Used    Tobacco comment: began smoking 1990.    Substance and Sexual Activity    Alcohol use: Yes     Comment: special occasions only, 3-4 times a year 1 glass of wine    Drug use: No    Sexual activity: Yes     Partners: Male     Comment:         Review of Systems     Review of Systems   Constitutional: Negative for chills and fever.   HENT: Negative for sore throat.    Respiratory: Negative for shortness of breath.    Cardiovascular: Negative for chest pain.   Gastrointestinal: Negative for abdominal pain and nausea.   Genitourinary: Negative for dysuria.   Musculoskeletal: Negative for back pain.        (+) R hip pain  (-) leg pain   Skin: Negative for rash.   Neurological: Negative for weakness and headaches.   Hematological: Does not bruise/bleed easily.   All other systems reviewed and are negative.     Physical Exam     Initial Vitals [10/20/19 1904]   BP Pulse Resp Temp SpO2   (!) 142/94 (!) 129 20 98.7 °F (37.1 °C) 99 %      MAP       --          Physical Exam  Nursing Notes and Vital Signs Reviewed.  Constitutional: Patient is in no acute distress. Well-developed and well-nourished.  Head: Atraumatic. Normocephalic.  Eyes: PERRL. EOM intact. Conjunctivae are not pale. No scleral icterus.  ENT: Mucous membranes are moist. Oropharynx is clear and symmetric.    Neck: Supple. Full ROM. No lymphadenopathy.  Cardiovascular: Regular rate. Regular rhythm. No murmurs, rubs, or gallops. Distal pulses are 2+ and symmetric.  Pulmonary/Chest: No respiratory distress. Clear to auscultation bilaterally. No wheezing or rales.  Abdominal: Soft and non-distended.  There is no tenderness.  No rebound, guarding, or rigidity. Good bowel sounds.  Musculoskeletal: Moves all extremities. No obvious deformities. No edema. Tenderness to R lateral  "hip.  Skin: Warm and dry.  Neurological:  Alert, awake, and appropriate.  Normal speech.  No acute focal neurological deficits are appreciated.  Psychiatric: Normal affect. Good eye contact. Appropriate in content.     ED Course   Procedures  ED Vital Signs:  Vitals:    10/20/19 1904 10/20/19 2018   BP: (!) 142/94 139/86   Pulse: (!) 129 103   Resp: 20 18   Temp: 98.7 °F (37.1 °C)    TempSrc: Oral    SpO2: 99% 99%   Height: 5' 4" (1.626 m)        Abnormal Lab Results:  Labs Reviewed   HIV 1 / 2 ANTIBODY   HEPATITIS C ANTIBODY        All Lab Results:  None.    Imaging Results:  Imaging Results          X-Ray Hip 2 View Right (Final result)  Result time 10/20/19 19:51:50    Final result by Sarbjit Nguyen MD (10/20/19 19:51:50)                 Impression:      Sclerosis with collapse of the right femoral head consistent with avascular necrosis.  Associated arthrosis with joint space narrowing.    Left femoral head sclerosis consistent with low-grade AVN.      Electronically signed by: Sarbjit Nguyen MD  Date:    10/20/2019  Time:    19:51             Narrative:    EXAMINATION:  XR HIP 2 VIEW RIGHT    CLINICAL HISTORY:  Pain in right hip  .    TECHNIQUE:  Standard radiography performed.    COMPARISON:  07/12/2017    FINDINGS:  Right femoral head sclerosis is present with collapse or flattening of the superior aspect of the femoral head.  There is joint space narrowing as well.    The left femoral head is included and demonstrates relative sclerosis as well.                                        The Emergency Provider reviewed the vital signs and test results, which are outlined above.     ED Discussion   8:14 PM: Reassessed pt at this time. Discussed with pt all pertinent ED information and results.Pt's x-ray findings are consistent with the x-ray findings from [facility] on 7/26/19. Discussed pt dx and plan of tx. Gave pt all f/u and return to the ED instructions. Pt is currently seeing a physician at [facility] in [facility] [place] and is trying to get set up for physical therapy. All questions and concerns were addressed at this time. Pt expresses understanding of information and instructions, and is comfortable with plan to discharge. Pt is stable for discharge.    I discussed with patient and/or family/caretaker that evaluation in the ED does not suggest any emergent or life threatening medical conditions requiring immediate intervention beyond what was provided in the ED, and I believe patient is safe for discharge.  Regardless, an unremarkable evaluation in the ED does not preclude the development or presence of a serious of life threatening condition. As such, patient was instructed to return immediately for any worsening or change in current symptoms.         Medical Decision Making:   Clinical Tests:   Radiological Study: Ordered and Reviewed     Additional MDM:   Smoking Cessation: The patient is a smoker. The patient was counseled on smoking cessation for: 3 minutes. The patient was counseled on tobacco related  health complications. The patient was counseled on how exercise will aide in tobacco cessation. Appropriate patient literature was given to the patient concerning tobacco cessation.        ED Medication(s):  Medications   HYDROcodone-acetaminophen  mg per tablet 1 tablet (1 tablet Oral Given 10/20/19 1938)       New Prescriptions    No medications on file        Medication List      ASK your doctor about these medications    cyclobenzaprine 10 MG tablet  Commonly known as:  FLEXERIL     gabapentin 600 MG tablet  Commonly known as:  NEURONTIN     * HYDROcodone-acetaminophen  mg per tablet  Commonly known as:  NORCO  Take 1 tablet by mouth every 4 (four) hours as needed for Pain.     * HYDROcodone-acetaminophen 7.5-325 mg per tablet  Commonly known as:  NORCO  Take 1 tablet by mouth every 6 (six) hours as needed.     * HYDROcodone-acetaminophen 7.5-325 mg per tablet  Commonly known as:  NORCO  Take 1  tablet by mouth every 6 (six) hours as needed.     meloxicam 15 MG tablet  Commonly known as:  MOBIC  Take 1 tablet (15 mg total) by mouth once daily.     metFORMIN 500 MG tablet  Commonly known as:  GLUCOPHAGE     naproxen 500 MG tablet  Commonly known as:  NAPROSYN     VENTOLIN HFA 90 mcg/actuation inhaler  Generic drug:  albuterol         * This list has 3 medication(s) that are the same as other medications prescribed for you. Read the directions carefully, and ask your doctor or other care provider to review them with you.                Follow-up Information     Robby Dos Santos NP In 1 week.    Specialty:  Family Medicine  Contact information:  [address] [address]  [phone]                       Scribe Attestation:   Scribe #1: I performed the above scribed service and the documentation accurately describes the services I performed. I attest to the accuracy of the note.     Attending:   Physician Attestation Statement for Scribe #1: I, RAYMOND Miller Jr., personally performed the services described in this documentation, as scribed by Leonid Maier, in my presence, and it is both accurate and complete.           Clinical Impression       ICD-10-CM ICD-9-CM   1. Osteonecrosis of right hip M87.9 733.42   2. Right hip pain M25.551 719.45       Disposition:   Disposition: Discharged  Condition: Stable         RAYMOND Bae Jr.  10/20/19 4158

## 2019-12-22 ENCOUNTER — HOSPITAL ENCOUNTER (EMERGENCY)
Facility: HOSPITAL | Age: 49
Discharge: HOME | End: 2019-12-22
Attending: EMERGENCY MEDICINE
Payer: MEDICAID

## 2019-12-22 VITALS
BODY MASS INDEX: 30.56 KG/M2 | OXYGEN SATURATION: 100 % | HEIGHT: 64 IN | TEMPERATURE: 98 F | DIASTOLIC BLOOD PRESSURE: 82 MMHG | RESPIRATION RATE: 20 BRPM | SYSTOLIC BLOOD PRESSURE: 138 MMHG | WEIGHT: 179 LBS | HEART RATE: 106 BPM

## 2019-12-22 DIAGNOSIS — M25.551 RIGHT HIP PAIN: Primary | ICD-10-CM
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
## 2019-12-22 PROCEDURE — 96372 THER/PROPH/DIAG INJ SC/IM: CPT

## 2019-12-22 PROCEDURE — 63600175 PHARM REV CODE 636 W HCPCS: Performed by: REGISTERED NURSE

## 2019-12-22 PROCEDURE — 25000003 PHARM REV CODE 250: Performed by: REGISTERED NURSE

## 2019-12-22 PROCEDURE — 99284 EMERGENCY DEPT VISIT MOD MDM: CPT | Mod: 25

## 2019-12-22 RX ORDER — HYDROCODONE BITARTRATE AND ACETAMINOPHEN 10; 325 MG/1; MG/1
1 TABLET ORAL
Status: COMPLETED | OUTPATIENT
Start: 2019-12-22 | End: 2019-12-22

## 2019-12-22 RX ORDER — KETOROLAC TROMETHAMINE 30 MG/ML
30 INJECTION, SOLUTION INTRAMUSCULAR; INTRAVENOUS
Status: COMPLETED | OUTPATIENT
Start: 2019-12-22 | End: 2019-12-22

## 2019-12-22 RX ADMIN — HYDROCODONE BITARTRATE AND ACETAMINOPHEN 1 TABLET: 10; 325 TABLET ORAL at 06:12

## 2019-12-22 RX ADMIN — KETOROLAC TROMETHAMINE 30 MG: 30 INJECTION, SOLUTION INTRAMUSCULAR at 06:12

## 2019-12-23 NOTE — ED NOTES
Patient identifiers verified and correct for Kaylene Emery. Patient reports several falls recently and complains of right hip pain. History of right hip osteonecrosis.    LOC: The patient is awake, alert and aware of environment with an appropriate affect, the patient is oriented x 3 and speaking appropriately.  APPEARANCE: Patient resting comfortably and in no acute distress, patient is clean and well groomed, patient's clothing is properly fastened.  SKIN: The skin is warm and dry, color consistent with ethnicity, patient has normal skin turgor and moist mucus membranes, skin intact, no breakdown or bruising noted.  MUSCULOSKELETAL: Patient moving all extremities spontaneously.  RESPIRATORY: Airway is open and patent, respirations are spontaneous.  CARDIAC: Patient has a normal rate, no periphreal edema noted, capillary refill < 3 seconds.  ABDOMEN: Soft and non tender to palpation.

## 2019-12-23 NOTE — ED PROVIDER NOTES
"SCRIBE #1 NOTE: I, Amy Weber, am scribing for, and in the presence of, Jackson Pérez Jr., RAYMOND. I have scribed the entire note.      History      Chief Complaint   Patient presents with    Hip Pain     right, 2 recent falls       Review of patient's allergies indicates:   Allergen Reactions    Corticosteroids (glucocorticoids)      "sets off my anxiety real bad"  "sets off my anxiety real bad"    Tylenol-codeine [acetaminophen-codeine] Itching    Tramadol Rash        HPI   HPI    12/22/2019, 6:24 PM   History obtained from the patient      History of Present Illness: Kaylene Emery is a 49 y.o. female patient with a PMhx of osteonecrosis who presents to the Emergency Department for chronic R hip pain, worsening after a fall which onset 10 days PTA. Symptoms are constant and moderate in severity. No mitigating or exacerbating factors reported. No associated sxs reported. Patient denies any head injury, CP, SOB, fever, chills, weakness, numbness, back pain, neck pain, and all other sxs at this time. Prior Tx includes naproxen 500 mg rotated with tylenol, pt states medications are not effective. No further complaints or concerns at this time.     Arrival mode: Personal vehicle  EMS  [facility]    PCP: Robby Dos Santos NP       Past Medical History:  Past Medical History:   Diagnosis Date    Anxiety     Depression     GERD (gastroesophageal reflux disease)     Osteonecrosis of right hip 2019    Tobacco use        Past Surgical History:  Past Surgical History:   Procedure Laterality Date    BLADDER REPAIR      CENTRAL LINE  6/28/2017         CHOLECYSTECTOMY  04/07/2017    KNEE ARTHROPLASTY      PARTIAL HYSTERECTOMY      TUBAL LIGATION           Family History:  Family History   Problem Relation Age of Onset    Hypertension Mother     Diabetes Mother     Hypoglycemic Father     Lung cancer Father     COPD Father        Social History:  Social History     Tobacco Use    Smoking status: Current " Every Day Smoker     Packs/day: 1.00     Years: 29.00     Pack years: 29.00     Types: Cigarettes    Smokeless tobacco: Never Used    Tobacco comment: began smoking 1990.    Substance and Sexual Activity    Alcohol use: Yes     Comment: special occasions only, 3-4 times a year 1 glass of wine    Drug use: No    Sexual activity: Yes     Partners: Male     Comment:        ROS   Review of Systems   Constitutional: Negative for chills and fever.   HENT: Negative for sore throat.    Respiratory: Negative for shortness of breath.    Cardiovascular: Negative for chest pain.   Gastrointestinal: Negative for nausea.   Genitourinary: Negative for dysuria.   Musculoskeletal: Positive for arthralgias (R hip pain). Negative for back pain and neck pain.   Skin: Negative for rash.   Neurological: Negative for weakness, numbness and headaches.   Hematological: Does not bruise/bleed easily.   All other systems reviewed and are negative.    Physical Exam      Initial Vitals [12/22/19 1818]   BP Pulse Resp Temp SpO2   (!) 143/86 (!) 120 18 98.4 °F (36.9 °C) 100 %      MAP       --          Physical Exam  Nursing Notes and Vital Signs Reviewed.  Constitutional: Patient is in mild distress. Well-developed and well-nourished.  Head: Atraumatic. Normocephalic.  Eyes: PERRL. EOM intact. Conjunctivae are not pale. No scleral icterus.  ENT: Mucous membranes are moist. Oropharynx is clear and symmetric.    Neck: Supple. Full ROM. No lymphadenopathy.  Cardiovascular: Regular rate. Regular rhythm. No murmurs, rubs, or gallops. Distal pulses are 2+ and symmetric.  Pulmonary/Chest: No respiratory distress. Clear to auscultation bilaterally. No wheezing or rales.  Abdominal: Soft and non-distended.  There is no tenderness.  No rebound, guarding, or rigidity. Good bowel sounds.  Genitourinary: No CVA tenderness  Musculoskeletal: Moves all extremities. No obvious deformities. No edema. No calf tenderness. Tenderness to R lateral  "hip.  Skin: Warm and dry.  Neurological:  Alert, awake, and appropriate.  Normal speech.  No acute focal neurological deficits are appreciated.  Psychiatric: Normal affect. Good eye contact. Appropriate in content.    ED Course    Procedures  ED Vital Signs:  Vitals:    12/22/19 1818   BP: (!) 143/86   Pulse: (!) 120   Resp: 18   Temp: 98.4 °F (36.9 °C)   TempSrc: Oral   SpO2: 100%   Weight: 81.2 kg (179 lb)   Height: 5' 4" (1.626 m)       Abnormal Lab Results:  Labs Reviewed - No data to display     All Lab Results:    Imaging Results:  Imaging Results          X-Ray Hip 2 View Right (Final result)  Result time 12/22/19 18:32:31    Final result by Nuno Gaming MD (12/22/19 18:32:31)                 Impression:      1. Severe osteoarthritis of the right hip with evidence of avascular necrosis.  Severe collapse and flattening of the right femoral head.  No change since 10/20/2019.  2. Avascular necrosis left femoral head with evidence of minimal collapse or flattening of the head.  No change.      Electronically signed by: Nuno Gaming  Date:    12/22/2019  Time:    18:32             Narrative:    EXAMINATION:  XR HIP 2 VIEW RIGHT    CLINICAL HISTORY:  . Right hip pain.    COMPARISON:  10/20/2019    FINDINGS:  Severe arthritic change of the right hip with bone-on-bone appearance of the right femoral head and acetabulum.  Significant flattening or subchondral collapse of the right femoral head which could represent sequela of avascular necrosis.  Findings have not changed significantly since 10/20/2019.    The remaining bony pelvis is unremarkable.  Left hip also demonstrates subchondral bony changes left femoral head compatible with avascular necrosis.  Evidence of minimal subchondral collapse of femoral head flattening.  No change since the prior exam.                                        The Emergency Provider reviewed the vital signs and test results, which are outlined above.    ED Discussion "     6:53 PM: Reassessed pt at this time.  Pt states her condition has improved at this time. Discussed with pt all pertinent ED information and results. Discussed pt dx and plan of tx. Gave pt all f/u and return to the ED instructions. All questions and concerns were addressed at this time. Pt expresses understanding of information and instructions, and is comfortable with plan to discharge. Pt is stable for discharge.    I discussed with patient and/or family/caretaker that evaluation in the ED does not suggest any emergent or life threatening medical conditions requiring immediate intervention beyond what was provided in the ED, and I believe patient is safe for discharge.  Regardless, an unremarkable evaluation in the ED does not preclude the development or presence of a serious of life threatening condition. As such, patient was instructed to return immediately for any worsening or change in current symptoms.      ED Medication(s):  Medications   ketorolac injection 30 mg (has no administration in time range)   HYDROcodone-acetaminophen  mg per tablet 1 tablet (has no administration in time range)       Follow-up Information     Robby Dos Santos NP In 3 days.    Specialty:  Family Medicine  Contact information:  [address] [address]  [phone]                 New Prescriptions    No medications on file        Medication List      ASK your doctor about these medications    cyclobenzaprine 10 MG tablet  Commonly known as:  FLEXERIL     gabapentin 600 MG tablet  Commonly known as:  NEURONTIN     * HYDROcodone-acetaminophen  mg per tablet  Commonly known as:  NORCO  Take 1 tablet by mouth every 4 (four) hours as needed for Pain.     * HYDROcodone-acetaminophen 7.5-325 mg per tablet  Commonly known as:  NORCO  Take 1 tablet by mouth every 6 (six) hours as needed.     * HYDROcodone-acetaminophen 7.5-325 mg per tablet  Commonly known as:  NORCO  Take 1 tablet by mouth every 6  (six) hours as needed.     meloxicam 15 MG tablet  Commonly known as:  MOBIC  Take 1 tablet (15 mg total) by mouth once daily.     metFORMIN 500 MG tablet  Commonly known as:  GLUCOPHAGE     naproxen 500 MG tablet  Commonly known as:  NAPROSYN     Ventolin HFA 90 mcg/actuation inhaler  Generic drug:  albuterol         * This list has 3 medication(s) that are the same as other medications prescribed for you. Read the directions carefully, and ask your doctor or other care provider to review them with you.                    Medical Decision Making    Medical Decision Making:   Clinical Tests:   Radiological Study: Ordered and Reviewed           Scribe Attestation:   Scribe #1: I performed the above scribed service and the documentation accurately describes the services I performed. I attest to the accuracy of the note.    Attending:   Physician Attestation Statement for Scribe #1: I, RAYMOND Bae Jr., personally performed the services described in this documentation, as scribed by Amy Weber, in my presence, and it is both accurate and complete.          Clinical Impression       ICD-10-CM ICD-9-CM   1. Right hip pain M25.551 719.45   2. Fall W19.XXXA E888.9       Disposition:   Disposition: Discharged  Condition: Stable         RAYMOND Bae Jr.  12/22/19 1950

## 2019-12-28 ENCOUNTER — HOSPITAL ENCOUNTER (EMERGENCY)
Facility: HOSPITAL | Age: 49
Discharge: HOME | End: 2019-12-28
Attending: EMERGENCY MEDICINE
Payer: MEDICAID

## 2019-12-28 VITALS
HEIGHT: 64 IN | HEART RATE: 98 BPM | SYSTOLIC BLOOD PRESSURE: 141 MMHG | RESPIRATION RATE: 18 BRPM | TEMPERATURE: 99 F | OXYGEN SATURATION: 97 % | BODY MASS INDEX: 30.56 KG/M2 | DIASTOLIC BLOOD PRESSURE: 94 MMHG | WEIGHT: 179 LBS

## 2019-12-28 DIAGNOSIS — M25.552 LEFT HIP PAIN: Primary | ICD-10-CM

## 2019-12-28 DIAGNOSIS — Z72.0 TOBACCO USE: ICD-10-CM

## 2019-12-28 PROCEDURE — 25000003 PHARM REV CODE 250: Performed by: PHYSICIAN ASSISTANT

## 2019-12-28 PROCEDURE — 99284 EMERGENCY DEPT VISIT MOD MDM: CPT

## 2019-12-28 RX ORDER — HYDROCODONE BITARTRATE AND ACETAMINOPHEN 10; 325 MG/1; MG/1
1 TABLET ORAL
Status: COMPLETED | OUTPATIENT
Start: 2019-12-28 | End: 2019-12-28

## 2019-12-28 RX ORDER — HYDROCODONE BITARTRATE AND ACETAMINOPHEN 7.5; 325 MG/1; MG/1
1 TABLET ORAL EVERY 6 HOURS PRN
Qty: 10 TABLET | Refills: 0 | OUTPATIENT
Start: 2019-12-28 | End: 2020-03-15

## 2019-12-28 RX ORDER — IBUPROFEN 200 MG
1 TABLET ORAL DAILY
Qty: 14 PATCH | Refills: 0 | Status: SHIPPED | OUTPATIENT
Start: 2019-12-28

## 2019-12-28 RX ADMIN — HYDROCODONE BITARTRATE AND ACETAMINOPHEN 1 TABLET: 10; 325 TABLET ORAL at 07:12

## 2019-12-29 NOTE — ED PROVIDER NOTES
"   History      Chief Complaint   Patient presents with    Hip Pain     R and L hip pain, chronic issue, reports she is to see a surgeon in [place] for a hip replacement        Review of patient's allergies indicates:   Allergen Reactions    Corticosteroids (glucocorticoids)      "sets off my anxiety real bad"  "sets off my anxiety real bad"    Tylenol-codeine [acetaminophen-codeine] Itching    Tramadol Rash        HPI   HPI    12/28/2019, 7:16 PM   History obtained from the patient      History of Present Illness: Kaylene Emery is a 49 y.o. female patient who presents to the Emergency Department for chronic hip pain, avascular necrosis.  Awaiting hip replacement but unapproved until she quits smoking.  No injury or change in symptoms, just flare of pain.  Symptoms are moderate in severity.     No further complaints or concerns at this time.           PCP: Robby Dos Santos NP       Past Medical History:  Past Medical History:   Diagnosis Date    Anxiety     Depression     GERD (gastroesophageal reflux disease)     Osteonecrosis of right hip 2019    Tobacco use          Past Surgical History:  Past Surgical History:   Procedure Laterality Date    BLADDER REPAIR      CENTRAL LINE  6/28/2017         CHOLECYSTECTOMY  04/07/2017    KNEE ARTHROPLASTY      PARTIAL HYSTERECTOMY      TUBAL LIGATION             Family History:  Family History   Problem Relation Age of Onset    Hypertension Mother     Diabetes Mother     Hypoglycemic Father     Lung cancer Father     COPD Father            Social History:  Social History     Tobacco Use    Smoking status: Current Every Day Smoker     Packs/day: 1.00     Years: 29.00     Pack years: 29.00     Types: Cigarettes    Smokeless tobacco: Never Used    Tobacco comment: began smoking 1990.    Substance and Sexual Activity    Alcohol use: Yes     Comment: special occasions only, 3-4 times a year 1 glass of wine    Drug use: No    Sexual activity: Yes "     Partners: Male     Comment:        ROS     Review of Systems   Constitutional: Negative for chills and fever.   HENT: Negative for facial swelling and trouble swallowing.    Eyes: Negative for discharge, redness and visual disturbance.   Respiratory: Negative for chest tightness and shortness of breath.    Cardiovascular: Negative for chest pain and leg swelling.   Gastrointestinal: Negative for diarrhea and vomiting.   Genitourinary: Negative for decreased urine volume and dysuria.   Musculoskeletal: Positive for arthralgias. Negative for joint swelling and neck stiffness.   Skin: Negative for rash and wound.   Neurological: Negative for syncope and facial asymmetry.   All other systems reviewed and are negative.      Physical Exam      Initial Vitals [12/28/19 1843]   BP Pulse Resp Temp SpO2   (!) 148/97 (!) 112 18 98.6 °F (37 °C) 97 %      MAP       --         Physical Exam  Vital signs and nursing notes reviewed.  Constitutional: Patient is in NAD. Awake and alert. Well-developed and well-nourished.  Head: Atraumatic. Normocephalic.  Eyes: PERRL. EOM intact. Conjunctivae nl. No scleral icterus.  ENT: Mucous membranes are moist. Oropharynx is clear.  Neck: Supple. No JVD. No lymphadenopathy.  No meningismus  Cardiovascular: Regular rate and rhythm. No murmurs, rubs, or gallops. Distal pulses are 2+ and symmetric.  Pulmonary/Chest: No respiratory distress. Clear to auscultation bilaterally. No wheezing, rales, or rhonchi.  Abdominal: Soft. Non-distended. No TTP. No rebound, guarding, or rigidity. Good bowel sounds.  Genitourinary: No CVA tenderness  Musculoskeletal: Moves all extremities. No edema.  Bilateral groin ttp, LROM due to pain.  2+dp  Skin: Warm and dry.  Neurological: Awake and alert. No acute focal neurological deficits are appreciated.  Psychiatric: Normal affect. Good eye contact. Appropriate in content.      ED Course          Procedures  ED Vital Signs:  Vitals:    12/28/19 1843 12/28/19  "1940   BP: (!) 148/97 (!) 141/94   Pulse: (!) 112 98   Resp: 18    Temp: 98.6 °F (37 °C)    TempSrc: Oral    SpO2: 97%    Weight: 81.2 kg (179 lb)    Height: 5' 4" (1.626 m)                  Imaging Results:  Imaging Results    None            The Emergency Provider reviewed the vital signs and test results, which are outlined above.    ED Discussion             Medication(s) given in the ER:  Medications   HYDROcodone-acetaminophen  mg per tablet 1 tablet (1 tablet Oral Given 12/28/19 1916)           Follow-up Information     Robby Dos Santos NP In 2 days.    Specialty:  Family Medicine  Contact information:  [address] [facility] [address]  [facility] [id number] [phone]                          Medication List      START taking these medications    nicotine 21 mg/24 hr  Commonly known as:  NICODERM CQ  Place 1 patch onto the skin once daily.        CHANGE how you take these medications    * HYDROcodone-acetaminophen  mg per tablet  Commonly known as:  NORCO  Take 1 tablet by mouth every 4 (four) hours as needed for Pain.  What changed:  Another medication with the same name was added. Make sure you understand how and when to take each.     * HYDROcodone-acetaminophen 7.5-325 mg per tablet  Commonly known as:  NORCO  Take 1 tablet by mouth every 6 (six) hours as needed.  What changed:  Another medication with the same name was added. Make sure you understand how and when to take each.     * HYDROcodone-acetaminophen 7.5-325 mg per tablet  Commonly known as:  NORCO  Take 1 tablet by mouth every 6 (six) hours as needed.  What changed:  Another medication with the same name was added. Make sure you understand how and when to take each.     * HYDROcodone-acetaminophen 7.5-325 mg per tablet  Commonly known as:  NORCO  Take 1 tablet by mouth every 6 (six) hours as needed for Pain.  What changed:  You were already taking a medication with the same name, and this prescription was added. Make sure you understand " how and when to take each.         * This list has 4 medication(s) that are the same as other medications prescribed for you. Read the directions carefully, and ask your doctor or other care provider to review them with you.            ASK your doctor about these medications    cyclobenzaprine 10 MG tablet  Commonly known as:  FLEXERIL     gabapentin 600 MG tablet  Commonly known as:  NEURONTIN     meloxicam 15 MG tablet  Commonly known as:  MOBIC  Take 1 tablet (15 mg total) by mouth once daily.     metFORMIN 500 MG tablet  Commonly known as:  GLUCOPHAGE     naproxen 500 MG tablet  Commonly known as:  NAPROSYN     Ventolin HFA 90 mcg/actuation inhaler  Generic drug:  albuterol           Where to Get Your Medications      You can get these medications from any pharmacy    Bring a paper prescription for each of these medications  · HYDROcodone-acetaminophen 7.5-325 mg per tablet  · nicotine 21 mg/24 hr             Medical Decision Making        All findings were reviewed with the patient/family in detail.   All remaining questions and concerns were addressed at that time.  Patient/family has been counseled regarding the need for follow-up as well as the indication to return to the emergency room should new or worrisome developments occur.        MDM  Number of Diagnoses or Management Options  Left hip pain:   Tobacco use:      Additional MDM:   Smoking Cessation: The patient was counseled on tobacco related  health complications. Appropriate patient literature was given to the patient concerning tobacco cessation. The patient was given a prescription for nicotine patches.              Clinical Impression:        ICD-10-CM ICD-9-CM   1. Left hip pain M25.552 719.45   2. Tobacco use Z72.0 305.1             Vika Ayala PA-C  12/29/19 0953

## 2020-01-28 ENCOUNTER — HOSPITAL ENCOUNTER (EMERGENCY)
Facility: HOSPITAL | Age: 50
Discharge: HOME | End: 2020-01-28
Attending: EMERGENCY MEDICINE
Payer: MEDICAID

## 2020-01-28 VITALS
OXYGEN SATURATION: 99 % | DIASTOLIC BLOOD PRESSURE: 83 MMHG | WEIGHT: 179 LBS | RESPIRATION RATE: 20 BRPM | BODY MASS INDEX: 30.56 KG/M2 | HEIGHT: 64 IN | HEART RATE: 96 BPM | SYSTOLIC BLOOD PRESSURE: 124 MMHG | TEMPERATURE: 98 F

## 2020-01-28 DIAGNOSIS — S83.92XA SPRAIN OF LEFT KNEE, UNSPECIFIED LIGAMENT, INITIAL ENCOUNTER: Primary | ICD-10-CM

## 2020-01-28 PROCEDURE — 99284 EMERGENCY DEPT VISIT MOD MDM: CPT

## 2020-01-28 RX ORDER — HYDROCODONE BITARTRATE AND ACETAMINOPHEN 5; 325 MG/1; MG/1
1 TABLET ORAL EVERY 4 HOURS PRN
Qty: 8 TABLET | Refills: 0 | OUTPATIENT
Start: 2020-01-28 | End: 2020-03-15

## 2020-01-28 RX ORDER — METHOCARBAMOL 500 MG/1
1000 TABLET, FILM COATED ORAL 3 TIMES DAILY
Qty: 30 TABLET | Refills: 0 | Status: SHIPPED | OUTPATIENT
Start: 2020-01-28 | End: 2020-02-02

## 2020-01-28 NOTE — ED PROVIDER NOTES
"SCRIBE #1 NOTE: I, Meera Carter, am scribing for, and in the presence of, ALAINA Carrero. I have scribed the entire note.       History     Chief Complaint   Patient presents with    Knee Pain     Pt states, "I fell and hurt my left knee."     Review of patient's allergies indicates:   Allergen Reactions    Corticosteroids (glucocorticoids)      "sets off my anxiety real bad"  "sets off my anxiety real bad"    Tylenol-codeine [acetaminophen-codeine] Itching    Tramadol Rash         History of Present Illness     HPI    1/28/2020, 4:14 PM  History obtained from the patient      History of Present Illness: Kaylene Emery is a 49 y.o. female patient who presents to the Emergency Department for evaluation of L knee pain which onset suddenly after twisting her knee while mopping. Symptoms are constant and moderate in severity. Sxs exacerbated by ambulation. No mitigating factors reported. No associated sxs reported. Patient denies any fever, chills, numbness, weakness, n/v/d, CP, SOB, abd pain, leg swelling, joint swelling, and all other sxs at this time. It is noted that pt refused an x-ray at this time. No further complaints or concerns at this time.     Arrival mode: Personal vehicle     PCP: Robby Dos Santos NP        Past Medical History:  Past Medical History:   Diagnosis Date    Anxiety     Depression     GERD (gastroesophageal reflux disease)     Osteonecrosis of right hip 2019    Tobacco use        Past Surgical History:  Past Surgical History:   Procedure Laterality Date    BLADDER REPAIR      CENTRAL LINE  6/28/2017         CHOLECYSTECTOMY  04/07/2017    KNEE ARTHROPLASTY      PARTIAL HYSTERECTOMY      TUBAL LIGATION           Family History:  Family History   Problem Relation Age of Onset    Hypertension Mother     Diabetes Mother     Hypoglycemic Father     Lung cancer Father     COPD Father        Social History:  Social History     Tobacco Use    Smoking status: Current " Every Day Smoker     Packs/day: 1.00     Years: 29.00     Pack years: 29.00     Types: Cigarettes    Smokeless tobacco: Never Used    Tobacco comment: began smoking 1990.    Substance and Sexual Activity    Alcohol use: Yes     Comment: special occasions only, 3-4 times a year 1 glass of wine    Drug use: No    Sexual activity: Yes     Partners: Male     Comment:         Review of Systems     Review of Systems   Constitutional: Negative for chills and fever.   HENT: Negative for sore throat.    Respiratory: Negative for shortness of breath.    Cardiovascular: Negative for chest pain and leg swelling.   Gastrointestinal: Negative for abdominal pain, diarrhea, nausea and vomiting.   Genitourinary: Negative for dysuria.   Musculoskeletal: Negative for back pain and joint swelling.        (+) L knee pain   Skin: Negative for rash.   Neurological: Negative for weakness and numbness.   Hematological: Does not bruise/bleed easily.   All other systems reviewed and are negative.     Physical Exam     Initial Vitals [01/28/20 1603]   BP Pulse Resp Temp SpO2   124/83 96 20 98.3 °F (36.8 °C) 99 %      MAP       --          Physical Exam  Nursing Notes and Vital Signs Reviewed.  Constitutional: Patient is in no acute distress. Well-developed and well-nourished.  Head: Atraumatic. Normocephalic.  Eyes: PERRL. EOM intact. Conjunctivae are not pale. No scleral icterus.  ENT: Mucous membranes are moist. Oropharynx is clear and symmetric.    Neck: Supple. Full ROM. No lymphadenopathy.  Cardiovascular: Regular rate. Regular rhythm. No murmurs, rubs, or gallops. Distal pulses are 2+ and symmetric.  Pulmonary/Chest: No respiratory distress. Clear to auscultation bilaterally. No wheezing or rales.  Abdominal: Soft and non-distended.  There is no tenderness.  No rebound, guarding, or rigidity. Good bowel sounds.  Genitourinary: No CVA tenderness  Musculoskeletal: Moves all extremities. No obvious deformities. No edema. No calf  "tenderness.  Left Knee:  No obvious deformity. There is no swelling.  There is tenderness along the lateral aspect.  No increased warmth, erythema, induration or fluctuance.  No ligament laxity. DP and PT pulses are 2+.  Normal capillary refill.  Distal sensation is intact.  Skin: Warm and dry.  Neurological:  Alert, awake, and appropriate.  Normal speech.  No acute focal neurological deficits are appreciated.  Psychiatric: Normal affect. Good eye contact. Appropriate in content.     ED Course   Procedures  ED Vital Signs:  Vitals:    01/28/20 1603   BP: 124/83   Pulse: 96   Resp: 20   Temp: 98.3 °F (36.8 °C)   TempSrc: Oral   SpO2: 99%   Weight: 81.2 kg (179 lb)   Height: 5' 4" (1.626 m)       Abnormal Lab Results:  Labs Reviewed - No data to display       Imaging Results:  Imaging Results    None                 The Emergency Provider reviewed the vital signs and test results, which are outlined above.     ED Discussion     4:17 PM: Assessed pt at this time. Discussed with pt all pertinent ED information and results. Discussed pt dx and plan of tx. Gave pt all f/u and return to the ED instructions. All questions and concerns were addressed at this time. Pt expresses understanding of information and instructions, and is comfortable with plan to discharge. Pt is stable for discharge.    I discussed with patient and/or family/caretaker that evaluation in the ED does not suggest any emergent or life threatening medical conditions requiring immediate intervention beyond what was provided in the ED, and I believe patient is safe for discharge.  Regardless, an unremarkable evaluation in the ED does not preclude the development or presence of a serious of life threatening condition. As such, patient was instructed to return immediately for any worsening or change in current symptoms.                 ED Medication(s):  Medications - No data to display    Discharge Medication List as of 1/28/2020  4:12 PM      START taking " these medications    Details   HYDROcodone-acetaminophen (NORCO) 5-325 mg per tablet Take 1 tablet by mouth every 4 (four) hours as needed., Starting Tue 1/28/2020, Print      methocarbamol (ROBAXIN) 500 MG Tab Take 2 tablets (1,000 mg total) by mouth 3 (three) times daily. for 5 days, Starting Tue 1/28/2020, Until Sun 2/2/2020, Normal             Follow-up Information     Robby Dos Santos NP In 1 week.    Specialty:  Family Medicine  Contact information:  [address] [address]  [phone]                       Scribe Attestation:   Scribe #1: I performed the above scribed service and the documentation accurately describes the services I performed. I attest to the accuracy of the note.     Attending:   Physician Attestation Statement for Scribe #1: I, ALAINA Carrero, personally performed the services described in this documentation, as scribed by Meera Carter, in my presence, and it is both accurate and complete.           Clinical Impression       ICD-10-CM ICD-9-CM   1. Sprain of left knee, unspecified ligament, initial encounter S83.92XA 844.9       Disposition:   Disposition: Discharged  Condition: Stable       ALAINA Carrero  01/31/20 1235

## 2020-03-15 ENCOUNTER — HOSPITAL ENCOUNTER (EMERGENCY)
Facility: HOSPITAL | Age: 50
Discharge: HOME | End: 2020-03-15
Attending: EMERGENCY MEDICINE
Payer: MEDICAID

## 2020-03-15 VITALS
SYSTOLIC BLOOD PRESSURE: 97 MMHG | TEMPERATURE: 99 F | RESPIRATION RATE: 20 BRPM | HEART RATE: 108 BPM | OXYGEN SATURATION: 96 % | DIASTOLIC BLOOD PRESSURE: 56 MMHG
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
## 2020-03-15 DIAGNOSIS — M25.551 RIGHT HIP PAIN: ICD-10-CM

## 2020-03-15 LAB
HCV AB SERPL QL IA: NEGATIVE
HIV 1+2 AB+HIV1 P24 AG SERPL QL IA: NEGATIVE

## 2020-03-15 PROCEDURE — 99284 EMERGENCY DEPT VISIT MOD MDM: CPT | Mod: 25

## 2020-03-15 PROCEDURE — 86703 HIV-1/HIV-2 1 RESULT ANTBDY: CPT

## 2020-03-15 PROCEDURE — 36415 COLL VENOUS BLD VENIPUNCTURE: CPT

## 2020-03-15 PROCEDURE — 86803 HEPATITIS C AB TEST: CPT

## 2020-03-15 PROCEDURE — 63600175 PHARM REV CODE 636 W HCPCS: Performed by: EMERGENCY MEDICINE

## 2020-03-15 RX ORDER — MORPHINE SULFATE 4 MG/ML
4 INJECTION, SOLUTION INTRAMUSCULAR; INTRAVENOUS
Status: COMPLETED | OUTPATIENT
Start: 2020-03-15 | End: 2020-03-15

## 2020-03-15 RX ORDER — HYDROCODONE BITARTRATE AND ACETAMINOPHEN 5; 325 MG/1; MG/1
1 TABLET ORAL EVERY 8 HOURS PRN
Qty: 8 TABLET | Refills: 0 | OUTPATIENT
Start: 2020-03-15 | End: 2020-04-10

## 2020-03-15 RX ORDER — LISINOPRIL 10 MG/1
10 TABLET ORAL DAILY
COMMUNITY

## 2020-03-15 RX ADMIN — MORPHINE SULFATE 4 MG: 4 INJECTION, SOLUTION INTRAMUSCULAR; INTRAVENOUS at 04:03

## 2020-03-15 NOTE — ED PROVIDER NOTES
"SCRIBE #1 NOTE: I, Hernan Kamran, am scribing for, and in the presence of, Martir Mohamud MD. I have scribed the entire note.       History     Chief Complaint   Patient presents with    Fall     tripped over lawnmower and fell on right side     Review of patient's allergies indicates:   Allergen Reactions    Corticosteroids (glucocorticoids)      "sets off my anxiety real bad"  "sets off my anxiety real bad"    Tylenol-codeine [acetaminophen-codeine] Itching    Tramadol Rash         History of Present Illness     HPI    3/15/2020, 4:46 PM  History obtained from the patient      History of Present Illness: Kaylene Emery is a 49 y.o. female patient with a PMHx of anxiety, depression, GERD, osteoarthritis R hip, who presents to the Emergency Department for evaluation of a fall which onset suddenly just PTA. Pt states that she was playing with her grandchild and tripped over a lawnmower, landing on concrete and striking her R hip.  Pt denies LOC or head, neck, back injury.  Pt is concerned due to her preexisting osteoarthritis in R hip. Symptoms are constant and moderate in severity. No mitigating or exacerbating factors reported. No associated sxs reported. Patient denies any  HA, weakness, dizziness, back pain, neck pain, abdominal pain, and all other sxs at this time. Pt states that she has had morphine in the past with no allergic reaction. No further complaints or concerns at this time.       Arrival mode: Personal vehicle     PCP: Robby Dos Santos NP        Past Medical History:  Past Medical History:   Diagnosis Date    Anxiety     Depression     GERD (gastroesophageal reflux disease)     Osteonecrosis of right hip 2019    Tobacco use        Past Surgical History:  Past Surgical History:   Procedure Laterality Date    BLADDER REPAIR      CENTRAL LINE  6/28/2017         CHOLECYSTECTOMY  04/07/2017    KNEE ARTHROPLASTY      PARTIAL HYSTERECTOMY      TUBAL LIGATION           Family " History:  Family History   Problem Relation Age of Onset    Hypertension Mother     Diabetes Mother     Hypoglycemic Father     Lung cancer Father     COPD Father        Social History:  Social History     Tobacco Use    Smoking status: Current Every Day Smoker     Packs/day: 1.00     Years: 29.00     Pack years: 29.00     Types: Cigarettes    Smokeless tobacco: Never Used    Tobacco comment: began smoking 1990.    Substance and Sexual Activity    Alcohol use: Yes     Comment: special occasions only, 3-4 times a year 1 glass of wine    Drug use: No    Sexual activity: Yes     Partners: Male     Comment:         Review of Systems     Review of Systems   Constitutional: Negative for activity change, appetite change, chills, diaphoresis and fever.   HENT: Negative for congestion, drooling, ear pain, mouth sores, rhinorrhea, sinus pain, sore throat and trouble swallowing.    Eyes: Negative for pain and discharge.   Respiratory: Negative for cough, chest tightness, shortness of breath, wheezing and stridor.    Cardiovascular: Negative for chest pain, palpitations and leg swelling.   Gastrointestinal: Negative for abdominal distention, abdominal pain, blood in stool, constipation, diarrhea, nausea and vomiting.   Genitourinary: Negative for difficulty urinating, dysuria, flank pain, frequency, hematuria and urgency.   Musculoskeletal: Negative for arthralgias, back pain, myalgias and neck pain.        (+) R hip pain secondary to fall     Skin: Negative for pallor, rash and wound.   Neurological: Negative for dizziness, seizures, syncope, weakness, light-headedness, numbness and headaches.   All other systems reviewed and are negative.       Physical Exam     Initial Vitals [03/15/20 1637]   BP Pulse Resp Temp SpO2   127/73 (!) 120 20 98.6 °F (37 °C) 95 %      MAP       --          Physical Exam  Nursing Notes and Vital Signs Reviewed.  Constitutional: Patient is in no acute distress. Well-developed and  well-nourished. Tearful, anxious.   Head: Atraumatic. Normocephalic.  Eyes: PERRL. EOM intact. Conjunctivae are not pale. No scleral icterus.  ENT: Mucous membranes are moist. Oropharynx is clear and symmetric.    Neck: Supple. Full ROM. No lymphadenopathy.  Cardiovascular: Regular rate. Regular rhythm. No murmurs, rubs, or gallops. Distal pulses are 2+ and symmetric.  Pulmonary/Chest: No respiratory distress. Clear to auscultation bilaterally. No wheezing or rales.  Abdominal: Soft and non-distended.  There is no tenderness.  No rebound, guarding, or rigidity. Good bowel sounds.  Genitourinary: No CVA tenderness  Musculoskeletal: Tenderness to palpation R lateral pelvis, R hip. Moves all extremities. No obvious deformities. No edema. No calf tenderness.  Skin: Warm and dry.  Neurological:  Alert, awake, and appropriate.  Normal speech.  No acute focal neurological deficits are appreciated.  Psychiatric: Normal affect. Good eye contact. Appropriate in content.     ED Course   Procedures  ED Vital Signs:  Vitals:    03/15/20 1637 03/15/20 1648 03/15/20 1720 03/15/20 1732   BP: 127/73 136/79 101/61 (!) 93/52   Pulse: (!) 120 (!) 116 (!) 112 109   Resp: 20      Temp: 98.6 °F (37 °C)      TempSrc: Oral      SpO2: 95% 98% 99% 97%    03/15/20 1801   BP: (!) 97/56   Pulse: 108   Resp:    Temp:    TempSrc:    SpO2: 96%       Abnormal Lab Results:  Labs Reviewed   HIV 1 / 2 ANTIBODY   HEPATITIS C ANTIBODY        All Lab Results:  Results for orders placed or performed during the hospital encounter of 03/15/20   HIV 1/2 Ag/Ab (4th Gen)   Result Value Ref Range    HIV 1/2 Ag/Ab Negative Negative   Hepatitis C antibody   Result Value Ref Range    Hepatitis C Ab Negative Negative         Imaging Results:  Imaging Results          X-Ray Hip 2 View Right (Final result)  Result time 03/15/20 17:37:23    Final result by Martir Navarrete MD (03/15/20 17:37:23)                 Impression:      Severe avascular necrosis of the femoral  heads bilaterally noted, right greater than left.  Findings are relatively stable compared to prior exam.      Electronically signed by: Martir Navarrete MD  Date:    03/15/2020  Time:    17:37             Narrative:    EXAMINATION:  XR HIP 2 VIEW RIGHT    CLINICAL HISTORY:  XR HIP 2 VIEW RIGHTPain in right hip    COMPARISON:  12/22/2019    FINDINGS:  Three views of the right hip is were obtained.    No definite acute fracture.  Severe avascular necrosis of the femoral heads bilaterally noted, right greater than left.  Bony mineralization is normal.  Soft tissues are unremarkable.   Mild constipation.                                      The Emergency Provider reviewed the vital signs and test results, which are outlined above.     ED Discussion       5:46 PM: Reassessed pt at this time.  Pt states her condition has improved and is requesting to go home at this time. Discussed with pt all pertinent ED information and results. Discussed pt dx and plan of tx. Gave pt all f/u and return to the ED instructions. All questions and concerns were addressed at this time. Pt expresses understanding of information and instructions, and is comfortable with plan to discharge. Pt is stable for discharge.    I discussed with patient and/or family/caretaker that evaluation in the ED does not suggest any emergent or life threatening medical conditions requiring immediate intervention beyond what was provided in the ED, and I believe patient is safe for discharge.  Regardless, an unremarkable evaluation in the ED does not preclude the development or presence of a serious of life threatening condition. As such, patient was instructed to return immediately for any worsening or change in current symptoms.         Medical Decision Making:   Clinical Tests:   Lab Tests: Ordered and Reviewed  Radiological Study: Ordered and Reviewed           ED Medication(s):  Medications   morphine injection 4 mg (4 mg Intramuscular Given 3/15/20 3737)        Discharge Medication List as of 3/15/2020  5:58 PM      START taking these medications    Details   HYDROcodone-acetaminophen (NORCO) 5-325 mg per tablet Take 1 tablet by mouth every 8 (eight) hours as needed for Pain., Starting Sun 3/15/2020, Print             Follow-up Information     Schedule an appointment as soon as possible for a visit  with Follow-up with your orthopedic surgeon.           [facility]    Specialty:  Emergency Medicine  Why:  As needed, If symptoms worsen  Contact information:  [address]  [place] [address] [phone]                     Scribe Attestation:   Scribe #1: I performed the above scribed service and the documentation accurately describes the services I performed. I attest to the accuracy of the note.     Attending:   Physician Attestation Statement for Scribe #1: I, Martir Mohamud MD, personally performed the services described in this documentation, as scribed by Hernan Andrew, in my presence, and it is both accurate and complete.           Clinical Impression       ICD-10-CM ICD-9-CM   1. Fall, initial encounter W19.XXXA E888.9   2. Right hip pain M25.551 719.45       Disposition:   Disposition: Discharged  Condition: Stable           Martir Mohamud MD  03/15/20 1939

## 2020-03-15 NOTE — ED NOTES
"Pt requesting to leave. Reports "Can you see if the Dr. Can hurry up some of the process". MD Mohamud notified and at bedside.  "

## 2020-04-09 ENCOUNTER — HOSPITAL ENCOUNTER (EMERGENCY)
Facility: HOSPITAL | Age: 50
Discharge: HOME | End: 2020-04-09
Attending: EMERGENCY MEDICINE
Payer: MEDICAID

## 2020-04-09 VITALS
RESPIRATION RATE: 20 BRPM | OXYGEN SATURATION: 96 % | DIASTOLIC BLOOD PRESSURE: 77 MMHG | SYSTOLIC BLOOD PRESSURE: 156 MMHG | TEMPERATURE: 98 F | HEART RATE: 104 BPM | HEIGHT: 64 IN | WEIGHT: 180 LBS | BODY MASS INDEX: 30.73 KG/M2

## 2020-04-09 DIAGNOSIS — M25.559 HIP PAIN: ICD-10-CM

## 2020-04-09 DIAGNOSIS — M25.551 PAIN OF RIGHT HIP JOINT: Primary | ICD-10-CM

## 2020-04-09 PROCEDURE — 96372 THER/PROPH/DIAG INJ SC/IM: CPT

## 2020-04-09 PROCEDURE — 25000003 PHARM REV CODE 250: Performed by: NURSE PRACTITIONER

## 2020-04-09 PROCEDURE — 63600175 PHARM REV CODE 636 W HCPCS: Performed by: NURSE PRACTITIONER

## 2020-04-09 PROCEDURE — 99284 EMERGENCY DEPT VISIT MOD MDM: CPT | Mod: 25

## 2020-04-09 RX ORDER — GABAPENTIN 800 MG/1
800 TABLET ORAL
COMMUNITY

## 2020-04-09 RX ORDER — ORPHENADRINE CITRATE 30 MG/ML
30 INJECTION INTRAMUSCULAR; INTRAVENOUS
Status: COMPLETED | OUTPATIENT
Start: 2020-04-09 | End: 2020-04-09

## 2020-04-09 RX ORDER — DULOXETIN HYDROCHLORIDE 30 MG/1
CAPSULE, DELAYED RELEASE ORAL
COMMUNITY
Start: 2020-03-03

## 2020-04-09 RX ORDER — DULOXETIN HYDROCHLORIDE 60 MG/1
CAPSULE, DELAYED RELEASE ORAL
COMMUNITY
Start: 2020-01-09

## 2020-04-09 RX ORDER — HYDROCODONE BITARTRATE AND ACETAMINOPHEN 5; 325 MG/1; MG/1
1 TABLET ORAL
Status: COMPLETED | OUTPATIENT
Start: 2020-04-09 | End: 2020-04-09

## 2020-04-09 RX ADMIN — ORPHENADRINE CITRATE 30 MG: 30 INJECTION INTRAMUSCULAR; INTRAVENOUS at 05:04

## 2020-04-09 RX ADMIN — HYDROCODONE BITARTRATE AND ACETAMINOPHEN 1 TABLET: 5; 325 TABLET ORAL at 05:04

## 2020-04-09 NOTE — ED PROVIDER NOTES
"SCRIBE #1 NOTE: I, Lizzy Rudolph, am scribing for, and in the presence of, Gerald Hogan NP. I have scribed the entire note.       History     Chief Complaint   Patient presents with    Hip Pain     Pt states, "I fell two days ago in my bathroom and my right hip is hurting."     Review of patient's allergies indicates:   Allergen Reactions    Codeine     Corticosteroids (glucocorticoids)      "sets off my anxiety real bad"  "sets off my anxiety real bad"    Tylenol-codeine [acetaminophen-codeine] Itching    Tramadol Rash         History of Present Illness     HPI    4/9/2020, 4:36 PM  History obtained from the patient      History of Present Illness: Kaylene Emery is a 49 y.o. female patient with a PMHx of osteonecrosis of R hip who presents to the Emergency Department for evaluation of R hip pain which onset suddenly two days ago. Patient reports a slip and fall at home. Symptoms are constant and moderate in severity. Pain radiates down R leg. No mitigating or exacerbating factors reported. No associated sxs. Patient denies any saddle anesthesia, bowel/bladder incontinence, fever, chills, CP, SOB, cough, head injury/trauma, dizziness, extremity weakness/numbness, and all other sxs at this time. No prior Tx. No further complaints or concerns at this time.        Arrival mode: Personal vehicle    PCP: Robby Dos Santos NP        Past Medical History:  Past Medical History:   Diagnosis Date    Anxiety     Depression     GERD (gastroesophageal reflux disease)     Osteonecrosis of right hip 2019    Tobacco use        Past Surgical History:  Past Surgical History:   Procedure Laterality Date    BLADDER REPAIR      CENTRAL LINE  6/28/2017         CHOLECYSTECTOMY  04/07/2017    KNEE ARTHROPLASTY      PARTIAL HYSTERECTOMY      TUBAL LIGATION           Family History:  Family History   Problem Relation Age of Onset    Hypertension Mother     Diabetes Mother     Hypoglycemic Father     Lung " cancer Father     COPD Father        Social History:  Social History     Tobacco Use    Smoking status: Current Every Day Smoker     Packs/day: 1.00     Years: 29.00     Pack years: 29.00     Types: Cigarettes    Smokeless tobacco: Never Used    Tobacco comment: began smoking 1990.    Substance and Sexual Activity    Alcohol use: Yes     Comment: special occasions only, 3-4 times a year 1 glass of wine    Drug use: No    Sexual activity: Yes     Partners: Male     Comment:         Review of Systems     Review of Systems   Constitutional: Negative for activity change, appetite change, chills, diaphoresis, fatigue and fever.   HENT: Negative for congestion, ear pain, nosebleeds, rhinorrhea, sinus pain, sore throat and trouble swallowing.    Eyes: Negative for pain and discharge.   Respiratory: Negative for cough, chest tightness, shortness of breath, wheezing and stridor.    Cardiovascular: Negative for chest pain, palpitations and leg swelling.   Gastrointestinal: Negative for abdominal distention, abdominal pain, blood in stool, constipation, diarrhea, nausea and vomiting.   Genitourinary: Negative for difficulty urinating, dysuria, flank pain, frequency, hematuria and urgency.   Musculoskeletal: Positive for arthralgias (R hip). Negative for back pain, myalgias and neck pain.   Skin: Negative for pallor, rash and wound.   Neurological: Negative for dizziness, syncope, weakness, light-headedness, numbness and headaches.        (-) saddle anesthesia  (-) bowel/bladder incontinence  (-) head injury/trauma   Hematological: Does not bruise/bleed easily.   Psychiatric/Behavioral: Negative for confusion and self-injury.   All other systems reviewed and are negative.       Physical Exam     Initial Vitals [04/09/20 1625]   BP Pulse Resp Temp SpO2   (!) 156/77 104 20 98.2 °F (36.8 °C) 96 %      MAP       --          Physical Exam  Nursing Notes and Vital Signs Reviewed.  Constitutional: Patient is in no acute  "distress. Well-developed and well-nourished.  Head: Atraumatic. Normocephalic.  Eyes: PERRL. EOM intact. Conjunctivae are not pale. No scleral icterus.  ENT: Mucous membranes are moist. Oropharynx is clear and symmetric.    Neck: Supple. Full ROM. No lymphadenopathy.  Cardiovascular: Regular rate. Regular rhythm. No murmurs, rubs, or gallops. Distal pulses and pt pulses are 2+ and symmetric.  Pulmonary/Chest: No respiratory distress. Clear to auscultation bilaterally. No wheezing or rales.  Abdominal: Soft and non-distended.  There is no tenderness.  No rebound, guarding, or rigidity. Good bowel sounds.  Genitourinary: No CVA tenderness  Musculoskeletal: Moves all extremities. No obvious deformities. No edema. No calf tenderness. Tenderness to palpation of R lateral hip. RLE is neurovascularly intact.  Skin: Warm and dry.  Neurological:  Alert, awake, and appropriate. Normal speech. No acute focal neurological deficits are appreciated.  Psychiatric: Normal affect. Good eye contact. Appropriate in content.     ED Course   Procedures  ED Vital Signs:  Vitals:    04/09/20 1625   BP: (!) 156/77   Pulse: 104   Resp: 20   Temp: 98.2 °F (36.8 °C)   TempSrc: Oral   SpO2: 96%   Weight: 81.6 kg (180 lb)   Height: 5' 4" (1.626 m)       Abnormal Lab Results:  Labs Reviewed - No data to display     All Lab Results:  none    Imaging Results:  Imaging Results          X-Ray Hip 2 View Right (Final result)  Result time 04/09/20 17:07:57    Final result by Yefri Daily MD (Timothy) (04/09/20 17:07:57)                 Impression:      No acute bony abnormalities.      Electronically signed by: Yefri Daily MD  Date:    04/09/2020  Time:    17:07             Narrative:    EXAMINATION:  XR HIP 2 VIEW RIGHT    CLINICAL HISTORY:  Pain in unspecified hip    TECHNIQUE:  Standard radiography performed.    COMPARISON:  None    FINDINGS:  Stable chronic severe arthritic changes involving the right hip.  No fractures.  There is also " arthritic changes of the left hip with sclerosis of the femoral head suggesting avascular necrosis with secondary arthritis                                        The Emergency Provider reviewed the vital signs and test results, which are outlined above.     ED Discussion     6:20 PM: Pt states her condition has improved at this time with medication. Discussed with pt all pertinent ED information and results. Discussed pt dx and plan of tx. Gave pt all f/u and return to the ED instructions. All questions and concerns were addressed at this time. Pt expresses understanding of information and instructions, and is comfortable with plan to discharge. Pt is stable for discharge.    I discussed with patient that evaluation in the ED does not suggest any emergent or life threatening medical conditions requiring immediate intervention beyond what was provided in the ED, and I believe patient is safe for discharge.  Regardless, an unremarkable evaluation in the ED does not preclude the development or presence of a serious of life threatening condition. As such, patient was instructed to return immediately for any worsening or change in current symptoms.       Medical Decision Making:   Clinical Tests:   Radiological Study: Ordered and Reviewed           ED Medication(s):  Medications   orphenadrine injection 30 mg (30 mg Intramuscular Given 4/9/20 1722)   HYDROcodone-acetaminophen 5-325 mg per tablet 1 tablet (1 tablet Oral Given 4/9/20 1722)       New Prescriptions    No medications       Follow-up Information     Robby Dos Santos NP.    Specialty:  Family Medicine  Why:  As needed  Contact information:  [address] [address] [phone]                       Scribe Attestation:   Scribe #1: I performed the above scribed service and the documentation accurately describes the services I performed. I attest to the accuracy of the note.     Attending:   Physician Attestation Statement for Scribe #1:  I, Gerald Hogan NP, personally performed the services described in this documentation, as scribed by Lizzy Rudolph, in my presence, and it is both accurate and complete.           Clinical Impression       ICD-10-CM ICD-9-CM   1. Pain of right hip joint M25.551 719.45   2. Hip pain M25.559 719.45       Disposition:   Disposition: Discharged  Condition: Stable         Gerald Hogan NP  04/09/20 1919

## 2020-04-10 ENCOUNTER — HOSPITAL ENCOUNTER (EMERGENCY)
Facility: HOSPITAL | Age: 50
Discharge: HOME | End: 2020-04-10
Attending: EMERGENCY MEDICINE
Payer: MEDICAID

## 2020-04-10 VITALS
TEMPERATURE: 98 F | WEIGHT: 180 LBS | SYSTOLIC BLOOD PRESSURE: 128 MMHG | HEART RATE: 105 BPM | HEIGHT: 64 IN | OXYGEN SATURATION: 99 % | RESPIRATION RATE: 16 BRPM | BODY MASS INDEX: 30.73 KG/M2 | DIASTOLIC BLOOD PRESSURE: 81 MMHG

## 2020-04-10 DIAGNOSIS — M54.31 SCIATICA OF RIGHT SIDE: ICD-10-CM

## 2020-04-10 DIAGNOSIS — M25.551 PAIN OF RIGHT HIP JOINT: Primary | ICD-10-CM

## 2020-04-10 PROCEDURE — 96372 THER/PROPH/DIAG INJ SC/IM: CPT

## 2020-04-10 PROCEDURE — 99284 EMERGENCY DEPT VISIT MOD MDM: CPT | Mod: 25

## 2020-04-10 PROCEDURE — 25000003 PHARM REV CODE 250: Performed by: REGISTERED NURSE

## 2020-04-10 PROCEDURE — 63600175 PHARM REV CODE 636 W HCPCS: Performed by: REGISTERED NURSE

## 2020-04-10 RX ORDER — HYDROCODONE BITARTRATE AND ACETAMINOPHEN 5; 325 MG/1; MG/1
1 TABLET ORAL EVERY 6 HOURS PRN
Qty: 12 TABLET | Refills: 0 | OUTPATIENT
Start: 2020-04-10 | End: 2020-07-29

## 2020-04-10 RX ORDER — KETOROLAC TROMETHAMINE 30 MG/ML
30 INJECTION, SOLUTION INTRAMUSCULAR; INTRAVENOUS
Status: COMPLETED | OUTPATIENT
Start: 2020-04-10 | End: 2020-04-10

## 2020-04-10 RX ORDER — PREDNISONE 20 MG/1
20 TABLET ORAL DAILY
Qty: 10 TABLET | Refills: 0 | Status: SHIPPED | OUTPATIENT
Start: 2020-04-10 | End: 2020-04-15

## 2020-04-10 RX ORDER — HYDROCODONE BITARTRATE AND ACETAMINOPHEN 10; 325 MG/1; MG/1
1 TABLET ORAL
Status: COMPLETED | OUTPATIENT
Start: 2020-04-10 | End: 2020-04-10

## 2020-04-10 RX ADMIN — KETOROLAC TROMETHAMINE 30 MG: 30 INJECTION, SOLUTION INTRAMUSCULAR at 03:04

## 2020-04-10 RX ADMIN — HYDROCODONE BITARTRATE AND ACETAMINOPHEN 1 TABLET: 10; 325 TABLET ORAL at 03:04

## 2020-04-10 NOTE — ED PROVIDER NOTES
"SCRIBE #1 NOTE: I, Yenifer Rodriguez, am scribing for, and in the presence of, Jackson Pérez Jr., PATRICIOP. I have scribed the entire note.      History      Chief Complaint   Patient presents with    Hip Pain     here yest for same thing. fell 2 days ago. "needs hip replacement" "i'm stoved up really bad, feels like electricity running thru my leg" "michael taken 4 aleve & 4 tylenol and i'm not getting nowhere"        Review of patient's allergies indicates:   Allergen Reactions    Codeine     Corticosteroids (glucocorticoids)      "sets off my anxiety real bad"  "sets off my anxiety real bad"    Tylenol-codeine [acetaminophen-codeine] Itching    Tramadol Rash        HPI   HPI    4/10/2020, 3:39 PM   History obtained from the patient      History of Present Illness: Kaylene Emery is a 49 y.o. female patient who presents to the Emergency Department for chronic R hip pain, onset 3 days PTA. Pt reports a slip and fall at home 3 days ago. She states the pain shoots down her R leg. Pt was seen in the ED yesterday for the same sx and reports her pain is not improving.  XR Hip was also negative. Symptoms are constant and moderate in severity. No mitigating or exacerbating factors reported. No associated sxs reported. Patient denies any fever, numbness, weakness, back pain, nausea, vomiting, bilateral leg swelling, SOB, and all other sxs at this time. Prior Tx includes aleve and tylenol with no relief of sx. No further complaints or concerns at this time.         Arrival mode: Personal vehicle      PCP: Robby Dos Santos NP       Past Medical History:  Past Medical History:   Diagnosis Date    Anxiety     Depression     GERD (gastroesophageal reflux disease)     Osteonecrosis of right hip 2019    Tobacco use        Past Surgical History:  Past Surgical History:   Procedure Laterality Date    BLADDER REPAIR      CENTRAL LINE  6/28/2017         CHOLECYSTECTOMY  04/07/2017    KNEE ARTHROPLASTY      PARTIAL " HYSTERECTOMY      TUBAL LIGATION           Family History:  Family History   Problem Relation Age of Onset    Hypertension Mother     Diabetes Mother     Hypoglycemic Father     Lung cancer Father     COPD Father        Social History:  Social History     Tobacco Use    Smoking status: Current Every Day Smoker     Packs/day: 1.00     Years: 29.00     Pack years: 29.00     Types: Cigarettes    Smokeless tobacco: Never Used    Tobacco comment: began smoking 1990.    Substance and Sexual Activity    Alcohol use: Yes     Comment: special occasions only, 3-4 times a year 1 glass of wine    Drug use: No    Sexual activity: Yes     Partners: Male     Comment:        ROS   Review of Systems   Constitutional: Negative for chills and fever.   HENT: Negative for sore throat.    Respiratory: Negative for cough and shortness of breath.    Cardiovascular: Negative for chest pain and leg swelling.   Gastrointestinal: Negative for abdominal pain, diarrhea, nausea and vomiting.   Genitourinary: Negative for dysuria.   Musculoskeletal: Negative for back pain.        (+) R hip pain   Skin: Negative for rash.   Neurological: Negative for weakness, light-headedness, numbness and headaches.   Hematological: Does not bruise/bleed easily.   All other systems reviewed and are negative.      Physical Exam      Initial Vitals [04/10/20 1533]   BP Pulse Resp Temp SpO2   128/81 105 16 98.1 °F (36.7 °C) 99 %      MAP       --          Physical Exam  Nursing Notes and Vital Signs Reviewed.  Constitutional: Patient is in mild distress. Well-developed and well-nourished.  Head: Atraumatic. Normocephalic.  Eyes: PERRL. EOM intact. Conjunctivae are not pale. No scleral icterus.  ENT: Mucous membranes are moist. Oropharynx is clear and symmetric.    Neck: Supple. Full ROM. No lymphadenopathy.  Back: R paraspinal and SI tenderness. No midline bony tenderness, deformities, or step-offs of the T-spine or L-spine. Skin appears normal  "without abrasions or bruising. No erythema, induration, or fluctuance.  Cardiovascular: Regular rate. Regular rhythm. No murmurs, rubs, or gallops. Distal pulses are 2+ and symmetric.  Pulmonary/Chest: No respiratory distress. Clear to auscultation bilaterally. No wheezing or rales.  Abdominal: Soft and non-distended.  There is no tenderness.  No rebound, guarding, or rigidity. Good bowel sounds.  Musculoskeletal: Moves all extremities. No obvious deformities. No edema. No calf tenderness.  Skin: Warm and dry.  Neurological:  Alert, awake, and appropriate.  Normal speech.  No acute focal neurological deficits are appreciated.  Psychiatric: Normal affect. Good eye contact. Appropriate in content.    ED Course    Procedures  ED Vital Signs:  Vitals:    04/10/20 1533   BP: 128/81   Pulse: 105   Resp: 16   Temp: 98.1 °F (36.7 °C)   TempSrc: Oral   SpO2: 99%   Weight: 81.6 kg (180 lb)   Height: 5' 4" (1.626 m)       Abnormal Lab Results:  Labs Reviewed - No data to display     All Lab Results:  none    Imaging Results:  Imaging Results    None                 The Emergency Provider reviewed the vital signs and test results, which are outlined above.    ED Discussion     4:06 PM: Reassessed pt at this time. Discussed with pt all pertinent ED information and results. Discussed pt dx and plan of tx. Gave pt all f/u and return to the ED instructions. All questions and concerns were addressed at this time. Pt expresses understanding of information and instructions, and is comfortable with plan to discharge. Pt is stable for discharge.    I discussed with patient and/or family/caretaker that evaluation in the ED does not suggest any emergent or life threatening medical conditions requiring immediate intervention beyond what was provided in the ED, and I believe patient is safe for discharge.  Regardless, an unremarkable evaluation in the ED does not preclude the development or presence of a serious of life threatening " condition. As such, patient was instructed to return immediately for any worsening or change in current symptoms.           ED Medication(s):  Medications   HYDROcodone-acetaminophen  mg per tablet 1 tablet (1 tablet Oral Given 4/10/20 1551)   ketorolac injection 30 mg (30 mg Intramuscular Given 4/10/20 1551)     New Prescriptions    HYDROCODONE-ACETAMINOPHEN (NORCO) 5-325 MG PER TABLET    Take 1 tablet by mouth every 6 (six) hours as needed.    PREDNISONE (DELTASONE) 20 MG TABLET    Take 1 tablet (20 mg total) by mouth once daily. for 5 days     Follow-up Information     Robby Dos Santos NP In 1 week.    Specialty:  Family Medicine  Contact information:  [address]  [facility] [address] [phone]                     Medical Decision Making              Scribe Attestation:   Scribe #1: I performed the above scribed service and the documentation accurately describes the services I performed. I attest to the accuracy of the note.    Attending:   Physician Attestation Statement for Scribe #1: I, RAYMOND Bae Jr., personally performed the services described in this documentation, as scribed by Yenifer Rodriguez, in my presence, and it is both accurate and complete.          Clinical Impression       ICD-10-CM ICD-9-CM   1. Pain of right hip joint M25.551 719.45   2. Sciatica of right side M54.31 724.3       Disposition:   Disposition: Discharged  Condition: Stable         RAYMOND Bae Jr.  04/10/20 1701

## 2020-07-01 ENCOUNTER — HOSPITAL ENCOUNTER (EMERGENCY)
Facility: HOSPITAL | Age: 50
Discharge: HOME | End: 2020-07-01
Attending: EMERGENCY MEDICINE
Payer: MEDICAID

## 2020-07-01 VITALS
BODY MASS INDEX: 30.71 KG/M2 | RESPIRATION RATE: 20 BRPM | HEIGHT: 64 IN | HEART RATE: 110 BPM | OXYGEN SATURATION: 99 % | WEIGHT: 179.88 LBS | SYSTOLIC BLOOD PRESSURE: 146 MMHG | DIASTOLIC BLOOD PRESSURE: 84 MMHG | TEMPERATURE: 99 F

## 2020-07-01 DIAGNOSIS — M16.11 OSTEOARTHRITIS OF RIGHT HIP, UNSPECIFIED OSTEOARTHRITIS TYPE: ICD-10-CM

## 2020-07-01 DIAGNOSIS — G89.29 CHRONIC RIGHT HIP PAIN: Primary | ICD-10-CM

## 2020-07-01 DIAGNOSIS — M25.551 CHRONIC RIGHT HIP PAIN: Primary | ICD-10-CM

## 2020-07-01 PROCEDURE — 63600175 PHARM REV CODE 636 W HCPCS: Performed by: NURSE PRACTITIONER

## 2020-07-01 PROCEDURE — 25000003 PHARM REV CODE 250: Performed by: NURSE PRACTITIONER

## 2020-07-01 PROCEDURE — 96372 THER/PROPH/DIAG INJ SC/IM: CPT

## 2020-07-01 PROCEDURE — 99284 EMERGENCY DEPT VISIT MOD MDM: CPT | Mod: 25

## 2020-07-01 RX ORDER — PROMETHAZINE HYDROCHLORIDE 25 MG/1
25 TABLET ORAL
Status: COMPLETED | OUTPATIENT
Start: 2020-07-01 | End: 2020-07-01

## 2020-07-01 RX ORDER — DICLOFENAC SODIUM 50 MG/1
50 TABLET, DELAYED RELEASE ORAL 3 TIMES DAILY PRN
Qty: 15 TABLET | Refills: 0 | Status: SHIPPED | OUTPATIENT
Start: 2020-07-01

## 2020-07-01 RX ORDER — DIPHENHYDRAMINE HCL 50 MG
50 CAPSULE ORAL
Status: COMPLETED | OUTPATIENT
Start: 2020-07-01 | End: 2020-07-01

## 2020-07-01 RX ORDER — MEPERIDINE HYDROCHLORIDE 25 MG/ML
50 INJECTION INTRAMUSCULAR; INTRAVENOUS; SUBCUTANEOUS
Status: COMPLETED | OUTPATIENT
Start: 2020-07-01 | End: 2020-07-01

## 2020-07-01 RX ADMIN — DIPHENHYDRAMINE HYDROCHLORIDE 50 MG: 50 CAPSULE ORAL at 09:07

## 2020-07-01 RX ADMIN — MEPERIDINE HYDROCHLORIDE 50 MG: 25 INJECTION INTRAMUSCULAR; INTRAVENOUS; SUBCUTANEOUS at 09:07

## 2020-07-01 RX ADMIN — PROMETHAZINE HYDROCHLORIDE 25 MG: 25 TABLET ORAL at 09:07

## 2020-07-02 NOTE — ED NOTES
Patient examined, evaluated, and educated on discharge instructions and prescriptions by YAQUELIN Hernandez without nursing assistance. Patient discharged to [facility] by NP.

## 2020-07-02 NOTE — ED PROVIDER NOTES
"     HISTORY     Chief Complaint   Patient presents with    Hip Pain     pt has c/o right hip pain, states she got a steroid injection approx 6 weeks ago and the pain has returned. Pt need a hip replacement per ortho surgeron     Review of patient's allergies indicates:   Allergen Reactions    Codeine     Corticosteroids (glucocorticoids)      "sets off my anxiety real bad"  "sets off my anxiety real bad"    Tylenol-codeine [acetaminophen-codeine] Itching    Tramadol Rash        HPI   The history is provided by the patient.   Leg Pain   Illness onset: Chronic  The pain is present in the right hip. The quality of the pain is described as aching. The pain is at a severity of 8/10. The pain has been fluctuating since onset. Pertinent negatives include no numbness, no inability to bear weight, no loss of motion, no muscle weakness, no loss of sensation and no tingling. She reports no foreign bodies present. The symptoms are aggravated by activity, bearing weight and palpation. Treatments tried: Hydrocodone, muscle relaxer  The treatment provided mild relief.        PCP: Robby Dos Santos NP     Past Medical History:  Past Medical History:   Diagnosis Date    Anxiety     Depression     GERD (gastroesophageal reflux disease)     Osteonecrosis of right hip 2019    Tobacco use         Past Surgical History:  Past Surgical History:   Procedure Laterality Date    BLADDER REPAIR      CENTRAL LINE  6/28/2017         CHOLECYSTECTOMY  04/07/2017    KNEE ARTHROPLASTY      PARTIAL HYSTERECTOMY      TUBAL LIGATION          Family History:  Family History   Problem Relation Age of Onset    Hypertension Mother     Diabetes Mother     Hypoglycemic Father     Lung cancer Father     COPD Father         Social History:  Social History     Tobacco Use    Smoking status: Current Every Day Smoker     Packs/day: 1.00     Years: 29.00     Pack years: 29.00     Types: Cigarettes    Smokeless tobacco: Never Used    Tobacco " "comment: began smoking 1990.    Substance and Sexual Activity    Alcohol use: Yes     Comment: special occasions only, 3-4 times a year 1 glass of wine    Drug use: No    Sexual activity: Yes     Partners: Male     Comment:          ROS   Review of Systems   Constitutional: Negative for fever.   HENT: Negative for sore throat.    Respiratory: Negative for shortness of breath.    Cardiovascular: Negative for chest pain.   Gastrointestinal: Negative for nausea.   Genitourinary: Negative for dysuria.   Musculoskeletal: Negative for back pain.        Chronic right hip pain    Skin: Negative for rash.   Neurological: Negative for tingling, weakness and numbness.   Hematological: Does not bruise/bleed easily.       PHYSICAL EXAM     Initial Vitals [07/01/20 2053]   BP Pulse Resp Temp SpO2   (!) 146/84 110 20 98.6 °F (37 °C) 99 %      MAP       --           Physical Exam    Constitutional: She appears well-developed and well-nourished. No distress.   HENT:   Head: Normocephalic and atraumatic.   Eyes: Conjunctivae are normal. Pupils are equal, round, and reactive to light.   Neck: Normal range of motion. Neck supple.   Cardiovascular: Normal rate, regular rhythm and normal heart sounds.   Pulmonary/Chest: Breath sounds normal.   Abdominal: Soft. Bowel sounds are normal. She exhibits no distension. There is no abdominal tenderness. There is no rebound.   Musculoskeletal: Normal range of motion. No edema.      Right hip: She exhibits tenderness.   Neurological: She is alert and oriented to person, place, and time. She has normal strength.   Skin: Skin is warm and dry.   Psychiatric: She has a normal mood and affect.          ED COURSE   Procedures  ED ONGOING VITALS:  Vitals:    07/01/20 2053 07/01/20 2157   BP: (!) 146/84    Pulse: 110    Resp: 20 20   Temp: 98.6 °F (37 °C)    TempSrc: Oral    SpO2: 99%    Weight: 81.6 kg (179 lb 14.3 oz)    Height: 5' 4" (1.626 m)          ABNORMAL LAB VALUES:  Labs Reviewed - No " data to display      ALL LAB VALUES:        RADIOLOGY STUDIES:  Imaging Results    None                   The above vital signs and test results have been reviewed by the emergency provider.     ED Medications:  Discharge Medication List as of 7/1/2020 10:12 PM      START taking these medications    Details   diclofenac (VOLTAREN) 50 MG EC tablet Take 1 tablet (50 mg total) by mouth 3 (three) times daily as needed., Starting Wed 7/1/2020, Print           Discharge Medications:  Discharge Medication List as of 7/1/2020 10:12 PM      START taking these medications    Details   diclofenac (VOLTAREN) 50 MG EC tablet Take 1 tablet (50 mg total) by mouth 3 (three) times daily as needed., Starting Wed 7/1/2020, Print            Follow-up Information     [facility]    Specialty: Emergency Medicine  Why: As needed, If symptoms worsen  Contact information:  [address] [facility] [address] [phone]           Schedule an appointment as soon as possible for a visit  with PCP.           Schedule an appointment as soon as possible for a visit  with [facility].    Specialty: Orthopedics  Contact information:  [address] [facility] [address] [phone]  Additional information:  ([address]) 1st floor                I discussed with patient and/or family/caretaker that evaluation in the ED does not suggest any emergent or life threatening medical conditions requiring immediate intervention beyond what was provided in the ED, and I believe patient is safe for discharge. Regardless, an unremarkable evaluation in the ED does not preclude the development or presence of a serious or life threatening condition. As such, patient was instructed to return immediately for any worsening or change in current symptoms.    Pre-hypertension/Hypertension: The pt has been informed that they may have pre-hypertension or hypertension based on a blood pressure  "reading in the ED. I recommend that the pt call the PCP listed on their discharge instructions or a physician of their choice this week to arrange f/u for further evaluation of possible pre-hypertension or hypertension.       Regarding CHRONIC PAIN, patient was instructed that the emergency department is trained to assess and treat emergencies using professional resources and utilize our best judgment when treating pain. Patient was advised that patient's should have only ONE provider and ONE pharmacy helping manage pain dealing with controlled substances. Patient was instructed that it is not recommended by the Drug Enforcement Agency, [employer], and the [facility] to prescribe pain medication to a patient that has already received pain medicine from another health care provider even if a patient alleges that prescriptions were stole or lost; prescribe pain medicines such as: OxyContin, MSContin, Fentanyl (Duragesic), Methadone, Opana ER, Exalgo, Subutex, Suboxone, or Methadone; provide injections for "flare--ups" of chronic pain; and have the right to utilize the [place] Prescription Monitoring Program to track opioid pain medications and other controlled substance prescriptions.       MEDICAL DECISION MAKING                 CLINICAL IMPRESSION       ICD-10-CM ICD-9-CM   1. Chronic right hip pain  M25.551 719.45    G89.29 338.29   2. Osteoarthritis of right hip, unspecified osteoarthritis type  M16.11 715.95       Disposition:   Disposition: Discharged  Condition: Stable  \       David Sewell NP  07/02/20 1618    "

## 2020-07-24 ENCOUNTER — HOSPITAL ENCOUNTER (EMERGENCY)
Facility: HOSPITAL | Age: 50
Discharge: HOME | End: 2020-07-24
Attending: EMERGENCY MEDICINE
Payer: MEDICAID

## 2020-07-24 VITALS
OXYGEN SATURATION: 100 % | SYSTOLIC BLOOD PRESSURE: 127 MMHG | RESPIRATION RATE: 18 BRPM | DIASTOLIC BLOOD PRESSURE: 82 MMHG | TEMPERATURE: 98 F | WEIGHT: 179 LBS | HEART RATE: 100 BPM | BODY MASS INDEX: 30.73 KG/M2

## 2020-07-24 DIAGNOSIS — M25.551 PAIN OF RIGHT HIP JOINT: Primary | ICD-10-CM

## 2020-07-24 PROCEDURE — 25000003 PHARM REV CODE 250: Performed by: EMERGENCY MEDICINE

## 2020-07-24 PROCEDURE — 99283 EMERGENCY DEPT VISIT LOW MDM: CPT

## 2020-07-24 RX ORDER — HYDROCODONE BITARTRATE AND ACETAMINOPHEN 5; 325 MG/1; MG/1
1 TABLET ORAL EVERY 6 HOURS PRN
Qty: 12 TABLET | Refills: 0 | OUTPATIENT
Start: 2020-07-24 | End: 2020-07-29

## 2020-07-24 RX ORDER — HYDROCODONE BITARTRATE AND ACETAMINOPHEN 5; 325 MG/1; MG/1
1 TABLET ORAL
Status: COMPLETED | OUTPATIENT
Start: 2020-07-24 | End: 2020-07-24

## 2020-07-24 RX ORDER — HYDROCODONE BITARTRATE AND ACETAMINOPHEN 5; 325 MG/1; MG/1
1 TABLET ORAL
Status: DISCONTINUED | OUTPATIENT
Start: 2020-07-24 | End: 2020-07-24

## 2020-07-24 RX ADMIN — HYDROCODONE BITARTRATE AND ACETAMINOPHEN 1 TABLET: 5; 325 TABLET ORAL at 08:07

## 2020-07-25 NOTE — ED PROVIDER NOTES
"     HISTORY     Chief Complaint   Patient presents with    Hip Pain     reports hx of right hip pain, reports will be having hip replacement soon. reports increased pain today. denies injury.     Review of patient's allergies indicates:   Allergen Reactions    Codeine     Corticosteroids (glucocorticoids)      "sets off my anxiety real bad"  "sets off my anxiety real bad"    Tylenol-codeine [acetaminophen-codeine] Itching    Tramadol Rash        HPI   The history is provided by the patient. No  was used.   Leg Pain   The incident occurred at home. There was no injury mechanism. Illness onset: chronic right hip pain. Pain location: right hip. The quality of the pain is described as aching. The pain has been constant since onset. Pertinent negatives include no numbness, no inability to bear weight, no loss of motion, no muscle weakness, no loss of sensation and no tingling. She reports no foreign bodies present. Nothing aggravates the symptoms. She has tried NSAIDs (flexaril) for the symptoms. The treatment provided no relief.      Pt reports she is scheduled for a hip replacement with [facility] but she needs something to help her with the pain  PCP: Robby Dos Santos NP     Past Medical History:  Past Medical History:   Diagnosis Date    Anxiety     Depression     GERD (gastroesophageal reflux disease)     Osteonecrosis of right hip 2019    Tobacco use         Past Surgical History:  Past Surgical History:   Procedure Laterality Date    BLADDER REPAIR      CENTRAL LINE  6/28/2017         CHOLECYSTECTOMY  04/07/2017    KNEE ARTHROPLASTY      PARTIAL HYSTERECTOMY      TUBAL LIGATION          Family History:  Family History   Problem Relation Age of Onset    Hypertension Mother     Diabetes Mother     Hypoglycemic Father     Lung cancer Father     COPD Father         Social History:  Social History     Tobacco Use    Smoking status: Current Every Day Smoker     Packs/day: 1.00     " Years: 29.00     Pack years: 29.00     Types: Cigarettes    Smokeless tobacco: Never Used    Tobacco comment: began smoking 1990.    Substance and Sexual Activity    Alcohol use: Yes     Comment: special occasions only, 3-4 times a year 1 glass of wine    Drug use: No    Sexual activity: Yes     Partners: Male     Comment:          ROS   Review of Systems   Constitutional: Negative for chills, fatigue and fever.   HENT: Negative for sore throat.    Respiratory: Negative for chest tightness and shortness of breath.    Cardiovascular: Negative for chest pain.   Gastrointestinal: Negative for abdominal pain and nausea.   Genitourinary: Negative for dysuria.   Musculoskeletal: Positive for arthralgias (right hip pain). Negative for back pain.   Skin: Negative for rash.   Neurological: Negative for dizziness, tingling, weakness and numbness.   Hematological: Does not bruise/bleed easily.   Psychiatric/Behavioral: Negative for agitation.       PHYSICAL EXAM     Initial Vitals [07/24/20 1913]   BP Pulse Resp Temp SpO2   (!) 154/93 100 18 97.8 °F (36.6 °C) 100 %      MAP       --           Physical Exam    Nursing note and vitals reviewed.  Constitutional: She appears well-developed and well-nourished. She is not diaphoretic. No distress.   HENT:   Head: Normocephalic and atraumatic.   Right Ear: External ear normal.   Left Ear: External ear normal.   Mouth/Throat: Oropharynx is clear and moist. No oropharyngeal exudate.   Eyes: Conjunctivae are normal. Right eye exhibits no discharge. Left eye exhibits no discharge.   Neck: Normal range of motion. Neck supple.   Cardiovascular: Normal rate.   Pulses:       Dorsalis pedis pulses are 2+ on the right side and 2+ on the left side.        Posterior tibial pulses are 2+ on the right side and 2+ on the left side.   Pulmonary/Chest: Breath sounds normal.   Abdominal: Soft. Bowel sounds are normal.   Musculoskeletal: Normal range of motion.   Neurological: She is alert  and oriented to person, place, and time. She has normal strength.   Skin: Skin is warm and dry.   Psychiatric: She has a normal mood and affect. Her behavior is normal. Thought content normal.          ED COURSE   Procedures  ED ONGOING VITALS:  Vitals:    07/24/20 1913   BP: (!) 154/93   Pulse: 100   Resp: 18   Temp: 97.8 °F (36.6 °C)   TempSrc: Oral   SpO2: 100%   Weight: 81.2 kg (179 lb)         ABNORMAL LAB VALUES:  Labs Reviewed - No data to display      ALL LAB VALUES:  none      RADIOLOGY STUDIES:  Imaging Results    None                   The above vital signs and test results have been reviewed by the emergency provider.     ED Medications:  Medications   HYDROcodone-acetaminophen 5-325 mg per tablet 1 tablet (has no administration in time range)       Current Discharge Medication List        Discharge Medications:  New Prescriptions    HYDROCODONE-ACETAMINOPHEN (NORCO) 5-325 MG PER TABLET    Take 1 tablet by mouth every 6 (six) hours as needed for Pain.      Follow-up Information     Schedule an appointment as soon as possible for a visit  with [facility].                7:46 PM    I discussed with patient and/or family/caretaker that evaluation in the ED does not suggest any emergent or life threatening medical conditions requiring immediate intervention beyond what was provided in the ED, and I believe patient is safe for discharge. Regardless, an unremarkable evaluation in the ED does not preclude the development or presence of a serious or life threatening condition. As such, patient was instructed to return immediately for any worsening or change in current symptoms.    Pre-hypertension/Hypertension: The pt has been informed that they may have pre-hypertension or hypertension based on a blood pressure reading in the ED. I recommend that the pt call the PCP listed on their discharge instructions or a physician of their choice this week to arrange f/u for further evaluation of possible pre-hypertension  or hypertension.       MEDICAL DECISION MAKING                 CLINICAL IMPRESSION       ICD-10-CM ICD-9-CM   1. Pain of right hip joint  M25.551 719.45       Disposition:   Disposition: Discharged  Condition: Stable         Gerald Hogan NP  07/24/20 1947

## 2020-07-27 ENCOUNTER — PATIENT OUTREACH (OUTPATIENT)
Dept: EMERGENCY MEDICINE | Facility: HOSPITAL | Age: 50
End: 2020-07-27

## 2020-07-27 NOTE — PROGRESS NOTES
Shanda Zarate Patient Care Assistant  ED Navigator  Emergency Department    Project: [facility] ED Navigator  Role: Community Health Worker    Date: 07/27/2020  Patient Name: Kaylene Emery  MRN: [id number]  PCP: Robby Dos Santos NP    Assessment:     Kaylene Emery is a 49 y.o. female who has presented to ED for Hip Pain  . Patient has visited the ED 2 times in the past 3 months. Patient did contact PCP.     ED Navigator Patient Assessment    Consent to Services  Does patient consent to completing the assessment?: Yes  Transportation  Does the patient have issues with Transportation?: No  Insurance Coverage  Do you have coverage/adequate coverage?: Yes  Type/kind of coverage: Healthy Blue   Is patient able to afford co-pays/deductibles?: Yes  Is patient able to afford HME or supplies?: Yes  Does patient have an established [facility] PCP?: Yes  Able to access?: Yes  Does the patient have a lack of adequate coverage?: Yes  Specialist Appointment  Did the patient come to the ED to see a specialist?: No  Does the patient have a pending specialist referral?: Yes  Does the patient have a specialist appointment made?: Yes  PCP Follow Up Appointment  Does the patient have a follow up appontment with their PCP?: Yes  Medications  Is patient able to afford medication?: Yes  Is patient unable to get medication due to lack of transportation?: No  Psychological  Does the patient have psycho-social concerns?: No  Food  Does the patient have concerns about food?: No  Communication/Education  Does the patient have limited English proficiency/English not primary language?: No  Does patient have low literacy and/or low health literacy?: No  Does patient have concerns with care?: No  Does patient have dissatisfaction with care?: No  Other Financial Concers  Does the patient have immediate financial distress?: No  Does the patient have general financial concerns?: No  Other Social Barriers/Concerns  Does the patient have any  additional barriers or concerns?: None         Social History     Socioeconomic History    Marital status:      Spouse name: Not on file    Number of children: Not on file    Years of education: Not on file    Highest education level: Not on file   Occupational History    Not on file   Social Needs    Financial resource strain: Not on file    Food insecurity     Worry: Never true     Inability: Never true    Transportation needs     Medical: No     Non-medical: No   Tobacco Use    Smoking status: Current Every Day Smoker     Packs/day: 1.00     Years: 29.00     Pack years: 29.00     Types: Cigarettes    Smokeless tobacco: Never Used    Tobacco comment: began smoking 1990.    Substance and Sexual Activity    Alcohol use: Yes     Comment: special occasions only, 3-4 times a year 1 glass of wine    Drug use: No    Sexual activity: Yes     Partners: Male     Comment:    Lifestyle    Physical activity     Days per week: Patient refused     Minutes per session: Patient refused    Stress: Not at all   Relationships    Social connections     Talks on phone: Patient refused     Gets together: Patient refused     Attends Shinto service: Patient refused     Active member of club or organization: Patient refused     Attends meetings of clubs or organizations: Patient refused     Relationship status: Patient refused   Other Topics Concern    Patient feels they ought to cut down on drinking/drug use No    Patient annoyed by others criticizing their drinking/drug use No    Patient has felt bad or guilty about drinking/drug use No    Patient has had a drink/used drugs as an eye opener in the AM No   Social History Narrative    Not on file       Plan:   Patient states that she has followed up with her PCP and is awaiting scheduling for her hip replacement. Patient denies any other needs at this time. Education on COVID 19 provided to patient during call. Instructed patient to call [facility] on  Call first if experiencing fever greater than 100.4, coughing and SOB. Provided patient [facility] on Call phone number [phone] as well as [facility] COVID 19 hotline [phone], verbalized understanding. Informed patient that they may contact their PCP for further guidance as well. Reviewed with patient precautions to take to help prevent the spread of this respiratory virus: Wash hands often (for 20 seconds singing Happy Birthday), cover your cough or sneeze into your elbow or a tissue, stay away from people who are sick, don't touch your eyes, nose or mouth with unwashed hands. Provided Instruction to stay home as directed by local government officials and only make trips that are of essential needs (Ex:grocery, pharmacy, medical appointments). Reviewed with patient supplies to have on hand while staying at home (food, water, medications, medical supplies and other essentials).             Appointment made with: Robby Dos Santos NP

## 2020-07-29 ENCOUNTER — HOSPITAL ENCOUNTER (EMERGENCY)
Facility: HOSPITAL | Age: 50
Discharge: HOME | End: 2020-07-29
Attending: EMERGENCY MEDICINE
Payer: MEDICAID

## 2020-07-29 VITALS
BODY MASS INDEX: 30.73 KG/M2 | RESPIRATION RATE: 16 BRPM | OXYGEN SATURATION: 99 % | HEART RATE: 97 BPM | HEIGHT: 64 IN | TEMPERATURE: 98 F | DIASTOLIC BLOOD PRESSURE: 63 MMHG | SYSTOLIC BLOOD PRESSURE: 107 MMHG

## 2020-07-29 DIAGNOSIS — M25.551 CHRONIC PAIN OF RIGHT HIP: ICD-10-CM
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
## 2020-07-29 DIAGNOSIS — M25.551 RIGHT HIP PAIN: ICD-10-CM

## 2020-07-29 DIAGNOSIS — Z72.0 TOBACCO ABUSE: ICD-10-CM

## 2020-07-29 DIAGNOSIS — Z01.818 PREOPERATIVE CLEARANCE: ICD-10-CM

## 2020-07-29 DIAGNOSIS — G89.29 CHRONIC PAIN OF RIGHT HIP: ICD-10-CM

## 2020-07-29 LAB
ABO + RH BLD: NORMAL
ALBUMIN SERPL BCP-MCNC: 4 G/DL (ref 3.5–5.2)
ALP SERPL-CCNC: 98 U/L (ref 55–135)
ALT SERPL W/O P-5'-P-CCNC: 19 U/L (ref 10–44)
ANION GAP SERPL CALC-SCNC: 17 MMOL/L (ref 8–16)
APTT BLDCRRT: 25.9 SEC (ref 21–32)
AST SERPL-CCNC: 13 U/L (ref 10–40)
BILIRUB SERPL-MCNC: 0.2 MG/DL (ref 0.1–1)
BILIRUB UR QL STRIP: NEGATIVE
BLD GP AB SCN CELLS X3 SERPL QL: NORMAL
BUN SERPL-MCNC: 9 MG/DL (ref 6–20)
CALCIUM SERPL-MCNC: 9.7 MG/DL (ref 8.7–10.5)
CHLORIDE SERPL-SCNC: 100 MMOL/L (ref 95–110)
CLARITY UR: CLEAR
CO2 SERPL-SCNC: 21 MMOL/L (ref 23–29)
COLOR UR: YELLOW
CREAT SERPL-MCNC: 0.9 MG/DL (ref 0.5–1.4)
EST. GFR  (AFRICAN AMERICAN): >60 ML/MIN/1.73 M^2
EST. GFR  (NON AFRICAN AMERICAN): >60 ML/MIN/1.73 M^2
GLUCOSE SERPL-MCNC: 141 MG/DL (ref 70–110)
GLUCOSE UR QL STRIP: NEGATIVE
HGB UR QL STRIP: NEGATIVE
INR PPP: 0.9 (ref 0.8–1.2)
KETONES UR QL STRIP: NEGATIVE
LEUKOCYTE ESTERASE UR QL STRIP: NEGATIVE
NITRITE UR QL STRIP: NEGATIVE
PH UR STRIP: 6 [PH] (ref 5–8)
POTASSIUM SERPL-SCNC: 3.5 MMOL/L (ref 3.5–5.1)
PROT SERPL-MCNC: 8.1 G/DL (ref 6–8.4)
PROT UR QL STRIP: NEGATIVE
PROTHROMBIN TIME: 9.8 SEC (ref 9–12.5)
SARS-COV-2 RDRP RESP QL NAA+PROBE: NEGATIVE
SODIUM SERPL-SCNC: 138 MMOL/L (ref 136–145)
SP GR UR STRIP: <=1.005 (ref 1–1.03)
URN SPEC COLLECT METH UR: ABNORMAL
UROBILINOGEN UR STRIP-ACNC: NEGATIVE EU/DL

## 2020-07-29 PROCEDURE — 80053 COMPREHEN METABOLIC PANEL: CPT

## 2020-07-29 PROCEDURE — 85610 PROTHROMBIN TIME: CPT

## 2020-07-29 PROCEDURE — 85730 THROMBOPLASTIN TIME PARTIAL: CPT

## 2020-07-29 PROCEDURE — 93005 ELECTROCARDIOGRAM TRACING: CPT

## 2020-07-29 PROCEDURE — 86850 RBC ANTIBODY SCREEN: CPT

## 2020-07-29 PROCEDURE — 93010 ELECTROCARDIOGRAM REPORT: CPT | Mod: ,,, | Performed by: INTERNAL MEDICINE

## 2020-07-29 PROCEDURE — 96361 HYDRATE IV INFUSION ADD-ON: CPT

## 2020-07-29 PROCEDURE — 81003 URINALYSIS AUTO W/O SCOPE: CPT

## 2020-07-29 PROCEDURE — 93010 EKG 12-LEAD: ICD-10-PCS | Mod: ,,, | Performed by: INTERNAL MEDICINE

## 2020-07-29 PROCEDURE — 25000003 PHARM REV CODE 250: Performed by: EMERGENCY MEDICINE

## 2020-07-29 PROCEDURE — 96374 THER/PROPH/DIAG INJ IV PUSH: CPT

## 2020-07-29 PROCEDURE — 63600175 PHARM REV CODE 636 W HCPCS: Performed by: EMERGENCY MEDICINE

## 2020-07-29 PROCEDURE — 96376 TX/PRO/DX INJ SAME DRUG ADON: CPT

## 2020-07-29 PROCEDURE — U0002 COVID-19 LAB TEST NON-CDC: HCPCS

## 2020-07-29 PROCEDURE — 99285 EMERGENCY DEPT VISIT HI MDM: CPT | Mod: 25

## 2020-07-29 RX ORDER — FENTANYL CITRATE 50 UG/ML
100 INJECTION, SOLUTION INTRAMUSCULAR; INTRAVENOUS ONCE
Status: COMPLETED | OUTPATIENT
Start: 2020-07-29 | End: 2020-07-29

## 2020-07-29 RX ORDER — HYDROCODONE BITARTRATE AND ACETAMINOPHEN 7.5; 325 MG/1; MG/1
1 TABLET ORAL EVERY 6 HOURS PRN
Qty: 12 TABLET | Refills: 0 | OUTPATIENT
Start: 2020-07-29 | End: 2020-12-23

## 2020-07-29 RX ORDER — FENTANYL CITRATE 50 UG/ML
100 INJECTION, SOLUTION INTRAMUSCULAR; INTRAVENOUS
Status: COMPLETED | OUTPATIENT
Start: 2020-07-29 | End: 2020-07-29

## 2020-07-29 RX ADMIN — FENTANYL CITRATE 100 MCG: 50 INJECTION INTRAMUSCULAR; INTRAVENOUS at 02:07

## 2020-07-29 RX ADMIN — FENTANYL CITRATE 100 MCG: 50 INJECTION INTRAMUSCULAR; INTRAVENOUS at 12:07

## 2020-07-29 RX ADMIN — SODIUM CHLORIDE 500 ML: 0.9 INJECTION, SOLUTION INTRAVENOUS at 02:07

## 2020-07-29 NOTE — ED PROVIDER NOTES
"SCRIBE #1 NOTE: I, Lizzy Rudolph, am scribing for, and in the presence of, Amirah Giles DO. I have scribed the entire note.       History     Chief Complaint   Patient presents with    Fall     ground level fall, shortening to right leg; denies blood thinners and LOC; reports hx of osteoporosis     Review of patient's allergies indicates:   Allergen Reactions    Codeine     Corticosteroids (glucocorticoids)      "sets off my anxiety real bad"  "sets off my anxiety real bad"    Tylenol-codeine [acetaminophen-codeine] Itching    Tramadol Rash         History of Present Illness     HPI    7/29/2020, 11:54 AM  History obtained from the patient      History of Present Illness: Kaylene Emery is a 49 y.o. female patient with a PMHx of anxiety, depression, GERD, osteoporosis, and osteonecrosis of right hip who presents to the Emergency Department for evaluation of sharp right hip pain which onset suddenly PTA. Patient was reaching for something on her kitchen counter and went to sit back down on her walker which wasn't directly behind her. She reports a ground-level fall onto her buttocks. She has not tried to ambulate or bear weight since then. Pain is constant and moderate in severity. No mitigating or exacerbating factors reported. No associated sxs. Patient denies any head injury/trauma, syncope, knee pain, back pain, neck pain, dizziness, lightheadedness, CP, SOB, n/v, leg swelling, and all other sxs at this time. Prior Tx includes Fentanyl 200 mcg en route. Denies blood thinners. Last solid intake was red beans and rice at 6 pm yesterday and last fluid intake was diet coke 2 hours ago. No further complaints or concerns at this time.       Arrival mode: [facility]    PCP: Robby Dos Santos NP        Past Medical History:  Past Medical History:   Diagnosis Date    Anxiety     Depression     GERD (gastroesophageal reflux disease)     Osteonecrosis of right hip 2019    Tobacco use        Past Surgical " History:  Past Surgical History:   Procedure Laterality Date    BLADDER REPAIR      CENTRAL LINE  6/28/2017         CHOLECYSTECTOMY  04/07/2017    KNEE ARTHROPLASTY      PARTIAL HYSTERECTOMY      TUBAL LIGATION           Family History:  Family History   Problem Relation Age of Onset    Hypertension Mother     Diabetes Mother     Hypoglycemic Father     Lung cancer Father     COPD Father        Social History:  Social History     Tobacco Use    Smoking status: Current Every Day Smoker     Packs/day: 1.00     Years: 29.00     Pack years: 29.00     Types: Cigarettes    Smokeless tobacco: Never Used    Tobacco comment: began smoking 1990.    Substance and Sexual Activity    Alcohol use: Yes     Comment: special occasions only, 3-4 times a year 1 glass of wine    Drug use: No    Sexual activity: Yes     Partners: Male     Comment:         Review of Systems     Review of Systems   Constitutional: Negative for activity change, appetite change, chills, diaphoresis, fatigue and fever.   HENT: Negative for congestion, ear pain, nosebleeds, rhinorrhea, sinus pain, sore throat and trouble swallowing.    Eyes: Negative for pain and discharge.   Respiratory: Negative for cough, chest tightness, shortness of breath, wheezing and stridor.    Cardiovascular: Negative for chest pain, palpitations and leg swelling.   Gastrointestinal: Negative for abdominal distention, abdominal pain, blood in stool, constipation, diarrhea, nausea and vomiting.   Genitourinary: Negative for difficulty urinating, dysuria, flank pain, frequency, hematuria and urgency.   Musculoskeletal: Positive for arthralgias (R hip). Negative for back pain, joint swelling, myalgias and neck pain.        (-) knee pain   Skin: Negative for pallor, rash and wound.   Neurological: Negative for dizziness, syncope, weakness, light-headedness, numbness and headaches.        (-) head injury/trauma   Hematological: Does not bruise/bleed easily.    Psychiatric/Behavioral: Negative for confusion and self-injury.   All other systems reviewed and are negative.     Physical Exam     Initial Vitals   BP Pulse Resp Temp SpO2   07/29/20 1157 07/29/20 1142 07/29/20 1142 07/29/20 1142 07/29/20 1142   111/67 (!) 120 20 98.1 °F (36.7 °C) 96 %      MAP       --                 Physical Exam  Nursing Notes and Vital Signs Reviewed.  Constitutional: Patient is in no acute distress. Obese.  Head: Atraumatic. Normocephalic.  Eyes: PERRL. EOM intact. Conjunctivae are not pale. No scleral icterus.  ENT: Mucous membranes are moist. Oropharynx is clear and symmetric.    Neck: Supple. No cervical midline bony tenderness, deformities, or step-offs.   Cardiovascular: Tachycardic. Regular rhythm. No murmurs, rubs, or gallops. Distal pulses are 2+ and symmetric.  Pulmonary/Chest: No respiratory distress. Clear to auscultation bilaterally. No wheezing or rales.  Abdominal: Soft and non-distended.  There is no tenderness.  No rebound, guarding, or rigidity. Good bowel sounds.  Genitourinary: No CVA tenderness  Musculoskeletal: No edema. No calf tenderness.  Back: No tenderness. No midline bony tenderness, deformities, or step-offs of the T-spine or L-spine. Skin appears normal without abrasions or bruising. No erythema, induration, or fluctuance.   RLE: R leg is externally rotated and shortened. Swelling to proximal hip. Cap refill distally is <2 seconds. DP and PT pulses are equal and 2+ bilaterally. No distal sensory deficit  Skin: Warm and dry.  Neurological:  Alert, awake, and appropriate.  Normal speech.  No acute focal neurological deficits are appreciated.  GCS is 15.  Cranial nerves 2-12 intact.  Psychiatric: Normal affect. Good eye contact. Appropriate in content.     ED Course   Procedures  ED Vital Signs:  Vitals:    07/29/20 1142 07/29/20 1157 07/29/20 1202 07/29/20 1243   BP:  111/67 107/66 98/62   Pulse: (!) 120  110 104   Resp: 20  16 (!) 21   Temp: 98.1 °F (36.7 °C)     "  TempSrc: Oral      SpO2: 96%  99% 99%   Height: 5' 4" (1.626 m)       07/29/20 1247 07/29/20 1411 07/29/20 1416 07/29/20 1423   BP:  (!) 98/54  (!) 101/53   Pulse:  95  94   Resp: 20  16    Temp:       TempSrc:       SpO2:  100%  (!) 94%   Height:        07/29/20 1432 07/29/20 1521 07/29/20 1534 07/29/20 1544   BP: (!) 102/51 (!) 98/53 107/63    Pulse: 100 96 97    Resp:       Temp:    98.2 °F (36.8 °C)   TempSrc:       SpO2: (!) 94% 97% 99%    Height:           Abnormal Lab Results:  Labs Reviewed   COMPREHENSIVE METABOLIC PANEL - Abnormal; Notable for the following components:       Result Value    CO2 21 (*)     Glucose 141 (*)     Anion Gap 17 (*)     All other components within normal limits   URINALYSIS, REFLEX TO URINE CULTURE - Abnormal; Notable for the following components:    Specific Gravity, UA <=1.005 (*)     All other components within normal limits    Narrative:     Specimen Source->Urine   PROTIME-INR   APTT   SARS-COV-2 RNA AMPLIFICATION, QUAL   TYPE & SCREEN        All Lab Results:  Results for orders placed or performed during the hospital encounter of 07/29/20   Comprehensive metabolic panel   Result Value Ref Range    Sodium 138 136 - 145 mmol/L    Potassium 3.5 3.5 - 5.1 mmol/L    Chloride 100 95 - 110 mmol/L    CO2 21 (L) 23 - 29 mmol/L    Glucose 141 (H) 70 - 110 mg/dL    BUN, Bld 9 6 - 20 mg/dL    Creatinine 0.9 0.5 - 1.4 mg/dL    Calcium 9.7 8.7 - 10.5 mg/dL    Total Protein 8.1 6.0 - 8.4 g/dL    Albumin 4.0 3.5 - 5.2 g/dL    Total Bilirubin 0.2 0.1 - 1.0 mg/dL    Alkaline Phosphatase 98 55 - 135 U/L    AST 13 10 - 40 U/L    ALT 19 10 - 44 U/L    Anion Gap 17 (H) 8 - 16 mmol/L    eGFR if African American >60 >60 mL/min/1.73 m^2    eGFR if non African American >60 >60 mL/min/1.73 m^2   Protime-INR   Result Value Ref Range    Prothrombin Time 9.8 9.0 - 12.5 sec    INR 0.9 0.8 - 1.2   APTT   Result Value Ref Range    aPTT 25.9 21.0 - 32.0 sec   COVID-19 Rapid Screening   Result Value Ref " Range    SARS-CoV-2 RNA, Amplification, Qual Negative Negative   Urinalysis, Reflex to Urine Culture Urine, Clean Catch    Specimen: Urine   Result Value Ref Range    Specimen UA Urine, Clean Catch     Color, UA Yellow Yellow, Straw, Lisa    Appearance, UA Clear Clear    pH, UA 6.0 5.0 - 8.0    Specific Gravity, UA <=1.005 (A) 1.005 - 1.030    Protein, UA Negative Negative    Glucose, UA Negative Negative    Ketones, UA Negative Negative    Bilirubin (UA) Negative Negative    Occult Blood UA Negative Negative    Nitrite, UA Negative Negative    Urobilinogen, UA Negative <2.0 EU/dL    Leukocytes, UA Negative Negative   Type & Screen   Result Value Ref Range    Group & Rh A POS     Indirect [place] NEG          Imaging Results:  Imaging Results          CT Hip Without Contrast Right (Final result)  Result time 07/29/20 14:41:22    Final result by Sreekanth Gama Jr., MD (07/29/20 14:41:22)                 Impression:      1. Osteonecrosis and fragmentation of the femoral head with lateral subluxation and a large joint effusion.  2. No acute fractures.      Electronically signed by: Sreekanth Gama Jr., MD  Date:    07/29/2020  Time:    14:41             Narrative:    EXAMINATION:  CT HIP WITHOUT CONTRAST RIGHT    CLINICAL HISTORY:  Hip trauma, nondiagnostic xray;    TECHNIQUE:  Axial 0.625-mm images of the right hip obtained without intravenous contrast.  Data submitted for coronal and sagittal reformats. All CT scans at this facility use dose modulation, iterative reconstruction and/or weight based dosing when appropriate to reduce radiation dose to as low as reasonably achievable.    COMPARISON:  Radiographs from July 29, 2020 and April 9, 2020 were reviewed.    FINDINGS:  Again demonstrated is osteonecrosis and fragmentation of the femoral head.  The hip joint is subluxed laterally.  Large hyperdense joint effusion.  Bone on bone articulation.  Bony mineralization is normal.                               X-Ray Chest 1  View (Final result)  Result time 07/29/20 12:23:58    Final result by Sreekanth Gama Jr., MD (07/29/20 12:23:58)                 Impression:      No significant radiographic abnormality.      Electronically signed by: Sreekanth Gama Jr., MD  Date:    07/29/2020  Time:    12:23             Narrative:    EXAMINATION:  XR CHEST AP PORTABLE    CLINICAL HISTORY:  preoperative clearance;    COMPARISON:  Prior radiograph from November 4, 2017.    FINDINGS:  Lungs are clear.  No pleural fluid or pneumothorax.  Heart size within normal limits.  No significant bony findings.                               X-Ray Hip 2 View Right (Final result)  Result time 07/29/20 12:54:06    Final result by Sreekanth Gama Jr., MD (07/29/20 12:54:06)                 Impression:      Again demonstrated is osteonecrosis of both femoral heads with severe secondary osteoarthritis of the hip joints, right greater than left.  No definite acute fracture.      Electronically signed by: Sreekanth Gama Jr., MD  Date:    07/29/2020  Time:    12:54             Narrative:    EXAMINATION:  XR HIP 2 VIEW RIGHT    CLINICAL HISTORY:  Pain in right hip    TECHNIQUE:  AP view of the pelvis and frog leg lateral view of the right hip were performed.    COMPARISON:  Prior radiograph from April 9, 2020.    FINDINGS:  Again demonstrated is a vascular necrosis of both femoral heads.  The right femoral head has collapsed.  There are well corticated fragments adjacent to the acetabulum.  There is severe narrowing of the joint space with bone-on-bone articulation.  Moderate to severe narrowing of the left hip joint space as well.                                 The EKG was ordered, reviewed, and independently interpreted by the ED provider.  Interpretation time: 12:18  Rate: 108 BPM  Rhythm: sinus tachycardia  Interpretation: Right atrial enlargement. No STEMI.         The Emergency Provider reviewed the vital signs and test results, which are outlined above.     ED  Discussion     3:07 PM: Discussed pt's case with Dr. Peterson (ortho) who recommends hip replacement. 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 Can make non weight-bearing with crutches in interim. She should f/u with Dr. Chatman. Nothing to do acutely from surgical perspective.    3:31 PM: Re-evaluated pt. Pt will make a f/u appointment with ortho. Has a walker at home and will use that to get around for the time being. Pt requesting Rx for pain medication. She was prescribed hydrocodone on 7/24. Discussed with pt all pertinent ED information and results. Discussed pt dx and plan of tx. Gave pt all f/u and return to the ED instructions. All questions and concerns were addressed at this time. Pt expresses understanding of information and instructions, and is comfortable with plan to discharge. Pt is stable for discharge.    I discussed with patient that evaluation in the ED does not suggest any emergent or life threatening medical conditions requiring immediate intervention beyond what was provided in the ED, and I believe patient is safe for discharge.  Regardless, an unremarkable evaluation in the ED does not preclude the development or presence of a serious of life threatening condition. As such, patient was instructed to return immediately for any worsening or change in current symptoms.         Medical Decision Making:   Clinical Tests:   Lab Tests: Ordered and Reviewed  Radiological Study: Ordered and Reviewed  Medical Tests: Ordered and Reviewed     Additional MDM:   Smoking Cessation: The patient is a smoker. The patient was counseled on smoking cessation for: 3 minutes. The patient was counseled on tobacco related  health complications.        ED Medication(s):  Medications   fentaNYL injection 100 mcg (100 mcg Intravenous Given 7/29/20 1247)   fentaNYL injection 100 mcg (100 mcg Intravenous Given 7/29/20 1416)   sodium chloride 0.9% bolus 500 mL (0 mLs Intravenous Stopped 7/29/20 1535)        Discharge Medication List as of 7/29/2020  3:37 PM      START taking these medications    Details   HYDROcodone-acetaminophen (NORCO) 7.5-325 mg per tablet Take 1 tablet by mouth every 6 (six) hours as needed for Pain., Starting Wed 7/29/2020, Normal             Follow-up Information     Nuno Gonzalez MD.    Specialty: Orthopedic Surgery  Why: or Orthopedist of Choice.   Use crutches or walker.  Return to the ED for:   worsening pain, fever, swelling, calf pain, calf swelling or other concerns.  Contact information:  [address] [place] [address] DR [place] [phone]                       Scribe Attestation:   Scribe #1: I performed the above scribed service and the documentation accurately describes the services I performed. I attest to the accuracy of the note.     Attending:   Physician Attestation Statement for Scribe #1: I, Amirah Giles DO, personally performed the services described in this documentation, as scribed by Lizzy Rudolph, in my presence, and it is both accurate and complete.           Clinical Impression       ICD-10-CM ICD-9-CM   1. Fall, initial encounter  W19.XXXA E888.9   2. Preoperative clearance  Z01.818 V72.84   3. Right hip pain  M25.551 719.45   4. Chronic pain of right hip  M25.551 719.45    G89.29 338.29   5. Tobacco abuse  Z72.0 305.1       Disposition:   Disposition: Discharged  Condition: Stable         Amirah Giles DO  07/29/20 2120

## 2020-08-07 NOTE — TELEPHONE ENCOUNTER
I have reviewed the notes done by the ED Navigator, and I concur with the documentation.  Anita Ortiz RN

## 2020-08-11 ENCOUNTER — HOSPITAL ENCOUNTER (EMERGENCY)
Facility: HOSPITAL | Age: 50
Discharge: HOME | End: 2020-08-11
Attending: EMERGENCY MEDICINE
Payer: MEDICAID

## 2020-08-11 VITALS
WEIGHT: 180 LBS | RESPIRATION RATE: 18 BRPM | BODY MASS INDEX: 30.9 KG/M2 | DIASTOLIC BLOOD PRESSURE: 65 MMHG | SYSTOLIC BLOOD PRESSURE: 149 MMHG | TEMPERATURE: 98 F | OXYGEN SATURATION: 99 % | HEART RATE: 99 BPM

## 2020-08-11 DIAGNOSIS — M25.551 RIGHT HIP PAIN: ICD-10-CM
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
## 2020-08-11 PROCEDURE — 63600175 PHARM REV CODE 636 W HCPCS: Performed by: EMERGENCY MEDICINE

## 2020-08-11 PROCEDURE — 96372 THER/PROPH/DIAG INJ SC/IM: CPT

## 2020-08-11 PROCEDURE — 99284 EMERGENCY DEPT VISIT MOD MDM: CPT | Mod: 25

## 2020-08-11 RX ORDER — MORPHINE SULFATE 4 MG/ML
8 INJECTION, SOLUTION INTRAMUSCULAR; INTRAVENOUS
Status: COMPLETED | OUTPATIENT
Start: 2020-08-11 | End: 2020-08-11

## 2020-08-11 RX ADMIN — MORPHINE SULFATE 8 MG: 4 INJECTION, SOLUTION INTRAMUSCULAR; INTRAVENOUS at 08:08

## 2020-08-11 NOTE — ED PROVIDER NOTES
"SCRIBE #1 NOTE: I, María Camargo, am scribing for, and in the presence of, Gerald Vega MD. I have scribed the entire note.       History     Chief Complaint   Patient presents with    Hip Pain     Pt had fall from standing position. Denies hitting head. Dr. Vega in room evaluating. No acute distress     Review of patient's allergies indicates:   Allergen Reactions    Codeine     Corticosteroids (glucocorticoids)      "sets off my anxiety real bad"  "sets off my anxiety real bad"    Tylenol-codeine [acetaminophen-codeine] Itching    Tramadol Rash         History of Present Illness     HPI    8/11/2020, 6:01 PM  History obtained from the patient      History of Present Illness: Kaylene Emery is a 49 y.o. female patient with a PMHx of osteonecrosis of R hip who presents to the Emergency Department for evaluation of intense R hip pain s/p mechanical fall which onset gradually earlier today. Pt reports having a walker at home and fell when reaching for it. Symptoms are constant and moderate in severity. No mitigating or exacerbating factors reported. Associated sxs not repoted. Patient denies any fever, chills, head trauma, LOC, back pain, neck pain/stiffness, abd pain, n/v, dizziness, lightheadness, numbness/weakness, HA, and all other sxs at this time. Pt reports an appointment for R hip replacement on 8/26/2020 with Dr. Bertram Camargo (Orthopedic Surgery). No further complaints or concerns at this time.       Arrival mode: Personal vehicle     PCP: Robby Dos Santos NP        Past Medical History:  Past Medical History:   Diagnosis Date    Anxiety     Depression     GERD (gastroesophageal reflux disease)     Osteonecrosis of right hip 2019    Tobacco use        Past Surgical History:  Past Surgical History:   Procedure Laterality Date    BLADDER REPAIR      CENTRAL LINE  6/28/2017         CHOLECYSTECTOMY  04/07/2017    KNEE ARTHROPLASTY      PARTIAL HYSTERECTOMY      TUBAL LIGATION           Family " History:  Family History   Problem Relation Age of Onset    Hypertension Mother     Diabetes Mother     Hypoglycemic Father     Lung cancer Father     COPD Father        Social History:  Social History     Tobacco Use    Smoking status: Current Every Day Smoker     Packs/day: 1.00     Years: 29.00     Pack years: 29.00     Types: Cigarettes    Smokeless tobacco: Never Used    Tobacco comment: began smoking 1990.    Substance and Sexual Activity    Alcohol use: Yes     Comment: special occasions only, 3-4 times a year 1 glass of wine    Drug use: No    Sexual activity: Yes     Partners: Male     Comment:         Review of Systems     Review of Systems   Constitutional: Negative for chills and fever.        (+) mechanical fall  (-) head trauma   HENT: Negative for sore throat.    Respiratory: Negative for shortness of breath.    Cardiovascular: Negative for chest pain.   Gastrointestinal: Negative for abdominal pain, nausea and vomiting.   Genitourinary: Negative for dysuria.   Musculoskeletal: Negative for back pain, neck pain and neck stiffness.        (+) R hip pain     Skin: Negative for rash.   Neurological: Negative for dizziness, syncope, weakness, light-headedness, numbness and headaches.   Hematological: Does not bruise/bleed easily.   All other systems reviewed and are negative.       Physical Exam     Initial Vitals [08/11/20 1758]   BP Pulse Resp Temp SpO2   (!) 152/83 (!) 123 20 98 °F (36.7 °C) 98 %      MAP       --          Physical Exam  Nursing Notes and Vital Signs Reviewed.  Constitutional: Patient is in no acute distress. Well-developed and well-nourished.  Head: Atraumatic. Normocephalic.  Eyes: PERRL. EOM intact. Conjunctivae are not pale. No scleral icterus.  ENT: Mucous membranes are moist. Oropharynx is clear and symmetric.    Neck: Supple. Full ROM. No lymphadenopathy.  Cardiovascular: Regular rate. Regular rhythm. No murmurs, rubs, or gallops. Distal pulses are 2+ and  symmetric.  Pulmonary/Chest: No respiratory distress. Clear to auscultation bilaterally. No wheezing or rales.  Abdominal: Soft and non-distended.  There is no tenderness.  No rebound, guarding, or rigidity. Good bowel sounds.  Genitourinary: No CVA tenderness  Musculoskeletal: Moves all extremities. No obvious deformities. No edema. No calf tenderness. There is R hip tenderness. No midline tenderness. No pelvic instability. Can stand on her own. Can ambulate with minimal assistance.  Skin: Warm and dry.  Neurological:  Alert, awake, and appropriate.  Normal speech.  No acute focal neurological deficits are appreciated.  Psychiatric: Normal affect. Good eye contact. Appropriate in content.     ED Course   Procedures  ED Vital Signs:  Vitals:    08/11/20 1758 08/11/20 2017 08/11/20 2039   BP: (!) 152/83  (!) 149/65   Pulse: (!) 123  99   Resp: 20 18 18   Temp: 98 °F (36.7 °C)  98 °F (36.7 °C)   SpO2: 98%  99%   Weight: 81.6 kg (180 lb)         Abnormal Lab Results:  Labs Reviewed - No data to display     Imaging Results:  Imaging Results          X-Ray Hip 2 View Right (Final result)  Result time 08/11/20 18:39:07    Final result by Martir Navarrete MD (08/11/20 18:39:07)                 Impression:      Findings similar to exam 07/29/2020.      Electronically signed by: Martir Navarrete MD  Date:    08/11/2020  Time:    18:39             Narrative:    EXAMINATION:  XR HIP 2 VIEW RIGHT    CLINICAL HISTORY:  XR HIP 2 VIEW RIGHTPain in right hip    COMPARISON:  07/29/2020    FINDINGS:  Three views of the right hip were obtained.    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  Findings are similar to prior exam..  Bony mineralization is normal.  Large amount of stool.                                        The Emergency Provider reviewed the vital signs and test results, which are outlined above.     ED Discussion     8:28 PM:  Reassessed pt at this time. Discussed with pt all pertinent ED information and results. Discussed pt dx and plan of tx. Gave pt all f/u and return to the ED instructions. All questions and concerns were addressed at this time. Pt expresses understanding of information and instructions, and is comfortable with plan to discharge. Pt is stable for discharge.    I discussed with patient and/or family/caretaker that evaluation in the ED does not suggest any emergent or life threatening medical conditions requiring immediate intervention beyond what was provided in the ED, and I believe patient is safe for discharge.  Regardless, an unremarkable evaluation in the ED does not preclude the development or presence of a serious of life threatening condition. As such, patient was instructed to return immediately for any worsening or change in current symptoms.        ED Course as of Aug 12 0402   Tue Aug 11, 2020   2020 Patient feels better. Ready for d/c. Had hip replacement scheduled for later this month. Will f/uw ith PCP and ortho.     [BA]      ED Course User Index  [BA] Gerald Vega MD     Medical Decision Making:   Clinical Tests:   Radiological Study: Ordered and Reviewed           ED Medication(s):  Medications   morphine injection 8 mg (8 mg Intramuscular Given 8/11/20 2017)       Discharge Medication List as of 8/11/2020  8:23 PM          Follow-up Information     Robby Dos Santos NP. Schedule an appointment as soon as possible for a visit in 2 days.    Specialty: Family Medicine  Why: For re-evaluation and further treatment  Contact information:  [address] [address] [phone]             [facility]. Go today.    Specialty: Emergency Medicine  Why: If symptoms worsen, For re-evaluation and further treatment, As needed  Contact information:  [address] [facility] [address] [phone]                     Scribe Attestation:   Scribe #1:  I performed the above scribed service and the documentation accurately describes the services I performed. I attest to the accuracy of the note.     Attending:   Physician Attestation Statement for Scribe #1: I, Gerald Vega MD, personally performed the services described in this documentation, as scribed by María Camargo, in my presence, and it is both accurate and complete.           Clinical Impression       ICD-10-CM ICD-9-CM   1. Fall, initial encounter  W19.XXXA E888.9   2. Right hip pain  M25.551 719.45       Disposition:   Disposition: Discharged  Condition: Stable         Gerald Vega MD  08/12/20 0406

## 2020-08-22 ENCOUNTER — HOSPITAL ENCOUNTER (EMERGENCY)
Facility: HOSPITAL | Age: 50
Discharge: HOME | End: 2020-08-22
Attending: EMERGENCY MEDICINE
Payer: MEDICAID

## 2020-08-22 VITALS
BODY MASS INDEX: 30.73 KG/M2 | HEART RATE: 114 BPM | HEIGHT: 64 IN | WEIGHT: 180 LBS | TEMPERATURE: 99 F | DIASTOLIC BLOOD PRESSURE: 83 MMHG | OXYGEN SATURATION: 97 % | SYSTOLIC BLOOD PRESSURE: 125 MMHG | RESPIRATION RATE: 18 BRPM

## 2020-08-22 DIAGNOSIS — M25.551 CHRONIC PAIN OF RIGHT HIP: ICD-10-CM
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
## 2020-08-22 DIAGNOSIS — G89.29 CHRONIC PAIN OF RIGHT HIP: ICD-10-CM

## 2020-08-22 PROCEDURE — 63600175 PHARM REV CODE 636 W HCPCS: Performed by: REGISTERED NURSE

## 2020-08-22 PROCEDURE — 96372 THER/PROPH/DIAG INJ SC/IM: CPT

## 2020-08-22 PROCEDURE — 99284 EMERGENCY DEPT VISIT MOD MDM: CPT | Mod: 25

## 2020-08-22 RX ORDER — MORPHINE SULFATE 4 MG/ML
6 INJECTION, SOLUTION INTRAMUSCULAR; INTRAVENOUS
Status: COMPLETED | OUTPATIENT
Start: 2020-08-22 | End: 2020-08-22

## 2020-08-22 RX ORDER — ONDANSETRON 2 MG/ML
4 INJECTION INTRAMUSCULAR; INTRAVENOUS
Status: COMPLETED | OUTPATIENT
Start: 2020-08-22 | End: 2020-08-22

## 2020-08-22 RX ADMIN — ONDANSETRON 4 MG: 2 INJECTION INTRAMUSCULAR; INTRAVENOUS at 04:08

## 2020-08-22 RX ADMIN — MORPHINE SULFATE 6 MG: 4 INJECTION, SOLUTION INTRAMUSCULAR; INTRAVENOUS at 04:08

## 2020-08-22 NOTE — ED PROVIDER NOTES
"Encounter Date: 8/22/2020       History     Chief Complaint   Patient presents with    Fall     right hip pain, fall in shower, pt states supposed to have R hip replacement      The history is provided by the patient.   Fall  The accident occurred today. The fall occurred while standing. Impact surface: shower. The point of impact was the buttocks. The pain is present in the right hip. She was ambulatory at the scene. There was no entrapment after the fall. There was no drug use involved in the accident. There was no alcohol use involved in the accident. Pertinent negatives include no neck pain, no back pain, no fever, no nausea and no loss of consciousness. The symptoms are aggravated by activity.     Review of patient's allergies indicates:   Allergen Reactions    Codeine     Corticosteroids (glucocorticoids)      "sets off my anxiety real bad"  "sets off my anxiety real bad"    Tylenol-codeine [acetaminophen-codeine] Itching    Tramadol Rash     Past Medical History:   Diagnosis Date    Anxiety     Depression     GERD (gastroesophageal reflux disease)     Osteonecrosis of right hip 2019    Tobacco use      Past Surgical History:   Procedure Laterality Date    BLADDER REPAIR      CENTRAL LINE  6/28/2017         CHOLECYSTECTOMY  04/07/2017    KNEE ARTHROPLASTY      PARTIAL HYSTERECTOMY      TUBAL LIGATION       Family History   Problem Relation Age of Onset    Hypertension Mother     Diabetes Mother     Hypoglycemic Father     Lung cancer Father     COPD Father      Social History     Tobacco Use    Smoking status: Current Every Day Smoker     Packs/day: 1.00     Years: 29.00     Pack years: 29.00     Types: Cigarettes    Smokeless tobacco: Never Used    Tobacco comment: began smoking 1990.    Substance Use Topics    Alcohol use: Yes     Comment: special occasions only, 3-4 times a year 1 glass of wine    Drug use: No     Review of Systems   Constitutional: Negative for fever.   HENT: " Negative for sore throat.    Respiratory: Negative for shortness of breath.    Cardiovascular: Negative for chest pain.   Gastrointestinal: Negative for nausea.   Genitourinary: Negative for dysuria.   Musculoskeletal: Negative for back pain and neck pain.        + Chronic R hip pain   Skin: Negative for rash.   Neurological: Negative for loss of consciousness and weakness.   Hematological: Does not bruise/bleed easily.   All other systems reviewed and are negative.      Physical Exam     Initial Vitals [08/22/20 1540]   BP Pulse Resp Temp SpO2   136/79 (!) 128 18 99 °F (37.2 °C) (!) 94 %      MAP       --         Physical Exam    Constitutional: She appears well-developed and well-nourished. She is not diaphoretic. No distress.   HENT:   Head: Normocephalic and atraumatic.   Eyes: Conjunctivae and EOM are normal. Pupils are equal, round, and reactive to light.   Neck: Normal range of motion. Neck supple.   Cardiovascular: Normal rate, regular rhythm and normal heart sounds.   No murmur heard.  Pulmonary/Chest: Breath sounds normal. No respiratory distress. She has no wheezes. She has no rales.   Abdominal: Soft. Bowel sounds are normal. There is no abdominal tenderness. There is no rebound and no guarding.   Musculoskeletal: No edema.      Right hip: She exhibits decreased range of motion, decreased strength and tenderness. She exhibits no swelling, no crepitus and no deformity.   Neurological: She is alert and oriented to person, place, and time. No cranial nerve deficit. GCS score is 15. GCS eye subscore is 4. GCS verbal subscore is 5. GCS motor subscore is 6.   Skin: Skin is warm and dry. Capillary refill takes less than 2 seconds.   Psychiatric: She has a normal mood and affect. Thought content normal.         ED Course   Procedures  Labs Reviewed - No data to display       Imaging Results    None            I discussed with patient and/or family/caretaker that evaluation in the ED does not suggest any emergent  or life threatening medical conditions requiring immediate intervention beyond what was provided in the ED, and I believe patient is safe for discharge.  Regardless, an unremarkable evaluation in the ED does not preclude the development or presence of a serious of life threatening condition. As such, patient was instructed to return immediately for any worsening or change in current symptoms.                                 Clinical Impression:       ICD-10-CM ICD-9-CM   1. Fall, initial encounter  W19.XXXA E888.9   2. Chronic pain of right hip  M25.551 719.45    G89.29 338.29             ED Disposition Condition    Discharge Stable        ED Prescriptions     None        Follow-up Information     Follow up With Specialties Details Why Contact Info    Robby Dos Santos NP Family Medicine  As needed [address] [facility]  [address]  [facility] [address]  [phone]      [facility]  Emergency Medicine  If symptoms worsen [address] [facility] [address] [phone]                                     Jackson Pérez Jr., FNP  08/22/20 9644

## 2020-10-08 ENCOUNTER — HOSPITAL ENCOUNTER (EMERGENCY)
Facility: HOSPITAL | Age: 50
Discharge: HOME | End: 2020-10-08
Attending: EMERGENCY MEDICINE
Payer: MEDICAID

## 2020-10-08 VITALS
HEIGHT: 64 IN | SYSTOLIC BLOOD PRESSURE: 116 MMHG | HEART RATE: 111 BPM | BODY MASS INDEX: 28.27 KG/M2 | DIASTOLIC BLOOD PRESSURE: 69 MMHG | OXYGEN SATURATION: 96 % | WEIGHT: 165.56 LBS | RESPIRATION RATE: 18 BRPM | TEMPERATURE: 98 F

## 2020-10-08 DIAGNOSIS — M25.552 LEFT HIP PAIN: Primary | ICD-10-CM

## 2020-10-08 PROCEDURE — 99283 EMERGENCY DEPT VISIT LOW MDM: CPT

## 2020-10-08 RX ORDER — HYDROCODONE BITARTRATE AND ACETAMINOPHEN 5; 325 MG/1; MG/1
1 TABLET ORAL EVERY 4 HOURS PRN
Qty: 12 TABLET | Refills: 0 | Status: SHIPPED | OUTPATIENT
Start: 2020-10-08 | End: 2020-12-17 | Stop reason: SDUPTHER

## 2020-10-08 NOTE — ED PROVIDER NOTES
"Encounter Date: 10/8/2020       History     Chief Complaint   Patient presents with    Hip Pain     pt c/o left hip pain x yesterday     Pt requesting pain medication to "hold her over" until she is able to follow up with her ortho next week.    The history is provided by the patient.   Leg Pain   There was no injury mechanism. The incident occurred several weeks ago. The pain is present in the left hip. The quality of the pain is described as aching and throbbing. The pain is at a severity of 4/10. The pain has been constant since onset. Pertinent negatives include no numbness, no inability to bear weight, no loss of motion, no muscle weakness, no loss of sensation and no tingling. She reports no foreign bodies present. The symptoms are aggravated by activity, bearing weight and palpation. She has tried nothing for the symptoms.     Review of patient's allergies indicates:   Allergen Reactions    Codeine     Corticosteroids (glucocorticoids)      "sets off my anxiety real bad"  "sets off my anxiety real bad"    Tylenol-codeine [acetaminophen-codeine] Itching    Tramadol Rash     Past Medical History:   Diagnosis Date    Anxiety     Depression     GERD (gastroesophageal reflux disease)     Osteonecrosis of right hip 2019    Tobacco use      Past Surgical History:   Procedure Laterality Date    BLADDER REPAIR      CENTRAL LINE  6/28/2017         CHOLECYSTECTOMY  04/07/2017    KNEE ARTHROPLASTY      PARTIAL HYSTERECTOMY      TUBAL LIGATION       Family History   Problem Relation Age of Onset    Hypertension Mother     Diabetes Mother     Hypoglycemic Father     Lung cancer Father     COPD Father      Social History     Tobacco Use    Smoking status: Current Every Day Smoker     Packs/day: 1.00     Years: 29.00     Pack years: 29.00     Types: Cigarettes    Smokeless tobacco: Never Used    Tobacco comment: began smoking 1990.    Substance Use Topics    Alcohol use: Yes     Comment: special " occasions only, 3-4 times a year 1 glass of wine    Drug use: No     Review of Systems   Constitutional: Negative for chills and fever.   HENT: Negative for sore throat.    Eyes: Negative for photophobia and redness.   Respiratory: Negative for cough and shortness of breath.    Cardiovascular: Negative for chest pain.   Gastrointestinal: Negative for abdominal pain, diarrhea and nausea.   Endocrine: Negative for polydipsia and polyphagia.   Genitourinary: Negative for dysuria.   Musculoskeletal: Negative for arthralgias, back pain and myalgias.        Left hip pain   Skin: Negative for rash.   Neurological: Negative for tingling, weakness, numbness and headaches.   Hematological: Does not bruise/bleed easily.   Psychiatric/Behavioral: The patient is not nervous/anxious.    All other systems reviewed and are negative.      Physical Exam     Initial Vitals [10/08/20 0935]   BP Pulse Resp Temp SpO2   116/69 (!) 111 18 97.5 °F (36.4 °C) 96 %      MAP       --         Physical Exam    Nursing note and vitals reviewed.  Constitutional: Vital signs are normal. She appears well-developed and well-nourished. No distress.   HENT:   Head: Normocephalic and atraumatic.   Right Ear: External ear normal.   Left Ear: External ear normal.   Nose: Nose normal.   Mouth/Throat: Oropharynx is clear and moist.   Eyes: Conjunctivae, EOM and lids are normal. Pupils are equal, round, and reactive to light.   Neck: Normal range of motion and full passive range of motion without pain. Neck supple.   Cardiovascular: Normal rate, regular rhythm, S1 normal, S2 normal, normal heart sounds, intact distal pulses and normal pulses.   Pulmonary/Chest: Breath sounds normal. No respiratory distress. She has no wheezes. She has no rales.   Abdominal: Soft. Normal appearance and bowel sounds are normal. She exhibits no distension. There is no abdominal tenderness.   Musculoskeletal:      Left hip: She exhibits decreased range of motion, tenderness and  bony tenderness.   Lymphadenopathy:     She has no cervical adenopathy.   Neurological: She is alert and oriented to person, place, and time. She has normal strength. No cranial nerve deficit or sensory deficit. Coordination and gait normal.   Skin: Skin is warm, dry and intact.   Psychiatric: She has a normal mood and affect. Her speech is normal and behavior is normal. Judgment and thought content normal. Cognition and memory are normal.         ED Course   Procedures  Labs Reviewed - No data to display       Imaging Results    None                                      Clinical Impression:       ICD-10-CM ICD-9-CM   1. Left hip pain  M25.552 719.45                      Disposition:   Disposition: Discharged  Condition: Stable     ED Disposition Condition    Discharge Stable        ED Prescriptions     Medication Sig Dispense Start Date End Date Auth. Provider    HYDROcodone-acetaminophen (NORCO) 5-325 mg per tablet Take 1 tablet by mouth every 4 (four) hours as needed. 12 tablet 10/8/2020  ALAINA Carrero        Follow-up Information     Follow up With Specialties Details Why Contact Info    Chaim Camargo MD Orthopedic Surgery Go in 2 days  [address] [facility]  [address]  [facility] [id number] [phone]                                         ALAINA Carrero  10/08/20 0987

## 2020-10-28 ENCOUNTER — HOSPITAL ENCOUNTER (EMERGENCY)
Facility: HOSPITAL | Age: 50
Discharge: HOME | End: 2020-10-28
Attending: EMERGENCY MEDICINE
Payer: MEDICAID

## 2020-10-28 VITALS
SYSTOLIC BLOOD PRESSURE: 110 MMHG | HEIGHT: 64 IN | RESPIRATION RATE: 18 BRPM | TEMPERATURE: 99 F | DIASTOLIC BLOOD PRESSURE: 73 MMHG | OXYGEN SATURATION: 95 % | HEART RATE: 86 BPM | BODY MASS INDEX: 28.42 KG/M2

## 2020-10-28 DIAGNOSIS — M25.552 LEFT HIP PAIN: Primary | ICD-10-CM

## 2020-10-28 DIAGNOSIS — G89.29 OTHER CHRONIC PAIN: ICD-10-CM

## 2020-10-28 LAB
HCV AB SERPL QL IA: NEGATIVE
HIV 1+2 AB+HIV1 P24 AG SERPL QL IA: NEGATIVE

## 2020-10-28 PROCEDURE — 99284 EMERGENCY DEPT VISIT MOD MDM: CPT | Mod: 25

## 2020-10-28 PROCEDURE — 63600175 PHARM REV CODE 636 W HCPCS: Performed by: NURSE PRACTITIONER

## 2020-10-28 PROCEDURE — 86803 HEPATITIS C AB TEST: CPT

## 2020-10-28 PROCEDURE — 86703 HIV-1/HIV-2 1 RESULT ANTBDY: CPT

## 2020-10-28 PROCEDURE — 96372 THER/PROPH/DIAG INJ SC/IM: CPT

## 2020-10-28 RX ORDER — MORPHINE SULFATE 4 MG/ML
8 INJECTION, SOLUTION INTRAMUSCULAR; INTRAVENOUS
Status: COMPLETED | OUTPATIENT
Start: 2020-10-28 | End: 2020-10-28

## 2020-10-28 RX ORDER — PROMETHAZINE HYDROCHLORIDE 25 MG/ML
25 INJECTION, SOLUTION INTRAMUSCULAR; INTRAVENOUS
Status: COMPLETED | OUTPATIENT
Start: 2020-10-28 | End: 2020-10-28

## 2020-10-28 RX ADMIN — PROMETHAZINE HYDROCHLORIDE 25 MG: 25 INJECTION INTRAMUSCULAR; INTRAVENOUS at 09:10

## 2020-10-28 RX ADMIN — MORPHINE SULFATE 8 MG: 4 INJECTION INTRAVENOUS at 09:10

## 2020-10-29 ENCOUNTER — PATIENT OUTREACH (OUTPATIENT)
Dept: EMERGENCY MEDICINE | Facility: HOSPITAL | Age: 50
End: 2020-10-29

## 2020-11-01 NOTE — ED PROVIDER NOTES
"     HISTORY     Chief Complaint   Patient presents with    Hip Pain     States left hip pain.  States needs replacement surgery and pain since this morning.     Review of patient's allergies indicates:   Allergen Reactions    Codeine     Corticosteroids (glucocorticoids)      "sets off my anxiety real bad"  "sets off my anxiety real bad"    Tylenol-codeine [acetaminophen-codeine] Itching    Tramadol Rash        HPI   49 year old female presents to the ER with a complaint of left hip pain. This is a chronic issue. This patient is well known to the ED. Patient states she has tried  Her home medications ( see list) but they are not helping. "must be the storm or cold front."  Pain is not different from her chronic pain.     The history is provided by the patient.        PCP: Robby Dos Santos NP     Past Medical History:  Past Medical History:   Diagnosis Date    Anxiety     Depression     GERD (gastroesophageal reflux disease)     Osteonecrosis of right hip 2019    Tobacco use         Past Surgical History:  Past Surgical History:   Procedure Laterality Date    BLADDER REPAIR      CENTRAL LINE  6/28/2017         CHOLECYSTECTOMY  04/07/2017    KNEE ARTHROPLASTY      PARTIAL HYSTERECTOMY      TUBAL LIGATION          Family History:  Family History   Problem Relation Age of Onset    Hypertension Mother     Diabetes Mother     Hypoglycemic Father     Lung cancer Father     COPD Father         Social History:  Social History     Tobacco Use    Smoking status: Current Every Day Smoker     Packs/day: 1.00     Years: 29.00     Pack years: 29.00     Types: Cigarettes    Smokeless tobacco: Never Used    Tobacco comment: began smoking 1990.    Substance and Sexual Activity    Alcohol use: Yes     Comment: special occasions only, 3-4 times a year 1 glass of wine    Drug use: No    Sexual activity: Yes     Partners: Male     Comment:          ROS   Review of Systems   Constitutional: Negative for " "fever.   HENT: Negative for sore throat.    Respiratory: Negative for shortness of breath.    Cardiovascular: Negative for chest pain.   Gastrointestinal: Negative for nausea.   Genitourinary: Negative for dysuria.   Musculoskeletal: Negative for back pain.        Left hip pain chronic    Skin: Negative for rash.   Neurological: Negative for weakness.   Hematological: Does not bruise/bleed easily.       PHYSICAL EXAM     Initial Vitals [10/28/20 2013]   BP Pulse Resp Temp SpO2   126/86 100 18 98.6 °F (37 °C) 98 %      MAP       --           Physical Exam    Constitutional: She appears well-developed and well-nourished. No distress.   HENT:   Head: Normocephalic and atraumatic.   Eyes: Conjunctivae are normal. Pupils are equal, round, and reactive to light.   Neck: Normal range of motion. Neck supple.   Cardiovascular: Normal rate, regular rhythm and normal heart sounds.   Pulmonary/Chest: Breath sounds normal.   Abdominal: Soft. Bowel sounds are normal. She exhibits no distension. There is no abdominal tenderness. There is no rebound.   Musculoskeletal: Normal range of motion. No edema.      Left hip: She exhibits tenderness.        Legs:    Neurological: She is alert and oriented to person, place, and time. She has normal strength.   Skin: Skin is warm and dry.   Psychiatric: She has a normal mood and affect.          ED COURSE   Procedures  ED ONGOING VITALS:  Vitals:    10/28/20 2013 10/28/20 2142 10/28/20 2215   BP: 126/86  110/73   Pulse: 100  86   Resp: 18 20 18   Temp: 98.6 °F (37 °C)     TempSrc: Oral     SpO2: 98%  95%   Height: 5' 4" (1.626 m)           ABNORMAL LAB VALUES:  Labs Reviewed   HIV 1 / 2 ANTIBODY   HEPATITIS C ANTIBODY         ALL LAB VALUES:  Results for orders placed or performed during the hospital encounter of 10/28/20   HIV 1/2 Ag/Ab (4th Gen)   Result Value Ref Range    HIV 1/2 Ag/Ab Negative Negative   Hepatitis C antibody   Result Value Ref Range    Hepatitis C Ab Negative Negative "           RADIOLOGY STUDIES:  Imaging Results    None                   The above vital signs and test results have been reviewed by the emergency provider.     ED Medications:  Discharge Medication List as of 10/28/2020  9:52 PM        Discharge Medications:  Discharge Medication List as of 10/28/2020  9:52 PM         Follow-up Information     Robby Dos Santos NP.    Specialty: Family Medicine  Contact information:  [address] [facility] [address]  [address]  [facility] [address]  [phone]             [facility] - .    Specialty: Emergency Medicine  Why: As needed, If symptoms worsen  Contact information:  [address]  [facility] [address] [phone]                I discussed with patient and/or family/caretaker that evaluation in the ED does not suggest any emergent or life threatening medical conditions requiring immediate intervention beyond what was provided in the ED, and I believe patient is safe for discharge. Regardless, an unremarkable evaluation in the ED does not preclude the development or presence of a serious or life threatening condition. As such, patient was instructed to return immediately for any worsening or change in current symptoms.    Regarding CHRONIC PAIN, patient was instructed that the emergency department is trained to assess and treat emergencies using professional resources and utilize our best judgment when treating pain. Patient was advised that patient's should have only ONE provider and ONE pharmacy helping manage pain dealing with controlled substances. Patient was instructed that it is not recommended by the Drug Enforcement Agency, [employer], and the [facility] to prescribe pain medication to a patient that has already received pain medicine from another health care provider even if a patient alleges that prescriptions were stole or lost; prescribe pain medicines such as: OxyContin, MSContin, Fentanyl  "(Duragesic), Methadone, Opana ER, Exalgo, Subutex, Suboxone, or Methadone; provide injections for "flare--ups" of chronic pain; and have the right to utilize the [place] Prescription Monitoring Program to track opioid pain medications and other controlled substance prescriptions.       MEDICAL DECISION MAKING                 CLINICAL IMPRESSION       ICD-10-CM ICD-9-CM   1. Left hip pain  M25.552 719.45   2. Other chronic pain  G89.29 338.29       Disposition:   Disposition: Discharged  Condition: Stable         David Sewell NP  11/01/20 1701    "

## 2020-12-17 ENCOUNTER — PATIENT OUTREACH (OUTPATIENT)
Dept: EMERGENCY MEDICINE | Facility: HOSPITAL | Age: 50
End: 2020-12-17

## 2020-12-17 ENCOUNTER — HOSPITAL ENCOUNTER (EMERGENCY)
Facility: HOSPITAL | Age: 50
Discharge: HOME | End: 2020-12-17
Attending: EMERGENCY MEDICINE
Payer: MEDICAID

## 2020-12-17 VITALS
BODY MASS INDEX: 28.42 KG/M2 | TEMPERATURE: 98 F | SYSTOLIC BLOOD PRESSURE: 110 MMHG | HEIGHT: 64 IN | OXYGEN SATURATION: 95 % | HEART RATE: 121 BPM | RESPIRATION RATE: 20 BRPM | DIASTOLIC BLOOD PRESSURE: 63 MMHG

## 2020-12-17 DIAGNOSIS — M25.559 HIP PAIN: ICD-10-CM

## 2020-12-17 DIAGNOSIS — S70.02XA CONTUSION OF LEFT HIP, INITIAL ENCOUNTER: Primary | ICD-10-CM

## 2020-12-17 PROCEDURE — 99283 EMERGENCY DEPT VISIT LOW MDM: CPT | Mod: 25

## 2020-12-17 RX ORDER — HYDROMORPHONE HYDROCHLORIDE 2 MG/ML
1 INJECTION, SOLUTION INTRAMUSCULAR; INTRAVENOUS; SUBCUTANEOUS
Status: DISCONTINUED | OUTPATIENT
Start: 2020-12-17 | End: 2020-12-17

## 2020-12-17 RX ORDER — HYDROCODONE BITARTRATE AND ACETAMINOPHEN 5; 325 MG/1; MG/1
1 TABLET ORAL EVERY 8 HOURS PRN
Qty: 12 TABLET | Refills: 0 | OUTPATIENT
Start: 2020-12-17 | End: 2020-12-23

## 2020-12-17 RX ORDER — ONDANSETRON 2 MG/ML
4 INJECTION INTRAMUSCULAR; INTRAVENOUS
Status: DISCONTINUED | OUTPATIENT
Start: 2020-12-17 | End: 2020-12-17

## 2020-12-17 NOTE — ED PROVIDER NOTES
"Encounter Date: 12/17/2020       History     Chief Complaint   Patient presents with    Fall     Pt reports slip & fall due to loss of balance at home just PTA. Hx of R hip replacement in August & has L hip replacement scheduled for 12/23. States she fell onto her buttocks. C/O L hip & buttock pain.      The history is provided by the patient.   Fall  The accident occurred just prior to arrival. The fall occurred while walking. The point of impact was the left hip. Pertinent negatives include no neck pain, no back pain, no paresthesias, no paralysis, no visual change, no fever, no numbness, no abdominal pain, no bowel incontinence, no nausea, no vomiting, no hematuria, no headaches, no hearing loss, no loss of consciousness and no tingling. The symptoms are aggravated by activity.     Review of patient's allergies indicates:   Allergen Reactions    Codeine     Corticosteroids (glucocorticoids)      "sets off my anxiety real bad"  "sets off my anxiety real bad"    Tylenol-codeine [acetaminophen-codeine] Itching    Tramadol Rash     Past Medical History:   Diagnosis Date    Anxiety     Depression     GERD (gastroesophageal reflux disease)     Osteonecrosis of right hip 2019    Tobacco use      Past Surgical History:   Procedure Laterality Date    BLADDER REPAIR      CENTRAL LINE  6/28/2017         CHOLECYSTECTOMY  04/07/2017    KNEE ARTHROPLASTY      PARTIAL HYSTERECTOMY      TUBAL LIGATION       Family History   Problem Relation Age of Onset    Hypertension Mother     Diabetes Mother     Hypoglycemic Father     Lung cancer Father     COPD Father      Social History     Tobacco Use    Smoking status: Current Every Day Smoker     Packs/day: 1.00     Years: 29.00     Pack years: 29.00     Types: Cigarettes    Smokeless tobacco: Never Used    Tobacco comment: began smoking 1990.    Substance Use Topics    Alcohol use: Yes     Comment: special occasions only, 3-4 times a year 1 glass of wine    " Drug use: No     Review of Systems   Constitutional: Negative for chills and fever.   HENT: Negative for sore throat.    Eyes: Negative for photophobia and redness.   Respiratory: Negative for cough and shortness of breath.    Cardiovascular: Negative for chest pain.   Gastrointestinal: Negative for abdominal pain, bowel incontinence, diarrhea, nausea and vomiting.   Endocrine: Negative for polydipsia and polyphagia.   Genitourinary: Negative for dysuria and hematuria.   Musculoskeletal: Negative for arthralgias, back pain, myalgias and neck pain.   Skin: Negative for rash.   Neurological: Negative for tingling, loss of consciousness, weakness, numbness, headaches and paresthesias.   Hematological: Does not bruise/bleed easily.   Psychiatric/Behavioral: The patient is not nervous/anxious.    All other systems reviewed and are negative.      Physical Exam     Initial Vitals [12/17/20 0945]   BP Pulse Resp Temp SpO2   110/63 (!) 121 20 98.1 °F (36.7 °C) 95 %      MAP       --         Physical Exam    Nursing note and vitals reviewed.  Constitutional: Vital signs are normal. She appears well-developed and well-nourished. She appears distressed (secondary to pain).   HENT:   Head: Normocephalic and atraumatic.   Right Ear: External ear normal.   Left Ear: External ear normal.   Nose: Nose normal.   Mouth/Throat: Oropharynx is clear and moist.   Eyes: Conjunctivae, EOM and lids are normal. Pupils are equal, round, and reactive to light.   Neck: Normal range of motion and full passive range of motion without pain. Neck supple.   Cardiovascular: Normal rate, regular rhythm, S1 normal, S2 normal, normal heart sounds, intact distal pulses and normal pulses.   Pulmonary/Chest: Breath sounds normal. No respiratory distress. She has no wheezes. She has no rales.   Abdominal: Soft. Normal appearance and bowel sounds are normal. She exhibits no distension. There is no abdominal tenderness.   Musculoskeletal: Normal range of  motion.   Lymphadenopathy:     She has no cervical adenopathy.   Neurological: She is alert and oriented to person, place, and time. She has normal strength. No cranial nerve deficit or sensory deficit. Coordination and gait normal.   Skin: Skin is warm, dry and intact.   Psychiatric: She has a normal mood and affect. Her speech is normal and behavior is normal. Judgment and thought content normal. Cognition and memory are normal.         ED Course   Procedures  Labs Reviewed - No data to display       Imaging Results          X-Ray Hip 2 View Left (Final result)  Result time 12/17/20 10:11:55    Final result by Sawyer Scales MD (12/17/20 10:11:55)                 Impression:      1.  Interval right total hip arthroplasty device placement.    2.  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  It is difficult to exclude this representing an acute on chronic process.  Clinical correlation is advised.      Electronically signed by: Sawyer Scales MD  Date:    12/17/2020  Time:    10:11             Narrative:    EXAMINATION:  XR HIP 2 VIEW LEFT    CLINICAL HISTORY:  Pain in unspecified hip    TECHNIQUE:  AP view of the pelvis and frog leg lateral view of the left hip were performed.    COMPARISON:  October 11, 2020    FINDINGS:  Since the comparison study, there is been placement of a right total hip arthroplasty device.  The hardware in surrounding osseous structures appear to be intact.  The advanced avascular necrosis changes to the left hip have progressed when compared to the prior study, with progressive depression of the superior left femoral head.    The rest of the visualized osseous structures and soft tissues are unchanged.  There are phleboliths versus ureteroliths some overlying the pelvis.  Normal bowel gas pattern.                                                             Clinical Impression:       ICD-10-CM ICD-9-CM   1.  Contusion of left hip, initial encounter  S70.02XA 924.01   2. Hip pain  M25.559 719.45                      Disposition:   Disposition: Discharged  Condition: Stable     ED Disposition Condition    Discharge Stable        ED Prescriptions     Medication Sig Dispense Start Date End Date Auth. Provider    HYDROcodone-acetaminophen (NORCO) 5-325 mg per tablet Take 1 tablet by mouth every 8 (eight) hours as needed. 12 tablet 12/17/2020  ALAINA Carrero        Follow-up Information     Follow up With Specialties Details Why Contact Info    Robby Dos Santos NP Family Medicine Call in 2 days  [address]  [facility] [address]  [phone]                                         ALAINA Carrero  12/17/20 1016

## 2020-12-23 ENCOUNTER — HOSPITAL ENCOUNTER (EMERGENCY)
Facility: HOSPITAL | Age: 50
Discharge: HOME | End: 2020-12-23
Attending: EMERGENCY MEDICINE
Payer: MEDICAID

## 2020-12-23 VITALS
TEMPERATURE: 98 F | RESPIRATION RATE: 16 BRPM | OXYGEN SATURATION: 97 % | DIASTOLIC BLOOD PRESSURE: 78 MMHG | SYSTOLIC BLOOD PRESSURE: 137 MMHG | BODY MASS INDEX: 28.42 KG/M2 | HEIGHT: 64 IN | HEART RATE: 103 BPM

## 2020-12-23 DIAGNOSIS — M25.551 RIGHT HIP PAIN: Primary | ICD-10-CM

## 2020-12-23 PROCEDURE — 99283 EMERGENCY DEPT VISIT LOW MDM: CPT

## 2020-12-23 RX ORDER — HYDROCODONE BITARTRATE AND ACETAMINOPHEN 7.5; 325 MG/1; MG/1
1 TABLET ORAL EVERY 6 HOURS PRN
Qty: 10 TABLET | Refills: 0 | Status: SHIPPED | OUTPATIENT
Start: 2020-12-23

## 2020-12-23 NOTE — ED PROVIDER NOTES
"Encounter Date: 12/23/2020       History     Chief Complaint   Patient presents with    Hip Pain     Pt c/o of increased right hip pain since this afternoon. Denies injury. Hx of right hip replacement August 26th.      50 year old female with complaint of right hip pain X several weeks with worsening X 2 days. No fall. No trauma.  No fever or chills.  Pt reports difficulty walking secondary to pain.         Review of patient's allergies indicates:   Allergen Reactions    Codeine     Corticosteroids (glucocorticoids)      "sets off my anxiety real bad"  "sets off my anxiety real bad"    Tylenol-codeine [acetaminophen-codeine] Itching    Tramadol Rash     Past Medical History:   Diagnosis Date    Anxiety     Depression     GERD (gastroesophageal reflux disease)     Osteonecrosis of right hip 2019    Tobacco use      Past Surgical History:   Procedure Laterality Date    BLADDER REPAIR      CENTRAL LINE  6/28/2017         CHOLECYSTECTOMY  04/07/2017    KNEE ARTHROPLASTY      PARTIAL HYSTERECTOMY      TUBAL LIGATION       Family History   Problem Relation Age of Onset    Hypertension Mother     Diabetes Mother     Hypoglycemic Father     Lung cancer Father     COPD Father      Social History     Tobacco Use    Smoking status: Current Every Day Smoker     Packs/day: 1.00     Years: 29.00     Pack years: 29.00     Types: Cigarettes    Smokeless tobacco: Never Used    Tobacco comment: began smoking 1990.    Substance Use Topics    Alcohol use: Yes     Comment: special occasions only, 3-4 times a year 1 glass of wine    Drug use: No     Review of Systems   Constitutional: Negative for fever.   HENT: Negative for sore throat.    Respiratory: Negative for shortness of breath.    Cardiovascular: Negative for chest pain.   Gastrointestinal: Negative for nausea.   Genitourinary: Negative for dysuria.   Musculoskeletal: Negative for back pain.        Right hip pain    Skin: Negative for rash. "   Neurological: Negative for weakness.   Hematological: Does not bruise/bleed easily.       Physical Exam     Initial Vitals [12/23/20 1329]   BP Pulse Resp Temp SpO2   137/78 103 16 97.8 °F (36.6 °C) 97 %      MAP       --         Physical Exam    Nursing note and vitals reviewed.  Constitutional: She appears well-developed and well-nourished.   HENT:   Head: Normocephalic and atraumatic.   Eyes: Conjunctivae and EOM are normal. Pupils are equal, round, and reactive to light.   Neck: Normal range of motion. Neck supple.   Cardiovascular: Normal rate, regular rhythm, normal heart sounds and intact distal pulses.   Pulmonary/Chest: Breath sounds normal.   Abdominal: Soft. There is no abdominal tenderness. There is no rebound and no guarding.   Musculoskeletal:      Comments: Right lateral hip tenderness, pain with ROM of right hip, ambulates with limp   Neurological: She is alert and oriented to person, place, and time. She has normal strength and normal reflexes.   Skin: Skin is warm and dry.   Psychiatric: She has a normal mood and affect. Her behavior is normal. Thought content normal.         ED Course   Procedures  Labs Reviewed - No data to display       Imaging Results    None                                      Clinical Impression:     ICD-10-CM ICD-9-CM   1. Right hip pain  M25.551 719.45                          ED Disposition Condition    Discharge Stable        ED Prescriptions     Medication Sig Dispense Start Date End Date Auth. Provider    HYDROcodone-acetaminophen (NORCO) 7.5-325 mg per tablet Take 1 tablet by mouth every 6 (six) hours as needed for Pain. 10 tablet 12/23/2020  Bridger Patrick NP        Follow-up Information     Follow up With Specialties Details Why Contact Info    Robby Dos Santos NP Family Medicine Schedule an appointment as soon as possible for a visit in 2 days  [address] [facility] [address]  [phone]                                         Bridger Patrick,  NP  12/23/20 1340
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
## 2021-09-16 ENCOUNTER — HOSPITAL ENCOUNTER (EMERGENCY)
Facility: HOSPITAL | Age: 51
Discharge: HOME | End: 2021-09-16
Attending: EMERGENCY MEDICINE
Payer: MEDICAID

## 2021-09-16 VITALS
SYSTOLIC BLOOD PRESSURE: 145 MMHG | RESPIRATION RATE: 16 BRPM | OXYGEN SATURATION: 99 % | HEART RATE: 91 BPM | HEIGHT: 64 IN | TEMPERATURE: 98 F | BODY MASS INDEX: 26.61 KG/M2 | WEIGHT: 155.88 LBS | DIASTOLIC BLOOD PRESSURE: 98 MMHG

## 2021-09-16 DIAGNOSIS — K04.7 DENTAL ABSCESS: Primary | ICD-10-CM

## 2021-09-16 DIAGNOSIS — K08.89 PAIN, DENTAL: ICD-10-CM

## 2021-09-16 DIAGNOSIS — K02.9 PAIN DUE TO DENTAL CARIES: ICD-10-CM

## 2021-09-16 PROCEDURE — 99283 EMERGENCY DEPT VISIT LOW MDM: CPT

## 2021-09-16 PROCEDURE — 25000003 PHARM REV CODE 250: Performed by: NURSE PRACTITIONER

## 2021-09-16 RX ORDER — AMOXICILLIN 500 MG/1
1000 CAPSULE ORAL 3 TIMES DAILY
Qty: 42 CAPSULE | Refills: 0 | Status: SHIPPED | OUTPATIENT
Start: 2021-09-16 | End: 2021-09-23

## 2021-09-16 RX ORDER — AMOXICILLIN 500 MG/1
1000 CAPSULE ORAL
Status: COMPLETED | OUTPATIENT
Start: 2021-09-16 | End: 2021-09-16

## 2021-09-16 RX ORDER — HYDROCODONE BITARTRATE AND ACETAMINOPHEN 10; 325 MG/1; MG/1
1 TABLET ORAL
Status: COMPLETED | OUTPATIENT
Start: 2021-09-16 | End: 2021-09-16

## 2021-09-16 RX ADMIN — AMOXICILLIN 1000 MG: 500 CAPSULE ORAL at 08:09

## 2021-09-16 RX ADMIN — HYDROCODONE BITARTRATE AND ACETAMINOPHEN 1 TABLET: 10; 325 TABLET ORAL at 08:09

## 2021-11-10 NOTE — ED PROVIDER NOTES
"Encounter Date: 5/3/2017       History     Chief Complaint   Patient presents with    bladder spasm     seen yesterday for same complaint, reports medication that was given made her body swell on left side     Review of patient's allergies indicates:   Allergen Reactions    Corticosteroids (glucocorticoids)      "sets off my anxiety real bad"    Tramadol Rash     HPI Comments: 46 year old female with complaint of abdominal pain, abdominal swelling and lower leg swelling X 3-4 days.  Pt had gall bladder removed 3 weeks ago.  Denies fever or chills.  Pt had urinary retention yesterday that was relieved with placing carroll catheter.  Pt currently has catheter in place.  No nausea or vomiting.  Reports moderate abdominal swelling.  Also reports pain in upper and lower abdomen.     Past Medical History:   Diagnosis Date    Anxiety     Depression     GERD (gastroesophageal reflux disease)     Tobacco use      Past Surgical History:   Procedure Laterality Date    BLADDER REPAIR      CHOLECYSTECTOMY  04/07/2017    KNEE ARTHROPLASTY      PARTIAL HYSTERECTOMY      TUBAL LIGATION       Family History   Problem Relation Age of Onset    Hypertension Mother     Diabetes Mother      Social History   Substance Use Topics    Smoking status: Current Every Day Smoker     Packs/day: 1.00     Types: Cigarettes    Smokeless tobacco: Never Used    Alcohol use No     Review of Systems   Constitutional: Negative for fever.   HENT: Negative for sore throat.    Respiratory: Negative for shortness of breath.    Cardiovascular: Negative for chest pain.   Gastrointestinal: Positive for abdominal pain. Negative for nausea.   Genitourinary: Negative for dysuria.   Musculoskeletal: Negative for back pain.   Skin: Negative for rash.   Neurological: Negative for weakness.   Hematological: Does not bruise/bleed easily.       Physical Exam   Initial Vitals   BP Pulse Resp Temp SpO2   05/03/17 0958 05/03/17 0958 05/03/17 0958 05/03/17 " PT: Orders received, chart reviewed, discussed with charge RN in rounds Pt admitted with COVID pneumonia. Pt is ambulating with SBA, steady on his feet and moving safely. No concerns from nursing. Pt has no skilled PT needs at this time. PT to complete orders.     0958 17 0958   107/74 101 19 97.7 °F (36.5 °C) 97 %     Physical Exam    Nursing note and vitals reviewed.  Constitutional: She appears well-developed and well-nourished.   HENT:   Head: Normocephalic and atraumatic.   Eyes: Conjunctivae and EOM are normal. Pupils are equal, round, and reactive to light.   Neck: Normal range of motion. Neck supple.   Cardiovascular: Normal rate, regular rhythm, normal heart sounds and intact distal pulses.   Pulmonary/Chest: Breath sounds normal.   Abdominal: Soft. She exhibits distension. There is no rebound and no guarding.   Mild left upper abdominal tenderness   Musculoskeletal: Normal range of motion.   1+ bilateral LE edema   Neurological: She is alert and oriented to person, place, and time. She has normal strength and normal reflexes.   Skin: Skin is warm and dry.   Psychiatric: She has a normal mood and affect. Her behavior is normal. Thought content normal.         ED Course   Procedures  Labs Reviewed   CBC W/ AUTO DIFFERENTIAL - Abnormal; Notable for the following:        Result Value    MCH 31.5 (*)     Eos # 0.6 (*)     All other components within normal limits   URINALYSIS - Abnormal; Notable for the following:     Specific Gravity, UA <=1.005 (*)     Glucose, UA Trace (*)     Occult Blood UA 1+ (*)     Nitrite, UA Positive (*)     All other components within normal limits   COMPREHENSIVE METABOLIC PANEL   PROTIME-INR   B-TYPE NATRIURETIC PEPTIDE   LIPASE   URINALYSIS MICROSCOPIC             Medical Decision Makin:48 PM  Case discussed with Dr. Church.  Dr. Church will come to evaluate pt.                    ED Course     Clinical Impression:   The primary encounter diagnosis was Pain of upper abdomen. Diagnoses of Constipation due to pain medication and Urinary retention were also pertinent to this visit.          Bridger Patrick NP  [phone]

## 2021-12-26 ENCOUNTER — HOSPITAL ENCOUNTER (EMERGENCY)
Facility: HOSPITAL | Age: 51
Discharge: HOME | End: 2021-12-26
Attending: EMERGENCY MEDICINE
Payer: MEDICAID

## 2021-12-26 VITALS
HEART RATE: 90 BPM | BODY MASS INDEX: 26.43 KG/M2 | SYSTOLIC BLOOD PRESSURE: 124 MMHG | RESPIRATION RATE: 16 BRPM | WEIGHT: 154 LBS | OXYGEN SATURATION: 98 % | DIASTOLIC BLOOD PRESSURE: 80 MMHG | TEMPERATURE: 98 F

## 2021-12-26 DIAGNOSIS — M25.551 RIGHT HIP PAIN: Primary | ICD-10-CM

## 2021-12-26 DIAGNOSIS — M79.606 LEG PAIN: ICD-10-CM

## 2021-12-26 PROCEDURE — 25000003 PHARM REV CODE 250: Performed by: NURSE PRACTITIONER

## 2021-12-26 PROCEDURE — 99284 EMERGENCY DEPT VISIT MOD MDM: CPT | Mod: 25

## 2021-12-26 PROCEDURE — 63600175 PHARM REV CODE 636 W HCPCS: Performed by: NURSE PRACTITIONER

## 2021-12-26 PROCEDURE — 96372 THER/PROPH/DIAG INJ SC/IM: CPT

## 2021-12-26 RX ORDER — ONDANSETRON 8 MG/1
8 TABLET, ORALLY DISINTEGRATING ORAL
Status: COMPLETED | OUTPATIENT
Start: 2021-12-26 | End: 2021-12-26

## 2021-12-26 RX ORDER — MORPHINE SULFATE 4 MG/ML
4 INJECTION, SOLUTION INTRAMUSCULAR; INTRAVENOUS
Status: COMPLETED | OUTPATIENT
Start: 2021-12-26 | End: 2021-12-26

## 2021-12-26 RX ORDER — METFORMIN HYDROCHLORIDE 500 MG/1
500 TABLET, EXTENDED RELEASE ORAL 2 TIMES DAILY
COMMUNITY
Start: 2021-12-20

## 2021-12-26 RX ORDER — ATORVASTATIN CALCIUM 10 MG/1
10 TABLET, FILM COATED ORAL DAILY
COMMUNITY
Start: 2021-12-22

## 2021-12-26 RX ADMIN — ONDANSETRON 8 MG: 8 TABLET, ORALLY DISINTEGRATING ORAL at 10:12

## 2021-12-26 RX ADMIN — MORPHINE SULFATE 4 MG: 4 INJECTION INTRAVENOUS at 10:12

## 2021-12-26 NOTE — ED PROVIDER NOTES
"Encounter Date: 12/26/2021       History     Chief Complaint   Patient presents with    Hip Pain     Right hip pain. Knows she needs surgery to fix hardware issue in hip from replacement last year      51 year old female with complaint of worsening right hip pain X one month. Pt has history of hip replacements and pt reports she was told hardware in hip is moving and she will need the hardware replaced. Pt reports pain has worsened over the past few days and requesting pain shot.  Pt denies recent fall or injury.         Review of patient's allergies indicates:   Allergen Reactions    Codeine     Corticosteroids (glucocorticoids)      "sets off my anxiety real bad"  "sets off my anxiety real bad"    Tylenol-codeine [acetaminophen-codeine] Itching    Tramadol Rash     Past Medical History:   Diagnosis Date    Anxiety     Depression     GERD (gastroesophageal reflux disease)     Osteonecrosis of right hip 2019    Tobacco use      Past Surgical History:   Procedure Laterality Date    BLADDER REPAIR      CENTRAL LINE  6/28/2017         CHOLECYSTECTOMY  04/07/2017    KNEE ARTHROPLASTY      PARTIAL HYSTERECTOMY      TUBAL LIGATION       Family History   Problem Relation Age of Onset    Hypertension Mother     Diabetes Mother     Hypoglycemic Father     Lung cancer Father     COPD Father      Social History     Tobacco Use    Smoking status: Current Every Day Smoker     Packs/day: 1.00     Years: 29.00     Pack years: 29.00     Types: Cigarettes    Smokeless tobacco: Never Used    Tobacco comment: began smoking 1990.    Substance Use Topics    Alcohol use: Yes     Comment: special occasions only, 3-4 times a year 1 glass of wine    Drug use: No     Review of Systems   Constitutional: Negative for fever.   HENT: Negative for sore throat.    Respiratory: Negative for shortness of breath.    Cardiovascular: Negative for chest pain.   Gastrointestinal: Negative for nausea.   Genitourinary: Negative " for dysuria.   Musculoskeletal: Negative for back pain.        Right hip pain    Skin: Negative for rash.   Neurological: Negative for weakness.   Hematological: Does not bruise/bleed easily.       Physical Exam     Initial Vitals [12/26/21 1034]   BP Pulse Resp Temp SpO2   122/82 102 18 98.1 °F (36.7 °C) 97 %      MAP       --         Physical Exam    Nursing note and vitals reviewed.  Constitutional: She appears well-developed and well-nourished.   HENT:   Head: Normocephalic and atraumatic.   Eyes: Conjunctivae and EOM are normal. Pupils are equal, round, and reactive to light.   Neck: Neck supple.   Normal range of motion.  Cardiovascular: Normal rate, regular rhythm, normal heart sounds and intact distal pulses.   Pulmonary/Chest: Breath sounds normal.   Abdominal: Abdomen is soft. There is no abdominal tenderness. There is no rebound and no guarding.   Musculoskeletal:         General: Normal range of motion.      Cervical back: Normal range of motion and neck supple.      Comments: Right lateral hip tenderness, pain with ROM of right hip, ambulates with limp     Neurological: She is alert and oriented to person, place, and time. She has normal strength and normal reflexes.   Skin: Skin is warm and dry.   Psychiatric: She has a normal mood and affect. Her behavior is normal. Thought content normal.         ED Course   Procedures  Labs Reviewed - No data to display       Imaging Results          X-Ray Pelvis Routine AP (In process)    Procedure changed from X-Ray Hip 2 or 3 views Right (with Pelvis when performed)                X-Ray Femur Ap/Lat Right (In process)               X-Rays:   Independently Interpreted Readings:   Other Readings:  Right hip:neg  Right femur: neg    Medications   morphine injection 4 mg (4 mg Intramuscular Given 12/26/21 1053)   ondansetron disintegrating tablet 8 mg (8 mg Oral Given 12/26/21 1053)                          Clinical Impression:   Final diagnoses:  [M79.606] Leg  pain  [M25.551] Right hip pain (Primary)          ED Disposition Condition    Discharge Stable        ED Prescriptions     None        Follow-up Information     Follow up With Specialties Details Why Contact Info    Robby Dos Santos NP Family Medicine Schedule an appointment as soon as possible for a visit  As needed [id number] [facility] [address]  [phone]             Bridger Patrick NP  12/26/21 1111

## 2022-01-07 ENCOUNTER — HOSPITAL ENCOUNTER (EMERGENCY)
Facility: HOSPITAL | Age: 52
Discharge: HOME | End: 2022-01-07
Attending: EMERGENCY MEDICINE
Payer: MEDICAID

## 2022-01-07 VITALS
BODY MASS INDEX: 25.14 KG/M2 | RESPIRATION RATE: 18 BRPM | HEIGHT: 64 IN | TEMPERATURE: 98 F | SYSTOLIC BLOOD PRESSURE: 141 MMHG | OXYGEN SATURATION: 97 % | HEART RATE: 110 BPM | WEIGHT: 147.25 LBS | DIASTOLIC BLOOD PRESSURE: 95 MMHG

## 2022-01-07 DIAGNOSIS — K04.7 DENTAL ABSCESS: Primary | ICD-10-CM

## 2022-01-07 PROCEDURE — 96372 THER/PROPH/DIAG INJ SC/IM: CPT

## 2022-01-07 PROCEDURE — 99284 EMERGENCY DEPT VISIT MOD MDM: CPT | Mod: 25

## 2022-01-07 PROCEDURE — 63600175 PHARM REV CODE 636 W HCPCS: Performed by: NURSE PRACTITIONER

## 2022-01-07 PROCEDURE — 25000003 PHARM REV CODE 250: Performed by: NURSE PRACTITIONER

## 2022-01-07 RX ORDER — KETOROLAC TROMETHAMINE 30 MG/ML
15 INJECTION, SOLUTION INTRAMUSCULAR; INTRAVENOUS
Status: COMPLETED | OUTPATIENT
Start: 2022-01-07 | End: 2022-01-07

## 2022-01-07 RX ORDER — CLINDAMYCIN PHOSPHATE 150 MG/ML
600 INJECTION, SOLUTION INTRAVENOUS
Status: COMPLETED | OUTPATIENT
Start: 2022-01-07 | End: 2022-01-07

## 2022-01-07 RX ORDER — CLINDAMYCIN HYDROCHLORIDE 150 MG/1
450 CAPSULE ORAL 3 TIMES DAILY
Qty: 63 CAPSULE | Refills: 0 | Status: SHIPPED | OUTPATIENT
Start: 2022-01-07 | End: 2022-01-14

## 2022-01-07 RX ORDER — HYDROCODONE BITARTRATE AND ACETAMINOPHEN 5; 325 MG/1; MG/1
1 TABLET ORAL EVERY 6 HOURS PRN
Qty: 8 TABLET | Refills: 0 | Status: SHIPPED | OUTPATIENT
Start: 2022-01-07

## 2022-01-07 RX ADMIN — KETOROLAC TROMETHAMINE 15 MG: 30 INJECTION, SOLUTION INTRAMUSCULAR at 01:01

## 2022-01-07 RX ADMIN — CLINDAMYCIN PHOSPHATE 600 MG: 150 INJECTION, SOLUTION INTRAVENOUS at 01:01

## 2022-01-07 NOTE — ED PROVIDER NOTES
"     HISTORY     Chief Complaint   Patient presents with    Abscess     Dental abcess left upper with facial swelling and redness     Review of patient's allergies indicates:   Allergen Reactions    Codeine     Corticosteroids (glucocorticoids)      "sets off my anxiety real bad"  "sets off my anxiety real bad"    Tylenol-codeine [acetaminophen-codeine] Itching    Tramadol Rash        HPI   The history is provided by the patient. No  was used.   Dental Pain  The primary symptoms include mouth pain. Primary symptoms do not include dental injury, oral bleeding, headaches, fever, shortness of breath, sore throat or angioedema. The symptoms began just prior to arrival. The symptoms are worsening. The symptoms are new.   Mouth pain occurs constantly.   Additional symptoms include: gum swelling, gum tenderness and facial swelling. Additional symptoms do not include: dental sensitivity to temperature, trismus, trouble swallowing, pain with swallowing, excessive salivation, dry mouth, taste disturbance, smell disturbance, drooling, ear pain, hearing loss, nosebleeds, swollen glands, goiter and fatigue.        PCP: Robby Dos Santos NP     Past Medical History:  Past Medical History:   Diagnosis Date    Anxiety     Depression     GERD (gastroesophageal reflux disease)     Osteonecrosis of right hip 2019    Tobacco use         Past Surgical History:  Past Surgical History:   Procedure Laterality Date    BLADDER REPAIR      CENTRAL LINE  6/28/2017         CHOLECYSTECTOMY  04/07/2017    KNEE ARTHROPLASTY      PARTIAL HYSTERECTOMY      TUBAL LIGATION          Family History:  Family History   Problem Relation Age of Onset    Hypertension Mother     Diabetes Mother     Hypoglycemic Father     Lung cancer Father     COPD Father         Social History:  Social History     Tobacco Use    Smoking status: Current Every Day Smoker     Packs/day: 1.00     Years: 29.00     Pack years: 29.00     " Types: Cigarettes    Smokeless tobacco: Never Used    Tobacco comment: began smoking 1990.    Substance and Sexual Activity    Alcohol use: Yes     Comment: special occasions only, 3-4 times a year 1 glass of wine    Drug use: No    Sexual activity: Yes     Partners: Male     Comment:          ROS   Review of Systems   Constitutional: Negative for fatigue and fever.   HENT: Positive for facial swelling. Negative for drooling, ear pain, hearing loss, nosebleeds, sore throat and trouble swallowing.    Respiratory: Negative for shortness of breath.    Cardiovascular: Negative for chest pain.   Gastrointestinal: Negative for nausea.   Genitourinary: Negative for dysuria.   Musculoskeletal: Negative for back pain.   Skin: Negative for rash.   Neurological: Negative for weakness and headaches.   Hematological: Does not bruise/bleed easily.       PHYSICAL EXAM     Initial Vitals [01/07/22 1151]   BP Pulse Resp Temp SpO2   (!) 141/95 110 18 98.1 °F (36.7 °C) 97 %      MAP       --           Physical Exam    Nursing note and vitals reviewed.  Constitutional: She appears well-developed and well-nourished. She is not diaphoretic. No distress.   HENT:   Head: Normocephalic and atraumatic.   Left upper dental swelling with erythema around 2 molars. No drainage. No trismus    Eyes: Right eye exhibits no discharge. Left eye exhibits no discharge.   Neck: Neck supple.   Normal range of motion.  Cardiovascular: Normal rate.   Pulmonary/Chest: No respiratory distress.   Abdominal: Abdomen is soft. She exhibits no distension.   Musculoskeletal:         General: Normal range of motion.      Cervical back: Normal range of motion and neck supple.     Neurological: She is alert and oriented to person, place, and time. She has normal strength.   Skin: Skin is warm and dry.   Psychiatric: She has a normal mood and affect. Her behavior is normal. Thought content normal.          ED COURSE   Procedures  ED ONGOING VITALS:  Vitals:  "   01/07/22 1151   BP: (!) 141/95   Pulse: 110   Resp: 18   Temp: 98.1 °F (36.7 °C)   TempSrc: Oral   SpO2: 97%   Weight: 66.8 kg (147 lb 4.3 oz)   Height: 5' 4" (1.626 m)         ABNORMAL LAB VALUES:  Labs Reviewed - No data to display      ALL LAB VALUES:  none    RADIOLOGY STUDIES:  Imaging Results    None                   The above vital signs and test results have been reviewed by the emergency provider.     ED Medications:  Medications   ketorolac injection 15 mg (has no administration in time range)   clindamycin injection 600 mg (has no administration in time range)       Current Discharge Medication List        Discharge Medications:  New Prescriptions    CLINDAMYCIN (CLEOCIN) 150 MG CAPSULE    Take 3 capsules (450 mg total) by mouth 3 (three) times daily. for 7 days    HYDROCODONE-ACETAMINOPHEN (NORCO) 5-325 MG PER TABLET    Take 1 tablet by mouth every 6 (six) hours as needed for Pain.       Follow-up Information     Schedule an appointment as soon as possible for a visit  with dentist of choice.                    12:51 PM    I discussed with patient and/or family/caretaker that evaluation in the ED does not suggest any emergent or life threatening medical conditions requiring immediate intervention beyond what was provided in the ED, and I believe patient is safe for discharge. Regardless, an unremarkable evaluation in the ED does not preclude the development or presence of a serious or life threatening condition. As such, patient was instructed to return immediately for any worsening or change in current symptoms.        MEDICAL DECISION MAKING                 CLINICAL IMPRESSION       ICD-10-CM ICD-9-CM   1. Dental abscess  K04.7 522.5       Disposition:   Disposition: Discharged  Condition: Stable         Gerald Hogan NP  01/07/22 1306    "

## 2022-06-21 ENCOUNTER — HOSPITAL ENCOUNTER (EMERGENCY)
Facility: HOSPITAL | Age: 52
Discharge: HOME | End: 2022-06-21
Attending: EMERGENCY MEDICINE
Payer: MEDICAID

## 2022-06-21 VITALS
TEMPERATURE: 98 F | WEIGHT: 147.38 LBS | HEART RATE: 90 BPM | HEIGHT: 64 IN | OXYGEN SATURATION: 98 % | SYSTOLIC BLOOD PRESSURE: 124 MMHG | DIASTOLIC BLOOD PRESSURE: 70 MMHG | BODY MASS INDEX: 25.16 KG/M2 | RESPIRATION RATE: 18 BRPM

## 2022-06-21 DIAGNOSIS — M25.551 RIGHT HIP PAIN: ICD-10-CM

## 2022-06-21 PROCEDURE — 99283 EMERGENCY DEPT VISIT LOW MDM: CPT

## 2022-06-21 NOTE — ED PROVIDER NOTES
"     HISTORY     Chief Complaint   Patient presents with    Hip Pain     Pt complaining of R hip pain after moving out a refrigerator ; hx of hip replacement     Review of patient's allergies indicates:   Allergen Reactions    Codeine     Corticosteroids (glucocorticoids)      "sets off my anxiety real bad"  "sets off my anxiety real bad"    Tylenol-codeine [acetaminophen-codeine] Itching    Tramadol Rash        HPI   51-year-old female presents emergency department with right hip pain.  Patient reports she was moving a refrigerator earlier today when she started having pain in her right hip.  Patient reports a history of bilateral hip replacement.  Patient denies any fever, chills, chest pain, back pain, abdominal pain, and all other concerns at this time.    The history is provided by the patient. No  was used.        PCP: Robby Dos Santos NP     Past Medical History:  Past Medical History:   Diagnosis Date    Anxiety     Depression     GERD (gastroesophageal reflux disease)     Osteonecrosis of right hip 2019    Tobacco use         Past Surgical History:  Past Surgical History:   Procedure Laterality Date    BLADDER REPAIR      CENTRAL LINE  6/28/2017         CHOLECYSTECTOMY  04/07/2017    KNEE ARTHROPLASTY      PARTIAL HYSTERECTOMY      TUBAL LIGATION          Family History:  Family History   Problem Relation Age of Onset    Hypertension Mother     Diabetes Mother     Hypoglycemic Father     Lung cancer Father     COPD Father         Social History:  Social History     Tobacco Use    Smoking status: Current Every Day Smoker     Packs/day: 1.00     Years: 29.00     Pack years: 29.00     Types: Cigarettes    Smokeless tobacco: Never Used    Tobacco comment: began smoking 1990.    Substance and Sexual Activity    Alcohol use: Yes     Comment: special occasions only, 3-4 times a year 1 glass of wine    Drug use: No    Sexual activity: Yes     Partners: Male     Comment: " "         ROS   Review of Systems   Constitutional: Negative for fever.   HENT: Negative for sore throat.    Respiratory: Negative for shortness of breath.    Cardiovascular: Negative for chest pain.   Gastrointestinal: Negative for nausea.   Genitourinary: Negative for dysuria.   Musculoskeletal: Positive for arthralgias. Negative for back pain.   Skin: Negative for rash.   Neurological: Negative for weakness.   Hematological: Does not bruise/bleed easily.       PHYSICAL EXAM     Initial Vitals [06/21/22 1827]   BP Pulse Resp Temp SpO2   127/77 91 18 97.8 °F (36.6 °C) 97 %      MAP       --           Physical Exam    Nursing note and vitals reviewed.  Constitutional: She appears well-developed and well-nourished. She is not diaphoretic. No distress.   HENT:   Head: Normocephalic and atraumatic.   Eyes: Right eye exhibits no discharge. Left eye exhibits no discharge.   Neck: Neck supple.   Normal range of motion.  Cardiovascular: Normal rate.   Pulmonary/Chest: No respiratory distress.   Abdominal: Abdomen is soft. She exhibits no distension.   Musculoskeletal:         General: Normal range of motion.      Cervical back: Normal range of motion and neck supple.      Comments: Patient able ambulate without any difficulty.  Distal pulses in the right lower extremity 2+.  There is no obvious deformity.     Neurological: She is alert and oriented to person, place, and time. She has normal strength.   Skin: Skin is warm and dry.   Psychiatric: She has a normal mood and affect. Her behavior is normal. Thought content normal.          ED COURSE   Procedures  ED ONGOING VITALS:  Vitals:    06/21/22 1827 06/21/22 2000   BP: 127/77 124/70   Pulse: 91 90   Resp: 18 18   Temp: 97.8 °F (36.6 °C) 97.8 °F (36.6 °C)   TempSrc: Oral Oral   SpO2: 97% 98%   Weight: 66.8 kg (147 lb 6 oz)    Height: 5' 4" (1.626 m)          ABNORMAL LAB VALUES:  Labs Reviewed - No data to display      ALL LAB VALUES:  none    RADIOLOGY " STUDIES:  Imaging Results          X-Ray Hip 2 or 3 views Right (with Pelvis when performed) (Final result)  Result time 06/21/22 19:21:37    Final result by Martir Navarrete MD (06/21/22 19:21:37)                 Impression:      No acute fracture or dislocation.      Electronically signed by: Martir Navarrete MD  Date:    06/21/2022  Time:    19:21             Narrative:    EXAMINATION:  XR HIP WITH PELVIS WHEN PERFORMED, 2 OR 3  VIEWS RIGHT    CLINICAL HISTORY:  XR HIP WITH PELVIS WHEN PERFORMED, 2 OR 3  VIEWS RIGHTPain in right hip    COMPARISON:  12/17/2020    FINDINGS:  Three views of the right hip were obtained.    Bilateral hip replacement without evidence of dislocation.  Mild osteopenia.  No evidence of acute fracture or dislocation.  Soft tissues are unremarkable. Mild constipation.                                          The above vital signs and test results have been reviewed by the emergency provider.     ED Medications:  Current Discharge Medication List        Discharge Medications:  Discharge Medication List as of 6/21/2022  7:45 PM          Follow-up Information     Robby Dos Santos NP.    Specialty: Family Medicine  Why: As needed  Contact information:  [address] [facility] [address]  [phone]             [facility] - Emergency Dept..    Specialty: Emergency Medicine  Why: If symptoms worsen  Contact information:  [address] [place] [address] [phone]                      7:45 PM    I discussed with patient and/or family/caretaker that evaluation in the ED does not suggest any emergent or life threatening medical conditions requiring immediate intervention beyond what was provided in the ED, and I believe patient is safe for discharge. Regardless, an unremarkable evaluation in the ED does not preclude the development or presence of a serious or life threatening condition. As such, patient was instructed to return immediately for any worsening or change in  current symptoms.      MEDICAL DECISION MAKING                 CLINICAL IMPRESSION       ICD-10-CM ICD-9-CM   1. Right hip pain  M25.551 719.45       Disposition:   Disposition: Discharged  Condition: Stable         Gerald Hogan NP  06/21/22 2010

## 2022-06-22 NOTE — ED NOTES
Bed: RWR 01  Expected date:   Expected time:   Means of arrival: Personal Transportation  Comments:     Gerald Hogan NP  06/21/22 8717

## 2023-04-10 ENCOUNTER — HOSPITAL ENCOUNTER (EMERGENCY)
Facility: HOSPITAL | Age: 53
Discharge: LEFT BEFORE DISCHARGE | End: 2023-04-10
Payer: MEDICAID

## 2023-04-10 VITALS
OXYGEN SATURATION: 99 % | SYSTOLIC BLOOD PRESSURE: 156 MMHG | TEMPERATURE: 98 F | HEIGHT: 64 IN | DIASTOLIC BLOOD PRESSURE: 87 MMHG | BODY MASS INDEX: 27.68 KG/M2 | HEART RATE: 93 BPM | RESPIRATION RATE: 20 BRPM | WEIGHT: 162.13 LBS

## 2023-04-10 LAB
GROUP A STREP, MOLECULAR: NEGATIVE
INFLUENZA A, MOLECULAR: NEGATIVE
INFLUENZA B, MOLECULAR: NEGATIVE
SARS-COV-2 RDRP RESP QL NAA+PROBE: NEGATIVE
SPECIMEN SOURCE: NORMAL

## 2023-04-10 PROCEDURE — 87651 STREP A DNA AMP PROBE: CPT | Performed by: NURSE PRACTITIONER

## 2023-04-10 PROCEDURE — U0002 COVID-19 LAB TEST NON-CDC: HCPCS | Performed by: NURSE PRACTITIONER

## 2023-04-10 PROCEDURE — 99283 EMERGENCY DEPT VISIT LOW MDM: CPT | Mod: 25

## 2023-04-10 PROCEDURE — 87502 INFLUENZA DNA AMP PROBE: CPT | Performed by: NURSE PRACTITIONER

## 2023-04-11 NOTE — FIRST PROVIDER EVALUATION
"Medical screening examination initiated.  I have conducted a focused provider triage encounter, findings are as follows:    Brief history of present illness:  52-year-old female presents to emergency room with cough sore throat hoarseness for 3 days.  Patient states she is been exposed to strep.    Vitals:    04/10/23 2031   BP: (!) 156/87   BP Location: Right arm   Patient Position: Sitting   Pulse: 93   Resp: 20   Temp: 98.4 °F (36.9 °C)   TempSrc: Oral   SpO2: 99%   Weight: 73.5 kg (162 lb 2.4 oz)   Height: 5' 4" (1.626 m)       Pertinent physical exam:  Vital signs stable no acute distress posterior pharynx erythematous with no swelling or exudate    Brief workup plan:  Swabs    Preliminary workup initiated; this workup will be continued and followed by the physician or advanced practice provider that is assigned to the patient when roomed.  "

## 2023-10-20 NOTE — ASSESSMENT & PLAN NOTE
Collateral provided further concern for chronic overuse of prescription medications. Both son and fiance thought inpatient psychiatry admit would benefit patient.     1. Dispo/Legal Status:  Patient signed in [facility].    2. Scheduled Medications:  -continue Prozac 40 mg daily and Latuda 20 mg  -Ativan .5 mg PO BID for anxiety; discussed plan with patient to taper off  -in the setting of recent possible NMS and the history of chronic overuse of seroquel, will NOT re-start seroquel at this time    3. PRN Medications:   -due to withdrawal symptoms, including increased BP, would recommend Ativan .5 mg PO BID PRN anxiety and recommend Ativan 2mg PO/IM PRN withdrawal precautions, 2+ of the following                        -Sys BP> 160                        -Ericka BP> 106                        -Pulse> 110                        -visible gross tremor                        -diaphoresis    4. Precautions/Nursing:  Suicide precautions    5. Case Discussed with: Dr. Loyd     no

## 2025-01-07 ENCOUNTER — HOSPITAL ENCOUNTER (EMERGENCY)
Facility: HOSPITAL | Age: 55
Discharge: HOME | End: 2025-01-07
Attending: EMERGENCY MEDICINE
Payer: MEDICAID

## 2025-01-07 VITALS
DIASTOLIC BLOOD PRESSURE: 90 MMHG | SYSTOLIC BLOOD PRESSURE: 142 MMHG | RESPIRATION RATE: 19 BRPM | OXYGEN SATURATION: 97 % | HEART RATE: 119 BPM | TEMPERATURE: 98 F

## 2025-01-07 DIAGNOSIS — H92.01 EAR PAIN, RIGHT: Primary | ICD-10-CM

## 2025-01-07 DIAGNOSIS — H60.501 ACUTE OTITIS EXTERNA OF RIGHT EAR, UNSPECIFIED TYPE: ICD-10-CM

## 2025-01-07 PROCEDURE — 96372 THER/PROPH/DIAG INJ SC/IM: CPT | Performed by: NURSE PRACTITIONER

## 2025-01-07 PROCEDURE — 25000003 PHARM REV CODE 250: Performed by: NURSE PRACTITIONER

## 2025-01-07 PROCEDURE — 99284 EMERGENCY DEPT VISIT MOD MDM: CPT | Mod: 25

## 2025-01-07 PROCEDURE — 63600175 PHARM REV CODE 636 W HCPCS: Performed by: NURSE PRACTITIONER

## 2025-01-07 RX ORDER — AMOXICILLIN AND CLAVULANATE POTASSIUM 875; 125 MG/1; MG/1
1 TABLET, FILM COATED ORAL 2 TIMES DAILY
Qty: 20 TABLET | Refills: 0 | Status: SHIPPED | OUTPATIENT
Start: 2025-01-07

## 2025-01-07 RX ORDER — HYDROCODONE BITARTRATE AND ACETAMINOPHEN 5; 325 MG/1; MG/1
1 TABLET ORAL
Status: COMPLETED | OUTPATIENT
Start: 2025-01-07 | End: 2025-01-07

## 2025-01-07 RX ORDER — ONDANSETRON 4 MG/1
4 TABLET, ORALLY DISINTEGRATING ORAL
Status: COMPLETED | OUTPATIENT
Start: 2025-01-07 | End: 2025-01-07

## 2025-01-07 RX ORDER — KETOROLAC TROMETHAMINE 10 MG/1
10 TABLET, FILM COATED ORAL EVERY 6 HOURS PRN
Qty: 20 TABLET | Refills: 0 | Status: SHIPPED | OUTPATIENT
Start: 2025-01-07 | End: 2025-01-12

## 2025-01-07 RX ORDER — OFLOXACIN 3 MG/ML
5 SOLUTION AURICULAR (OTIC) 2 TIMES DAILY
Qty: 10 ML | Refills: 0 | Status: SHIPPED | OUTPATIENT
Start: 2025-01-07 | End: 2025-01-14

## 2025-01-07 RX ORDER — CEFTRIAXONE 1 G/1
1 INJECTION, POWDER, FOR SOLUTION INTRAMUSCULAR; INTRAVENOUS
Status: COMPLETED | OUTPATIENT
Start: 2025-01-07 | End: 2025-01-07

## 2025-01-07 RX ADMIN — HYDROCODONE BITARTRATE AND ACETAMINOPHEN 1 TABLET: 5; 325 TABLET ORAL at 01:01

## 2025-01-07 RX ADMIN — ONDANSETRON 4 MG: 4 TABLET, ORALLY DISINTEGRATING ORAL at 01:01

## 2025-01-07 RX ADMIN — CEFTRIAXONE 1 G: 1 INJECTION, POWDER, FOR SOLUTION INTRAMUSCULAR; INTRAVENOUS at 01:01

## 2025-01-07 NOTE — ED PROVIDER NOTES
"Encounter Date: 1/7/2025       History     Chief Complaint   Patient presents with    Otitis Media     C/o ear infection. On antibiotics x's 3 days. States no relief from symptoms after 3 days of antibiotics.      54-year-old female who presents to ER complaining of pain and drainage to right ear for 2 days.  Reports no relief with amoxicillin.  Denies fever.  Denies decreased hearing.  Denies injury or trauma.        Review of patient's allergies indicates:   Allergen Reactions    Corticosteroids (glucocorticoids) Anxiety and Anaphylaxis     "sets off my anxiety real bad"    Codeine     Penicillins      Rash     Acetaminophen-codeine Itching and Rash    Tramadol Rash     Past Medical History:   Diagnosis Date    Anxiety     Depression     GERD (gastroesophageal reflux disease)     Osteonecrosis of right hip 2019    Tobacco use      Past Surgical History:   Procedure Laterality Date    BLADDER REPAIR      CENTRAL LINE  6/28/2017         CHOLECYSTECTOMY  04/07/2017    KNEE ARTHROPLASTY      PARTIAL HYSTERECTOMY      TUBAL LIGATION       Family History   Problem Relation Name Age of Onset    Hypertension Mother      Diabetes Mother      Hypoglycemic Father      Lung cancer Father      COPD Father       Social History     Tobacco Use    Smoking status: Every Day     Current packs/day: 1.00     Average packs/day: 1 pack/day for 29.0 years (29.0 ttl pk-yrs)     Types: Cigarettes    Smokeless tobacco: Never    Tobacco comments:     began smoking 1990.    Substance Use Topics    Alcohol use: Yes     Comment: special occasions only, 3-4 times a year 1 glass of wine    Drug use: No     Review of Systems   Constitutional:  Negative for fever.   HENT:  Positive for ear pain. Negative for sore throat.    Respiratory:  Negative for shortness of breath.    Cardiovascular:  Negative for chest pain.   Gastrointestinal:  Negative for nausea.   Genitourinary:  Negative for dysuria.   Musculoskeletal:  Negative for back pain.   Skin:  " Negative for rash.   Neurological:  Negative for weakness.   Hematological:  Does not bruise/bleed easily.       Physical Exam     Initial Vitals [01/07/25 0022]   BP Pulse Resp Temp SpO2   (!) 142/90 (!) 119 (!) 22 98 °F (36.7 °C) 97 %      MAP       --         Physical Exam    Nursing note and vitals reviewed.  Constitutional: She appears well-developed and well-nourished.   HENT:   Head: Normocephalic.   Right Ear: Hearing, tympanic membrane and external ear normal. There is drainage, swelling and tenderness.   Left Ear: Tympanic membrane, external ear and ear canal normal.   Eyes: Conjunctivae are normal.   Neck: Neck supple.   Cardiovascular:  Normal rate and regular rhythm.           Pulmonary/Chest: Effort normal and breath sounds normal. No respiratory distress.   Abdominal: She exhibits no distension. There is no abdominal tenderness.   Musculoskeletal:         General: Normal range of motion.      Cervical back: Neck supple.     Neurological: She is alert and oriented to person, place, and time. No cranial nerve deficit or sensory deficit. GCS eye subscore is 4. GCS verbal subscore is 5. GCS motor subscore is 6.   Skin: Skin is warm, dry and intact. Capillary refill takes less than 2 seconds.   Psychiatric: She has a normal mood and affect.         ED Course   Procedures  Labs Reviewed   HEPATITIS C ANTIBODY   HEP C VIRUS HOLD SPECIMEN   HIV 1 / 2 ANTIBODY          Imaging Results    None          Medications   cefTRIAXone injection 1 g (1 g Intramuscular Given 1/7/25 0123)   HYDROcodone-acetaminophen 5-325 mg per tablet 1 tablet (1 tablet Oral Given 1/7/25 0122)   ondansetron disintegrating tablet 4 mg (4 mg Oral Given 1/7/25 0122)     Medical Decision Making  Risk  Prescription drug management.                     1:07 AM  Patient presents to ER for evaluation of right ear pain.  Patient comfortable.  Afebrile.  Nontoxic appearing.  Vital signs stable.  Exam is consistent with otitis externa.  Patient  reports she is able to tolerate amoxicillin.  We will discharge home with Augmentin, ofloxacin drops, and Toradol.  Advised to follow up with ENT if symptoms do not improve.  Patient verbalized understanding and is in agreement with treatment plan.  Stable for discharge.  Differential diagnosis include TM perforation, acute otitis media, foreign body, TMJ, dental abscess, facial cellulitis.                 Clinical Impression:  Final diagnoses:  [H92.01] Ear pain, right (Primary)  [H60.501] Acute otitis externa of right ear, unspecified type          ED Disposition Condition    Discharge Stable          ED Prescriptions       Medication Sig Dispense Start Date End Date Auth. Provider    ketorolac (TORADOL) 10 mg tablet Take 1 tablet (10 mg total) by mouth every 6 (six) hours as needed for Pain. 20 tablet 1/7/2025 1/12/2025 Joselin Calix NP    amoxicillin-clavulanate 875-125mg (AUGMENTIN) 875-125 mg per tablet Take 1 tablet by mouth 2 (two) times daily. 20 tablet 1/7/2025 -- Joselin Calix NP    ofloxacin (FLOXIN) 0.3 % otic solution Place 5 drops into the right ear 2 (two) times daily. for 7 days 10 mL 1/7/2025 1/14/2025 Joselin Calix NP          Follow-up Information       Follow up With Specialties Details Why Contact Info    Robby Dos Santos NP Family Medicine Schedule an appointment as soon as possible for a visit   [address] [facility] [id number] [phone]      Otolaryngology Otolaryngology Schedule an appointment as soon as possible for a visit   [address] [facility] [address] [phone]             Joselin Calix NP  01/07/25 0129

## 2025-01-07 NOTE — DISCHARGE INSTRUCTIONS
Take medication as prescribed.  Take medication with food.  Follow up with primary care provider or ENT for further evaluation and management.  Return to ER for new or worsening symptoms.

## 2025-01-07 NOTE — Clinical Note
"Kaylene Webb"Kaylene" Rupert was seen and treated in our emergency department on 1/7/2025.  She may return to work on 01/09/2025.       If you have any questions or concerns, please don't hesitate to call.      Joselin Calix, NP"

## 2025-03-18 NOTE — IP AVS SNAPSHOT
[address] [place] [address]           Patient Discharge Instructions   Our goal is to set you up for success. This packet includes information on your condition, medications, and your home care.  It will help you care for yourself to prevent having to return to the hospital.     Please ask your nurse if you have any questions.      There are many details to remember when preparing to leave the hospital. Here is what you will need to do:    1. Take your medicine. If you are prescribed medications, review your Medication List on the following pages. You may have new medications to  at the pharmacy and others that you'll need to stop taking. Review the instructions for how and when to take your medications. Talk with your doctor or nurses if you are unsure of what to do.     2. Go to your follow-up appointments. Specific follow-up information is listed in the following pages. Your may be contacted by a nurse or clinical provider about future appointments. Be sure we have all of the phone numbers to reach you. Please contact your provider's office if you are unable to make an appointment.     3. Watch for warning signs. Your doctor or nurse will give you detailed warning signs to watch for and when to call for assistance. These instructions may also include educational information about your condition. If you experience any of warning signs to your health, call your doctor.               ** Verify the list of medication(s) below is accurate and up to date. Carry this with you in case of emergency. If your medications have changed, please notify your healthcare provider.             Medication List      CONTINUE taking these medications        Additional Info                      esomeprazole 40 MG capsule   Commonly known as:  NEXIUM   Refills:  0   Dose:  40 mg    Instructions:  Take 40 mg by mouth before breakfast.     Begin Date    AM    Noon    PM    Bedtime  Daily Note     Today's date: 3/18/2025  Patient name: Milagro Perales  : 1947  MRN: [id number]  Referring provider: Kwesi Carranza famotidine 40 MG tablet   Commonly known as:  PEPCID   Refills:  0   Dose:  40 mg    Instructions:  Take 40 mg by mouth once daily.     Begin Date    AM    Noon    PM    Bedtime       fluoxetine 40 MG capsule   Commonly known as:  PROZAC   Refills:  0   Dose:  40 mg    Instructions:  Take 40 mg by mouth once daily.     Begin Date    AM    Noon    PM    Bedtime       hydrocodone-acetaminophen 7.5-325mg 7.5-325 mg per tablet   Commonly known as:  NORCO   Quantity:  30 tablet   Refills:  0   Dose:  1 tablet    Last time this was given:  1 tablet on 4/8/2017 10:59 AM   Instructions:  Take 1 tablet by mouth every 4 (four) hours as needed for Pain.     Begin Date    AM    Noon    PM    Bedtime       lorazepam 1 MG tablet   Commonly known as:  ATIVAN   Refills:  0   Dose:  1 mg    Instructions:  Take 1 mg by mouth every 6 (six) hours as needed for Anxiety.     Begin Date    AM    Noon    PM    Bedtime       naproxen 375 MG tablet   Commonly known as:  NAPROSYN   Quantity:  14 tablet   Refills:  0   Dose:  375 mg    Instructions:  Take 1 tablet (375 mg total) by mouth 2 (two) times daily with meals.     Begin Date    AM    Noon    PM    Bedtime       ondansetron 8 MG tablet   Commonly known as:  ZOFRAN   Refills:  0   Dose:  8 mg    Instructions:  Take 8 mg by mouth every 8 (eight) hours.     Begin Date    AM    Noon    PM    Bedtime       promethazine 25 MG tablet   Commonly known as:  PHENERGAN   Quantity:  12 tablet   Refills:  0   Dose:  12.5 mg    Instructions:  Take 0.5 tablets (12.5 mg total) by mouth every 6 (six) hours as needed for Nausea.     Begin Date    AM    Noon    PM    Bedtime       quetiapine 200 MG Tab   Commonly known as:  SEROQUEL   Refills:  0    Instructions:  Take by mouth.     Begin Date    AM    Noon    PM    Bedtime         STOP taking these medications     tramadol 50 mg tablet   Commonly known as:  ULTRAM            Where to Get Your Medications      You can get these medications from any  "Eval/Reval       MD          FOTO       [facility] HEP:  Access Code: [email or web address]  URL: [email or web address]  pharmacy     Bring a paper prescription for each of these medications     hydrocodone-acetaminophen 7.5-325mg 7.5-325 mg per tablet                  Please bring to all follow up appointments:    1. A copy of your discharge instructions.  2. All medicines you are currently taking in their original bottles.  3. Identification and insurance card.    Please arrive 15 minutes ahead of scheduled appointment time.    Please call 24 hours in advance if you must reschedule your appointment and/or time.        Follow-up Information     Follow up with Td Church MD In 3 weeks.    Specialty:  General Surgery    Why:  post op appt, or Ms. Calli Gutierrez on a thursday    Contact information:    [address]  [place] [address] [phone]          Follow up with Td Church MD In 3 weeks.    Specialty:  General Surgery    Why:  post op appt    Contact information:    [address]  [place] [id number] [phone]          Discharge Instructions     Future Orders    Call MD for:  difficulty breathing or increased cough     Call MD for:  persistent nausea and vomiting or diarrhea     Call MD for:  severe uncontrolled pain     Call MD for:  temperature >100.4     Diet general     Questions:    Total calories:      Fat restriction, if any:      Protein restriction, if any:      Na restriction, if any:      Fluid restriction:      Additional restrictions:      Lifting restrictions     Comments:    No lifting more than 30 pounds for 2 weeks        Discharge Instructions       Please call for any fever, increase in pain, nausea or vomiting or redness or drainage from incision(s).    No lifting more than 30 pounds for 2 weeks    May shower once dressings are removed  Remove steri strips as they begin to fall off    If you become constipated from the pain medication you can use over the counter laxatives,  Miralax or Glycolax, or Magnesium Citrate for severe constipation.            Discharge  "References/Attachments     CHOLECYSTECTOMY (LAPAROSCOPIC), DISCHARGE INSTRUCTIONS (ENGLISH)    SMOKE, WHY DO YOU (ENGLISH)    SMOKING CESSATION (ENGLISH)    SMOKING WITHDRAWAL, COPING WITH (ENGLISH)        Primary Diagnosis     Your primary diagnosis was:  Calculus Of Gallbladder With Acute Cholecystitis      Admission Information     Date & Time Provider Department CSN    4/7/2017  8:56 AM Td Church MD [facility] [id number]      Care Providers     Provider Role Specialty Primary office phone    Td Church MD Attending Provider General Surgery [phone]    Td Church MD Surgeon  General Surgery [phone]      Your Vitals Were     BP Pulse Temp Resp Height Weight    115/71 (BP Location: Left arm, Patient Position: Lying, BP Method: Automatic) 74 98.7 °F (37.1 °C) (Oral) 18 5' 4" (1.626 m) 61.7 kg (136 lb)    SpO2 BMI             96% 23.34 kg/m2         Recent Lab Values     No lab values to display.      Pending Labs     Order Current Status    Specimen to Pathology - Surgery Collected (04/07/17 2006)    Specimen to Pathology - Surgery Collected (04/07/17 2007)    Specimen to Pathology - Surgery Collected (04/07/17 2047)      Allergies as of 4/8/2017        Reactions    Tramadol Rash      [facility]     [facility] Nurse Care Line - 24/7 Assistance  Unless otherwise directed by your provider, please contact [facility], our nurse care line that is available for 24/7 assistance.     Registered nurses in the [facility] provide clinical advisement, health education, appointment booking, and other advisory services.  Call for this free service at [phone].        Advance Directives     An advance directive is a document which, in the event you are no longer able to make decisions for yourself, tells your healthcare team what kind of treatment you do or do not want to receive, or who you would like to make those decisions for you.  If you do not " currently have an advance directive, [facility] encourages you to create one.  For more information call:  [phone]), [phone] ([phone]),  or log on to [email or web address].        Smoking Cessation     If you would like to quit smoking:   You may be eligible for free services if you are a [place] resident and started smoking cigarettes before September 1, 1988.  Call the Smoking Cessation [employer] ([facility]) toll free at [phone] or [phone].   Call 2-864-QUIT-NOW if you do not meet the above criteria.   Contact us via email: [email or web address]   View our website for more information: [email or web address]        Language Assistance Services     ATTENTION: Language assistance services are available, free of charge. Please call [phone].      ATENCIÓN: Si habla español, tiene a smith disposición servicios gratuitos de asistencia lingüística. Llame al [phone].     CHÚ Ý: N?u b?n nói Ti?ng Vi?t, có các d?ch v? h? tr? ngôn ng? mi?n phí dành cho b?n. G?i s? [phone].        [facility] Sign-Up     Activating your [facility] account is as easy as 1-2-3!     1) Visit [email or web address], select Sign Up Now, enter this activation code and your date of birth, then select Next.  [email or web address]  Expires: 4/19/2017  1:53 AM      2) Create a username and password to use when you visit [facility] in the future and select a security question in case you lose your password and select Next.    3) Enter your e-mail address and click Sign Up!    Additional Information  If you have questions, please e-mail [email or web address] or call [phone] to talk to our [facility] staff. Remember, [facility] is NOT to be used for urgent needs. For medical emergencies, dial 911.          [facility] complies with applicable Federal civil rights laws and does not discriminate on the basis of race, color, national origin, age, disability, or sex.

## (undated) DEVICE — ELECTRODE ENDOPATH + II 5X34

## (undated) DEVICE — MANIFOLD 4 PORT

## (undated) DEVICE — TUBING HEATED INSUFFLATOR

## (undated) DEVICE — SEE MEDLINE ITEM 146372

## (undated) DEVICE — KIT ANTIFOG

## (undated) DEVICE — SEE MEDLINE ITEM 152739

## (undated) DEVICE — SEE MEDLINE ITEM 152622

## (undated) DEVICE — GAUZE SPONGE 4X4 12PLY

## (undated) DEVICE — CORD LAP 10 DISP

## (undated) DEVICE — APPLIER CLIP ENDO LIGAMAX 5MM

## (undated) DEVICE — SEAL SCOPE WARMER 20/BX

## (undated) DEVICE — APPLICATOR CHLORAPREP ORN 26ML

## (undated) DEVICE — SYR 3CC LUER LOC

## (undated) DEVICE — DRAPE ABDOMINAL TIBURON 14X11

## (undated) DEVICE — SYR 10CC LUER LOCK

## (undated) DEVICE — NDL SAFETY 25G X 1.5 ECLIPSE

## (undated) DEVICE — SUPPORT ULNA NERVE PROTECTOR

## (undated) DEVICE — HEMOSTAT SURGICEL 4X8IN

## (undated) DEVICE — SCISSOR TIPS METAENBAUM LAP

## (undated) DEVICE — ELECTRODE REM PLYHSV RETURN 9

## (undated) DEVICE — POSITIONER HEAD DONUT 9IN FOAM

## (undated) DEVICE — SOL 9P NACL IRR PIC IL

## (undated) DEVICE — TROCAR ENDOPATH XCEL 5X100MM

## (undated) DEVICE — SUT MONOCRYL 4.0 PS2 CP496G

## (undated) DEVICE — ADHESIVE MASTISOL VIAL 48/BX

## (undated) DEVICE — SEE MEDLINE ITEM 157117

## (undated) DEVICE — SOL NS 1000CC

## (undated) DEVICE — DECANTER VIAL ASEPTIC TRANSFER

## (undated) DEVICE — HANDLE PISTOL GRIP HAND CNTRL

## (undated) DEVICE — GLOVE SURG BIOGEL LATEX SZ 7.5

## (undated) DEVICE — SEE MEDLINE ITEM 157027

## (undated) DEVICE — CANNULA ENDOPATH XCEL 5X100MM

## (undated) DEVICE — COVER OVERHEAD SURG LT BLUE

## (undated) DEVICE — NDL ASPIR/INJ 5 MM

## (undated) DEVICE — CONTAINER SPECIMEN STRL 4OZ

## (undated) DEVICE — NDL PNEUMO INSUFFLATI 120MM

## (undated) DEVICE — CLOSURE SKIN STERI STRIP 1/2X4

## (undated) DEVICE — BAG TISS RETRV MONARCH 10MM

## (undated) DEVICE — SYS IRRIG PRESSURIZED SPIKE